# Patient Record
Sex: FEMALE | NOT HISPANIC OR LATINO
[De-identification: names, ages, dates, MRNs, and addresses within clinical notes are randomized per-mention and may not be internally consistent; named-entity substitution may affect disease eponyms.]

---

## 2017-03-21 ENCOUNTER — LAB SERVICES (OUTPATIENT)
Dept: OTHER | Age: 57
End: 2017-03-21

## 2017-03-21 ENCOUNTER — MYAURORA ACCOUNT LINK (OUTPATIENT)
Dept: OTHER | Age: 57
End: 2017-03-21

## 2017-03-21 ENCOUNTER — CHARTING TRANS (OUTPATIENT)
Dept: INTERNAL MEDICINE | Age: 57
End: 2017-03-21

## 2017-03-21 LAB
25(OH)D3 SERPL-MCNC: 21.9 NG/ML (ref 30–100)
ALBUMIN SERPL BCG-MCNC: 4.4 G/DL (ref 3.6–5.1)
ALP SERPL-CCNC: 66 U/L (ref 45–105)
ALT SERPL W/O P-5'-P-CCNC: 7 U/L (ref 7–34)
AST SERPL-CCNC: 13 U/L (ref 9–37)
BILIRUB SERPL-MCNC: 0.2 MG/DL (ref 0–1)
BILIRUBIN URINE: NEGATIVE
BLOOD URINE: ABNORMAL
BUN SERPL-MCNC: 16 MG/DL (ref 7–20)
CALCIUM SERPL-MCNC: 9.4 MG/DL (ref 8.6–10.6)
CHLORIDE SERPL-SCNC: 103 MMOL/L (ref 96–107)
CHOLEST SERPL-MCNC: 247 MG/DL (ref 140–200)
CLARITY: CLEAR
COLOR: YELLOW
CREATININE, SERUM: 1.3 MG/DL (ref 0.5–1.4)
DIFFERENTIAL TYPE: ABNORMAL
EST. AVERAGE GLUCOSE BLD GHB EST-MCNC: 113 MG/DL (ref 0–154)
GFR SERPL CREATININE-BSD FRML MDRD: 44 ML/MIN/{1.73M2}
GFR SERPL CREATININE-BSD FRML MDRD: 53 ML/MIN/{1.73M2}
GLUCOSE P FAST SERPL-MCNC: 92 MG/DL (ref 60–100)
GLUCOSE QUALITATIVE U: NEGATIVE
HBA1C MFR BLD: 5.6 % (ref 4.2–6)
HCO3 SERPL-SCNC: 28 MMOL/L (ref 22–32)
HDLC SERPL-MCNC: 66 MG/DL
HEMATOCRIT: 48.6 % (ref 34–45)
HEMOGLOBIN: 16.2 G/DL (ref 11.2–15.7)
KETONES, URINE: NEGATIVE
LDLC SERPL CALC-MCNC: 164 MG/DL (ref 0–100)
LEUKOCYTE ESTERASE URINE: NEGATIVE
LYMPH PERCENT: 30.7 % (ref 20.5–51.1)
LYMPHOCYTE ABSOLUTE #: 2.6 10*3/UL (ref 1.2–3.4)
MEAN CORPUSCULAR HGB CONCENTRATION: 33.3 % (ref 32–36)
MEAN CORPUSCULAR HGB: 30.1 PG (ref 27–34)
MEAN CORPUSCULAR VOLUME: 90.3 FL (ref 79–95)
MEAN PLATELET VOLUME: 9 FL (ref 8.6–12.4)
MIXED %: 8.1 % (ref 4.3–12.9)
MIXED ABSOLUTE #: 0.7 10*3/UL (ref 0.2–0.9)
NEUTROPHIL ABSOLUTE #: 5.2 10*3/UL (ref 1.4–6.5)
NEUTROPHIL PERCENT: 61.2 % (ref 34–73.5)
NITRITE URINE: NEGATIVE
PH URINE: 6.5 (ref 5–7)
PLATELET COUNT: 238 10*3/UL (ref 150–400)
POTASSIUM SERPL-SCNC: 4.5 MMOL/L (ref 3.5–5.3)
PROT SERPL-MCNC: 7.2 G/DL (ref 6.2–8.1)
RED BLOOD CELL COUNT: 5.38 10*6/UL (ref 3.7–5.2)
RED CELL DISTRIBUTION WIDTH: 14.3 % (ref 11.3–14.8)
RHEUMATOID FACT SERPL-ACNC: <8.6 [IU]/ML
SEDIMENTATION RATE, RBC: 8 MM/H (ref 0–20)
SODIUM SERPL-SCNC: 139 MMOL/L (ref 136–146)
SPECIFIC GRAVITY URINE: 1.01 (ref 1–1.03)
TRIGL SERPL-MCNC: 85 MG/DL (ref 0–200)
TSH SERPL-ACNC: 71.3 M[IU]/L (ref 0.3–4.82)
URINE PROTEIN, QUAL (DIPSTICK): NEGATIVE
UROBILINOGEN URINE: 0.2
WHITE BLOOD CELL COUNT: 8.5 10*3/UL (ref 4–10)

## 2017-03-22 ENCOUNTER — CHARTING TRANS (OUTPATIENT)
Dept: OTHER | Age: 57
End: 2017-03-22

## 2017-03-24 ENCOUNTER — CHARTING TRANS (OUTPATIENT)
Dept: OTHER | Age: 57
End: 2017-03-24

## 2017-03-24 LAB
ANA SER QL IA: NORMAL RATIO (ref 0–0.6)
CCP IGG FLD IA-ACNC: 1.7 U/ML (ref 0–7)
DSDNA IGG SERPL IA-ACNC: NORMAL [IU]/ML (ref 0–10)

## 2017-03-31 ENCOUNTER — CHARTING TRANS (OUTPATIENT)
Dept: OTHER | Age: 57
End: 2017-03-31

## 2017-05-06 ENCOUNTER — LAB SERVICES (OUTPATIENT)
Dept: OTHER | Age: 57
End: 2017-05-06

## 2017-05-06 LAB
DIFFERENTIAL TYPE: ABNORMAL
HEMATOCRIT: 47.9 % (ref 34–45)
HEMOGLOBIN: 15.3 G/DL (ref 11.2–15.7)
LYMPH PERCENT: 29.3 % (ref 20.5–51.1)
LYMPHOCYTE ABSOLUTE #: 2.5 10*3/UL (ref 1.2–3.4)
MEAN CORPUSCULAR HGB CONCENTRATION: 31.9 % (ref 32–36)
MEAN CORPUSCULAR HGB: 30.1 PG (ref 27–34)
MEAN CORPUSCULAR VOLUME: 94.1 FL (ref 79–95)
MEAN PLATELET VOLUME: 9.6 FL (ref 8.6–12.4)
MIXED %: 8.3 % (ref 4.3–12.9)
MIXED ABSOLUTE #: 0.7 10*3/UL (ref 0.2–0.9)
NEUTROPHIL ABSOLUTE #: 5.2 10*3/UL (ref 1.4–6.5)
NEUTROPHIL PERCENT: 62.4 % (ref 34–73.5)
PLATELET COUNT: 248 10*3/UL (ref 150–400)
RED BLOOD CELL COUNT: 5.09 10*6/UL (ref 3.7–5.2)
RED CELL DISTRIBUTION WIDTH: 14.4 % (ref 11.3–14.8)
TSH SERPL-ACNC: 32.1 M[IU]/L (ref 0.3–4.82)
WHITE BLOOD CELL COUNT: 8.4 10*3/UL (ref 4–10)

## 2017-05-09 ENCOUNTER — CHARTING TRANS (OUTPATIENT)
Dept: OTHER | Age: 57
End: 2017-05-09

## 2017-06-27 ENCOUNTER — CHARTING TRANS (OUTPATIENT)
Dept: OTHER | Age: 57
End: 2017-06-27

## 2017-07-06 ENCOUNTER — CHARTING TRANS (OUTPATIENT)
Dept: OTHER | Age: 57
End: 2017-07-06

## 2017-07-11 ENCOUNTER — LAB SERVICES (OUTPATIENT)
Dept: OTHER | Age: 57
End: 2017-07-11

## 2017-07-11 LAB — TSH SERPL-ACNC: 22.3 M[IU]/L (ref 0.3–4.82)

## 2017-07-14 ENCOUNTER — CHARTING TRANS (OUTPATIENT)
Dept: OTHER | Age: 57
End: 2017-07-14

## 2017-08-09 ENCOUNTER — LAB SERVICES (OUTPATIENT)
Dept: OTHER | Age: 57
End: 2017-08-09

## 2017-08-09 ENCOUNTER — CHARTING TRANS (OUTPATIENT)
Dept: RHEUMATOLOGY | Age: 57
End: 2017-08-09

## 2017-08-09 LAB
CRP SERPL-MCNC: <0.5 MG/DL (ref 0–1)
HBV SURFACE AG SERPL QL IA: NEGATIVE
SEDIMENTATION RATE, RBC: 12 MM/H (ref 0–20)

## 2017-08-10 LAB
HBV SURFACE AB SER QL: NEGATIVE
HCV AB SER QL: NEGATIVE

## 2017-08-16 LAB — G6PD RBC-CCNT: 12.4 U/G HB (ref 8.6–18)

## 2017-08-21 ENCOUNTER — CHARTING TRANS (OUTPATIENT)
Dept: OTHER | Age: 57
End: 2017-08-21

## 2017-08-29 ENCOUNTER — CHARTING TRANS (OUTPATIENT)
Dept: OTHER | Age: 57
End: 2017-08-29

## 2017-09-05 ENCOUNTER — MYAURORA ACCOUNT LINK (OUTPATIENT)
Dept: OTHER | Age: 57
End: 2017-09-05

## 2017-09-05 ENCOUNTER — CHARTING TRANS (OUTPATIENT)
Dept: RHEUMATOLOGY | Age: 57
End: 2017-09-05

## 2017-09-08 ENCOUNTER — CHARTING TRANS (OUTPATIENT)
Dept: OTHER | Age: 57
End: 2017-09-08

## 2017-09-14 ENCOUNTER — MYAURORA ACCOUNT LINK (OUTPATIENT)
Dept: OTHER | Age: 57
End: 2017-09-14

## 2017-09-14 ENCOUNTER — CHARTING TRANS (OUTPATIENT)
Dept: ORTHOPEDICS | Age: 57
End: 2017-09-14

## 2017-09-14 ENCOUNTER — LAB SERVICES (OUTPATIENT)
Dept: OTHER | Age: 57
End: 2017-09-14

## 2017-09-14 LAB — TSH SERPL-ACNC: 40.3 M[IU]/L (ref 0.3–4.82)

## 2018-01-02 ENCOUNTER — CHARTING TRANS (OUTPATIENT)
Dept: OTHER | Age: 58
End: 2018-01-02

## 2018-01-17 ENCOUNTER — LAB SERVICES (OUTPATIENT)
Dept: OTHER | Age: 58
End: 2018-01-17

## 2018-01-17 LAB — TSH SERPL-ACNC: 110 M[IU]/L (ref 0.3–4.82)

## 2018-02-26 ENCOUNTER — CHARTING TRANS (OUTPATIENT)
Dept: OTHER | Age: 58
End: 2018-02-26

## 2018-02-27 ENCOUNTER — CHARTING TRANS (OUTPATIENT)
Dept: OTHER | Age: 58
End: 2018-02-27

## 2018-11-28 VITALS
SYSTOLIC BLOOD PRESSURE: 120 MMHG | RESPIRATION RATE: 16 BRPM | DIASTOLIC BLOOD PRESSURE: 68 MMHG | HEIGHT: 63 IN | BODY MASS INDEX: 30.65 KG/M2 | HEART RATE: 64 BPM | WEIGHT: 173 LBS

## 2018-11-28 VITALS
WEIGHT: 170 LBS | HEART RATE: 62 BPM | RESPIRATION RATE: 18 BRPM | DIASTOLIC BLOOD PRESSURE: 78 MMHG | BODY MASS INDEX: 30.12 KG/M2 | HEIGHT: 63 IN | SYSTOLIC BLOOD PRESSURE: 120 MMHG

## 2018-11-28 VITALS
SYSTOLIC BLOOD PRESSURE: 140 MMHG | BODY MASS INDEX: 27.42 KG/M2 | HEART RATE: 80 BPM | DIASTOLIC BLOOD PRESSURE: 80 MMHG | HEIGHT: 62 IN | WEIGHT: 149 LBS | TEMPERATURE: 98.2 F

## 2018-11-28 VITALS — WEIGHT: 173 LBS | HEIGHT: 63 IN | BODY MASS INDEX: 30.65 KG/M2

## 2020-11-02 ENCOUNTER — APPOINTMENT (OUTPATIENT)
Dept: BEHAVIORAL HEALTH | Age: 60
End: 2020-11-02

## 2023-10-04 ENCOUNTER — APPOINTMENT (OUTPATIENT)
Dept: GENERAL RADIOLOGY | Facility: HOSPITAL | Age: 63
End: 2023-10-04
Attending: EMERGENCY MEDICINE

## 2023-10-04 ENCOUNTER — APPOINTMENT (OUTPATIENT)
Dept: GENERAL RADIOLOGY | Facility: HOSPITAL | Age: 63
End: 2023-10-04

## 2023-10-04 ENCOUNTER — APPOINTMENT (OUTPATIENT)
Dept: CT IMAGING | Facility: HOSPITAL | Age: 63
End: 2023-10-04
Attending: EMERGENCY MEDICINE

## 2023-10-04 ENCOUNTER — HOSPITAL ENCOUNTER (EMERGENCY)
Facility: HOSPITAL | Age: 63
Discharge: HOME | End: 2023-10-04
Attending: EMERGENCY MEDICINE

## 2023-10-04 VITALS
HEIGHT: 63 IN | RESPIRATION RATE: 18 BRPM | HEART RATE: 73 BPM | DIASTOLIC BLOOD PRESSURE: 57 MMHG | WEIGHT: 185 LBS | OXYGEN SATURATION: 93 % | BODY MASS INDEX: 32.78 KG/M2 | SYSTOLIC BLOOD PRESSURE: 107 MMHG | TEMPERATURE: 98 F

## 2023-10-04 DIAGNOSIS — S09.90XA INJURY OF HEAD, INITIAL ENCOUNTER: ICD-10-CM

## 2023-10-04 DIAGNOSIS — S40.011A CONTUSION OF RIGHT SHOULDER, INITIAL ENCOUNTER: ICD-10-CM

## 2023-10-04 DIAGNOSIS — S80.01XA CONTUSION OF RIGHT KNEE, INITIAL ENCOUNTER: ICD-10-CM

## 2023-10-04 DIAGNOSIS — S93.402A MILD SPRAIN OF LEFT ANKLE, INITIAL ENCOUNTER: Primary | ICD-10-CM

## 2023-10-04 PROCEDURE — 99284 EMERGENCY DEPT VISIT MOD MDM: CPT

## 2023-10-04 PROCEDURE — 73030 X-RAY EXAM OF SHOULDER: CPT | Performed by: EMERGENCY MEDICINE

## 2023-10-04 PROCEDURE — 73610 X-RAY EXAM OF ANKLE: CPT | Performed by: EMERGENCY MEDICINE

## 2023-10-04 PROCEDURE — 70450 CT HEAD/BRAIN W/O DYE: CPT | Performed by: EMERGENCY MEDICINE

## 2023-10-04 PROCEDURE — 73560 X-RAY EXAM OF KNEE 1 OR 2: CPT | Performed by: EMERGENCY MEDICINE

## 2023-10-04 RX ORDER — HYDROCODONE BITARTRATE AND ACETAMINOPHEN 5; 325 MG/1; MG/1
1 TABLET ORAL ONCE
Status: COMPLETED | OUTPATIENT
Start: 2023-10-04 | End: 2023-10-04

## 2023-10-04 RX ORDER — ASPIRIN 81 MG/1
81 TABLET ORAL DAILY
COMMUNITY

## 2023-10-04 NOTE — ED INITIAL ASSESSMENT (HPI)
Patient to triage via ems from home, c/o fall at home while she was in the kitchen. Stated her legs started to shake and she fell forward. Has been dealing with this issue of her legs shaking for a few months. + left ankle pain and swelling noted. Right knee and right shoulder pain. Stated she hit her head. Stated she is on a blood thinner but unsure which one. Denies any LOC.

## 2023-10-04 NOTE — CM/SW NOTE
Per RN, multiple [email or web address] companies called to attempt transfer home post discharge from ER, but none available. [facility] arranged to transport patient home.

## 2023-10-09 ENCOUNTER — APPOINTMENT (OUTPATIENT)
Dept: GENERAL RADIOLOGY | Facility: HOSPITAL | Age: 63
End: 2023-10-09
Attending: EMERGENCY MEDICINE

## 2023-10-09 ENCOUNTER — HOSPITAL ENCOUNTER (OUTPATIENT)
Facility: HOSPITAL | Age: 63
Setting detail: OBSERVATION
Discharge: NURSING, REHAB OR LONG-TERM CARE | End: 2023-10-12
Attending: EMERGENCY MEDICINE | Admitting: STUDENT IN AN ORGANIZED HEALTH CARE EDUCATION/TRAINING PROGRAM

## 2023-10-09 DIAGNOSIS — R29.6 FREQUENT FALLS: ICD-10-CM

## 2023-10-09 DIAGNOSIS — S42.202A CLOSED FRACTURE OF PROXIMAL END OF LEFT HUMERUS, UNSPECIFIED FRACTURE MORPHOLOGY, INITIAL ENCOUNTER: Primary | ICD-10-CM

## 2023-10-09 LAB
ANION GAP SERPL CALC-SCNC: 10 MMOL/L (ref 0–18)
BASOPHILS # BLD AUTO: 0.11 X10(3) UL (ref 0–0.2)
BASOPHILS NFR BLD AUTO: 0.9 %
BUN BLD-MCNC: 10 MG/DL (ref 7–18)
BUN/CREAT SERPL: 9.6 (ref 10–20)
CALCIUM BLD-MCNC: 9.1 MG/DL (ref 8.5–10.1)
CHLORIDE SERPL-SCNC: 109 MMOL/L (ref 98–112)
CO2 SERPL-SCNC: 19 MMOL/L (ref 21–32)
CREAT BLD-MCNC: 1.04 MG/DL
DEPRECATED RDW RBC AUTO: 47 FL (ref 35.1–46.3)
EGFRCR SERPLBLD CKD-EPI 2021: 61 ML/MIN/1.73M2 (ref 60–?)
EOSINOPHIL # BLD AUTO: 0.03 X10(3) UL (ref 0–0.7)
EOSINOPHIL NFR BLD AUTO: 0.3 %
ERYTHROCYTE [DISTWIDTH] IN BLOOD BY AUTOMATED COUNT: 13.8 % (ref 11–15)
EST. AVERAGE GLUCOSE BLD GHB EST-MCNC: 134 MG/DL (ref 68–126)
GLUCOSE BLD-MCNC: 113 MG/DL (ref 70–99)
GLUCOSE BLDC GLUCOMTR-MCNC: 105 MG/DL (ref 70–99)
HBA1C MFR BLD: 6.3 % (ref ?–5.7)
HCT VFR BLD AUTO: 46.7 %
HGB BLD-MCNC: 15 G/DL
IMM GRANULOCYTES # BLD AUTO: 0.04 X10(3) UL (ref 0–1)
IMM GRANULOCYTES NFR BLD: 0.3 %
LYMPHOCYTES # BLD AUTO: 2.08 X10(3) UL (ref 1–4)
LYMPHOCYTES NFR BLD AUTO: 17.6 %
MCH RBC QN AUTO: 29.5 PG (ref 26–34)
MCHC RBC AUTO-ENTMCNC: 32.1 G/DL (ref 31–37)
MCV RBC AUTO: 91.9 FL
MONOCYTES # BLD AUTO: 0.96 X10(3) UL (ref 0.1–1)
MONOCYTES NFR BLD AUTO: 8.1 %
NEUTROPHILS # BLD AUTO: 8.61 X10 (3) UL (ref 1.5–7.7)
NEUTROPHILS # BLD AUTO: 8.61 X10(3) UL (ref 1.5–7.7)
NEUTROPHILS NFR BLD AUTO: 72.8 %
OSMOLALITY SERPL CALC.SUM OF ELEC: 286 MOSM/KG (ref 275–295)
PLATELET # BLD AUTO: 272 10(3)UL (ref 150–450)
POTASSIUM SERPL-SCNC: 4.6 MMOL/L (ref 3.5–5.1)
RBC # BLD AUTO: 5.08 X10(6)UL
SODIUM SERPL-SCNC: 138 MMOL/L (ref 136–145)
WBC # BLD AUTO: 11.8 X10(3) UL (ref 4–11)

## 2023-10-09 PROCEDURE — 73030 X-RAY EXAM OF SHOULDER: CPT | Performed by: EMERGENCY MEDICINE

## 2023-10-09 PROCEDURE — 73080 X-RAY EXAM OF ELBOW: CPT | Performed by: EMERGENCY MEDICINE

## 2023-10-09 RX ORDER — ACETAMINOPHEN 500 MG
500 TABLET ORAL EVERY 4 HOURS PRN
Status: DISCONTINUED | OUTPATIENT
Start: 2023-10-09 | End: 2023-10-12

## 2023-10-09 RX ORDER — NICOTINE POLACRILEX 4 MG
30 LOZENGE BUCCAL
Status: DISCONTINUED | OUTPATIENT
Start: 2023-10-09 | End: 2023-10-12

## 2023-10-09 RX ORDER — BISACODYL 10 MG
10 SUPPOSITORY, RECTAL RECTAL
Status: DISCONTINUED | OUTPATIENT
Start: 2023-10-09 | End: 2023-10-12

## 2023-10-09 RX ORDER — IBUPROFEN 600 MG/1
600 TABLET ORAL ONCE
Status: COMPLETED | OUTPATIENT
Start: 2023-10-09 | End: 2023-10-09

## 2023-10-09 RX ORDER — NICOTINE POLACRILEX 4 MG
15 LOZENGE BUCCAL
Status: DISCONTINUED | OUTPATIENT
Start: 2023-10-09 | End: 2023-10-12

## 2023-10-09 RX ORDER — ENEMA 19; 7 G/133ML; G/133ML
1 ENEMA RECTAL ONCE AS NEEDED
Status: DISCONTINUED | OUTPATIENT
Start: 2023-10-09 | End: 2023-10-12

## 2023-10-09 RX ORDER — LEVOTHYROXINE SODIUM 0.05 MG/1
50 TABLET ORAL
Status: DISCONTINUED | OUTPATIENT
Start: 2023-10-10 | End: 2023-10-10

## 2023-10-09 RX ORDER — ONDANSETRON 2 MG/ML
4 INJECTION INTRAMUSCULAR; INTRAVENOUS EVERY 6 HOURS PRN
Status: DISCONTINUED | OUTPATIENT
Start: 2023-10-09 | End: 2023-10-12

## 2023-10-09 RX ORDER — KETOROLAC TROMETHAMINE 15 MG/ML
30 INJECTION, SOLUTION INTRAMUSCULAR; INTRAVENOUS EVERY 6 HOURS PRN
Status: DISCONTINUED | OUTPATIENT
Start: 2023-10-09 | End: 2023-10-11

## 2023-10-09 RX ORDER — LISINOPRIL 5 MG/1
5 TABLET ORAL DAILY
Status: DISCONTINUED | OUTPATIENT
Start: 2023-10-10 | End: 2023-10-12

## 2023-10-09 RX ORDER — ATORVASTATIN CALCIUM 10 MG/1
10 TABLET, FILM COATED ORAL NIGHTLY
Status: DISCONTINUED | OUTPATIENT
Start: 2023-10-09 | End: 2023-10-12

## 2023-10-09 RX ORDER — PROCHLORPERAZINE EDISYLATE 5 MG/ML
5 INJECTION INTRAMUSCULAR; INTRAVENOUS EVERY 8 HOURS PRN
Status: DISCONTINUED | OUTPATIENT
Start: 2023-10-09 | End: 2023-10-12

## 2023-10-09 RX ORDER — POLYETHYLENE GLYCOL 3350 17 G/17G
17 POWDER, FOR SOLUTION ORAL DAILY PRN
Status: DISCONTINUED | OUTPATIENT
Start: 2023-10-09 | End: 2023-10-12

## 2023-10-09 RX ORDER — ALBUTEROL SULFATE 90 UG/1
2 AEROSOL, METERED RESPIRATORY (INHALATION) EVERY 4 HOURS PRN
COMMUNITY

## 2023-10-09 RX ORDER — TRAMADOL HYDROCHLORIDE 50 MG/1
50 TABLET ORAL EVERY 6 HOURS PRN
Qty: 10 TABLET | Refills: 0 | Status: SHIPPED | OUTPATIENT
Start: 2023-10-09 | End: 2023-10-12

## 2023-10-09 RX ORDER — ALPRAZOLAM 1 MG/1
1 TABLET, EXTENDED RELEASE ORAL 3 TIMES DAILY PRN
COMMUNITY
End: 2023-10-12

## 2023-10-09 RX ORDER — SENNOSIDES 8.6 MG
17.2 TABLET ORAL NIGHTLY PRN
Status: DISCONTINUED | OUTPATIENT
Start: 2023-10-09 | End: 2023-10-12

## 2023-10-09 RX ORDER — ATORVASTATIN CALCIUM 10 MG/1
10 TABLET, FILM COATED ORAL NIGHTLY
Status: DISCONTINUED | OUTPATIENT
Start: 2023-10-09 | End: 2023-10-09

## 2023-10-09 RX ORDER — DEXTROSE MONOHYDRATE 25 G/50ML
50 INJECTION, SOLUTION INTRAVENOUS
Status: DISCONTINUED | OUTPATIENT
Start: 2023-10-09 | End: 2023-10-12

## 2023-10-09 NOTE — ED INITIAL ASSESSMENT (HPI)
Pt arrives via EMS from home, pt was seen here 10/4 for a fall, fell again today, pt c/o L shoulder pain. Pt is scheduled to see neuro tomorrow for eval.   Pt denies LOC with fall, head injury, blood thinners.

## 2023-10-10 ENCOUNTER — APPOINTMENT (OUTPATIENT)
Dept: MRI IMAGING | Facility: HOSPITAL | Age: 63
End: 2023-10-10
Attending: Other

## 2023-10-10 ENCOUNTER — APPOINTMENT (OUTPATIENT)
Dept: GENERAL RADIOLOGY | Facility: HOSPITAL | Age: 63
End: 2023-10-10
Attending: STUDENT IN AN ORGANIZED HEALTH CARE EDUCATION/TRAINING PROGRAM

## 2023-10-10 PROBLEM — R56.9 SEIZURE-LIKE ACTIVITY (HCC): Status: ACTIVE | Noted: 2023-10-10

## 2023-10-10 LAB
ANION GAP SERPL CALC-SCNC: 5 MMOL/L (ref 0–18)
BASOPHILS # BLD AUTO: 0.1 X10(3) UL (ref 0–0.2)
BASOPHILS NFR BLD AUTO: 1.2 %
BUN BLD-MCNC: 12 MG/DL (ref 7–18)
BUN/CREAT SERPL: 10.3 (ref 10–20)
CALCIUM BLD-MCNC: 9 MG/DL (ref 8.5–10.1)
CHLORIDE SERPL-SCNC: 110 MMOL/L (ref 98–112)
CO2 SERPL-SCNC: 26 MMOL/L (ref 21–32)
CREAT BLD-MCNC: 1.16 MG/DL
DEPRECATED RDW RBC AUTO: 46.8 FL (ref 35.1–46.3)
EGFRCR SERPLBLD CKD-EPI 2021: 54 ML/MIN/1.73M2 (ref 60–?)
EOSINOPHIL # BLD AUTO: 0.11 X10(3) UL (ref 0–0.7)
EOSINOPHIL NFR BLD AUTO: 1.3 %
ERYTHROCYTE [DISTWIDTH] IN BLOOD BY AUTOMATED COUNT: 13.9 % (ref 11–15)
GLUCOSE BLD-MCNC: 118 MG/DL (ref 70–99)
GLUCOSE BLDC GLUCOMTR-MCNC: 101 MG/DL (ref 70–99)
GLUCOSE BLDC GLUCOMTR-MCNC: 109 MG/DL (ref 70–99)
GLUCOSE BLDC GLUCOMTR-MCNC: 115 MG/DL (ref 70–99)
GLUCOSE BLDC GLUCOMTR-MCNC: 124 MG/DL (ref 70–99)
HCT VFR BLD AUTO: 46.6 %
HGB BLD-MCNC: 14.7 G/DL
IMM GRANULOCYTES # BLD AUTO: 0.03 X10(3) UL (ref 0–1)
IMM GRANULOCYTES NFR BLD: 0.4 %
LYMPHOCYTES # BLD AUTO: 1.94 X10(3) UL (ref 1–4)
LYMPHOCYTES NFR BLD AUTO: 22.6 %
MCH RBC QN AUTO: 29 PG (ref 26–34)
MCHC RBC AUTO-ENTMCNC: 31.5 G/DL (ref 31–37)
MCV RBC AUTO: 91.9 FL
MONOCYTES # BLD AUTO: 0.9 X10(3) UL (ref 0.1–1)
MONOCYTES NFR BLD AUTO: 10.5 %
NEUTROPHILS # BLD AUTO: 5.49 X10 (3) UL (ref 1.5–7.7)
NEUTROPHILS # BLD AUTO: 5.49 X10(3) UL (ref 1.5–7.7)
NEUTROPHILS NFR BLD AUTO: 64 %
OSMOLALITY SERPL CALC.SUM OF ELEC: 293 MOSM/KG (ref 275–295)
PLATELET # BLD AUTO: 274 10(3)UL (ref 150–450)
POTASSIUM SERPL-SCNC: 4 MMOL/L (ref 3.5–5.1)
RBC # BLD AUTO: 5.07 X10(6)UL
SODIUM SERPL-SCNC: 141 MMOL/L (ref 136–145)
TSI SER-ACNC: 0.53 MIU/ML (ref 0.36–3.74)
VIT B12 SERPL-MCNC: 581 PG/ML (ref 193–986)
WBC # BLD AUTO: 8.6 X10(3) UL (ref 4–11)

## 2023-10-10 PROCEDURE — 99223 1ST HOSP IP/OBS HIGH 75: CPT | Performed by: OTHER

## 2023-10-10 PROCEDURE — 99233 SBSQ HOSP IP/OBS HIGH 50: CPT | Performed by: HOSPITALIST

## 2023-10-10 PROCEDURE — 73020 X-RAY EXAM OF SHOULDER: CPT | Performed by: STUDENT IN AN ORGANIZED HEALTH CARE EDUCATION/TRAINING PROGRAM

## 2023-10-10 PROCEDURE — 70553 MRI BRAIN STEM W/O & W/DYE: CPT | Performed by: OTHER

## 2023-10-10 PROCEDURE — 73030 X-RAY EXAM OF SHOULDER: CPT | Performed by: STUDENT IN AN ORGANIZED HEALTH CARE EDUCATION/TRAINING PROGRAM

## 2023-10-10 PROCEDURE — 95816 EEG AWAKE AND DROWSY: CPT | Performed by: OTHER

## 2023-10-10 RX ORDER — ALPRAZOLAM 0.5 MG/1
1 TABLET ORAL 3 TIMES DAILY PRN
Status: DISCONTINUED | OUTPATIENT
Start: 2023-10-10 | End: 2023-10-12

## 2023-10-10 RX ORDER — LEVOTHYROXINE SODIUM 0.15 MG/1
150 TABLET ORAL
Status: DISCONTINUED | OUTPATIENT
Start: 2023-10-11 | End: 2023-10-12

## 2023-10-10 RX ORDER — LORAZEPAM 2 MG/ML
2 INJECTION INTRAMUSCULAR EVERY 4 HOURS PRN
Status: DISCONTINUED | OUTPATIENT
Start: 2023-10-10 | End: 2023-10-12

## 2023-10-10 RX ORDER — LAMOTRIGINE 25 MG/1
25 TABLET ORAL DAILY
Status: DISCONTINUED | OUTPATIENT
Start: 2023-10-10 | End: 2023-10-12

## 2023-10-10 RX ORDER — HYDROCODONE BITARTRATE AND ACETAMINOPHEN 5; 325 MG/1; MG/1
1 TABLET ORAL EVERY 4 HOURS PRN
Status: DISCONTINUED | OUTPATIENT
Start: 2023-10-10 | End: 2023-10-12

## 2023-10-10 RX ORDER — ALBUTEROL SULFATE 90 UG/1
2 AEROSOL, METERED RESPIRATORY (INHALATION) EVERY 4 HOURS PRN
Status: DISCONTINUED | OUTPATIENT
Start: 2023-10-10 | End: 2023-10-12

## 2023-10-10 RX ORDER — LEVOTHYROXINE SODIUM 0.07 MG/1
75 TABLET ORAL
Status: DISCONTINUED | OUTPATIENT
Start: 2023-10-15 | End: 2023-10-12

## 2023-10-10 RX ORDER — GADOTERATE MEGLUMINE 376.9 MG/ML
20 INJECTION INTRAVENOUS
Status: COMPLETED | OUTPATIENT
Start: 2023-10-10 | End: 2023-10-10

## 2023-10-10 RX ORDER — MORPHINE SULFATE 2 MG/ML
2 INJECTION, SOLUTION INTRAMUSCULAR; INTRAVENOUS EVERY 4 HOURS PRN
Status: DISCONTINUED | OUTPATIENT
Start: 2023-10-10 | End: 2023-10-11

## 2023-10-10 NOTE — PROCEDURES
EEG report    REFERRING PHYSICIAN: Javier Morgan MD    PCP and phone number:  No primary care provider on file. None    TECHNIQUE: 21 channels of EEG, 2 channels of EOG, and 1 channel of EKG were recorded utilizing the International 10/20 System. The recording was performed in a digitized monopolar referential format and playback was reformatted into various referential and bipolar montages utilizing appropriate filter settings. Automatic seizure and spike detection programs were utilized throughout the recording. Video was not recorded during the study    CLINICAL DATA:  Patient is sent for the evaluation of possible seizures. MEDICATION:  Continuous Medications:      Scheduled Medications:    Current Outpatient Medications:     traMADol 50 MG Oral Tab, Take 1 tablet (50 mg total) by mouth every 6 (six) hours as needed. , Disp: 10 tablet, Rfl: 0    PRN Medications:  morphINE, HYDROcodone-acetaminophen, ALPRAZolam, albuterol, LORazepam, acetaminophen, melatonin, polyethylene glycol (PEG 3350), sennosides, bisacodyl, fleet enema, ondansetron, prochlorperazine, glucose **OR** glucose **OR** glucose-vitamin C **OR** dextrose **OR** glucose **OR** glucose **OR** glucose-vitamin C, ketorolac    ACTIVATION:  Hyperventilation: Not done  Photic stimulation: Done, no abnormalities  Sleep: Normal sleep architecture was seen. BACKGROUND  While the patient was awake, the posterior dominant rhythm consisted of poorly-regulated 7-8 Hz rhythmic waveforms, symmetrically distributed over both posterior quadrants and was reactive to eye opening. EEG ABNORMALITY  None    IMPRESSION:  This is a slightly abnormal EEG (PDR was slower than normal). No focal, lateralized, or epileptiform features are noted. Clinical correlation required.

## 2023-10-10 NOTE — PLAN OF CARE
Monitoring vital signs- stable at this time. No acute changes noted throughout shift. Pt left arm in sling. Monitoring blood glucose levels per order- ACHS. Tolerating carb controlled diet. Voiding via purewick. SCDs for DVT prophylaxis. Norco provided as needed for pain. NWB to LUE. Fall and seizure precautions maintained- bed alarm on, bed locked in lowest position, call light and personal belongings within reach, non-skid socks in place to bilateral feet, and seizure pads in place. Frequent rounding by nursing staff. Plan for MRI and PT/OT eval, neurology and surgery on consult. Problem: Patient Centered Care  Goal: Patient preferences are identified and integrated in the patient's plan of care  Description: Interventions:  - What would you like us to know as we care for you?  - Provide timely, complete, and accurate information to patient/family  - Incorporate patient and family knowledge, values, beliefs, and cultural backgrounds into the planning and delivery of care  - Encourage patient/family to participate in care and decision-making at the level they choose  - Honor patient and family perspectives and choices  Outcome: Progressing     Problem: Patient/Family Goals  Goal: Patient/Family Long Term Goal  Description: Patient's Long Term Goal: Pt free of falls    Interventions:  - Fall risk precautions, safety education, seizure precautions  - See additional Care Plan goals for specific interventions  Outcome: Progressing  Goal: Patient/Family Short Term Goal  Description: Patient's Short Term Goal: Pain at comfortable score    Interventions:   - Pain management with medication  - See additional Care Plan goals for specific interventions  Outcome: Progressing     Problem: SAFETY ADULT - FALL  Goal: Free from fall injury  Description: INTERVENTIONS:  - Assess pt frequently for physical needs  - Identify cognitive and physical deficits and behaviors that affect risk of falls.   - [place] fall precautions as indicated by assessment.  - Educate pt/family on patient safety including physical limitations  - Instruct pt to call for assistance with activity based on assessment  - Modify environment to reduce risk of injury  - Provide assistive devices as appropriate  - Consider OT/PT consult to assist with strengthening/mobility  - Encourage toileting schedule  Outcome: Progressing     Problem: PAIN - ADULT  Goal: Verbalizes/displays adequate comfort level or patient's stated pain goal  Description: INTERVENTIONS:  - Encourage pt to monitor pain and request assistance  - Assess pain using appropriate pain scale  - Administer analgesics based on type and severity of pain and evaluate response  - Implement non-pharmacological measures as appropriate and evaluate response  - Consider cultural and social influences on pain and pain management  - Manage/alleviate anxiety  - Utilize distraction and/or relaxation techniques  - Monitor for opioid side effects  - Notify MD/LIP if interventions unsuccessful or patient reports new pain  - Anticipate increased pain with activity and pre-medicate as appropriate  Outcome: Progressing     Problem: SKIN/TISSUE INTEGRITY - ADULT  Goal: Skin integrity remains intact  Description: INTERVENTIONS  - Assess and document risk factors for pressure ulcer development  - Assess and document skin integrity  - Monitor for areas of redness and/or skin breakdown  - Initiate interventions, skin care algorithm/standards of care as needed  Outcome: Progressing     Problem: MUSCULOSKELETAL - ADULT  Goal: Return mobility to safest level of function  Description: INTERVENTIONS:  - Assess patient stability and activity tolerance for standing, transferring and ambulating w/ or w/o assistive devices  - Assist with transfers and ambulation using safe patient handling equipment as needed  - Ensure adequate protection for wounds/incisions during mobilization  - Obtain PT/OT consults as needed  - Advance activity as appropriate  - Communicate ordered activity level and limitations with patient/family  Outcome: Progressing  Goal: Maintain proper alignment of affected body part  Description: INTERVENTIONS:  - Support and protect limb and body alignment per provider's orders  - Instruct and reinforce with patient and family use of appropriate assistive device and precautions (e.g. spinal or hip dislocation precautions)  Outcome: Progressing     Problem: Impaired Functional Mobility  Goal: Achieve highest/safest level of mobility/gait  Description: Interventions:  - Assess patient's functional ability and stability  - Promote increasing activity/tolerance for mobility and gait  - Educate and engage patient/family in tolerated activity level and precautions  Outcome: Progressing     Problem: Impaired Activities of Daily Living  Goal: Achieve highest/safest level of independence in self care  Description: Interventions:  - Assess ability and encourage patient to participate in ADLs to maximize function  - Promote sitting position while performing ADLs such as feeding, grooming, and bathing  - Educate and encourage patient/family in tolerated functional activity level and precautions during self-care  Outcome: Progressing     Problem: RISK FOR INFECTION - ADULT  Goal: Absence of fever/infection during anticipated neutropenic period  Description: INTERVENTIONS  - Monitor WBC  - Administer growth factors as ordered  - Implement neutropenic guidelines  Outcome: Progressing     Problem: DISCHARGE PLANNING  Goal: Discharge to home or other facility with appropriate resources  Description: INTERVENTIONS:  - Identify barriers to discharge w/pt and caregiver  - Include patient/family/discharge partner in discharge planning  - Arrange for needed discharge resources and transportation as appropriate  - Identify discharge learning needs (meds, wound care, etc)  - Arrange for interpreters to assist at discharge as needed  - Consider post-discharge preferences of patient/family/discharge partner  - Complete POLST form as appropriate  - Assess patient's ability to be responsible for managing their own health  - Refer to Case Management Department for coordinating discharge planning if the patient needs post-hospital services based on physician/LIP order or complex needs related to functional status, cognitive ability or social support system  Outcome: Progressing

## 2023-10-10 NOTE — PROGRESS NOTES
Spoke with nurse- continue to await clarification orders from the ortho MD. Nurse plans to reach out to MD

## 2023-10-10 NOTE — ED QUICK NOTES
Pt reports ambulatory with assistance of a walker. Pt reports \"I can't use a cane, it doesn't work for [facility] [employer]". Pt states, \"I don't have any friends/ family or support system to help me at home. I am normally able to drive but I don't think I will be able to\".

## 2023-10-10 NOTE — ED QUICK NOTES
Orders for admission, patient is aware of plan and ready to go upstairs. Any questions, please call ED RN melody at extension [phone].      Patient Covid vaccination status: Fully vaccinated     COVID Test Ordered in ED: None    COVID Suspicion at Admission: N/A    Running Infusions:  None    Mental Status/LOC at time of transport: a & o x 4    Other pertinent information:   CIWA score: N/A   NIH score:  N/A

## 2023-10-10 NOTE — PROGRESS NOTES
Orders for OT eval received. Chart reviewed. Pt present with LT humerus fx. Ortho consult pending. Will defer eval until cleared by ortho.

## 2023-10-10 NOTE — PLAN OF CARE
Problem: Patient Centered Care  Goal: Patient preferences are identified and integrated in the patient's plan of care  Description: Interventions:  - What would you like us to know as we care for you?   - Provide timely, complete, and accurate information to patient/family  - Incorporate patient and family knowledge, values, beliefs, and cultural backgrounds into the planning and delivery of care  - Encourage patient/family to participate in care and decision-making at the level they choose  - Honor patient and family perspectives and choices  Outcome: Progressing     Problem: Patient/Family Goals  Goal: Patient/Family Long Term Goal  Description: Patient's Long Term Goal:     Interventions:  -   - See additional Care Plan goals for specific interventions  Outcome: Progressing  Goal: Patient/Family Short Term Goal  Description: Patient's Short Term Goal:     Interventions:   -   - See additional Care Plan goals for specific interventions  Outcome: Progressing     Problem: SAFETY ADULT - FALL  Goal: Free from fall injury  Description: INTERVENTIONS:  - Assess pt frequently for physical needs  - Identify cognitive and physical deficits and behaviors that affect risk of falls.   - [place] fall precautions as indicated by assessment.  - Educate pt/family on patient safety including physical limitations  - Instruct pt to call for assistance with activity based on assessment  - Modify environment to reduce risk of injury  - Provide assistive devices as appropriate  - Consider OT/PT consult to assist with strengthening/mobility  - Encourage toileting schedule  Outcome: Progressing     Problem: PAIN - ADULT  Goal: Verbalizes/displays adequate comfort level or patient's stated pain goal  Description: INTERVENTIONS:  - Encourage pt to monitor pain and request assistance  - Assess pain using appropriate pain scale  - Administer analgesics based on type and severity of pain and evaluate response  - Implement non-pharmacological measures as appropriate and evaluate response  - Consider cultural and social influences on pain and pain management  - Manage/alleviate anxiety  - Utilize distraction and/or relaxation techniques  - Monitor for opioid side effects  - Notify MD/LIP if interventions unsuccessful or patient reports new pain  - Anticipate increased pain with activity and pre-medicate as appropriate  Outcome: Progressing     Problem: SKIN/TISSUE INTEGRITY - ADULT  Goal: Skin integrity remains intact  Description: INTERVENTIONS  - Assess and document risk factors for pressure ulcer development  - Assess and document skin integrity  - Monitor for areas of redness and/or skin breakdown  - Initiate interventions, skin care algorithm/standards of care as needed  Outcome: Progressing     Problem: MUSCULOSKELETAL - ADULT  Goal: Return mobility to safest level of function  Description: INTERVENTIONS:  - Assess patient stability and activity tolerance for standing, transferring and ambulating w/ or w/o assistive devices  - Assist with transfers and ambulation using safe patient handling equipment as needed  - Ensure adequate protection for wounds/incisions during mobilization  - Obtain PT/OT consults as needed  - Advance activity as appropriate  - Communicate ordered activity level and limitations with patient/family  Outcome: Progressing  Goal: Maintain proper alignment of affected body part  Description: INTERVENTIONS:  - Support and protect limb and body alignment per provider's orders  - Instruct and reinforce with patient and family use of appropriate assistive device and precautions (e.g. spinal or hip dislocation precautions)  Outcome: Progressing     Problem: Impaired Functional Mobility  Goal: Achieve highest/safest level of mobility/gait  Description: Interventions:  - Assess patient's functional ability and stability  - Promote increasing activity/tolerance for mobility and gait  - Educate and engage patient/family in tolerated activity level and precautions  Outcome: Progressing     Problem: Impaired Activities of Daily Living  Goal: Achieve highest/safest level of independence in self care  Description: Interventions:  - Assess ability and encourage patient to participate in ADLs to maximize function  - Promote sitting position while performing ADLs such as feeding, grooming, and bathing  - Educate and encourage patient/family in tolerated functional activity level and precautions during self-care  Outcome: Progressing     Problem: RISK FOR INFECTION - ADULT  Goal: Absence of fever/infection during anticipated neutropenic period  Description: INTERVENTIONS  - Monitor WBC  - Administer growth factors as ordered  - Implement neutropenic guidelines  Outcome: Progressing     Problem: DISCHARGE PLANNING  Goal: Discharge to home or other facility with appropriate resources  Description: INTERVENTIONS:  - Identify barriers to discharge w/pt and caregiver  - Include patient/family/discharge partner in discharge planning  - Arrange for needed discharge resources and transportation as appropriate  - Identify discharge learning needs (meds, wound care, etc)  - Arrange for interpreters to assist at discharge as needed  - Consider post-discharge preferences of patient/family/discharge partner  - Complete POLST form as appropriate  - Assess patient's ability to be responsible for managing their own health  - Refer to Case Management Department for coordinating discharge planning if the patient needs post-hospital services based on physician/LIP order or complex needs related to functional status, cognitive ability or social support system  Outcome: Progressing    Pt admitted from ED. Lives alone and fell at home. FX left elbow, sling in place. Tylenol and toradol for pain PRN. Purewick in place. ACHS. Call light within reach. Bed in lowest and locked position. Dr. Alma Bhatti on consult to see patient.

## 2023-10-10 NOTE — PHYSICAL THERAPY NOTE
Physical Therapy Defer Note    Orders received and chart reviewed. Attempted to see patient for Physical Therapy services. Patient is not appropriate for therapy at this time secondary to patient admitted for L humerus fx 2/2 a fall, pending orthopedic consult. Please advise on appropriate WB status. Decision made to defer therapy pending ortho clearance. 10/10/23 0756   VISIT TYPE   PT Inpatient Visit Type (Documentation Required) Attempted Evaluation  (pending ortho consult)     Will re-schedule visit as patient is medically able to participate.     Ifeoma Thurston, PT, DPT  [id number]  Inpatient Rehabilitation  Physical Therapy  [phone]

## 2023-10-11 PROBLEM — F33.1 MAJOR DEPRESSIVE DISORDER, RECURRENT EPISODE, MODERATE (HCC): Status: ACTIVE | Noted: 2023-10-11

## 2023-10-11 PROBLEM — F41.1 GENERALIZED ANXIETY DISORDER: Status: ACTIVE | Noted: 2023-10-11

## 2023-10-11 PROBLEM — E23.7 PITUITARY LESION (HCC): Status: ACTIVE | Noted: 2023-10-11

## 2023-10-11 LAB
ANION GAP SERPL CALC-SCNC: 8 MMOL/L (ref 0–18)
BASOPHILS # BLD AUTO: 0.09 X10(3) UL (ref 0–0.2)
BASOPHILS NFR BLD AUTO: 1 %
BUN BLD-MCNC: 13 MG/DL (ref 7–18)
BUN/CREAT SERPL: 15.7 (ref 10–20)
CALCIUM BLD-MCNC: 8.8 MG/DL (ref 8.5–10.1)
CHLORIDE SERPL-SCNC: 108 MMOL/L (ref 98–112)
CO2 SERPL-SCNC: 25 MMOL/L (ref 21–32)
CREAT BLD-MCNC: 0.83 MG/DL
DEPRECATED RDW RBC AUTO: 45.1 FL (ref 35.1–46.3)
EGFRCR SERPLBLD CKD-EPI 2021: 80 ML/MIN/1.73M2 (ref 60–?)
EOSINOPHIL # BLD AUTO: 0.13 X10(3) UL (ref 0–0.7)
EOSINOPHIL NFR BLD AUTO: 1.4 %
ERYTHROCYTE [DISTWIDTH] IN BLOOD BY AUTOMATED COUNT: 13.6 % (ref 11–15)
GLUCOSE BLD-MCNC: 124 MG/DL (ref 70–99)
GLUCOSE BLDC GLUCOMTR-MCNC: 111 MG/DL (ref 70–99)
GLUCOSE BLDC GLUCOMTR-MCNC: 118 MG/DL (ref 70–99)
GLUCOSE BLDC GLUCOMTR-MCNC: 123 MG/DL (ref 70–99)
GLUCOSE BLDC GLUCOMTR-MCNC: 127 MG/DL (ref 70–99)
GLUCOSE BLDC GLUCOMTR-MCNC: 141 MG/DL (ref 70–99)
HCT VFR BLD AUTO: 45.1 %
HGB BLD-MCNC: 14.6 G/DL
IMM GRANULOCYTES # BLD AUTO: 0.04 X10(3) UL (ref 0–1)
IMM GRANULOCYTES NFR BLD: 0.4 %
LYMPHOCYTES # BLD AUTO: 1.42 X10(3) UL (ref 1–4)
LYMPHOCYTES NFR BLD AUTO: 15.4 %
MCH RBC QN AUTO: 29.2 PG (ref 26–34)
MCHC RBC AUTO-ENTMCNC: 32.4 G/DL (ref 31–37)
MCV RBC AUTO: 90.2 FL
MONOCYTES # BLD AUTO: 0.9 X10(3) UL (ref 0.1–1)
MONOCYTES NFR BLD AUTO: 9.7 %
NEUTROPHILS # BLD AUTO: 6.66 X10 (3) UL (ref 1.5–7.7)
NEUTROPHILS # BLD AUTO: 6.66 X10(3) UL (ref 1.5–7.7)
NEUTROPHILS NFR BLD AUTO: 72.1 %
OSMOLALITY SERPL CALC.SUM OF ELEC: 294 MOSM/KG (ref 275–295)
PLATELET # BLD AUTO: 273 10(3)UL (ref 150–450)
POTASSIUM SERPL-SCNC: 3.9 MMOL/L (ref 3.5–5.1)
RBC # BLD AUTO: 5 X10(6)UL
SODIUM SERPL-SCNC: 141 MMOL/L (ref 136–145)
T PALLIDUM AB SER QL: NEGATIVE
WBC # BLD AUTO: 9.2 X10(3) UL (ref 4–11)

## 2023-10-11 PROCEDURE — 99232 SBSQ HOSP IP/OBS MODERATE 35: CPT | Performed by: OTHER

## 2023-10-11 PROCEDURE — 90792 PSYCH DIAG EVAL W/MED SRVCS: CPT | Performed by: OTHER

## 2023-10-11 PROCEDURE — 99233 SBSQ HOSP IP/OBS HIGH 50: CPT | Performed by: HOSPITALIST

## 2023-10-11 RX ORDER — MORPHINE SULFATE 2 MG/ML
2 INJECTION, SOLUTION INTRAMUSCULAR; INTRAVENOUS EVERY 4 HOURS PRN
Status: DISCONTINUED | OUTPATIENT
Start: 2023-10-11 | End: 2023-10-12

## 2023-10-11 RX ORDER — DOCUSATE SODIUM 100 MG/1
100 CAPSULE, LIQUID FILLED ORAL 2 TIMES DAILY
Status: DISCONTINUED | OUTPATIENT
Start: 2023-10-11 | End: 2023-10-12

## 2023-10-11 RX ORDER — NAPROXEN 250 MG/1
500 TABLET ORAL 2 TIMES DAILY WITH MEALS
Status: DISCONTINUED | OUTPATIENT
Start: 2023-10-11 | End: 2023-10-12

## 2023-10-11 RX ORDER — MIRTAZAPINE 7.5 MG/1
15 TABLET, FILM COATED ORAL NIGHTLY
Status: DISCONTINUED | OUTPATIENT
Start: 2023-10-11 | End: 2023-10-12

## 2023-10-11 RX ORDER — ARIPIPRAZOLE 2 MG/1
1 TABLET ORAL NIGHTLY
Status: DISCONTINUED | OUTPATIENT
Start: 2023-10-11 | End: 2023-10-12

## 2023-10-11 RX ORDER — HEPARIN SODIUM 5000 [USP'U]/ML
5000 INJECTION, SOLUTION INTRAVENOUS; SUBCUTANEOUS EVERY 12 HOURS SCHEDULED
Status: DISCONTINUED | OUTPATIENT
Start: 2023-10-11 | End: 2023-10-12

## 2023-10-11 RX ORDER — BUSPIRONE HYDROCHLORIDE 5 MG/1
5 TABLET ORAL 2 TIMES DAILY
Status: DISCONTINUED | OUTPATIENT
Start: 2023-10-11 | End: 2023-10-12

## 2023-10-11 RX ORDER — NAPROXEN 250 MG/1
500 TABLET ORAL 2 TIMES DAILY WITH MEALS
Status: DISCONTINUED | OUTPATIENT
Start: 2023-10-11 | End: 2023-10-11

## 2023-10-11 NOTE — CM/SW NOTE
Pt discussed in RN rounds. SW was informed that pt may need GENE. SW asked [facility] to tent send GENE referrals, while awating PT OT recs. 1239pm- PASRR level 1 screen completed and uploaded to aidin referral. SW was informed that pt wishes to go to [address]. SW called Roderick Collazo, to see if location is able to accommodate her. Dannyann Vale will reach out to [facility]. SW asked if pt needs insurance Rodolfo Mendoza stated no.          10/11/23 1300   CM/SW Referral Data   Referral Source Physician   Reason for Referral Discharge planning   Medical Hx   Does patient have an established PCP? Yes  (Catalina Sutton, )   Patient Info   Patient's Current Mental Status at Time of Assessment Oriented; Alert   Patient's Home Environment Condo/Apt with elevator   Patient lives with Alone   Patient Status Prior to Admission   Independent with ADLs and Mobility Yes   Discharge Needs   Anticipated D/C needs Subacute rehab   Services Requested   PASRR Level 1 Submitted Yes   Choice of Post-Acute Provider   Informed patient of right to choose their preferred provider Yes   List of appropriate post-acute services provided to patient/family with quality data Yes     SW received MDO for discharge planning. Pt provided above information. SW provided pt GENE list. Pt was informed that [facility] not have a bed until Friday and [facility] has no bed avail at this time. Pt wishes to have time to review choice for GENE. 259pm- RN informed ARMEN that pt wishes to have BTL for choice. SW notified Dyan Wade for choice. Roderick Collazo informed SW for tomorrow DC. SW reserved via [employer]. ARMEN ordered transportation of a [address] through [employer]. [address] will transport the pt at [address] [place] [address] & [facility]  10/11, 10/12, to [address] [facility] form/ flow sheet completed. RN to attach and print out with After Visit Summary Packet. Plan: Pending medical clearance, DC to BTL, PASRR completed    SW/CM to remain available for support and/or discharge planning.      BINDU Gaitan, NESSAW   x [phone]

## 2023-10-11 NOTE — PHYSICAL THERAPY NOTE
PHYSICAL THERAPY EVALUATION - INPATIENT     Room Number: 408/408-A  Evaluation Date: 10/11/2023  Type of Evaluation: Initial   Physician Order: PT Eval and Treat    Presenting Problem: L humeral fx nonsurgical  Co-Morbidities : multiple falls  Reason for Therapy: Mobility Dysfunction and Discharge Planning    PHYSICAL THERAPY ASSESSMENT     Patient is a 66year old female admitted 10/9/2023 for L humeral fx non-surgical.  Patient's current functional deficits include bed mobility, transfers, and gait, which are below the patient's pre-admission status. Patient was independent at home thought with frequent falls including history of ankle fx. Functional Mobility: Gait belt used throughout mobility. - bed mobility: supine to/from sit mod A   - transfers: sit to/from stand transfers with min A x 3 reps    - standing balance   - gait: ambulation with HHA and min A 15' and 25'    At end of session patient in chair with all needs in reach, RN aware. The patient's Approx Degree of Impairment: 57.7% has been calculated based on documentation in the [facility] '6 clicks' Inpatient Basic Mobility Short Form. Research supports that patients with this level of impairment may benefit from GENE. Patient will benefit from continued IP PT services to address these deficits in preparation for discharge. DISCHARGE RECOMMENDATIONS  PT Discharge Recommendations: Sub-acute rehabilitation (was at [facility] from previous ankle injury, open to returning there)    PLAN  PT Treatment Plan: Bed mobility; Coordination; Body mechanics; Energy conservation;Patient education; Family education;Gait training;Range of motion;Stoop training;Stair training;Transfer training; Other (Comment)  Rehab Potential : Good  Frequency (Obs): 5x/week       PHYSICAL THERAPY MEDICAL/SOCIAL HISTORY     History related to current admission: fracture of L humerus      Problem List  Principal Problem:    Closed fracture of proximal end of left humerus, unspecified fracture morphology, initial encounter  Active Problems:    Frequent falls    Seizure-like activity ([address]      HOME SITUATION  Home Situation  Type of Home: Apartment  Home Layout: One level;Elevator  Lives With: Alone  Patient Owned Equipment: Rolling walker     Prior Level of Beat[place]: CGA    SUBJECTIVE  \"I can't go home like this. \"     PHYSICAL THERAPY EXAMINATION     OBJECTIVE  Precautions: Bed/chair alarm  Fall Risk: High fall risk    WEIGHT BEARING RESTRICTION     L Upper Extremity: Non-Weight Bearing          PAIN ASSESSMENT  Rating: 10  Location: L UE fx  Management Techniques: Body mechanics;Breathing techniques;Relaxation;Repositioning    COGNITION  Overall Cognitive Status:  WFL - within functional limits    BALANCE  Static Sitting: Fair +  Dynamic Sitting: Fair  Static Standing: Fair -  Dynamic Standin [place] 2 '6-Clicks' INPATIENT SHORT FORM - BASIC MOBILITY  How much difficulty does the patient currently have. .. Patient Difficulty: Turning over in bed (including adjusting bedclothes, sheets and blankets)?: A Little   Patient Difficulty: Sitting down on and standing up from a chair with arms (e.g., wheelchair, bedside commode, etc.): A Little   Patient Difficulty: Moving from lying on back to sitting on the side of the bed?: A Lot   How much help from another person does the patient currently need. ..    Help from Another: Moving to and from a bed to a chair (including a wheelchair)?: A Little   Help from Another: Need to walk in hospital room?: A Little   Help from Another: Climbing 3-5 steps with a railing?: Total     AM-PAC Score:  Raw Score: 15   Approx Degree of Impairment: 57.7%   Standardized Score (AM-PAC Scale): 39.45   CMS Modifier (G-Code): CK    FUNCTIONAL ABILITY STATUS  Functional Mobility/Gait Assessment  Gait Assistance: Minimum assistance (HHA)  Distance (ft): 15' and 25'  Assistive Device: None (HHA R Ue)  Pattern: Shuffle    Bed Mobility: min A     Transfers: min A     Exercise/Education Provided:  Bed mobility  Body mechanics  Functional activity tolerated  Gait training    Patient End of Session: Up in chair;Call light within reach; Needs met;RN aware of session/findings; All patient questions and concerns addressed; Alarm set; Discussed recommendations with /    CURRENT GOALS    Goals to be met by: 10/30  Patient Goal Patient's self-stated goal is: unstated    Goal #1 Patient is able to demonstrate supine - sit EOB @ level: supervision     Goal #1   Current Status    Goal #2 Patient is able to demonstrate transfers Sit to/from Stand at assistance level: supervision with cane - straight     Goal #2  Current Status    Goal #3 Patient is able to ambulate 150 feet with assist device: cane - straight at assistance level: supervision   Goal #3   Current Status    Goal #4 Patient will negotiate 1 stairs/one curb w/ assistive device and supervision   Goal #4   Current Status    Goal #5 Patient to demonstrate independence with home activity/exercise instructions provided to patient in preparation for discharge.    Goal #5   Current Status    Goal #6    Goal #6  Current Status      Patient Evaluation Complexity Level:  History Moderate - 1 or 2 personal factors and/or co-morbidities   Examination of body systems Moderate - addressing a total of 3 or more elements   Clinical Presentation Moderate - Evolving   Clinical Decision Making Moderate Complexity       Gait Training: 15 minutes  Therapeutic Activity: 8 minutes

## 2023-10-11 NOTE — CM/SW NOTE
Department  notified of request for juan antonio MILLS referrals started. Assigned CM/SW to follow up with pt/family on further discharge planning.        Lucía Alvarez   , [facility]

## 2023-10-11 NOTE — PLAN OF CARE
Sling in place to Left arm. Monitoring vital signs- stable at this time. No acute changes noted throughout shift. Monitoring blood glucose levels per order- ACHS. Tolerating carb controlled diet. Voiding up to bathroom. Patient is on room air. SCDs for DVT prophylaxis. Norco, morphine, and naproxen provided as needed for pain. Up with HHA on right side. Encouraged frequent ambulation and sitting up in chair. Fall and seizure precautions maintained- bed alarm on, bed locked in lowest position, call light and personal belongings within reach, non-skid socks in place to bilateral feet, seizure pads, and tele monitoring. Frequent rounding by nursing staff. Plan for discharge to [place] [id number] tomorrow (10/12) pending medical clearance. Problem: Patient Centered Care  Goal: Patient preferences are identified and integrated in the patient's plan of care  Description: Interventions:  - What would you like us to know as we care for you?  Therapy dog visits  - Provide timely, complete, and accurate information to patient/family  - Incorporate patient and family knowledge, values, beliefs, and cultural backgrounds into the planning and delivery of care  - Encourage patient/family to participate in care and decision-making at the level they choose  - Honor patient and family perspectives and choices  Outcome: Progressing     Problem: Patient/Family Goals  Goal: Patient/Family Long Term Goal  Description: Patient's Long Term Goal: To be free of falls    Interventions:  - Fall precautions, safety with ambulation  - See additional Care Plan goals for specific interventions  Outcome: Progressing  Goal: Patient/Family Short Term Goal  Description: Patient's Short Term Goal: Pain at comfortable score    Interventions:   - pain medication, non-pharmacological comfort measures   - See additional Care Plan goals for specific interventions  Outcome: Progressing     Problem: SAFETY ADULT - FALL  Goal: Free from fall injury  Description: INTERVENTIONS:  - Assess pt frequently for physical needs  - Identify cognitive and physical deficits and behaviors that affect risk of falls.   - [place] fall precautions as indicated by assessment.  - Educate pt/family on patient safety including physical limitations  - Instruct pt to call for assistance with activity based on assessment  - Modify environment to reduce risk of injury  - Provide assistive devices as appropriate  - Consider OT/PT consult to assist with strengthening/mobility  - Encourage toileting schedule  Outcome: Progressing     Problem: PAIN - ADULT  Goal: Verbalizes/displays adequate comfort level or patient's stated pain goal  Description: INTERVENTIONS:  - Encourage pt to monitor pain and request assistance  - Assess pain using appropriate pain scale  - Administer analgesics based on type and severity of pain and evaluate response  - Implement non-pharmacological measures as appropriate and evaluate response  - Consider cultural and social influences on pain and pain management  - Manage/alleviate anxiety  - Utilize distraction and/or relaxation techniques  - Monitor for opioid side effects  - Notify MD/LIP if interventions unsuccessful or patient reports new pain  - Anticipate increased pain with activity and pre-medicate as appropriate  Outcome: Progressing     Problem: SKIN/TISSUE INTEGRITY - ADULT  Goal: Skin integrity remains intact  Description: INTERVENTIONS  - Assess and document risk factors for pressure ulcer development  - Assess and document skin integrity  - Monitor for areas of redness and/or skin breakdown  - Initiate interventions, skin care algorithm/standards of care as needed  Outcome: Progressing     Problem: MUSCULOSKELETAL - ADULT  Goal: Return mobility to safest level of function  Description: INTERVENTIONS:  - Assess patient stability and activity tolerance for standing, transferring and ambulating w/ or w/o assistive devices  - Assist with transfers and ambulation using safe patient handling equipment as needed  - Ensure adequate protection for wounds/incisions during mobilization  - Obtain PT/OT consults as needed  - Advance activity as appropriate  - Communicate ordered activity level and limitations with patient/family  Outcome: Progressing  Goal: Maintain proper alignment of affected body part  Description: INTERVENTIONS:  - Support and protect limb and body alignment per provider's orders  - Instruct and reinforce with patient and family use of appropriate assistive device and precautions (e.g. spinal or hip dislocation precautions)  Outcome: Progressing     Problem: Impaired Functional Mobility  Goal: Achieve highest/safest level of mobility/gait  Description: Interventions:  - Assess patient's functional ability and stability  - Promote increasing activity/tolerance for mobility and gait  - Educate and engage patient/family in tolerated activity level and precautions  Outcome: Progressing     Problem: Impaired Activities of Daily Living  Goal: Achieve highest/safest level of independence in self care  Description: Interventions:  - Assess ability and encourage patient to participate in ADLs to maximize function  - Promote sitting position while performing ADLs such as feeding, grooming, and bathing  - Educate and encourage patient/family in tolerated functional activity level and precautions during self-care  Outcome: Progressing     Problem: RISK FOR INFECTION - ADULT  Goal: Absence of fever/infection during anticipated neutropenic period  Description: INTERVENTIONS  - Monitor WBC  - Administer growth factors as ordered  - Implement neutropenic guidelines  Outcome: Progressing     Problem: DISCHARGE PLANNING  Goal: Discharge to home or other facility with appropriate resources  Description: INTERVENTIONS:  - Identify barriers to discharge w/pt and caregiver  - Include patient/family/discharge partner in discharge planning  - Arrange for needed discharge resources and transportation as appropriate  - Identify discharge learning needs (meds, wound care, etc)  - Arrange for interpreters to assist at discharge as needed  - Consider post-discharge preferences of patient/family/discharge partner  - Complete POLST form as appropriate  - Assess patient's ability to be responsible for managing their own health  - Refer to Case Management Department for coordinating discharge planning if the patient needs post-hospital services based on physician/LIP order or complex needs related to functional status, cognitive ability or social support system  Outcome: Progressing

## 2023-10-12 VITALS
BODY MASS INDEX: 33 KG/M2 | DIASTOLIC BLOOD PRESSURE: 69 MMHG | SYSTOLIC BLOOD PRESSURE: 151 MMHG | HEART RATE: 87 BPM | RESPIRATION RATE: 16 BRPM | WEIGHT: 185.19 LBS | TEMPERATURE: 97 F | OXYGEN SATURATION: 94 %

## 2023-10-12 LAB
ANION GAP SERPL CALC-SCNC: 7 MMOL/L (ref 0–18)
BASOPHILS # BLD AUTO: 0.12 X10(3) UL (ref 0–0.2)
BASOPHILS NFR BLD AUTO: 1.2 %
BUN BLD-MCNC: 14 MG/DL (ref 7–18)
BUN/CREAT SERPL: 14 (ref 10–20)
CALCIUM BLD-MCNC: 9.3 MG/DL (ref 8.5–10.1)
CHLORIDE SERPL-SCNC: 107 MMOL/L (ref 98–112)
CO2 SERPL-SCNC: 26 MMOL/L (ref 21–32)
CREAT BLD-MCNC: 1 MG/DL
DEPRECATED RDW RBC AUTO: 48.1 FL (ref 35.1–46.3)
EGFRCR SERPLBLD CKD-EPI 2021: 64 ML/MIN/1.73M2 (ref 60–?)
EOSINOPHIL # BLD AUTO: 0.31 X10(3) UL (ref 0–0.7)
EOSINOPHIL NFR BLD AUTO: 3.1 %
ERYTHROCYTE [DISTWIDTH] IN BLOOD BY AUTOMATED COUNT: 14.1 % (ref 11–15)
GLUCOSE BLD-MCNC: 140 MG/DL (ref 70–99)
GLUCOSE BLDC GLUCOMTR-MCNC: 129 MG/DL (ref 70–99)
GLUCOSE BLDC GLUCOMTR-MCNC: 132 MG/DL (ref 70–99)
HCT VFR BLD AUTO: 46.8 %
HGB BLD-MCNC: 14.8 G/DL
IMM GRANULOCYTES # BLD AUTO: 0.03 X10(3) UL (ref 0–1)
IMM GRANULOCYTES NFR BLD: 0.3 %
LYMPHOCYTES # BLD AUTO: 1.89 X10(3) UL (ref 1–4)
LYMPHOCYTES NFR BLD AUTO: 19.1 %
MCH RBC QN AUTO: 29.5 PG (ref 26–34)
MCHC RBC AUTO-ENTMCNC: 31.6 G/DL (ref 31–37)
MCV RBC AUTO: 93.4 FL
MONOCYTES # BLD AUTO: 1.13 X10(3) UL (ref 0.1–1)
MONOCYTES NFR BLD AUTO: 11.4 %
NEUTROPHILS # BLD AUTO: 6.43 X10 (3) UL (ref 1.5–7.7)
NEUTROPHILS # BLD AUTO: 6.43 X10(3) UL (ref 1.5–7.7)
NEUTROPHILS NFR BLD AUTO: 64.9 %
OSMOLALITY SERPL CALC.SUM OF ELEC: 293 MOSM/KG (ref 275–295)
PLATELET # BLD AUTO: 304 10(3)UL (ref 150–450)
POTASSIUM SERPL-SCNC: 3.9 MMOL/L (ref 3.5–5.1)
RBC # BLD AUTO: 5.01 X10(6)UL
SODIUM SERPL-SCNC: 140 MMOL/L (ref 136–145)
WBC # BLD AUTO: 9.9 X10(3) UL (ref 4–11)

## 2023-10-12 PROCEDURE — 99233 SBSQ HOSP IP/OBS HIGH 50: CPT | Performed by: OTHER

## 2023-10-12 RX ORDER — HYDROCODONE BITARTRATE AND ACETAMINOPHEN 5; 325 MG/1; MG/1
1 TABLET ORAL EVERY 4 HOURS PRN
Qty: 30 TABLET | Refills: 0 | Status: SHIPPED | OUTPATIENT
Start: 2023-10-12 | End: 2023-10-12

## 2023-10-12 RX ORDER — ALPRAZOLAM 0.5 MG/1
0.5 TABLET ORAL 3 TIMES DAILY PRN
Qty: 20 TABLET | Refills: 0 | Status: SHIPPED | OUTPATIENT
Start: 2023-10-12

## 2023-10-12 RX ORDER — SENNA AND DOCUSATE SODIUM 50; 8.6 MG/1; MG/1
1 TABLET, FILM COATED ORAL 2 TIMES DAILY
Status: SHIPPED | COMMUNITY
Start: 2023-10-12

## 2023-10-12 RX ORDER — LAMOTRIGINE 25 MG/1
25 TABLET ORAL DAILY
Qty: 30 TABLET | Refills: 0 | Status: SHIPPED | OUTPATIENT
Start: 2023-10-13

## 2023-10-12 RX ORDER — POLYETHYLENE GLYCOL 3350 17 G/17G
17 POWDER, FOR SOLUTION ORAL DAILY PRN
Status: SHIPPED | COMMUNITY
Start: 2023-10-12

## 2023-10-12 RX ORDER — ARIPIPRAZOLE 2 MG/1
1 TABLET ORAL NIGHTLY
Qty: 30 TABLET | Refills: 0 | Status: SHIPPED | OUTPATIENT
Start: 2023-10-12

## 2023-10-12 RX ORDER — LEVOTHYROXINE SODIUM 0.07 MG/1
75 TABLET ORAL
Status: SHIPPED | COMMUNITY
Start: 2023-10-12

## 2023-10-12 RX ORDER — LEVOTHYROXINE SODIUM 0.15 MG/1
150 TABLET ORAL
Status: SHIPPED | COMMUNITY
Start: 2023-10-12

## 2023-10-12 RX ORDER — HYDROCODONE BITARTRATE AND ACETAMINOPHEN 5; 325 MG/1; MG/1
2 TABLET ORAL EVERY 4 HOURS PRN
Status: DISCONTINUED | OUTPATIENT
Start: 2023-10-12 | End: 2023-10-12

## 2023-10-12 RX ORDER — BUSPIRONE HYDROCHLORIDE 5 MG/1
5 TABLET ORAL 2 TIMES DAILY
Qty: 60 TABLET | Refills: 0 | Status: SHIPPED | OUTPATIENT
Start: 2023-10-12

## 2023-10-12 RX ORDER — HYDROCODONE BITARTRATE AND ACETAMINOPHEN 5; 325 MG/1; MG/1
2 TABLET ORAL EVERY 4 HOURS PRN
Qty: 40 TABLET | Refills: 0 | Status: SHIPPED | OUTPATIENT
Start: 2023-10-12

## 2023-10-12 NOTE — CM/SW NOTE
Confirmed w/ MD - pt is cleared today. SW left Vmail for liaison Serena Remy - requested call back to arrange DC time for today. PLAN: [facility], [facility] needs date - pending time from GENE AYERS/CM to remain available for support and/or discharge planning.          Krishan Alexander, MSW, [facility]

## 2023-10-12 NOTE — CM/SW NOTE
10/12/23 1100   Discharge disposition   Expected discharge disposition subacute   Post Acute Care Provider [facility]   Discharge transportation [facility]       Confirmed pt is cleared for DC. Received call from liaison Cholo Feliciano they can accept at any time - agreed to Andres Ramos RN/Ifeoma and Dr. Skip Valencia updated. Requested RN update pt.     SW spoke to [facility] w/ [facility] set for [facility]. PCS completed in Epic - pt's RN to print w/ pt's AVS.    Report phone #: [phone]    PLAN: Gail [facility], [id number] set for [facility], [address] completed        Ting Up MSW, [address] [facility]

## 2023-10-12 NOTE — PLAN OF CARE
Problem: Patient Centered Care  Goal: Patient preferences are identified and integrated in the patient's plan of care  Description: Interventions:  - What would you like us to know as we care for you?   - Provide timely, complete, and accurate information to patient/family  - Incorporate patient and family knowledge, values, beliefs, and cultural backgrounds into the planning and delivery of care  - Encourage patient/family to participate in care and decision-making at the level they choose  - Honor patient and family perspectives and choices  Outcome: Progressing     Problem: Patient/Family Goals  Goal: Patient/Family Long Term Goal  Description: Patient's Long Term Goal:  Interventions:  - See additional Care Plan goals for specific interventions  Outcome: Progressing  Goal: Patient/Family Short Term Goal  Description: Patient's Short Term Goal:     Interventions:   - See additional Care Plan goals for specific interventions  Outcome: Progressing     Problem: SAFETY ADULT - FALL  Goal: Free from fall injury  Description: INTERVENTIONS:  - Assess pt frequently for physical needs  - Identify cognitive and physical deficits and behaviors that affect risk of falls.   - [place] fall precautions as indicated by assessment.  - Educate pt/family on patient safety including physical limitations  - Instruct pt to call for assistance with activity based on assessment  - Modify environment to reduce risk of injury  - Provide assistive devices as appropriate  - Consider OT/PT consult to assist with strengthening/mobility  - Encourage toileting schedule  Outcome: Progressing     Problem: PAIN - ADULT  Goal: Verbalizes/displays adequate comfort level or patient's stated pain goal  Description: INTERVENTIONS:  - Encourage pt to monitor pain and request assistance  - Assess pain using appropriate pain scale  - Administer analgesics based on type and severity of pain and evaluate response  - Implement non-pharmacological measures as appropriate and evaluate response  - Consider cultural and social influences on pain and pain management  - Manage/alleviate anxiety  - Utilize distraction and/or relaxation techniques  - Monitor for opioid side effects  - Notify MD/LIP if interventions unsuccessful or patient reports new pain  - Anticipate increased pain with activity and pre-medicate as appropriate  Outcome: Progressing     Problem: SKIN/TISSUE INTEGRITY - ADULT  Goal: Skin integrity remains intact  Description: INTERVENTIONS  - Assess and document risk factors for pressure ulcer development  - Assess and document skin integrity  - Monitor for areas of redness and/or skin breakdown  - Initiate interventions, skin care algorithm/standards of care as needed  Outcome: Progressing     Problem: MUSCULOSKELETAL - ADULT  Goal: Return mobility to safest level of function  Description: INTERVENTIONS:  - Assess patient stability and activity tolerance for standing, transferring and ambulating w/ or w/o assistive devices  - Assist with transfers and ambulation using safe patient handling equipment as needed  - Ensure adequate protection for wounds/incisions during mobilization  - Obtain PT/OT consults as needed  - Advance activity as appropriate  - Communicate ordered activity level and limitations with patient/family  Outcome: Progressing  Goal: Maintain proper alignment of affected body part  Description: INTERVENTIONS:  - Support and protect limb and body alignment per provider's orders  - Instruct and reinforce with patient and family use of appropriate assistive device and precautions (e.g. spinal or hip dislocation precautions)  Outcome: Progressing     Problem: Impaired Functional Mobility  Goal: Achieve highest/safest level of mobility/gait  Description: Interventions:  - Assess patient's functional ability and stability  - Promote increasing activity/tolerance for mobility and gait  - Educate and engage patient/family in tolerated activity level and precautions  Outcome: Progressing     Problem: Impaired Activities of Daily Living  Goal: Achieve highest/safest level of independence in self care  Description: Interventions:  - Assess ability and encourage patient to participate in ADLs to maximize function  - Promote sitting position while performing ADLs such as feeding, grooming, and bathing  - Educate and encourage patient/family in tolerated functional activity level and precautions during self-care  {Additional ADL Interventions:[id number]:::0}  Outcome: Progressing     Problem: RISK FOR INFECTION - ADULT  Goal: Absence of fever/infection during anticipated neutropenic period  Description: INTERVENTIONS  - Monitor WBC  - Administer growth factors as ordered  - Implement neutropenic guidelines  Outcome: Progressing     Problem: DISCHARGE PLANNING  Goal: Discharge to home or other facility with appropriate resources  Description: INTERVENTIONS:  - Identify barriers to discharge w/pt and caregiver  - Include patient/family/discharge partner in discharge planning  - Arrange for needed discharge resources and transportation as appropriate  - Identify discharge learning needs (meds, wound care, etc)  - Arrange for interpreters to assist at discharge as needed  - Consider post-discharge preferences of patient/family/discharge partner  - Complete POLST form as appropriate  - Assess patient's ability to be responsible for managing their own health  - Refer to Case Management Department for coordinating discharge planning if the patient needs post-hospital services based on physician/LIP order or complex needs related to functional status, cognitive ability or social support system  [facility]  [facility], gave norco for pain, no leg seizures during the night. Dr Serg Fernandez saw patient. New meds ordered. Patient calm and resting in chair this am. Patient to be discharged to [facility] today.

## 2023-10-12 NOTE — PHYSICAL THERAPY NOTE
Attempted f/u treatment today, chart reviewed. Per SW note, pt being discharged to rehab today at 2:00pm. Will follow up if DC plans change.

## 2023-10-12 NOTE — PLAN OF CARE
Progress adequate for discharge to rehab today per MDs and therapists. Left arm sling maintained, ice packs PRN for comfort. Going to rehab today.

## 2023-10-12 NOTE — PLAN OF CARE
Up and about with assist in room to chair and bathroom. Left arm sling on. Ice packs for comfort. Pain controlled with oral pain medications. Dc plan is [facility] when cleared medically. Refused prune juice and refused suppository, stated had loose stools prior and will not take; Miralax and stool softener given per MD orders. Problem: Patient Centered Care  Goal: Patient preferences are identified and integrated in the patient's plan of care  Description: Interventions:  - What would you like us to know as we care for you? From home alone  - Provide timely, complete, and accurate information to patient/family  - Incorporate patient and family knowledge, values, beliefs, and cultural backgrounds into the planning and delivery of care  - Encourage patient/family to participate in care and decision-making at the level they choose  - Honor patient and family perspectives and choices  Outcome: Progressing     Problem: Patient/Family Goals  Goal: Patient/Family Long Term Goal  Description: Patient's Long Term Goal: return home when able    Interventions:  - MD aware of DC plan  - See additional Care Plan goals for specific interventions  Outcome: Progressing  Goal: Patient/Family Short Term Goal  Description: Patient's Short Term Goal: pain controlled <5/10    Interventions:   - medicated per MD orders for pain  - See additional Care Plan goals for specific interventions  Outcome: Progressing     Problem: SAFETY ADULT - FALL  Goal: Free from fall injury  Description: INTERVENTIONS:  - Assess pt frequently for physical needs  - Identify cognitive and physical deficits and behaviors that affect risk of falls.   - [place] fall precautions as indicated by assessment.  - Educate pt/family on patient safety including physical limitations  - Instruct pt to call for assistance with activity based on assessment  - Modify environment to reduce risk of injury  - Provide assistive devices as appropriate  - Consider OT/PT consult to assist with strengthening/mobility  - Encourage toileting schedule  Outcome: Progressing     Problem: PAIN - ADULT  Goal: Verbalizes/displays adequate comfort level or patient's stated pain goal  Description: INTERVENTIONS:  - Encourage pt to monitor pain and request assistance  - Assess pain using appropriate pain scale  - Administer analgesics based on type and severity of pain and evaluate response  - Implement non-pharmacological measures as appropriate and evaluate response  - Consider cultural and social influences on pain and pain management  - Manage/alleviate anxiety  - Utilize distraction and/or relaxation techniques  - Monitor for opioid side effects  - Notify MD/LIP if interventions unsuccessful or patient reports new pain  - Anticipate increased pain with activity and pre-medicate as appropriate  Outcome: Progressing     Problem: SKIN/TISSUE INTEGRITY - ADULT  Goal: Skin integrity remains intact  Description: INTERVENTIONS  - Assess and document risk factors for pressure ulcer development  - Assess and document skin integrity  - Monitor for areas of redness and/or skin breakdown  - Initiate interventions, skin care algorithm/standards of care as needed  Outcome: Progressing     Problem: MUSCULOSKELETAL - ADULT  Goal: Return mobility to safest level of function  Description: INTERVENTIONS:  - Assess patient stability and activity tolerance for standing, transferring and ambulating w/ or w/o assistive devices  - Assist with transfers and ambulation using safe patient handling equipment as needed  - Ensure adequate protection for wounds/incisions during mobilization  - Obtain PT/OT consults as needed  - Advance activity as appropriate  - Communicate ordered activity level and limitations with patient/family  Outcome: Progressing  Goal: Maintain proper alignment of affected body part  Description: INTERVENTIONS:  - Support and protect limb and body alignment per provider's orders  - Instruct and reinforce with patient and family use of appropriate assistive device and precautions (e.g. spinal or hip dislocation precautions)  Outcome: Progressing     Problem: Impaired Functional Mobility  Goal: Achieve highest/safest level of mobility/gait  Description: Interventions:  - Assess patient's functional ability and stability  - Promote increasing activity/tolerance for mobility and gait  - Educate and engage patient/family in tolerated activity level and precautions    Outcome: Progressing     Problem: Impaired Activities of Daily Living  Goal: Achieve highest/safest level of independence in self care  Description: Interventions:  - Assess ability and encourage patient to participate in ADLs to maximize function  - Promote sitting position while performing ADLs such as feeding, grooming, and bathing  - Educate and encourage patient/family in tolerated functional activity level and precautions during self-care    Outcome: Progressing     Problem: RISK FOR INFECTION - ADULT  Goal: Absence of fever/infection during anticipated neutropenic period  Description: INTERVENTIONS  - Monitor WBC  - Administer growth factors as ordered  - Implement neutropenic guidelines  Outcome: Progressing     Problem: DISCHARGE PLANNING  Goal: Discharge to home or other facility with appropriate resources  Description: INTERVENTIONS:  - Identify barriers to discharge w/pt and caregiver  - Include patient/family/discharge partner in discharge planning  - Arrange for needed discharge resources and transportation as appropriate  - Identify discharge learning needs (meds, wound care, etc)  - Arrange for interpreters to assist at discharge as needed  - Consider post-discharge preferences of patient/family/discharge partner  - Complete POLST form as appropriate  - Assess patient's ability to be responsible for managing their own health  - Refer to Case Management Department for coordinating discharge planning if the patient needs post-hospital services based on physician/LIP order or complex needs related to functional status, cognitive ability or social support system  Outcome: Progressing

## 2023-10-13 ENCOUNTER — PATIENT OUTREACH (OUTPATIENT)
Dept: CASE MANAGEMENT | Age: 63
End: 2023-10-13

## 2023-10-13 ENCOUNTER — INITIAL APN SNF VISIT (OUTPATIENT)
Facility: SKILLED NURSING FACILITY | Age: 63
End: 2023-10-13

## 2023-10-13 DIAGNOSIS — R26.81 GAIT INSTABILITY: ICD-10-CM

## 2023-10-13 DIAGNOSIS — R29.6 FREQUENT FALLS: ICD-10-CM

## 2023-10-13 DIAGNOSIS — E03.8 OTHER SPECIFIED HYPOTHYROIDISM: ICD-10-CM

## 2023-10-13 DIAGNOSIS — S42.202A CLOSED FRACTURE OF PROXIMAL END OF LEFT HUMERUS, UNSPECIFIED FRACTURE MORPHOLOGY, INITIAL ENCOUNTER: Primary | ICD-10-CM

## 2023-10-13 DIAGNOSIS — F33.1 MAJOR DEPRESSIVE DISORDER, RECURRENT EPISODE, MODERATE (HCC): ICD-10-CM

## 2023-10-13 DIAGNOSIS — F41.1 GENERALIZED ANXIETY DISORDER: ICD-10-CM

## 2023-10-13 DIAGNOSIS — R56.9 SEIZURE-LIKE ACTIVITY (HCC): ICD-10-CM

## 2023-10-13 PROCEDURE — 1123F ACP DISCUSS/DSCN MKR DOCD: CPT | Performed by: NURSE PRACTITIONER

## 2023-10-13 PROCEDURE — 99306 1ST NF CARE HIGH MDM 50: CPT | Performed by: NURSE PRACTITIONER

## 2023-10-13 NOTE — PROGRESS NOTES
Ascencion Lloyd  : 1960  Age 66year old  female patient is admitted to [address] [place] [address] for rehabilitation and strengthening. [address] [place] [address] Admission: 10/09/23 - 10/12/23    Chief complaint: humerus fracture, gait instability, DM type 2, hld, hypothyroidism     HPI  66year old female who presents for evaluation of fall and left arm pain. PMHx significant for essential hypertension, hyperlipidemia, thyroid disease. Patient reports that she fell at home on the tile floor after losing balance. She did not have any lightheadedness or dizziness prior to the episode. She did not hit her head or lost consciousness. She reports that she does not take any blood thinners. States that she has been falling frequently at home. States that she does not have any family support lives on her own. Of note, patient was seen in the ED 10/4/2023 after a fall. At that time, CT head did not reveal any intracranial abnormalities. She was discharged home and was advised to follow-up with her PCP as well as see a neurologist.  Patient states that she is able to eat and drink without any difficulties. She also reported she is not able to take care of herself at home. Denies any headache, change in vision, chest pain, shortness of breath, fever or chills. Stabilized and [facility]    Patient seen as in initial aprn visit, she Is up in the wheelchair. Hasn't been evaluated by therapy yet but thinks she will do ok. Has some pain but the medication helps control it. No shortness of breath or chest pain, no nausea or vomiting. No complaints. Past Medical History:   Diagnosis Date    Essential hypertension     Heart attack ([address] 2009    Hyperlipidemia     Thyroid disease      Past Surgical History:   Procedure Laterality Date    APPENDECTOMY            REMOVAL GALLBLADDER       No family history on file.   Social History     Socioeconomic History    Marital status:    Tobacco Use    Smoking status: Every Day     Years: 10     Types: Cigarettes    Smokeless tobacco: Never   Social Determinants of Health  Food Insecurity: Unknown (10/9/2023)      Food Insecurity          Food Insecurity: Patient refused  Transportation Needs: No Transportation Needs (10/9/2023)      Transportation Needs          Lack of Transportation: No  Housing Stability: Low Risk  (10/9/2023)      Housing Stability          Housing Instability: No    IMMUNIZATIONS    Immunization History  Administered            Date(s) Administered    Covid-19 Vaccine Pfizer 30 mcg/0.3 ml                          02/23/2021  04/06/2021  10/26/2021       ALLERGIES:  No Known Allergies    CODE STATUS:  Full Code    ADVANCED CARE PLANNING TEAM: Will need family care plan       CURRENT MEDICATIONS - reviewed and updated on [facility]       SUBJECTIVE    Patient seen as in initial aprn visit, she Is up in the wheelchair. Hasn't been evaluated by therapy yet but thinks she will do ok. Has some pain but the medication helps control it. No shortness of breath or chest pain, no nausea or vomiting. No complaints. PHYSICAL EXAM:  GENERAL HEALTH:well developed, well nourished, in no apparent distress   LINES, TUBES, DRAINS:  none  SKIN: no rashes, no suspicious lesions, pale, warm  WOUND: see wound assessment,   EYES: PERRLA, EOMI, sclera anicteric, conjunctiva normal; there is no nystagmus, no drainage from eyes  HENT: normocephalic; normal nose, no nasal drainage, mucous membranes pink, moist, pharynx no exudate, no visible cerumen. NECK:supple, FROM; no JVD, no TMG, no carotid bruits   BREAST: deferred exam   RESPIRATORY:clear to percussion and auscultation  CARDIOVASCULAR: S1, S2 normal, RRR; no S3, no S4 and no murmur  ABDOMEN:  normal active BS+, soft, nondistended; no organomegaly, no masses; no bruits; nontender, no guarding, no rebound tenderness.   :Deferred  LYMPHATIC:no lymphedema  MUSCULOSKELETAL: no acute synovitis upper or lower extremity EXTREMITIES/VASCULAR:no cyanosis, clubbing or edema  NEUROLOGIC:intact; no sensorimotor deficit and follows commands  PSYCHIATRIC: alert and oriented x 3; affect appropriate     SEE PLAN BELOW  Humerus fracture   Minimally displaced  - Xray 10/10/23 - Mildly displaced, comminuted fracture of proximal humeral metadiaphysis with involvement of the greater tuberosity   - continue Norco 5-325mg (2 tabs) Q4PRN  - continue Ibuprofen 200mg Q6PRN  - seen by Dr. Donovan Graves in Orthopedic consultation, recom conservative management-->  No lifting using the upper extremity. Ok to come out of the sling at 7 days, multiple times a day, and start pendulum exercises which were taught today. NWSNOW RICE  Follow up: in 2-3 weeks in our office for repeat radiographic evaluation. Gait instability. Over the last 2 months patient has had progressive gait instability with frequent falls. Prior to this she ambulated without any difficulty whatsoever. Previous ER visit recently showed normal CT of the brain. No prodrome to suggest arrhythmia, hypotension. Pt describes her gait instability as starting off with shaking in the left leg than the right leg and that she goes down without warning, worrisome for seizure  - A1c 6.3  - seen by Dr. Lucía Harris in Neurology consultation  - continue Lamictal 25mg daily  - MRI brain shows:   1. No acute intracranial abnormality. No suspect epileptogenic foci are seen. 2. There is a 5 mm intrinsically T1 hyperintense nodule in the sella along the anterosuperior margin of the pituitary gland. Primary differential considerations include a complex/proteinaceous Rathke's cleft cyst or a complex pituitary micro adenoma. Suggest correlation with pituitary biochemical parameters.   If laboratory abnormalities are present, consider further evaluation with a dedicated pituitary protocol MR.   - pt to f/u with her own endocrinologist outpatient [facility]  - monitor     Hypothyroidism   - continue Levothyroxine 75 mcg daily  - monitor     HTN  - Qshift vitals  - continue Lisinopril 5mg daily  - continue ASA 81mg daily  - monitor      Constipation   - continue GlycoLax daily PRN  - continue senna dailyPRN  - monitor      Depression/Anxiety  - continue ALPRAZolam 0.5 Q8PRN  - continue BusPIRone 5mg BID  - continue ARIPiprazole 1mg HS  - monitor       FOLLOW UP APPOINTMENTS  No future appointments. This is a 60 minute visit and greater than 50% of the time was spent counseling the patient and/or coordinating care.  Patient is a full code    Kendra Urban, APRN  10/13/23

## 2023-10-16 ENCOUNTER — EXTERNAL FACILITY (OUTPATIENT)
Dept: INTERNAL MEDICINE CLINIC | Facility: CLINIC | Age: 63
End: 2023-10-16

## 2023-10-16 DIAGNOSIS — R26.81 GAIT INSTABILITY: Primary | ICD-10-CM

## 2023-10-16 DIAGNOSIS — I10 PRIMARY HYPERTENSION: ICD-10-CM

## 2023-10-16 DIAGNOSIS — S42.202D CLOSED FRACTURE OF PROXIMAL END OF LEFT HUMERUS WITH ROUTINE HEALING, UNSPECIFIED FRACTURE MORPHOLOGY, SUBSEQUENT ENCOUNTER: ICD-10-CM

## 2023-10-16 PROCEDURE — 99305 1ST NF CARE MODERATE MDM 35: CPT | Performed by: INTERNAL MEDICINE

## 2023-10-16 NOTE — PROGRESS NOTES
hpi  66year old female who presents for evaluation of fall and left arm pain. PMHx significant for essential hypertension, hyperlipidemia, thyroid disease. Patient reports that she fell at home on the tile floor after losing balance. She did not have any lightheadedness or dizziness prior to the episode. She did not hit her head or lost consciousness. She reports that she does not take any blood thinners. States that she has been falling frequently at home. At that time, CT head did not reveal any intracranial abnormalities. She was discharged home and was advised to follow-up with her PCP as well as see a neurologist.  Patient states that she is able to eat and drink without any difficulties. She also reported she is not able to take care of herself at home. Denies any headache, change in vision, chest pain, shortness of breath, fever or chills. Stabilized and sent [facility]         Past Medical History:  Diagnosis Date   Essential hypertension   Heart attack [facility])    Hyperlipidemia   Thyroid disease    Past Surgical History:  Procedure Laterality Date   APPENDECTOMY      REMOVAL GALLBLADDER    No family history on file.   Social History    Socioeconomic History   Marital status:   Tobacco Use   Smoking status: Every Day   Years: 10   Types: Cigarettes   Smokeless tobacco: Never  Social Determinants of Health  Food Insecurity: Unknown (10/9/2023)      Food Insecurity          Food Insecurity: Patient refused  Transportation Needs: No Transportation Needs (10/9/2023)      Transportation Needs          Lack of Transportation: No  Housing Stability: Low Risk (10/9/2023)      Housing Stability          Housing Instability: No      ALLERGIES:  No Known Allergies    CODE STATUS: Full Code        CURRENT MEDICATIONS - reviewed     SUBJECTIVE    she is complainig of left arm pain  PHYSICAL EXAM:  GENERAL HEALTH:well developed, well nourished, in no apparent distress  LINES, TUBES, DRAINS: none  SKIN: no rashes, no suspicious lesions, pale, warm  WOUND: see wound assessment,  EYES: PERRLA, EOMI, sclera anicteric, conjunctiva normal; there is no nystagmus, no drainage from eyes  HENT: normocephalic; normal nose, no nasal drainage, mucous membranes pink, moist, pharynx no exudate, no visible cerumen. NECK:supple, FROM; no JVD, no TMG, no carotid bruits  BREAST: deferred exam  RESPIRATORY:clear to percussion and auscultation  CARDIOVASCULAR: S1, S2 normal, RRR; no S3, no S4 and no murmur  ABDOMEN: normal active BS+, soft, nondistended; no organomegaly, no masses; no bruits; nontender, no guarding, no rebound tenderness. :Deferred  LYMPHATIC:no lymphedema  MUSCULOSKELETAL: no acute synovitis upper or lower extremity  EXTREMITIES/VASCULAR:no cyanosis, clubbing or edema  NEUROLOGIC:intact; no sensorimotor deficit and follows commands  PSYCHIATRIC: alert and oriented x 3; affect appropriate    SEE PLAN BELOW  Humerus fracture   Minimally displaced  - Xray 10/10/23 - Mildly displaced, comminuted fracture of proximal humeral metadiaphysis with involvement of the greater tuberosity  pain meds as needed  - seen by Dr. Ifeoma Rosenberg in Orthopedic consultation, recom conservative management-->  ? No lifting using the upper extremity. ? Ok to come out of the sling at 7 days, multiple times a day, and start pendulum exercises which were taught today. ? NWB LUE  follow up with ortho       Gait instability. Over the last 2 months patient has had progressive gait instability with frequent falls. - A1c 6.3  - seen by Dr. Coco Acuña in Neurology consultation  - continue Lamictal 25mg daily  -.    Suggest correlation with pituitary biochemical parameters.   If laboratory abnormalities are present, consider further evaluation with a dedicated pituitary protocol MR.   - pt to f/u with her own endocrinologist outpatient at [facility]      Hypothyroidism  - continue Levothyroxine 75 mcg daily      HTN  monitor bp    Constipation  -continue meds  Depression/Anxiety  cnitnue mds

## 2023-10-17 ENCOUNTER — SNF VISIT (OUTPATIENT)
Facility: SKILLED NURSING FACILITY | Age: 63
End: 2023-10-17

## 2023-10-17 DIAGNOSIS — E03.9 HYPOTHYROIDISM, UNSPECIFIED TYPE: ICD-10-CM

## 2023-10-17 DIAGNOSIS — R26.81 GAIT INSTABILITY: ICD-10-CM

## 2023-10-17 DIAGNOSIS — F41.1 GENERALIZED ANXIETY DISORDER: ICD-10-CM

## 2023-10-17 DIAGNOSIS — I10 PRIMARY HYPERTENSION: ICD-10-CM

## 2023-10-17 DIAGNOSIS — S42.202A CLOSED FRACTURE OF PROXIMAL END OF LEFT HUMERUS, UNSPECIFIED FRACTURE MORPHOLOGY, INITIAL ENCOUNTER: Primary | ICD-10-CM

## 2023-10-17 PROCEDURE — 99309 SBSQ NF CARE MODERATE MDM 30: CPT | Performed by: NURSE PRACTITIONER

## 2023-10-17 NOTE — PROGRESS NOTES
Venus Bradshaw  : 1960  Age 66year old  female patient is admitted to [address] [place] [address] for rehabilitation and strengthening     [address] [place] [address] Admission: 10/09/23 - 10/12/23     Chief complaint: humerus fracture, gait instability, DM type 2, hld, hypothyroidism      HPI  66year old female who presents for evaluation of fall and left arm pain. PMHx significant for essential hypertension, hyperlipidemia, thyroid disease. Patient reports that she fell at home on the tile floor after losing balance. She did not have any lightheadedness or dizziness prior to the episode. She did not hit her head or lost consciousness. She reports that she does not take any blood thinners. States that she has been falling frequently at home. States that she does not have any family support lives on her own. Of note, patient was seen in the ED 10/4/2023 after a fall. At that time, CT head did not reveal any intracranial abnormalities. She was discharged home and was advised to follow-up with her PCP as well as see a neurologist.  Patient states that she is able to eat and drink without any difficulties. She also reported she is not able to take care of herself at home. Denies any headache, change in vision, chest pain, shortness of breath, fever or chills. Stabilized and sent to [facility]    Patient seen in follow up, working with therapy. States the pain is better controlled and she is using the medication. She is doing well but her roommate is driving her nuts and very mean to her. They are working in getting her into a different room. No shortness of breath or chest pain, no nausea. Eating well.      ALLERGIES:  No Known Allergies    IMMUNIZATIONS    Immunization History  Administered            Date(s) Administered    Covid-19 Vaccine Pfizer 30 mcg/0.3 ml                          2021  2021  10/26/2021       CODE STATUS:  Full Code    ADVANCED CARE PLANNING TEAM: Will need family care plan    CURRENT MEDICATIONS - reviewed and updated on SNF EMR     SUBJECTIVE    Patient seen in follow up, working with therapy. States the pain is better controlled and she is using the medication. She is doing well but her roommate is driving her nuts and very mean to her. They are working in getting her into a different room. No shortness of breath or chest pain, no nausea. Eating well. PHYSICAL EXAM:  GENERAL HEALTH:well developed, well nourished, in no apparent distress   LINES, TUBES, DRAINS:  none  SKIN: no rashes, no suspicious lesions, pale, warm  WOUND: see wound assessment,   EYES: PERRLA, EOMI, sclera anicteric, conjunctiva normal; there is no nystagmus, no drainage from eyes  HENT: normocephalic; normal nose, no nasal drainage, mucous membranes pink, moist, pharynx no exudate, no visible cerumen. NECK:supple, FROM; no JVD, no TMG, no carotid bruits   BREAST: deferred exam   RESPIRATORY:clear to percussion and auscultation  CARDIOVASCULAR: S1, S2 normal, RRR; no S3, no S4 and no murmur  ABDOMEN:  normal active BS+, soft, nondistended; no organomegaly, no masses; no bruits; nontender, no guarding, no rebound tenderness. :Deferred  LYMPHATIC:no lymphedema  MUSCULOSKELETAL: no acute synovitis upper or lower extremity   EXTREMITIES/VASCULAR:no cyanosis, clubbing or edema  NEUROLOGIC:intact; no sensorimotor deficit and follows commands  PSYCHIATRIC: alert and oriented x 3; affect appropriate      SEE PLAN BELOW  Humerus fracture   Minimally displaced  - Xray 10/10/23 - Mildly displaced, comminuted fracture of proximal humeral metadiaphysis with involvement of the greater tuberosity   - continue Norco 5-325mg (2 tabs) Q4PRN  - continue Ibuprofen 200mg Q6PRN  - seen by Dr. Kristina Hutchison in Orthopedic consultation, recom conservative management-->  No lifting using the upper extremity. Ok to come out of the sling at 7 days, multiple times a day, and start pendulum exercises which were taught today.   BIB RICE  Follow up: in 2-3 weeks in our office for repeat radiographic evaluation. Gait instability. Over the last 2 months patient has had progressive gait instability with frequent falls. Prior to this she ambulated without any difficulty whatsoever. Previous ER visit recently showed normal CT of the brain. No prodrome to suggest arrhythmia, hypotension. Pt describes her gait instability as starting off with shaking in the left leg than the right leg and that she goes down without warning, worrisome for seizure  - A1c 6.3  - seen by Dr. Jennifer Rasmussen in Neurology consultation  - continue Lamictal 25mg daily  - MRI brain shows:   1. No acute intracranial abnormality. No suspect epileptogenic foci are seen. 2. There is a 5 mm intrinsically T1 hyperintense nodule in the sella along the anterosuperior margin of the pituitary gland. Primary differential considerations include a complex/proteinaceous Rathke's cleft cyst or a complex pituitary micro adenoma. Suggest correlation with pituitary biochemical parameters. If laboratory abnormalities are present, consider further evaluation with a dedicated pituitary protocol MR.   - pt to f/u with her own endocrinologist outpatient at [facility]  - monitor      Hypothyroidism   - continue Levothyroxine 75 mcg daily  - monitor      HTN  - Qshift vitals  - continue Lisinopril 5mg daily  - continue ASA 81mg daily  - monitor       Constipation   - continue GlycoLax daily PRN  - continue senna dailyPRN  - monitor      Depression/Anxiety  - continue ALPRAZolam 0.5 Q8PRN  - continue BusPIRone 5mg BID  - continue ARIPiprazole 1mg HS  - monitor     FOLLOW UP APPOINTMENTS  No future appointments. This is a 25 minute visit and greater than 50% of the time was spent counseling the patient and/or coordinating care.       Alison Stoll, APRN  10/17/23

## 2023-10-24 PROCEDURE — 99308 SBSQ NF CARE LOW MDM 20: CPT | Performed by: INTERNAL MEDICINE

## 2023-10-25 ENCOUNTER — EXTERNAL FACILITY (OUTPATIENT)
Dept: INTERNAL MEDICINE CLINIC | Facility: CLINIC | Age: 63
End: 2023-10-25

## 2023-10-25 DIAGNOSIS — S42.202D CLOSED FRACTURE OF PROXIMAL END OF LEFT HUMERUS WITH ROUTINE HEALING, UNSPECIFIED FRACTURE MORPHOLOGY, SUBSEQUENT ENCOUNTER: Primary | ICD-10-CM

## 2023-10-25 NOTE — PROGRESS NOTES
follow up    seen 10/24/2023 BTl  66year old female who presents for evaluation of fall and left arm pain. PMHx significant for essential hypertension, hyperlipidemia, thyroid disease. Patient reports that she fell at home on the tile floor after losing balance. She did not have any lightheadedness or dizziness prior to the episode. She did not hit her head or lost consciousness. She reports that she does not take any blood thinners. States that she has been falling frequently at home. At that time, CT head did not reveal any intracranial abnormalities. She was discharged home and was advised to follow-up with her PCP as well as see a neurologist.  Patient states that she is able to eat and drink without any difficulties. She also reported she is not able to take care of herself at home. Denies any headache, change in vision, chest pain, shortness of breath, fever or chills. Stabilized and sent to [facility] for rehab         Past Medical History:  Diagnosis Date   Essential hypertension   Heart attack [facility])    Hyperlipidemia   Thyroid disease    Past Surgical History:  Procedure Laterality Date   APPENDECTOMY      REMOVAL GALLBLADDER    No family history on file.   Social History    Socioeconomic History   Marital status:   Tobacco Use   Smoking status: Every Day   Years: 10   Types: Cigarettes   Smokeless tobacco: Never  Social Determinants of Health  Food Insecurity: Unknown (10/9/2023)      Food Insecurity          Food Insecurity: Patient refused  Transportation Needs: No Transportation Needs (10/9/2023)      Transportation Needs          Lack of Transportation: No  Housing Stability: Low Risk (10/9/2023)      Housing Stability          Housing Instability: No    ALLERGIES:  No Known Allergies      CURRENT MEDICATIONS - reviewed    SUBJECTIVE     she is feeling better   PHYSICAL EXAM:  GENERAL HEALTH:well developed, well nourished, in no apparent distress  LINES, TUBES, DRAINS: none  SKIN: no rashes, no suspicious lesions, pale, warm  WOUND: see wound assessment,  EYES: PERRLA, EOMI, sclera anicteric, conjunctiva normal; there is no nystagmus, no drainage from eyes  HENT: normocephalic; normal nose, no nasal drainage, mucous membranes pink, moist, pharynx no exudate, no visible cerumen. NECK:supple, FROM; no JVD, no TMG, no carotid bruits  BREAST: deferred exam  RESPIRATORY:clear to percussion and auscultation  CARDIOVASCULAR: S1, S2 normal, RRR; no S3, no S4 and no murmur  ABDOMEN: normal active BS+, soft, nondistended; no organomegaly, no masses; no bruits; nontender, no guarding, no rebound tenderness. :Deferred  LYMPHATIC:no lymphedema  MUSCULOSKELETAL: no acute synovitis upper or lower extremity  EXTREMITIES/VASCULAR:no cyanosis, clubbing or edema  NEUROLOGIC:intact; no sensorimotor deficit and follows commands  PSYCHIATRIC: alert and oriented x 3; affect appropriate    SEE PLAN BELOW  Humerus fracture   Minimally displaced  - Xray 10/10/23 - Mildly displaced, comminuted fracture of proximal humeral metadiaphysis with involvement of the greater tuberosity  pain meds as needed  - seen by Dr. Thierno Mcmullen in Orthopedic consultation, recom conservative management-->  ? No lifting using the upper extremity. ? Ok to come out of the sling at 7 days, multiple times a day, and start pendulum exercises which were taught today. ? NWB LUE  follow up with ortho, stable     Gait instability. Over the last 2 months patient has had progressive gait instability with frequent falls. - A1c 6.3  - seen by Dr. Chata Quick in Neurology consultation  - continue Lamictal 25mg daily  -.    Suggest correlation with pituitary biochemical parameters.   If laboratory abnormalities are present, consider further evaluation with a dedicated pituitary protocol MR.   - pt to f/u with her own endocrinologist outpatient at [facility]    Hypothyroidism  - continue Levothyroxine 75 mcg daily    HTN  monitor bp    Constipation  -continue meds  Depression/Anxiety  conitnue mds

## 2023-10-26 PROCEDURE — 99308 SBSQ NF CARE LOW MDM 20: CPT | Performed by: INTERNAL MEDICINE

## 2023-10-27 ENCOUNTER — EXTERNAL FACILITY (OUTPATIENT)
Dept: INTERNAL MEDICINE CLINIC | Facility: CLINIC | Age: 63
End: 2023-10-27

## 2023-10-27 DIAGNOSIS — S42.202D CLOSED FRACTURE OF PROXIMAL END OF LEFT HUMERUS WITH ROUTINE HEALING, UNSPECIFIED FRACTURE MORPHOLOGY, SUBSEQUENT ENCOUNTER: Primary | ICD-10-CM

## 2023-10-27 DIAGNOSIS — I10 PRIMARY HYPERTENSION: ICD-10-CM

## 2023-10-27 NOTE — PROGRESS NOTES
follow up  seen 10/26/2023    66year old female who presents for evaluation of fall and left arm pain. PMHx significant for essential hypertension, hyperlipidemia, thyroid disease. Patient reports that she fell at home on the tile floor after losing balance. She did not have any lightheadedness or dizziness prior to the episode. She did not hit her head or lost consciousness. She reports that she does not take any blood thinners. States that she has been falling frequently at home. At that time, CT head did not reveal any intracranial abnormalities. She was discharged home and was advised to follow-up with her PCP as well as see a neurologist.  Patient states that she is able to eat and drink without any difficulties. She also reported she is not able to take care of herself at home. Denies any headache, change in vision, chest pain, shortness of breath, fever or chills. Stabilized and sent to [facility] for [facility]         Past Medical History:  Diagnosis Date   Essential hypertension   Heart attack [facility])    Hyperlipidemia   Thyroid disease    Past Surgical History:  Procedure Laterality Date   APPENDECTOMY      REMOVAL GALLBLADDER    No family history on file.   Social History    Socioeconomic History   Marital status:   Tobacco Use   Smoking status: Every Day   Years: 10   Types: Cigarettes   Smokeless tobacco: Never  Social Determinants of Health  Food Insecurity: Unknown (10/9/2023)      Food Insecurity          Food Insecurity: Patient refused  Transportation Needs: No Transportation Needs (10/9/2023)      Transportation Needs          Lack of Transportation: No  Housing Stability: Low Risk (10/9/2023)      Housing Stability          Housing Instability: No    ALLERGIES:  No Known Allergies    CURRENT MEDICATIONS - reviewed    SUBJECTIVE    she is feeling better  PHYSICAL EXAM:  GENERAL HEALTH:well developed, well nourished, in no apparent distress  LINES, TUBES, DRAINS: none  SKIN: no rashes, no suspicious lesions, pale, warm  WOUND: see wound assessment,  EYES: PERRLA, EOMI, sclera anicteric, conjunctiva normal; there is no nystagmus, no drainage from eyes  HENT: normocephalic; normal nose, no nasal drainage, mucous membranes pink, moist, pharynx no exudate, no visible cerumen. NECK:supple, FROM; no JVD, no TMG, no carotid bruits  BREAST: deferred exam  RESPIRATORY:clear to percussion and auscultation  CARDIOVASCULAR: S1, S2 normal, RRR; no S3, no S4 and no murmur  ABDOMEN: normal active BS+, soft, nondistended; no organomegaly, no masses; no bruits; nontender, no guarding, no rebound tenderness. :Deferred  LYMPHATIC:no lymphedema  MUSCULOSKELETAL: no acute synovitis upper or lower extremity  EXTREMITIES/VASCULAR:no cyanosis, clubbing or edema  NEUROLOGIC:intact; no sensorimotor deficit and follows commands  PSYCHIATRIC: alert and oriented x 3; affect appropriate    a/p    Humerus fracture   Minimally displaced  - Xray 10/10/23 - Mildly displaced, comminuted fracture of proximal humeral metadiaphysis with involvement of the greater tuberosity  pain meds as needed  - seen by Dr. Gabriel Nathan in Orthopedic consultation, recom conservative management-->  ? No lifting using the upper extremity. ? Ok to come out of the sling at 7 days, multiple times a day, and start pendulum exercises which were taught today. ? NWB LUE  follow up with ortho, stable, doing  ok    Gait instability. Over the last 2 months patient has had progressive gait instability with frequent falls. - A1c 6.3  - seen by Dr. Valentino Shivers in Neurology consultation  - continue Lamictal 25mg daily  -.    Suggest correlation with pituitary biochemical parameters.   If laboratory abnormalities are present, consider further evaluation with a dedicated pituitary protocol MR.   - pt to f/u with her own endocrinologist outpatient at [facility]    Hypothyroidism  - continue Levothyroxine 75 mcg daily    HTN  monitor bp    Constipation  -continue meds  Depression/Anxiety  continue with ptot , stable

## 2023-10-30 PROCEDURE — 99308 SBSQ NF CARE LOW MDM 20: CPT | Performed by: INTERNAL MEDICINE

## 2023-10-31 ENCOUNTER — EXTERNAL FACILITY (OUTPATIENT)
Dept: INTERNAL MEDICINE CLINIC | Facility: CLINIC | Age: 63
End: 2023-10-31

## 2023-10-31 DIAGNOSIS — S42.302D CLOSED FRACTURE OF LEFT UPPER EXTREMITY WITH ROUTINE HEALING, SUBSEQUENT ENCOUNTER: Primary | ICD-10-CM

## 2023-10-31 DIAGNOSIS — I10 PRIMARY HYPERTENSION: ICD-10-CM

## 2023-10-31 DIAGNOSIS — E03.8 OTHER SPECIFIED HYPOTHYROIDISM: ICD-10-CM

## 2023-10-31 NOTE — PROGRESS NOTES
follow up  seen 10/30/2023    66year old female who presents for evaluation of fall and left arm pain. PMHx significant for essential hypertension, hyperlipidemia, thyroid disease. Patient reports that she fell at home on the tile floor after losing balance. She did not have any lightheadedness or dizziness prior to the episode. She did not hit her head or lost consciousness. She reports that she does not take any blood thinners. States that she has been falling frequently at home. At that time, CT head did not reveal any intracranial abnormalities. She was discharged home and was advised to follow-up with her PCP as well as see a neurologist.  Patient states that she is able to eat and drink without any difficulties. She also reported she is not able to take care of herself at home. Denies any headache, change in vision, chest pain, shortness of breath, fever or chills. Stabilized and sent to [facility] for rehab         Past Medical History:  Diagnosis Date   Essential hypertension   Heart attack [facility])    Hyperlipidemia   Thyroid disease    Past Surgical History:  Procedure Laterality Date   APPENDECTOMY      REMOVAL GALLBLADDER    No family history on file.   Social History    Socioeconomic History   Marital status:   Tobacco Use   Smoking status: Every Day   Years: 10   Types: Cigarettes   Smokeless tobacco: Never  Social Determinants of Health  Food Insecurity: Unknown (10/9/2023)      Food Insecurity          Food Insecurity: Patient refused  Transportation Needs: No Transportation Needs (10/9/2023)      Transportation Needs          Lack of Transportation: No  Housing Stability: Low Risk (10/9/2023)      Housing Stability          Housing Instability: No    ALLERGIES:  No Known Allergies    CURRENT MEDICATIONS - reviewed    SUBJECTIVE    she is feeling better, on and off pain but she wants to take tylenol  PHYSICAL EXAM:  GENERAL HEALTH:well developed, well nourished, in no apparent distress  LINES, TUBES, DRAINS: none  SKIN: no rashes, no suspicious lesions, pale, warm  WOUND: see wound assessment,  EYES: PERRLA, EOMI, sclera anicteric, conjunctiva normal; there is no nystagmus, no drainage from eyes  HENT: normocephalic; normal nose, no nasal drainage, mucous membranes pink, moist, pharynx no exudate, no visible cerumen. NECK:supple, FROM; no JVD, no TMG, no carotid bruits  BREAST: deferred exam  RESPIRATORY:clear to percussion and auscultation  CARDIOVASCULAR: S1, S2 normal, RRR; no S3, no S4 and no murmur  ABDOMEN: normal active BS+, soft, nondistended; no organomegaly, no masses; no bruits; nontender, no guarding, no rebound tenderness. :Deferred  LYMPHATIC:no lymphedema  MUSCULOSKELETAL: no acute synovitis upper or lower extremity  EXTREMITIES/VASCULAR:no cyanosis, clubbing or edema  NEUROLOGIC:intact; no sensorimotor deficit and follows commands  PSYCHIATRIC: alert and oriented x 3; affect appropriate    a/p    Humerus fracture   Minimally displaced  - Xray 10/10/23 - Mildly displaced, comminuted fracture of proximal humeral metadiaphysis with involvement of the greater tuberosity  pain meds as needed  - seen by Dr. Alma Bhatti in Orthopedic consultation, recom conservative management-->  ? No lifting using the upper extremity. NWB LUE  follow up with ortho,tylenol as needed for pain    Gait instability. Over the last 2 months patient has had progressive gait instability with frequent falls. - A1c 6.3  - seen by Dr. Ghada Paulson in Neurology consultation  - continue Lamictal 25mg daily  -.    Suggest correlation with pituitary biochemical parameters.   If laboratory abnormalities are present, consider further evaluation with a dedicated pituitary protocol .   - pt to f/u with her own endocrinologist outpatient at [facility]    Hypothyroidism  - continue Levothyroxine 75 mcg daily    HTN  monitor bp    Constipation  -continue meds  Depression/Anxiety  smoker - advised her to quit   continue with ptot , stable

## 2023-11-02 ENCOUNTER — EXTERNAL FACILITY (OUTPATIENT)
Dept: INTERNAL MEDICINE CLINIC | Facility: CLINIC | Age: 63
End: 2023-11-02

## 2023-11-02 DIAGNOSIS — S42.202D CLOSED FRACTURE OF PROXIMAL END OF LEFT HUMERUS WITH ROUTINE HEALING, UNSPECIFIED FRACTURE MORPHOLOGY, SUBSEQUENT ENCOUNTER: ICD-10-CM

## 2023-11-02 DIAGNOSIS — I10 PRIMARY HYPERTENSION: ICD-10-CM

## 2023-11-02 DIAGNOSIS — R26.81 UNSTEADY GAIT: Primary | ICD-10-CM

## 2023-11-02 PROCEDURE — 99308 SBSQ NF CARE LOW MDM 20: CPT | Performed by: INTERNAL MEDICINE

## 2023-11-02 NOTE — PROGRESS NOTES
follow up      66year old female who presents for evaluation of fall and left arm pain. PMHx significant for essential hypertension, hyperlipidemia, thyroid disease. Patient reports that she fell at home on the tile floor after losing balance. She did not have any lightheadedness or dizziness prior to the episode. She did not hit her head or lost consciousness. She reports that she does not take any blood thinners. States that she has been falling frequently at home. At that time, CT head did not reveal any intracranial abnormalities. She was discharged home and was advised to follow-up with her PCP as well as see a neurologist.  Patient states that she is able to eat and drink without any difficulties. She also reported she is not able to take care of herself at home. Denies any headache, change in vision, chest pain, shortness of breath, fever or chills. Stabilized and sent [facility]         Past Medical History:  Diagnosis Date   Essential hypertension   Heart attack [facility])    Hyperlipidemia   Thyroid disease    Past Surgical History:  Procedure Laterality Date   APPENDECTOMY      REMOVAL GALLBLADDER    No family history on file.   Social History    Socioeconomic History   Marital status:   Tobacco Use   Smoking status: Every Day   Years: 10   Types: Cigarettes   Smokeless tobacco: Never  Social Determinants of Health  Food Insecurity: Unknown (10/9/2023)      Food Insecurity          Food Insecurity: Patient refused  Transportation Needs: No Transportation Needs (10/9/2023)      Transportation Needs          Lack of Transportation: No  Housing Stability: Low Risk (10/9/2023)      Housing Stability          Housing Instability: No    ALLERGIES:  No Known Allergies    CURRENT MEDICATIONS - reviewed    SUBJECTIVE      she is doing better , pain improved , doing good with therapy    PHYSICAL EXAM:  GENERAL HEALTH:well developed, well nourished, in no apparent distress  LINES, TUBES, DRAINS: none  SKIN: no rashes, no suspicious lesions, pale, warm  WOUND: see wound assessment,  EYES: PERRLA, EOMI, sclera anicteric, conjunctiva normal; there is no nystagmus, no drainage from eyes  HENT: normocephalic; normal nose, no nasal drainage, mucous membranes pink, moist, pharynx no exudate, no visible cerumen. NECK:supple, FROM; no JVD, no TMG, no carotid bruits  BREAST: deferred exam  RESPIRATORY:clear to percussion and auscultation  CARDIOVASCULAR: S1, S2 normal, RRR; no S3, no S4 and no murmur  ABDOMEN: normal active BS+, soft, nondistended; no organomegaly, no masses; no bruits; nontender, no guarding, no rebound tenderness. :Deferred  LYMPHATIC:no lymphedema  MUSCULOSKELETAL: no acute synovitis upper or lower extremity  EXTREMITIES/VASCULAR:no cyanosis, clubbing or edema  NEUROLOGIC:intact; no sensorimotor deficit and follows commands  PSYCHIATRIC: alert and oriented x 3; affect appropriate    a/p    Humerus fracture   Minimally displaced  - Xray 10/10/23 - Mildly displaced, comminuted fracture of proximal humeral metadiaphysis with involvement of the greater tuberosity  pain meds as needed  - seen by Dr. Carole Barthel in Orthopedic consultation, recom conservative management-->  continue with ptot          Gait instability. Over the last 2 months patient has had progressive gait instability with frequent falls. - A1c 6.3  - seen by Dr. Eduardo Bonds in Neurology consultation  - continue Lamictal 25mg daily  -. continue with ptot    Suggest correlation with pituitary biochemical parameters.   If laboratory abnormalities are present, consider further evaluation with a dedicated pituitary protocol MR.   - pt to f/u with her own endocrinologist outpatient at [facility]    Hypothyroidism  - continue Levothyroxine 75 mcg daily    HTN  monitor bp    Constipation  -continue meds  Depression/Anxiety  smoker - advised her to quit  continue with ptot , stable

## 2023-11-07 ENCOUNTER — EXTERNAL FACILITY (OUTPATIENT)
Dept: INTERNAL MEDICINE CLINIC | Facility: CLINIC | Age: 63
End: 2023-11-07

## 2023-11-07 ENCOUNTER — SNF VISIT (OUTPATIENT)
Facility: SKILLED NURSING FACILITY | Age: 63
End: 2023-11-07

## 2023-11-07 DIAGNOSIS — S42.202D CLOSED FRACTURE OF PROXIMAL END OF LEFT HUMERUS WITH ROUTINE HEALING, UNSPECIFIED FRACTURE MORPHOLOGY, SUBSEQUENT ENCOUNTER: ICD-10-CM

## 2023-11-07 DIAGNOSIS — R29.6 FREQUENT FALLS: Primary | ICD-10-CM

## 2023-11-07 DIAGNOSIS — S42.202A CLOSED FRACTURE OF PROXIMAL END OF LEFT HUMERUS, UNSPECIFIED FRACTURE MORPHOLOGY, INITIAL ENCOUNTER: Primary | ICD-10-CM

## 2023-11-07 DIAGNOSIS — E03.9 HYPOTHYROIDISM, UNSPECIFIED TYPE: ICD-10-CM

## 2023-11-07 DIAGNOSIS — I10 PRIMARY HYPERTENSION: ICD-10-CM

## 2023-11-07 PROCEDURE — 99309 SBSQ NF CARE MODERATE MDM 30: CPT | Performed by: NURSE PRACTITIONER

## 2023-11-07 PROCEDURE — 99308 SBSQ NF CARE LOW MDM 20: CPT | Performed by: INTERNAL MEDICINE

## 2023-11-07 NOTE — PROGRESS NOTES
follow up  seen   66year old female who presents for evaluation of fall and left arm pain. PMHx significant for essential hypertension, hyperlipidemia, thyroid disease. Patient reports that she fell at home on the tile floor after losing balance. She did not have any lightheadedness or dizziness prior to the episode. She did not hit her head or lost consciousness. She reports that she does not take any blood thinners. States that she has been falling frequently at home. At that time, CT head did not reveal any intracranial abnormalities. She was discharged home and was advised to follow-up with her PCP as well as see a neurologist.  Patient states that she is able to eat and drink without any difficulties. She also reported she is not able to take care of herself at home. Denies any headache, change in vision, chest pain, shortness of breath, fever or chills. Stabilized and sent [facility]         Past Medical History:  Diagnosis Date   Essential hypertension   Heart attack [facility])    Hyperlipidemia   Thyroid disease    Past Surgical History:  Procedure Laterality Date   APPENDECTOMY      REMOVAL GALLBLADDER    No family history on file.   Social History    Socioeconomic History   Marital status:   Tobacco Use   Smoking status: Every Day   Years: 10   Types: Cigarettes   Smokeless tobacco: Never  Social Determinants of Health  Food Insecurity: Unknown (10/9/2023)      Food Insecurity          Food Insecurity: Patient refused  Transportation Needs: No Transportation Needs (10/9/2023)      Transportation Needs          Lack of Transportation: No  Housing Stability: Low Risk (10/9/2023)      Housing Stability          Housing Instability: No    ALLERGIES:  No Known Allergies    CURRENT MEDICATIONS - reviewed    SUBJECTIVE    she is doing better , improving     PHYSICAL EXAM:  GENERAL HEALTH:well developed, well nourished, in no apparent distress  LINES, TUBES, DRAINS: none  SKIN: no rashes, no suspicious lesions, pale, warm  WOUND: see wound assessment,  EYES: PERRLA, EOMI, sclera anicteric, conjunctiva normal; there is no nystagmus, no drainage from eyes  HENT: normocephalic; normal nose, no nasal drainage, mucous membranes pink, moist, pharynx no exudate, no visible cerumen. NECK:supple, FROM; no JVD, no TMG, no carotid bruits  BREAST: deferred exam  RESPIRATORY:clear to percussion and auscultation  CARDIOVASCULAR: S1, S2 normal, RRR; no S3, no S4 and no murmur  ABDOMEN: normal active BS+, soft, nondistended; no organomegaly, no masses; no bruits; nontender, no guarding, no rebound tenderness. :Deferred  LYMPHATIC:no lymphedema  MUSCULOSKELETAL: no acute synovitis upper or lower extremity  EXTREMITIES/VASCULAR:no cyanosis, clubbing or edema  NEUROLOGIC:intact; no sensorimotor deficit and follows commands  PSYCHIATRIC: alert and oriented x 3; affect appropriate    a/p    Humerus fracture   Minimally displaced  - Xray 10/10/23 - Mildly displaced, comminuted fracture of proximal humeral metadiaphysis with involvement of the greater tuberosity  pain meds as needed  - seen by Dr. Jennifer Cardona in Orthopedic consultation, recom conservative management-->  continue with ptot, follow up with ortho as scheduled     Gait instability. Over the last 2 months patient has had progressive gait instability with frequent falls. - A1c 6.3  - seen by Dr. Fanta Silverman in Neurology consultation  - continue Lamictal 25mg daily  -. continue with ptot    Suggest correlation with pituitary biochemical parameters.   If laboratory abnormalities are present, consider further evaluation with a dedicated pituitary protocol MR.   - pt to f/u with her own endocrinologist outpatient at [facility]    Hypothyroidism  - continue Levothyroxine 75 mcg daily    HTN  controlled     Constipation  -continue meds  Depression/Anxiety  smoker - advised her to quit  continue with ptot , stable

## 2023-11-07 NOTE — PROGRESS NOTES
Karen uLly  : 1960  Age 66year old  female patient is admitted to [address] [place] [address] for rehabilitation and strengthening     [facility] Admission: 10/09/23 - 10/12/23     Chief complaint: humerus fracture, gait instability, DM type 2, hld, hypothyroidism      HPI  66year old female who presents for evaluation of fall and left arm pain. PMHx significant for essential hypertension, hyperlipidemia, thyroid disease. Patient reports that she fell at home on the tile floor after losing balance. She did not have any lightheadedness or dizziness prior to the episode. She did not hit her head or lost consciousness. She reports that she does not take any blood thinners. States that she has been falling frequently at home. States that she does not have any family support lives on her own. Of note, patient was seen in the ED 10/4/2023 after a fall. At that time, CT head did not reveal any intracranial abnormalities. She was discharged home and was advised to follow-up with her PCP as well as see a neurologist.  Patient states that she is able to eat and drink without any difficulties. She also reported she is not able to take care of herself at home. Denies any headache, change in vision, chest pain, shortness of breath, fever or chills. Stabilized and sent to [facility] for rehab     Patient seen in follow up, she has ortho on  and then is hoping of home next week. She has no complaints. Ready for home. No shortness of breath or chest pain. No nausea or vomiting. Working with therapy. Standby assist walking with out devices.      ALLERGIES:  No Known Allergies    IMMUNIZATIONS  Immunization History   Administered Date(s) Administered    Covid-19 Vaccine Pfizer 30 mcg/0.3 ml 2021, 2021, 10/26/2021        CODE STATUS:  Full Code    ADVANCED CARE PLANNING TEAM: Will need family care plan    CURRENT MEDICATIONS - reviewed and updated on SNF EMR     SUBJECTIVE    Patient seen in follow up, she has ortho on 11/9 and then is hoping of home next week. She has no complaints. Ready for home. No shortness of breath or chest pain. No nausea or vomiting. Working with therapy. Standby assist walking with out devices. PHYSICAL EXAM:    GENERAL HEALTH:well developed, well nourished, in no apparent distress   LINES, TUBES, DRAINS:  none  SKIN: no rashes, no suspicious lesions, pale, warm  WOUND: see wound assessment,   EYES: PERRLA, EOMI, sclera anicteric, conjunctiva normal; there is no nystagmus, no drainage from eyes  HENT: normocephalic; normal nose, no nasal drainage, mucous membranes pink, moist, pharynx no exudate, no visible cerumen. NECK:supple, FROM; no JVD, no TMG, no carotid bruits   BREAST: deferred exam   RESPIRATORY:clear to percussion and auscultation  CARDIOVASCULAR: S1, S2 normal, RRR; no S3, no S4 and no murmur  ABDOMEN:  normal active BS+, soft, nondistended; no organomegaly, no masses; no bruits; nontender, no guarding, no rebound tenderness. :Deferred  LYMPHATIC:no lymphedema  MUSCULOSKELETAL: no acute synovitis upper or lower extremity   EXTREMITIES/VASCULAR:no cyanosis, clubbing or edema  NEUROLOGIC:intact; no sensorimotor deficit and follows commands  PSYCHIATRIC: alert and oriented x 3; affect appropriate      SEE PLAN BELOW  Humerus fracture   Minimally displaced  - Xray 10/10/23 - Mildly displaced, comminuted fracture of proximal humeral metadiaphysis with involvement of the greater tuberosity   - continue Norco 5-325mg (2 tabs) Q4PRN  - continue Ibuprofen 200mg Q6PRN  - seen by Dr. Enid Alford in Orthopedic consultation, recom conservative management-->  No lifting using the upper extremity. Ok to come out of the sling at 7 days, multiple times a day, and start pendulum exercises which were taught today. BIB RICE  Follow up: in 2-3 weeks in our office for repeat radiographic evaluation. Gait instability.   Over the last 2 months patient has had progressive gait instability with frequent falls. Prior to this she ambulated without any difficulty whatsoever. Previous ER visit recently showed normal CT of the brain. No prodrome to suggest arrhythmia, hypotension. Pt describes her gait instability as starting off with shaking in the left leg than the right leg and that she goes down without warning, worrisome for seizure  - A1c 6.3  - seen by Dr. Ainsley Gonzalez in Neurology consultation  - continue Lamictal 25mg daily  - MRI brain shows:   1. No acute intracranial abnormality. No suspect epileptogenic foci are seen. 2. There is a 5 mm intrinsically T1 hyperintense nodule in the sella along the anterosuperior margin of the pituitary gland. Primary differential considerations include a complex/proteinaceous Rathke's cleft cyst or a complex pituitary micro adenoma. Suggest correlation with pituitary biochemical parameters. If laboratory abnormalities are present, consider further evaluation with a dedicated pituitary protocol MR.   - pt to f/u with her own endocrinologist outpatient at [facility]  - monitor      Hypothyroidism   - continue Levothyroxine 75 mcg daily  - monitor      HTN  - Qshift vitals  - continue Lisinopril 5mg daily  - continue ASA 81mg daily  - monitor       Constipation   - continue GlycoLax daily PRN  - continue senna dailyPRN  - monitor      Depression/Anxiety  - continue ALPRAZolam 0.5 Q8PRN  - continue BusPIRone 5mg BID  - continue ARIPiprazole 1mg HS  - monitor      FOLLOW UP APPOINTMENTS  No future appointments. This is a 25 minute visit and greater than 50% of the time was spent counseling the patient and/or coordinating care. Note to patient: The [facility] makes medical notes like these available to patients in the interest of transparency. However, this is a medical document intended as peer to peer communication. It is written in medical language and may contain abbreviations or verbiage that are unfamiliar. It may appear blunt or direct. Medical documents are intended to carry relevant information, facts as evident, and the clinical opinion of the practitioner who signs the document.      Marium Og, APRN  11/07/23 Tetracycline Counseling: Patient counseled regarding possible photosensitivity and increased risk for sunburn.  Patient instructed to avoid sunlight, if possible.  When exposed to sunlight, patients should wear protective clothing, sunglasses, and sunscreen.  The patient was instructed to call the office immediately if the following severe adverse effects occur:  hearing changes, easy bruising/bleeding, severe headache, or vision changes.  The patient verbalized understanding of the proper use and possible adverse effects of tetracycline.  All of the patient's questions and concerns were addressed. Patient understands to avoid pregnancy while on therapy due to potential birth defects.

## 2023-11-09 ENCOUNTER — SNF DISCHARGE (OUTPATIENT)
Facility: SKILLED NURSING FACILITY | Age: 63
End: 2023-11-09

## 2023-11-09 ENCOUNTER — EXTERNAL FACILITY (OUTPATIENT)
Dept: INTERNAL MEDICINE CLINIC | Facility: CLINIC | Age: 63
End: 2023-11-09

## 2023-11-09 DIAGNOSIS — I10 PRIMARY HYPERTENSION: ICD-10-CM

## 2023-11-09 DIAGNOSIS — S42.202D CLOSED FRACTURE OF PROXIMAL END OF LEFT HUMERUS WITH ROUTINE HEALING, UNSPECIFIED FRACTURE MORPHOLOGY, SUBSEQUENT ENCOUNTER: Primary | ICD-10-CM

## 2023-11-09 DIAGNOSIS — S42.202A CLOSED FRACTURE OF PROXIMAL END OF LEFT HUMERUS, UNSPECIFIED FRACTURE MORPHOLOGY, INITIAL ENCOUNTER: Primary | ICD-10-CM

## 2023-11-09 DIAGNOSIS — F33.1 MAJOR DEPRESSIVE DISORDER, RECURRENT EPISODE, MODERATE (HCC): ICD-10-CM

## 2023-11-09 DIAGNOSIS — R29.6 FREQUENT FALLS: ICD-10-CM

## 2023-11-09 DIAGNOSIS — E03.9 HYPOTHYROIDISM, UNSPECIFIED TYPE: ICD-10-CM

## 2023-11-09 PROCEDURE — 99308 SBSQ NF CARE LOW MDM 20: CPT | Performed by: INTERNAL MEDICINE

## 2023-11-09 PROCEDURE — 99310 SBSQ NF CARE HIGH MDM 45: CPT | Performed by: NURSE PRACTITIONER

## 2023-11-09 NOTE — PROGRESS NOTES
Antonio Braun, 9/20/1960, 66year old, female is being discharged from [address] [place] [address] to home      [address]    Date of Admission to [address] [place] [address]: 10/12/23    Date of Discharge from [address] [place] [address]: 11/13/23                          Admitting Diagnoses: humerus fracture, gait instability, DM type 2, hld, hypothyroidism     Reason for Admission to [facility]: Rehabilitation and strengthening      PHYSICAL EXAM:  GENERAL HEALTH:well developed, well nourished, in no apparent distress   LINES, TUBES, DRAINS:  none  SKIN: no rashes, no suspicious lesions, pale, warm  WOUND: see wound assessment,   EYES: PERRLA, EOMI, sclera anicteric, conjunctiva normal; there is no nystagmus, no drainage from eyes  HENT: normocephalic; normal nose, no nasal drainage, mucous membranes pink, moist, pharynx no exudate, no visible cerumen. NECK:supple, FROM; no JVD, no TMG, no carotid bruits   BREAST: deferred exam   RESPIRATORY:clear to percussion and auscultation  CARDIOVASCULAR: S1, S2 normal, RRR; no S3, no S4 and no murmur  ABDOMEN:  normal active BS+, soft, nondistended; no organomegaly, no masses; no bruits; nontender, no guarding, no rebound tenderness. :Deferred  LYMPHATIC:no lymphedema  MUSCULOSKELETAL: no acute synovitis upper or lower extremity   EXTREMITIES/VASCULAR:no cyanosis, clubbing or edema  NEUROLOGIC:intact; no sensorimotor deficit and follows commands  PSYCHIATRIC: alert and oriented x 3; affect appropriate      SEE PLAN BELOW  Humerus fracture   Minimally displaced  - Xray 10/10/23 - Mildly displaced, comminuted fracture of proximal humeral metadiaphysis with involvement of the greater tuberosity   - continue Norco 5-325mg (2 tabs) Q4PRN  - continue Ibuprofen 200mg Q6PRN  - seen by Dr. Jeff Dumont in Orthopedic consultation, recom conservative management-->  No lifting using the upper extremity.   Ok to come out of the sling at 7 days, multiple times a day, and start pendulum exercises which were taught today.  BIB RICE  Follow up: 11/9, awaiting new orders. Gait instability. Over the last 2 months patient has had progressive gait instability with frequent falls. Prior to this she ambulated without any difficulty whatsoever. Previous ER visit recently showed normal CT of the brain. No prodrome to suggest arrhythmia, hypotension. Pt describes her gait instability as starting off with shaking in the left leg than the right leg and that she goes down without warning, worrisome for seizure  - A1c 6.3  - seen by Dr. Codie Macdonald in Neurology consultation  - continue Lamictal 25mg daily  - MRI brain shows:   1. No acute intracranial abnormality. No suspect epileptogenic foci are seen. 2. There is a 5 mm intrinsically T1 hyperintense nodule in the sella along the anterosuperior margin of the pituitary gland. Primary differential considerations include a complex/proteinaceous Rathke's cleft cyst or a complex pituitary micro adenoma. Suggest correlation with pituitary biochemical parameters. If laboratory abnormalities are present, consider further evaluation with a dedicated pituitary protocol MR.   - pt to f/u with her own endocrinologist outpatient at [facility]  - monitor      Hypothyroidism   - continue Levothyroxine 75 mcg daily  - monitor      HTN  - Qshift vitals  - continue Lisinopril 5mg daily  - continue ASA 81mg daily  - monitor       Constipation   - continue GlycoLax daily PRN  - continue senna dailyPRN  - monitor      Depression/Anxiety  - continue ALPRAZolam 0.5 Q8PRN  - continue BusPIRone 5mg BID  - continue ARIPiprazole 1mg HS  - monitor     Discharge planning  - SW following  - [facility], residential  - PT/OT at home  - patient would like to discharge to home 11/13/23    Medication Reconciliation Completed:  Yes - All medications and side effects reviewed with patient    FOLLOW UP APPOINTMENTS  No future appointments.      This is a 35 minute visit and greater than 50% of the time was spent counseling the patient and/or coordinating care. Note to patient: The [facility] makes medical notes like these available to patients in the interest of transparency. However, this is a medical document intended as peer to peer communication. It is written in medical language and may contain abbreviations or verbiage that are unfamiliar. It may appear blunt or direct. Medical documents are intended to carry relevant information, facts as evident, and the clinical opinion of the practitioner who signs the document.      Alvarez Galeano, APRN  11/9/2023

## 2023-11-09 NOTE — PROGRESS NOTES
follow up  seen   66year old female who presents for evaluation of fall and left arm pain. PMHx significant for essential hypertension, hyperlipidemia, thyroid disease. Patient reports that she fell at home on the tile floor after losing balance. She did not have any lightheadedness or dizziness prior to the episode. She did not hit her head or lost consciousness. She reports that she does not take any blood thinners. States that she has been falling frequently at home. At that time, CT head did not reveal any intracranial abnormalities. She was discharged home and was advised to follow-up with her PCP as well as see a neurologist.  Patient states that she is able to eat and drink without any difficulties. She also reported she is not able to take care of herself at home. Denies any headache, change in vision, chest pain, shortness of breath, fever or chills. Stabilized and sent to [facility]         Past Medical History:  Diagnosis Date   Essential hypertension   Heart attack [facility])    Hyperlipidemia   Thyroid disease    Past Surgical History:  Procedure Laterality Date   APPENDECTOMY      REMOVAL GALLBLADDER    No family history on file.   Social History    Socioeconomic History   Marital status:   Tobacco Use   Smoking status: Every Day   Years: 10   Types: Cigarettes   Smokeless tobacco: Never  Social Determinants of Health  Food Insecurity: Unknown (10/9/2023)      Food Insecurity          Food Insecurity: Patient refused  Transportation Needs: No Transportation Needs (10/9/2023)      Transportation Needs          Lack of Transportation: No  Housing Stability: Low Risk (10/9/2023)      Housing Stability          Housing Instability: No    ALLERGIES:  No Known Allergies    CURRENT MEDICATIONS - reviewed    SUBJECTIVE    she is doing better ,has lul with ortho today    PHYSICAL EXAM:  GENERAL HEALTH:well developed, well nourished, in no apparent distress  LINES, TUBES, DRAINS: none  SKIN: no rashes, no suspicious lesions, pale, warm  WOUND: see wound assessment,  EYES: PERRLA, EOMI, sclera anicteric, conjunctiva normal; there is no nystagmus, no drainage from eyes  HENT: normocephalic; normal nose, no nasal drainage, mucous membranes pink, moist, pharynx no exudate, no visible cerumen. NECK:supple, FROM; no JVD, no TMG, no carotid bruits  BREAST: deferred exam  RESPIRATORY:clear to percussion and auscultation  CARDIOVASCULAR: S1, S2 normal, RRR; no S3, no S4 and no murmur  ABDOMEN: normal active BS+, soft, nondistended; no organomegaly, no masses; no bruits; nontender, no guarding, no rebound tenderness. :Deferred  LYMPHATIC:no lymphedema  MUSCULOSKELETAL: no acute synovitis upper or lower extremity  EXTREMITIES/VASCULAR:no cyanosis, clubbing or edema  NEUROLOGIC:intact; no sensorimotor deficit and follows commands  PSYCHIATRIC: alert and oriented x 3; affect appropriate    a/p    Humerus fracture   Minimally displaced  - Xray 10/10/23 - Mildly displaced, comminuted fracture of proximal humeral metadiaphysis with involvement of the greater tuberosity  pain meds as needed  - seen by Dr. Joselo Cortes in Orthopedic consultation, recom conservative management-->  c she has lul today     Gait instability. Over the last 2 months patient has had progressive gait instability with frequent falls. - A1c 6.3  - seen by Dr. Codie Macdonald in Neurology consultation  - continue Lamictal 25mg daily  -. continue with ptot    Suggest correlation with pituitary biochemical parameters.   If laboratory abnormalities are present, consider further evaluation with a dedicated pituitary protocol MR.   - pt to f/u with her own endocrinologist outpatient at [facility]    Hypothyroidism  - continue Levothyroxine 75 mcg daily    HTN  controlled    Constipation  -continue meds  Depression/Anxiety  smoker - advised her to quit  continue with ptot , stable

## 2023-11-14 ENCOUNTER — EXTERNAL FACILITY (OUTPATIENT)
Dept: INTERNAL MEDICINE CLINIC | Facility: CLINIC | Age: 63
End: 2023-11-14

## 2023-11-14 DIAGNOSIS — I10 PRIMARY HYPERTENSION: ICD-10-CM

## 2023-11-14 DIAGNOSIS — R26.81 GAIT INSTABILITY: ICD-10-CM

## 2023-11-14 DIAGNOSIS — S42.202D CLOSED FRACTURE OF PROXIMAL END OF LEFT HUMERUS WITH ROUTINE HEALING, UNSPECIFIED FRACTURE MORPHOLOGY, SUBSEQUENT ENCOUNTER: Primary | ICD-10-CM

## 2023-11-14 NOTE — PROGRESS NOTES
follow up  seen   66year old female who presents for evaluation of fall and left arm pain. PMHx significant for essential hypertension, hyperlipidemia, thyroid disease. Patient reports that she fell at home on the tile floor after losing balance. She did not have any lightheadedness or dizziness prior to the episode. She did not hit her head or lost consciousness. She reports that she does not take any blood thinners. States that she has been falling frequently at home. At that time, CT head did not reveal any intracranial abnormalities. She was discharged home and was advised to follow-up with her PCP as well as see a neurologist.  Patient states that she is able to eat and drink without any difficulties. She also reported she is not able to take care of herself at home. Denies any headache, change in vision, chest pain, shortness of breath, fever or chills. Stabilized and sent to [facility]         Past Medical History:  Diagnosis Date   Essential hypertension   Heart attack [facility])    Hyperlipidemia   Thyroid disease    Past Surgical History:  Procedure Laterality Date   APPENDECTOMY      REMOVAL GALLBLADDER    No family history on file.   Social History    Socioeconomic History   Marital status:   Tobacco Use   Smoking status: Every Day   Years: 10   Types: Cigarettes   Smokeless tobacco: Never  Social Determinants of Health  Food Insecurity: Unknown (10/9/2023)      Food Insecurity          Food Insecurity: Patient refused  Transportation Needs: No Transportation Needs (10/9/2023)      Transportation Needs          Lack of Transportation: No  Housing Stability: Low Risk (10/9/2023)      Housing Stability          Housing Instability: No    ALLERGIES:  No Known Allergies    CURRENT MEDICATIONS - reviewed    SUBJECTIVE    she is doing better ,has lul with ortho today    PHYSICAL EXAM:  GENERAL HEALTH:well developed, well nourished, in no apparent distress  LINES, TUBES, DRAINS: none  SKIN: no rashes, no suspicious lesions, pale, warm  WOUND: see wound assessment,  EYES: PERRLA, EOMI, sclera anicteric, conjunctiva normal; there is no nystagmus, no drainage from eyes  HENT: normocephalic; normal nose, no nasal drainage, mucous membranes pink, moist, pharynx no exudate, no visible cerumen. NECK:supple, FROM; no JVD, no TMG, no carotid bruits  BREAST: deferred exam  RESPIRATORY:clear to percussion and auscultation  CARDIOVASCULAR: S1, S2 normal, RRR; no S3, no S4 and no murmur  ABDOMEN: normal active BS+, soft, nondistended; no organomegaly, no masses; no bruits; nontender, no guarding, no rebound tenderness. :Deferred  LYMPHATIC:no lymphedema  MUSCULOSKELETAL: no acute synovitis upper or lower extremity  EXTREMITIES/VASCULAR:no cyanosis, clubbing or edema  NEUROLOGIC:intact; no sensorimotor deficit and follows commands  PSYCHIATRIC: alert and oriented x 3; affect appropriate    a/p    Humerus fracture   Minimally displaced  - Xray 10/10/23 - Mildly displaced, comminuted fracture of proximal humeral metadiaphysis with involvement of the greater tuberosity  pain meds as needed  - seen by Dr. Juan R Delarosa in Orthopedic consultation, recom conservative management-->  c she has lul today    Gait instability. Over the last 2 months patient has had progressive gait instability with frequent falls. - A1c 6.3  - seen by Dr. Mary Bui in Neurology consultation  - continue Lamictal 25mg daily  -. continue with ptot    Suggest correlation with pituitary biochemical parameters.   If laboratory abnormalities are present, consider further evaluation with a dedicated pituitary protocol MR.   - pt to f/u with her own endocrinologist outpatient at [facility]    Hypothyroidism  - continue Levothyroxine 75 mcg daily    HTN  controlled    Constipation  -continue meds  Depression/Anxiety  smoker - advised her to quit  continue with ptot , stable

## 2023-11-16 ENCOUNTER — EXTERNAL FACILITY (OUTPATIENT)
Dept: INTERNAL MEDICINE CLINIC | Facility: CLINIC | Age: 63
End: 2023-11-16

## 2023-11-16 DIAGNOSIS — I10 PRIMARY HYPERTENSION: ICD-10-CM

## 2023-11-16 DIAGNOSIS — S42.202D CLOSED FRACTURE OF PROXIMAL END OF LEFT HUMERUS WITH ROUTINE HEALING, UNSPECIFIED FRACTURE MORPHOLOGY, SUBSEQUENT ENCOUNTER: Primary | ICD-10-CM

## 2023-11-16 PROCEDURE — 99308 SBSQ NF CARE LOW MDM 20: CPT | Performed by: INTERNAL MEDICINE

## 2023-11-16 NOTE — PROGRESS NOTES
follow up    66year old female who presents for evaluation of fall and left arm pain. PMHx significant for essential hypertension, hyperlipidemia, thyroid disease. Patient reports that she fell at home on the tile floor after losing balance. She did not have any lightheadedness or dizziness prior to the episode. She did not hit her head or lost consciousness. She reports that she does not take any blood thinners. States that she has been falling frequently at home. At that time, CT head did not reveal any intracranial abnormalities. She was discharged home and was advised to follow-up with her PCP as well as see a neurologist.  Patient states that she is able to eat and drink without any difficulties. She also reported she is not able to take care of herself at home. Denies any headache, change in vision, chest pain, shortness of breath, fever or chills. Stabilized and sent to [facility]         Past Medical History:  Diagnosis Date   Essential hypertension   Heart attack [facility])    Hyperlipidemia   Thyroid disease    Past Surgical History:  Procedure Laterality Date   APPENDECTOMY      REMOVAL GALLBLADDER    No family history on file.   Social History    Socioeconomic History   Marital status:   Tobacco Use   Smoking status: Every Day   Years: 10   Types: Cigarettes   Smokeless tobacco: Never  Social Determinants of Health  Food Insecurity: Unknown (10/9/2023)      Food Insecurity          Food Insecurity: Patient refused  Transportation Needs: No Transportation Needs (10/9/2023)      Transportation Needs          Lack of Transportation: No  Housing Stability: Low Risk (10/9/2023)      Housing Stability          Housing Instability: No    ALLERGIES:  No Known Allergies    CURRENT MEDICATIONS - reviewed    SUBJECTIVE    she is doing  much better, has minimal pain on her shoulder     PHYSICAL EXAM:  GENERAL HEALTH:well developed, well nourished, in no apparent distress  LINES, TUBES, DRAINS: none  SKIN: no rashes, no suspicious lesions, pale, warm  WOUND: see wound assessment,  EYES: PERRLA, EOMI, sclera anicteric, conjunctiva normal; there is no nystagmus, no drainage from eyes  HENT: normocephalic; normal nose, no nasal drainage, mucous membranes pink, moist, pharynx no exudate, no visible cerumen. NECK:supple, FROM; no JVD, no TMG, no carotid bruits  BREAST: deferred exam  RESPIRATORY:clear to percussion and auscultation  CARDIOVASCULAR: S1, S2 normal, RRR; no S3, no S4 and no murmur  ABDOMEN: normal active BS+, soft, nondistended; no organomegaly, no masses; no bruits; nontender, no guarding, no rebound tenderness. :Deferred  LYMPHATIC:no lymphedema  MUSCULOSKELETAL: no acute synovitis upper or lower extremity  EXTREMITIES/VASCULAR:no cyanosis, clubbing or edema  NEUROLOGIC:intact; no sensorimotor deficit and follows commands  PSYCHIATRIC: alert and oriented x 3; affect appropriate    a/p    Humerus fracture   Minimally displaced  - Xray 10/10/23 - Mildly displaced, comminuted fracture of proximal humeral metadiaphysis with involvement of the greater tuberosity  pain meds as needed  - seen by Dr. Svetlana Lucero in Orthopedic consultation, recom conservative management-->   continue with ptot,improving     Gait instability. Over the last 2 months patient has had progressive gait instability with frequent falls. - A1c 6.3  - seen by Dr. Amee Sorenson in Neurology consultation  - continue Lamictal 25mg daily  -. continue with ptot    Suggest correlation with pituitary biochemical parameters.   If laboratory abnormalities are present, consider further evaluation with a dedicated pituitary protocol MR.   - pt to f/u with her own endocrinologist [facility] at [facility]    Hypothyroidism  - continue Levothyroxine 75 mcg daily    HTN  stable    Constipation  -continue meds  Depression/Anxiety  smoker - advised her to quit  continue with ptot , stable

## 2025-05-14 ENCOUNTER — APPOINTMENT (OUTPATIENT)
Dept: GENERAL RADIOLOGY | Facility: HOSPITAL | Age: 65
End: 2025-05-14
Attending: EMERGENCY MEDICINE
Payer: MEDICAID

## 2025-05-14 ENCOUNTER — APPOINTMENT (OUTPATIENT)
Dept: CT IMAGING | Facility: HOSPITAL | Age: 65
End: 2025-05-14
Attending: EMERGENCY MEDICINE
Payer: MEDICAID

## 2025-05-14 ENCOUNTER — HOSPITAL ENCOUNTER (EMERGENCY)
Facility: HOSPITAL | Age: 65
Discharge: HOME | End: 2025-05-14
Attending: EMERGENCY MEDICINE
Payer: MEDICAID

## 2025-05-14 VITALS
HEART RATE: 63 BPM | OXYGEN SATURATION: 93 % | TEMPERATURE: 97 F | SYSTOLIC BLOOD PRESSURE: 100 MMHG | RESPIRATION RATE: 16 BRPM | DIASTOLIC BLOOD PRESSURE: 61 MMHG

## 2025-05-14 DIAGNOSIS — N17.9 ACUTE KIDNEY INJURY: ICD-10-CM

## 2025-05-14 DIAGNOSIS — J44.1 COPD EXACERBATION (HCC): ICD-10-CM

## 2025-05-14 DIAGNOSIS — R29.6 FREQUENT FALLS: ICD-10-CM

## 2025-05-14 DIAGNOSIS — R74.01 TRANSAMINITIS: ICD-10-CM

## 2025-05-14 DIAGNOSIS — S22.000A COMPRESSION FRACTURE OF BODY OF THORACIC VERTEBRA (HCC): Primary | ICD-10-CM

## 2025-05-14 LAB
ALBUMIN SERPL-MCNC: 4.2 G/DL (ref 3.2–4.8)
ALBUMIN/GLOB SERPL: 1.8 {RATIO} (ref 1–2)
ALP LIVER SERPL-CCNC: 309 U/L (ref 50–130)
ALT SERPL-CCNC: 306 U/L (ref 10–49)
ANION GAP SERPL CALC-SCNC: 3 MMOL/L (ref 0–18)
AST SERPL-CCNC: 438 U/L (ref ?–34)
ATRIAL RATE: 58 BPM
BASOPHILS # BLD AUTO: 0.07 X10(3) UL (ref 0–0.2)
BASOPHILS NFR BLD AUTO: 0.8 %
BILIRUB SERPL-MCNC: 0.3 MG/DL (ref 0.2–1.1)
BUN BLD-MCNC: 18 MG/DL (ref 9–23)
BUN/CREAT SERPL: 7.7 (ref 10–20)
CALCIUM BLD-MCNC: 8.9 MG/DL (ref 8.7–10.4)
CHLORIDE SERPL-SCNC: 108 MMOL/L (ref 98–112)
CO2 SERPL-SCNC: 26 MMOL/L (ref 21–32)
CREAT BLD-MCNC: 2.34 MG/DL (ref 0.55–1.02)
DEPRECATED RDW RBC AUTO: 47.4 FL (ref 35.1–46.3)
EGFRCR SERPLBLD CKD-EPI 2021: 23 ML/MIN/1.73M2 (ref 60–?)
EOSINOPHIL # BLD AUTO: 0.04 X10(3) UL (ref 0–0.7)
EOSINOPHIL NFR BLD AUTO: 0.5 %
ERYTHROCYTE [DISTWIDTH] IN BLOOD BY AUTOMATED COUNT: 14.2 % (ref 11–15)
FLUAV + FLUBV RNA SPEC NAA+PROBE: NEGATIVE
FLUAV + FLUBV RNA SPEC NAA+PROBE: NEGATIVE
GLOBULIN PLAS-MCNC: 2.4 G/DL (ref 2–3.5)
GLUCOSE BLD-MCNC: 121 MG/DL (ref 70–99)
HCT VFR BLD AUTO: 53.7 % (ref 35–48)
HGB BLD-MCNC: 17.5 G/DL (ref 12–16)
IMM GRANULOCYTES # BLD AUTO: 0.03 X10(3) UL (ref 0–1)
IMM GRANULOCYTES NFR BLD: 0.3 %
LIPASE SERPL-CCNC: 36 U/L (ref 12–53)
LYMPHOCYTES # BLD AUTO: 1.48 X10(3) UL (ref 1–4)
LYMPHOCYTES NFR BLD AUTO: 17.1 %
MCH RBC QN AUTO: 29.5 PG (ref 26–34)
MCHC RBC AUTO-ENTMCNC: 32.6 G/DL (ref 31–37)
MCV RBC AUTO: 90.4 FL (ref 80–100)
MONOCYTES # BLD AUTO: 0.3 X10(3) UL (ref 0.1–1)
MONOCYTES NFR BLD AUTO: 3.5 %
NEUTROPHILS # BLD AUTO: 6.71 X10 (3) UL (ref 1.5–7.7)
NEUTROPHILS # BLD AUTO: 6.71 X10(3) UL (ref 1.5–7.7)
NEUTROPHILS NFR BLD AUTO: 77.8 %
NT-PROBNP SERPL-MCNC: 1213 PG/ML (ref ?–125)
OSMOLALITY SERPL CALC.SUM OF ELEC: 287 MOSM/KG (ref 275–295)
P AXIS: 49 DEGREES
P-R INTERVAL: 218 MS
PLATELET # BLD AUTO: 286 10(3)UL (ref 150–450)
POTASSIUM SERPL-SCNC: 3.9 MMOL/L (ref 3.5–5.1)
PROT SERPL-MCNC: 6.6 G/DL (ref 5.7–8.2)
Q-T INTERVAL: 476 MS
QRS DURATION: 108 MS
QTC CALCULATION (BEZET): 467 MS
R AXIS: -23 DEGREES
RBC # BLD AUTO: 5.94 X10(6)UL (ref 3.8–5.3)
RSV RNA SPEC NAA+PROBE: NEGATIVE
SARS-COV-2 RNA RESP QL NAA+PROBE: NOT DETECTED
SODIUM SERPL-SCNC: 137 MMOL/L (ref 136–145)
T AXIS: 92 DEGREES
VENTRICULAR RATE: 58 BPM
WBC # BLD AUTO: 8.6 X10(3) UL (ref 4–11)

## 2025-05-14 PROCEDURE — 85025 COMPLETE CBC W/AUTO DIFF WBC: CPT | Performed by: EMERGENCY MEDICINE

## 2025-05-14 PROCEDURE — 85025 COMPLETE CBC W/AUTO DIFF WBC: CPT

## 2025-05-14 PROCEDURE — 72072 X-RAY EXAM THORAC SPINE 3VWS: CPT | Performed by: EMERGENCY MEDICINE

## 2025-05-14 PROCEDURE — 94799 UNLISTED PULMONARY SVC/PX: CPT

## 2025-05-14 PROCEDURE — 70450 CT HEAD/BRAIN W/O DYE: CPT | Performed by: EMERGENCY MEDICINE

## 2025-05-14 PROCEDURE — 83880 ASSAY OF NATRIURETIC PEPTIDE: CPT | Performed by: EMERGENCY MEDICINE

## 2025-05-14 PROCEDURE — 93010 ELECTROCARDIOGRAM REPORT: CPT

## 2025-05-14 PROCEDURE — 85060 BLOOD SMEAR INTERPRETATION: CPT | Performed by: EMERGENCY MEDICINE

## 2025-05-14 PROCEDURE — 99285 EMERGENCY DEPT VISIT HI MDM: CPT

## 2025-05-14 PROCEDURE — 71045 X-RAY EXAM CHEST 1 VIEW: CPT | Performed by: EMERGENCY MEDICINE

## 2025-05-14 PROCEDURE — 0241U SARS-COV-2/FLU A AND B/RSV BY PCR (GENEXPERT): CPT | Performed by: EMERGENCY MEDICINE

## 2025-05-14 PROCEDURE — 93005 ELECTROCARDIOGRAM TRACING: CPT

## 2025-05-14 PROCEDURE — 96361 HYDRATE IV INFUSION ADD-ON: CPT

## 2025-05-14 PROCEDURE — 72100 X-RAY EXAM L-S SPINE 2/3 VWS: CPT | Performed by: EMERGENCY MEDICINE

## 2025-05-14 PROCEDURE — 72125 CT NECK SPINE W/O DYE: CPT | Performed by: EMERGENCY MEDICINE

## 2025-05-14 PROCEDURE — 94644 CONT INHLJ TX 1ST HOUR: CPT

## 2025-05-14 PROCEDURE — 83690 ASSAY OF LIPASE: CPT | Performed by: EMERGENCY MEDICINE

## 2025-05-14 PROCEDURE — 96374 THER/PROPH/DIAG INJ IV PUSH: CPT

## 2025-05-14 PROCEDURE — 80053 COMPREHEN METABOLIC PANEL: CPT

## 2025-05-14 PROCEDURE — 80053 COMPREHEN METABOLIC PANEL: CPT | Performed by: EMERGENCY MEDICINE

## 2025-05-14 RX ORDER — KETOROLAC TROMETHAMINE 15 MG/ML
15 INJECTION, SOLUTION INTRAMUSCULAR; INTRAVENOUS ONCE
Status: COMPLETED | OUTPATIENT
Start: 2025-05-14 | End: 2025-05-14

## 2025-05-14 RX ORDER — ALPRAZOLAM 0.5 MG
0.25 TABLET ORAL ONCE
Status: COMPLETED | OUTPATIENT
Start: 2025-05-14 | End: 2025-05-14

## 2025-05-14 RX ORDER — ALBUTEROL SULFATE 90 UG/1
1 INHALANT RESPIRATORY (INHALATION) ONCE
Status: COMPLETED | OUTPATIENT
Start: 2025-05-14 | End: 2025-05-14

## 2025-05-14 RX ORDER — HYDROCODONE BITARTRATE AND ACETAMINOPHEN 5; 325 MG/1; MG/1
1 TABLET ORAL ONCE
Refills: 0 | Status: COMPLETED | OUTPATIENT
Start: 2025-05-14 | End: 2025-05-14

## 2025-05-14 RX ORDER — METHYLPREDNISOLONE 4 MG/1
TABLET ORAL
Qty: 21 TABLET | Refills: 0 | Status: ON HOLD | OUTPATIENT
Start: 2025-05-14

## 2025-05-14 RX ORDER — PREDNISONE 20 MG/1
40 TABLET ORAL ONCE
Status: COMPLETED | OUTPATIENT
Start: 2025-05-14 | End: 2025-05-14

## 2025-05-14 RX ORDER — ALBUTEROL SULFATE 5 MG/ML
10 SOLUTION RESPIRATORY (INHALATION) ONCE
Status: COMPLETED | OUTPATIENT
Start: 2025-05-14 | End: 2025-05-14

## 2025-05-14 RX ORDER — LIDOCAINE 50 MG/G
1 PATCH TOPICAL EVERY 24 HOURS
Qty: 14 PATCH | Refills: 0 | Status: ON HOLD | OUTPATIENT
Start: 2025-05-14

## 2025-05-14 RX ORDER — IBUPROFEN 600 MG/1
600 TABLET, FILM COATED ORAL EVERY 6 HOURS PRN
Qty: 28 TABLET | Refills: 0 | Status: ON HOLD | OUTPATIENT
Start: 2025-05-14 | End: 2025-05-21

## 2025-05-14 NOTE — DISCHARGE INSTRUCTIONS
Thank you for seeking care at [facility].  You have been seen and evaluated for back pain. As we discussed you have a T10-12 mild anterior compression fracture.  Please follow-up with the back specialist we provided you and take the medicines as prescribed. Continue your home norco.      We discussed the results of your workup   Please read the instructions provided   If given prescriptions, take as instructed    Remember, your care process does not end after your visit today. Please follow-up with your doctor within 1-2 days for a follow-up check to ensure you are  improving, to see if you need any further evaluation/testing, or to evaluate for any alternate diagnoses.    Please return to the emergency department if you develop severe pain that is not controlled by pain medications, loss of control of your legs, if you are unable to walk because of pain or weakness, worsening numbness or weakness, loss of bowel or bladder control (dribbling of urine or having accidents you wouldn't normally have), inability to urinate, numbness of your genital or anal area, fevers, abdominal pain, change in urination, or as these could all be signs of a serious medical emergency. Call or return to the ER for any other new or worsening symptoms.

## 2025-05-14 NOTE — ED PROVIDER NOTES
Patient Seen in: [facility] Emergency Department    History     Chief Complaint   Patient presents with    Fall    Back Pain     Stated Complaint: falls, back pain    HPI    Chief complaint: Back pain    History of present illness: This is a 64-year-old female history of CAD, hyperlipidemia, hypertension, hypothyroidism, chronic leg weakness being evaluated by a neurologist at [facility] arrives to the ER today for evaluation of back pain.  Patient states that she falls somewhat frequently and most recently fell about a week and a half ago.  States she was in her kitchen when she slipped and \"did the splits\" on her legs and hit her back and head.  She did not pass out and has been ambulatory with her walker since then but reports persistent back pain and neck pain, worse with movements.  Denies any new numbness weakness or tingling in her extremities. Denies fever. Denies bowel or urinary retention, no saddle anesthesia, perianal numbness, stool/bladder incontinence. Denies pain in her arms or legs. She does additionally however reports she has been short of breath for about the past week though denies cough. Reports hx of COPD, ran out of albuterol at home. Denies chest pain or pressure. Has not been around anyone sick or traveled recently.  No history of DVT or PE.  She denies any abdominal pain, vomiting, diarrhea, dysuria.  Reports somewhat poor appetite but this is not new for her. Pt is not sure if she's on bloodthinners.       Past Medical History[1]    Past Surgical History[2]         Family History[3]    Short Social Hx on File[4]    Review of Systems    Positive for stated complaint: falls, back pain  Other systems are as noted in HPI.  Constitutional and vital signs reviewed.      All other systems reviewed and negative except as noted above.    PSFH elements reviewed from today and agreed except as otherwise stated in HPI.    Physical Exam     ED Triage Vitals   BP 05/14/25 0949 (!)  151/135   Pulse 05/14/25 0949 62   Resp 05/14/25 0949 16   Temp 05/14/25 0953 96.7 °F (35.9 °C)   Temp src 05/14/25 0953 Temporal   SpO2 05/14/25 0949 92 %   O2 Device 05/14/25 0949 None (Room air)       Current:/61   Pulse 63   Temp 96.7 °F (35.9 °C) (Temporal)   Resp 16   SpO2 93%     PULSE OX 92% on RA       General: Patient appears in no distress  Neck: Supple, full range of motion, +diffuse midline bony tenderness, no deformity or bruising  CV: RRR no MRG, 2+ radial pulses bilaterally  RESP: diffuse expiratory wheezes with rhonchi at lung bases. Diminished at bases. Speaking in full sentences, no accessory muscle use   Abdomen: Soft nontender nondistended, no mass or prominent aortic pulsation  Back: Tender to palpation in bilateral paraspinal area on thoracic and lumbar region, no midline bony tenderness to the T or L spine, no erythema  Straight leg tests: negative bilaterally   Neuro: Patient is alert and oriented x3, antigrav 4+/5 strength bilateral lower extremities on flexion of hips and knee flexion/extension, 5/5 ankle flexion and extension, dorsiflexion and plantarflexion 5/5 strength, sensation intact from sacral region throughout lower extremities down to the dorsal surface of the foot, 2+ DTRs of the knee bilaterally   Extremities:Nontender full range of motion in bilateral upper and lower extremities, no calf tenderness or pitting leg edema        ED Course     Labs Reviewed   CBC WITH DIFFERENTIAL WITH PLATELET - Abnormal; Notable for the following components:       Result Value    RBC 5.94 (*)     HGB 17.5 (*)     HCT 53.7 (*)     RDW-SD 47.4 (*)     All other components within normal limits   COMP METABOLIC PANEL (14) - Abnormal; Notable for the following components:    Glucose 121 (*)     Creatinine 2.34 (*)     BUN/CREA Ratio 7.7 (*)     eGFR-Cr 23 (*)      (*)      (*)     Alkaline Phosphatase 309 (*)     All other components within normal limits   PRO BETA NATRIURETIC  PEPTIDE - Abnormal; Notable for the following components:    Pro-Beta Natriuretic Peptide 1,213 (*)     All other components within normal limits   LIPASE - Normal   SARS-COV-2/FLU A AND B/RSV BY PCR (GENEXPERT) - Normal    Narrative:     This test is intended for the qualitative detection and differentiation of SARS-CoV-2, influenza A, influenza B, and respiratory syncytial virus (RSV) viral RNA in nasopharyngeal or nares swabs from individuals suspected of respiratory viral infection consistent with COVID-19 by their healthcare provider. Signs and symptoms of respiratory viral infection due to SARS-CoV-2, influenza, and RSV can be similar.    Test performed using the Xpert Xpress SARS-CoV-2/FLU/RSV (real time RT-PCR)  assay on the [employer] instrument, [address], [place] [address].   This test is being used under the Food and Drug Administration's Emergency Use Authorization.    The authorized Fact Sheet for Healthcare Providers for this assay is available upon request from the laboratory.   MD BLOOD SMEAR CONSULT   RAINBOW DRAW BLUE     EKG my interpretation:   Sinus bradycardia rate 58 bpm, 1st degree av block, qtc 467 ms, no stemi.     MDM       This patient presents with complaints of back pain as well as shortness of breath in the setting of COPD.  She is wheezing on exam though satting appropriately on room air.  She has some midline C-spine tenderness, no midline T or L-spine tenderness, no step-off or deformity and generally is well-appearing.    Differential diagnoses considered includes, but is not limited to:   Back spasm or musculoskeletal pain or strain in the setting of mechanical fall  C-spine injury or intracranial bleeding in the setting of headache and fall with head trauma  Copd exacerbation  Pneumonia   Viral syndrome   Less likely CHF     Will obtain the following tests: CT head/C spine, XR T and L spine, cxr   Will administer the following medications/therapies: albuterol 10mg neb,  prednisone, norco and toradol for pain     Cbc and cmp sent prior to my assessment of patient show no anemia but are concerning for SUSAN after Toradol ordered, also transaminitis. Will hold any further doses of NSAIDs at this time to avoid renal injury.     Please see ED course for my independent review of these tests/imaging results.    Chronic conditions affecting care: CAD, HTN, HLD, COPD    Workup and medications considered but not ordered: n/a     Social Determinants of Health that impacted care: n/a       ED Course as of 05/17/25 0010  ------------------------------------------------------------  Time: 05/14 1045  Comment: Cbc no leukocytosis or anemia   ------------------------------------------------------------  Time: 05/14 1045  Value: CREATININE(!): 2.34  Comment: Baseline Cr 1.3 back in December. No prior hepatic panel recently   ------------------------------------------------------------  Time: 05/14 1057  Value: pro-BETA NATRIURETIC PEPTIDE(!): 1,213  Comment: (Reviewed)  ------------------------------------------------------------  Time: 05/14 1057  Value: Lipase: 36  Comment: (Reviewed)  ------------------------------------------------------------  Time: 05/14 6803  Comment: Patient reassessed, has some improvement in her air movement and improvement in wheezing but still rhonchorous at lung bases.  States she is no longer feeling short of breath.  Discussed all results with her.  She is adamant she really would prefer not to be admitted to the hospital if possible, will give IV fluids and send for imaging, further disposition pending above, if no fractures and patient able to ambulate here I discussed with her I think would be okay for her to get some close follow-up at she would need a close recheck of her creatinine on the outpatient basis as well as follow-up with GI for transaminitis  ------------------------------------------------------------  Time: 05/14 6569  Value: CT BRAIN OR HEAD  (CPT=70450)  Comment:   CONCLUSION:  1. No acute intracranial process by noncontrast CT technique.  2. Intracranial atherosclerosis.     ------------------------------------------------------------  Time: 05/14 1249  Value: CT SPINE CERVICAL (CPT=72125)  Comment:   FINDINGS:  Normal alignment with no fracture.  Disc narrowing and endplate DJD C4-C7      CONCLUSION: No fracture     ------------------------------------------------------------  Time: 05/14 1249  Value: XR LUMBAR SPINE (MIN 2 VIEWS) (CPT=72100)  Comment:      Normal lumbar alignment.  No compression fracture.  Moderate disc narrowing L5-S1.  Severe aortic calcification with stent in the left common iliac artery     ------------------------------------------------------------  Time: 05/14 1249  Value: XR THORACIC SPINE (3 VIEWS) (CPT=72072)  Comment:   Findings and impression:      Mild anterior compression fractures T10, T11, T12      No malalignment     ------------------------------------------------------------  Time: 05/14 1249  Value: XR CHEST AP PORTABLE  (CPT=71045)  Comment:    Findings and impression:      Normal heart size.  No edema      No mediastinal widening      Mild patchy basilar opacity, likely atelectasis .      No pneumothorax or significant effusion      Old fracture proximal left humerus         ------------------------------------------------------------  Time: 05/14 6920  Comment: Patient has been ambulatory here.  I discussed all results with her, all questions answered.  Discussed findings of elevated creatinine and acute kidney injury as well as transaminitis that is nonspecific, proBNP mildly elevated though she has no pulmonary edema on chest x-ray, no focal lung consolidation to suggest pneumonia.  I offered her admission for the above however patient is adamantly declining and states that nothing I could say would really convince her to stay however I advised her strongly to get a recheck of her creatinine within the next  week, establish with GI regarding transaminitis, and to follow-up with neurosurgery regarding her compression fractures, take pain medicines as prescribed, she already has Norco prescribed for home.  Advise return if new or worsening symptoms occur right away.  She verbalized understanding comfortable with plan for discharge.  She has been giving albuterol for home as well as a steroid burst.             Disposition and Plan     Clinical Impression:  1. Compression fracture of body of thoracic vertebra (HCC)    2. Acute kidney injury    3. Frequent falls    4. Transaminitis    5. COPD exacerbation (HCC)        Disposition:  Discharge    Follow-up:  Madiha Cuellar  [address] [facility] [address] [phone]    Schedule an appointment as soon as possible for a visit in 2 day(s)      [facility] Emergency Department  [address] [facility] [id number] [phone]  Go to  If symptoms worsen, right away    Doris Martinez MD  [address] [facility] [id number] [phone]    Schedule an appointment as soon as possible for a visit in 1 week(s)  regarding liver levels    Dusty Skinner MD  [address] [facility] [address]  [facility] [id number] [phone]    Schedule an appointment as soon as possible for a visit in 1 week(s)        Medications Prescribed:  Discharge Medication List as of 5/14/2025  1:51 PM        START taking these medications    Details   methylPREDNISolone 4 MG Oral Tablet Therapy Pack Take as directed on dose pack instructions, Normal, Disp-21 tablet, R-0      lidocaine 5 % External Patch Place 1 patch onto the skin daily., Normal, Disp-14 patch, R-0      !! ibuprofen 600 MG Oral Tab Take 1 tablet (600 mg total) by mouth every 6 (six) hours as needed., Normal, Disp-28 tablet, R-0       !! - Potential duplicate medications found. Please discuss with provider.          Present on Admission:  **None**    Cayla Tubbs,   Attending Physician  Emergency Medicine               [1]   Past Medical History:   Essential hypertension    Heart attack (HCC)    Hyperlipidemia    Thyroid disease   [2]   Past Surgical History:  Procedure Laterality Date    Appendectomy            Removal gallbladder     [3] History reviewed. No pertinent family history.  [4]   Social History  Socioeconomic History    Marital status:    Tobacco Use    Smoking status: Every Day     Types: Cigarettes    Smokeless tobacco: Never     Social Drivers of Health     Food Insecurity: Unknown (10/9/2023)    Food Insecurity     Food Insecurity: Patient declined   Transportation Needs: No Transportation Needs (10/9/2023)    Transportation Needs     Lack of Transportation: No   Housing Stability: Low Risk  (10/9/2023)    Housing Stability     Housing Instability: No   Recent Concern: Housing Stability - At Risk (2023)    Received from [facility]      Continue Regimen: Wart peel Detail Level: Zone Otc Regimen: Scar away Initiate Treatment: [place]

## 2025-05-14 NOTE — ED INITIAL ASSESSMENT (HPI)
Pt arrives via EMS from home c/o back pain, pt fell Monday. Pt states she has had frequent falls, is seeing a neurologist for this.   Pt denies head injury/LOC with falls.   Pt denies numbness or tingling to legs, denies loss of bowel or bladder.     A&Ox4/4     +81mg ASA daily

## 2025-05-17 ENCOUNTER — HOSPITAL ENCOUNTER (INPATIENT)
Facility: HOSPITAL | Age: 65
LOS: 21 days | Discharge: NURSING, REHAB OR LONG-TERM CARE | End: 2025-06-07
Attending: EMERGENCY MEDICINE | Admitting: INTERNAL MEDICINE
Payer: MEDICAID

## 2025-05-17 ENCOUNTER — APPOINTMENT (OUTPATIENT)
Dept: ULTRASOUND IMAGING | Facility: HOSPITAL | Age: 65
End: 2025-05-17
Attending: FAMILY MEDICINE
Payer: MEDICAID

## 2025-05-17 ENCOUNTER — APPOINTMENT (OUTPATIENT)
Dept: CT IMAGING | Facility: HOSPITAL | Age: 65
End: 2025-05-17
Attending: EMERGENCY MEDICINE
Payer: MEDICAID

## 2025-05-17 ENCOUNTER — APPOINTMENT (OUTPATIENT)
Dept: GENERAL RADIOLOGY | Facility: HOSPITAL | Age: 65
End: 2025-05-17
Attending: EMERGENCY MEDICINE
Payer: MEDICAID

## 2025-05-17 DIAGNOSIS — M62.82 NON-TRAUMATIC RHABDOMYOLYSIS: ICD-10-CM

## 2025-05-17 DIAGNOSIS — N18.9 ACUTE RENAL FAILURE SUPERIMPOSED ON CHRONIC KIDNEY DISEASE, UNSPECIFIED ACUTE RENAL FAILURE TYPE, UNSPECIFIED CKD STAGE: Primary | ICD-10-CM

## 2025-05-17 DIAGNOSIS — J43.9 PULMONARY EMPHYSEMA, UNSPECIFIED EMPHYSEMA TYPE (HCC): ICD-10-CM

## 2025-05-17 DIAGNOSIS — N17.9 ACUTE RENAL FAILURE SUPERIMPOSED ON CHRONIC KIDNEY DISEASE, UNSPECIFIED ACUTE RENAL FAILURE TYPE, UNSPECIFIED CKD STAGE: Primary | ICD-10-CM

## 2025-05-17 DIAGNOSIS — R74.01 TRANSAMINITIS: ICD-10-CM

## 2025-05-17 LAB
ALBUMIN SERPL-MCNC: 4.1 G/DL (ref 3.2–4.8)
ALBUMIN/GLOB SERPL: 1.7 {RATIO} (ref 1–2)
ALP LIVER SERPL-CCNC: 487 U/L (ref 50–130)
ALT SERPL-CCNC: 958 U/L (ref 10–49)
ANION GAP SERPL CALC-SCNC: 6 MMOL/L (ref 0–18)
ANION GAP SERPL CALC-SCNC: 7 MMOL/L (ref 0–18)
AST SERPL-CCNC: 2002 U/L (ref ?–34)
BASOPHILS # BLD AUTO: 0.06 X10(3) UL (ref 0–0.2)
BASOPHILS NFR BLD AUTO: 0.6 %
BILIRUB SERPL-MCNC: 0.2 MG/DL (ref 0.2–1.1)
BILIRUB UR QL CFM: NEGATIVE
BUN BLD-MCNC: 33 MG/DL (ref 9–23)
BUN BLD-MCNC: 38 MG/DL (ref 9–23)
BUN/CREAT SERPL: 6.5 (ref 10–20)
BUN/CREAT SERPL: 7.8 (ref 10–20)
CALCIUM BLD-MCNC: 7.4 MG/DL (ref 8.7–10.4)
CALCIUM BLD-MCNC: 8 MG/DL (ref 8.7–10.4)
CHLORIDE SERPL-SCNC: 107 MMOL/L (ref 98–112)
CHLORIDE SERPL-SCNC: 111 MMOL/L (ref 98–112)
CK SERPL-CCNC: ABNORMAL U/L (ref 34–145)
CK SERPL-CCNC: ABNORMAL U/L (ref 34–145)
CO2 SERPL-SCNC: 18 MMOL/L (ref 21–32)
CO2 SERPL-SCNC: 21 MMOL/L (ref 21–32)
COLOR UR: YELLOW
CREAT BLD-MCNC: 4.9 MG/DL (ref 0.55–1.02)
CREAT BLD-MCNC: 5.08 MG/DL (ref 0.55–1.02)
DEPRECATED RDW RBC AUTO: 47.9 FL (ref 35.1–46.3)
EGFRCR SERPLBLD CKD-EPI 2021: 9 ML/MIN/1.73M2 (ref 60–?)
EGFRCR SERPLBLD CKD-EPI 2021: 9 ML/MIN/1.73M2 (ref 60–?)
EOSINOPHIL # BLD AUTO: 0 X10(3) UL (ref 0–0.7)
EOSINOPHIL NFR BLD AUTO: 0 %
ERYTHROCYTE [DISTWIDTH] IN BLOOD BY AUTOMATED COUNT: 14.6 % (ref 11–15)
GLOBULIN PLAS-MCNC: 2.4 G/DL (ref 2–3.5)
GLUCOSE BLD-MCNC: 115 MG/DL (ref 70–99)
GLUCOSE BLD-MCNC: 93 MG/DL (ref 70–99)
GLUCOSE BLDC GLUCOMTR-MCNC: 108 MG/DL (ref 70–99)
GLUCOSE UR-MCNC: NEGATIVE MG/DL
HCT VFR BLD AUTO: 51.6 % (ref 35–48)
HGB BLD-MCNC: 17.1 G/DL (ref 12–16)
IMM GRANULOCYTES # BLD AUTO: 0.04 X10(3) UL (ref 0–1)
IMM GRANULOCYTES NFR BLD: 0.4 %
IRON SATN MFR SERPL: 22 % (ref 15–50)
IRON SERPL-MCNC: 64 UG/DL (ref 50–170)
LYMPHOCYTES # BLD AUTO: 1.21 X10(3) UL (ref 1–4)
LYMPHOCYTES NFR BLD AUTO: 12 %
MAGNESIUM SERPL-MCNC: 2.4 MG/DL (ref 1.6–2.6)
MCH RBC QN AUTO: 29.6 PG (ref 26–34)
MCHC RBC AUTO-ENTMCNC: 33.1 G/DL (ref 31–37)
MCV RBC AUTO: 89.3 FL (ref 80–100)
MONOCYTES # BLD AUTO: 0.25 X10(3) UL (ref 0.1–1)
MONOCYTES NFR BLD AUTO: 2.5 %
NEUTROPHILS # BLD AUTO: 8.5 X10 (3) UL (ref 1.5–7.7)
NEUTROPHILS # BLD AUTO: 8.5 X10(3) UL (ref 1.5–7.7)
NEUTROPHILS NFR BLD AUTO: 84.5 %
NITRITE UR QL STRIP.AUTO: NEGATIVE
OSMOLALITY SERPL CALC.SUM OF ELEC: 287 MOSM/KG (ref 275–295)
OSMOLALITY SERPL CALC.SUM OF ELEC: 290 MOSM/KG (ref 275–295)
PH UR: 5.5 [PH] (ref 5–8)
PHOSPHATE SERPL-MCNC: 6.3 MG/DL (ref 2.4–5.1)
PLATELET # BLD AUTO: 242 10(3)UL (ref 150–450)
POTASSIUM SERPL-SCNC: 3.9 MMOL/L (ref 3.5–5.1)
POTASSIUM SERPL-SCNC: 4.2 MMOL/L (ref 3.5–5.1)
PROT SERPL-MCNC: 6.5 G/DL (ref 5.7–8.2)
PROT UR-MCNC: >=300 MG/DL
PTH-INTACT SERPL-MCNC: 447.9 PG/ML (ref 18.5–88)
RBC # BLD AUTO: 5.78 X10(6)UL (ref 3.8–5.3)
RBC #/AREA URNS AUTO: >10 /HPF
SODIUM SERPL-SCNC: 135 MMOL/L (ref 136–145)
SODIUM SERPL-SCNC: 135 MMOL/L (ref 136–145)
SP GR UR STRIP: 1.02 (ref 1–1.03)
TOTAL IRON BINDING CAPACITY: 296 UG/DL (ref 250–425)
TRANSFERRIN SERPL-MCNC: 229 MG/DL (ref 250–380)
URATE SERPL-MCNC: 9.3 MG/DL (ref 3.1–7.8)
UROBILINOGEN UR STRIP-ACNC: 0.2
WBC # BLD AUTO: 10.1 X10(3) UL (ref 4–11)
WBC #/AREA URNS AUTO: >50 /HPF
WBC CLUMPS UR QL AUTO: PRESENT /HPF

## 2025-05-17 PROCEDURE — 74176 CT ABD & PELVIS W/O CONTRAST: CPT | Performed by: EMERGENCY MEDICINE

## 2025-05-17 PROCEDURE — 71045 X-RAY EXAM CHEST 1 VIEW: CPT | Performed by: EMERGENCY MEDICINE

## 2025-05-17 PROCEDURE — 72128 CT CHEST SPINE W/O DYE: CPT | Performed by: EMERGENCY MEDICINE

## 2025-05-17 PROCEDURE — 72125 CT NECK SPINE W/O DYE: CPT | Performed by: EMERGENCY MEDICINE

## 2025-05-17 PROCEDURE — 70450 CT HEAD/BRAIN W/O DYE: CPT | Performed by: EMERGENCY MEDICINE

## 2025-05-17 PROCEDURE — 72131 CT LUMBAR SPINE W/O DYE: CPT | Performed by: EMERGENCY MEDICINE

## 2025-05-17 PROCEDURE — 76775 US EXAM ABDO BACK WALL LIM: CPT | Performed by: FAMILY MEDICINE

## 2025-05-17 RX ORDER — BUSPIRONE HYDROCHLORIDE 30 MG/1
30 TABLET ORAL 3 TIMES DAILY
COMMUNITY

## 2025-05-17 RX ORDER — DULOXETIN HYDROCHLORIDE 60 MG/1
60 CAPSULE, DELAYED RELEASE ORAL 2 TIMES DAILY
COMMUNITY
Start: 2024-11-26

## 2025-05-17 RX ORDER — DULOXETIN HYDROCHLORIDE 60 MG/1
60 CAPSULE, DELAYED RELEASE ORAL 2 TIMES DAILY
Status: DISCONTINUED | OUTPATIENT
Start: 2025-05-17 | End: 2025-05-24

## 2025-05-17 RX ORDER — LEVOTHYROXINE SODIUM 75 UG/1
150 TABLET ORAL
Status: DISCONTINUED | OUTPATIENT
Start: 2025-05-18 | End: 2025-05-28

## 2025-05-17 RX ORDER — CHLORPROMAZINE HYDROCHLORIDE 25 MG/1
25 TABLET, FILM COATED ORAL 4 TIMES DAILY
COMMUNITY
Start: 2025-05-02

## 2025-05-17 RX ORDER — BUSPIRONE HYDROCHLORIDE 15 MG/1
30 TABLET ORAL 3 TIMES DAILY
Status: DISCONTINUED | OUTPATIENT
Start: 2025-05-17 | End: 2025-05-24

## 2025-05-17 RX ORDER — DIAZEPAM 5 MG/1
5 TABLET ORAL EVERY 8 HOURS PRN
Status: ON HOLD | COMMUNITY
Start: 2025-04-21 | End: 2025-06-07

## 2025-05-17 RX ORDER — SODIUM CHLORIDE 9 MG/ML
INJECTION, SOLUTION INTRAVENOUS CONTINUOUS
Status: ACTIVE | OUTPATIENT
Start: 2025-05-17 | End: 2025-05-19

## 2025-05-17 RX ORDER — QUETIAPINE FUMARATE 50 MG/1
50 TABLET, FILM COATED ORAL 2 TIMES DAILY
COMMUNITY
Start: 2025-04-07

## 2025-05-17 RX ORDER — DIAZEPAM 5 MG/1
5 TABLET ORAL EVERY 8 HOURS PRN
Status: DISCONTINUED | OUTPATIENT
Start: 2025-05-17 | End: 2025-05-28

## 2025-05-17 RX ORDER — QUETIAPINE FUMARATE 25 MG/1
50 TABLET, FILM COATED ORAL 2 TIMES DAILY
Status: DISCONTINUED | OUTPATIENT
Start: 2025-05-17 | End: 2025-05-24

## 2025-05-17 RX ORDER — METOPROLOL SUCCINATE 50 MG/1
50 TABLET, EXTENDED RELEASE ORAL 2 TIMES DAILY
COMMUNITY
Start: 2022-07-06 | End: 2025-06-07

## 2025-05-17 RX ORDER — ACETAMINOPHEN 325 MG/1
650 TABLET ORAL EVERY 6 HOURS PRN
Status: DISCONTINUED | OUTPATIENT
Start: 2025-05-17 | End: 2025-06-07

## 2025-05-17 RX ORDER — CHLORPROMAZINE HYDROCHLORIDE 25 MG/1
25 TABLET, FILM COATED ORAL 4 TIMES DAILY
Status: DISCONTINUED | OUTPATIENT
Start: 2025-05-17 | End: 2025-05-24

## 2025-05-17 RX ORDER — SODIUM CHLORIDE 9 MG/ML
INJECTION, SOLUTION INTRAVENOUS CONTINUOUS
Status: DISCONTINUED | OUTPATIENT
Start: 2025-05-17 | End: 2025-05-19 | Stop reason: ALTCHOICE

## 2025-05-17 RX ORDER — METFORMIN HYDROCHLORIDE 500 MG/1
2 TABLET, EXTENDED RELEASE ORAL DAILY
COMMUNITY
End: 2025-06-07

## 2025-05-17 RX ORDER — QUETIAPINE 300 MG/1
300 TABLET, FILM COATED, EXTENDED RELEASE ORAL NIGHTLY
Status: DISCONTINUED | OUTPATIENT
Start: 2025-05-17 | End: 2025-06-07

## 2025-05-17 RX ORDER — QUETIAPINE 300 MG/1
300 TABLET, FILM COATED, EXTENDED RELEASE ORAL NIGHTLY
COMMUNITY
Start: 2025-03-25

## 2025-05-17 RX ORDER — ASPIRIN 81 MG/1
81 TABLET ORAL DAILY
Status: DISCONTINUED | OUTPATIENT
Start: 2025-05-18 | End: 2025-06-07

## 2025-05-17 RX ORDER — ROSUVASTATIN CALCIUM 40 MG/1
40 TABLET, COATED ORAL DAILY
COMMUNITY
Start: 2022-10-10 | End: 2025-06-07

## 2025-05-17 RX ORDER — METOPROLOL SUCCINATE 50 MG/1
50 TABLET, EXTENDED RELEASE ORAL 2 TIMES DAILY
Status: DISCONTINUED | OUTPATIENT
Start: 2025-05-17 | End: 2025-05-29 | Stop reason: ALTCHOICE

## 2025-05-17 NOTE — ED QUICK NOTES
Orders for admission, patient is aware of plan and ready to go upstairs. Any questions, please call ED RN Rylie at extension [phone].     Patient Covid vaccination status: Fully vaccinated     COVID Test Ordered in ED: None    COVID Suspicion at Admission: N/A    Running Infusions: Medication Infusions[1]     Mental Status/LOC at time of transport: a&ox4    Other pertinent information:   CIWA score: N/A   NIH score:  N/A             [1]    sodium chloride

## 2025-05-17 NOTE — H&P
[facility]  part of [facility]    History & Physical    Radha Winters Patient Status:  Inpatient    1960 MRN [id number]   Location [facility] 5SW/SE Attending Fanny Majano MD   Hosp Day # 0 PCP JUAN MONTANEZ     Date:  2025  Date of Admission:  2025    History provided by:patient  Chief Complaint:     Chief Complaint   Patient presents with    Dizziness       HPI:   Radha Winters is a(n) 64 year old female. ***    History   Past Medical History[1]  Past Surgical History[2]  Family History[3]  Social History:  Short Social Hx on File[4]  Allergies/Medications:   Allergies: Allergies[5]  Prescriptions Prior to Admission[6]    Review of Systems:   Pertinent items are noted in HPI.    Physical Exam:   Vital Signs:  Blood pressure 120/72, pulse 63, temperature 97.4 °F (36.3 °C), temperature source Oral, resp. rate 16, height 5' 3\" (1.6 m), weight 212 lb 8.4 oz (96.4 kg), SpO2 91%.          Cervical Papanicolaou to be done in MD's office    Results:     Lab Results   Component Value Date    WBC 10.1 2025    HGB 17.1 (H) 2025    HCT 51.6 (H) 2025    .0 2025    CREATSERUM 4.90 (H) 2025    BUN 38 (H) 2025     (L) 2025    K 4.2 2025     2025    CO2 21.0 2025     (H) 2025    CA 8.0 (L) 2025    ALB 4.1 2025    ALKPHO 487 (H) 2025    BILT 0.2 2025    TP 6.5 2025    AST 2,002 (H) 2025     (H) 2025    TSH 0.528 10/10/2023    LIP 36 2025    CK 39,706 (HH) 2025    B12 581 10/10/2023       CT ABDOMEN+PELVIS(CPT=74176)  Result Date: 2025  CONCLUSION: No acute abnormality identified within the abdomen/pelvis.  Incidental nonacute findings are present and are described within the body of the report.    Dictated by (CST): Jordi River MD on 2025 at 12:00 PM     Finalized by (CST): Jordi River MD on 2025 at  12:06 PM          XR CHEST AP PORTABLE  (CPT=71045)  Result Date: 5/17/2025  CONCLUSION: Questionable retrocardiac density which could represent atelectasis, infiltrate, or a combination in addition to a trace pleural effusion.  No other acute appearing abnormalities are clearly demonstrated.    Dictated by (CST): Jordi River MD on 5/17/2025 at 11:42 AM     Finalized by (CST): Jordi River MD on 5/17/2025 at 11:43 AM          CT SPINE THORACIC (CPT=72128)  Result Date: 5/17/2025  CONCLUSION:   1. Central compression deformities of the T11 and T12 vertebral bodies likely nonacute given the lack of associated abnormalities.  No acute fracture clearly demonstrated.  2. Moderate degenerative changes within the mid/lower thoracic spine.     Dictated by (CST): Jordi River MD on 5/17/2025 at 11:35 AM     Finalized by (CST): Jordi River MD on 5/17/2025 at 11:38 AM          CT SPINE LUMBAR (CPT=72131)  Result Date: 5/17/2025  CONCLUSION:   1. No acute fracture/traumatic subluxation.  2. Moderate degenerative changes within the lumbar spine as described within the body of the report.    Dictated by (CST): Jordi River MD on 5/17/2025 at 11:28 AM     Finalized by (CST): Jordi River MD on 5/17/2025 at 11:31 AM          CT BRAIN OR HEAD (CPT=70450)  Result Date: 5/17/2025  CONCLUSION: No acute or significant chronic intracranial abnormality.     Dictated by (CST): Jordi River MD on 5/17/2025 at 11:10 AM     Finalized by (CST): Jordi River MD on 5/17/2025 at 11:11 AM          CT SPINE CERVICAL (CPT=72125)  Result Date: 5/17/2025  CONCLUSION:   1. No acute fracture/traumatic subluxation.  2. Slight to moderate degenerative changes within the mid/lower cervical spine.    Dictated by (CST): Jordi River MD on 5/17/2025 at 10:57 AM     Finalized by (CST): Jordi River MD on 5/17/2025 at 11:02 AM          EKG 12 Lead  Result Date: 5/17/2025  Sinus bradycardia with 1st degree A-V block  Possible Left atrial enlargement Low voltage QRS, consider pulmonary disease, pericardial effusion, or normal variant Septal infarct , age undetermined Abnormal ECG When compared with ECG of 14-MAY-2025 11:19, Septal infarct is now Present      Assessment/Plan:     Acute renal failure superimposed on chronic kidney disease, unspecified acute renal failure type, unspecified CKD stage        traumatic rhabdomyolysis        Transaminitis      Active Orders   LAB    Basic Metabolic Panel (8)     Frequency: Tomorrow AM draw     Number of Occurrences: 1 Occurrences    CBC With Differential With Platelet     Frequency: Tomorrow AM draw     Number of Occurrences: 1 Occurrences    Chloride, Urine, Random     Frequency: Once     Number of Occurrences: 1 Occurrences    CK (Creatine Kinase) (Not Creatinine)     Frequency: Once     Number of Occurrences: 1 Occurrences    Comp Metabolic Panel (14)     Frequency: Tomorrow AM draw     Number of Occurrences: 1 Occurrences    Creatinine, Urine, Random     Frequency: Once     Number of Occurrences: 1 Occurrences    Iron And Tibc     Frequency: Once     Number of Occurrences: 1 Occurrences    Protein,Total,Urine, Random     Frequency: Once     Number of Occurrences: 1 Occurrences    PTH, Intact     Frequency: Once     Number of Occurrences: 1 Occurrences    Sodium, Urine, Random     Frequency: Once     Number of Occurrences: 1 Occurrences   Nourishments    Room Service Eligibility Until Discontinued     Frequency: Until Discontinued     Number of Occurrences: Until Specified    Room Service Notify RN Until Discontinued     Frequency: Until Discontinued     Number of Occurrences: Until Specified   OT    OT eval and treat     Frequency: Once     Number of Occurrences: 1 Occurrences   PT    PT eval and treat     Frequency: Once     Number of Occurrences: 1 Occurrences   Diet    Cardiac diet Cardiac; Texture Consistency: Soft / Easy to Chew ; Is Patient on Accuchecks? No     Frequency:  Effective Now     Number of Occurrences: Until Specified   Consult    Consult to Nephrology     Frequency: Once     Number of Occurrences: 1 Occurrences   Medications    acetaminophen (Tylenol) tab 650 mg     Frequency: Q6H PRN     Dose: 650 mg     Route: Oral    aspirin DR tab 81 mg     Frequency: Daily     Dose: 81 mg     Route: Oral    busPIRone (Buspar) tab 30 mg     Frequency: TID     Dose: 30 mg     Route: Oral    chlorproMAZINE (Thorazine) tab 25 mg     Frequency: QID     Dose: 25 mg     Route: Oral    diazePAM (Valium) tab 5 mg     Frequency: Q8H PRN     Dose: 5 mg     Route: Oral    DULoxetine (Cymbalta) DR cap 60 mg     Frequency: BID     Dose: 60 mg     Route: Oral    levothyroxine (Synthroid) tab 150 mcg     Frequency: Before breakfast     Dose: 150 mcg     Route: Oral    metoprolol succinate ER (Toprol XL) 24 hr tab 50 mg     Frequency: BID     Dose: 50 mg     Route: Oral    QUEtiapine (SEROquel) tab 50 mg     Frequency: BID     Dose: 50 mg     Route: Oral    QUEtiapine ER (SEROquel XR) 24 hr tab 300 mg     Frequency: Nightly     Dose: 300 mg     Route: Oral    sodium chloride 0.9% infusion     Frequency: Continuous     Route: Intravenous    sodium chloride 0.9% infusion     Frequency: Continuous     Route: Intravenous       Fanny Majano MD  2025          [1]   Past Medical History:   Essential hypertension    Heart attack (HCC)    Hyperlipidemia    Thyroid disease   [2]   Past Surgical History:  Procedure Laterality Date    Appendectomy            Removal gallbladder     [3] History reviewed. No pertinent family history.  [4]   Social History  Socioeconomic History    Marital status:    Tobacco Use    Smoking status: Every Day     Types: Cigarettes    Smokeless tobacco: Never     Social Drivers of Health     Food Insecurity: No Food Insecurity (2025)    NCSS - Food Insecurity     Worried About Running Out of Food in the Last Year: No     Ran Out of Food in the Last Year:  No   Transportation Needs: No Transportation Needs (5/17/2025)    NCSS - Transportation     Lack of Transportation: No   Housing Stability: Not At Risk (5/17/2025)    NCSS - Housing/Utilities     Has Housing: Yes     Worried About Losing Housing: No     Unable to Get Utilities: No   [5] No Known Allergies  [6]   Medications Prior to Admission   Medication Sig    busPIRone HCl 30 MG Oral Tab Take 1 tablet (30 mg total) by mouth 3 (three) times daily.    chlorproMAZINE 25 MG Oral Tab Take 1 tablet (25 mg total) by mouth 4 (four) times daily.    diazePAM 5 MG Oral Tab Take 1 tablet (5 mg total) by mouth every 8 (eight) hours as needed for Anxiety.    DULoxetine 60 MG Oral Cap DR Particles Take 1 capsule (60 mg total) by mouth 2 (two) times daily.    metFORMIN  MG Oral Tablet 24 Hr Take 2 tablets (1,000 mg total) by mouth daily. (Patient taking differently: Take 2 tablets (1,000 mg total) by mouth 2 (two) times daily with meals.)    metoprolol succinate ER 50 MG Oral Tablet 24 Hr Take 1 tablet (50 mg total) by mouth 2 (two) times daily.    QUEtiapine  MG Oral Tablet 24 Hr Take 1 tablet (300 mg total) by mouth nightly.    QUEtiapine 50 MG Oral Tab Take 1 tablet (50 mg total) by mouth 2 (two) times daily.    rosuvastatin 40 MG Oral Tab Take 1 tablet (40 mg total) by mouth before bedtime.    lidocaine 5 % External Patch Place 1 patch onto the skin daily.    ibuprofen 600 MG Oral Tab Take 1 tablet (600 mg total) by mouth every 6 (six) hours as needed.    levothyroxine 150 MCG Oral Tab Take 1 tablet (150 mcg total) by mouth before breakfast. Give on an empty stomach. On Mon, Tue, Wed, Thur, Fri and Saturday    levothyroxine 75 MCG Oral Tab Take 1 tablet (75 mcg total) by mouth before breakfast. On Sundays only    aspirin 81 MG Oral Tab EC Take 1 tablet (81 mg total) by mouth in the morning.    methylPREDNISolone 4 MG Oral Tablet Therapy Pack Take as directed on dose pack instructions (Patient not taking: Reported  on 5/17/2025)

## 2025-05-17 NOTE — CONSULTS
[facility]  part of [facility]    Report of Consultation    Radha Winters Patient Status:  Inpatient    1960 MRN [id number]   Location [facility] [place] 5SW/SE Attending Fanny Majano MD   Hosp Day # 0 PCP JUAN MONTANEZ     Date of Admission:  2025  Date of Consult:  2025  Reason for Consultation:   Acute Kidney Injury    History of Present Illness:   Patient is a 64 year old female who was admitted to the hospital for Acute renal failure superimposed on chronic kidney disease, unspecified acute renal failure type, unspecified CKD stage:  4    Ms. Winters is a 64-year-old female with a history of hypertension, coronary artery disease, hyperlipidemia, hypothyroidism, and chronic leg weakness who presents with multiple issues. She has a two-year history of recurrent falls and was previously evaluated at [facility], where she was told her falls were related to her sodium levels. The patient experienced a fall 2-3 days ago and presented to the emergency room but refused admission at that time. Laboratory studies then revealed elevated creatinine. Today, she experienced two more falls - initially landing on her back, followed by a second fall. She developed occipital headache and back pain following these events. She denies loss of consciousness, neck pain, or leg pain. Additionally, the patient reports poor oral intake and decreased appetite over the past several days.     Nephrology consulted for Acute Kidney Injury, and Rhabdomyolysis.  When seen and examined earlier, patient is hemodynamically stable, on NC.    Past Medical History  Past Medical History[1]    Past Surgical History  Past Surgical History[2]    Family History  Family History[3]    Social History  Short Social Hx on File[4]        Current Medications:  Current Hospital Medications[5]  Prescriptions Prior to Admission[6]    Allergies  Allergies[7]    Review of Systems:    12 ROS checked  pertinent positives per HPI    Physical Exam:   Blood pressure 120/72, pulse 63, temperature 97.4 °F (36.3 °C), temperature source Oral, resp. rate 16, height 5' 3\" (1.6 m), weight 212 lb 8.4 oz (96.4 kg), SpO2 91%.    Gen:  NAD, fatigued appearing  HEENT:  NC/AT, PERRLA  Neck:  Supple, no JVD  Chest:  Diminished breath sounds at bases  CVS:  S1S2, regular rate  Abdomen:  Soft, NT/ND  Ext:  + pedal edema  Neuro: No focal deficits appreciated.    Results:     Laboratory Data:  Lab Results   Component Value Date    WBC 10.1 05/17/2025    HGB 17.1 (H) 05/17/2025    HCT 51.6 (H) 05/17/2025    .0 05/17/2025    CREATSERUM 4.90 (H) 05/17/2025    BUN 38 (H) 05/17/2025     (L) 05/17/2025    K 4.2 05/17/2025     05/17/2025    CO2 21.0 05/17/2025     (H) 05/17/2025    CA 8.0 (L) 05/17/2025    ALB 4.1 05/17/2025    ALKPHO 487 (H) 05/17/2025    TP 6.5 05/17/2025    AST 2,002 (H) 05/17/2025     (H) 05/17/2025    TSH 0.528 10/10/2023    LIP 36 05/14/2025    CK 39,706 (HH) 05/17/2025    B12 581 10/10/2023         Imaging:  CT ABDOMEN+PELVIS(CPT=74176)  Result Date: 5/17/2025  CONCLUSION: No acute abnormality identified within the abdomen/pelvis.  Incidental nonacute findings are present and are described within the body of the report.    Dictated by (CST): Jordi River MD on 5/17/2025 at 12:00 PM     Finalized by (CST): Jordi River MD on 5/17/2025 at 12:06 PM          XR CHEST AP PORTABLE  (CPT=71045)  Result Date: 5/17/2025  CONCLUSION: Questionable retrocardiac density which could represent atelectasis, infiltrate, or a combination in addition to a trace pleural effusion.  No other acute appearing abnormalities are clearly demonstrated.    Dictated by (CST): Jordi River MD on 5/17/2025 at 11:42 AM     Finalized by (CST): Jordi River MD on 5/17/2025 at 11:43 AM          CT SPINE THORACIC (CPT=72128)  Result Date: 5/17/2025  CONCLUSION:   1. Central compression deformities of the  T11 and T12 vertebral bodies likely nonacute given the lack of associated abnormalities.  No acute fracture clearly demonstrated.  2. Moderate degenerative changes within the mid/lower thoracic spine.     Dictated by (CST): Jordi River MD on 5/17/2025 at 11:35 AM     Finalized by (CST): Jordi River MD on 5/17/2025 at 11:38 AM          CT SPINE LUMBAR (CPT=72131)  Result Date: 5/17/2025  CONCLUSION:   1. No acute fracture/traumatic subluxation.  2. Moderate degenerative changes within the lumbar spine as described within the body of the report.    Dictated by (CST): Jordi River MD on 5/17/2025 at 11:28 AM     Finalized by (CST): Jordi River MD on 5/17/2025 at 11:31 AM          CT BRAIN OR HEAD (CPT=70450)  Result Date: 5/17/2025  CONCLUSION: No acute or significant chronic intracranial abnormality.     Dictated by (CST): Jordi River MD on 5/17/2025 at 11:10 AM     Finalized by (CST): Jordi River MD on 5/17/2025 at 11:11 AM          CT SPINE CERVICAL (CPT=72125)  Result Date: 5/17/2025  CONCLUSION:   1. No acute fracture/traumatic subluxation.  2. Slight to moderate degenerative changes within the mid/lower cervical spine.    Dictated by (CST): Jordi River MD on 5/17/2025 at 10:57 AM     Finalized by (CST): Jordi River MD on 5/17/2025 at 11:02 AM               Impression:     Acute renal failure superimposed on chronic kidney disease, unspecified acute renal failure type, unspecified CKD stage        Non-traumatic rhabdomyolysis        Transaminitis    Patient is a 63 y/o female with PMH of HTN, dyslipidemia, CAD admitted after multiple falls, Nephrology consulted for Acute Kidney Injury     Acute Kidney Injury:  Likely secondary to ATN, from Rhabdomyolysis, continue aggressive volume resuscitation, 0.9  ml/hr, continue to trend BMP, check Uric acid, Phos, Urine studies  CKD stage 3b vs 4:  Secondary to HTN nephrosclerosis, check Phos, PTH, renal US  Transaminitis:   Continue to trend LFTs, will check abdominal US      Recommendations:  Continue to trend CMP  Check Phos, PTH, uric acid,Phos  Aggressive volume resuscitation, 0.9 NS at 150 ml/hr  Check UA, Urine electrolytes, urine protein, urine creatinine  Renal US  Renally dose meds  MAP goal >65    Thank you for allowing me to participate in the care of your patient.    Bernardo Lewis MD  2025         [1]   Past Medical History:   Essential hypertension    Heart attack (HCC)    Hyperlipidemia    Thyroid disease   [2]   Past Surgical History:  Procedure Laterality Date    Appendectomy            Removal gallbladder     [3] History reviewed. No pertinent family history.  [4]   Social History  Socioeconomic History    Marital status:    Tobacco Use    Smoking status: Every Day     Types: Cigarettes    Smokeless tobacco: Never     Social Drivers of Health     Food Insecurity: No Food Insecurity (2025)    NCSS - Food Insecurity     Worried About Running Out of Food in the Last Year: No     Ran Out of Food in the Last Year: No   Transportation Needs: No Transportation Needs (2025)    NCSS - Transportation     Lack of Transportation: No   Housing Stability: Not At Risk (2025)    NCSS - Housing/Utilities     Has Housing: Yes     Worried About Losing Housing: No     Unable to Get Utilities: No   [5]   Current Facility-Administered Medications   Medication Dose Route Frequency    sodium chloride 0.9% infusion   Intravenous Continuous    acetaminophen (Tylenol) tab 650 mg  650 mg Oral Q6H PRN   [6]   Medications Prior to Admission   Medication Sig    busPIRone HCl 30 MG Oral Tab Take 1 tablet (30 mg total) by mouth 3 (three) times daily.    chlorproMAZINE 25 MG Oral Tab Take 1 tablet (25 mg total) by mouth 4 (four) times daily.    diazePAM 5 MG Oral Tab Take 1 tablet (5 mg total) by mouth every 8 (eight) hours as needed for Anxiety.    DULoxetine 60 MG Oral Cap DR Particles Take 1 capsule (60 mg  total) by mouth 2 (two) times daily.    metFORMIN  MG Oral Tablet 24 Hr Take 2 tablets (1,000 mg total) by mouth daily. (Patient taking differently: Take 2 tablets (1,000 mg total) by mouth 2 (two) times daily with meals.)    metoprolol succinate ER 50 MG Oral Tablet 24 Hr Take 1 tablet (50 mg total) by mouth 2 (two) times daily.    QUEtiapine  MG Oral Tablet 24 Hr Take 1 tablet (300 mg total) by mouth nightly.    QUEtiapine 50 MG Oral Tab Take 1 tablet (50 mg total) by mouth 2 (two) times daily.    rosuvastatin 40 MG Oral Tab Take 1 tablet (40 mg total) by mouth before bedtime.    lidocaine 5 % External Patch Place 1 patch onto the skin daily.    ibuprofen 600 MG Oral Tab Take 1 tablet (600 mg total) by mouth every 6 (six) hours as needed.    levothyroxine 150 MCG Oral Tab Take 1 tablet (150 mcg total) by mouth before breakfast. Give on an empty stomach. On Mon, Tue, Wed, Thur, Fri and Saturday    levothyroxine 75 MCG Oral Tab Take 1 tablet (75 mcg total) by mouth before breakfast. On Sundays only    aspirin 81 MG Oral Tab EC Take 1 tablet (81 mg total) by mouth in the morning.    methylPREDNISolone 4 MG Oral Tablet Therapy Pack Take as directed on dose pack instructions (Patient not taking: Reported on 5/17/2025)   [7] No Known Allergies

## 2025-05-17 NOTE — ED PROVIDER NOTES
Patient Seen in: [facility] Emergency Department      History     Chief Complaint   Patient presents with    Dizziness     Stated Complaint: dizziness    Subjective:   HPI  History of Present Illness            64-year-old female with history of hypertension, hyperlipidemia, prior myocardial infarction, thyroid disease, and chronic leg weakness with frequent falls for which she follows with neurology presents after multiple falls today.  The patient was seen in this emergency department on  after a fall and was found to have thoracic vertebral compression fractures.  She was offered admission but opted to go home at that time.  She returns today after multiple falls this morning.  The patient states that she fell out of bed around 4:30 AM hitting her head as she fell.  She was able to get up and get back into bed.  She awoke approximately an hour later and went to the kitchen where she fell again landing on her back and hitting her head.  She then reports shortly thereafter she again fell while in the living room.  At this time she called the paramedics.  She complains of pain to her back in the same area where she had pain with her recent ED visit.  She also complains of diffuse headache worsened towards the back of the head.  She denies extremity injuries.  She denies chest or abdominal pain.  She denies vomiting or diarrhea.  She denies dysuria.  No known fevers.      Objective:     Past Medical History:    Essential hypertension    Heart attack (HCC)    Hyperlipidemia    Thyroid disease              Past Surgical History:   Procedure Laterality Date    Appendectomy            Removal gallbladder                  Social History     Socioeconomic History    Marital status:    Tobacco Use    Smoking status: Every Day     Types: Cigarettes    Smokeless tobacco: Never     Social Drivers of Health     Food Insecurity: Unknown (10/9/2023)    Food Insecurity     Food Insecurity: Patient  declined   Transportation Needs: No Transportation Needs (10/9/2023)    Transportation Needs     Lack of Transportation: No   Housing Stability: Low Risk  (10/9/2023)    Housing Stability     Housing Instability: No   Recent Concern: Housing Stability - At Risk (8/18/2023)    Received from [facility]                                Physical Exam     ED Triage Vitals [05/17/25 1002]   /60   Pulse 54   Resp 18   Temp 97.9 °F (36.6 °C)   Temp src Oral   SpO2 95 %   O2 Device None (Room air)       Current Vitals:   Vital Signs  BP: 105/63  Pulse: 59  Resp: 12  Temp: 97.9 °F (36.6 °C)  Temp src: Oral  MAP (mmHg): 78    Oxygen Therapy  SpO2: 95 %  O2 Device: None (Room air)          Physical Exam      General Appearance: alert, no distress  Eyes: pupils equal and round no injection  Respiratory: chest is non tender to palpation, breath sounds are equal  Cardiac: regular rate and rhythm  Gastrointestinal:  soft and non tender, there is no evidence of external or internal trauma by exam.  Neurological: Speech normal.  Motor and sensation is intact and symmetric to bilateral upper and lower extremities.  Skin: No laceration or abrasions.  Musculoskeletal                Head: atraumatic without scalp tenderness                Neck: The cervical spine is tender to palpation to the midline                Back: there is tenderness to palpation to the midline of the mid thoracic spine extending down through the lower lumbar spine                Extremities are non tender to palpation and there is full range of motion of the joints.        ED Course     Labs Reviewed   CBC WITH DIFFERENTIAL WITH PLATELET - Abnormal; Notable for the following components:       Result Value    RBC 5.78 (*)     HGB 17.1 (*)     HCT 51.6 (*)     RDW-SD 47.9 (*)     Neutrophil Absolute Prelim 8.50 (*)     Neutrophil Absolute 8.50 (*)     All other components within normal limits   COMP METABOLIC PANEL (14) - Abnormal; Notable for  the following components:    Glucose 115 (*)     Sodium 135 (*)     BUN 38 (*)     Creatinine 4.90 (*)     BUN/CREA Ratio 7.8 (*)     Calcium, Total 8.0 (*)     eGFR-Cr 9 (*)      (*)     AST 2,002 (*)     Alkaline Phosphatase 487 (*)     All other components within normal limits   URINALYSIS WITH CULTURE REFLEX - Abnormal; Notable for the following components:    Clarity Urine Turbid (*)     Ketones Urine Trace (*)     Blood Urine Large (*)     Protein Urine >=300 (*)     Leukocyte Esterase Urine Large (*)     All other components within normal limits   CK CREATINE KINASE (NOT CREATININE) - Abnormal; Notable for the following components:    CK 39,706 (*)     All other components within normal limits   UA MICROSCOPIC ONLY, URINE - Abnormal; Notable for the following components:    WBC Urine >50 (*)     RBC Urine >10 (*)     Bacteria Urine 3+ (*)     Squamous Epi. Cells Many (*)     WBC Clump Present (*)     All other components within normal limits   POCT GLUCOSE - Abnormal; Notable for the following components:    POC Glucose  108 (*)     All other components within normal limits   ICTOTEST - Normal   RAINBOW DRAW LAVENDER   RAINBOW DRAW LIGHT GREEN   RAINBOW DRAW BLUE   URINE CULTURE, ROUTINE     EKG    Rate, intervals and axes as noted on EKG Report.  Rate: 59  Rhythm: Sinus Rhythm  Axis: Normal  Reading: Nonspecific EKG              Results                              MDM      Lab, EKG, and CT results noted.  No acute injuries found on imaging today.  Patient with significant rhabdomyolysis and worsening acute renal failure as compared to 3 days ago.  Also with worsening transaminitis.  No visible abnormalities on plain CT of the abdomen/pelvis.  Receiving IV fluids and plan to admit for further evaluation and treatment.  Communicated with Dr. Lewis, nephrology.  Also communicated with Dr. Majano, medicine on-call.    Admission disposition: 5/17/2025 12:24 PM           Medical Decision  Making      Disposition and Plan     Clinical Impression:  1. Acute renal failure superimposed on chronic kidney disease, unspecified acute renal failure type, unspecified CKD stage    2. Non-traumatic rhabdomyolysis    3. Transaminitis         Disposition:  Admit  5/17/2025 12:24 pm    Follow-up:  No follow-up provider specified.        Medications Prescribed:  Current Discharge Medication List                Supplementary Documentation:         Hospital Problems       Present on Admission  Date Reviewed: 10/31/2023          ICD-10-CM Noted POA    * (Principal) Acute renal failure superimposed on chronic kidney disease, unspecified acute renal failure type, unspecified CKD stage N17.9, N18.9 5/17/2025 Unknown

## 2025-05-17 NOTE — ED INITIAL ASSESSMENT (HPI)
Received pt via EMS. Pt reports \"This morning around 0500 the usual time I wake up. I fell out of bed. I went to turn over and I slipped right off the bed. That was the first fall. Then I laid on the ground for a little bit on the floor. I fell back asleep on the floor, because my back was hurting and I couldn't get up off the floor. My second fall was in the living room. I was backing up my walker and I just fell backwards. I landed on the floor\". Pt reports hitting her head on the floor. Denies loc. + headache + dizziness. Pt is on ASA. Pt denies cp/sob. Pt reports dizzy spells for 2 years and follows up with a neurologist. PT c/o midline tenderness onset 2 days ago. Pt was elevated in ED and had a CT cervical spine. Pt c/o thoracic back pain that radiates to her lower back.

## 2025-05-18 ENCOUNTER — APPOINTMENT (OUTPATIENT)
Dept: ULTRASOUND IMAGING | Facility: HOSPITAL | Age: 65
End: 2025-05-18
Attending: INTERNAL MEDICINE
Payer: MEDICAID

## 2025-05-18 LAB
ALBUMIN SERPL-MCNC: 3.6 G/DL (ref 3.2–4.8)
ALBUMIN SERPL-MCNC: 3.6 G/DL (ref 3.2–4.8)
ALBUMIN/GLOB SERPL: 1.6 {RATIO} (ref 1–2)
ALBUMIN/GLOB SERPL: 1.8 {RATIO} (ref 1–2)
ALP LIVER SERPL-CCNC: 468 U/L (ref 50–130)
ALP LIVER SERPL-CCNC: 490 U/L (ref 50–130)
ALT SERPL-CCNC: 1048 U/L (ref 10–49)
ALT SERPL-CCNC: 846 U/L (ref 10–49)
ANION GAP SERPL CALC-SCNC: 6 MMOL/L (ref 0–18)
ANION GAP SERPL CALC-SCNC: 6 MMOL/L (ref 0–18)
ANION GAP SERPL CALC-SCNC: 8 MMOL/L (ref 0–18)
AST SERPL-CCNC: 1624 U/L (ref ?–34)
AST SERPL-CCNC: 2168 U/L (ref ?–34)
ATRIAL RATE: 59 BPM
BASOPHILS # BLD AUTO: 0.05 X10(3) UL (ref 0–0.2)
BASOPHILS NFR BLD AUTO: 0.6 %
BILIRUB SERPL-MCNC: 0.2 MG/DL (ref 0.2–1.1)
BILIRUB SERPL-MCNC: 0.2 MG/DL (ref 0.2–1.1)
BILIRUB UR QL: NEGATIVE
BUN BLD-MCNC: 42 MG/DL (ref 9–23)
BUN BLD-MCNC: 45 MG/DL (ref 9–23)
BUN BLD-MCNC: 45 MG/DL (ref 9–23)
BUN/CREAT SERPL: 6.8 (ref 10–20)
BUN/CREAT SERPL: 7 (ref 10–20)
BUN/CREAT SERPL: 7.3 (ref 10–20)
CALCIUM BLD-MCNC: 6.9 MG/DL (ref 8.7–10.4)
CALCIUM BLD-MCNC: 7 MG/DL (ref 8.7–10.4)
CALCIUM BLD-MCNC: 7.1 MG/DL (ref 8.7–10.4)
CHLORIDE SERPL-SCNC: 109 MMOL/L (ref 98–112)
CHLORIDE SERPL-SCNC: 111 MMOL/L (ref 98–112)
CHLORIDE SERPL-SCNC: 112 MMOL/L (ref 98–112)
CHLORIDE UR-SCNC: 77 MMOL/L
CHOLEST SERPL-MCNC: 71 MG/DL (ref ?–200)
CK SERPL-CCNC: ABNORMAL U/L (ref 34–145)
CK SERPL-CCNC: ABNORMAL U/L (ref 34–145)
CO2 SERPL-SCNC: 19 MMOL/L (ref 21–32)
CO2 SERPL-SCNC: 20 MMOL/L (ref 21–32)
CO2 SERPL-SCNC: 20 MMOL/L (ref 21–32)
COLOR UR: YELLOW
CREAT BLD-MCNC: 6.03 MG/DL (ref 0.55–1.02)
CREAT BLD-MCNC: 6.15 MG/DL (ref 0.55–1.02)
CREAT BLD-MCNC: 6.58 MG/DL (ref 0.55–1.02)
CREAT UR-SCNC: 89.4 MG/DL
DEPRECATED RDW RBC AUTO: 49.5 FL (ref 35.1–46.3)
EGFRCR SERPLBLD CKD-EPI 2021: 7 ML/MIN/1.73M2 (ref 60–?)
EOSINOPHIL # BLD AUTO: 0.02 X10(3) UL (ref 0–0.7)
EOSINOPHIL NFR BLD AUTO: 0.2 %
ERYTHROCYTE [DISTWIDTH] IN BLOOD BY AUTOMATED COUNT: 14.9 % (ref 11–15)
EST. AVERAGE GLUCOSE BLD GHB EST-MCNC: 171 MG/DL (ref 68–126)
GLOBULIN PLAS-MCNC: 2 G/DL (ref 2–3.5)
GLOBULIN PLAS-MCNC: 2.2 G/DL (ref 2–3.5)
GLUCOSE BLD-MCNC: 78 MG/DL (ref 70–99)
GLUCOSE BLD-MCNC: 84 MG/DL (ref 70–99)
GLUCOSE BLD-MCNC: 91 MG/DL (ref 70–99)
GLUCOSE UR-MCNC: NORMAL MG/DL
HBA1C MFR BLD: 7.6 % (ref ?–5.7)
HBV CORE AB SERPL QL IA: NONREACTIVE
HBV SURFACE AB SER QL: NONREACTIVE
HBV SURFACE AB SERPL IA-ACNC: <3.1 MIU/ML
HBV SURFACE AG SER-ACNC: <0.1 [IU]/L
HBV SURFACE AG SERPL QL IA: NONREACTIVE
HCT VFR BLD AUTO: 51.3 % (ref 35–48)
HDLC SERPL-MCNC: 26 MG/DL (ref 40–59)
HGB BLD-MCNC: 16.8 G/DL (ref 12–16)
IMM GRANULOCYTES # BLD AUTO: 0.03 X10(3) UL (ref 0–1)
IMM GRANULOCYTES NFR BLD: 0.3 %
KETONES UR-MCNC: NEGATIVE MG/DL
LDLC SERPL CALC-MCNC: 13 MG/DL (ref ?–100)
LEUKOCYTE ESTERASE UR QL STRIP.AUTO: 500
LYMPHOCYTES # BLD AUTO: 1.22 X10(3) UL (ref 1–4)
LYMPHOCYTES NFR BLD AUTO: 13.6 %
MCH RBC QN AUTO: 29.6 PG (ref 26–34)
MCHC RBC AUTO-ENTMCNC: 32.7 G/DL (ref 31–37)
MCV RBC AUTO: 90.3 FL (ref 80–100)
MONOCYTES # BLD AUTO: 0.23 X10(3) UL (ref 0.1–1)
MONOCYTES NFR BLD AUTO: 2.6 %
NEUTROPHILS # BLD AUTO: 7.39 X10 (3) UL (ref 1.5–7.7)
NEUTROPHILS # BLD AUTO: 7.39 X10(3) UL (ref 1.5–7.7)
NEUTROPHILS NFR BLD AUTO: 82.7 %
NITRITE UR QL STRIP.AUTO: NEGATIVE
NONHDLC SERPL-MCNC: 45 MG/DL (ref ?–130)
OSMOLALITY SERPL CALC.SUM OF ELEC: 292 MOSM/KG (ref 275–295)
OSMOLALITY SERPL CALC.SUM OF ELEC: 295 MOSM/KG (ref 275–295)
OSMOLALITY SERPL CALC.SUM OF ELEC: 296 MOSM/KG (ref 275–295)
P AXIS: 49 DEGREES
P-R INTERVAL: 216 MS
PH UR: 6.5 [PH] (ref 5–8)
PLATELET # BLD AUTO: 209 10(3)UL (ref 150–450)
POTASSIUM SERPL-SCNC: 4.2 MMOL/L (ref 3.5–5.1)
POTASSIUM SERPL-SCNC: 4.4 MMOL/L (ref 3.5–5.1)
POTASSIUM SERPL-SCNC: 4.7 MMOL/L (ref 3.5–5.1)
PROT SERPL-MCNC: 5.6 G/DL (ref 5.7–8.2)
PROT SERPL-MCNC: 5.8 G/DL (ref 5.7–8.2)
PROT UR-MCNC: 300 MG/DL
PROT UR-MCNC: 744.5 MG/DL (ref ?–14)
Q-T INTERVAL: 478 MS
QRS DURATION: 100 MS
QTC CALCULATION (BEZET): 473 MS
R AXIS: -10 DEGREES
RBC # BLD AUTO: 5.68 X10(6)UL (ref 3.8–5.3)
RBC #/AREA URNS AUTO: >10 /HPF
SODIUM SERPL-SCNC: 136 MMOL/L (ref 136–145)
SODIUM SERPL-SCNC: 137 MMOL/L (ref 136–145)
SODIUM SERPL-SCNC: 138 MMOL/L (ref 136–145)
SODIUM SERPL-SCNC: 93 MMOL/L
SP GR UR STRIP: 1.02 (ref 1–1.03)
T AXIS: 95 DEGREES
TRIGL SERPL-MCNC: 202 MG/DL (ref 30–149)
UROBILINOGEN UR STRIP-ACNC: NORMAL
VENTRICULAR RATE: 59 BPM
VLDLC SERPL CALC-MCNC: 25 MG/DL (ref 0–30)
WBC # BLD AUTO: 8.9 X10(3) UL (ref 4–11)
WBC #/AREA URNS AUTO: >50 /HPF

## 2025-05-18 PROCEDURE — 76857 US EXAM PELVIC LIMITED: CPT | Performed by: INTERNAL MEDICINE

## 2025-05-18 RX ORDER — HEPARIN SODIUM 5000 [USP'U]/ML
5000 INJECTION, SOLUTION INTRAVENOUS; SUBCUTANEOUS EVERY 12 HOURS SCHEDULED
Status: DISCONTINUED | OUTPATIENT
Start: 2025-05-18 | End: 2025-06-07

## 2025-05-18 RX ORDER — SODIUM CHLORIDE 9 MG/ML
INJECTION, SOLUTION INTRAVENOUS CONTINUOUS
Status: ACTIVE | OUTPATIENT
Start: 2025-05-18 | End: 2025-05-19

## 2025-05-18 RX ORDER — MIDODRINE HYDROCHLORIDE 5 MG/1
10 TABLET ORAL 3 TIMES DAILY
Status: DISCONTINUED | OUTPATIENT
Start: 2025-05-18 | End: 2025-05-28

## 2025-05-18 NOTE — PLAN OF CARE
Patient denied pain or discomfort. Critical CK, Dr. Majano made aware, no new order. Continue IVF NS at 150 ml/hr. SBA - safety precaution in place. Plan to maintained safety during this shift, monitor renal function & IV fluid, and PT/OT eval in AM. No urine output throughout the shift, bladder scanned the pt - 52 ml noted. Unable to get out of the bed due to weakness. Dr. Majano notified - US Kidney bladder order. Symptomatic - bladder pressure. Straight cath per protocol- UA sent to the lab    Problem: Patient Centered Care  Goal: Patient preferences are identified and integrated in the patient's plan of care  Description: Interventions:  - What would you like us to know as we care for you? Home alone  - Provide timely, complete, and accurate information to patient/family  - Incorporate patient and family knowledge, values, beliefs, and cultural backgrounds into the planning and delivery of care  - Encourage patient/family to participate in care and decision-making at the level they choose  - Honor patient and family perspectives and choices  Outcome: Progressing     Problem: PAIN - ADULT  Goal: Verbalizes/displays adequate comfort level or patient's stated pain goal  Description: INTERVENTIONS:  - Encourage pt to monitor pain and request assistance  - Assess pain using appropriate pain scale  - Administer analgesics based on type and severity of pain and evaluate response  - Implement non-pharmacological measures as appropriate and evaluate response  - Consider cultural and social influences on pain and pain management  - Manage/alleviate anxiety  - Utilize distraction and/or relaxation techniques  - Monitor for opioid side effects  - Notify MD/LIP if interventions unsuccessful or patient reports new pain  - Anticipate increased pain with activity and pre-medicate as appropriate  Outcome: Progressing     Problem: SAFETY ADULT - FALL  Goal: Free from fall injury  Description: INTERVENTIONS:  - Assess pt  frequently for physical needs  - Identify cognitive and physical deficits and behaviors that affect risk of falls.  - [place] fall precautions as indicated by assessment.  - Educate pt/family on patient safety including physical limitations  - Instruct pt to call for assistance with activity based on assessment  - Modify environment to reduce risk of injury  - Provide assistive devices as appropriate  - Consider OT/PT consult to assist with strengthening/mobility  - Encourage toileting schedule  Outcome: Progressing     Problem: DISCHARGE PLANNING  Goal: Discharge to home or other facility with appropriate resources  Description: INTERVENTIONS:  - Identify barriers to discharge w/pt and caregiver  - Include patient/family/discharge partner in discharge planning  - Arrange for needed discharge resources and transportation as appropriate  - Identify discharge learning needs (meds, wound care, etc)  - Arrange for interpreters to assist at discharge as needed  - Consider post-discharge preferences of patient/family/discharge partner  - Complete POLST form as appropriate  - Assess patient's ability to be responsible for managing their own health  - Refer to Case Management Department for coordinating discharge planning if the patient needs post-hospital services based on physician/LIP order or complex needs related to functional status, cognitive ability or social support system  Outcome: Progressing     Problem: SKIN/TISSUE INTEGRITY - ADULT  Goal: Skin integrity remains intact  Description: INTERVENTIONS  - Assess and document risk factors for pressure ulcer development  - Assess and document skin integrity  - Monitor for areas of redness and/or skin breakdown  - Initiate interventions, skin care algorithm/standards of care as needed  Outcome: Progressing

## 2025-05-18 NOTE — PROGRESS NOTES
[facility]  part of [facility]    Progress Note    Radha Winters Patient Status:  Inpatient    1960 MRN [id number]   Location [facility] 5SW/SE Attending Fanny Majano MD   Hosp Day # 1 PCP JUAN MONTANEZ       SUBJECTIVE:  No chest pain no abdominal pain no nausea no vomiting  Nursing staff reports decreased urine output    OBJECTIVE:  Vital signs in last 24 hours:  /61 (BP Location: Right arm)   Pulse 66   Temp 97.7 °F (36.5 °C) (Axillary)   Resp 16   Ht 5' 3\" (1.6 m)   Wt 212 lb 8.4 oz (96.4 kg)   SpO2 93%   BMI 37.65 kg/m²     Intake/Output:    Intake/Output Summary (Last 24 hours) at 2025 0801  Last data filed at 2025 0700  Gross per 24 hour   Intake 2777.92 ml   Output 65 ml   Net 2712.92 ml       Wt Readings from Last 3 Encounters:   25 212 lb 8.4 oz (96.4 kg)   10/09/23 185 lb 3 oz (84 kg)   10/04/23 185 lb (83.9 kg)       Exam  Gen: No acute distress, alert and oriented x3,  HEENT: No pallor  Pulm: Lungs clear, normal respiratory effort  CV: Heart with regular rate and rhythm, no peripheral edema  Abd: Abdomen soft, nontender, nondistended, no organomegaly, bowel sounds present  MSK: Full range of motion in extremities, no clubbing, no cyanosis  Skin: no rashes or lesions  CNS: Alert    Data Review:     Labs:   Lab Results   Component Value Date    WBC 8.9 2025    HGB 16.8 2025    HCT 51.3 2025    .0 2025    CREATSERUM 5.08 2025    BUN 33 2025     2025    K 3.9 2025     2025    CO2 18.0 2025    GLU 93 2025    CA 7.4 2025    ALB 4.1 2025    ALKPHO 487 2025    BILT 0.2 2025    TP 6.5 2025    AST 2,002 2025     2025    MG 2.4 2025    PHOS 6.3 2025    CK 27,597 2025       LABS  Recent Labs   Lab 25  0956 25  1018 25  0714   RBC 5.94* 5.78*  --  5.68*   HGB  17.5* 17.1*  --  16.8*   HCT 53.7* 51.6*  --  51.3*   MCV 90.4 89.3  --  90.3   MCH 29.5 29.6  --  29.6   MCHC 32.6 33.1  --  32.7   RDW 14.2 14.6  --  14.9   NEPRELIM 6.71 8.50*  --  7.39   WBC 8.6 10.1  --  8.9   .0 242.0  --  209.0   * 2,002*  --   --    * 958*  --   --    LIP 36  --   --   --    CK  --  39,706* 27,597*  --    * 115* 93  --        Imaging:      Meds:   Current Hospital Medications[1]    Assessment  Problem List[2]  1. Acute Kidney Injury on Chronic Kidney Disease:  - Will initiate IV hydration  - Nephrology consultation obtained  Patient with worsening renal function.  Nephrology is following the patient     2. Rhabdomyolysis:  - Secondary to recurrent falls  - Possibly statin-induced  - Plan: Hold statin (Crestor)     3. Acute Transaminitis:  - Likely secondary to rhabdomyolysis  - May also be statin-related  - Will monitor with serial labs       4. Recurrent Falls:  - Will check orthostatic vital signs  - Physical Therapy and Occupational Therapy consultations ordered     5. Coronary Artery Disease:  - Currently stable  - Patient denies chest pain     6. Hypothyroidism:  - Continue levothyroxine  Possible UTI.  Will start on ceftriaxone.       Plan for close monitoring and adjustment of management based on clinical course.  Case discussed with nursing staff    Active Orders   Imaging    US KIDNEY/BLADDER (ZCL=[id number])     Frequency: Once     Number of Occurrences: 1 Occurrences   Nourishments    Room Service Eligibility Until Discontinued     Frequency: Until Discontinued     Number of Occurrences: Until Specified    Room Service Notify RN Until Discontinued     Frequency: Until Discontinued     Number of Occurrences: Until Specified   OT    OT eval and treat     Frequency: Once     Number of Occurrences: 1 Occurrences   PT    PT eval and treat     Frequency: Once     Number of Occurrences: 1 Occurrences   Respiratory Care    Oxygen administration (adult) PRN      Frequency: PRN     Number of Occurrences: Until Specified   Diet    Cardiac diet Cardiac; Texture Consistency: Soft / Easy to Chew ; Is Patient on Accuchecks? No     Frequency: Effective Now     Number of Occurrences: Until Specified   Consult    Consult to Nephrology     Frequency: Once     Number of Occurrences: 1 Occurrences   Medications    acetaminophen (Tylenol) tab 650 mg     Frequency: Q6H PRN     Dose: 650 mg     Route: Oral    aspirin DR tab 81 mg     Frequency: Daily     Dose: 81 mg     Route: Oral    busPIRone (Buspar) tab 30 mg     Frequency: TID     Dose: 30 mg     Route: Oral    chlorproMAZINE (Thorazine) tab 25 mg     Frequency: QID     Dose: 25 mg     Route: Oral    diazePAM (Valium) tab 5 mg     Frequency: Q8H PRN     Dose: 5 mg     Route: Oral    DULoxetine (Cymbalta) DR cap 60 mg     Frequency: BID     Dose: 60 mg     Route: Oral    levothyroxine (Synthroid) tab 150 mcg     Frequency: Before breakfast     Dose: 150 mcg     Route: Oral    metoprolol succinate ER (Toprol XL) 24 hr tab 50 mg     Frequency: BID     Dose: 50 mg     Route: Oral    QUEtiapine (SEROquel) tab 50 mg     Frequency: BID     Dose: 50 mg     Route: Oral    QUEtiapine ER (SEROquel XR) 24 hr tab 300 mg     Frequency: Nightly     Dose: 300 mg     Route: Oral    sodium chloride 0.9% infusion     Frequency: Continuous     Route: Intravenous    sodium chloride 0.9% infusion     Frequency: Continuous     Route: Intravenous       Fanny Majano MD  5/18/2025  8:01 AM         [1]   Current Facility-Administered Medications   Medication Dose Route Frequency    sodium chloride 0.9% infusion   Intravenous Continuous    acetaminophen (Tylenol) tab 650 mg  650 mg Oral Q6H PRN    sodium chloride 0.9% infusion   Intravenous Continuous    aspirin DR tab 81 mg  81 mg Oral Daily    busPIRone (Buspar) tab 30 mg  30 mg Oral TID    chlorproMAZINE (Thorazine) tab 25 mg  25 mg Oral QID    diazePAM (Valium) tab 5 mg  5 mg Oral Q8H PRN    DULoxetine  (Cymbalta)  cap 60 mg  60 mg Oral BID    levothyroxine (Synthroid) tab 150 mcg  150 mcg Oral Before breakfast    metoprolol succinate ER (Toprol XL) 24 hr tab 50 mg  50 mg Oral BID    QUEtiapine (SEROquel) tab 50 mg  50 mg Oral BID    QUEtiapine ER (SEROquel XR) 24 hr tab 300 mg  300 mg Oral Nightly   [2]   Patient Active Problem List  Diagnosis    Closed fracture of proximal end of left humerus, unspecified fracture morphology, initial encounter    Frequent falls    Seizure-like activity (HCC)    Pituitary lesion (HCC)    Major depressive disorder, recurrent episode, moderate (HCC)    Generalized anxiety disorder    Acute renal failure superimposed on chronic kidney disease, unspecified acute renal failure type, unspecified CKD stage    Non-traumatic rhabdomyolysis    Transaminitis

## 2025-05-18 NOTE — PLAN OF CARE
Problem: Patient Centered Care  Goal: Patient preferences are identified and integrated in the patient's plan of care  Description: Interventions:  - What would you like us to know as we care for you? From home alone   - Provide timely, complete, and accurate information to patient/family  - Incorporate patient and family knowledge, values, beliefs, and cultural backgrounds into the planning and delivery of care  - Encourage patient/family to participate in care and decision-making at the level they choose  - Honor patient and family perspectives and choices  Outcome: Progressing     Problem: PAIN - ADULT  Goal: Verbalizes/displays adequate comfort level or patient's stated pain goal  Description: INTERVENTIONS:  - Encourage pt to monitor pain and request assistance  - Assess pain using appropriate pain scale  - Administer analgesics based on type and severity of pain and evaluate response  - Implement non-pharmacological measures as appropriate and evaluate response  - Consider cultural and social influences on pain and pain management  - Manage/alleviate anxiety  - Utilize distraction and/or relaxation techniques  - Monitor for opioid side effects  - Notify MD/LIP if interventions unsuccessful or patient reports new pain  - Anticipate increased pain with activity and pre-medicate as appropriate  Outcome: Progressing     Problem: SAFETY ADULT - FALL  Goal: Free from fall injury  Description: INTERVENTIONS:  - Assess pt frequently for physical needs  - Identify cognitive and physical deficits and behaviors that affect risk of falls.  - [place] fall precautions as indicated by assessment.  - Educate pt/family on patient safety including physical limitations  - Instruct pt to call for assistance with activity based on assessment  - Modify environment to reduce risk of injury  - Provide assistive devices as appropriate  - Consider OT/PT consult to assist with strengthening/mobility  - Encourage toileting  schedule  Outcome: Progressing     Problem: DISCHARGE PLANNING  Goal: Discharge to home or other facility with appropriate resources  Description: INTERVENTIONS:  - Identify barriers to discharge w/pt and caregiver  - Include patient/family/discharge partner in discharge planning  - Arrange for needed discharge resources and transportation as appropriate  - Identify discharge learning needs (meds, wound care, etc)  - Arrange for interpreters to assist at discharge as needed  - Consider post-discharge preferences of patient/family/discharge partner  - Complete POLST form as appropriate  - Assess patient's ability to be responsible for managing their own health  - Refer to Case Management Department for coordinating discharge planning if the patient needs post-hospital services based on physician/LIP order or complex needs related to functional status, cognitive ability or social support system  Outcome: Progressing     Problem: SKIN/TISSUE INTEGRITY - ADULT  Goal: Skin integrity remains intact  Description: INTERVENTIONS  - Assess and document risk factors for pressure ulcer development  - Assess and document skin integrity  - Monitor for areas of redness and/or skin breakdown  - Initiate interventions, skin care algorithm/standards of care as needed  Outcome: Progressing

## 2025-05-18 NOTE — PROGRESS NOTES
[facility]  part of [facility]    Nephrology Progress Note    Radha Winters Patient Status:  Inpatient    1960 MRN [id number]   Location [facility] 5SW/SE Attending Fanny Majano MD   Hosp Day # 1 PCP JUAN MONTANEZ     Subjective:   Radha Winters is a(n) 64 year old female     Objective:   Blood pressure 102/59, pulse 62, temperature 98.2 °F (36.8 °C), temperature source Axillary, resp. rate 16, height 5' 3\" (1.6 m), weight 212 lb 8.4 oz (96.4 kg), SpO2 92%.        Results:    Lab Results   Component Value Date    WBC 8.9 2025    HGB 16.8 (H) 2025    HCT 51.3 (H) 2025    .0 2025    CREATSERUM 6.15 (H) 2025    BUN 45 (H) 2025     2025    K 4.7 2025     2025    CO2 20.0 (L) 2025    GLU 84 2025    CA 7.0 (L) 2025    ALB 3.6 2025    ALKPHO 490 (H) 2025    BILT 0.2 2025    TP 5.6 (L) 2025    AST 2,168 (H) 2025    ALT 1,048 (H) 2025    TSH 0.528 10/10/2023    LIP 36 2025    MG 2.4 2025    PHOS 6.3 (H) 2025    CK 24,888 (HH) 2025    B12 581 10/10/2023       CT ABDOMEN+PELVIS(CPT=74176)  Result Date: 2025  CONCLUSION: No acute abnormality identified within the abdomen/pelvis.  Incidental nonacute findings are present and are described within the body of the report.    Dictated by (CST): Jordi River MD on 2025 at 12:00 PM     Finalized by (CST): Jordi River MD on 2025 at 12:06 PM          XR CHEST AP PORTABLE  (CPT=71045)  Result Date: 2025  CONCLUSION: Questionable retrocardiac density which could represent atelectasis, infiltrate, or a combination in addition to a trace pleural effusion.  No other acute appearing abnormalities are clearly demonstrated.    Dictated by (CST): Jordi River MD on 2025 at 11:42 AM     Finalized by (CST): Jordi River MD on 2025 at 11:43 AM          CT  SPINE THORACIC (CPT=72128)  Result Date: 5/17/2025  CONCLUSION:   1. Central compression deformities of the T11 and T12 vertebral bodies likely nonacute given the lack of associated abnormalities.  No acute fracture clearly demonstrated.  2. Moderate degenerative changes within the mid/lower thoracic spine.     Dictated by (CST): Jordi River MD on 5/17/2025 at 11:35 AM     Finalized by (CST): Jordi River MD on 5/17/2025 at 11:38 AM          CT SPINE LUMBAR (CPT=72131)  Result Date: 5/17/2025  CONCLUSION:   1. No acute fracture/traumatic subluxation.  2. Moderate degenerative changes within the lumbar spine as described within the body of the report.    Dictated by (CST): Jordi River MD on 5/17/2025 at 11:28 AM     Finalized by (CST): Jordi River MD on 5/17/2025 at 11:31 AM          CT BRAIN OR HEAD (CPT=70450)  Result Date: 5/17/2025  CONCLUSION: No acute or significant chronic intracranial abnormality.     Dictated by (CST): Jordi River MD on 5/17/2025 at 11:10 AM     Finalized by (CST): Jordi River MD on 5/17/2025 at 11:11 AM          CT SPINE CERVICAL (CPT=72125)  Result Date: 5/17/2025  CONCLUSION:   1. No acute fracture/traumatic subluxation.  2. Slight to moderate degenerative changes within the mid/lower cervical spine.    Dictated by (CST): Jordi River MD on 5/17/2025 at 10:57 AM     Finalized by (CST): Jordi River MD on 5/17/2025 at 11:02 AM          EKG 12 Lead  Result Date: 5/18/2025  Sinus bradycardia with 1st degree A-V block Possible Left atrial enlargement Low voltage QRS, consider pulmonary disease, pericardial effusion, or normal variant Septal infarct , age undetermined Abnormal ECG Confirmed by Moustapha Patel ([id number]) on 5/18/2025 4:46:43 PM      Assessment & Plan:     Acute renal failure superimposed on chronic kidney disease, unspecified acute renal failure type, unspecified CKD stage        Non-traumatic rhabdomyolysis        Transaminitis    Patient is a 64  y/o female with PMH of HTN, dyslipidemia, CAD admitted after multiple falls, Nephrology consulted for Acute Kidney Injury      Acute Kidney Injury:  Likely secondary to ATN, from Rhabdomyolysis, continue aggressive volume resuscitation, 0.9  ml/hr, continue to trend BMP, check Uric acid, Phos, Urine studies  CKD stage 3b vs 4:  Secondary to HTN nephrosclerosis, check Phos, PTH, renal US  Transaminitis:  Continue to trend LFTs, will check abdominal US        Recommendations:  Continue to trend CMP  Aggressive volume resuscitation, 0.9 NS at 150 ml/hr  Renal US reviewed  Renally dose meds  Add Midodrine for MAP goal >65  Huynh placement, for strict I/Os     Thank you for allowing me to participate in the care of your patient.      Bernardo Lewis MD  5/18/2025

## 2025-05-18 NOTE — H&P
Acute Kidney Injury with Rhabdomyolysis and Recurrent Falls    Chief Complaint  1. Acute kidney injury on  chronic kidney disease  2. Rhabdomyolysis  3. Acute transaminitis  4. Recurrent falls    History of Present Illness  Ms. Winters is a 64-year-old female with a history of hypertension, coronary artery disease, hyperlipidemia, hypothyroidism, and chronic leg weakness who presents with multiple issues. She has a two-year history of recurrent falls and was previously evaluated at [facility], where she was told her falls were related to her sodium levels. The patient experienced a fall 2-3 days ago and presented to the emergency room but refused admission at that time. Laboratory studies then revealed elevated creatinine. Today, she experienced two more falls - initially landing on her back, followed by a second fall. She developed occipital headache and back pain following these events. She denies loss of consciousness, neck pain, or leg pain. Additionally, the patient reports poor oral intake and decreased appetite over the past several days.    Medications  1. Crestor (rosuvastatin)  2. Levothyroxine  3. Other medications (reviewed but not specified in transcription)    Allergies  No known allergies    Past Medical History  1. Hypothyroidism  2. Hypertension  3. Coronary artery disease  4. Hyperlipidemia  5. Recurrent falls  6. Thoracic vertebral fracture    Past Surgical History  Appendectomy    Social History  Marital Status:   Smoking: Current smoker, 5 cigarettes per day  Alcohol: Denies alcohol use    Family History  Mother: Breast cancer    Review of Systems  Constitutional: Poor appetite, decreased oral intake  Neurological: Denies headache initially, later developed occipital headache  HEENT: No blurry vision, no double vision, no earache  Neck: No neck pain  Cardiovascular: No chest pain  Gastrointestinal: No abdominal pain, no nausea, no vomiting, no diarrhea  All other  systems negative    Vital Signs  Vital signs noted to be stable     Physical Exam  General: 65-year-old female lying comfortably in bed, alert  HEENT: Head atraumatic, pupils reactive, pallor noted  Neck: No JVD, no carotid bruits  Cardiovascular: S1 and S2 regular rate and rhythm  Respiratory: Lungs clear to auscultation  Abdomen: Bowel sounds present  Extremities: No pedal edema, no deformities  Neurological: Alert and oriented, no focal deficits    Lab Results  Creatinine: 4.9  CPK: 39, 706 (elevated)    Imaging and Other Relevant Results  No imaging results provided in transcription    Assessment and Plan  1. Acute Kidney Injury on Chronic Kidney Disease:  - Will initiate IV hydration  - Nephrology consultation obtained    2. Rhabdomyolysis:  - Secondary to recurrent falls  - Possibly statin-induced  - Plan: Hold statin (Crestor)    3. Acute Transaminitis:  - Likely secondary to rhabdomyolysis  - May also be statin-related  - Will monitor with serial labs    4. Recurrent Falls:  - Will check orthostatic vital signs  - Physical Therapy and Occupational Therapy consultations ordered    5. Coronary Artery Disease:  - Currently stable  - Patient denies chest pain    6. Hypothyroidism:  - Continue levothyroxine    Plan for close monitoring and adjustment of management based on clinical course.  Case discussed with nursing staff  Discussed with emergency room physician

## 2025-05-18 NOTE — OCCUPATIONAL THERAPY NOTE
OCCUPATIONAL THERAPY EVALUATION - INPATIENT     Room Number: 570/570-A  Evaluation Date: 5/18/2025  Type of Evaluation: Initial  Presenting Problem: 64-year-old female with a history of hypertension, coronary artery disease, hyperlipidemia, hypothyroidism, and chronic leg weakness who presents with multiple issues. She has a two-year history of recurrent falls and was previously evaluated at [facility], where she was told her falls were related to her sodium levels. The patient experienced a fall 2-3 days ago and presented to the emergency room but refused admission at that time. Laboratory studies then revealed elevated creatinine. Today, she experienced two more falls - initially landing on her back, followed by a second fall. She developed occipital headache and back pain following these events. She denies loss of consciousness, neck pain, or leg pain. Additionally, the patient reports poor oral intake and decreased appetite over the past several days.    Physician Order: IP Consult to Occupational Therapy  Reason for Therapy: ADL/IADL Dysfunction and Discharge Planning    OCCUPATIONAL THERAPY ASSESSMENT   Patient is a 64 year old female admitted 5/17/2025 for frequent falls with no clear etiology.  Prior to admission, patient's baseline is mod I.  Patient is currently functioning below baseline with toileting, bathing, lower body dressing, transfers, static standing balance, dynamic standing balance, functional standing tolerance, and performing household tasks.  Patient is requiring moderate assist as a result of the following impairments: decreased functional strength, decreased functional reach, decreased endurance, decreased muscular endurance, and difficulty maintaining precautions. Occupational Therapy will continue to follow for duration of hospitalization.    Patient will benefit from continued skilled OT Services to promote return to prior level of function and safety with continuous  assistance and gradual rehabilitative therapy.    PLAN DURING HOSPITALIZATION  OT Device Recommendations: None  OT Treatment Plan: ADL training, Functional transfer training, UE strengthening/ROM, Endurance training, Cognitive reorientation     OCCUPATIONAL THERAPY MEDICAL/SOCIAL HISTORY   Problem List  Principal Problem:    Acute renal failure superimposed on chronic kidney disease, unspecified acute renal failure type, unspecified CKD stage  Active Problems:    Non-traumatic rhabdomyolysis    Transaminitis    HOME SITUATION  Type of Home: Apartment  Home Layout: One level; Able to live on main level; Ramped entrance  Lives With: Alone  Toilet and Equipment: Standard height toilet  Shower/Tub and Equipment: Tub transfer bench; Tub-shower combo; Grab bar  Drives: Yes  Patient Regularly Uses: Rolling walker; Glasses    Stairs in Home: ramp access  Use of Assistive Device(s): rolling walker    Prior Level of [place]: mod I    SUBJECTIVE  \"I don't want to get up.  I am scared to move.\"    OCCUPATIONAL THERAPY EXAMINATION      OBJECTIVE  Precautions: Bed/chair alarm  Fall Risk: High fall risk      PAIN ASSESSMENT  Ratin      ACTIVITY TOLERANCE                         O2 SATURATIONS       COGNITION  TBA     VISION  WFL    PERCEPTION  WFL    SENSATION  Light touch:  intact    Communication: Able to communicate basic wants and needs    Behavioral/Emotional/Social: cooperative     RANGE OF MOTION   Upper extremity ROM is within functional limits     STRENGTH ASSESSMENT  Upper extremity strength is within functional limits     COORDINATION  Gross Motor: WFL   Fine Motor: WFL     ACTIVITIES OF DAILY LIVING ASSESSMENT  AM-PAC ‘6-Clicks’ Inpatient Daily Activity Short Form  How much help from another person does the patient currently need…  -   Putting on and taking off regular lower body clothing?: A Lot  -   Bathing (including washing, rinsing, drying)?: A Lot  -   Toileting, which includes using toilet, bedpan or  urinal? : A Lot  -   Putting on and taking off regular upper body clothing?: A Little  -   Taking care of personal grooming such as brushing teeth?: A Little  -   Eating meals?: None    AM-PAC Score:  Score: 16  Approx Degree of Impairment: 53.32%  Standardized Score (AM-PAC Scale): 35.96  CMS Modifier (G-Code): CK    FUNCTIONAL TRANSFER ASSESSMENT  Sit to Stand: Edge of Bed  Edge of Bed: Minimal Assist    BED MOBILITY  Rolling: Minimal Assist  Supine to Sit : Minimal Assist    BALANCE ASSESSMENT     FUNCTIONAL ADL ASSESSMENT     THERAPEUTIC EXERCISE     Skilled Therapy Provided: OT Evaluation Complete:  Following OT evaluation, treatment rendered as follows:  - Fall prevention  - BUE HEP from bed level to inc strength and endurance while in house.   - AE edu for lb dressing  - Initiated fall education but pt request to hold completion at this time.      EDUCATION PROVIDED  Patient Education : Role of Occupational Therapy; Plan of Care; Discharge Recommendations; DME Recommendations; Functional Transfer Techniques; Fall Prevention; Energy Conservation  Patient's Response to Education: Verbalized Understanding; Requires Further Education    The patient's Approx Degree of Impairment: 53.32% has been calculated based on documentation in the [facility] '6 clicks' Inpatient Daily Activity Short Form.  Research supports that patients with this level of impairment may benefit from rehab.  Final disposition will be made by interdisciplinary medical team.     Patient End of Session: Up in chair, Needs met, Call light within reach, RN aware of session/findings, All patient questions and concerns addressed, Hospital anti-slip socks, Discussed recommendations with /    OT Goals  Patients self stated goal is: to not fall      Patient will complete functional transfer with min A   Comment:     Patient will complete toileting with min A  Comment:     Patient will tolerate standing for 10 minutes in prep for  adls with min A   Comment:    Patient will complete item retrieval with min A   Comment:          Goals  on: 2025  Frequency: 3-5x a week     Patient Evaluation Complexity Level:   Occupational Profile/Medical History MODERATE - Expanded review of history including review of medical or therapy record   Specific performance deficits impacting engagement in ADL/IADL MODERATE  3 - 5 performance deficits   Client Assessment/Performance Deficits MODERATE - Comorbidities and min to mod modifications of tasks    Clinical Decision Making MODERATE - Analysis of occupational profile, detailed assessments, several treatment options    Overall Complexity MODERATE     OT Session Time: 23 minutes  Self-Care Home Management: 13 minutes  Therapeutic Activity: 10 minutes

## 2025-05-19 ENCOUNTER — APPOINTMENT (OUTPATIENT)
Dept: GENERAL RADIOLOGY | Facility: HOSPITAL | Age: 65
End: 2025-05-19
Attending: INTERNAL MEDICINE
Payer: MEDICAID

## 2025-05-19 ENCOUNTER — APPOINTMENT (OUTPATIENT)
Dept: GENERAL RADIOLOGY | Facility: HOSPITAL | Age: 65
End: 2025-05-19
Attending: EMERGENCY MEDICINE
Payer: MEDICAID

## 2025-05-19 LAB
ALBUMIN SERPL-MCNC: 3.1 G/DL (ref 3.2–4.8)
ALBUMIN/GLOB SERPL: 1.7 {RATIO} (ref 1–2)
ALP LIVER SERPL-CCNC: 503 U/L (ref 50–130)
ALT SERPL-CCNC: 1368 U/L (ref 10–49)
ANION GAP SERPL CALC-SCNC: 7 MMOL/L (ref 0–18)
AST SERPL-CCNC: 2929 U/L (ref ?–34)
BASOPHILS # BLD AUTO: 0.03 X10(3) UL (ref 0–0.2)
BASOPHILS NFR BLD AUTO: 0.3 %
BILIRUB SERPL-MCNC: <0.2 MG/DL (ref 0.2–1.1)
BUN BLD-MCNC: 45 MG/DL (ref 9–23)
BUN/CREAT SERPL: 6.7 (ref 10–20)
CALCIUM BLD-MCNC: 6.5 MG/DL (ref 8.7–10.4)
CHLORIDE SERPL-SCNC: 115 MMOL/L (ref 98–112)
CK SERPL-CCNC: ABNORMAL U/L (ref 34–145)
CO2 SERPL-SCNC: 16 MMOL/L (ref 21–32)
CREAT BLD-MCNC: 6.69 MG/DL (ref 0.55–1.02)
DEPRECATED RDW RBC AUTO: 51.4 FL (ref 35.1–46.3)
EGFRCR SERPLBLD CKD-EPI 2021: 7 ML/MIN/1.73M2 (ref 60–?)
EOSINOPHIL # BLD AUTO: 0.01 X10(3) UL (ref 0–0.7)
EOSINOPHIL NFR BLD AUTO: 0.1 %
ERYTHROCYTE [DISTWIDTH] IN BLOOD BY AUTOMATED COUNT: 15.3 % (ref 11–15)
GLOBULIN PLAS-MCNC: 1.8 G/DL (ref 2–3.5)
GLUCOSE BLD-MCNC: 87 MG/DL (ref 70–99)
GLUCOSE BLDC GLUCOMTR-MCNC: 111 MG/DL (ref 70–99)
HAV IGM SER QL: NONREACTIVE
HBV CORE AB SERPL QL IA: NONREACTIVE
HBV CORE IGM SER QL: NONREACTIVE
HBV SURFACE AB SER QL: NONREACTIVE
HBV SURFACE AB SERPL IA-ACNC: <3.1 MIU/ML
HBV SURFACE AG SER-ACNC: <0.1 [IU]/L
HBV SURFACE AG SERPL QL IA: NONREACTIVE
HBV SURFACE AG SERPL QL IA: NONREACTIVE
HCT VFR BLD AUTO: 45.8 % (ref 35–48)
HCV AB SERPL QL IA: NONREACTIVE
HGB BLD-MCNC: 14.8 G/DL (ref 12–16)
IMM GRANULOCYTES # BLD AUTO: 0.04 X10(3) UL (ref 0–1)
IMM GRANULOCYTES NFR BLD: 0.4 %
LYMPHOCYTES # BLD AUTO: 1.04 X10(3) UL (ref 1–4)
LYMPHOCYTES NFR BLD AUTO: 10.7 %
MCH RBC QN AUTO: 29.5 PG (ref 26–34)
MCHC RBC AUTO-ENTMCNC: 32.3 G/DL (ref 31–37)
MCV RBC AUTO: 91.4 FL (ref 80–100)
MONOCYTES # BLD AUTO: 0.22 X10(3) UL (ref 0.1–1)
MONOCYTES NFR BLD AUTO: 2.3 %
NEUTROPHILS # BLD AUTO: 8.34 X10 (3) UL (ref 1.5–7.7)
NEUTROPHILS # BLD AUTO: 8.34 X10(3) UL (ref 1.5–7.7)
NEUTROPHILS NFR BLD AUTO: 86.2 %
OSMOLALITY SERPL CALC.SUM OF ELEC: 297 MOSM/KG (ref 275–295)
PLATELET # BLD AUTO: 174 10(3)UL (ref 150–450)
POTASSIUM SERPL-SCNC: 4.3 MMOL/L (ref 3.5–5.1)
PROT SERPL-MCNC: 4.9 G/DL (ref 5.7–8.2)
RBC # BLD AUTO: 5.01 X10(6)UL (ref 3.8–5.3)
SODIUM SERPL-SCNC: 138 MMOL/L (ref 136–145)
WBC # BLD AUTO: 9.7 X10(3) UL (ref 4–11)

## 2025-05-19 PROCEDURE — 36556 INSERT NON-TUNNEL CV CATH: CPT | Performed by: EMERGENCY MEDICINE

## 2025-05-19 PROCEDURE — 71045 X-RAY EXAM CHEST 1 VIEW: CPT | Performed by: EMERGENCY MEDICINE

## 2025-05-19 PROCEDURE — 99291 CRITICAL CARE FIRST HOUR: CPT | Performed by: INTERNAL MEDICINE

## 2025-05-19 PROCEDURE — 71045 X-RAY EXAM CHEST 1 VIEW: CPT | Performed by: INTERNAL MEDICINE

## 2025-05-19 PROCEDURE — B5181ZA FLUOROSCOPY OF SUPERIOR VENA CAVA USING LOW OSMOLAR CONTRAST, GUIDANCE: ICD-10-PCS | Performed by: EMERGENCY MEDICINE

## 2025-05-19 PROCEDURE — 02HV33Z INSERTION OF INFUSION DEVICE INTO SUPERIOR VENA CAVA, PERCUTANEOUS APPROACH: ICD-10-PCS | Performed by: EMERGENCY MEDICINE

## 2025-05-19 PROCEDURE — 76937 US GUIDE VASCULAR ACCESS: CPT | Performed by: EMERGENCY MEDICINE

## 2025-05-19 PROCEDURE — 5A1D70Z PERFORMANCE OF URINARY FILTRATION, INTERMITTENT, LESS THAN 6 HOURS PER DAY: ICD-10-PCS | Performed by: INTERNAL MEDICINE

## 2025-05-19 RX ORDER — HEPARIN SODIUM 1000 [USP'U]/ML
2000 INJECTION, SOLUTION INTRAVENOUS; SUBCUTANEOUS
Status: DISCONTINUED | OUTPATIENT
Start: 2025-05-19 | End: 2025-06-07

## 2025-05-19 RX ORDER — ALBUTEROL SULFATE 0.83 MG/ML
2.5 SOLUTION RESPIRATORY (INHALATION) EVERY 6 HOURS PRN
Status: DISCONTINUED | OUTPATIENT
Start: 2025-05-19 | End: 2025-06-07

## 2025-05-19 RX ORDER — ALBUMIN (HUMAN) 12.5 G/50ML
25 SOLUTION INTRAVENOUS
Status: DISPENSED | OUTPATIENT
Start: 2025-05-19 | End: 2025-05-21

## 2025-05-19 NOTE — PLAN OF CARE
Pt is alert & or x 3, following cammands, recent admission for fall resulting in thoracic vertebral compression fractures,  ct head, & spine this admission were negative. Pt has chronic falls. Prn tylenol this am.  She stated feeling exhausted after having dialysis. I stated Dialysis pt ususally tell me they get really tired on dialysis days. Sinus r.  Right neck dialysis catheter with pigtail placed early this am.  This was her first -Dialysis started today. Ordered for am called in the order for 05/20/25. Dr Lewis ordered.levophed started at 3 mics during dialysis, albumin given before starting levophed.  Sched midodrine. Pt is swallowing pills.  0.9 at 125.enc her to eat. 1 liter removed per HD reviewed call light usage, emotinoal support. Huynh care done.  Redness on her right hip and sacrum   Problem: Patient Centered Care  Goal: Patient preferences are identified and integrated in the patient's plan of care  Description: Interventions:  - What would you like us to know as we care for you?  - Provide timely, complete, and accurate information to patient/family  - Incorporate patient and family knowledge, values, beliefs, and cultural backgrounds into the planning and delivery of care  - Encourage patient/family to participate in care and decision-making at the level they choose  - Honor patient and family perspectives and choices  Outcome: Progressing     Problem: PAIN - ADULT  Goal: Verbalizes/displays adequate comfort level or patient's stated pain goal  Description: INTERVENTIONS:  - Encourage pt to monitor pain and request assistance  - Assess pain using appropriate pain scale  - Administer analgesics based on type and severity of pain and evaluate response  - Implement non-pharmacological measures as appropriate and evaluate response  - Consider cultural and social influences on pain and pain management  - Manage/alleviate anxiety  - Utilize distraction and/or relaxation techniques  - Monitor for opioid  side effects  - Notify MD/LIP if interventions unsuccessful or patient reports new pain  - Anticipate increased pain with activity and pre-medicate as appropriate  Outcome: Progressing     Problem: SAFETY ADULT - FALL  Goal: Free from fall injury  Description: INTERVENTIONS:  - Assess pt frequently for physical needs  - Identify cognitive and physical deficits and behaviors that affect risk of falls.  - [place] fall precautions as indicated by assessment.  - Educate pt/family on patient safety including physical limitations  - Instruct pt to call for assistance with activity based on assessment  - Modify environment to reduce risk of injury  - Provide assistive devices as appropriate  - Consider OT/PT consult to assist with strengthening/mobility  - Encourage toileting schedule  Outcome: Progressing     Problem: DISCHARGE PLANNING  Goal: Discharge to home or other facility with appropriate resources  Description: INTERVENTIONS:  - Identify barriers to discharge w/pt and caregiver  - Include patient/family/discharge partner in discharge planning  - Arrange for needed discharge resources and transportation as appropriate  - Identify discharge learning needs (meds, wound care, etc)  - Arrange for interpreters to assist at discharge as needed  - Consider post-discharge preferences of patient/family/discharge partner  - Complete POLST form as appropriate  - Assess patient's ability to be responsible for managing their own health  - Refer to Case Management Department for coordinating discharge planning if the patient needs post-hospital services based on physician/LIP order or complex needs related to functional status, cognitive ability or social support system  Outcome: Progressing     Problem: SKIN/TISSUE INTEGRITY - ADULT  Goal: Skin integrity remains intact  Description: INTERVENTIONS  - Assess and document risk factors for pressure ulcer development  - Assess and document skin integrity  - Monitor for areas of  redness and/or skin breakdown  - Initiate interventions, skin care algorithm/standards of care as needed  Outcome: Progressing     Problem: RESPIRATORY - ADULT  Goal: Achieves optimal ventilation and oxygenation  Description: INTERVENTIONS:  - Assess for changes in respiratory status  - Assess for changes in mentation and behavior  - Position to facilitate oxygenation and minimize respiratory effort  - Oxygen supplementation based on oxygen saturation or ABGs  - Provide Smoking Cessation handout, if applicable  - Encourage broncho-pulmonary hygiene including cough, deep breathe, Incentive Spirometry  - Assess the need for suctioning and perform as needed  - Assess and instruct to report SOB or any respiratory difficulty  - Respiratory Therapy support as indicated  - Manage/alleviate anxiety  - Monitor for signs/symptoms of CO2 retention  Outcome: Progressing     Problem: GENITOURINARY - ADULT  Goal: Absence of urinary retention  Description: INTERVENTIONS:  - Assess patient’s ability to void and empty bladder  - Monitor intake/output and perform bladder scan as needed  - Follow urinary retention protocol/standard of care  - Consider collaborating with pharmacy to review patient's medication profile  - Implement strategies to promote bladder emptying  Outcome: Progressing     Problem: METABOLIC/FLUID AND ELECTROLYTES - ADULT  Goal: Glucose maintained within prescribed range  Description: INTERVENTIONS:  - Monitor Blood Glucose as ordered  - Assess for signs and symptoms of hyperglycemia and hypoglycemia  - Administer ordered medications to maintain glucose within target range  - Assess barriers to adequate nutritional intake and initiate nutrition consult as needed  - Instruct patient on self management of diabetes  Outcome: Progressing  Goal: Electrolytes maintained within normal limits  Description: INTERVENTIONS:  - Monitor labs and rhythm and assess patient for signs and symptoms of electrolyte imbalances  -  Administer electrolyte replacement as ordered  - Monitor response to electrolyte replacements, including rhythm and repeat lab results as appropriate  - Fluid restriction as ordered  - Instruct patient on fluid and nutrition restrictions as appropriate  Outcome: Progressing  Goal: Hemodynamic stability and optimal renal function maintained  Description: INTERVENTIONS:  - Monitor labs and assess for signs and symptoms of volume excess or deficit  - Monitor intake, output and patient weight  - Monitor urine specific gravity, serum osmolarity and serum sodium as indicated or ordered  - Monitor response to interventions for patient's volume status, including labs, urine output, blood pressure (other measures as available)  - Encourage oral intake as appropriate  - Instruct patient on fluid and nutrition restrictions as appropriate  Outcome: Progressing

## 2025-05-19 NOTE — PLAN OF CARE
Dr Lewis notified  of pt low urine out, BP and the results after I irrigated the denny and the appearance of the output. Orders also received from Dr Lewis. Will continue to monitor pt and update MD when needed.

## 2025-05-19 NOTE — PROGRESS NOTES
On call Nocturnist 05/19/25    Patient admitted 2 days ago with worsening renal failure and elevated cpk of 39,000, creatinine 4.9 and elevated lft's.  Has been receiving fluids, and was just started on midodrine last night for low bp.  Having bp in 80.s.  Currently sleeping sbp in 90's. Cr now 6.58.    -with worsening Cr and soft BP will place central line lg volume catheter.    Central line placement:   After verbal informed consent from patient; with the risks explained to be bleeding, infection, pain, retroperitoneal bleeding and or pneumothorax; maximal sterile barrier technique was uses including cap, gown, sterile gloves, large sheet, handwashing and chlorexidine prep.  The area anesthetized with 1% lidocaine.  The r internal jugular was located with us and  vein was punctured then a wire introducer was placed , a triple lumen catheter was placed using Seldinger technique.  No complications.  Blood return low pressure, dark blood.  Patient tolerated procedure well.   Line placement verification xray pending.    The procedure was performed by myself.

## 2025-05-19 NOTE — PLAN OF CARE
Pt transferred to CCU accompanied by staff (RN)and transporter. Pt in no acute distress, will notify family/friend later in the morning.

## 2025-05-19 NOTE — PLAN OF CARE
Problem: Patient Centered Care  Goal: Patient preferences are identified and integrated in the patient's plan of care  Description: Interventions:  - What would you like us to know as we care for you? Pt from home alone  - Provide timely, complete, and accurate information to patient/family  - Incorporate patient and family knowledge, values, beliefs, and cultural backgrounds into the planning and delivery of care  - Encourage patient/family to participate in care and decision-making at the level they choose  - Honor patient and family perspectives and choices  Outcome: Progressing     Problem: PAIN - ADULT  Goal: Verbalizes/displays adequate comfort level or patient's stated pain goal  Description: INTERVENTIONS:  - Encourage pt to monitor pain and request assistance  - Assess pain using appropriate pain scale  - Administer analgesics based on type and severity of pain and evaluate response  - Implement non-pharmacological measures as appropriate and evaluate response  - Consider cultural and social influences on pain and pain management  - Manage/alleviate anxiety  - Utilize distraction and/or relaxation techniques  - Monitor for opioid side effects  - Notify MD/LIP if interventions unsuccessful or patient reports new pain  - Anticipate increased pain with activity and pre-medicate as appropriate  Outcome: Progressing     Problem: SAFETY ADULT - FALL  Goal: Free from fall injury  Description: INTERVENTIONS:  - Assess pt frequently for physical needs  - Identify cognitive and physical deficits and behaviors that affect risk of falls.  - [place] fall precautions as indicated by assessment.  - Educate pt/family on patient safety including physical limitations  - Instruct pt to call for assistance with activity based on assessment  - Modify environment to reduce risk of injury  - Provide assistive devices as appropriate  - Consider OT/PT consult to assist with strengthening/mobility  - Encourage toileting  schedule  Outcome: Progressing     Problem: DISCHARGE PLANNING  Goal: Discharge to home or other facility with appropriate resources  Description: INTERVENTIONS:  - Identify barriers to discharge w/pt and caregiver  - Include patient/family/discharge partner in discharge planning  - Arrange for needed discharge resources and transportation as appropriate  - Identify discharge learning needs (meds, wound care, etc)  - Arrange for interpreters to assist at discharge as needed  - Consider post-discharge preferences of patient/family/discharge partner  - Complete POLST form as appropriate  - Assess patient's ability to be responsible for managing their own health  - Refer to Case Management Department for coordinating discharge planning if the patient needs post-hospital services based on physician/LIP order or complex needs related to functional status, cognitive ability or social support system  Outcome: Progressing     Problem: SKIN/TISSUE INTEGRITY - ADULT  Goal: Skin integrity remains intact  Description: INTERVENTIONS  - Assess and document risk factors for pressure ulcer development  - Assess and document skin integrity  - Monitor for areas of redness and/or skin breakdown  - Initiate interventions, skin care algorithm/standards of care as needed  Outcome: Progressing     Problem: RESPIRATORY - ADULT  Goal: Achieves optimal ventilation and oxygenation  Description: INTERVENTIONS:  - Assess for changes in respiratory status  - Assess for changes in mentation and behavior  - Position to facilitate oxygenation and minimize respiratory effort  - Oxygen supplementation based on oxygen saturation or ABGs  - Provide Smoking Cessation handout, if applicable  - Encourage broncho-pulmonary hygiene including cough, deep breathe, Incentive Spirometry  - Assess the need for suctioning and perform as needed  - Assess and instruct to report SOB or any respiratory difficulty  - Respiratory Therapy support as indicated  -  Manage/alleviate anxiety  - Monitor for signs/symptoms of CO2 retention  Outcome: Progressing     Problem: GENITOURINARY - ADULT  Goal: Absence of urinary retention  Description: INTERVENTIONS:  - Assess patient’s ability to void and empty bladder  - Monitor intake/output and perform bladder scan as needed  - Follow urinary retention protocol/standard of care  - Consider collaborating with pharmacy to review patient's medication profile  - Implement strategies to promote bladder emptying  Outcome: Progressing     Problem: METABOLIC/FLUID AND ELECTROLYTES - ADULT  Goal: Glucose maintained within prescribed range  Description: INTERVENTIONS:  - Monitor Blood Glucose as ordered  - Assess for signs and symptoms of hyperglycemia and hypoglycemia  - Administer ordered medications to maintain glucose within target range  - Assess barriers to adequate nutritional intake and initiate nutrition consult as needed  - Instruct patient on self management of diabetes  Outcome: Progressing  Goal: Electrolytes maintained within normal limits  Description: INTERVENTIONS:  - Monitor labs and rhythm and assess patient for signs and symptoms of electrolyte imbalances  - Administer electrolyte replacement as ordered  - Monitor response to electrolyte replacements, including rhythm and repeat lab results as appropriate  - Fluid restriction as ordered  - Instruct patient on fluid and nutrition restrictions as appropriate  Outcome: Progressing  Goal: Hemodynamic stability and optimal renal function maintained  Description: INTERVENTIONS:  - Monitor labs and assess for signs and symptoms of volume excess or deficit  - Monitor intake, output and patient weight  - Monitor urine specific gravity, serum osmolarity and serum sodium as indicated or ordered  - Monitor response to interventions for patient's volume status, including labs, urine output, blood pressure (other measures as available)  - Encourage oral intake as appropriate  - Instruct  patient on fluid and nutrition restrictions as appropriate  Outcome: Progressing

## 2025-05-19 NOTE — PLAN OF CARE
Problem: Patient Centered Care  Goal: Patient preferences are identified and integrated in the patient's plan of care  Description: Interventions:  - What would you like us to know as we care for you?   - Provide timely, complete, and accurate information to patient/family  - Incorporate patient and family knowledge, values, beliefs, and cultural backgrounds into the planning and delivery of care  - Encourage patient/family to participate in care and decision-making at the level they choose  - Honor patient and family perspectives and choices  Outcome: Progressing     Problem: PAIN - ADULT  Goal: Verbalizes/displays adequate comfort level or patient's stated pain goal  Description: INTERVENTIONS:  - Encourage pt to monitor pain and request assistance  - Assess pain using appropriate pain scale  - Administer analgesics based on type and severity of pain and evaluate response  - Implement non-pharmacological measures as appropriate and evaluate response  - Consider cultural and social influences on pain and pain management  - Manage/alleviate anxiety  - Utilize distraction and/or relaxation techniques  - Monitor for opioid side effects  - Notify MD/LIP if interventions unsuccessful or patient reports new pain  - Anticipate increased pain with activity and pre-medicate as appropriate  Outcome: Progressing     Problem: SAFETY ADULT - FALL  Goal: Free from fall injury  Description: INTERVENTIONS:  - Assess pt frequently for physical needs  - Identify cognitive and physical deficits and behaviors that affect risk of falls.  - [place] fall precautions as indicated by assessment.  - Educate pt/family on patient safety including physical limitations  - Instruct pt to call for assistance with activity based on assessment  - Modify environment to reduce risk of injury  - Provide assistive devices as appropriate  - Consider OT/PT consult to assist with strengthening/mobility  - Encourage toileting schedule  Outcome:  Progressing     Problem: DISCHARGE PLANNING  Goal: Discharge to home or other facility with appropriate resources  Description: INTERVENTIONS:  - Identify barriers to discharge w/pt and caregiver  - Include patient/family/discharge partner in discharge planning  - Arrange for needed discharge resources and transportation as appropriate  - Identify discharge learning needs (meds, wound care, etc)  - Arrange for interpreters to assist at discharge as needed  - Consider post-discharge preferences of patient/family/discharge partner  - Complete POLST form as appropriate  - Assess patient's ability to be responsible for managing their own health  - Refer to Case Management Department for coordinating discharge planning if the patient needs post-hospital services based on physician/LIP order or complex needs related to functional status, cognitive ability or social support system  Outcome: Progressing     Problem: SKIN/TISSUE INTEGRITY - ADULT  Goal: Skin integrity remains intact  Description: INTERVENTIONS  - Assess and document risk factors for pressure ulcer development  - Assess and document skin integrity  - Monitor for areas of redness and/or skin breakdown  - Initiate interventions, skin care algorithm/standards of care as needed  Outcome: Progressing     Problem: GENITOURINARY - ADULT  Goal: Absence of urinary retention  Description: INTERVENTIONS:  - Assess patient’s ability to void and empty bladder  - Monitor intake/output and perform bladder scan as needed  - Follow urinary retention protocol/standard of care  - Consider collaborating with pharmacy to review patient's medication profile  - Implement strategies to promote bladder emptying  Outcome: Progressing     Problem: METABOLIC/FLUID AND ELECTROLYTES - ADULT  Goal: Glucose maintained within prescribed range  Description: INTERVENTIONS:  - Monitor Blood Glucose as ordered  - Assess for signs and symptoms of hyperglycemia and hypoglycemia  - Administer  ordered medications to maintain glucose within target range  - Assess barriers to adequate nutritional intake and initiate nutrition consult as needed  - Instruct patient on self management of diabetes  Outcome: Progressing  Goal: Electrolytes maintained within normal limits  Description: INTERVENTIONS:  - Monitor labs and rhythm and assess patient for signs and symptoms of electrolyte imbalances  - Administer electrolyte replacement as ordered  - Monitor response to electrolyte replacements, including rhythm and repeat lab results as appropriate  - Fluid restriction as ordered  - Instruct patient on fluid and nutrition restrictions as appropriate  Outcome: Progressing  Goal: Hemodynamic stability and optimal renal function maintained  Description: INTERVENTIONS:  - Monitor labs and assess for signs and symptoms of volume excess or deficit  - Monitor intake, output and patient weight  - Monitor urine specific gravity, serum osmolarity and serum sodium as indicated or ordered  - Monitor response to interventions for patient's volume status, including labs, urine output, blood pressure (other measures as available)  - Encourage oral intake as appropriate  - Instruct patient on fluid and nutrition restrictions as appropriate  Outcome: Progressing     Problem: RESPIRATORY - ADULT  Goal: Achieves optimal ventilation and oxygenation  Description: INTERVENTIONS:  - Assess for changes in respiratory status  - Assess for changes in mentation and behavior  - Position to facilitate oxygenation and minimize respiratory effort  - Oxygen supplementation based on oxygen saturation or ABGs  - Provide Smoking Cessation handout, if applicable  - Encourage broncho-pulmonary hygiene including cough, deep breathe, Incentive Spirometry  - Assess the need for suctioning and perform as needed  - Assess and instruct to report SOB or any respiratory difficulty  - Respiratory Therapy support as indicated  - Manage/alleviate anxiety  - Monitor  for signs/symptoms of CO2 retention  Outcome: Not Progressing

## 2025-05-19 NOTE — PHYSICAL THERAPY NOTE
PT order received, chart reviewed. Patient was transferred to CCU overnight due to a decline in medical status/hypotension and increased creatine level with central line placed.. Will plan to check back tomorrow.

## 2025-05-19 NOTE — PROGRESS NOTES
[facility]  part of [facility]    Progress Note    Radha Winters Patient Status:  Inpatient    1960 MRN [id number]   Location [facility] 5SW/SE Attending Fanny Majano MD   Hosp Day # 2 PCP JUAN MONTANEZ       SUBJECTIVE:  No chest pain no abdominal pain no nausea no vomiting    Nursing staff reports no complaints    Patient was hypotensive last night.  Patient was given IV fluids.  Patient transferred to the ICU for vasopressors.            OBJECTIVE:  Vital signs in last 24 hours:  /57 (BP Location: Right arm)   Pulse 61   Temp 98.1 °F (36.7 °C)   Resp 16   Ht 5' 3\" (1.6 m)   Wt 214 lb 15.2 oz (97.5 kg)   SpO2 98%   BMI 38.08 kg/m²     Intake/Output:    Intake/Output Summary (Last 24 hours) at 2025 0904  Last data filed at 2025 0633  Gross per 24 hour   Intake 69853.08 ml   Output 55 ml   Net 34862.08 ml       Wt Readings from Last 3 Encounters:   25 214 lb 15.2 oz (97.5 kg)   10/09/23 185 lb 3 oz (84 kg)   10/04/23 185 lb (83.9 kg)       Exam  Gen: No acute distress, alert and oriented x3,  HEENT: No pallor  Pulm: Lungs clear, normal respiratory effort  CV: Heart with regular rate and rhythm, no peripheral edema  Abd: Abdomen soft, nontender, nondistended, no organomegaly, bowel sounds present  Skin: no rashes or lesions  CNS: Alert    Data Review:     Labs:   Lab Results   Component Value Date    WBC 9.7 2025    HGB 14.8 2025    HCT 45.8 2025    .0 2025    CREATSERUM 6.69 2025    BUN 45 2025     2025    K 4.3 2025     2025    CO2 16.0 2025    GLU 87 2025    CA 6.5 2025    ALB 3.1 2025    ALKPHO 503 2025    BILT <0.2 2025    TP 4.9 2025    AST 2,929 2025    ALT 1,368 2025    CK 20,418 2025       LABS  Recent Labs   Lab 25  0956 25  1018 25  0714 25  0951 25  1757  05/19/25  0337   RBC 5.94* 5.78*  --  5.68*  --   --  5.01   HGB 17.5* 17.1*  --  16.8*  --   --  14.8   HCT 53.7* 51.6*  --  51.3*  --   --  45.8   MCV 90.4 89.3  --  90.3  --   --  91.4   MCH 29.5 29.6  --  29.6  --   --  29.5   MCHC 32.6 33.1  --  32.7  --   --  32.3   RDW 14.2 14.6  --  14.9  --   --  15.3*   NEPRELIM 6.71 8.50*  --  7.39  --   --  8.34*   WBC 8.6 10.1  --  8.9  --   --  9.7   .0 242.0  --  209.0  --   --  174.0   * 2,002*  --  1,624* 2,168*  --  2,929*   * 958*  --  846* 1,048*  --  1,368*   LIP 36  --   --   --   --   --   --    CK  --  39,706*   < >  --  24,888* 22,903* 20,418*   * 115*   < > 91 84 78 87    < > = values in this interval not displayed.       Imaging:      Meds:   Current Hospital Medications[1]    Assessment  Problem List[2]  1. Acute Kidney Injury on Chronic Kidney Disease:  - Will initiate IV hydration  - Nephrology consultation obtained  Patient with worsening renal function.  Nephrology is following the patient    Patient was started on hemodialysis     2. Rhabdomyolysis:  - Secondary to recurrent falls  - Possibly statin-induced  - Plan: Hold statin (Crestor)     3. Acute Transaminitis:  - Likely secondary to rhabdomyolysis  - May also be statin-related  - Will monitor with serial labs  Patient could also have a shock liver       4. Recurrent Falls:  - Will check orthostatic vital signs  - Physical Therapy and Occupational Therapy consultations ordered     5. Coronary Artery Disease:  - Currently stable  - Patient denies chest pain     6. Hypothyroidism:  - Continue levothyroxine  Possible UTI.  Will start on ceftriaxone.       Plan for close monitoring and adjustment of management based on clinical course.  Case discussed with nursing staff    Active Orders   LAB    Basic Metabolic Panel (8)     Frequency: Tomorrow AM draw     Number of Occurrences: 1 Occurrences    BNP (Brain Natriuretic Peptide)     Frequency: Once     Number of Occurrences:  1 Occurrences    CBC With Differential With Platelet     Frequency: Tomorrow AM draw     Number of Occurrences: 1 Occurrences    Hepatic Function Panel (7)     Frequency: Tomorrow AM draw     Number of Occurrences: 1 Occurrences   Imaging    XR CHEST AP PORTABLE  (CPT=71045)     Frequency: Once     Number of Occurrences: 1 Occurrences   Nourishments    Room Service Eligibility Until Discontinued     Frequency: Until Discontinued     Number of Occurrences: Until Specified    Room Service Notify RN Until Discontinued     Frequency: Until Discontinued     Number of Occurrences: Until Specified   PT    PT eval and treat     Frequency: Once     Number of Occurrences: 1 Occurrences   Respiratory Care    Oxygen administration (adult) PRN     Frequency: PRN     Number of Occurrences: Until Specified   Dialysis    Hemodialysis inpatient     Frequency: Once     Number of Occurrences: 1 Occurrences   Diet    Renal diet Renal; Sodium Restriction: 2 GM NA; Texture Consistency: Soft / Easy to Chew ; Is Patient on Accuchecks? No     Frequency: Effective Now     Number of Occurrences: Until Specified   Nursing    Give all morning medications unless otherwise specified     Frequency: Until Discontinued     Number of Occurrences: Until Specified     Order Comments: Give all morning medications unless otherwise specified.  DO NOT use Electrolyte protocol.      Insert denny catheter     Frequency: Once     Number of Occurrences: 1 Occurrences    Nurse Driven Indwelling Urinary Catheter Removal Protocol     Frequency: Until Discontinued     Number of Occurrences: Until Specified    Nursing communication - call [facility] to order dialysis     Frequency: Once     Number of Occurrences: 1 Occurrences     Order Comments: Call [facility] at [phone] to order dialysis.  Identify special circumstances and specify stat or routine treatment.     Consult    Consult to Pulmonology     Frequency: Once     Number of Occurrences: 1 Occurrences    Medications    acetaminophen (Tylenol) tab 650 mg     Frequency: Q6H PRN     Dose: 650 mg     Route: Oral    albumin human (Albumin) 25% injection 25 g     Linked Order: And     Frequency: PRN Dialysis     Dose: 25 g     Route: Intravenous    aspirin DR tab 81 mg     Frequency: Daily     Dose: 81 mg     Route: Oral    busPIRone (Buspar) tab 30 mg     Frequency: TID     Dose: 30 mg     Route: Oral    cefTRIAXone (Rocephin) 1 g in sodium chloride 0.9% 100 mL IVPB-ADDV     Frequency: Q24H     Dose: 1 g     Route: Intravenous    chlorproMAZINE (Thorazine) tab 25 mg     Frequency: QID     Dose: 25 mg     Route: Oral    diazePAM (Valium) tab 5 mg     Frequency: Q8H PRN     Dose: 5 mg     Route: Oral    DULoxetine (Cymbalta) DR cap 60 mg     Frequency: BID     Dose: 60 mg     Route: Oral    heparin (Porcine) 1000 UNIT/ML injection 2,000 Units     Frequency: PRN Dialysis     Dose: 2,000 Units     Route: Intravenous    heparin (Porcine) 5000 UNIT/ML injection 5,000 Units     Frequency: Q12H PATRICK     Dose: 5,000 Units     Route: Subcutaneous    levothyroxine (Synthroid) tab 150 mcg     Frequency: Before breakfast     Dose: 150 mcg     Route: Oral    metoprolol succinate ER (Toprol XL) 24 hr tab 50 mg     Frequency: BID     Dose: 50 mg     Route: Oral    midodrine (ProAmatine) tab 10 mg     Frequency: TID     Dose: 10 mg     Route: Oral    norepinephrine (Levophed) 4 mg/250mL infusion premix     Frequency: Continuous     Dose: 0.5-50 mcg/min     Route: Intravenous    QUEtiapine (SEROquel) tab 50 mg     Frequency: BID     Dose: 50 mg     Route: Oral    QUEtiapine ER (SEROquel XR) 24 hr tab 300 mg     Frequency: Nightly     Dose: 300 mg     Route: Oral    sodium chloride 0.9 % IV bolus 100 mL     Linked Order: And     Frequency: Q30 Min PRN     Dose: 100 mL     Route: Intravenous    sodium chloride 0.9% infusion     Frequency: Continuous     Route: Intravenous    sodium chloride 0.9% infusion     Frequency: Continuous      Route: Intravenous    sodium chloride 0.9% infusion     Frequency: Continuous     Route: Intravenous       Fanny Majano MD  5/18/2025  8:01 AM         [1]   Current Facility-Administered Medications   Medication Dose Route Frequency    norepinephrine (Levophed) 4 mg/250mL infusion premix  0.5-50 mcg/min Intravenous Continuous    sodium chloride 0.9 % IV bolus 100 mL  100 mL Intravenous Q30 Min PRN    And    albumin human (Albumin) 25% injection 25 g  25 g Intravenous PRN Dialysis    heparin (Porcine) 1000 UNIT/ML injection 2,000 Units  2,000 Units Intravenous PRN Dialysis    cefTRIAXone (Rocephin) 1 g in sodium chloride 0.9% 100 mL IVPB-ADDV  1 g Intravenous Q24H    sodium chloride 0.9% infusion   Intravenous Continuous    heparin (Porcine) 5000 UNIT/ML injection 5,000 Units  5,000 Units Subcutaneous 2 times per day    midodrine (ProAmatine) tab 10 mg  10 mg Oral TID    sodium chloride 0.9% infusion   Intravenous Continuous    acetaminophen (Tylenol) tab 650 mg  650 mg Oral Q6H PRN    aspirin DR tab 81 mg  81 mg Oral Daily    busPIRone (Buspar) tab 30 mg  30 mg Oral TID    chlorproMAZINE (Thorazine) tab 25 mg  25 mg Oral QID    diazePAM (Valium) tab 5 mg  5 mg Oral Q8H PRN    DULoxetine (Cymbalta) DR cap 60 mg  60 mg Oral BID    levothyroxine (Synthroid) tab 150 mcg  150 mcg Oral Before breakfast    metoprolol succinate ER (Toprol XL) 24 hr tab 50 mg  50 mg Oral BID    QUEtiapine (SEROquel) tab 50 mg  50 mg Oral BID    QUEtiapine ER (SEROquel XR) 24 hr tab 300 mg  300 mg Oral Nightly   [2]   Patient Active Problem List  Diagnosis    Closed fracture of proximal end of left humerus, unspecified fracture morphology, initial encounter    Frequent falls    Seizure-like activity (HCC)    Pituitary lesion (HCC)    Major depressive disorder, recurrent episode, moderate (HCC)    Generalized anxiety disorder    Acute renal failure superimposed on chronic kidney disease, unspecified acute renal failure type, unspecified  CKD stage    Non-traumatic rhabdomyolysis    Transaminitis

## 2025-05-19 NOTE — PROGRESS NOTES
[facility]  part of [facility]    Nephrology Progress Note    Radha Winters Patient Status:  Inpatient    1960 MRN [id number]   Location [facility] 2W/SW Attending Fanny Majano MD   Hosp Day # 2 PCP JUAN MONTANEZ     Subjective:   Radha Winters is a(n) 64 year old female     Seen and examined in the ICU, transferred for hypotension, now on Norepinephrine, on NC.  HD catheter placed, remains oliguric/anuric.      Objective:   Blood pressure 99/50, pulse 62, temperature 98 °F (36.7 °C), resp. rate 11, height 5' 3\" (1.6 m), weight 214 lb 15.2 oz (97.5 kg), SpO2 94%.    Gen:  NAD, fatigued appearing  HEENT:  NC/AT, PERRLA  Neck:  Supple, no JVD  Chest:  Diminished breath sounds at bases  CVS:  S1S2, regular rate  Abdomen:  Soft, NT/ND  Ext:  + pedal edema  Neuro: No focal deficits appreciated.    Results:    Lab Results   Component Value Date    WBC 9.7 2025    HGB 14.8 2025    HCT 45.8 2025    .0 2025    CREATSERUM 6.69 (H) 2025    BUN 45 (H) 2025     2025    K 4.3 2025     (H) 2025    CO2 16.0 (L) 2025    GLU 87 2025    CA 6.5 (L) 2025    ALB 3.1 (L) 2025    ALKPHO 503 (H) 2025    BILT <0.2 (L) 2025    TP 4.9 (L) 2025    AST 2,929 (H) 2025    ALT 1,368 (H) 2025    TSH 0.528 10/10/2023    LIP 36 2025    MG 2.4 2025    PHOS 6.3 (H) 2025    CK 20,418 (HH) 2025    B12 581 10/10/2023       XR CHEST AP PORTABLE  (CPT=71045)  Result Date: 2025  CONCLUSION:   1. Slight cardiac enlargement with secondary signs of slight to moderate fluid overload.  2. Small hazy opacities at the lung bases which could represent infiltrate, atelectasis, or a combination in addition to very small bilateral pleural effusions.    Dictated by (CST): Jordi River MD on 2025 at 6:42 AM     Finalized by (CST): Jordi River MD on 2025  at 6:43 AM          XR CHEST AP/PA (1 VIEW) (CPT=71045)  Result Date: 5/19/2025  CONCLUSION:   1. Cardiac enlargement with secondary signs of slight to moderate fluid overload.  2. Small to moderate-sized streaky opacity at the left lung base which could represent atelectasis, infiltrate, or a combination in addition to a small pleural effusion.    Dictated by (CST): Jordi River MD on 5/19/2025 at 6:38 AM     Finalized by (CST): Jordi River MD on 5/19/2025 at 6:40 AM          CT ABDOMEN+PELVIS(CPT=74176)  Result Date: 5/17/2025  CONCLUSION: No acute abnormality identified within the abdomen/pelvis.  Incidental nonacute findings are present and are described within the body of the report.    Dictated by (CST): Jordi River MD on 5/17/2025 at 12:00 PM     Finalized by (CST): Jordi River MD on 5/17/2025 at 12:06 PM          XR CHEST AP PORTABLE  (CPT=71045)  Result Date: 5/17/2025  CONCLUSION: Questionable retrocardiac density which could represent atelectasis, infiltrate, or a combination in addition to a trace pleural effusion.  No other acute appearing abnormalities are clearly demonstrated.    Dictated by (CST): Jordi River MD on 5/17/2025 at 11:42 AM     Finalized by (CST): Jordi River MD on 5/17/2025 at 11:43 AM          CT SPINE THORACIC (CPT=72128)  Result Date: 5/17/2025  CONCLUSION:   1. Central compression deformities of the T11 and T12 vertebral bodies likely nonacute given the lack of associated abnormalities.  No acute fracture clearly demonstrated.  2. Moderate degenerative changes within the mid/lower thoracic spine.     Dictated by (CST): Jordi River MD on 5/17/2025 at 11:35 AM     Finalized by (CST): Jordi River MD on 5/17/2025 at 11:38 AM          CT SPINE LUMBAR (CPT=72131)  Result Date: 5/17/2025  CONCLUSION:   1. No acute fracture/traumatic subluxation.  2. Moderate degenerative changes within the lumbar spine as described within the body of the report.     Dictated by (CST): Jordi River MD on 5/17/2025 at 11:28 AM     Finalized by (CST): Jordi River MD on 5/17/2025 at 11:31 AM          CT BRAIN OR HEAD (CPT=70450)  Result Date: 5/17/2025  CONCLUSION: No acute or significant chronic intracranial abnormality.     Dictated by (CST): Jordi River MD on 5/17/2025 at 11:10 AM     Finalized by (CST): Jordi River MD on 5/17/2025 at 11:11 AM          CT SPINE CERVICAL (CPT=72125)  Result Date: 5/17/2025  CONCLUSION:   1. No acute fracture/traumatic subluxation.  2. Slight to moderate degenerative changes within the mid/lower cervical spine.    Dictated by (CST): Jordi River MD on 5/17/2025 at 10:57 AM     Finalized by (CST): Jordi River MD on 5/17/2025 at 11:02 AM          EKG 12 Lead  Result Date: 5/18/2025  Sinus bradycardia with 1st degree A-V block Possible Left atrial enlargement Low voltage QRS, consider pulmonary disease, pericardial effusion, or normal variant Septal infarct , age undetermined Abnormal ECG Confirmed by Moustapha Patel ([id number]) on 5/18/2025 4:46:43 PM      Assessment & Plan:     Acute renal failure superimposed on chronic kidney disease, unspecified acute renal failure type, unspecified CKD stage        Non-traumatic rhabdomyolysis        Transaminitis    Patient is a 63 y/o female with PMH of HTN, dyslipidemia, CAD admitted after multiple falls, Nephrology consulted for Acute Kidney Injury      Acute Kidney Injury:  Likely secondary to ATN, from Rhabdomyolysis, continue aggressive volume resuscitation, 0.9  ml/hr, continue to trend BMP, check Uric acid, Phos, Urine studies  CKD stage 3b vs 4:  Secondary to HTN nephrosclerosis, check Phos, PTH, renal US  Transaminitis:  Continue to trend LFTs, will check abdominal US        Recommendations:  Acute Kidney Injury likely unresolved ATN, will plan for HD later today    Renal US reviewed  Renally dose meds  Levophed, Midodrine for MAP goal >65  Huynh placement, for strict  I/Os     Thank you for allowing me to participate in the care of your patient.      Bernardo Lewis MD  5/19/2025

## 2025-05-19 NOTE — PLAN OF CARE
Pt BP very low, took it 3 times,also note denny cath cloudy with sediments. Denny catheter irrigated with 30ml x2 returned equal amount cloudy (almost white) with sediments. Dr Majano notified of these finding, order to notify Renal, and give iV bolus(see order)

## 2025-05-19 NOTE — CONSULTS
[facility]  part of [facility]    Consult Note    Date:  2025  Date of Admission:  2025    Chief Complaint:   Radha Winters is a(n) 64 year old female with rhabdomyolysis with acute kidney injury.    HPI:   The patient has a history of hypertension, coronary artery disease, dyslipidemia, hypothyroidism, chronic leg weakness and back discomforts for which she takes ibuprofen 600 mg daily.  She had fallen several times over the past few days.  She came to the emergency room for this concern and was found to have markedly elevated CPK and a creatinine greater than 6.  She denies fever, chills, shakes, nausea, vomiting.  She does note that she hit her head when she fell.  She denies blacking out but does admit to some dizziness and attributes her falling to weakness in the lower extremities and some shaking motion in her left lower extremity which then jordan.    History   Past Medical History[1]  Past Surgical History[2]  Family History[3] Mother  cancer, father  heart attack.    Social History: Single, 1 child, smokes half pack per day previously 1 pack/day, no alcohol, disability due to back disease and previously worked at [employer][4]  Allergies/Medications:   Allergies: Allergies[5]  Prescriptions Prior to Admission[6]    Review of Systems:   Review of Systems:  Vision normal. Ear nose and throat normal. Bowel normal. Bladder function normal. No depression. No thyroid disease. No lymphatic system concerns.  No rash. Muscles and joints unremarkable. No weight loss no weight gain.    Physical Exam:   Vital Signs:  Blood pressure 99/50, pulse 62, temperature 98 °F (36.7 °C), resp. rate 11, height 5' 3\" (1.6 m), weight 214 lb 15.2 oz (97.5 kg), SpO2 94%.     Alert female  HEENT examination is unremarkable with pupils equal round and reactive to light and accommodation.   Neck without adenopathy, thyromegaly, JVD nor bruit.   Lungs diminished with  bibasilar crackle to auscultation and percussion.  Cardiac regular rate and rhythm no murmur.   Abdomen nontender, without hepatosplenomegaly and no mass appreciable.   Extremities without clubbing cyanosis nor edema.   Neurologic grossly intact with symmetric tone and strength and reflex.  Skin without gross abnormality    Results:     Lab Results   Component Value Date    WBC 9.7 05/19/2025    HGB 14.8 05/19/2025    HCT 45.8 05/19/2025    .0 05/19/2025    CREATSERUM 6.69 05/19/2025    BUN 45 05/19/2025     05/19/2025    K 4.3 05/19/2025     05/19/2025    CO2 16.0 05/19/2025    GLU 87 05/19/2025    CA 6.5 05/19/2025    ALB 3.1 05/19/2025    ALKPHO 503 05/19/2025    BILT <0.2 05/19/2025    TP 4.9 05/19/2025    AST 2,929 05/19/2025    ALT 1,368 05/19/2025    CK 20,418 05/19/2025   Chest x-ray-Questionable retrocardiac density which could represent atelectasis, infiltrate, or a combination in addition to a trace pleural effusion.  No other acute appearing abnormalities are clearly demonstrated.    CT scan the brain-no acute intracranial process    Assessment/Plan:   1.  Acute kidney injury with rhabdomyolysis-associated with multiple falls.  The patient became hypotensive overnight and a central line was inserted with trialysis catheter.    Recommendations:  1.  Hydration  2.  Follow CPK and renal functions  3.  As per nephrology.    2.  Transaminitis-worsening.  Etiology is uncertain.  Hypotensive and may have a component of shock liver.  Was on a statin.  Minimal fatty infiltration on CT scan of the abdomen.    Recommendations: Following functions    3.  DVT prophylaxis-subcutaneous heparin    4.  Hypotension-on midodrine and now on Levophed.  Would hold the metoprolol.    Recommendations: Taper Levophed as tolerated    5.  Basilar crackles-mild basilar haziness with slight to moderate fluid overload.    Recommendations: Supplemental oxygen and morning chest x-ray.    6.  Falls-rehabilitative  services    Tommie Reynaga MD  Medical Director, Critical Care, [facility]  Medical Director, [facility]  Pager: [phone]    Upon my evaluation, this patient had a high probability of imminent or life-threatening deterioration due to critical findings of laboratory tests, critical findings of imaging, hypotension, shock, renal failure, hepatic failure, metabolic failure, and respiratory failure, which required my direct attention, intervention, and personal management.    I have personally provided 35 minutes of critical care time, exclusive of time spent on separately billable procedures.  Time includes review of all pertinent laboratory/radiology results, discussion with consultants, and monitoring for potential decompensation.  Performed interventions included fluids, pressor drugs, and vasoactive medications.              [1]   Past Medical History:   Essential hypertension    Heart attack (HCC)    Hyperlipidemia    Thyroid disease   [2]   Past Surgical History:  Procedure Laterality Date    Appendectomy            Removal gallbladder     [3] History reviewed. No pertinent family history.  [4]   Social History  Socioeconomic History    Marital status:    Tobacco Use    Smoking status: Every Day     Types: Cigarettes    Smokeless tobacco: Never     Social Drivers of Health     Food Insecurity: No Food Insecurity (2025)    NCSS - Food Insecurity     Worried About Running Out of Food in the Last Year: No     Ran Out of Food in the Last Year: No   Transportation Needs: No Transportation Needs (2025)    NCSS - Transportation     Lack of Transportation: No   Housing Stability: Not At Risk (2025)    NCSS - Housing/Utilities     Has Housing: Yes     Worried About Losing Housing: No     Unable to Get Utilities: No   [5] No Known Allergies  [6]   Medications Prior to Admission   Medication Sig    busPIRone HCl 30 MG Oral Tab Take 1 tablet (30 mg total) by mouth 3 (three)  times daily.    chlorproMAZINE 25 MG Oral Tab Take 1 tablet (25 mg total) by mouth 4 (four) times daily.    diazePAM 5 MG Oral Tab Take 1 tablet (5 mg total) by mouth every 8 (eight) hours as needed for Anxiety.    DULoxetine 60 MG Oral Cap DR Particles Take 1 capsule (60 mg total) by mouth 2 (two) times daily.    metFORMIN  MG Oral Tablet 24 Hr Take 2 tablets (1,000 mg total) by mouth daily. (Patient taking differently: Take 2 tablets (1,000 mg total) by mouth 2 (two) times daily with meals.)    metoprolol succinate ER 50 MG Oral Tablet 24 Hr Take 1 tablet (50 mg total) by mouth 2 (two) times daily.    QUEtiapine  MG Oral Tablet 24 Hr Take 1 tablet (300 mg total) by mouth nightly.    QUEtiapine 50 MG Oral Tab Take 1 tablet (50 mg total) by mouth 2 (two) times daily.    rosuvastatin 40 MG Oral Tab Take 1 tablet (40 mg total) by mouth before bedtime.    lidocaine 5 % External Patch Place 1 patch onto the skin daily.    ibuprofen 600 MG Oral Tab Take 1 tablet (600 mg total) by mouth every 6 (six) hours as needed.    levothyroxine 150 MCG Oral Tab Take 1 tablet (150 mcg total) by mouth before breakfast. Give on an empty stomach. On Mon, Tue, Wed, Thur, Fri and Saturday    levothyroxine 75 MCG Oral Tab Take 1 tablet (75 mcg total) by mouth before breakfast. On Sundays only    aspirin 81 MG Oral Tab EC Take 1 tablet (81 mg total) by mouth in the morning.    methylPREDNISolone 4 MG Oral Tablet Therapy Pack Take as directed on dose pack instructions (Patient not taking: Reported on 5/17/2025)

## 2025-05-20 ENCOUNTER — APPOINTMENT (OUTPATIENT)
Dept: GENERAL RADIOLOGY | Facility: HOSPITAL | Age: 65
End: 2025-05-20
Attending: INTERNAL MEDICINE
Payer: MEDICAID

## 2025-05-20 LAB
ALBUMIN SERPL-MCNC: 3.2 G/DL (ref 3.2–4.8)
ALP LIVER SERPL-CCNC: 485 U/L (ref 50–130)
ALT SERPL-CCNC: 1083 U/L (ref 10–49)
ANION GAP SERPL CALC-SCNC: 9 MMOL/L (ref 0–18)
AST SERPL-CCNC: 1603 U/L (ref ?–34)
BASOPHILS # BLD AUTO: 0.05 X10(3) UL (ref 0–0.2)
BASOPHILS NFR BLD AUTO: 0.7 %
BILIRUB DIRECT SERPL-MCNC: <0.1 MG/DL (ref ?–0.3)
BILIRUB SERPL-MCNC: 0.2 MG/DL (ref 0.2–1.1)
BNP SERPL-MCNC: 605 PG/ML (ref ?–100)
BUN BLD-MCNC: 28 MG/DL (ref 9–23)
BUN/CREAT SERPL: 5.3 (ref 10–20)
CALCIUM BLD-MCNC: 7.1 MG/DL (ref 8.7–10.4)
CHLORIDE SERPL-SCNC: 108 MMOL/L (ref 98–112)
CK SERPL-CCNC: ABNORMAL U/L (ref 34–145)
CO2 SERPL-SCNC: 19 MMOL/L (ref 21–32)
CREAT BLD-MCNC: 5.28 MG/DL (ref 0.55–1.02)
DEPRECATED RDW RBC AUTO: 49.2 FL (ref 35.1–46.3)
EGFRCR SERPLBLD CKD-EPI 2021: 9 ML/MIN/1.73M2 (ref 60–?)
EOSINOPHIL # BLD AUTO: 0.04 X10(3) UL (ref 0–0.7)
EOSINOPHIL NFR BLD AUTO: 0.5 %
ERYTHROCYTE [DISTWIDTH] IN BLOOD BY AUTOMATED COUNT: 15.2 % (ref 11–15)
GLUCOSE BLD-MCNC: 102 MG/DL (ref 70–99)
HCT VFR BLD AUTO: 42.5 % (ref 35–48)
HGB BLD-MCNC: 14.2 G/DL (ref 12–16)
IMM GRANULOCYTES # BLD AUTO: 0.04 X10(3) UL (ref 0–1)
IMM GRANULOCYTES NFR BLD: 0.5 %
LYMPHOCYTES # BLD AUTO: 0.88 X10(3) UL (ref 1–4)
LYMPHOCYTES NFR BLD AUTO: 12 %
MCH RBC QN AUTO: 29.5 PG (ref 26–34)
MCHC RBC AUTO-ENTMCNC: 33.4 G/DL (ref 31–37)
MCV RBC AUTO: 88.2 FL (ref 80–100)
MONOCYTES # BLD AUTO: 0.23 X10(3) UL (ref 0.1–1)
MONOCYTES NFR BLD AUTO: 3.1 %
NEUTROPHILS # BLD AUTO: 6.07 X10 (3) UL (ref 1.5–7.7)
NEUTROPHILS # BLD AUTO: 6.07 X10(3) UL (ref 1.5–7.7)
NEUTROPHILS NFR BLD AUTO: 83.2 %
OSMOLALITY SERPL CALC.SUM OF ELEC: 288 MOSM/KG (ref 275–295)
PLATELET # BLD AUTO: 151 10(3)UL (ref 150–450)
PLATELETS.RETICULATED NFR BLD AUTO: 5.3 % (ref 0–7)
POTASSIUM SERPL-SCNC: 3.9 MMOL/L (ref 3.5–5.1)
PROT SERPL-MCNC: 4.8 G/DL (ref 5.7–8.2)
RBC # BLD AUTO: 4.82 X10(6)UL (ref 3.8–5.3)
SODIUM SERPL-SCNC: 136 MMOL/L (ref 136–145)
WBC # BLD AUTO: 7.3 X10(3) UL (ref 4–11)

## 2025-05-20 PROCEDURE — 99233 SBSQ HOSP IP/OBS HIGH 50: CPT | Performed by: INTERNAL MEDICINE

## 2025-05-20 RX ORDER — DOCUSATE SODIUM 100 MG/1
100 CAPSULE, LIQUID FILLED ORAL 2 TIMES DAILY
Status: DISCONTINUED | OUTPATIENT
Start: 2025-05-20 | End: 2025-05-24

## 2025-05-20 RX ORDER — SODIUM PHOSPHATE, DIBASIC AND SODIUM PHOSPHATE, MONOBASIC 7; 19 G/230ML; G/230ML
1 ENEMA RECTAL ONCE AS NEEDED
Status: DISCONTINUED | OUTPATIENT
Start: 2025-05-20 | End: 2025-06-07

## 2025-05-20 RX ORDER — POLYETHYLENE GLYCOL 3350 17 G/17G
17 POWDER, FOR SOLUTION ORAL DAILY PRN
Status: DISCONTINUED | OUTPATIENT
Start: 2025-05-20 | End: 2025-06-07

## 2025-05-20 RX ORDER — BISACODYL 10 MG
10 SUPPOSITORY, RECTAL RECTAL
Status: DISCONTINUED | OUTPATIENT
Start: 2025-05-20 | End: 2025-06-07

## 2025-05-20 NOTE — PROGRESS NOTES
[facility]  part of [facility]    Nephrology Progress Note    Radha Winters Patient Status:  Inpatient    1960 MRN [id number]   Location [facility] 2W/SW Attending Fanny Majano MD   Hosp Day # 3 PCP JUAN MONTANEZ     Subjective:   Radha Winters is a(n) 64 year old female     Seen and examined earlier, had HD earlier tolerated it well, now off pressors, on NC.    Objective:   Blood pressure 91/50, pulse 80, temperature 98.3 °F (36.8 °C), temperature source Temporal, resp. rate 14, height 5' 3\" (1.6 m), weight 213 lb (96.6 kg), SpO2 93%.    Gen:  NAD, fatigued appearing  HEENT:  NC/AT, PERRLA  Neck:  Supple, no JVD  Chest:  Diminished breath sounds at bases  CVS:  S1S2, regular rate  Abdomen:  Soft, NT/ND  Ext:  + pedal edema  Neuro: No focal deficits appreciated.       Results:    Lab Results   Component Value Date    WBC 7.3 2025    HGB 14.2 2025    HCT 42.5 2025    .0 2025    CREATSERUM 5.28 (H) 2025    BUN 28 (H) 2025     2025    K 3.9 2025     2025    CO2 19.0 (L) 2025     (H) 2025    CA 7.1 (L) 2025    ALB 3.2 2025    ALKPHO 485 (H) 2025    BILT 0.2 2025    TP 4.8 (L) 2025    AST 1,603 (H) 2025    ALT 1,083 (H) 2025    TSH 0.528 10/10/2023    LIP 36 2025    MG 2.4 2025    PHOS 6.3 (H) 2025    CK 20,418 (HH) 2025    B12 581 10/10/2023       XR CHEST AP PORTABLE  (CPT=71045)  Result Date: 2025  CONCLUSION:   Suboptimal evaluation of the left lung base secondary to patient positioning and portable technique.  Questionable small left pleural effusion and hazy left basilar opacities versus artifact related to patient positioning.  If symptoms or strong suspicion persist, suggest short interval follow-up PA and lateral chest radiographs.  Additional streaky bilateral perihilar and infrahilar opacities have  slightly worsened since examination from 21 hours prior probably relate to worsening atelectasis.   A preliminary report was issued by the [facility] teleradiology service. There are no major discrepancies.  elm-remote  Dictated by (CST): Christiano Basilio MD on 5/20/2025 at 8:42 AM     Finalized by (CST): Christiano Basilio MD on 5/20/2025 at 8:45 AM          XR CHEST AP PORTABLE  (CPT=71045)  Result Date: 5/19/2025  CONCLUSION:   1. Slight cardiac enlargement with secondary signs of slight to moderate fluid overload.  2. Small hazy opacities at the lung bases which could represent infiltrate, atelectasis, or a combination in addition to very small bilateral pleural effusions.    Dictated by (CST): Jordi River MD on 5/19/2025 at 6:42 AM     Finalized by (CST): Jordi River MD on 5/19/2025 at 6:43 AM          XR CHEST AP/PA (1 VIEW) (CPT=71045)  Result Date: 5/19/2025  CONCLUSION:   1. Cardiac enlargement with secondary signs of slight to moderate fluid overload.  2. Small to moderate-sized streaky opacity at the left lung base which could represent atelectasis, infiltrate, or a combination in addition to a small pleural effusion.    Dictated by (CST): Jordi River MD on 5/19/2025 at 6:38 AM     Finalized by (CST): Jordi River MD on 5/19/2025 at 6:40 AM                Assessment & Plan:     Acute renal failure superimposed on chronic kidney disease, unspecified acute renal failure type, unspecified CKD stage        Non-traumatic rhabdomyolysis        Transaminitis  Patient is a 65 y/o female with PMH of HTN, dyslipidemia, CAD admitted after multiple falls, Nephrology consulted for Acute Kidney Injury      Acute Kidney Injury:  Likely secondary to ATN, from Rhabdomyolysis, continue aggressive volume resuscitation, 0.9  ml/hr, continue to trend BMP, check Uric acid, Phos, Urine studies  CKD stage 3b vs 4:  Secondary to HTN nephrosclerosis, check Phos, PTH, renal US  Transaminitis:  Continue to  trend LFTs, will check abdominal US        Recommendations:  Acute Kidney Injury likely unresolved ATN, will plan for HD today and again tomorrow with UF as tolerated     Renal US reviewed  Renally dose meds  Midodrine for MAP goal >65  Huynh placement, for strict I/Os     Thank you for allowing me to participate in the care of your patient.           Bernardo Lewis MD  5/20/2025

## 2025-05-20 NOTE — PAYOR COMM NOTE
--------------  CONTINUED STAY REVIEW  5/20  Payor: [facility]  Subscriber #:  [id number]  Authorization Number: [id number]    Admit date: 5/17/25  Admit time:  2:12 PM    REVIEW DOCUMENTATION:  Critical care    Assessment and Plan:   1.  Acute kidney injury with rhabdomyolysis-associated with multiple falls.  The patient became hypotensive and a central line was inserted with trialysis catheter.  The patient is now off pressors.  She is undergoing dialysis.     Recommendations:  1.  Hydration  2.  Follow CPK and renal functions  3.  As per nephrology.     2.  Transaminitis-improved today but still markedly elevated.  Etiology is uncertain.  Hypotensive and may have a component of shock liver.  Was on a statin.  Minimal fatty infiltration on CT scan of the abdomen.     Recommendations: Following functions     3.  DVT prophylaxis-subcutaneous heparin     4.  Hypotension-on midodrine and now on Levophed.  Would hold the metoprolol.     Recommendations: Taper Levophed as tolerated     5.  Basilar crackles-mild basilar haziness with slight to moderate fluid overload.  Suggestion of modest laterally layering left pleural effusion.  Getting dialysis today.     Recommendations: Supplemental oxygen and morning chest x-ray.     6.  Falls-rehabilitative services           Subjective:   Radha Winters is a(n) 64 year old female who looks better and is feeling somewhat better     Objective:   Blood pressure 102/54, pulse 73, temperature 97.3 °F (36.3 °C), temperature source Temporal, resp. rate 15, height 5' 3\" (1.6 m), weight 213 lb (96.6 kg), SpO2 94%.     Physical Exam alert white female  HEENT examination is unremarkable with pupils equal round and reactive to light and accommodation.   Neck without adenopathy, thyromegaly, JVD nor bruit.   Lungs diminished at bases to auscultation and percussion.  Cardiac regular rate and rhythm no murmur.   Abdomen nontender, without hepatosplenomegaly and  no mass appreciable.   Extremities without clubbing cyanosis nor edema.   Neurologic grossly intact with symmetric tone and strength and reflex.  Skin without gross abnormality     Results:            Lab Results   Component Value Date     WBC 7.3 05/20/2025     HGB 14.2 05/20/2025     HCT 42.5 05/20/2025     .0 05/20/2025     CREATSERUM 5.28 05/20/2025     BUN 28 05/20/2025      05/20/2025     K 3.9 05/20/2025      05/20/2025     CO2 19.0 05/20/2025      05/20/2025     CA 7.1 05/20/2025     ALB 3.2 05/20/2025     ALKPHO 485 05/20/2025     BILT 0.2 05/20/2025     TP 4.8 05/20/2025     AST 1,603 05/20/2025     ALT 1,083 05/20/2025      Chest x-ray-haziness at left lung base with possible posteriorly and laterally layering pleural effusion    MEDICATIONS ADMINISTERED IN LAST 1 DAY:  acetaminophen (Tylenol) tab 650 mg       Date Action Dose Route User    5/20/2025 0943 Given 650 mg Oral Silvia Hong RN    5/19/2025 2114 Given 650 mg Oral Ashlyn Jacobson RN          albuterol (Ventolin) (2.5 MG/3ML) 0.083% nebulizer solution 2.5 mg       Date Action Dose Route User    5/19/2025 2351 Given 2.5 mg Nebulization Luke Mcgrath, ELIAZARP          aspirin DR tab 81 mg       Date Action Dose Route User    5/20/2025 0936 Given 81 mg Oral Silvia Hong RN          busPIRone (Buspar) tab 30 mg       Date Action Dose Route User    5/20/2025 0936 Given 30 mg Oral Silvia Hong RN    5/19/2025 2114 Given 30 mg Oral Ashyln Jacobson RN    5/19/2025 1645 Given 30 mg Oral Silvia Hong RN          chlorproMAZINE (Thorazine) tab 25 mg       Date Action Dose Route User    5/20/2025 1308 Given 25 mg Oral Silvia Hong RN    5/20/2025 0936 Given 25 mg Oral Silvai Hong RN    5/19/2025 2117 Given 25 mg Oral Ashlyn Jacobson RN    5/19/2025 1646 Given 25 mg Oral Silvia Hong RN          diazePAM (Valium) tab 5 mg       Date Action Dose Route User    5/19/2025 2322 Given 5 mg Oral Kavon  ELLIS Goldberg          DULoxetine (Cymbalta) DR cap 60 mg       Date Action Dose Route User    5/20/2025 0936 Given 60 mg Oral Silvia Hong RN    5/19/2025 2114 Given 60 mg Oral Ashlyn Jacobson RN          heparin (Porcine) 1000 UNIT/ML injection 2,000 Units       Date Action Dose Route User    5/20/2025 0947 Given 2,000 Units Intravenous Silvia Hong RN          heparin (Porcine) 5000 UNIT/ML injection 5,000 Units       Date Action Dose Route User    5/20/2025 0936 Given 5,000 Units Subcutaneous (Left Upper Abdomen) Silvia Hong RN    5/19/2025 2115 Given 5,000 Units Subcutaneous (Left Lower Abdomen) Ashlyn Jacobson RN          levothyroxine (Synthroid) tab 150 mcg       Date Action Dose Route User    5/20/2025 0607 Given 150 mcg Oral Ashlyn Jacobson RN          midodrine (ProAmatine) tab 10 mg       Date Action Dose Route User    5/20/2025 1308 Given 10 mg Oral Silvia Hong RN    5/20/2025 0607 Given 10 mg Oral Ashlyn Jacobson RN    5/19/2025 1646 Given 10 mg Oral Silvia Hong RN          norepinephrine (Levophed) 4 mg/250mL infusion premix       Date Action Dose Route User    5/19/2025 2300 Rate/Dose Change 1 mcg/min Intravenous Ashlyn Jacobson RN    5/19/2025 1800 Rate/Dose Verify 2 mcg/min Intravenous Silvia Hong RN    5/19/2025 1700 Rate/Dose Verify 2 mcg/min Intravenous Silvia Hong RN    5/19/2025 1624 Rate/Dose Change 2 mcg/min Intravenous Silvia Hong RN    5/19/2025 1600 Rate/Dose Verify 3 mcg/min Intravenous Silvia Hong RN          QUEtiapine (SEROquel) tab 50 mg       Date Action Dose Route User    5/20/2025 0936 Given 50 mg Oral Silvia Hong RN    5/19/2025 1645 Given 50 mg Oral Silvia Hong RN          QUEtiapine ER (SEROquel XR) 24 hr tab 300 mg       Date Action Dose Route User    5/19/2025 2114 Given 300 mg Oral Ashlyn Jacobson, RN          sodium chloride 0.9% infusion       Date Action Dose Route User    5/19/2025 1800 Rate/Dose Verify  (none) Intravenous Silvia Hong, RN    5/19/2025 1700 Rate/Dose Verify (none) Intravenous Silvia Hong, RN    5/19/2025 1624 Rate/Dose Verify (none) Intravenous Silvia Hong, RN    5/19/2025 1600 Rate/Dose Verify (none) Intravenous Silvia Hong RN            Vitals (last day)       Date/Time Temp Pulse Resp BP SpO2 Weight O2 Device O2 Flow Rate (L/min) [facility]    05/20/25 1400 -- 82 18 119/55 92 % -- Nasal cannula 3 L/min HS    05/20/25 1300 -- 82 15 139/121 -- -- -- -- HS    05/20/25 1200 98.3 °F (36.8 °C) 80 14 -- 93 % -- Nasal cannula 3 L/min HS    05/20/25 1000 -- 80 16 91/50 97 % -- Nasal cannula 3 L/min HS    05/20/25 0900 -- 73 13 93/51 96 % -- Nasal cannula 3 L/min HS    05/20/25 0800 98.1 °F (36.7 °C) 72 14 105/52 97 % -- Nasal cannula 4 L/min HS    05/20/25 0700 -- 73 15 102/54 94 % 213 lb (96.6 kg) -- 4 L/min HS    05/20/25 0630 -- 74 13 91/54 95 % -- Nasal cannula 4 L/min NA    05/20/25 0610 -- 76 22 95/54 94 % -- Nasal cannula 4 L/min NA    05/20/25 0600 -- 74 12 88/49 92 % -- Nasal cannula 4 L/min NA    05/20/25 0500 -- 77 17 104/62 93 % -- Nasal cannula 4 L/min NA    05/20/25 0400 97.3 °F (36.3 °C) 72 14 111/53 95 % -- Nasal cannula 4 L/min NA    05/20/25 0300 -- 72 11 110/51 94 % -- Nasal cannula 4 L/min NA    05/20/25 0200 -- 70 11 107/51 94 % -- Nasal cannula 4 L/min NA    05/20/25 0100 -- 69 11 103/51 94 % -- Nasal cannula 4 L/min NA    05/20/25 0000 97.6 °F (36.4 °C) 69 18 105/63 94 % -- Nasal cannula 4 L/min NA    05/19/25 2330 -- 69 15 117/59 92 % -- Nasal cannula 4 L/min NA    05/19/25 2300 -- 69 17 124/61 95 % -- Nasal cannula 4 L/min NA    05/19/25 2200 -- 69 16 116/64 96 % -- Nasal cannula 4 L/min NA    05/19/25 2100 -- 70 13 111/59 94 % -- Nasal cannula 4 L/min NA    05/19/25 2000 97.5 °F (36.4 °C) 65 11 109/54 94 % -- Nasal cannula 4 L/min NA    05/19/25 1900 -- 69 14 118/56 97 % -- Nasal cannula 4 L/min HS    05/19/25 1800 -- 70 15 107/52 98 % -- Nasal cannula 4 L/min HS     05/19/25 1700 -- 69 16 118/64 94 % -- Nasal cannula 4 L/min     05/19/25 1624 -- 68 10 123/61 98 % -- Nasal cannula 4 L/min     05/19/25 1600 98.1 °F (36.7 °C) 64 10 106/56 97 % -- Nasal cannula 5 L/min     05/19/25 1500 -- 66 11 113/58 98 % -- Nasal cannula 5 L/min     05/19/25 1400 -- 68 12 99/50 98 % -- Nasal cannula 5 L/min     05/19/25 1300 -- 65 12 100/52 99 % -- Nasal cannula 6 L/min     05/19/25 1210 -- 63 11 100/53 -- -- Nasal cannula 6 L/min     05/19/25 1200 98.1 °F (36.7 °C) 61 12 89/49 -- -- Nasal cannula 6 L/min     05/19/25 1100 -- 61 15 85/53 96 % -- Nasal cannula --     05/19/25 1000 -- 61 15 103/50 94 % -- Nasal cannula 6 L/min     05/19/25 0800 98.1 °F (36.7 °C) 61 16 110/57 98 % -- Nasal cannula 6 L/min     05/19/25 0734 -- -- -- -- -- 214 lb 15.2 oz (97.5 kg) -- --     05/19/25 0700 -- 59 13 108/54 89 % -- Nasal cannula 6 L/min     05/19/25 0600 -- 62 11 99/50 94 % -- Nasal cannula 6 L/min     05/19/25 0400 98 °F (36.7 °C) 61 12 97/53 94 % -- Nasal cannula 6 L/min     05/19/25 0200 97.6 °F (36.4 °C) 57 12 92/49 94 % -- Nasal cannula 6 L/min     05/19/25 0027 -- -- 18 84/52 -- -- -- -- SWA    05/19/25 0020 -- 62 18 89/50 91 % -- Nasal cannula 3 L/min SWA          CIWA Scores (since admission)       None

## 2025-05-20 NOTE — PROGRESS NOTES
[facility]  part of [facility]    Progress Note      Assessment and Plan:   1.  Acute kidney injury with rhabdomyolysis-associated with multiple falls.  The patient became hypotensive and a central line was inserted with trialysis catheter.  The patient is now off pressors.  She is undergoing dialysis.     Recommendations:  1.  Hydration  2.  Follow CPK and renal functions  3.  As per nephrology.     2.  Transaminitis-improved today but still markedly elevated.  Etiology is uncertain.  Hypotensive and may have a component of shock liver.  Was on a statin.  Minimal fatty infiltration on CT scan of the abdomen.     Recommendations: Following functions     3.  DVT prophylaxis-subcutaneous heparin     4.  Hypotension-on midodrine and now on Levophed.  Would hold the metoprolol.     Recommendations: Taper Levophed as tolerated     5.  Basilar crackles-mild basilar haziness with slight to moderate fluid overload.  Suggestion of modest laterally layering left pleural effusion.  Getting dialysis today.     Recommendations: Supplemental oxygen and morning chest x-ray.     6.  Falls-rehabilitative services        Subjective:   Radha Winters is a(n) 64 year old female who looks better and is feeling somewhat better    Objective:   Blood pressure 102/54, pulse 73, temperature 97.3 °F (36.3 °C), temperature source Temporal, resp. rate 15, height 5' 3\" (1.6 m), weight 213 lb (96.6 kg), SpO2 94%.    Physical Exam alert white female  HEENT examination is unremarkable with pupils equal round and reactive to light and accommodation.   Neck without adenopathy, thyromegaly, JVD nor bruit.   Lungs diminished at bases to auscultation and percussion.  Cardiac regular rate and rhythm no murmur.   Abdomen nontender, without hepatosplenomegaly and no mass appreciable.   Extremities without clubbing cyanosis nor edema.   Neurologic grossly intact with symmetric tone and strength and reflex.  Skin without gross  abnormality     Results:     Lab Results   Component Value Date    WBC 7.3 05/20/2025    HGB 14.2 05/20/2025    HCT 42.5 05/20/2025    .0 05/20/2025    CREATSERUM 5.28 05/20/2025    BUN 28 05/20/2025     05/20/2025    K 3.9 05/20/2025     05/20/2025    CO2 19.0 05/20/2025     05/20/2025    CA 7.1 05/20/2025    ALB 3.2 05/20/2025    ALKPHO 485 05/20/2025    BILT 0.2 05/20/2025    TP 4.8 05/20/2025    AST 1,603 05/20/2025    ALT 1,083 05/20/2025     Chest x-ray-haziness at left lung base with possible posteriorly and laterally layering pleural effusion    Tommie Reynaga MD  Medical Director, Critical Care, [facility]  Medical Director, [facility]  Pager: [phone]

## 2025-05-20 NOTE — PROGRESS NOTES
[facility]  part of [facility]    Progress Note    Radha Winters Patient Status:  Inpatient    1960 MRN [id number]   Location [facility] 5SW/SE Attending Fanny Majano MD   Hosp Day # 3 PCP JUAN MONTANEZ       SUBJECTIVE:  No chest pain no abdominal pain no nausea no vomiting  feeling tired    Nursing staff reports no complaints        OBJECTIVE:  Vital signs in last 24 hours:  BP 95/54 (BP Location: Right arm)   Pulse 76   Temp 97.3 °F (36.3 °C) (Temporal)   Resp 22   Ht 5' 3\" (1.6 m)   Wt 214 lb 15.2 oz (97.5 kg)   SpO2 94%   BMI 38.08 kg/m²     Intake/Output:    Intake/Output Summary (Last 24 hours) at 2025 0618  Last data filed at 2025 0400  Gross per 24 hour   Intake 6382.6 ml   Output 1105 ml   Net 5277.6 ml       Wt Readings from Last 3 Encounters:   25 214 lb 15.2 oz (97.5 kg)   10/09/23 185 lb 3 oz (84 kg)   10/04/23 185 lb (83.9 kg)       Exam  Gen: No acute distress, alert and oriented x3,  HEENT: No pallor  Pulm: Lungs clear, normal respiratory effort  CV: Heart with regular rate and rhythm, no peripheral edema  Abd: Abdomen soft, nontender, nondistended, no organomegaly, bowel sounds present  Skin: no rashes or lesions  CNS: Alert    Data Review:     Labs:   Lab Results   Component Value Date    WBC 7.3 2025    HGB 14.2 2025    HCT 42.5 2025    .0 2025    CREATSERUM 5.28 2025    BUN 28 2025     2025    K 3.9 2025     2025    CO2 19.0 2025     2025    CA 7.1 2025    ALB 3.2 2025    ALKPHO 485 2025    BILT 0.2 2025    TP 4.8 2025    AST 1,603 2025    ALT 1,083 2025       LABS  Recent Labs   Lab 25  0956 25  1018 25  0714 25  0951 25  1757 25  0337 25  0441   RBC 5.94*   < > 5.68*  --   --  5.01 4.82   HGB 17.5*   < > 16.8*  --   --  14.8 14.2   HCT 53.7*   < >  51.3*  --   --  45.8 42.5   MCV 90.4   < > 90.3  --   --  91.4 88.2   MCH 29.5   < > 29.6  --   --  29.5 29.5   MCHC 32.6   < > 32.7  --   --  32.3 33.4   RDW 14.2   < > 14.9  --   --  15.3* 15.2*   NEPRELIM 6.71   < > 7.39  --   --  8.34* 6.07   WBC 8.6   < > 8.9  --   --  9.7 7.3   .0   < > 209.0  --   --  174.0 151.0   *   < > 1,624* 2,168*  --  2,929* 1,603*   *   < > 846* 1,048*  --  1,368* 1,083*   LIP 36  --   --   --   --   --   --    CK  --    < >  --  24,888* 22,903* 20,418*  --    *   < > 91 84 78 87 102*    < > = values in this interval not displayed.       Imaging:      Meds:   Current Hospital Medications[1]    Assessment  Problem List[2]  1. Acute Kidney Injury on Chronic Kidney Disease:  - Will initiate IV hydration  - Nephrology consultation obtained  Patient with worsening renal function.  Nephrology is following the patient    Patient was started on hemodialysis     2. Rhabdomyolysis:  - Secondary to recurrent falls  - Possibly statin-induced  - Plan: Hold statin (Crestor)     3. Acute Transaminitis:  - Likely secondary to rhabdomyolysis  - May also be statin-related  - Will monitor with serial labs  Patient could also have a shock liver       4. Recurrent Falls:  - Will check orthostatic vital signs  - Physical Therapy and Occupational Therapy consultations ordered     5. Coronary Artery Disease:  - Currently stable  - Patient denies chest pain     6. Hypothyroidism:  - Continue levothyroxine  Possible UTI.  Will start on ceftriaxone.       Plan for close monitoring and adjustment of management based on clinical course.  Case discussed with nursing staff    Active Orders   Nourishments    Room Service Eligibility Until Discontinued     Frequency: Until Discontinued     Number of Occurrences: Until Specified    Room Service Notify RN Until Discontinued     Frequency: Until Discontinued     Number of Occurrences: Until Specified   PT    PT eval and treat     Frequency:  Once     Number of Occurrences: 1 Occurrences   Respiratory Care    Oxygen administration (adult) PRN     Frequency: PRN     Number of Occurrences: Until Specified   Dialysis    Hemodialysis inpatient     Frequency: Tomorrow     Number of Occurrences: 1 Occurrences   Diet    Renal diet Renal; Sodium Restriction: 2 GM NA; Texture Consistency: Soft / Easy to Chew ; Is Patient on Accuchecks? No     Frequency: Effective Now     Number of Occurrences: Until Specified   Nursing    Give all morning medications unless otherwise specified     Frequency: Until Discontinued     Number of Occurrences: Until Specified     Order Comments: Give all morning medications unless otherwise specified.  DO NOT use Electrolyte protocol.      Insert denny catheter     Frequency: Once     Number of Occurrences: 1 Occurrences    Nurse Driven Indwelling Urinary Catheter Removal Protocol     Frequency: Until Discontinued     Number of Occurrences: Until Specified    Nursing communication - call [facility] to order dialysis     Frequency: Once     Number of Occurrences: 1 Occurrences     Order Comments: Call [facility] at [phone] to order dialysis.  Identify special circumstances and specify stat or routine treatment.     Consult    Consult to Pulmonology     Frequency: Once     Number of Occurrences: 1 Occurrences   Medications    acetaminophen (Tylenol) tab 650 mg     Frequency: Q6H PRN     Dose: 650 mg     Route: Oral    albumin human (Albumin) 25% injection 25 g     Linked Order: And     Frequency: PRN Dialysis     Dose: 25 g     Route: Intravenous    albuterol (Ventolin) (2.5 MG/3ML) 0.083% nebulizer solution 2.5 mg     Frequency: Q6H PRN     Dose: 2.5 mg     Route: Nebulization    aspirin DR tab 81 mg     Frequency: Daily     Dose: 81 mg     Route: Oral    busPIRone (Buspar) tab 30 mg     Frequency: TID     Dose: 30 mg     Route: Oral    cefTRIAXone (Rocephin) 2 g in sodium chloride 0.9% 100 mL IVPB-ADDV     Frequency: Q24H     Dose:  2 g     Route: Intravenous    chlorproMAZINE (Thorazine) tab 25 mg     Frequency: QID     Dose: 25 mg     Route: Oral    diazePAM (Valium) tab 5 mg     Frequency: Q8H PRN     Dose: 5 mg     Route: Oral    DULoxetine (Cymbalta) DR cap 60 mg     Frequency: BID     Dose: 60 mg     Route: Oral    heparin (Porcine) 1000 UNIT/ML injection 2,000 Units     Frequency: PRN Dialysis     Dose: 2,000 Units     Route: Intravenous    heparin (Porcine) 5000 UNIT/ML injection 5,000 Units     Frequency: Q12H PATRICK     Dose: 5,000 Units     Route: Subcutaneous    levothyroxine (Synthroid) tab 150 mcg     Frequency: Before breakfast     Dose: 150 mcg     Route: Oral    metoprolol succinate ER (Toprol XL) 24 hr tab 50 mg     Frequency: BID     Dose: 50 mg     Route: Oral    midodrine (ProAmatine) tab 10 mg     Frequency: TID     Dose: 10 mg     Route: Oral    norepinephrine (Levophed) 4 mg/250mL infusion premix     Frequency: Continuous     Dose: 0.5-50 mcg/min     Route: Intravenous    QUEtiapine (SEROquel) tab 50 mg     Frequency: BID     Dose: 50 mg     Route: Oral    QUEtiapine ER (SEROquel XR) 24 hr tab 300 mg     Frequency: Nightly     Dose: 300 mg     Route: Oral    sodium chloride 0.9 % IV bolus 100 mL     Linked Order: And     Frequency: Q30 Min PRN     Dose: 100 mL     Route: Intravenous       Fanny Majano MD  5/18/2025  8:01 AM         [1]   Current Facility-Administered Medications   Medication Dose Route Frequency    norepinephrine (Levophed) 4 mg/250mL infusion premix  0.5-50 mcg/min Intravenous Continuous    sodium chloride 0.9 % IV bolus 100 mL  100 mL Intravenous Q30 Min PRN    And    albumin human (Albumin) 25% injection 25 g  25 g Intravenous PRN Dialysis    heparin (Porcine) 1000 UNIT/ML injection 2,000 Units  2,000 Units Intravenous PRN Dialysis    cefTRIAXone (Rocephin) 2 g in sodium chloride 0.9% 100 mL IVPB-ADDV  2 g Intravenous Q24H    albuterol (Ventolin) (2.5 MG/3ML) 0.083% nebulizer solution 2.5 mg  2.5 mg  Nebulization Q6H PRN    heparin (Porcine) 5000 UNIT/ML injection 5,000 Units  5,000 Units Subcutaneous 2 times per day    midodrine (ProAmatine) tab 10 mg  10 mg Oral TID    acetaminophen (Tylenol) tab 650 mg  650 mg Oral Q6H PRN    aspirin DR tab 81 mg  81 mg Oral Daily    busPIRone (Buspar) tab 30 mg  30 mg Oral TID    chlorproMAZINE (Thorazine) tab 25 mg  25 mg Oral QID    diazePAM (Valium) tab 5 mg  5 mg Oral Q8H PRN    DULoxetine (Cymbalta) DR cap 60 mg  60 mg Oral BID    levothyroxine (Synthroid) tab 150 mcg  150 mcg Oral Before breakfast    [Held by provider] metoprolol succinate ER (Toprol XL) 24 hr tab 50 mg  50 mg Oral BID    QUEtiapine (SEROquel) tab 50 mg  50 mg Oral BID    QUEtiapine ER (SEROquel XR) 24 hr tab 300 mg  300 mg Oral Nightly   [2]   Patient Active Problem List  Diagnosis    Closed fracture of proximal end of left humerus, unspecified fracture morphology, initial encounter    Frequent falls    Seizure-like activity (HCC)    Pituitary lesion (HCC)    Major depressive disorder, recurrent episode, moderate (HCC)    Generalized anxiety disorder    Acute renal failure superimposed on chronic kidney disease, unspecified acute renal failure type, unspecified CKD stage    Non-traumatic rhabdomyolysis    Transaminitis

## 2025-05-20 NOTE — PHYSICAL THERAPY NOTE
Chart reviewed for PT phoeinx, spoke with RN. Pt is finishing dialysis right now but can participate later. Will check back later as schedule allows. RN aware

## 2025-05-20 NOTE — DIETARY NOTE
ADULT NUTRITION INITIAL ASSESSMENT    Pt is at moderate nutrition risk.  Pt does not meet malnutrition criteria.      RECOMMENDATIONS TO MD:   RD liberalized diet to least restrictive and add ONS to promote improved/adequate PO intake. Pt would benefit from bowel meds for improved regularity.   See Nutrition Intervention for diet and oral nutritional supplement specifics     ADMITTING DIAGNOSIS:  Transaminitis [R74.01]  Non-traumatic rhabdomyolysis [M62.82]  Acute renal failure superimposed on chronic kidney disease, unspecified acute renal failure type, unspecified CKD stage [N17.9, N18.9]  PERTINENT PAST MEDICAL HISTORY: Past Medical History[1]    PATIENT STATUS:   Initial 05/20/25: Pt assessed due to verbal RN consult for poor appetite and PO intake. Pt presented to hospital s/p recurrent falls. Has PMH as listed above including 2-year h/o recurrent falls with previous evaluation [facility] where falls were linked to hyponatremia. Per H&P pt reported poor appetite/intake for several days PTA however screened at no risk by RN on initial MST. Found to have SUSAN and rhabdomyolysis with emergent HD initiated on 5/19. Transferred to the CCU with hypotension. Stable on NC with 3L O2. Off pressor support early this AM. Pt sitting up in bed working on lunch tray, <25% consumed. Appetite remains poor. Reports she consumed the 10am Mighty Shake however felt the chocolate was too rich for her. Will trial alternative flavors today. See diet hx below. Pt reports typical wt on home standing scale ~220 lbs.     FOOD/NUTRITION RELATED HISTORY:  Appetite: poor appetite PTA  Intake: ~20-25% x5 meals documented since past 24 hrs and reports typically consumes 2 meals daily, a late breakfast of ready to eat cereal with 2% milk and a self-prepared balanced dinner. 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 Reports decreased intake ~75% of usually x2 wks PTA.   Intake Meeting Needs: No, but oral nutrition supplements (ONS) to maximize  Percent  Meals Eaten (last 6 days)       Date/Time Percent Meals Eaten (%)    05/17/25 2130 75 %    05/18/25 1143 25 %    05/18/25 1902 20 %     Percent Meals Eaten (%): dinner at 05/18/25 1902    05/19/25 0902 20 %    05/19/25 1800 20 %    05/20/25 1100 20 %        Food Allergies: No Known Food Allergies (NKFA)  Cultural/Ethnic/Jew Preferences: Not Obtained    GASTROINTESTINAL: +BM last reported on 5/15, constipation, Loss of appetite, and abdomen is firm, distended, with active bowel sounds  UO: 105 ml output over the past 24 hrs; 1 L output from HD on 5/19   I/Os: +8.6 L total this admission    MEDICATIONS: reviewed   norepinephrine Stopped (05/19/25 2332)      cefTRIAXone  2 g Intravenous Q24H    heparin  5,000 Units Subcutaneous 2 times per day    midodrine  10 mg Oral TID    aspirin  81 mg Oral Daily    busPIRone HCl  30 mg Oral TID    chlorproMAZINE  25 mg Oral QID    DULoxetine  60 mg Oral BID    levothyroxine  150 mcg Oral Before breakfast    [Held by provider] metoprolol succinate ER  50 mg Oral BID    QUEtiapine  50 mg Oral BID    QUEtiapine ER  300 mg Oral Nightly     LABS: reviewed  Recent Labs     05/17/25  1018 05/17/25 2005 05/18/25  1757 05/19/25  0337 05/20/25  0441   *   < > 78 87 102*   BUN 38*   < > 45* 45* 28*   CREATSERUM 4.90*   < > 6.58* 6.69* 5.28*   CA 8.0*   < > 6.9* 6.5* 7.1*   MG 2.4  --   --   --   --    *   < > 138 138 136   K 4.2   < > 4.4 4.3 3.9      < > 112 115* 108   CO2 21.0   < > 20.0* 16.0* 19.0*   PHOS 6.3*  --   --   --   --    OSMOCALC 290   < > 296* 297* 288    < > = values in this interval not displayed.       WEIGHT HISTORY:  Patient Weight(s) for the past 336 hrs:   Weight   05/20/25 0700 96.6 kg (213 lb)   05/19/25 0734 97.5 kg (214 lb 15.2 oz)   05/17/25 1419 96.4 kg (212 lb 8.4 oz)   05/17/25 1002 84 kg (185 lb 3 oz)     Wt Readings from Last 10 Encounters:   05/20/25 96.6 kg (213 lb)   10/09/23 84 kg (185 lb 3 oz)   10/04/23 83.9 kg (185 lb)    06/12/16 49.9 kg (110 lb)     ANTHROPOMETRICS:  HT: 160 cm (5' 3\")  Wt Readings from Last 1 Encounters:   05/20/25 96.6 kg (213 lb)   Last weight: Likely accurate and Current wt reflects 3% (7 lb) wt loss from UBW.  Dosing Weight: 97 kg (213 lbs) - admission weight on 5/20/25, utilized for anthropometric calculations  BMI: Body mass index is 37.73 kg/m².  BMI CLASSIFICATION: 35-39.9 kg/m2 - obesity class II  IBW/lbs (Calculated) Female: 115 lbs           185% IBW  Usual Body Wt: 220 lbs       97% UBW    NUTRITION RELATED PHYSICAL FINDINGS:  - Nutrition Focused Physical Exam (NFPE): well nourished and no wasting noted  - Fluid Accumulation: none . See RN documentation for details  - Skin Integrity: at risk. See RN documentation for details    NUTRITION DIAGNOSIS/PROBLEM:   Inadequate oral intake related to Decreased ability to consume sufficient energy as evidenced by diet hx decreased PO x2 wks PTA and poor appetite/intake since admission x3 days.     NUTRITION INTERVENTION:     NUTRITION PRESCRIPTION:   Estimated Nutrition needs: --dosing wt of 97 kg - wt taken on 5/20/25   Calories: 7348-1832 calories/day (15-20 calories per kg Dosing wt)  Protein: 63-78 g protein/day (1.2-1.5 g protein/kg Ideal body wt (IBW) (52.3 kg))  Fluid Needs: 1585 ml/day (25 ml/kg adjusted BW for obesity (63.4 kg) chronological age method) - adjust for clinical status (SUSAN on HD)    - Diet:       Procedures    Sodium restricted diet Sodium Restriction: 3-4 GM NA; Texture Consistency: Soft / Easy to Chew ; Is Patient on Accuchecks? No   **Liberalized renal restriction to promote improved/adequate PO intake**     - Nutrition Care Plan: Adjusted diet to least restrictive to maximize intake, Encouraged increased PO intake, Encouraged small frequent meals with emphasis on high calorie/high protein, and Initiated ONS (oral nutritional supplements)  - ONS (Oral Nutrition Supplements)/Meals/Snacks: Mighty Shake (300 calories/ 9 g protein each)  TID Vanilla, Chocolate, or Strawberry   - Vitamin and mineral supplements: none  - Feeding assistance: meal set up and assistance with menu selection and encouragement at meal times  - Nutrition education: Discussed importance of adequate energy and protein intake; encouraged ONS intake; encouraged smaller, more frequent meals  - Coordination of nutrition care: collaboration with other providers and discussed in Care Rounds   - Discharge and transfer of nutrition care to new setting or provider: monitor plans - from home alone    MONITOR AND EVALUATE/NUTRITION GOALS:  - Food and Nutrient Intake:      Monitor: adequacy of PO intake and adequacy of supplement intake  - Food and Nutrient Administration:      Monitor: N/A  - Anthropometric Measurement:    Monitor weight  - Nutrition Goals:      long term gradual wt loss, PO and supplement greater than 75% of needs, labs within acceptable limits, minimize lean body mass loss, maintain true wt within 5%, and improved GI status    DIETITIAN FOLLOW UP: RD to follow and monitor nutrition status    Rosy Chaidez MS, RD, LDN, [facility]  [id number]         [1]   Past Medical History:   Essential hypertension    Heart attack (HCC)    Hyperlipidemia    Thyroid disease

## 2025-05-20 NOTE — PAYOR COMM NOTE
--------------  ADMISSION REVIEW   5/17-5/19  Payor: [facility]  Subscriber #:  [id number]  Authorization Number: [id number]    Admit date: 5/17/25  Admit time:  2:12 PM       REVIEW DOCUMENTATION:     ED Provider Notes        ED Provider Notes signed by Massimo Stoll MD at 5/17/2025 12:26 PM       Author: Massimo Stoll MD Service: -- Author Type: Physician    Filed: 5/17/2025 12:26 PM Date of Service: 5/17/2025 10:29 AM Status: Signed    : Massimo Stoll MD (Physician)           Patient Seen in: [facility] Emergency Department      History     Chief Complaint   Patient presents with    Dizziness     Stated Complaint: dizziness    Subjective: History of Present Illness            64-year-old female with history of hypertension, hyperlipidemia, prior myocardial infarction, thyroid disease, and chronic leg weakness with frequent falls for which she follows with neurology presents after multiple falls today.  The patient was seen in this emergency department on 5/14 after a fall and was found to have thoracic vertebral compression fractures.  She was offered admission but opted to go home at that time.  She returns today after multiple falls this morning.  The patient states that she fell out of bed around 4:30 AM hitting her head as she fell.  She was able to get up and get back into bed.  She awoke approximately an hour later and went to the kitchen where she fell again landing on her back and hitting her head.  She then reports shortly thereafter she again fell while in the living room.  At this time she called the paramedics.  She complains of pain to her back in the same area where she had pain with her recent ED visit.  She also complains of diffuse headache worsened towards the back of the head.  She denies extremity injuries.  She denies chest or abdominal pain.  She denies vomiting or diarrhea.  She denies dysuria.  No known fevers.    Objective:     Past  Medical History:    Essential hypertension    Heart attack (HCC)    Hyperlipidemia    Thyroid disease     Physical Exam     ED Triage Vitals [05/17/25 1002]   /60   Pulse 54   Resp 18   Temp 97.9 °F (36.6 °C)   Temp src Oral   SpO2 95 %   O2 Device None (Room air)       Current Vitals:   Vital Signs  BP: 105/63  Pulse: 59  Resp: 12  Temp: 97.9 °F (36.6 °C)  Temp src: Oral  MAP (mmHg): 78    Oxygen Therapy  SpO2: 95 %  O2 Device: None (Room air)      Physical Exam      General Appearance: alert, no distress  Eyes: pupils equal and round no injection  Respiratory: chest is non tender to palpation, breath sounds are equal  Cardiac: regular rate and rhythm  Gastrointestinal:  soft and non tender, there is no evidence of external or internal trauma by exam.  Neurological: Speech normal.  Motor and sensation is intact and symmetric to bilateral upper and lower extremities.  Skin: No laceration or abrasions.  Musculoskeletal                Head: atraumatic without scalp tenderness                Neck: The cervical spine is tender to palpation to the midline                Back: there is tenderness to palpation to the midline of the mid thoracic spine extending down through the lower lumbar spine                Extremities are non tender to palpation and there is full range of motion of the joints.    ED Course     Labs Reviewed   CBC WITH DIFFERENTIAL WITH PLATELET - Abnormal; Notable for the following components:       Result Value    RBC 5.78 (*)     HGB 17.1 (*)     HCT 51.6 (*)     RDW-SD 47.9 (*)     Neutrophil Absolute Prelim 8.50 (*)     Neutrophil Absolute 8.50 (*)     All other components within normal limits   COMP METABOLIC PANEL (14) - Abnormal; Notable for the following components:    Glucose 115 (*)     Sodium 135 (*)     BUN 38 (*)     Creatinine 4.90 (*)     BUN/CREA Ratio 7.8 (*)     Calcium, Total 8.0 (*)     eGFR-Cr 9 (*)      (*)     AST 2,002 (*)     Alkaline Phosphatase 487 (*)     All  other components within normal limits   URINALYSIS WITH CULTURE REFLEX - Abnormal; Notable for the following components:    Clarity Urine Turbid (*)     Ketones Urine Trace (*)     Blood Urine Large (*)     Protein Urine >=300 (*)     Leukocyte Esterase Urine Large (*)     All other components within normal limits   CK CREATINE KINASE (NOT CREATININE) - Abnormal; Notable for the following components:    CK 39,706 (*)     All other components within normal limits   UA MICROSCOPIC ONLY, URINE - Abnormal; Notable for the following components:    WBC Urine >50 (*)     RBC Urine >10 (*)     Bacteria Urine 3+ (*)     Squamous Epi. Cells Many (*)     WBC Clump Present (*)     All other components within normal limits   POCT GLUCOSE - Abnormal; Notable for the following components:    POC Glucose  108 (*)     All other components within normal limits   ICTOTEST - Normal   RAINBOW DRAW LAVENDER   RAINBOW DRAW LIGHT GREEN   RAINBOW DRAW BLUE   URINE CULTURE, ROUTINE     EKG    Rate, intervals and axes as noted on EKG Report.  Rate: 59  Rhythm: Sinus Rhythm  Axis: Normal  Reading: Nonspecific EKG    MDM      Lab, EKG, and CT results noted.  No acute injuries found on imaging today.  Patient with significant rhabdomyolysis and worsening acute renal failure as compared to 3 days ago.  Also with worsening transaminitis.  No visible abnormalities on plain CT of the abdomen/pelvis.  Receiving IV fluids and plan to admit for further evaluation and treatment.  Communicated with Dr. Lewis, nephrology.  Also communicated with Dr. Majano, medicine on-call.    Admission disposition: 5/17/2025 12:24 PM    Disposition and Plan     Clinical Impression:  1. Acute renal failure superimposed on chronic kidney disease, unspecified acute renal failure type, unspecified CKD stage    2. Non-traumatic rhabdomyolysis    3. Transaminitis        Disposition:  Admit  5/17/2025 12:24 pm    Hospital Problems       Present on Admission  Date Reviewed:  10/31/2023          ICD-10-CM Noted POA    * (Principal) Acute renal failure superimposed on chronic kidney disease, unspecified acute renal failure type, unspecified CKD stage N17.9, N18.9 5/17/2025 Unknown                 5/17 H&P  Acute Kidney Injury with Rhabdomyolysis and Recurrent Falls     Chief Complaint  1. Acute kidney injury on  chronic kidney disease  2. Rhabdomyolysis  3. Acute transaminitis  4. Recurrent falls     History of Present Illness  Ms. Winters is a 64-year-old female with a history of hypertension, coronary artery disease, hyperlipidemia, hypothyroidism, and chronic leg weakness who presents with multiple issues. She has a two-year history of recurrent falls and was previously evaluated at [facility], where she was told her falls were related to her sodium levels. The patient experienced a fall 2-3 days ago and presented to the emergency room but refused admission at that time. Laboratory studies then revealed elevated creatinine. Today, she experienced two more falls - initially landing on her back, followed by a second fall. She developed occipital headache and back pain following these events. She denies loss of consciousness, neck pain, or leg pain. Additionally, the patient reports poor oral intake and decreased appetite over the past several days.      Review of Systems  Constitutional: Poor appetite, decreased oral intake  Neurological: Denies headache initially, later developed occipital headache  HEENT: No blurry vision, no double vision, no earache  Neck: No neck pain  Cardiovascular: No chest pain  Gastrointestinal: No abdominal pain, no nausea, no vomiting, no diarrhea  All other systems negative     Vital Signs  Vital signs noted to be stable      Physical Exam  General: 65-year-old female lying comfortably in bed, alert  HEENT: Head atraumatic, pupils reactive, pallor noted  Neck: No JVD, no carotid bruits  Cardiovascular: S1 and S2 regular rate and  rhythm  Respiratory: Lungs clear to auscultation  Abdomen: Bowel sounds present  Extremities: No pedal edema, no deformities  Neurological: Alert and oriented, no focal deficits     Lab Results  Creatinine: 4.9  CPK: 39, 706 (elevated)     Imaging and Other Relevant Results  No imaging results provided in transcription     Assessment and Plan  1. Acute Kidney Injury on Chronic Kidney Disease:  - Will initiate IV hydration  - Nephrology consultation obtained     2. Rhabdomyolysis:  - Secondary to recurrent falls  - Possibly statin-induced  - Plan: Hold statin (Crestor)     3. Acute Transaminitis:  - Likely secondary to rhabdomyolysis  - May also be statin-related  - Will monitor with serial labs     4. Recurrent Falls:  - Will check orthostatic vital signs  - Physical Therapy and Occupational Therapy consultations ordered     5. Coronary Artery Disease:  - Currently stable  - Patient denies chest pain     6. Hypothyroidism:  - Continue levothyroxine          5/17 Nephrology    Reason for Consultation:   Acute Kidney Injury     History of Present Illness:   Patient is a 64 year old female who was admitted to the hospital for Acute renal failure superimposed on chronic kidney disease, unspecified acute renal failure type, unspecified CKD stage:  4     Ms. Winters is a 64-year-old female with a history of hypertension, coronary artery disease, hyperlipidemia, hypothyroidism, and chronic leg weakness who presents with multiple issues. She has a two-year history of recurrent falls and was previously evaluated at [facility], where she was told her falls were related to her sodium levels. The patient experienced a fall 2-3 days ago and presented to the emergency room but refused admission at that time. Laboratory studies then revealed elevated creatinine. Today, she experienced two more falls - initially landing on her back, followed by a second fall. She developed occipital headache and back pain  following these events. She denies loss of consciousness, neck pain, or leg pain. Additionally, the patient reports poor oral intake and decreased appetite over the past several days.      Nephrology consulted for Acute Kidney Injury, and Rhabdomyolysis.  When seen and examined earlier, patient is hemodynamically stable, on NC.     Acute renal failure superimposed on chronic kidney disease, unspecified acute renal failure type, unspecified CKD stage          Non-traumatic rhabdomyolysis          Transaminitis     Patient is a 63 y/o female with PMH of HTN, dyslipidemia, CAD admitted after multiple falls, Nephrology consulted for Acute Kidney Injury      Acute Kidney Injury:  Likely secondary to ATN, from Rhabdomyolysis, continue aggressive volume resuscitation, 0.9  ml/hr, continue to trend BMP, check Uric acid, Phos, Urine studies  CKD stage 3b vs 4:  Secondary to HTN nephrosclerosis, check Phos, PTH, renal US  Transaminitis:  Continue to trend LFTs, will check abdominal US        Recommendations:  Continue to trend CMP  Check Phos, PTH, uric acid,Phos  Aggressive volume resuscitation, 0.9 NS at 150 ml/hr  Check UA, Urine electrolytes, urine protein, urine creatinine  Renal US  Renally dose meds  MAP goal >65        5/18 IM  decreased urine outpu       /61 (BP Location: Right arm)   Pulse 66   Temp 97.7 °F (36.5 °C) (Axillary)   Resp 16   Ht 5' 3\" (1.6 m)   Wt 212 lb 8.4 oz (96.4 kg)   SpO2 93%   BMI 37.65 kg/m²        Lab 05/14/25  0956 05/17/25  1018 05/17/25 2005 05/18/25  0714   RBC 5.94* 5.78*  --  5.68*   HGB 17.5* 17.1*  --  16.8*   HCT 53.7* 51.6*  --  51.3*   MCV 90.4 89.3  --  90.3   MCH 29.5 29.6  --  29.6   MCHC 32.6 33.1  --  32.7   RDW 14.2 14.6  --  14.9   NEPRELIM 6.71 8.50*  --  7.39   WBC 8.6 10.1  --  8.9   .0 242.0  --  209.0   * 2,002*  --   --    * 958*  --   --    LIP 36  --   --   --    CK  --  39,706* 27,597*  --    * 115* 93  --       Medication Dose Route Frequency    sodium chloride 0.9% infusion   Intravenous Continuous     1. Acute Kidney Injury on Chronic Kidney Disease:  - Will initiate IV hydration  - Nephrology consultation obtained  Patient with worsening renal function.  Nephrology is following the patient     2. Rhabdomyolysis:  - Secondary to recurrent falls  - Possibly statin-induced  - Plan: Hold statin (Crestor)     3. Acute Transaminitis:  - Likely secondary to rhabdomyolysis  - May also be statin-related  - Will monitor with serial labs        4. Recurrent Falls:  - Will check orthostatic vital signs  - Physical Therapy and Occupational Therapy consultations ordered     5. Coronary Artery Disease:  - Currently stable  - Patient denies chest pain     6. Hypothyroidism:  - Continue levothyroxine  Possible UTI.  Will start on ceftriaxone.      5/18 Nephrology     Acute Kidney Injury:  Likely secondary to ATN, from Rhabdomyolysis, continue aggressive volume resuscitation, 0.9  ml/hr, continue to trend BMP, check Uric acid, Phos, Urine studies  CKD stage 3b vs 4:  Secondary to HTN nephrosclerosis, check Phos, PTH, renal US  Transaminitis:  Continue to trend LFTs, will check abdominal US        5/19 Nephrology  Seen and examined in the ICU, transferred for hypotension, now on Norepinephrine, on NC. HD catheter placed, remains oliguric/anuric.         Blood pressure 99/50, pulse 62, temperature 98 °F (36.7 °C), resp. rate 11, height 5' 3\" (1.6 m), weight 214 lb 15.2 oz (97.5 kg), SpO2 94%.    Gen:  NAD, fatigued appearing  HEENT:  NC/AT, PERRLA  Neck:  Supple, no JVD  Chest:  Diminished breath sounds at bases  CVS:  S1S2, regular rate  Abdomen:  Soft, NT/ND  Ext:  + pedal edema  Neuro: No focal deficits appreciated.     Results:        Lab Results   Component Value Date     WBC 9.7 05/19/2025     HGB 14.8 05/19/2025     HCT 45.8 05/19/2025     .0 05/19/2025     CREATSERUM 6.69 (H) 05/19/2025     BUN 45 (H) 05/19/2025       05/19/2025     K 4.3 05/19/2025      (H) 05/19/2025     CO2 16.0 (L) 05/19/2025     GLU 87 05/19/2025     CA 6.5 (L) 05/19/2025     ALB 3.1 (L) 05/19/2025     ALKPHO 503 (H) 05/19/2025     BILT <0.2 (L) 05/19/2025     TP 4.9 (L) 05/19/2025     AST 2,929 (H) 05/19/2025     ALT 1,368 (H) 05/19/2025         CK 20,418 (HH) 05/19/2025   Acute renal failure superimposed on chronic kidney disease, unspecified acute renal failure type, unspecified CKD stage          Non-traumatic rhabdomyolysis          Transaminitis     Patient is a 65 y/o female with PMH of HTN, dyslipidemia, CAD admitted after multiple falls, Nephrology consulted for Acute Kidney Injury      Acute Kidney Injury:  Likely secondary to ATN, from Rhabdomyolysis, continue aggressive volume resuscitation, 0.9  ml/hr, continue to trend BMP, check Uric acid, Phos, Urine studies  CKD stage 3b vs 4:  Secondary to HTN nephrosclerosis, check Phos, PTH, renal US  Transaminitis:  Continue to trend LFTs, will check abdominal US        Recommendations:  Acute Kidney Injury likely unresolved ATN, will plan for HD later today     Renal US reviewed  Renally dose meds  Levophed, Midodrine for MAP goal >65  Huynh placement, for strict I/Os      MEDICATIONS ADMINISTERED IN LAST 1 DAY:  acetaminophen (Tylenol) tab 650 mg       Date Action Dose Route User    5/19/2025 2114 Given 650 mg Oral Ashlyn Jacobson RN    5/19/2025 1215 Given 650 mg Oral Silvia Hong RN          albumin human (Albumin) 25% injection 25 g       Date Action Dose Route User    5/19/2025 1159 New Bag 25 g Intravenous Silvia Hong RN          albuterol (Ventolin) (2.5 MG/3ML) 0.083% nebulizer solution 2.5 mg       Date Action Dose Route User    5/19/2025 2351 Given 2.5 mg Nebulization Luke Mcgrath, MART          aspirin DR tab 81 mg       Date Action Dose Route User    5/20/2025 0936 Given 81 mg Oral Silvia Hong, RN          busPIRone (Buspar) tab 30 mg       Date Action  Dose Route User    5/20/2025 0936 Given 30 mg Oral Silvia Hong RN    5/19/2025 2114 Given 30 mg Oral Ashlyn Jacobson RN    5/19/2025 1645 Given 30 mg Oral Silvia Hong RN          cefTRIAXone (Rocephin) 2 g in sodium chloride 0.9% 100 mL IVPB-ADDV       Date Action Dose Route User    5/19/2025 1431 New Bag 2 g Intravenous Silvia Hong RN          chlorproMAZINE (Thorazine) tab 25 mg       Date Action Dose Route User    5/20/2025 0936 Given 25 mg Oral Silvia Hong RN    5/19/2025 2117 Given 25 mg Oral Ashlyn Jacobson RN    5/19/2025 1646 Given 25 mg Oral Silvia Hong RN    5/19/2025 1215 Given 25 mg Oral Silvia Hong RN          diazePAM (Valium) tab 5 mg       Date Action Dose Route User    5/19/2025 2322 Given 5 mg Oral Ashlyn Jacobson RN          DULoxetine (Cymbalta) DR cap 60 mg       Date Action Dose Route User    5/20/2025 0936 Given 60 mg Oral Silvia Hong RN    5/19/2025 2114 Given 60 mg Oral Ashlyn Jacobson RN          heparin (Porcine) 1000 UNIT/ML injection 2,000 Units       Date Action Dose Route User    5/19/2025 1421 Given 2,000 Units Intravenous Silvia Hong RN          heparin (Porcine) 5000 UNIT/ML injection 5,000 Units       Date Action Dose Route User    5/20/2025 0936 Given 5,000 Units Subcutaneous (Left Upper Abdomen) Silvia Hong RN    5/19/2025 2115 Given 5,000 Units Subcutaneous (Left Lower Abdomen) Ashlyn Jacobson RN          levothyroxine (Synthroid) tab 150 mcg       Date Action Dose Route User    5/20/2025 0607 Given 150 mcg Oral Ashlyn Jacobson RN          midodrine (ProAmatine) tab 10 mg       Date Action Dose Route User    5/20/2025 0607 Given 10 mg Oral Ashlyn Jacobson RN    5/19/2025 1646 Given 10 mg Oral Silvia Hong RN    5/19/2025 1159 Given 10 mg Oral Silvia Hong, RN          norepinephrine (Levophed) 4 mg/250mL infusion premix       Date Action Dose Route User    5/19/2025 2300 Rate/Dose Change 1 mcg/min Intravenous  Ashlyn Jacobson, RN    5/19/2025 1800 Rate/Dose Verify 2 mcg/min Intravenous Gely, Hope A, RN    5/19/2025 1700 Rate/Dose Verify 2 mcg/min Intravenous Gely, Hope A, RN    5/19/2025 1624 Rate/Dose Change 2 mcg/min Intravenous GelySilvia espana, RN    5/19/2025 1600 Rate/Dose Verify 3 mcg/min Intravenous Silvia Hong, RN    5/19/2025 1424 Rate/Dose Verify 3 mcg/min Intravenous Gely, Hope A, RN    5/19/2025 1400 Rate/Dose Verify 3 mcg/min Intravenous Silvia Hong, RN    5/19/2025 1200 Rate/Dose Verify 3 mcg/min Intravenous Silvia Hong, RN          QUEtiapine (SEROquel) tab 50 mg       Date Action Dose Route User    5/20/2025 0936 Given 50 mg Oral Gely, Hope A, RN    5/19/2025 1645 Given 50 mg Oral GelySilvia pineda RN          QUEtiapine ER (SEROquel XR) 24 hr tab 300 mg       Date Action Dose Route User    5/19/2025 2114 Given 300 mg Oral Ashlyn Jacobson, ELLIS          sodium chloride 0.9% infusion       Date Action Dose Route User    5/19/2025 1800 Rate/Dose Verify (none) Intravenous Silvia Hong, RN    5/19/2025 1700 Rate/Dose Verify (none) Intravenous Silvia Hong, RN    5/19/2025 1624 Rate/Dose Verify (none) Intravenous Silvia Hong, RN    5/19/2025 1600 Rate/Dose Verify (none) Intravenous Gely, Hope A, RN    5/19/2025 1424 Rate/Dose Verify (none) Intravenous Silvia Hong, RN    5/19/2025 1422 New Bag (none) Intravenous Silvia Hong, RN    5/19/2025 1400 Rate/Dose Verify (none) Intravenous Silvia Hong, RN    5/19/2025 1200 Rate/Dose Verify (none) Intravenous Silvia Hong, RN            Vitals (last day)       Date/Time Temp Pulse Resp BP SpO2 Weight O2 Device O2 Flow Rate (L/min) [facility]    05/20/25 0700 -- 73 15 102/54 94 % 213 lb (96.6 kg) -- 4 L/min HS    05/20/25 0630 -- 74 13 91/54 95 % -- Nasal cannula 4 L/min NA    05/20/25 0610 -- 76 22 95/54 94 % -- Nasal cannula 4 L/min NA    05/20/25 0600 -- 74 12 88/49 92 % -- Nasal cannula 4 L/min NA     05/20/25 0500 -- 77 17 104/62 93 % -- Nasal cannula 4 L/min NA    05/20/25 0400 97.3 °F (36.3 °C) 72 14 111/53 95 % -- Nasal cannula 4 L/min NA    05/20/25 0300 -- 72 11 110/51 94 % -- Nasal cannula 4 L/min NA    05/20/25 0200 -- 70 11 107/51 94 % -- Nasal cannula 4 L/min NA    05/20/25 0100 -- 69 11 103/51 94 % -- Nasal cannula 4 L/min NA    05/20/25 0000 97.6 °F (36.4 °C) 69 18 105/63 94 % -- Nasal cannula 4 L/min NA    05/19/25 2330 -- 69 15 117/59 92 % -- Nasal cannula 4 L/min NA    05/19/25 2300 -- 69 17 124/61 95 % -- Nasal cannula 4 L/min NA    05/19/25 2200 -- 69 16 116/64 96 % -- Nasal cannula 4 L/min NA    05/19/25 2100 -- 70 13 111/59 94 % -- Nasal cannula 4 L/min NA    05/19/25 2000 97.5 °F (36.4 °C) 65 11 109/54 94 % -- Nasal cannula 4 L/min NA    05/19/25 1900 -- 69 14 118/56 97 % -- Nasal cannula 4 L/min HS    05/19/25 1800 -- 70 15 107/52 98 % -- Nasal cannula 4 L/min HS    05/19/25 1700 -- 69 16 118/64 94 % -- Nasal cannula 4 L/min HS    05/19/25 1624 -- 68 10 123/61 98 % -- Nasal cannula 4 L/min HS    05/19/25 1600 98.1 °F (36.7 °C) 64 10 106/56 97 % -- Nasal cannula 5 L/min HS    05/19/25 1500 -- 66 11 113/58 98 % -- Nasal cannula 5 L/min HS    05/19/25 1400 -- 68 12 99/50 98 % -- Nasal cannula 5 L/min HS    05/19/25 1300 -- 65 12 100/52 99 % -- Nasal cannula 6 L/min HS    05/19/25 1210 -- 63 11 100/53 -- -- Nasal cannula 6 L/min     05/19/25 1200 98.1 °F (36.7 °C) 61 12 89/49 -- -- Nasal cannula 6 L/min     05/19/25 1100 -- 61 15 85/53 96 % -- Nasal cannula --     05/19/25 1000 -- 61 15 103/50 94 % -- Nasal cannula 6 L/min     05/19/25 0800 98.1 °F (36.7 °C) 61 16 110/57 98 % -- Nasal cannula 6 L/min     05/19/25 0734 -- -- -- -- -- 214 lb 15.2 oz (97.5 kg) -- --     05/19/25 0700 -- 59 13 108/54 89 % -- Nasal cannula 6 L/min     05/19/25 0600 -- 62 11 99/50 94 % -- Nasal cannula 6 L/min     05/19/25 0400 98 °F (36.7 °C) 61 12 97/53 94 % -- Nasal cannula 6 L/min     05/19/25  0200 97.6 °F (36.4 °C) 57 12 92/49 94 % -- Nasal cannula 6 L/min SW    05/19/25 0027 -- -- 18 84/52 -- -- -- -- SWA    05/19/25 0020 -- 62 18 89/50 91 % -- Nasal cannula 3 L/min [facility]          05/17/25 0955 [facility] EMERGENCY 45 45 Emergency Medicine Admission   05/17/25 1353 [facility] EMERGENCY 45 45 Emergency Medicine Transfer Out   05/17/25 1353 [facility] EMERGENCY PJ PJ Emergency Medicine Transfer In   05/17/25 1412 [facility] EMERGENCY PJ PJ Emergency Medicine Transfer Out   05/17/25 1412 [facility] 5SW/ 570-A Medical Transfer In   05/19/25 0129 [facility] 5SW/ 570-A Medical Transfer Out   05/19/25 0129 [facility] 2W/ 211-A CNICU/CCU Transfer In   05/19/25 0312 EM 2W/ 211-A CNICU/CCU Patient Update

## 2025-05-21 ENCOUNTER — APPOINTMENT (OUTPATIENT)
Dept: GENERAL RADIOLOGY | Facility: HOSPITAL | Age: 65
End: 2025-05-21
Attending: INTERNAL MEDICINE
Payer: MEDICAID

## 2025-05-21 LAB
ALBUMIN SERPL-MCNC: 3.1 G/DL (ref 3.2–4.8)
ALP LIVER SERPL-CCNC: 450 U/L (ref 50–130)
ALT SERPL-CCNC: 749 U/L (ref 10–49)
ANION GAP SERPL CALC-SCNC: 8 MMOL/L (ref 0–18)
AST SERPL-CCNC: 741 U/L (ref ?–34)
BASOPHILS # BLD AUTO: 0.07 X10(3) UL (ref 0–0.2)
BASOPHILS NFR BLD AUTO: 0.9 %
BILIRUB DIRECT SERPL-MCNC: 0.1 MG/DL (ref ?–0.3)
BILIRUB SERPL-MCNC: 0.2 MG/DL (ref 0.2–1.1)
BUN BLD-MCNC: 22 MG/DL (ref 9–23)
BUN/CREAT SERPL: 4.7 (ref 10–20)
CALCIUM BLD-MCNC: 7.9 MG/DL (ref 8.7–10.4)
CHLORIDE SERPL-SCNC: 105 MMOL/L (ref 98–112)
CK SERPL-CCNC: 8116 U/L (ref 34–145)
CO2 SERPL-SCNC: 25 MMOL/L (ref 21–32)
CREAT BLD-MCNC: 4.73 MG/DL (ref 0.55–1.02)
DEPRECATED RDW RBC AUTO: 49.7 FL (ref 35.1–46.3)
EGFRCR SERPLBLD CKD-EPI 2021: 10 ML/MIN/1.73M2 (ref 60–?)
EOSINOPHIL # BLD AUTO: 0.05 X10(3) UL (ref 0–0.7)
EOSINOPHIL NFR BLD AUTO: 0.7 %
ERYTHROCYTE [DISTWIDTH] IN BLOOD BY AUTOMATED COUNT: 15.3 % (ref 11–15)
GLUCOSE BLD-MCNC: 124 MG/DL (ref 70–99)
HCT VFR BLD AUTO: 42.4 % (ref 35–48)
HGB BLD-MCNC: 14.3 G/DL (ref 12–16)
IMM GRANULOCYTES # BLD AUTO: 0.03 X10(3) UL (ref 0–1)
IMM GRANULOCYTES NFR BLD: 0.4 %
LYMPHOCYTES # BLD AUTO: 0.93 X10(3) UL (ref 1–4)
LYMPHOCYTES NFR BLD AUTO: 12.6 %
MCH RBC QN AUTO: 29.6 PG (ref 26–34)
MCHC RBC AUTO-ENTMCNC: 33.7 G/DL (ref 31–37)
MCV RBC AUTO: 87.8 FL (ref 80–100)
MONOCYTES # BLD AUTO: 0.32 X10(3) UL (ref 0.1–1)
MONOCYTES NFR BLD AUTO: 4.3 %
NEUTROPHILS # BLD AUTO: 5.98 X10 (3) UL (ref 1.5–7.7)
NEUTROPHILS # BLD AUTO: 5.98 X10(3) UL (ref 1.5–7.7)
NEUTROPHILS NFR BLD AUTO: 81.1 %
OSMOLALITY SERPL CALC.SUM OF ELEC: 291 MOSM/KG (ref 275–295)
PLATELET # BLD AUTO: 133 10(3)UL (ref 150–450)
PLATELETS.RETICULATED NFR BLD AUTO: 5.5 % (ref 0–7)
POTASSIUM SERPL-SCNC: 3.3 MMOL/L (ref 3.5–5.1)
PROT SERPL-MCNC: 4.8 G/DL (ref 5.7–8.2)
RBC # BLD AUTO: 4.83 X10(6)UL (ref 3.8–5.3)
SODIUM SERPL-SCNC: 138 MMOL/L (ref 136–145)
WBC # BLD AUTO: 7.4 X10(3) UL (ref 4–11)

## 2025-05-21 PROCEDURE — 99233 SBSQ HOSP IP/OBS HIGH 50: CPT | Performed by: INTERNAL MEDICINE

## 2025-05-21 PROCEDURE — 71045 X-RAY EXAM CHEST 1 VIEW: CPT | Performed by: INTERNAL MEDICINE

## 2025-05-21 RX ORDER — ALBUMIN (HUMAN) 12.5 G/50ML
25 SOLUTION INTRAVENOUS ONCE
Status: DISCONTINUED | OUTPATIENT
Start: 2025-05-21 | End: 2025-05-28

## 2025-05-21 RX ORDER — POTASSIUM CHLORIDE 750 MG/1
10 TABLET, EXTENDED RELEASE ORAL ONCE
Status: COMPLETED | OUTPATIENT
Start: 2025-05-21 | End: 2025-05-21

## 2025-05-21 RX ORDER — ONDANSETRON 2 MG/ML
4 INJECTION INTRAMUSCULAR; INTRAVENOUS EVERY 6 HOURS PRN
Status: DISCONTINUED | OUTPATIENT
Start: 2025-05-21 | End: 2025-06-07

## 2025-05-21 RX ORDER — ONDANSETRON 2 MG/ML
INJECTION INTRAMUSCULAR; INTRAVENOUS
Status: COMPLETED
Start: 2025-05-21 | End: 2025-05-21

## 2025-05-21 NOTE — PLAN OF CARE
Problem: Patient Centered Care  Goal: Patient preferences are identified and integrated in the patient's plan of care  Description: Interventions:  - What would you like us to know as we care for you?   - Provide timely, complete, and accurate information to patient/family  - Incorporate patient and family knowledge, values, beliefs, and cultural backgrounds into the planning and delivery of care  - Encourage patient/family to participate in care and decision-making at the level they choose  - Honor patient and family perspectives and choices  Outcome: Progressing     Problem: PAIN - ADULT  Goal: Verbalizes/displays adequate comfort level or patient's stated pain goal  Description: INTERVENTIONS:  - Encourage pt to monitor pain and request assistance  - Assess pain using appropriate pain scale  - Administer analgesics based on type and severity of pain and evaluate response  - Implement non-pharmacological measures as appropriate and evaluate response  - Consider cultural and social influences on pain and pain management  - Manage/alleviate anxiety  - Utilize distraction and/or relaxation techniques  - Monitor for opioid side effects  - Notify MD/LIP if interventions unsuccessful or patient reports new pain  - Anticipate increased pain with activity and pre-medicate as appropriate  Outcome: Progressing     Problem: SAFETY ADULT - FALL  Goal: Free from fall injury  Description: INTERVENTIONS:  - Assess pt frequently for physical needs  - Identify cognitive and physical deficits and behaviors that affect risk of falls.  - [place] fall precautions as indicated by assessment.  - Educate pt/family on patient safety including physical limitations  - Instruct pt to call for assistance with activity based on assessment  - Modify environment to reduce risk of injury  - Provide assistive devices as appropriate  - Consider OT/PT consult to assist with strengthening/mobility  - Encourage toileting schedule  Outcome:  Progressing     Problem: DISCHARGE PLANNING  Goal: Discharge to home or other facility with appropriate resources  Description: INTERVENTIONS:  - Identify barriers to discharge w/pt and caregiver  - Include patient/family/discharge partner in discharge planning  - Arrange for needed discharge resources and transportation as appropriate  - Identify discharge learning needs (meds, wound care, etc)  - Arrange for interpreters to assist at discharge as needed  - Consider post-discharge preferences of patient/family/discharge partner  - Complete POLST form as appropriate  - Assess patient's ability to be responsible for managing their own health  - Refer to Case Management Department for coordinating discharge planning if the patient needs post-hospital services based on physician/LIP order or complex needs related to functional status, cognitive ability or social support system  Outcome: Progressing     Problem: RESPIRATORY - ADULT  Goal: Achieves optimal ventilation and oxygenation  Description: INTERVENTIONS:  - Assess for changes in respiratory status  - Assess for changes in mentation and behavior  - Position to facilitate oxygenation and minimize respiratory effort  - Oxygen supplementation based on oxygen saturation or ABGs  - Provide Smoking Cessation handout, if applicable  - Encourage broncho-pulmonary hygiene including cough, deep breathe, Incentive Spirometry  - Assess the need for suctioning and perform as needed  - Assess and instruct to report SOB or any respiratory difficulty  - Respiratory Therapy support as indicated  - Manage/alleviate anxiety  - Monitor for signs/symptoms of CO2 retention  Outcome: Progressing     Problem: GENITOURINARY - ADULT  Goal: Absence of urinary retention  Description: INTERVENTIONS:  - Assess patient’s ability to void and empty bladder  - Monitor intake/output and perform bladder scan as needed  - Follow urinary retention protocol/standard of care  - Consider collaborating  with pharmacy to review patient's medication profile  - Implement strategies to promote bladder emptying  Outcome: Progressing     Problem: METABOLIC/FLUID AND ELECTROLYTES - ADULT  Goal: Glucose maintained within prescribed range  Description: INTERVENTIONS:  - Monitor Blood Glucose as ordered  - Assess for signs and symptoms of hyperglycemia and hypoglycemia  - Administer ordered medications to maintain glucose within target range  - Assess barriers to adequate nutritional intake and initiate nutrition consult as needed  - Instruct patient on self management of diabetes  Outcome: Progressing  Goal: Electrolytes maintained within normal limits  Description: INTERVENTIONS:  - Monitor labs and rhythm and assess patient for signs and symptoms of electrolyte imbalances  - Administer electrolyte replacement as ordered  - Monitor response to electrolyte replacements, including rhythm and repeat lab results as appropriate  - Fluid restriction as ordered  - Instruct patient on fluid and nutrition restrictions as appropriate  Outcome: Progressing  Goal: Hemodynamic stability and optimal renal function maintained  Description: INTERVENTIONS:  - Monitor labs and assess for signs and symptoms of volume excess or deficit  - Monitor intake, output and patient weight  - Monitor urine specific gravity, serum osmolarity and serum sodium as indicated or ordered  - Monitor response to interventions for patient's volume status, including labs, urine output, blood pressure (other measures as available)  - Encourage oral intake as appropriate  - Instruct patient on fluid and nutrition restrictions as appropriate  Outcome: Progressing

## 2025-05-21 NOTE — PROGRESS NOTES
05/21/25 1400   Vitals   Pulse 103   Resp 19   /82   MAP (mmHg) 92   Patient Position Sitting   Cardiac Rhythm NSR;ST   Oxygen Therapy   SpO2 94 %   O2 Device Nasal cannula   O2 Flow Rate (L/min) 4 L/min     C/o feeling more SOB, denies CP. Pt ate lunch. Sitting up in chair. Lungs congested rhonchi, increase oxygen to 4liters. Resp called for neb treament. Pt with weak cough unable to cough but declined deep suctioning. Post neb pt reports slight improvement of SOB. Dr. Majano made aware. Speech eval for bedside swallow ordered. Pt also complained of nausea no emesis. Relieved with zofran. Cont to monitor.

## 2025-05-21 NOTE — PROGRESS NOTES
[facility]  part of [facility]    Progress Note      Assessment and Plan:   1.  Acute kidney injury with rhabdomyolysis-associated with multiple falls.  The patient became hypotensive and a central line was inserted with trialysis catheter.  The patient is now off pressors.  She is undergoing dialysis.  The chest x-ray looks like pulmonary edema and it would be best to get more fluid off.     Recommendations:  1.  Hydration  2.  Follow CPK and renal functions  3.  As per nephrology.     2.  Transaminitis-improving.  Etiology is uncertain.  Hypotensive and may have a component of shock liver.  Was on a statin.  Minimal fatty infiltration on CT scan of the abdomen.     Recommendations: Following functions     3.  DVT prophylaxis-subcutaneous heparin     4.  Hypotension-on midodrine and now on Levophed.  Would hold the metoprolol.     Recommendations: Taper Levophed as tolerated     5.  Basilar crackles-chest x-ray looks like pulmonary edema.     Recommendations: Supplemental oxygen and morning chest x-ray.  Fluid removal with dialysis.     6.  Falls-rehabilitative services        Subjective:   Radha Winters is a(n) 64 year old female who looks better but still has malaise.  Objective:   Blood pressure 101/53, pulse 86, temperature 97.3 °F (36.3 °C), temperature source Temporal, resp. rate 11, height 5' 3\" (1.6 m), weight 204 lb 5.9 oz (92.7 kg), SpO2 94%.    Physical Exam alert white female  HEENT examination is unremarkable with pupils equal round and reactive to light and accommodation.   Neck without adenopathy, thyromegaly, JVD nor bruit.   Lungs diminished at bases to auscultation and percussion.  Cardiac regular rate and rhythm no murmur.   Abdomen nontender, without hepatosplenomegaly and no mass appreciable.   Extremities without clubbing cyanosis nor edema.   Neurologic grossly intact with symmetric tone and strength and reflex.  Skin without gross abnormality     Results:     Lab  Results   Component Value Date    WBC 7.4 05/21/2025    HGB 14.3 05/21/2025    HCT 42.4 05/21/2025    .0 05/21/2025    CREATSERUM 4.73 05/21/2025    BUN 22 05/21/2025     05/21/2025    K 3.3 05/21/2025     05/21/2025    CO2 25.0 05/21/2025     05/21/2025    CA 7.9 05/21/2025    ALB 3.1 05/21/2025    ALKPHO 450 05/21/2025    BILT 0.2 05/21/2025    TP 4.8 05/21/2025     05/21/2025     05/21/2025    CK 8,116 05/21/2025     Chest x-ray-pulmonary edema with modest basilar effusions    Tommie Reynaga MD  Medical Director, Critical Care, [facility]  Medical Director, [facility]  Pager: [phone]

## 2025-05-21 NOTE — PHYSICAL THERAPY NOTE
Chart reviewed for eval, per RN Robbie pt is again on dialysis this AM. Will plan to check back this afternoon.

## 2025-05-21 NOTE — PHYSICAL THERAPY NOTE
PHYSICAL THERAPY EVALUATION - INPATIENT     Room Number: 211/211-A  Evaluation Date: 5/21/2025  Type of Evaluation: Initial   Physician Order: PT Eval and Treat    Presenting Problem: AMS w/multiple falls, rhabdo w/elevated CPK and hypotension  Co-Morbidities : falls, HTN, CAD, hypothyroid, chronic leg weakness  Reason for Therapy: Mobility Dysfunction and Discharge Planning    PHYSICAL THERAPY ASSESSMENT   Patient is a 64 year old female admitted 5/17/2025 for frequent falls, AMS, rhabdomyolosis. hospital course complicated by worsening hypotension and creatine levels, Newly started on hemodialysis which she has received several times. Prior to admission, patient's baseline is home alone, independent and managing her home, has a ramped entrance uses a RW. Has a two year history of falling and has had multiple work ups.  Patient is currently functioning below baseline with bed mobility, transfers, gait, maintaining seated position, standing prolonged periods, and performing household tasks.  Patient is requiring maximum assist and dependent as a result of the following impairments: decreased functional strength, decreased endurance/aerobic capacity, pain, impaired sitting  balance, impaired motor planning, decreased muscular endurance, medical status, and increased O2 needs from baseline.  Physical Therapy will continue to follow for duration of hospitalization.    Patient will benefit from continued skilled PT Services to promote return to prior level of function and safety with continuous assistance and gradual rehabilitative therapy .    PLAN DURING HOSPITALIZATION  Nursing Mobility Recommendation : Lift Equipment  PT Device Recommendation: Other (Comment) (undetermined)  PT Treatment Plan: Bed mobility, Endurance, Energy conservation, Patient education, Gait training, Range of motion, Strengthening, Transfer training, Balance training  Rehab Potential : Fair  Frequency (Obs): 3-5x/week     PHYSICAL THERAPY  MEDICAL/SOCIAL HISTORY   History related to current admission: Presenting Problem: 64-year-old female with a history of hypertension, coronary artery disease, hyperlipidemia, hypothyroidism, and chronic leg weakness who presents with multiple issues. She has a two-year history of recurrent falls and was previously evaluated at [facility], where she was told her falls were related to her sodium levels. The patient experienced a fall 2-3 days ago and presented to the emergency room but refused admission at that time. Laboratory studies then revealed elevated creatinine. Today, she experienced two more falls - initially landing on her back, followed by a second fall. She developed occipital headache and back pain following these events. She denies loss of consciousness, neck pain, or leg pain. Additionally, the patient reports poor oral intake and decreased appetite over the past several days.      Problem List  Principal Problem:    Acute renal failure superimposed on chronic kidney disease, unspecified acute renal failure type, unspecified CKD stage  Active Problems:    Non-traumatic rhabdomyolysis    Transaminitis      HOME SITUATION  Type of Home: Apartment  Home Layout: One level, Ramped entrance, Elevator  Stairs to Enter : 0        Stairs to Bedroom: 0         Lives With: Alone    Drives: Yes   Patient Regularly Uses: Glasses, Rolling walker     Prior Level of [place]: Patient lives in an apartment alone. She reports using a RW at all times and manages her home duties. She has no family support    SUBJECTIVE  \"I am just so weak\"    PHYSICAL THERAPY EXAMINATION   OBJECTIVE  Precautions: Bed/chair alarm, Limb alert - right, Limb alert - left  Fall Risk: High fall risk    PAIN ASSESSMENT  Rating: Unable to rate  Location: sensitivity on her skin all over  Management Techniques: Activity promotion, Repositioning    COGNITION  Overall Cognitive Status:  A&Ox3, slow to respond at  times    RANGE OF MOTION AND STRENGTH ASSESSMENT  Upper extremity ROM and strength are limited, pt is able to use for short bursts but is functionally weak    Lower extremity ROM is within functional limits   Lower extremity strength is limited, MMT is 2+ to 3/5 in BLE, able to do a SLR but for a second only    BALANCE  Static Sitting: Fair +  Dynamic Sitting: Fair -  Static Standing: Not tested  Dynamic Standing: Not tested    NEUROLOGICAL FINDINGS      Unable to fully assess, limited LE strength                ACTIVITY TOLERANCE  Pulse: 110  Heart Rate Source: Monitor     BP: 105/61  BP Location: Right arm  BP Method: Automatic  Patient Position: Sitting    O2 WALK  Oxygen Therapy  SPO2% on Oxygen at Rest: 94    AM-PAC '6-Clicks' INPATIENT SHORT FORM - BASIC MOBILITY  How much difficulty does the patient currently have...  Patient Difficulty: Turning over in bed (including adjusting bedclothes, sheets and blankets)?: A Lot   Patient Difficulty: Sitting down on and standing up from a chair with arms (e.g., wheelchair, bedside commode, etc.): Unable   Patient Difficulty: Moving from lying on back to sitting on the side of the bed?: A Lot   How much help from another person does the patient currently need...   Help from Another: Moving to and from a bed to a chair (including a wheelchair)?: Total   Help from Another: Need to walk in hospital room?: Total   Help from Another: Climbing 3-5 steps with a railing?: Total     AM-PAC Score:  Raw Score: 8   Approx Degree of Impairment: 86.62%   Standardized Score (AM-PAC Scale): 28.58   CMS Modifier (G-Code): CM    FUNCTIONAL ABILITY STATUS  Functional Mobility/Gait Assessment  Gait Assistance: Not tested (pt unable to attempt to stand today)  Rolling: maximum assist  Supine to Sit: maximum assist  Sit to Supine: dependent  Sit to Stand: unable to stand from chair    Exercise/Education Provided:  Bed mobility  Energy conservation  Functional activity tolerated  Neuromuscular  re-educate  Posture  ROM  Sitting balance and core exs    Skilled Therapy Provided: RN approves participation, pt completed hemodialysis this morning. She presents awake and alert in bed and agreeable to therapy. She is able to perform AAROM in all extremities and rolling with 1-2 person assist. She does not feel she can sit EOB so overhead lift used to transfer to chair. In the chair she is able to weight shift in all directions with UE support and hold for 10-20 seconds at a time. She is unable to stand from the chair, reports her legs feel like jelly. She is up in chair with all needs in reach, tolerated activity well. RN in room and aware of session, all needs in reach. Pt eating lunch    The patient's Approx Degree of Impairment: 86.62% has been calculated based on documentation in the [facility] '6 clicks' Inpatient Basic Mobility Short Form.  Research supports that patients with this level of impairment may benefit from LTAC. However, anticipate she will most benefit from gradual rehab therapy to maximize return to plf. Final disposition will be made by interdisciplinary medical team.    Patient End of Session: Up in chair, With  staff, Needs met, Call light within reach, RN aware of session/findings, All patient questions and concerns addressed, Hospital anti-slip socks, Discussed recommendations with /    CURRENT GOALS  Goals to be met by: 6/15/25  Patient Goal Patient's self-stated goal is: to be able to live alone again   Goal #1 Patient is able to demonstrate supine - sit EOB @ level: minimum assistance     Goal #1   Current Status    Goal #2 Patient is able to demonstrate transfers Sit to/from Stand at assistance level: moderate assistance with walker - rolling     Goal #2  Current Status    Goal #3 Patient is able to stand for 2 min with assist device: walker - rolling at assistance level: maximum assistance   Goal #3   Current Status    Goal #4 Patient will demonstrate transfers  bed to/from chair with RW and moderate assistance   Goal #4   Current Status    Goal #5 Patient to demonstrate independence with home activity/exercise instructions provided to patient in preparation for discharge.   Goal #5   Current Status    Goal #6    Goal #6  Current Status      Patient Evaluation Complexity Level:  History High - 3 or more personal factors and/or co-morbidities   Examination of body systems Moderate - addressing a total of 3 or more elements   Clinical Presentation  Moderate - Evolving   Clinical Decision Making  Moderate Complexity     Therapeutic Activity:  20 minutes

## 2025-05-21 NOTE — PROGRESS NOTES
[facility]  part of [facility]    Progress Note    Radha Winters Patient Status:  Inpatient    1960 MRN [id number]   Location [facility] 5SW/SE Attending Fanny Majano MD   Hosp Day # 4 PCP JUAN MONTAENZ       SUBJECTIVE:  No chest pain no abdominal pain no nausea no vomiting      Nursing staff reports no complaints        OBJECTIVE:  Vital signs in last 24 hours:  /54 (BP Location: Right arm)   Pulse 83   Temp 97.3 °F (36.3 °C) (Temporal)   Resp 11   Ht 5' 3\" (1.6 m)   Wt 204 lb 5.9 oz (92.7 kg)   SpO2 94%   BMI 36.20 kg/m²     Intake/Output:    Intake/Output Summary (Last 24 hours) at 2025 0600  Last data filed at 2025 2200  Gross per 24 hour   Intake 740 ml   Output 1635 ml   Net -895 ml       Wt Readings from Last 3 Encounters:   25 204 lb 5.9 oz (92.7 kg)   10/09/23 185 lb 3 oz (84 kg)   10/04/23 185 lb (83.9 kg)       Exam  Gen: No acute distress, alert and oriented x3,  HEENT: No pallor  Pulm: Lungs clear, normal respiratory effort  CV: Heart with regular rate and rhythm, no peripheral edema  Abd: Abdomen soft, nontender, nondistended, no organomegaly, bowel sounds present  Skin: no rashes or lesions  CNS: Alert    Data Review:     Labs:   Lab Results   Component Value Date    WBC 7.4 2025    HGB 14.3 2025    HCT 42.4 2025    .0 2025    CREATSERUM 4.73 2025    BUN 22 2025     2025    K 3.3 2025     2025    CO2 25.0 2025     2025    CA 7.9 2025    ALB 3.1 2025    ALKPHO 450 2025    BILT 0.2 2025    TP 4.8 2025     2025     2025    CK 8,116 2025       LABS  Recent Labs   Lab 25  0956 25  1018 25  0337 25  0441 25  0347   RBC 5.94*   < > 5.01 4.82 4.83   HGB 17.5*   < > 14.8 14.2 14.3   HCT 53.7*   < > 45.8 42.5 42.4   MCV 90.4   < > 91.4 88.2 87.8   MCH  29.5   < > 29.5 29.5 29.6   MCHC 32.6   < > 32.3 33.4 33.7   RDW 14.2   < > 15.3* 15.2* 15.3*   NEPRELIM 6.71   < > 8.34* 6.07 5.98   WBC 8.6   < > 9.7 7.3 7.4   .0   < > 174.0 151.0 133.0*   *   < > 2,929* 1,603* 741*   *   < > 1,368* 1,083* 749*   LIP 36  --   --   --   --    CK  --    < > 20,418* 12,214* 8,116*   *   < > 87 102* 124*    < > = values in this interval not displayed.       Imaging:      Meds:   Current Hospital Medications[1]    Assessment  Problem List[2]  1. Acute Kidney Injury on Chronic Kidney Disease:  - Will initiate IV hydration  - Nephrology consultation obtained  Patient with worsening renal function.  Nephrology is following the patient    Patient was started on hemodialysis     2. Rhabdomyolysis:  - Secondary to recurrent falls  - Possibly statin-induced  - Plan: Hold statin (Crestor)     3. Acute Transaminitis:  - Likely secondary to rhabdomyolysis  - May also be statin-related  - Will monitor with serial labs  Patient could also have a shock liver       4. Recurrent Falls:  - Will check orthostatic vital signs  - Physical Therapy and Occupational Therapy consultations ordered     5. Coronary Artery Disease:  - Currently stable  - Patient denies chest pain     6. Hypothyroidism:  - Continue levothyroxine  Possible UTI.  Will start on ceftriaxone.       Plan for close monitoring and adjustment of management based on clinical course.  Case discussed with nursing staff    Active Orders   Nourishments    Dietary Nutrition Supplements BID     Frequency: BID     Number of Occurrences: Until Specified     Order Comments: Mighty Shake @ 10am and 2pm daily - variety of flavors (send one now)      Room Service Eligibility Until Discontinued     Frequency: Until Discontinued     Number of Occurrences: Until Specified    Room Service Notify RN Until Discontinued     Frequency: Until Discontinued     Number of Occurrences: Until Specified   OT    OT Eval and Treat      Frequency: Once     Number of Occurrences: 1 Occurrences    OT Eval and Treat     Frequency: Once     Number of Occurrences: 1 Occurrences   PT    PT eval and treat     Frequency: Once     Number of Occurrences: 1 Occurrences   Respiratory Care    Oxygen administration (adult) PRN     Frequency: PRN     Number of Occurrences: Until Specified   Dialysis    Hemodialysis inpatient     Frequency: Tomorrow     Number of Occurrences: 1 Occurrences     Order Comments: Bolus heparin 4000 U at start     Diet    Sodium restricted diet Sodium Restriction: 3-4 GM NA; Texture Consistency: Soft / Easy to Chew ; Is Patient on Accuchecks? No     Frequency: Effective Now     Number of Occurrences: Until Specified   Nursing    Give all morning medications unless otherwise specified     Frequency: Until Discontinued     Number of Occurrences: Until Specified     Order Comments: Give all morning medications unless otherwise specified.  DO NOT use Electrolyte protocol.      Insert denny catheter     Frequency: Once     Number of Occurrences: 1 Occurrences    Nurse Driven Indwelling Urinary Catheter Removal Protocol     Frequency: Until Discontinued     Number of Occurrences: Until Specified    Nursing communication - call [facility] to order dialysis     Frequency: Once     Number of Occurrences: 1 Occurrences     Order Comments: Call [facility] at [phone] to order dialysis.  Identify special circumstances and specify stat or routine treatment.     Consult    Consult to Pulmonology     Frequency: Once     Number of Occurrences: 1 Occurrences   Medications    acetaminophen (Tylenol) tab 650 mg     Frequency: Q6H PRN     Dose: 650 mg     Route: Oral    albumin human (Albumin) 25% injection 25 g     Linked Order: And     Frequency: PRN Dialysis     Dose: 25 g     Route: Intravenous    albuterol (Ventolin) (2.5 MG/3ML) 0.083% nebulizer solution 2.5 mg     Frequency: Q6H PRN     Dose: 2.5 mg     Route: Nebulization    aspirin  tab 81  mg     Frequency: Daily     Dose: 81 mg     Route: Oral    bisacodyl (Dulcolax) 10 MG rectal suppository 10 mg     Frequency: Daily PRN     Dose: 10 mg     Route: Rectal    busPIRone (Buspar) tab 30 mg     Frequency: TID     Dose: 30 mg     Route: Oral    cefTRIAXone (Rocephin) 2 g in sodium chloride 0.9% 100 mL IVPB-ADDV     Frequency: Q24H     Dose: 2 g     Route: Intravenous    chlorproMAZINE (Thorazine) tab 25 mg     Frequency: QID     Dose: 25 mg     Route: Oral    diazePAM (Valium) tab 5 mg     Frequency: Q8H PRN     Dose: 5 mg     Route: Oral    docusate sodium (Colace) cap 100 mg     Frequency: BID     Dose: 100 mg     Route: Oral    DULoxetine (Cymbalta) DR cap 60 mg     Frequency: BID     Dose: 60 mg     Route: Oral    fleet enema (Fleet) rectal enema 133 mL     Frequency: Once PRN     Dose: 1 enema     Route: Rectal    heparin (Porcine) 1000 UNIT/ML injection 2,000 Units     Frequency: PRN Dialysis     Dose: 2,000 Units     Route: Intravenous    heparin (Porcine) 5000 UNIT/ML injection 5,000 Units     Frequency: Q12H PATRICK     Dose: 5,000 Units     Route: Subcutaneous    levothyroxine (Synthroid) tab 150 mcg     Frequency: Before breakfast     Dose: 150 mcg     Route: Oral    magnesium hydroxide (Milk of Magnesia) 400 MG/5ML oral suspension 30 mL     Frequency: Daily PRN     Dose: 30 mL     Route: Oral    metoprolol succinate ER (Toprol XL) 24 hr tab 50 mg     Frequency: BID     Dose: 50 mg     Route: Oral    midodrine (ProAmatine) tab 10 mg     Frequency: TID     Dose: 10 mg     Route: Oral    norepinephrine (Levophed) 4 mg/250mL infusion premix     Frequency: Continuous     Dose: 0.5-50 mcg/min     Route: Intravenous    polyethylene glycol (PEG 3350) (Miralax) 17 g oral packet 17 g     Frequency: Daily PRN     Dose: 17 g     Route: Oral    QUEtiapine (SEROquel) tab 50 mg     Frequency: BID     Dose: 50 mg     Route: Oral    QUEtiapine ER (SEROquel XR) 24 hr tab 300 mg     Frequency: Nightly     Dose: 300  mg     Route: Oral    sodium chloride 0.9 % IV bolus 100 mL     Linked Order: And     Frequency: Q30 Min PRN     Dose: 100 mL     Route: Intravenous       Fanny Majano MD  5/18/2025  8:01 AM         [1]   Current Facility-Administered Medications   Medication Dose Route Frequency    docusate sodium (Colace) cap 100 mg  100 mg Oral BID    polyethylene glycol (PEG 3350) (Miralax) 17 g oral packet 17 g  17 g Oral Daily PRN    magnesium hydroxide (Milk of Magnesia) 400 MG/5ML oral suspension 30 mL  30 mL Oral Daily PRN    bisacodyl (Dulcolax) 10 MG rectal suppository 10 mg  10 mg Rectal Daily PRN    fleet enema (Fleet) rectal enema 133 mL  1 enema Rectal Once PRN    norepinephrine (Levophed) 4 mg/250mL infusion premix  0.5-50 mcg/min Intravenous Continuous    sodium chloride 0.9 % IV bolus 100 mL  100 mL Intravenous Q30 Min PRN    And    albumin human (Albumin) 25% injection 25 g  25 g Intravenous PRN Dialysis    heparin (Porcine) 1000 UNIT/ML injection 2,000 Units  2,000 Units Intravenous PRN Dialysis    cefTRIAXone (Rocephin) 2 g in sodium chloride 0.9% 100 mL IVPB-ADDV  2 g Intravenous Q24H    albuterol (Ventolin) (2.5 MG/3ML) 0.083% nebulizer solution 2.5 mg  2.5 mg Nebulization Q6H PRN    heparin (Porcine) 5000 UNIT/ML injection 5,000 Units  5,000 Units Subcutaneous 2 times per day    midodrine (ProAmatine) tab 10 mg  10 mg Oral TID    acetaminophen (Tylenol) tab 650 mg  650 mg Oral Q6H PRN    aspirin DR tab 81 mg  81 mg Oral Daily    busPIRone (Buspar) tab 30 mg  30 mg Oral TID    chlorproMAZINE (Thorazine) tab 25 mg  25 mg Oral QID    diazePAM (Valium) tab 5 mg  5 mg Oral Q8H PRN    DULoxetine (Cymbalta) DR cap 60 mg  60 mg Oral BID    levothyroxine (Synthroid) tab 150 mcg  150 mcg Oral Before breakfast    [Held by provider] metoprolol succinate ER (Toprol XL) 24 hr tab 50 mg  50 mg Oral BID    QUEtiapine (SEROquel) tab 50 mg  50 mg Oral BID    QUEtiapine ER (SEROquel XR) 24 hr tab 300 mg  300 mg Oral  Blu   [2]   Patient Active Problem List  Diagnosis    Closed fracture of proximal end of left humerus, unspecified fracture morphology, initial encounter    Frequent falls    Seizure-like activity (HCC)    Pituitary lesion (HCC)    Major depressive disorder, recurrent episode, moderate (HCC)    Generalized anxiety disorder    Acute renal failure superimposed on chronic kidney disease, unspecified acute renal failure type, unspecified CKD stage    Non-traumatic rhabdomyolysis    Transaminitis

## 2025-05-21 NOTE — PROGRESS NOTES
[facility]  part of [facility]    Radha Winters Patient Status:  Inpatient    1960 MRN [id number]   Location [facility] 2W/SW Attending Fanny Majano MD   Hosp Day # 4 PCP JUAN MONTANEZ     Radha Winters is a 64 year old female patient.    Patient Active Problem List    Diagnosis Date Noted    Acute renal failure superimposed on chronic kidney disease, unspecified acute renal failure type, unspecified CKD stage 2025    Non-traumatic rhabdomyolysis 2025    Transaminitis 2025    Pituitary lesion (HCC) 10/11/2023    Major depressive disorder, recurrent episode, moderate (HCC) 10/11/2023    Generalized anxiety disorder 10/11/2023    Seizure-like activity ([facility]) 10/10/2023    Closed fracture of proximal end of left humerus, unspecified fracture morphology, initial encounter 10/09/2023    Frequent falls 10/09/2023       Past Medical History[1]    Current Medications[2]    Allergies[3]    Principal Problem:    Acute renal failure superimposed on chronic kidney disease, unspecified acute renal failure type, unspecified CKD stage  Active Problems:    Non-traumatic rhabdomyolysis    Transaminitis      Blood pressure 109/72, pulse 91, temperature 97.3 °F (36.3 °C), temperature source Temporal, resp. rate 16, height 5' 3\" (1.6 m), weight 204 lb 5.9 oz (92.7 kg), SpO2 96%.        Subjective      ***  Objective:  Vital signs: (most recent): Blood pressure 109/72, pulse 91, temperature 97.3 °F (36.3 °C), temperature source Temporal, resp. rate 16, height 5' 3\" (1.6 m), weight 204 lb 5.9 oz (92.7 kg), SpO2 96%.          ***  Recent Labs   Lab 25  0337 25  0441 25  0347   GLU 87 102* 124*   BUN 45* 28* 22   CREATSERUM 6.69* 5.28* 4.73*   CA 6.5* 7.1* 7.9*   ALB 3.1* 3.2 3.1*    136 138   K 4.3 3.9 3.3*   * 108 105   CO2 16.0* 19.0* 25.0   ALKPHO 503* 485* 450*   AST 2,929* 1,603* 741*   ALT 1,368* 1,083* 749*   BILT <0.2* 0.2 0.2   TP 4.9*  4.8* 4.8*     Recent Labs   Lab 05/19/25  0337 05/20/25  0441 05/21/25  0347   RBC 5.01 4.82 4.83   HGB 14.8 14.2 14.3   HCT 45.8 42.5 42.4   MCV 91.4 88.2 87.8   MCH 29.5 29.5 29.6   MCHC 32.3 33.4 33.7   RDW 15.3* 15.2* 15.3*   NEPRELIM 8.34* 6.07 5.98   WBC 9.7 7.3 7.4   .0 151.0 133.0*       Assessment & Plan   Renal Failure.  CAD.  Hypotension.  S/P Dialysis Catheter.  Hypoalbuminemia.               [1]   Past Medical History:   Essential hypertension    Heart attack (HCC)    Hyperlipidemia    Thyroid disease   [2]   No current outpatient medications on file.   [3] No Known Allergies

## 2025-05-21 NOTE — PROGRESS NOTES
[facility]  part of [facility]    Nephrology Progress Note    Radha Winters Patient Status:  Inpatient    1960 MRN [id number]   Location [facility] 2W/SW Attending Fanny Majano MD   Hosp Day # 4 PCP JUAN MONTANEZ     Subjective:   Radha Winters is a(n) 64 year old female     Off pressors, hemodynamically stable, plan for HD again today.    Objective:   Blood pressure 101/53, pulse 86, temperature 97.3 °F (36.3 °C), temperature source Temporal, resp. rate 11, height 5' 3\" (1.6 m), weight 204 lb 5.9 oz (92.7 kg), SpO2 94%.    Gen:  NAD, fatigued appearing  HEENT:  NC/AT, PERRLA  Neck:  Supple, no JVD  Chest:  Diminished breath sounds at bases  CVS:  S1S2, regular rate  Abdomen:  Soft, NT/ND  Ext:  + pedal edema  Neuro: No focal deficits appreciated.    Results:    Lab Results   Component Value Date    WBC 7.4 2025    HGB 14.3 2025    HCT 42.4 2025    .0 (L) 2025    CREATSERUM 4.73 (H) 2025    BUN 22 2025     2025    K 3.3 (L) 2025     2025    CO2 25.0 2025     (H) 2025    CA 7.9 (L) 2025    ALB 3.1 (L) 2025    ALKPHO 450 (H) 2025    BILT 0.2 2025    TP 4.8 (L) 2025     (H) 2025     (H) 2025    TSH 0.528 10/10/2023    LIP 36 2025    MG 2.4 2025    PHOS 6.3 (H) 2025    CK 8,116 (HH) 2025    B12 581 10/10/2023       XR CHEST AP PORTABLE  (CPT=71045)  Result Date: 2025  CONCLUSION:  1. Findings most suggestive of moderate CHF/fluid overload including stable central pulmonary venous congestion with small bilateral pleural effusions, slightly worse on the right. 2. Bibasilar consolidation, slightly worse on the right, which probably relates to passive atelectasis.    Dictated by (CST): Von Roberts MD on 2025 at 7:45 AM     Finalized by (CST): Von Roberts MD on 2025 at 7:47 AM           XR CHEST AP PORTABLE  (CPT=71045)  Result Date: 5/20/2025  CONCLUSION:   Suboptimal evaluation of the left lung base secondary to patient positioning and portable technique.  Questionable small left pleural effusion and hazy left basilar opacities versus artifact related to patient positioning.  If symptoms or strong suspicion persist, suggest short interval follow-up PA and lateral chest radiographs.  Additional streaky bilateral perihilar and infrahilar opacities have slightly worsened since examination from 21 hours prior probably relate to worsening atelectasis.   A preliminary report was issued by the [facility] teleradiology service. There are no major discrepancies.  [id number]  Dictated by (CST): Christiano Basilio MD on 5/20/2025 at 8:42 AM     Finalized by (CST): Christiano Basilio MD on 5/20/2025 at 8:45 AM                Assessment & Plan:     Acute renal failure superimposed on chronic kidney disease, unspecified acute renal failure type, unspecified CKD stage        Non-traumatic rhabdomyolysis        Transaminitis    Patient is a 63 y/o female with PMH of HTN, dyslipidemia, CAD admitted after multiple falls, Nephrology consulted for Acute Kidney Injury      Acute Kidney Injury:  Likely secondary to ATN, from Rhabdomyolysis, continue aggressive volume resuscitation, 0.9  ml/hr, continue to trend BMP, check Uric acid, Phos, Urine studies  CKD stage 3b vs 4:  Secondary to HTN nephrosclerosis, check Phos, PTH, renal US  Transaminitis:  Continue to trend LFTs, will check abdominal US        Recommendations:  Acute Kidney Injury likely unresolved ATN, will plan for HD today with UF     Renal US reviewed  Renally dose meds  Midodrine for MAP goal >65  Huynh placement, for strict I/Os     Thank you for allowing me to participate in the care of your patient.      Bernardo Lewis MD  5/21/2025

## 2025-05-21 NOTE — PROGRESS NOTES
Dr. Arredondo notified for vitals. Pt receiving HD at this time. Pt without complaints. Received order for albunin 25% 200ml. And to stop pulling fluids if no improvement. Cont to monitor.    05/21/25 0955   Vitals   Pulse 91   Resp 15   BP (!) 76/45   MAP (mmHg) (!) 54   Cardiac Rhythm NSR   Oxygen Therapy   SpO2 96 %   O2 Device Nasal cannula   O2 Flow Rate (L/min) 3 L/min

## 2025-05-22 ENCOUNTER — APPOINTMENT (OUTPATIENT)
Dept: GENERAL RADIOLOGY | Facility: HOSPITAL | Age: 65
End: 2025-05-22
Attending: INTERNAL MEDICINE
Payer: MEDICAID

## 2025-05-22 ENCOUNTER — APPOINTMENT (OUTPATIENT)
Dept: INTERVENTIONAL RADIOLOGY/VASCULAR | Facility: HOSPITAL | Age: 65
End: 2025-05-22
Attending: CLINICAL NURSE SPECIALIST
Payer: MEDICAID

## 2025-05-22 LAB
ALBUMIN SERPL-MCNC: 3.1 G/DL (ref 3.2–4.8)
ALBUMIN/GLOB SERPL: 1.8 {RATIO} (ref 1–2)
ALP LIVER SERPL-CCNC: 379 U/L (ref 50–130)
ALT SERPL-CCNC: 497 U/L (ref 10–49)
AMMONIA PLAS-MCNC: <10 UMOL/L (ref 11–32)
ANION GAP SERPL CALC-SCNC: 7 MMOL/L (ref 0–18)
AST SERPL-CCNC: 381 U/L (ref ?–34)
BASOPHILS # BLD AUTO: 0.06 X10(3) UL (ref 0–0.2)
BASOPHILS NFR BLD AUTO: 0.8 %
BILIRUB DIRECT SERPL-MCNC: <0.1 MG/DL (ref ?–0.3)
BILIRUB SERPL-MCNC: 0.2 MG/DL (ref 0.2–1.1)
BUN BLD-MCNC: 17 MG/DL (ref 9–23)
BUN/CREAT SERPL: 3.6 (ref 10–20)
CALCIUM BLD-MCNC: 7.8 MG/DL (ref 8.7–10.4)
CHLORIDE SERPL-SCNC: 102 MMOL/L (ref 98–112)
CO2 SERPL-SCNC: 26 MMOL/L (ref 21–32)
CREAT BLD-MCNC: 4.77 MG/DL (ref 0.55–1.02)
DEPRECATED RDW RBC AUTO: 50.6 FL (ref 35.1–46.3)
EGFRCR SERPLBLD CKD-EPI 2021: 10 ML/MIN/1.73M2 (ref 60–?)
EOSINOPHIL # BLD AUTO: 0.08 X10(3) UL (ref 0–0.7)
EOSINOPHIL NFR BLD AUTO: 1.1 %
ERYTHROCYTE [DISTWIDTH] IN BLOOD BY AUTOMATED COUNT: 15.3 % (ref 11–15)
GLOBULIN PLAS-MCNC: 1.7 G/DL (ref 2–3.5)
GLUCOSE BLD-MCNC: 174 MG/DL (ref 70–99)
HCT VFR BLD AUTO: 42.6 % (ref 35–48)
HGB BLD-MCNC: 14.2 G/DL (ref 12–16)
IMM GRANULOCYTES # BLD AUTO: 0.05 X10(3) UL (ref 0–1)
IMM GRANULOCYTES NFR BLD: 0.7 %
LYMPHOCYTES # BLD AUTO: 0.95 X10(3) UL (ref 1–4)
LYMPHOCYTES NFR BLD AUTO: 13.3 %
MCH RBC QN AUTO: 30 PG (ref 26–34)
MCHC RBC AUTO-ENTMCNC: 33.3 G/DL (ref 31–37)
MCV RBC AUTO: 90.1 FL (ref 80–100)
MONOCYTES # BLD AUTO: 0.45 X10(3) UL (ref 0.1–1)
MONOCYTES NFR BLD AUTO: 6.3 %
NEUTROPHILS # BLD AUTO: 5.57 X10 (3) UL (ref 1.5–7.7)
NEUTROPHILS # BLD AUTO: 5.57 X10(3) UL (ref 1.5–7.7)
NEUTROPHILS NFR BLD AUTO: 77.8 %
OSMOLALITY SERPL CALC.SUM OF ELEC: 286 MOSM/KG (ref 275–295)
PLATELET # BLD AUTO: 112 10(3)UL (ref 150–450)
PLATELETS.RETICULATED NFR BLD AUTO: 6 % (ref 0–7)
POTASSIUM SERPL-SCNC: 3.2 MMOL/L (ref 3.5–5.1)
PROT SERPL-MCNC: 4.8 G/DL (ref 5.7–8.2)
RBC # BLD AUTO: 4.73 X10(6)UL (ref 3.8–5.3)
SODIUM SERPL-SCNC: 135 MMOL/L (ref 136–145)
WBC # BLD AUTO: 7.2 X10(3) UL (ref 4–11)

## 2025-05-22 PROCEDURE — 99233 SBSQ HOSP IP/OBS HIGH 50: CPT | Performed by: INTERNAL MEDICINE

## 2025-05-22 PROCEDURE — 02HV33Z INSERTION OF INFUSION DEVICE INTO SUPERIOR VENA CAVA, PERCUTANEOUS APPROACH: ICD-10-PCS | Performed by: RADIOLOGY

## 2025-05-22 PROCEDURE — 71045 X-RAY EXAM CHEST 1 VIEW: CPT | Performed by: INTERNAL MEDICINE

## 2025-05-22 PROCEDURE — 0JH63XZ INSERTION OF TUNNELED VASCULAR ACCESS DEVICE INTO CHEST SUBCUTANEOUS TISSUE AND FASCIA, PERCUTANEOUS APPROACH: ICD-10-PCS | Performed by: RADIOLOGY

## 2025-05-22 PROCEDURE — B5181ZA FLUOROSCOPY OF SUPERIOR VENA CAVA USING LOW OSMOLAR CONTRAST, GUIDANCE: ICD-10-PCS | Performed by: RADIOLOGY

## 2025-05-22 RX ORDER — MIDAZOLAM HYDROCHLORIDE 1 MG/ML
INJECTION INTRAMUSCULAR; INTRAVENOUS
Status: DISCONTINUED
Start: 2025-05-22 | End: 2025-05-22 | Stop reason: WASHOUT

## 2025-05-22 RX ORDER — HYDROCODONE BITARTRATE AND ACETAMINOPHEN 5; 325 MG/1; MG/1
1 TABLET ORAL EVERY 6 HOURS PRN
Refills: 0 | Status: DISCONTINUED | OUTPATIENT
Start: 2025-05-22 | End: 2025-06-07

## 2025-05-22 RX ORDER — LIDOCAINE HYDROCHLORIDE 20 MG/ML
INJECTION, SOLUTION INFILTRATION; PERINEURAL
Status: COMPLETED
Start: 2025-05-22 | End: 2025-05-22

## 2025-05-22 RX ORDER — POTASSIUM CHLORIDE 750 MG/1
10 TABLET, EXTENDED RELEASE ORAL DAILY
Status: DISCONTINUED | OUTPATIENT
Start: 2025-05-22 | End: 2025-05-27

## 2025-05-22 RX ORDER — HEPARIN SODIUM 1000 [USP'U]/ML
INJECTION, SOLUTION INTRAVENOUS; SUBCUTANEOUS
Status: COMPLETED
Start: 2025-05-22 | End: 2025-05-22

## 2025-05-22 NOTE — PROCEDURES
[facility]  part of [facility]  Procedure Note    Radha Winters Patient Status:  Inpatient    1960 MRN [id number]   Location [facility] INTERVENTIONAL SUITES Attending Fanny Majano MD   Hosp Day # 5 PCP JUAN MONTANEZ     Procedure: fluoro guided conversion of right vascath to permcath    Pre-Procedure Diagnosis:  acute renal failure    Post-Procedure Diagnosis: same    Anesthesia:  Local    Findings:  19 cm percath with tip at cavo-atrial junction. Tip is ready for immediate use.    Blood Loss:  minimal       Complications:  None     Plan:  1. Catheter ready for immediate use.    Galen De MD  2025

## 2025-05-22 NOTE — PAYOR COMM NOTE
--------------  CONTINUED STAY REVIEW  5/22  Payor: [facility]  Subscriber #:  [id number]  Authorization Number: [id number]    Admit date: 5/17/25  Admit time:  2:12 PM    REVIEW DOCUMENTATION:    Critical care    Assessment and Plan:   1.  Acute kidney injury with rhabdomyolysis-associated with multiple falls.  The patient became hypotensive and a central line was inserted with trialysis catheter.  The patient is now off pressors.     The patient had his dyspnea and chest x-ray yet suggest pulmonary edema.  She remains on dialysis.     Recommendations:  1.  Hydration  2.  Follow CPK and renal functions-will repeat in the morning  3.  As per nephrology.  4.  Okay to floor transfer     2.  Transaminitis-improving.  Etiology is uncertain.  Hypotensive and may have a component of shock liver.  Was on a statin.  Minimal fatty infiltration on CT scan of the abdomen.     Recommendations: Following functions     3.  DVT prophylaxis-subcutaneous heparin     4.  Hypotension-on midodrine and now off Levophed.  Would hold the metoprolol.     Recommendations: Taper Levophed as tolerated     5.  Basilar crackles-chest x-ray looks like pulmonary edema.     Recommendations: Fluid removal with dialysis.     6.  Falls-rehabilitative services      Lungs diminished at bases to auscultation and percussion.       Component Value Date     WBC 7.2 05/22/2025     HGB 14.2 05/22/2025     HCT 42.6 05/22/2025     .0 05/22/2025     CREATSERUM 4.77 05/22/2025     BUN 17 05/22/2025      05/22/2025     K 3.2 05/22/2025      05/22/2025     CO2 26.0 05/22/2025      05/22/2025     CA 7.8 05/22/2025     ALB 3.1 05/22/2025     ALKPHO 379 05/22/2025     BILT 0.2 05/22/2025     TP 4.8 05/22/2025      05/22/2025      05/22/2025      Chest x-ray-pulmonary edema with modest basilar effusions     MEDICATIONS ADMINISTERED IN LAST 1 DAY:  albuterol (Ventolin) (2.5 MG/3ML) 0.083% nebulizer  solution 2.5 mg       Date Action Dose Route User    5/21/2025 2120 Given 2.5 mg Nebulization Miracle Us RCP    5/21/2025 1539 Given 2.5 mg Nebulization Vamshi Bundy, [facility]          aspirin DR tab 81 mg       Date Action Dose Route User    5/22/2025 0753 Given 81 mg Oral ChallengeUmm sweet RN    5/21/2025 1300 Given 81 mg Oral Robbie Palomares RN          busPIRone (Buspar) tab 30 mg       Date Action Dose Route User    5/22/2025 0753 Given 30 mg Oral ChallengeUmm sweet RN    5/21/2025 2217 Given 30 mg Oral Shawna, Cherry    5/21/2025 1745 Given 30 mg Oral Robbie Palomares RN    5/21/2025 1258 Given 30 mg Oral Robbie Palomares RN          cefTRIAXone (Rocephin) 2 g in sodium chloride 0.9% 100 mL IVPB-ADDV       Date Action Dose Route User    5/21/2025 1345 New Bag 2 g Intravenous Robbie Palomares RN          chlorproMAZINE (Thorazine) tab 25 mg       Date Action Dose Route User    5/22/2025 0753 Given 25 mg Oral Umm Huang RN    5/21/2025 2217 Given 25 mg Oral Shawna, Cherry    5/21/2025 1748 Given 25 mg Oral Robbie Palomares RN    5/21/2025 1259 Given 25 mg Oral Robbie Palomares RN          dextrose 10% - sodium chloride 0.9% 1000 mL infusion       Date Action Dose Route User    5/22/2025 0752 New Bag (none) Intravenous Umm Huang RN    5/21/2025 1449 New Bag (none) Intravenous Robbie Palomares RN          docusate sodium (Colace) cap 100 mg       Date Action Dose Route User    5/22/2025 0753 Given 100 mg Oral ChallengeUmm sweet RN    5/21/2025 2217 Given 100 mg Oral Shawna, Cherry    5/21/2025 1259 Given 100 mg Oral Robbie Palomares RN          DULoxetine (Cymbalta) DR cap 60 mg       Date Action Dose Route User    5/22/2025 0753 Given 60 mg Oral Challenge, Umm, RN    5/21/2025 2217 Given 60 mg Oral Cherry Matos    5/21/2025 1258 Given 60 mg Oral Robbie Palomares, RN          heparin (Porcine) 5000 UNIT/ML injection 5,000 Units       Date Action Dose Route User    5/22/2025 0753 Given 5,000 Units  Subcutaneous (Right Upper Arm) Umm Huang RN    5/21/2025 2217 Given 5,000 Units Subcutaneous (Right Lower Abdomen) Shawna, Cherry          levothyroxine (Synthroid) tab 150 mcg       Date Action Dose Route User    5/22/2025 0605 Given 150 mcg Oral Shawna, Cherry          midodrine (ProAmatine) tab 10 mg       Date Action Dose Route User    5/22/2025 0605 Given 10 mg Oral Shawna, Cherry    5/21/2025 1745 Given 10 mg Oral Robbie Palomares RN    5/21/2025 1302 Given 10 mg Oral Robbie Palomares RN          ondansetron (Zofran) 4 MG/2ML injection       Date Action Dose Route User    5/21/2025 1612 Given 4 mg (none) Robbie Palomares RN          polyethylene glycol (PEG 3350) (Miralax) 17 g oral packet 17 g       Date Action Dose Route User    5/21/2025 1307 Given 17 g Oral Robbie Palomares RN          potassium chloride (Klor-Con M10) tab 10 mEq       Date Action Dose Route User    5/22/2025 0908 Given 10 mEq Oral Umm Huang RN          QUEtiapine (SEROquel) tab 50 mg       Date Action Dose Route User    5/22/2025 0753 Given 50 mg Oral Umm Huang RN    5/21/2025 1926 Given 50 mg Oral Robbie Palomares RN    5/21/2025 1259 Given 50 mg Oral Robbie Palomares RN          QUEtiapine ER (SEROquel XR) 24 hr tab 300 mg       Date Action Dose Route User    5/21/2025 2218 Given 300 mg Oral Shawna, Cherry            Vitals (last day)       Date/Time Temp Pulse Resp BP SpO2 Weight O2 Device O2 Flow Rate (L/min) [facility]    05/22/25 1200 97.6 °F (36.4 °C) 96 18 124/61 93 % -- Nasal cannula 4 L/min     05/22/25 1100 -- 94 17 112/92 92 % -- Nasal cannula 4 L/min     05/22/25 1000 -- 94 15 140/65 94 % -- Nasal cannula 4 L/min     05/22/25 0900 -- 89 11 110/65 95 % -- Nasal cannula 4 L/min SR    05/22/25 0800 97.5 °F (36.4 °C) 94 17 128/79 93 % -- Nasal cannula 4 L/min SR    05/22/25 0730 -- 96 17 124/71 91 % -- Nasal cannula 4 L/min SR    05/22/25 0600 -- 99 14 119/59 93 % -- Nasal cannula 4 L/min EC    05/22/25 0400  97.8 °F (36.6 °C) 98 17 118/66 92 % -- Nasal cannula 4 L/min EC    05/22/25 0001 97.6 °F (36.4 °C) 91 12 121/65 93 % -- Nasal cannula 4 L/min EC    05/21/25 2215 -- 93 19 123/64 93 % -- Nasal cannula 4 L/min EC    05/21/25 2000 97.6 °F (36.4 °C) 95 17 123/63 93 % -- Nasal cannula 4 L/min EC    05/21/25 1800 -- 95 19 123/74 93 % -- Nasal cannula 4 L/min AM    05/21/25 1754 -- 95 20 118/69 93 % -- Nasal cannula 4 L/min AM    05/21/25 1700 -- 96 18 -- 96 % -- Nasal cannula 4 L/min AM    05/21/25 1600 97.6 °F (36.4 °C) 97 19 107/74 95 % -- Nasal cannula 4 L/min AM    05/21/25 1515 -- 99 20 -- 95 % -- Nasal cannula 4 L/min AM    05/21/25 1500 -- 97 22 -- 97 % -- -- -- AM    05/21/25 1400 -- 103 19 114/82 94 % -- Nasal cannula 4 L/min AM    05/21/25 1345 -- 110 -- 105/61 -- -- -- --     05/21/25 1300 -- 97 18 105/61 94 % -- Nasal cannula 2 L/min AM    05/21/25 1200 98.9 °F (37.2 °C) 91 14 111/71 97 % -- Nasal cannula 2 L/min AM    05/21/25 1100 -- 94 15 101/57 97 % -- Nasal cannula 3 L/min AM    05/21/25 1026 -- 91 16 109/72 96 % -- Nasal cannula 3 L/min AM    05/21/25 1000 -- 89 16 71/18 98 % -- -- -- AM    05/21/25 0955 -- 91 15 76/45 96 % -- Nasal cannula 4 L/min AM    05/21/25 0900 -- 89 15 111/69 96 % -- -- -- AM    05/21/25 0800 -- 88 18 126/62 94 % -- -- -- AM    05/21/25 0600 -- 86 11 101/53 94 % -- Nasal cannula 3 L/min     05/21/25 0400 97.3 °F (36.3 °C) 83 11 109/54 94 % -- Nasal cannula 3 L/min     05/21/25 0200 -- 83 12 105/49 94 % -- Nasal cannula 3 L/min     05/21/25 0100 -- -- -- -- -- 204 lb 5.9 oz (92.7 kg) -- --     05/21/25 0000 97.4 °F (36.3 °C) 82 14 106/79 95 % -- Nasal cannula 3 L/min           CIWA Scores (since admission)       None

## 2025-05-22 NOTE — PROGRESS NOTES
[facility]  part of [facility]    Progress Note      Assessment and Plan:   1.  Acute kidney injury with rhabdomyolysis-associated with multiple falls.  The patient became hypotensive and a central line was inserted with trialysis catheter.  The patient is now off pressors.    The patient had his dyspnea and chest x-ray yet suggest pulmonary edema.  She remains on dialysis.    Recommendations:  1.  Hydration  2.  Follow CPK and renal functions-will repeat in the morning  3.  As per nephrology.  4.  Okay to floor transfer     2.  Transaminitis-improving.  Etiology is uncertain.  Hypotensive and may have a component of shock liver.  Was on a statin.  Minimal fatty infiltration on CT scan of the abdomen.     Recommendations: Following functions     3.  DVT prophylaxis-subcutaneous heparin     4.  Hypotension-on midodrine and now off Levophed.  Would hold the metoprolol.     Recommendations: Taper Levophed as tolerated     5.  Basilar crackles-chest x-ray looks like pulmonary edema.     Recommendations: Fluid removal with dialysis.     6.  Falls-rehabilitative services        Subjective:   Radha Winters is a(n) 64 year old female who l yet complains of dyspnea.    Objective:   Blood pressure 124/61, pulse 96, temperature 97.6 °F (36.4 °C), temperature source Temporal, resp. rate 18, height 5' 3\" (1.6 m), weight 204 lb 5.9 oz (92.7 kg), SpO2 93%.    Physical Exam alert white female  HEENT examination is unremarkable with pupils equal round and reactive to light and accommodation.   Neck without adenopathy, thyromegaly, JVD nor bruit.   Lungs diminished at bases to auscultation and percussion.  Cardiac regular rate and rhythm no murmur.   Abdomen nontender, without hepatosplenomegaly and no mass appreciable.   Extremities without clubbing cyanosis nor edema.   Neurologic grossly intact with symmetric tone and strength and reflex.  Skin without gross abnormality     Results:     Lab Results    Component Value Date    WBC 7.2 05/22/2025    HGB 14.2 05/22/2025    HCT 42.6 05/22/2025    .0 05/22/2025    CREATSERUM 4.77 05/22/2025    BUN 17 05/22/2025     05/22/2025    K 3.2 05/22/2025     05/22/2025    CO2 26.0 05/22/2025     05/22/2025    CA 7.8 05/22/2025    ALB 3.1 05/22/2025    ALKPHO 379 05/22/2025    BILT 0.2 05/22/2025    TP 4.8 05/22/2025     05/22/2025     05/22/2025     Chest x-ray-pulmonary edema with modest basilar effusions    Tommie Reynaga MD  Medical Director, Critical Care, [facility]  Medical Director, [facility]  Pager: [phone]

## 2025-05-22 NOTE — PROGRESS NOTES
[facility]  part of [facility]    Nephrology Progress Note    Radha Winters Patient Status:  Inpatient    1960 MRN [id number]   Location [facility] 2W/SW Attending Fanny Majano MD   Hosp Day # 5 PCP JUAN MONTANEZ     Subjective:   Radha Winters is a(n) 64 year old female     Plan for IR placement of permcath.  Hemodynamically stable.      Objective:   Blood pressure 124/71, pulse 96, temperature 97.8 °F (36.6 °C), temperature source Temporal, resp. rate 17, height 5' 3\" (1.6 m), weight 204 lb 5.9 oz (92.7 kg), SpO2 91%.    Gen:  NAD, fatigued appearing  HEENT:  NC/AT, PERRLA  Neck:  Supple, no JVD  Chest:  Diminished breath sounds at bases  CVS:  S1S2, regular rate  Abdomen:  Soft, NT/ND  Ext:  + pedal edema  Neuro: No focal deficits appreciated.    Results:    Lab Results   Component Value Date    WBC 7.2 2025    HGB 14.2 2025    HCT 42.6 2025    .0 (L) 2025    CREATSERUM 4.77 (H) 2025    BUN 17 2025     (L) 2025    K 3.2 (L) 2025     2025    CO2 26.0 2025     (H) 2025    CA 7.8 (L) 2025    ALB 3.1 (L) 2025    ALKPHO 379 (H) 2025    BILT 0.2 2025    TP 4.8 (L) 2025     (H) 2025     (H) 2025    TSH 0.528 10/10/2023    LIP 36 2025    MG 2.4 2025    PHOS 6.3 (H) 2025    CK 8,116 (HH) 2025    B12 581 10/10/2023       XR CHEST AP PORTABLE  (CPT=71045)  Result Date: 2025  CONCLUSION: Findings suggesting increased fluid volume status of the patient and/or CHF.  No significant interval change.   Dictated by (CST): Michael Villareal MD on 2025 at 7:34 AM     Finalized by (CST): Michael Villareal MD on 2025 at 7:36 AM          XR CHEST AP PORTABLE  (CPT=71045)  Result Date: 2025  CONCLUSION:  1. Findings most suggestive of moderate CHF/fluid overload including stable central pulmonary venous  congestion with small bilateral pleural effusions, slightly worse on the right. 2. Bibasilar consolidation, slightly worse on the right, which probably relates to passive atelectasis.    Dictated by (CST): Von Roberts MD on 5/21/2025 at 7:45 AM     Finalized by (CST): Von Roberts MD on 5/21/2025 at 7:47 AM                Assessment & Plan:     Acute renal failure superimposed on chronic kidney disease, unspecified acute renal failure type, unspecified CKD stage        Non-traumatic rhabdomyolysis        Transaminitis    Patient is a 63 y/o female with PMH of HTN, dyslipidemia, CAD admitted after multiple falls, Nephrology consulted for Acute Kidney Injury      Acute Kidney Injury:  Likely secondary to ATN, from Rhabdomyolysis, continue aggressive volume resuscitation, 0.9  ml/hr, continue to trend BMP, check Uric acid, Phos, Urine studies  CKD stage 3b vs 4:  Secondary to HTN nephrosclerosis, check Phos, PTH, renal US  Transaminitis:  Continue to trend LFTs, will check abdominal US        Recommendations:  Acute Kidney Injury likely unresolved ATN  Will plan for tunneled HD line  KCL replacement    HD in AM     Renal US reviewed  Renally dose meds  Midodrine for MAP goal >65  Huynh placement, for strict I/Os     Thank you for allowing me to participate in the care of your patient.         Bernardo Lewis MD  5/22/2025

## 2025-05-22 NOTE — PLAN OF CARE
Patient alert and oriented on 4L oxygen overnight. Low urine output- patient bladder scanned and pt not retaining. IVF continued at 50/hr. Call light in reach and no needs noted at this time  Problem: Patient Centered Care  Goal: Patient preferences are identified and integrated in the patient's plan of care  Description: Interventions:  - What would you like us to know as we care for you?  - Provide timely, complete, and accurate information to patient/family  - Incorporate patient and family knowledge, values, beliefs, and cultural backgrounds into the planning and delivery of care  - Encourage patient/family to participate in care and decision-making at the level they choose  - Honor patient and family perspectives and choices  Outcome: Progressing     Problem: PAIN - ADULT  Goal: Verbalizes/displays adequate comfort level or patient's stated pain goal  Description: INTERVENTIONS:  - Encourage pt to monitor pain and request assistance  - Assess pain using appropriate pain scale  - Administer analgesics based on type and severity of pain and evaluate response  - Implement non-pharmacological measures as appropriate and evaluate response  - Consider cultural and social influences on pain and pain management  - Manage/alleviate anxiety  - Utilize distraction and/or relaxation techniques  - Monitor for opioid side effects  - Notify MD/LIP if interventions unsuccessful or patient reports new pain  - Anticipate increased pain with activity and pre-medicate as appropriate  Outcome: Progressing     Problem: SAFETY ADULT - FALL  Goal: Free from fall injury  Description: INTERVENTIONS:  - Assess pt frequently for physical needs  - Identify cognitive and physical deficits and behaviors that affect risk of falls.  - [place] fall precautions as indicated by assessment.  - Educate pt/family on patient safety including physical limitations  - Instruct pt to call for assistance with activity based on assessment  - Modify  environment to reduce risk of injury  - Provide assistive devices as appropriate  - Consider OT/PT consult to assist with strengthening/mobility  - Encourage toileting schedule  Outcome: Progressing     Problem: DISCHARGE PLANNING  Goal: Discharge to home or other facility with appropriate resources  Description: INTERVENTIONS:  - Identify barriers to discharge w/pt and caregiver  - Include patient/family/discharge partner in discharge planning  - Arrange for needed discharge resources and transportation as appropriate  - Identify discharge learning needs (meds, wound care, etc)  - Arrange for interpreters to assist at discharge as needed  - Consider post-discharge preferences of patient/family/discharge partner  - Complete POLST form as appropriate  - Assess patient's ability to be responsible for managing their own health  - Refer to Case Management Department for coordinating discharge planning if the patient needs post-hospital services based on physician/LIP order or complex needs related to functional status, cognitive ability or social support system  Outcome: Progressing     Problem: SKIN/TISSUE INTEGRITY - ADULT  Goal: Skin integrity remains intact  Description: INTERVENTIONS  - Assess and document risk factors for pressure ulcer development  - Assess and document skin integrity  - Monitor for areas of redness and/or skin breakdown  - Initiate interventions, skin care algorithm/standards of care as needed  Outcome: Progressing     Problem: RESPIRATORY - ADULT  Goal: Achieves optimal ventilation and oxygenation  Description: INTERVENTIONS:  - Assess for changes in respiratory status  - Assess for changes in mentation and behavior  - Position to facilitate oxygenation and minimize respiratory effort  - Oxygen supplementation based on oxygen saturation or ABGs  - Provide Smoking Cessation handout, if applicable  - Encourage broncho-pulmonary hygiene including cough, deep breathe, Incentive Spirometry  - Assess  the need for suctioning and perform as needed  - Assess and instruct to report SOB or any respiratory difficulty  - Respiratory Therapy support as indicated  - Manage/alleviate anxiety  - Monitor for signs/symptoms of CO2 retention  Outcome: Progressing     Problem: GENITOURINARY - ADULT  Goal: Absence of urinary retention  Description: INTERVENTIONS:  - Assess patient’s ability to void and empty bladder  - Monitor intake/output and perform bladder scan as needed  - Follow urinary retention protocol/standard of care  - Consider collaborating with pharmacy to review patient's medication profile  - Implement strategies to promote bladder emptying  Outcome: Progressing     Problem: METABOLIC/FLUID AND ELECTROLYTES - ADULT  Goal: Glucose maintained within prescribed range  Description: INTERVENTIONS:  - Monitor Blood Glucose as ordered  - Assess for signs and symptoms of hyperglycemia and hypoglycemia  - Administer ordered medications to maintain glucose within target range  - Assess barriers to adequate nutritional intake and initiate nutrition consult as needed  - Instruct patient on self management of diabetes  Outcome: Progressing  Goal: Electrolytes maintained within normal limits  Description: INTERVENTIONS:  - Monitor labs and rhythm and assess patient for signs and symptoms of electrolyte imbalances  - Administer electrolyte replacement as ordered  - Monitor response to electrolyte replacements, including rhythm and repeat lab results as appropriate  - Fluid restriction as ordered  - Instruct patient on fluid and nutrition restrictions as appropriate  Outcome: Progressing  Goal: Hemodynamic stability and optimal renal function maintained  Description: INTERVENTIONS:  - Monitor labs and assess for signs and symptoms of volume excess or deficit  - Monitor intake, output and patient weight  - Monitor urine specific gravity, serum osmolarity and serum sodium as indicated or ordered  - Monitor response to  interventions for patient's volume status, including labs, urine output, blood pressure (other measures as available)  - Encourage oral intake as appropriate  - Instruct patient on fluid and nutrition restrictions as appropriate  Outcome: Progressing     Problem: HEMATOLOGIC - ADULT  Goal: Free from bleeding injury  Description: (Example usage: patient with low platelets)  INTERVENTIONS:  - Avoid intramuscular injections, enemas and rectal medication administration  - Ensure safe mobilization of patient  - Hold pressure on venipuncture sites to achieve adequate hemostasis  - Assess for signs and symptoms of internal bleeding  - Monitor lab trends  - Patient is to report abnormal signs of bleeding to staff  - Avoid use of toothpicks and dental floss  - Use electric shaver for shaving  - Use soft bristle tooth brush  - Limit straining and forceful nose blowing  Outcome: Progressing

## 2025-05-22 NOTE — PLAN OF CARE
Speech consult today, patient passed swallow eval. Patient was very lethargic today, MD notified, ammonia <10. Patient went to IR procedure - temp HD cath exchanged for a perm cath. Call light within reach, safety measures maintained. Transfer order in.     Problem: Patient Centered Care  Goal: Patient preferences are identified and integrated in the patient's plan of care  Description: Interventions:  - What would you like us to know as we care for you? I like to have my legs elevated sometimes.   - Provide timely, complete, and accurate information to patient/family  - Incorporate patient and family knowledge, values, beliefs, and cultural backgrounds into the planning and delivery of care  - Encourage patient/family to participate in care and decision-making at the level they choose  - Honor patient and family perspectives and choices  Outcome: Progressing     Problem: PAIN - ADULT  Goal: Verbalizes/displays adequate comfort level or patient's stated pain goal  Description: INTERVENTIONS:  - Encourage pt to monitor pain and request assistance  - Assess pain using appropriate pain scale  - Administer analgesics based on type and severity of pain and evaluate response  - Implement non-pharmacological measures as appropriate and evaluate response  - Consider cultural and social influences on pain and pain management  - Manage/alleviate anxiety  - Utilize distraction and/or relaxation techniques  - Monitor for opioid side effects  - Notify MD/LIP if interventions unsuccessful or patient reports new pain  - Anticipate increased pain with activity and pre-medicate as appropriate  Outcome: Progressing     Problem: SAFETY ADULT - FALL  Goal: Free from fall injury  Description: INTERVENTIONS:  - Assess pt frequently for physical needs  - Identify cognitive and physical deficits and behaviors that affect risk of falls.  - [place] fall precautions as indicated by assessment.  - Educate pt/family on patient safety  including physical limitations  - Instruct pt to call for assistance with activity based on assessment  - Modify environment to reduce risk of injury  - Provide assistive devices as appropriate  - Consider OT/PT consult to assist with strengthening/mobility  - Encourage toileting schedule  Outcome: Progressing     Problem: DISCHARGE PLANNING  Goal: Discharge to home or other facility with appropriate resources  Description: INTERVENTIONS:  - Identify barriers to discharge w/pt and caregiver  - Include patient/family/discharge partner in discharge planning  - Arrange for needed discharge resources and transportation as appropriate  - Identify discharge learning needs (meds, wound care, etc)  - Arrange for interpreters to assist at discharge as needed  - Consider post-discharge preferences of patient/family/discharge partner  - Complete POLST form as appropriate  - Assess patient's ability to be responsible for managing their own health  - Refer to Case Management Department for coordinating discharge planning if the patient needs post-hospital services based on physician/LIP order or complex needs related to functional status, cognitive ability or social support system  Outcome: Progressing     Problem: SKIN/TISSUE INTEGRITY - ADULT  Goal: Skin integrity remains intact  Description: INTERVENTIONS  - Assess and document risk factors for pressure ulcer development  - Assess and document skin integrity  - Monitor for areas of redness and/or skin breakdown  - Initiate interventions, skin care algorithm/standards of care as needed  Outcome: Progressing     Problem: RESPIRATORY - ADULT  Goal: Achieves optimal ventilation and oxygenation  Description: INTERVENTIONS:  - Assess for changes in respiratory status  - Assess for changes in mentation and behavior  - Position to facilitate oxygenation and minimize respiratory effort  - Oxygen supplementation based on oxygen saturation or ABGs  - Provide Smoking Cessation handout,  if applicable  - Encourage broncho-pulmonary hygiene including cough, deep breathe, Incentive Spirometry  - Assess the need for suctioning and perform as needed  - Assess and instruct to report SOB or any respiratory difficulty  - Respiratory Therapy support as indicated  - Manage/alleviate anxiety  - Monitor for signs/symptoms of CO2 retention  Outcome: Progressing     Problem: GENITOURINARY - ADULT  Goal: Absence of urinary retention  Description: INTERVENTIONS:  - Assess patient’s ability to void and empty bladder  - Monitor intake/output and perform bladder scan as needed  - Follow urinary retention protocol/standard of care  - Consider collaborating with pharmacy to review patient's medication profile  - Implement strategies to promote bladder emptying  Outcome: Progressing     Problem: METABOLIC/FLUID AND ELECTROLYTES - ADULT  Goal: Glucose maintained within prescribed range  Description: INTERVENTIONS:  - Monitor Blood Glucose as ordered  - Assess for signs and symptoms of hyperglycemia and hypoglycemia  - Administer ordered medications to maintain glucose within target range  - Assess barriers to adequate nutritional intake and initiate nutrition consult as needed  - Instruct patient on self management of diabetes  Outcome: Progressing  Goal: Electrolytes maintained within normal limits  Description: INTERVENTIONS:  - Monitor labs and rhythm and assess patient for signs and symptoms of electrolyte imbalances  - Administer electrolyte replacement as ordered  - Monitor response to electrolyte replacements, including rhythm and repeat lab results as appropriate  - Fluid restriction as ordered  - Instruct patient on fluid and nutrition restrictions as appropriate  Outcome: Progressing  Goal: Hemodynamic stability and optimal renal function maintained  Description: INTERVENTIONS:  - Monitor labs and assess for signs and symptoms of volume excess or deficit  - Monitor intake, output and patient weight  - Monitor  urine specific gravity, serum osmolarity and serum sodium as indicated or ordered  - Monitor response to interventions for patient's volume status, including labs, urine output, blood pressure (other measures as available)  - Encourage oral intake as appropriate  - Instruct patient on fluid and nutrition restrictions as appropriate  Outcome: Progressing     Problem: HEMATOLOGIC - ADULT  Goal: Free from bleeding injury  Description: (Example usage: patient with low platelets)  INTERVENTIONS:  - Avoid intramuscular injections, enemas and rectal medication administration  - Ensure safe mobilization of patient  - Hold pressure on venipuncture sites to achieve adequate hemostasis  - Assess for signs and symptoms of internal bleeding  - Monitor lab trends  - Patient is to report abnormal signs of bleeding to staff  - Avoid use of toothpicks and dental floss  - Use electric shaver for shaving  - Use soft bristle tooth brush  - Limit straining and forceful nose blowing  Outcome: Progressing

## 2025-05-22 NOTE — SLP NOTE
ADULT SWALLOWING EVALUATION    ASSESSMENT    ASSESSMENT/OVERALL IMPRESSION:  PPE REQUIRED. THIS THERAPIST WORE GLOVES FOR DURATION OF EVALUATION. HANDS WASHED UPON ENTRANCE/EXIT.    Per MD H&P, \"Ms. Winters is a 64-year-old female with a history of hypertension, coronary artery disease, hyperlipidemia, hypothyroidism, and chronic leg weakness who presents with multiple issues. She has a two-year history of recurrent falls and was previously evaluated at [facility], where she was told her falls were related to her sodium levels. The patient experienced a fall 2-3 days ago and presented to the emergency room but refused admission at that time. Laboratory studies then revealed elevated creatinine. Today, she experienced two more falls - initially landing on her back, followed by a second fall. She developed occipital headache and back pain following these events. She denies loss of consciousness, neck pain, or leg pain. Additionally, the patient reports poor oral intake and decreased appetite over the past several days.\"    SLP BSSE orders received and acknowledged. A swallow evaluation warranted per \"congested after eating\". Pt afebrile with weak/clear vocal quality, on 4L/Min, with oxygen saturation at 95%. Pt with no hx of dysphagia at [facility].   Pt positioned 90 degrees in bed, fatigued/cooperative. Pt with no complaints of pain. Oral motor examination revealed reduced strength, ROM, and rate of motion. Pt presented with trials of soft solids and thin liquids via straw. Pt with adequate oral acceptance and bilabial seal across all trials. Pt with intact bite, prolonged mastication of solids, and delayed A-P transit. Pt's swallow response appears delayed with reduced hyolaryngeal elevation/excursion. No clinical signs of aspiration (e.g., immediate/delayed throat clear, immediate/delayed cough, wet vocal quality, increased O2 effort) observed across all trials. 5/22 CXR indicates \"Findings  suggesting increased fluid volume status of the patient and/or CHF.  No significant interval change\". Oxygen status remained stable t/o the entire evaluation.     At this time, pt presents with mild oral dysphagia and probable pharyngeal dysfunction. Recommend a soft easy to chew diet and thin liquids with strict adherence to safe swallowing compensatory strategies. Results and recommendations reviewed with RN, pt. Pt v/u to all results/recommendations.     PLAN: SLP to f/u x2 meal assessments, monitor imaging, and VFSS if clinically indicated       RECOMMENDATIONS   Diet Recommendations - Solids: Soft/ Easy to chew  Diet Recommendations - Liquids: Thin Liquids                       Aspiration Precautions: Upright position, Slow rate, Small bites, Small sips  Medication Administration Recommendations:  (as tolerated)  Treatment Plan/Recommendations: Aspiration precautions    HISTORY   MEDICAL HISTORY  Reason for Referral:  (congested after eating)    Problem List  Principal Problem:    Acute renal failure superimposed on chronic kidney disease, unspecified acute renal failure type, unspecified CKD stage  Active Problems:    Non-traumatic rhabdomyolysis    Transaminitis      Past Medical History  Past Medical History[1]    Prior Living Situation: Home alone  Diet Prior to Admission: Regular, Thin liquids  Precautions: Aspiration    Patient/Family Goals: did not state    SWALLOWING HISTORY  Current Diet Consistency: Soft/ Easy to Chew, Thin liquids  Dysphagia History: none  Imaging Results: 5/17 CT BRAIN  CONCLUSION: No acute or significant chronic intracranial abnormality.       SUBJECTIVE       OBJECTIVE   ORAL MOTOR EXAMINATION  Dentition: Edentulous (has dentures)  Symmetry: Within Functional Limits  Strength: Overall reduced     Range of Motion: Overall reduced  Rate of Motion: Reduced    Voice Quality: Weak, Clear  Respiratory Status: Supplemental O2, Nasal cannula  Consistencies Trialed: Soft solid, Thin  liquids  Method of Presentation: Staff/Clinician assistance, Straw  Patient Positioned: Upright, Midline    Oral Phase of Swallow: Impaired  Bolus Retrieval: Intact  Bilabial Seal: Intact  Bolus Formation: Impaired  Bolus Propulsion: Impaired  Mastication: Impaired  Retention: Intact    Pharyngeal Phase of Swallow: Appears Impaired  Laryngeal Elevation Timing: Appears impaired  Laryngeal Elevation Strength: Appears impaired  Laryngeal Elevation Coordination: Appears intact  (Please note: Silent aspiration cannot be evaluated clinically. Videofluoroscopic Swallow Study is required to rule-out silent aspiration.)    Esophageal Phase of Swallow: No complaints consistent with possible esophageal involvement  Comments: NA          GOALS  Goal #1 The patient will tolerate soft easy to chew consistency and thin liquids without overt signs or symptoms of aspiration with 100 % accuracy over 1-2 session(s).  In Progress   Goal #2 The patient/family/caregiver will demonstrate understanding and implementation of aspiration precautions and swallow strategies independently over 1-2 session(s).    In Progress   Goal #3 The patient will tolerate trial upgrade of regular consistency and thin liquids without overt signs or symptoms of aspiration with 100 % accuracy over 1-2 session(s).  In Progress   Goal #4 The patient will utilize compensatory strategies as outlined by  BSSE (clinical evaluation) including Slow rate, Small bites, Small sips, Upright 90 degrees, Eliminate distractions with PRN assistance 100 % of the time across 2 sessions.    In Progress     FOLLOW UP  Treatment Plan/Recommendations: Aspiration precautions  Duration: 1 week  Follow Up Needed (Documentation Required): Yes  SLP Follow-up Date: 05/23/25    Thank you for your referral.   If you have any questions, please contact VANESSA Mijares M.S. CCC-SLP  Speech Language Pathologist  Phone Number Ext. [phone]         [1]   Past Medical History:    Essential hypertension    Heart attack (HCC)    Hyperlipidemia    Thyroid disease

## 2025-05-22 NOTE — PROGRESS NOTES
[facility]  part of [facility]    Progress Note    Radha Winters Patient Status:  Inpatient    1960 MRN [id number]   Location [facility] 5SW/SE Attending Fanny Majano MD   Hosp Day # 5 PCP JUAN MONTANEZ       SUBJECTIVE:  Complains of feeling tired.  Otherwise patient is alert.  Answering my questions appropriately          OBJECTIVE:  Vital signs in last 24 hours:  /61 (BP Location: Right arm)   Pulse 96   Temp 97.6 °F (36.4 °C) (Temporal)   Resp 18   Ht 5' 3\" (1.6 m)   Wt 204 lb 5.9 oz (92.7 kg)   SpO2 93%   BMI 36.20 kg/m²     Intake/Output:    Intake/Output Summary (Last 24 hours) at 2025 1304  Last data filed at 2025 0600  Gross per 24 hour   Intake 840 ml   Output --   Net 840 ml       Wt Readings from Last 3 Encounters:   25 204 lb 5.9 oz (92.7 kg)   10/09/23 185 lb 3 oz (84 kg)   10/04/23 185 lb (83.9 kg)       Exam  Gen: No acute distress, alert and oriented x3,  HEENT: No pallor  Pulm: Lungs clear, normal respiratory effort  CV: Heart with regular rate and rhythm, no peripheral edema  Abd: Abdomen soft, nontender, nondistended, no organomegaly, bowel sounds present  Skin: no rashes or lesions  CNS: Alert    Data Review:     Labs:   Lab Results   Component Value Date    WBC 7.2 2025    HGB 14.2 2025    HCT 42.6 2025    .0 2025    CREATSERUM 4.77 2025    BUN 17 2025     2025    K 3.2 2025     2025    CO2 26.0 2025     2025    CA 7.8 2025    ALB 3.1 2025    ALKPHO 379 2025    BILT 0.2 2025    TP 4.8 2025     2025     2025       LABS  Recent Labs   Lab 25  0337 25  0441 25  0347 25  0613   RBC 5.01 4.82 4.83 4.73   HGB 14.8 14.2 14.3 14.2   HCT 45.8 42.5 42.4 42.6   MCV 91.4 88.2 87.8 90.1   MCH 29.5 29.5 29.6 30.0   MCHC 32.3 33.4 33.7 33.3   RDW 15.3* 15.2* 15.3*  15.3*   NEPRELIM 8.34* 6.07 5.98 5.57   WBC 9.7 7.3 7.4 7.2   .0 151.0 133.0* 112.0*   AST 2,929* 1,603* 741* 381*   ALT 1,368* 1,083* 749* 497*   CK 20,418* 12,214* 8,116*  --    GLU 87 102* 124* 174*       Imaging:      Meds:   Current Hospital Medications[1]    Assessment  Problem List[2]  1. Acute Kidney Injury on Chronic Kidney Disease:  - Will initiate IV hydration  - Nephrology consultation obtained  Patient with worsening renal function.  Nephrology is following the patient    on hemodialysis     2. Rhabdomyolysis:  - Secondary to recurrent falls  - Possibly statin-induced  - Plan: Hold statin (Crestor)  Being     3. Acute Transaminitis:  - Likely secondary to rhabdomyolysis  - May also be statin-related  - Will monitor with serial labs  Patient could also have a shock liver       4. Recurrent Falls:  - Will check orthostatic vital signs  - Physical Therapy and Occupational Therapy consultations ordered     5. Coronary Artery Disease:  - Currently stable  - Patient denies chest pain     6. Hypothyroidism:  - Continue levothyroxine  Possible UTI.  Will start on ceftriaxone.       Plan for close monitoring and adjustment of management based on clinical course.  Case discussed with nursing staff  Nursing staff texted me that patient was feeling lethargic.  But patient is actually more alert today than yesterday.  I had ordered ammonia level which is also not elevated    Active Orders   LAB    Basic Metabolic Panel (8)     Frequency: Tomorrow AM draw     Number of Occurrences: 1 Occurrences    CBC With Differential With Platelet     Frequency: Tomorrow AM draw     Number of Occurrences: 1 Occurrences    CBC With Differential With Platelet     Frequency: Tomorrow AM draw     Number of Occurrences: 1 Occurrences    CK (Creatine Kinase) (Not Creatinine)     Frequency: Once     Number of Occurrences: 1 Occurrences    Comp Metabolic Panel (14)     Frequency: Tomorrow AM draw     Number of Occurrences: 1  Occurrences   Nourishments    Dietary Nutrition Supplements BID     Frequency: BID     Number of Occurrences: Until Specified     Order Comments: Mighty Shake @ 10am and 2pm daily - variety of flavors (send one now)      Room Service Eligibility Until Discontinued     Frequency: Until Discontinued     Number of Occurrences: Until Specified    Room Service Notify RN Until Discontinued     Frequency: Until Discontinued     Number of Occurrences: Until Specified   Respiratory Care    Oxygen administration (adult) PRN     Frequency: PRN     Number of Occurrences: Until Specified   Dialysis    Hemodialysis inpatient     Frequency: Once     Number of Occurrences: 1 Occurrences     Order Comments: Bolus heparin 4000 U at start     Transfer    Transfer patient Once     Frequency: Once     Number of Occurrences: 1 Occurrences   Diet    NPO     Frequency: Effective Now     Number of Occurrences: Until Specified   Nursing    Give all morning medications unless otherwise specified     Frequency: Until Discontinued     Number of Occurrences: Until Specified     Order Comments: Give all morning medications unless otherwise specified.  DO NOT use Electrolyte protocol.      Insert denny catheter     Frequency: Once     Number of Occurrences: 1 Occurrences    Notify Radiologist     Frequency: Until Discontinued     Number of Occurrences: Until Specified    Nurse Driven Indwelling Urinary Catheter Removal Protocol     Frequency: Until Discontinued     Number of Occurrences: Until Specified    Nursing communication (specify)     Frequency: Once     Number of Occurrences: 1 Occurrences     Order Comments: Hold blood thinners pre procedure      Nursing communication - call [facility] to order dialysis     Frequency: Once     Number of Occurrences: 1 Occurrences     Order Comments: Call [facility] at [phone] to order dialysis.  Identify special circumstances and specify stat or routine treatment.      Verify informed consent      Frequency: Once     Number of Occurrences: 1 Occurrences    Void prior to procedure     Frequency: Once     Number of Occurrences: 1 Occurrences   Consult    Consult to Interventional Radiology     Frequency: Once     Number of Occurrences: 1 Occurrences    Consult to Pulmonology     Frequency: Once     Number of Occurrences: 1 Occurrences   Medications    acetaminophen (Tylenol) tab 650 mg     Frequency: Q6H PRN     Dose: 650 mg     Route: Oral    albumin human (Albumin) 25% injection 25 g     Frequency: Once     Dose: 25 g     Route: Intravenous    albuterol (Ventolin) (2.5 MG/3ML) 0.083% nebulizer solution 2.5 mg     Frequency: Q6H PRN     Dose: 2.5 mg     Route: Nebulization    aspirin DR tab 81 mg     Frequency: Daily     Dose: 81 mg     Route: Oral    bisacodyl (Dulcolax) 10 MG rectal suppository 10 mg     Frequency: Daily PRN     Dose: 10 mg     Route: Rectal    busPIRone (Buspar) tab 30 mg     Frequency: TID     Dose: 30 mg     Route: Oral    cefTRIAXone (Rocephin) 2 g in sodium chloride 0.9% 100 mL IVPB-ADDV     Frequency: Q24H     Dose: 2 g     Route: Intravenous    chlorproMAZINE (Thorazine) tab 25 mg     Frequency: QID     Dose: 25 mg     Route: Oral    dextrose 10% - sodium chloride 0.9% 1000 mL infusion     Frequency: Continuous     Route: Intravenous    diazePAM (Valium) tab 5 mg     Frequency: Q8H PRN     Dose: 5 mg     Route: Oral    docusate sodium (Colace) cap 100 mg     Frequency: BID     Dose: 100 mg     Route: Oral    DULoxetine (Cymbalta) DR cap 60 mg     Frequency: BID     Dose: 60 mg     Route: Oral    fleet enema (Fleet) rectal enema 133 mL     Frequency: Once PRN     Dose: 1 enema     Route: Rectal    heparin (Porcine) 1000 UNIT/ML injection 2,000 Units     Frequency: PRN Dialysis     Dose: 2,000 Units     Route: Intravenous    heparin (Porcine) 5000 UNIT/ML injection 5,000 Units     Frequency: Q12H PATRICK     Dose: 5,000 Units     Route: Subcutaneous    levothyroxine (Synthroid) tab 150 mcg      Frequency: Before breakfast     Dose: 150 mcg     Route: Oral    magnesium hydroxide (Milk of Magnesia) 400 MG/5ML oral suspension 30 mL     Frequency: Daily PRN     Dose: 30 mL     Route: Oral    metoprolol succinate ER (Toprol XL) 24 hr tab 50 mg     Frequency: BID     Dose: 50 mg     Route: Oral    midodrine (ProAmatine) tab 10 mg     Frequency: TID     Dose: 10 mg     Route: Oral    norepinephrine (Levophed) 4 mg/250mL infusion premix     Frequency: Continuous     Dose: 0.5-50 mcg/min     Route: Intravenous    ondansetron (Zofran) 4 MG/2ML injection 4 mg     Frequency: Q6H PRN     Dose: 4 mg     Route: Intravenous    polyethylene glycol (PEG 3350) (Miralax) 17 g oral packet 17 g     Frequency: Daily PRN     Dose: 17 g     Route: Oral    potassium chloride (Klor-Con M10) tab 10 mEq     Frequency: Daily     Dose: 10 mEq     Route: Oral    QUEtiapine (SEROquel) tab 50 mg     Frequency: BID     Dose: 50 mg     Route: Oral    QUEtiapine ER (SEROquel XR) 24 hr tab 300 mg     Frequency: Nightly     Dose: 300 mg     Route: Oral       Fanny Majano MD           [1]   Current Facility-Administered Medications   Medication Dose Route Frequency    potassium chloride (Klor-Con M10) tab 10 mEq  10 mEq Oral Daily    albumin human (Albumin) 25% injection 25 g  25 g Intravenous Once    dextrose 10% - sodium chloride 0.9% 1000 mL infusion   Intravenous Continuous    ondansetron (Zofran) 4 MG/2ML injection 4 mg  4 mg Intravenous Q6H PRN    docusate sodium (Colace) cap 100 mg  100 mg Oral BID    polyethylene glycol (PEG 3350) (Miralax) 17 g oral packet 17 g  17 g Oral Daily PRN    magnesium hydroxide (Milk of Magnesia) 400 MG/5ML oral suspension 30 mL  30 mL Oral Daily PRN    bisacodyl (Dulcolax) 10 MG rectal suppository 10 mg  10 mg Rectal Daily PRN    fleet enema (Fleet) rectal enema 133 mL  1 enema Rectal Once PRN    norepinephrine (Levophed) 4 mg/250mL infusion premix  0.5-50 mcg/min Intravenous Continuous    heparin  (Porcine) 1000 UNIT/ML injection 2,000 Units  2,000 Units Intravenous PRN Dialysis    cefTRIAXone (Rocephin) 2 g in sodium chloride 0.9% 100 mL IVPB-ADDV  2 g Intravenous Q24H    albuterol (Ventolin) (2.5 MG/3ML) 0.083% nebulizer solution 2.5 mg  2.5 mg Nebulization Q6H PRN    heparin (Porcine) 5000 UNIT/ML injection 5,000 Units  5,000 Units Subcutaneous 2 times per day    midodrine (ProAmatine) tab 10 mg  10 mg Oral TID    acetaminophen (Tylenol) tab 650 mg  650 mg Oral Q6H PRN    aspirin DR tab 81 mg  81 mg Oral Daily    busPIRone (Buspar) tab 30 mg  30 mg Oral TID    chlorproMAZINE (Thorazine) tab 25 mg  25 mg Oral QID    diazePAM (Valium) tab 5 mg  5 mg Oral Q8H PRN    DULoxetine (Cymbalta) DR cap 60 mg  60 mg Oral BID    levothyroxine (Synthroid) tab 150 mcg  150 mcg Oral Before breakfast    [Held by provider] metoprolol succinate ER (Toprol XL) 24 hr tab 50 mg  50 mg Oral BID    QUEtiapine (SEROquel) tab 50 mg  50 mg Oral BID    QUEtiapine ER (SEROquel XR) 24 hr tab 300 mg  300 mg Oral Nightly   [2]   Patient Active Problem List  Diagnosis    Closed fracture of proximal end of left humerus, unspecified fracture morphology, initial encounter    Frequent falls    Seizure-like activity (HCC)    Pituitary lesion (HCC)    Major depressive disorder, recurrent episode, moderate (HCC)    Generalized anxiety disorder    Acute renal failure superimposed on chronic kidney disease, unspecified acute renal failure type, unspecified CKD stage    Non-traumatic rhabdomyolysis    Transaminitis

## 2025-05-22 NOTE — CM/SW NOTE
Anticipated therapy need: Gradual Rehabilitative Therapy.    [facility] sent.  [facility] plan.    CM will follow-up with patient to see if Radha is agreeable for IP rehab placement at [facility].    Magali Bravo MBA BSN RN CRELLIS MURRY  RN Case Manager  [phone]

## 2025-05-23 ENCOUNTER — APPOINTMENT (OUTPATIENT)
Dept: GENERAL RADIOLOGY | Facility: HOSPITAL | Age: 65
End: 2025-05-23
Attending: INTERNAL MEDICINE
Payer: MEDICAID

## 2025-05-23 LAB
ALBUMIN SERPL-MCNC: 3.3 G/DL (ref 3.2–4.8)
ALBUMIN/GLOB SERPL: 1.7 {RATIO} (ref 1–2)
ALP LIVER SERPL-CCNC: 343 U/L (ref 50–130)
ALT SERPL-CCNC: 357 U/L (ref 10–49)
ANION GAP SERPL CALC-SCNC: 7 MMOL/L (ref 0–18)
AST SERPL-CCNC: 276 U/L (ref ?–34)
BASOPHILS # BLD AUTO: 0.05 X10(3) UL (ref 0–0.2)
BASOPHILS NFR BLD AUTO: 0.7 %
BILIRUB SERPL-MCNC: <0.2 MG/DL (ref 0.2–1.1)
BUN BLD-MCNC: 22 MG/DL (ref 9–23)
BUN/CREAT SERPL: 3.8 (ref 10–20)
CALCIUM BLD-MCNC: 8.4 MG/DL (ref 8.7–10.4)
CHLORIDE SERPL-SCNC: 102 MMOL/L (ref 98–112)
CK SERPL-CCNC: 7754 U/L (ref 34–145)
CO2 SERPL-SCNC: 25 MMOL/L (ref 21–32)
CREAT BLD-MCNC: 5.84 MG/DL (ref 0.55–1.02)
DEPRECATED RDW RBC AUTO: 52.3 FL (ref 35.1–46.3)
EGFRCR SERPLBLD CKD-EPI 2021: 8 ML/MIN/1.73M2 (ref 60–?)
EOSINOPHIL # BLD AUTO: 0.05 X10(3) UL (ref 0–0.7)
EOSINOPHIL NFR BLD AUTO: 0.7 %
ERYTHROCYTE [DISTWIDTH] IN BLOOD BY AUTOMATED COUNT: 15.9 % (ref 11–15)
GLOBULIN PLAS-MCNC: 2 G/DL (ref 2–3.5)
GLUCOSE BLD-MCNC: 139 MG/DL (ref 70–99)
GLUCOSE BLDC GLUCOMTR-MCNC: 146 MG/DL (ref 70–99)
HCT VFR BLD AUTO: 46 % (ref 35–48)
HGB BLD-MCNC: 15.2 G/DL (ref 12–16)
IMM GRANULOCYTES # BLD AUTO: 0.03 X10(3) UL (ref 0–1)
IMM GRANULOCYTES NFR BLD: 0.4 %
LYMPHOCYTES # BLD AUTO: 0.78 X10(3) UL (ref 1–4)
LYMPHOCYTES NFR BLD AUTO: 10.4 %
MCH RBC QN AUTO: 29.9 PG (ref 26–34)
MCHC RBC AUTO-ENTMCNC: 33 G/DL (ref 31–37)
MCV RBC AUTO: 90.4 FL (ref 80–100)
MONOCYTES # BLD AUTO: 0.45 X10(3) UL (ref 0.1–1)
MONOCYTES NFR BLD AUTO: 6 %
NEUTROPHILS # BLD AUTO: 6.11 X10 (3) UL (ref 1.5–7.7)
NEUTROPHILS # BLD AUTO: 6.11 X10(3) UL (ref 1.5–7.7)
NEUTROPHILS NFR BLD AUTO: 81.8 %
OSMOLALITY SERPL CALC.SUM OF ELEC: 284 MOSM/KG (ref 275–295)
PLATELET # BLD AUTO: 119 10(3)UL (ref 150–450)
PLATELETS.RETICULATED NFR BLD AUTO: 7.4 % (ref 0–7)
POTASSIUM SERPL-SCNC: 3.7 MMOL/L (ref 3.5–5.1)
PROT SERPL-MCNC: 5.3 G/DL (ref 5.7–8.2)
RBC # BLD AUTO: 5.09 X10(6)UL (ref 3.8–5.3)
SODIUM SERPL-SCNC: 134 MMOL/L (ref 136–145)
WBC # BLD AUTO: 7.5 X10(3) UL (ref 4–11)

## 2025-05-23 PROCEDURE — 71045 X-RAY EXAM CHEST 1 VIEW: CPT | Performed by: INTERNAL MEDICINE

## 2025-05-23 PROCEDURE — 99232 SBSQ HOSP IP/OBS MODERATE 35: CPT | Performed by: INTERNAL MEDICINE

## 2025-05-23 NOTE — PROGRESS NOTES
[facility]  part of [facility]    Progress Note    Radha Winters Patient Status:  Inpatient    1960 MRN [id number]   Location [facility] 5SW/SE Attending Fanny Majano MD   Hosp Day # 6 PCP JUAN MONTANEZ       SUBJECTIVE:  Complains of feeling tired.  Otherwise patient is alert.      OBJECTIVE:  Vital signs in last 24 hours:  BP 96/41 (BP Location: Right arm)   Pulse 93   Temp 97.6 °F (36.4 °C) (Oral)   Resp 18   Ht 5' 3\" (1.6 m)   Wt 211 lb 13.8 oz (96.1 kg)   SpO2 95%   BMI 37.53 kg/m²     Intake/Output:    Intake/Output Summary (Last 24 hours) at 2025 1116  Last data filed at 2025 1445  Gross per 24 hour   Intake 1269.9 ml   Output 50 ml   Net 1219.9 ml       Wt Readings from Last 3 Encounters:   25 211 lb 13.8 oz (96.1 kg)   10/09/23 185 lb 3 oz (84 kg)   10/04/23 185 lb (83.9 kg)       Exam  Gen: No acute distress, alert and oriented x3,  HEENT: No pallor  Pulm: Lungs clear, normal respiratory effort  CV: Heart with regular rate and rhythm, no peripheral edema  Abd: Abdomen soft, nontender, nondistended, no organomegaly, bowel sounds present  Skin: no rashes or lesions  CNS: Alert    Data Review:     Labs:   Lab Results   Component Value Date    WBC 7.5 2025    HGB 15.2 2025    HCT 46.0 2025    .0 2025    CREATSERUM 5.84 2025    BUN 22 2025     2025    K 3.7 2025     2025    CO2 25.0 2025     2025    CA 8.4 2025    ALB 3.3 2025    ALKPHO 343 2025    BILT <0.2 2025    TP 5.3 2025     2025     2025    CK 7,754 2025       LABS  Recent Labs   Lab 25  0441 25  0347 25  0613 25  0811   RBC 4.82 4.83 4.73 5.09   HGB 14.2 14.3 14.2 15.2   HCT 42.5 42.4 42.6 46.0   MCV 88.2 87.8 90.1 90.4   MCH 29.5 29.6 30.0 29.9   MCHC 33.4 33.7 33.3 33.0   RDW 15.2* 15.3* 15.3* 15.9*    NEPRELIM 6.07 5.98 5.57 6.11   WBC 7.3 7.4 7.2 7.5   .0 133.0* 112.0* 119.0*   AST 1,603* 741* 381* 276*   ALT 1,083* 749* 497* 357*   CK 12,214* 8,116*  --  7,754*   * 124* 174* 139*       Imaging:      Meds:   Current Hospital Medications[1]    Assessment  Problem List[2]  1. Acute Kidney Injury on Chronic Kidney Disease:  - Will initiate IV hydration  - Nephrology consultation obtained  Patient with worsening renal function.  Nephrology is following the patient    on hemodialysis     2. Rhabdomyolysis:  - Secondary to recurrent falls  - Possibly statin-induced  - Plan: Hold statin (Crestor)  Improving     3. Acute Transaminitis:  - Likely secondary to rhabdomyolysis  - May also be statin-related  - Will monitor with serial labs  Patient could also have a shock liver  Liver enzymes are improving       4. Recurrent Falls:  - Will check orthostatic vital signs  - Physical Therapy and Occupational Therapy consultations ordered     5. Coronary Artery Disease:  - Currently stable  - Patient denies chest pain     6. Hypothyroidism:  - Continue levothyroxine  Possible UTI.  Will start on ceftriaxone.       Plan for close monitoring and adjustment of management based on clinical course.  Case discussed with nursing staff    Active Orders   Nourishments    Dietary Nutrition Supplements BID     Frequency: BID     Number of Occurrences: Until Specified     Order Comments: Mighty Shake @ 10am and 2pm daily - variety of flavors (send one now)      Room Service Eligibility Until Discontinued     Frequency: Until Discontinued     Number of Occurrences: Until Specified    Room Service Notify RN Until Discontinued     Frequency: Until Discontinued     Number of Occurrences: Until Specified   Respiratory Care    Oxygen administration (adult) PRN     Frequency: PRN     Number of Occurrences: Until Specified   Dialysis    Hemodialysis inpatient     Frequency: Tomorrow     Number of Occurrences: 1 Occurrences     Order  Comments: Bolus heparin 4000 U at start     Diet    Renal diet Renal; Sodium Restriction: 3-4 GM NA; Texture Consistency: Soft / Easy to Chew ; Is Patient on Accuchecks? No     Frequency: Effective Now     Number of Occurrences: Until Specified   Nursing    Give all morning medications unless otherwise specified     Frequency: Until Discontinued     Number of Occurrences: Until Specified     Order Comments: Give all morning medications unless otherwise specified.  DO NOT use Electrolyte protocol.      Insert denny catheter     Frequency: Once     Number of Occurrences: 1 Occurrences    May use port/central line     Frequency: Until Discontinued     Number of Occurrences: Until Specified    Notify Radiologist     Frequency: Until Discontinued     Number of Occurrences: Until Specified    Nurse Driven Indwelling Urinary Catheter Removal Protocol     Frequency: Until Discontinued     Number of Occurrences: Until Specified    Nursing communication (specify)     Frequency: Once     Number of Occurrences: 1 Occurrences     Order Comments: Hold blood thinners pre procedure      Nursing communication - call [facility] to order dialysis     Frequency: Once     Number of Occurrences: 1 Occurrences     Order Comments: Call [facility] at [phone] to order dialysis.  Identify special circumstances and specify stat or routine treatment.      Patient may resume usual diet     Frequency: Once     Number of Occurrences: 1 Occurrences     Order Comments: Npo x one hour, then resume pre-procedure diet      Post procedure site assessment     Frequency: Per Unit Routine     Number of Occurrences: Until Specified     Order Comments: Every 15 minutes for 1 hour, then every 30 minutes for 1 hour, then every 60 minutes for 2 hours, then per unit routine      Pulse oximetry     Frequency: Per Unit Routine     Number of Occurrences: Until Specified    Verify informed consent     Frequency: Once     Number of Occurrences: 1 Occurrences     Vital signs     Frequency: Per Unit Routine     Number of Occurrences: Until Specified     Order Comments: Every 15 minutes for 1 hour, then every 30 minutes for 1 hour, then every 60 minutes for 2 hours, then per unit routine.      Void prior to procedure     Frequency: Once     Number of Occurrences: 1 Occurrences   Consult    Consult to Interventional Radiology     Frequency: Once     Number of Occurrences: 1 Occurrences    Consult to Pulmonology     Frequency: Once     Number of Occurrences: 1 Occurrences   Medications    acetaminophen (Tylenol) tab 650 mg     Frequency: Q6H PRN     Dose: 650 mg     Route: Oral    albumin human (Albumin) 25% injection 25 g     Frequency: Once     Dose: 25 g     Route: Intravenous    albuterol (Ventolin) (2.5 MG/3ML) 0.083% nebulizer solution 2.5 mg     Frequency: Q6H PRN     Dose: 2.5 mg     Route: Nebulization    aspirin DR tab 81 mg     Frequency: Daily     Dose: 81 mg     Route: Oral    bisacodyl (Dulcolax) 10 MG rectal suppository 10 mg     Frequency: Daily PRN     Dose: 10 mg     Route: Rectal    busPIRone (Buspar) tab 30 mg     Frequency: TID     Dose: 30 mg     Route: Oral    ceFAZolin (Ancef) 1 g in dextrose 5% 100mL IVPB-ADD     Frequency: Q24H     Dose: 1 g     Route: Intravenous    chlorproMAZINE (Thorazine) tab 25 mg     Frequency: QID     Dose: 25 mg     Route: Oral    dextrose 10% - sodium chloride 0.9% 1000 mL infusion     Frequency: Continuous     Route: Intravenous    diazePAM (Valium) tab 5 mg     Frequency: Q8H PRN     Dose: 5 mg     Route: Oral    docusate sodium (Colace) cap 100 mg     Frequency: BID     Dose: 100 mg     Route: Oral    DULoxetine (Cymbalta) DR cap 60 mg     Frequency: BID     Dose: 60 mg     Route: Oral    fleet enema (Fleet) rectal enema 133 mL     Frequency: Once PRN     Dose: 1 enema     Route: Rectal    heparin (Porcine) 100 Units/mL lock flush 150 Units     Frequency: Once     Dose: 1.5 mL     Route: Intravenous    heparin (Porcine)  1000 UNIT/ML injection 2,000 Units     Frequency: PRN Dialysis     Dose: 2,000 Units     Route: Intravenous    heparin (Porcine) 5000 UNIT/ML injection 5,000 Units     Frequency: Q12H PATRICK     Dose: 5,000 Units     Route: Subcutaneous    HYDROcodone-acetaminophen (Norco) 5-325 MG per tab 1 tablet     Frequency: Q6H PRN     Dose: 1 tablet     Route: Oral    levothyroxine (Synthroid) tab 150 mcg     Frequency: Before breakfast     Dose: 150 mcg     Route: Oral    magnesium hydroxide (Milk of Magnesia) 400 MG/5ML oral suspension 30 mL     Frequency: Daily PRN     Dose: 30 mL     Route: Oral    metoprolol succinate ER (Toprol XL) 24 hr tab 50 mg     Frequency: BID     Dose: 50 mg     Route: Oral    midodrine (ProAmatine) tab 10 mg     Frequency: TID     Dose: 10 mg     Route: Oral    norepinephrine (Levophed) 4 mg/250mL infusion premix     Frequency: Continuous     Dose: 0.5-50 mcg/min     Route: Intravenous    ondansetron (Zofran) 4 MG/2ML injection 4 mg     Frequency: Q6H PRN     Dose: 4 mg     Route: Intravenous    polyethylene glycol (PEG 3350) (Miralax) 17 g oral packet 17 g     Frequency: Daily PRN     Dose: 17 g     Route: Oral    potassium chloride (Klor-Con M10) tab 10 mEq     Frequency: Daily     Dose: 10 mEq     Route: Oral    QUEtiapine (SEROquel) tab 50 mg     Frequency: BID     Dose: 50 mg     Route: Oral    QUEtiapine ER (SEROquel XR) 24 hr tab 300 mg     Frequency: Nightly     Dose: 300 mg     Route: Oral       Fanny Majano MD           [1]   Current Facility-Administered Medications   Medication Dose Route Frequency    potassium chloride (Klor-Con M10) tab 10 mEq  10 mEq Oral Daily    ceFAZolin (Ancef) 1 g in dextrose 5% 100mL IVPB-ADD  1 g Intravenous Q24H    heparin (Porcine) 100 Units/mL lock flush 150 Units  1.5 mL Intravenous Once    HYDROcodone-acetaminophen (Norco) 5-325 MG per tab 1 tablet  1 tablet Oral Q6H PRN    albumin human (Albumin) 25% injection 25 g  25 g Intravenous Once    dextrose  10% - sodium chloride 0.9% 1000 mL infusion   Intravenous Continuous    ondansetron (Zofran) 4 MG/2ML injection 4 mg  4 mg Intravenous Q6H PRN    docusate sodium (Colace) cap 100 mg  100 mg Oral BID    polyethylene glycol (PEG 3350) (Miralax) 17 g oral packet 17 g  17 g Oral Daily PRN    magnesium hydroxide (Milk of Magnesia) 400 MG/5ML oral suspension 30 mL  30 mL Oral Daily PRN    bisacodyl (Dulcolax) 10 MG rectal suppository 10 mg  10 mg Rectal Daily PRN    fleet enema (Fleet) rectal enema 133 mL  1 enema Rectal Once PRN    norepinephrine (Levophed) 4 mg/250mL infusion premix  0.5-50 mcg/min Intravenous Continuous    heparin (Porcine) 1000 UNIT/ML injection 2,000 Units  2,000 Units Intravenous PRN Dialysis    albuterol (Ventolin) (2.5 MG/3ML) 0.083% nebulizer solution 2.5 mg  2.5 mg Nebulization Q6H PRN    [Held by provider] heparin (Porcine) 5000 UNIT/ML injection 5,000 Units  5,000 Units Subcutaneous 2 times per day    midodrine (ProAmatine) tab 10 mg  10 mg Oral TID    acetaminophen (Tylenol) tab 650 mg  650 mg Oral Q6H PRN    aspirin DR tab 81 mg  81 mg Oral Daily    busPIRone (Buspar) tab 30 mg  30 mg Oral TID    chlorproMAZINE (Thorazine) tab 25 mg  25 mg Oral QID    diazePAM (Valium) tab 5 mg  5 mg Oral Q8H PRN    DULoxetine (Cymbalta) DR cap 60 mg  60 mg Oral BID    levothyroxine (Synthroid) tab 150 mcg  150 mcg Oral Before breakfast    [Held by provider] metoprolol succinate ER (Toprol XL) 24 hr tab 50 mg  50 mg Oral BID    QUEtiapine (SEROquel) tab 50 mg  50 mg Oral BID    QUEtiapine ER (SEROquel XR) 24 hr tab 300 mg  300 mg Oral Nightly   [2]   Patient Active Problem List  Diagnosis    Closed fracture of proximal end of left humerus, unspecified fracture morphology, initial encounter    Frequent falls    Seizure-like activity (HCC)    Pituitary lesion (HCC)    Major depressive disorder, recurrent episode, moderate (HCC)    Generalized anxiety disorder    Acute renal failure superimposed on chronic  kidney disease, unspecified acute renal failure type, unspecified CKD stage    Non-traumatic rhabdomyolysis    Transaminitis

## 2025-05-23 NOTE — SLP NOTE
SPEECH DAILY NOTE - INPATIENT    ASSESSMENT & PLAN   ASSESSMENT      Proper PPE worn. Hands sanitized upon entrance/exit Pt room.         Pt alert, afebrile and on 2L/min 02 via NC. Pt seen for swallow analysis per BSE recommendations (after consulting with RN). Pt agreed to participate. Pt's preferred method of learning verbal. Pt upright in bed; observed with current diet of soft ETC/thin liquids (lunch tray) for monitoring diet tolerance. Swallowing precautions/strategies discussed; mild cues needed for execution. Prolonged mastication time on soft solid- attributed to Pt's overt fatigue; reduced endurance for task. No significant oral retention. Pharyngeal response appeared to trigger within 1-2 sec per hyolaryngeal elevation to completion (functional rise/strength per palpation). No overt CSA on soft ETC nor thin liquids. Slight congestion noted baseline. Rec no straw- discussed with Pt and RN. No report of difficulty taking meds. Sp02 ~95% during this session. Call light within Pt's reach upon SLP discharge from room.        CXR 5/22/25:  CONCLUSION: Findings suggesting increased fluid volume status of the patient and/or CHF.  No significant interval change.       PLAN:    To f/u x1 sessions to ensure safe intake of diet and reinforce swallowing/aspiration precautions. To monitor for new CXR results. Diet upgrade as tolerated (with dentures present). Family education as available.          Diet Recommendations - Solids: Soft/ Easy to chew  Diet Recommendations - Liquids: Thin Liquids    Compensatory Strategies Recommended:  (no straw)  Aspiration Precautions: Upright position, Slow rate, Small bites, Small sips  Medication Administration Recommendations:  (as tolerated)    Patient Experiencing Pain: No  Treatment Plan  Treatment Plan/Recommendations: Aspiration precautions  Interdisciplinary Communication: Discussed with RN  Plan posted at bedside      GOALS  Goal #1 The patient will tolerate soft easy to chew  consistency and thin liquids without overt signs or symptoms of aspiration with 100 % accuracy over 1-2 session(s).    No CSA on current diet. Increased mastication duration.          In Progress   Goal #2 The patient/family/caregiver will demonstrate understanding and implementation of aspiration precautions and swallow strategies independently over 1-2 session(s).    Swallowing precautions posted in room.     In Progress   Goal #3 The patient will tolerate trial upgrade of regular consistency and thin liquids without overt signs or symptoms of aspiration with 100 % accuracy over 1-2 session(s).    N/A    In Progress   Goal #4 The patient will utilize compensatory strategies as outlined by  BSSE (clinical evaluation) including Slow rate, Small bites, Small sips, Upright 90 degrees, Eliminate distractions with PRN assistance 100 % of the time across 2 sessions.    Mild cues for small sips/bites.     In Progress   FOLLOW UP  Follow Up Needed (Documentation Required): Yes  SLP Follow-up Date: 05/24/25  Duration: 1 week    Session: 1    If you have any questions, please contact   Maria Fernanda Fierro M.S. [facility]/SLP  Speech-Language Pathologist  [facility]  #[phone]

## 2025-05-23 NOTE — PLAN OF CARE
Transferred to room 544. Report given to ELLIS Riggs. PRN pain meds given. Fluids running @ 50ml  Problem: Patient Centered Care  Goal: Patient preferences are identified and integrated in the patient's plan of care  Description: Interventions:  - What would you like us to know as we care for you?   - Provide timely, complete, and accurate information to patient/family  - Incorporate patient and family knowledge, values, beliefs, and cultural backgrounds into the planning and delivery of care  - Encourage patient/family to participate in care and decision-making at the level they choose  - Honor patient and family perspectives and choices  Outcome: Progressing     Problem: PAIN - ADULT  Goal: Verbalizes/displays adequate comfort level or patient's stated pain goal  Description: INTERVENTIONS:  - Encourage pt to monitor pain and request assistance  - Assess pain using appropriate pain scale  - Administer analgesics based on type and severity of pain and evaluate response  - Implement non-pharmacological measures as appropriate and evaluate response  - Consider cultural and social influences on pain and pain management  - Manage/alleviate anxiety  - Utilize distraction and/or relaxation techniques  - Monitor for opioid side effects  - Notify MD/LIP if interventions unsuccessful or patient reports new pain  - Anticipate increased pain with activity and pre-medicate as appropriate  Outcome: Progressing     Problem: SAFETY ADULT - FALL  Goal: Free from fall injury  Description: INTERVENTIONS:  - Assess pt frequently for physical needs  - Identify cognitive and physical deficits and behaviors that affect risk of falls.  - [place] fall precautions as indicated by assessment.  - Educate pt/family on patient safety including physical limitations  - Instruct pt to call for assistance with activity based on assessment  - Modify environment to reduce risk of injury  - Provide assistive devices as appropriate  - Consider OT/PT  consult to assist with strengthening/mobility  - Encourage toileting schedule  Outcome: Progressing     Problem: DISCHARGE PLANNING  Goal: Discharge to home or other facility with appropriate resources  Description: INTERVENTIONS:  - Identify barriers to discharge w/pt and caregiver  - Include patient/family/discharge partner in discharge planning  - Arrange for needed discharge resources and transportation as appropriate  - Identify discharge learning needs (meds, wound care, etc)  - Arrange for interpreters to assist at discharge as needed  - Consider post-discharge preferences of patient/family/discharge partner  - Complete POLST form as appropriate  - Assess patient's ability to be responsible for managing their own health  - Refer to Case Management Department for coordinating discharge planning if the patient needs post-hospital services based on physician/LIP order or complex needs related to functional status, cognitive ability or social support system  Outcome: Progressing     Problem: SKIN/TISSUE INTEGRITY - ADULT  Goal: Skin integrity remains intact  Description: INTERVENTIONS  - Assess and document risk factors for pressure ulcer development  - Assess and document skin integrity  - Monitor for areas of redness and/or skin breakdown  - Initiate interventions, skin care algorithm/standards of care as needed  Outcome: Progressing     Problem: RESPIRATORY - ADULT  Goal: Achieves optimal ventilation and oxygenation  Description: INTERVENTIONS:  - Assess for changes in respiratory status  - Assess for changes in mentation and behavior  - Position to facilitate oxygenation and minimize respiratory effort  - Oxygen supplementation based on oxygen saturation or ABGs  - Provide Smoking Cessation handout, if applicable  - Encourage broncho-pulmonary hygiene including cough, deep breathe, Incentive Spirometry  - Assess the need for suctioning and perform as needed  - Assess and instruct to report SOB or any  respiratory difficulty  - Respiratory Therapy support as indicated  - Manage/alleviate anxiety  - Monitor for signs/symptoms of CO2 retention  Outcome: Progressing     Problem: GENITOURINARY - ADULT  Goal: Absence of urinary retention  Description: INTERVENTIONS:  - Assess patient’s ability to void and empty bladder  - Monitor intake/output and perform bladder scan as needed  - Follow urinary retention protocol/standard of care  - Consider collaborating with pharmacy to review patient's medication profile  - Implement strategies to promote bladder emptying  Outcome: Progressing     Problem: METABOLIC/FLUID AND ELECTROLYTES - ADULT  Goal: Glucose maintained within prescribed range  Description: INTERVENTIONS:  - Monitor Blood Glucose as ordered  - Assess for signs and symptoms of hyperglycemia and hypoglycemia  - Administer ordered medications to maintain glucose within target range  - Assess barriers to adequate nutritional intake and initiate nutrition consult as needed  - Instruct patient on self management of diabetes  Outcome: Progressing  Goal: Electrolytes maintained within normal limits  Description: INTERVENTIONS:  - Monitor labs and rhythm and assess patient for signs and symptoms of electrolyte imbalances  - Administer electrolyte replacement as ordered  - Monitor response to electrolyte replacements, including rhythm and repeat lab results as appropriate  - Fluid restriction as ordered  - Instruct patient on fluid and nutrition restrictions as appropriate  Outcome: Progressing  Goal: Hemodynamic stability and optimal renal function maintained  Description: INTERVENTIONS:  - Monitor labs and assess for signs and symptoms of volume excess or deficit  - Monitor intake, output and patient weight  - Monitor urine specific gravity, serum osmolarity and serum sodium as indicated or ordered  - Monitor response to interventions for patient's volume status, including labs, urine output, blood pressure (other  measures as available)  - Encourage oral intake as appropriate  - Instruct patient on fluid and nutrition restrictions as appropriate  Outcome: Progressing     Problem: HEMATOLOGIC - ADULT  Goal: Free from bleeding injury  Description: (Example usage: patient with low platelets)  INTERVENTIONS:  - Avoid intramuscular injections, enemas and rectal medication administration  - Ensure safe mobilization of patient  - Hold pressure on venipuncture sites to achieve adequate hemostasis  - Assess for signs and symptoms of internal bleeding  - Monitor lab trends  - Patient is to report abnormal signs of bleeding to staff  - Avoid use of toothpicks and dental floss  - Use electric shaver for shaving  - Use soft bristle tooth brush  - Limit straining and forceful nose blowing  Outcome: Progressing

## 2025-05-23 NOTE — CM/SW NOTE
Patient discussed in RN DC rounds. ARMEN sent updated HD clinical information to HD SARs via aidin. [facility] is still queued at this time.     413pm- [facility] uploaded into aidin referral    Plan: Pending medical clearance, DC to TBD, [facility], *[facility], *list/choice/*[facility], HD flowsheets, *[facility] HD approval    SW/VALORIE to remain available for support and/or discharge planning.     Michaela Young, MSW, LSW   x [phone]

## 2025-05-23 NOTE — PROGRESS NOTES
[facility]  part of [facility]     Progress Note    Radha Winters Patient Status:  Inpatient    1960 MRN [id number]   Location [facility] 5SW/SE Attending Fanny Majano MD   Hosp Day # 6 PCP JUAN MONTANEZ       Subjective:   Patient seen and examined.  Resting in bed.  Some occasional mild dyspnea.    Objective:   Blood pressure 130/64, pulse 93, temperature 97.6 °F (36.4 °C), temperature source Oral, resp. rate 18, height 5' 3\" (1.6 m), weight 211 lb 13.8 oz (96.1 kg), SpO2 95%.  Intake/Output:   Last 3 shifts: I/O last 3 completed shifts:  In: 2109.9 [P.O.:250; I.V.:1759.9; IV PIGGYBACK:100]  Out: 50 [Urine:50]   This shift: No intake/output data recorded.     Vent Settings:      Hemodynamic parameters (last 24 hours):      Scheduled Meds: Current Hospital Medications[1]    Continuous Infusions: Medication Infusions[2]    Physical Exam  Constitutional: no acute distress  Eyes: PERRL  ENT: nares pateint  Neck: supple, no JVD  Cardio: RRR, S1 S2  Respiratory: Slightly diminished basilar breath sounds  GI: abdomen soft, non tender, active bowel sounds, no organomegaly  Extremities: no clubbing, cyanosis, edema  Neurologic: no gross motor deficits  Skin: warm, dry      Results:     Lab Results   Component Value Date    WBC 7.5 2025    HGB 15.2 2025    HCT 46.0 2025    .0 2025    CREATSERUM 5.84 2025    BUN 22 2025     2025    K 3.7 2025     2025    CO2 25.0 2025     2025    CA 8.4 2025    ALB 3.3 2025    ALKPHO 343 2025    BILT <0.2 2025    TP 5.3 2025     2025     2025    CK 7,754 2025       IR TUNNELED CV CATH INSERTION EXCHANGE CHECK  Result Date: 2025  CONCLUSION:  Conversion of right temporary catheter to PermCath with tip at cavo-atrial junction.  Line is ready for immediate use.    Dictated by (CST): Kenisha  MD Galen on 5/22/2025 at 6:47 PM     Finalized by (CST): Galen De MD on 5/22/2025 at 6:51 PM          XR CHEST AP PORTABLE  (CPT=71045)  Result Date: 5/22/2025  CONCLUSION: Findings suggesting increased fluid volume status of the patient and/or CHF.  No significant interval change.   Dictated by (CST): Michael Villareal MD on 5/22/2025 at 7:34 AM     Finalized by (CST): Michael Villareal MD on 5/22/2025 at 7:36 AM                  Assessment   1.  Acute kidney injury  2.  Acute respiratory failure  3.  Transaminitis, improved  4.  Mild pulmonary edema     Plan   -Patient presents with evidence of acute kidney injury and rhabdomyolysis with hypotension.  Weaned off pressor support.  - Wean oxygen as tolerated  - Chest x-ray with mild edema seen  - DVT prophylaxis: Heparin    Stephanie Russell, DO  Pulmonary Critical Care Medicine  [facility]        [1]   Current Facility-Administered Medications   Medication Dose Route Frequency    potassium chloride (Klor-Con M10) tab 10 mEq  10 mEq Oral Daily    ceFAZolin (Ancef) 1 g in dextrose 5% 100mL IVPB-ADD  1 g Intravenous Q24H    heparin (Porcine) 100 Units/mL lock flush 150 Units  1.5 mL Intravenous Once    HYDROcodone-acetaminophen (Norco) 5-325 MG per tab 1 tablet  1 tablet Oral Q6H PRN    albumin human (Albumin) 25% injection 25 g  25 g Intravenous Once    dextrose 10% - sodium chloride 0.9% 1000 mL infusion   Intravenous Continuous    ondansetron (Zofran) 4 MG/2ML injection 4 mg  4 mg Intravenous Q6H PRN    docusate sodium (Colace) cap 100 mg  100 mg Oral BID    polyethylene glycol (PEG 3350) (Miralax) 17 g oral packet 17 g  17 g Oral Daily PRN    magnesium hydroxide (Milk of Magnesia) 400 MG/5ML oral suspension 30 mL  30 mL Oral Daily PRN    bisacodyl (Dulcolax) 10 MG rectal suppository 10 mg  10 mg Rectal Daily PRN    fleet enema (Fleet) rectal enema 133 mL  1 enema Rectal Once PRN    norepinephrine (Levophed) 4 mg/250mL infusion premix  0.5-50 mcg/min Intravenous  Continuous    heparin (Porcine) 1000 UNIT/ML injection 2,000 Units  2,000 Units Intravenous PRN Dialysis    albuterol (Ventolin) (2.5 MG/3ML) 0.083% nebulizer solution 2.5 mg  2.5 mg Nebulization Q6H PRN    [Held by provider] heparin (Porcine) 5000 UNIT/ML injection 5,000 Units  5,000 Units Subcutaneous 2 times per day    midodrine (ProAmatine) tab 10 mg  10 mg Oral TID    acetaminophen (Tylenol) tab 650 mg  650 mg Oral Q6H PRN    aspirin DR tab 81 mg  81 mg Oral Daily    busPIRone (Buspar) tab 30 mg  30 mg Oral TID    chlorproMAZINE (Thorazine) tab 25 mg  25 mg Oral QID    diazePAM (Valium) tab 5 mg  5 mg Oral Q8H PRN    DULoxetine (Cymbalta) DR cap 60 mg  60 mg Oral BID    levothyroxine (Synthroid) tab 150 mcg  150 mcg Oral Before breakfast    [Held by provider] metoprolol succinate ER (Toprol XL) 24 hr tab 50 mg  50 mg Oral BID    QUEtiapine (SEROquel) tab 50 mg  50 mg Oral BID    QUEtiapine ER (SEROquel XR) 24 hr tab 300 mg  300 mg Oral Nightly   [2]    dextrose 10% - sodium chloride 0.9% 1000 mL infusion 50 mL/hr at 05/22/25 0752    norepinephrine Stopped (05/19/25 2332)

## 2025-05-23 NOTE — CONGREGATE LIVING REVIEW
[facility] Living Authorization    The [facility] Review Committee has reviewed this case and the patient IS APPROVED for discharge to a facility for Short Term Skilled once the following procedure is followed:     - The physician discharge instructions (contained within the FLORES note for SNF) must inlcude the below appropriate and approved COVID instructions to the facility    For questions regarding CLRC approval process, please contact the CM assigned to the case.  For questions regarding RN discharge workflow, please contact the unit Clinical Leader.

## 2025-05-24 ENCOUNTER — APPOINTMENT (OUTPATIENT)
Dept: GENERAL RADIOLOGY | Facility: HOSPITAL | Age: 65
End: 2025-05-24
Attending: HOSPITALIST
Payer: MEDICAID

## 2025-05-24 ENCOUNTER — APPOINTMENT (OUTPATIENT)
Dept: GENERAL RADIOLOGY | Facility: HOSPITAL | Age: 65
End: 2025-05-24
Attending: INTERNAL MEDICINE
Payer: MEDICAID

## 2025-05-24 LAB
ALBUMIN SERPL-MCNC: 3.1 G/DL (ref 3.2–4.8)
ALBUMIN/GLOB SERPL: 1.6 {RATIO} (ref 1–2)
ALP LIVER SERPL-CCNC: 274 U/L (ref 50–130)
ALT SERPL-CCNC: 189 U/L (ref 10–49)
ANION GAP SERPL CALC-SCNC: 6 MMOL/L (ref 0–18)
AST SERPL-CCNC: 193 U/L (ref ?–34)
ATRIAL RATE: 94 BPM
BASOPHILS # BLD AUTO: 0.05 X10(3) UL (ref 0–0.2)
BASOPHILS NFR BLD AUTO: 0.6 %
BILIRUB SERPL-MCNC: 0.2 MG/DL (ref 0.2–1.1)
BUN BLD-MCNC: 18 MG/DL (ref 9–23)
BUN/CREAT SERPL: 3.7 (ref 10–20)
CALCIUM BLD-MCNC: 8 MG/DL (ref 8.7–10.4)
CHLORIDE SERPL-SCNC: 102 MMOL/L (ref 98–112)
CK SERPL-CCNC: 6552 U/L (ref 34–145)
CO2 SERPL-SCNC: 29 MMOL/L (ref 21–32)
CREAT BLD-MCNC: 4.89 MG/DL (ref 0.55–1.02)
D DIMER PPP FEU-MCNC: 1.77 UG/ML FEU (ref ?–0.62)
DEPRECATED RDW RBC AUTO: 51.8 FL (ref 35.1–46.3)
EGFRCR SERPLBLD CKD-EPI 2021: 9 ML/MIN/1.73M2 (ref 60–?)
EOSINOPHIL # BLD AUTO: 0.06 X10(3) UL (ref 0–0.7)
EOSINOPHIL NFR BLD AUTO: 0.7 %
ERYTHROCYTE [DISTWIDTH] IN BLOOD BY AUTOMATED COUNT: 15.8 % (ref 11–15)
GLOBULIN PLAS-MCNC: 1.9 G/DL (ref 2–3.5)
GLUCOSE BLD-MCNC: 151 MG/DL (ref 70–99)
GLUCOSE BLDC GLUCOMTR-MCNC: 107 MG/DL (ref 70–99)
GLUCOSE BLDC GLUCOMTR-MCNC: 142 MG/DL (ref 70–99)
HCT VFR BLD AUTO: 41 % (ref 35–48)
HGB BLD-MCNC: 13.7 G/DL (ref 12–16)
IMM GRANULOCYTES # BLD AUTO: 0.07 X10(3) UL (ref 0–1)
IMM GRANULOCYTES NFR BLD: 0.9 %
LYMPHOCYTES # BLD AUTO: 0.84 X10(3) UL (ref 1–4)
LYMPHOCYTES NFR BLD AUTO: 10.3 %
MCH RBC QN AUTO: 30.3 PG (ref 26–34)
MCHC RBC AUTO-ENTMCNC: 33.4 G/DL (ref 31–37)
MCV RBC AUTO: 90.7 FL (ref 80–100)
MONOCYTES # BLD AUTO: 0.53 X10(3) UL (ref 0.1–1)
MONOCYTES NFR BLD AUTO: 6.5 %
NEUTROPHILS # BLD AUTO: 6.64 X10 (3) UL (ref 1.5–7.7)
NEUTROPHILS # BLD AUTO: 6.64 X10(3) UL (ref 1.5–7.7)
NEUTROPHILS NFR BLD AUTO: 81 %
OSMOLALITY SERPL CALC.SUM OF ELEC: 289 MOSM/KG (ref 275–295)
P AXIS: 74 DEGREES
P-R INTERVAL: 232 MS
PLATELET # BLD AUTO: 111 10(3)UL (ref 150–450)
PLATELETS.RETICULATED NFR BLD AUTO: 7.2 % (ref 0–7)
POTASSIUM SERPL-SCNC: 3.7 MMOL/L (ref 3.5–5.1)
PROT SERPL-MCNC: 5 G/DL (ref 5.7–8.2)
Q-T INTERVAL: 370 MS
QRS DURATION: 104 MS
QTC CALCULATION (BEZET): 462 MS
R AXIS: -13 DEGREES
RBC # BLD AUTO: 4.52 X10(6)UL (ref 3.8–5.3)
SODIUM SERPL-SCNC: 137 MMOL/L (ref 136–145)
T AXIS: 48 DEGREES
TROPONIN I SERPL HS-MCNC: 197 NG/L (ref ?–34)
VENTRICULAR RATE: 94 BPM
WBC # BLD AUTO: 8.2 X10(3) UL (ref 4–11)

## 2025-05-24 PROCEDURE — 99233 SBSQ HOSP IP/OBS HIGH 50: CPT | Performed by: INTERNAL MEDICINE

## 2025-05-24 PROCEDURE — 71045 X-RAY EXAM CHEST 1 VIEW: CPT | Performed by: INTERNAL MEDICINE

## 2025-05-24 PROCEDURE — 71045 X-RAY EXAM CHEST 1 VIEW: CPT | Performed by: HOSPITALIST

## 2025-05-24 PROCEDURE — 99291 CRITICAL CARE FIRST HOUR: CPT | Performed by: HOSPITALIST

## 2025-05-24 RX ORDER — DULOXETIN HYDROCHLORIDE 60 MG/1
60 CAPSULE, DELAYED RELEASE ORAL 2 TIMES DAILY
Status: DISCONTINUED | OUTPATIENT
Start: 2025-05-24 | End: 2025-06-07

## 2025-05-24 RX ORDER — DOCUSATE SODIUM 100 MG/1
100 CAPSULE, LIQUID FILLED ORAL 2 TIMES DAILY
Status: DISCONTINUED | OUTPATIENT
Start: 2025-05-24 | End: 2025-05-28

## 2025-05-24 RX ORDER — QUETIAPINE FUMARATE 25 MG/1
50 TABLET, FILM COATED ORAL 2 TIMES DAILY
Status: DISCONTINUED | OUTPATIENT
Start: 2025-05-24 | End: 2025-05-28

## 2025-05-24 RX ORDER — BUSPIRONE HYDROCHLORIDE 15 MG/1
30 TABLET ORAL 3 TIMES DAILY
Status: DISCONTINUED | OUTPATIENT
Start: 2025-05-24 | End: 2025-05-28

## 2025-05-24 RX ORDER — CHLORPROMAZINE HYDROCHLORIDE 25 MG/1
25 TABLET, FILM COATED ORAL 4 TIMES DAILY
Status: DISCONTINUED | OUTPATIENT
Start: 2025-05-24 | End: 2025-05-28

## 2025-05-24 NOTE — PLAN OF CARE
Problem: Patient Centered Care  Goal: Patient preferences are identified and integrated in the patient's plan of care  Description: Interventions:  - What would you like us to know as we care for you?   - Provide timely, complete, and accurate information to patient/family  - Incorporate patient and family knowledge, values, beliefs, and cultural backgrounds into the planning and delivery of care  - Encourage patient/family to participate in care and decision-making at the level they choose  - Honor patient and family perspectives and choices  Outcome: Progressing     Problem: PAIN - ADULT  Goal: Verbalizes/displays adequate comfort level or patient's stated pain goal  Description: INTERVENTIONS:  - Encourage pt to monitor pain and request assistance  - Assess pain using appropriate pain scale  - Administer analgesics based on type and severity of pain and evaluate response  - Implement non-pharmacological measures as appropriate and evaluate response  - Consider cultural and social influences on pain and pain management  - Manage/alleviate anxiety  - Utilize distraction and/or relaxation techniques  - Monitor for opioid side effects  - Notify MD/LIP if interventions unsuccessful or patient reports new pain  - Anticipate increased pain with activity and pre-medicate as appropriate  Outcome: Progressing     Problem: SAFETY ADULT - FALL  Goal: Free from fall injury  Description: INTERVENTIONS:  - Assess pt frequently for physical needs  - Identify cognitive and physical deficits and behaviors that affect risk of falls.  - Moran fall precautions as indicated by assessment.  - Educate pt/family on patient safety including physical limitations  - Instruct pt to call for assistance with activity based on assessment  - Modify environment to reduce risk of injury  - Provide assistive devices as appropriate  - Consider OT/PT consult to assist with strengthening/mobility  - Encourage toileting schedule  Outcome:  Progressing     Problem: DISCHARGE PLANNING  Goal: Discharge to home or other facility with appropriate resources  Description: INTERVENTIONS:  - Identify barriers to discharge w/pt and caregiver  - Include patient/family/discharge partner in discharge planning  - Arrange for needed discharge resources and transportation as appropriate  - Identify discharge learning needs (meds, wound care, etc)  - Arrange for interpreters to assist at discharge as needed  - Consider post-discharge preferences of patient/family/discharge partner  - Complete POLST form as appropriate  - Assess patient's ability to be responsible for managing their own health  - Refer to Case Management Department for coordinating discharge planning if the patient needs post-hospital services based on physician/LIP order or complex needs related to functional status, cognitive ability or social support system  Outcome: Progressing     Problem: SKIN/TISSUE INTEGRITY - ADULT  Goal: Skin integrity remains intact  Description: INTERVENTIONS  - Assess and document risk factors for pressure ulcer development  - Assess and document skin integrity  - Monitor for areas of redness and/or skin breakdown  - Initiate interventions, skin care algorithm/standards of care as needed  Outcome: Progressing     Problem: RESPIRATORY - ADULT  Goal: Achieves optimal ventilation and oxygenation  Description: INTERVENTIONS:  - Assess for changes in respiratory status  - Assess for changes in mentation and behavior  - Position to facilitate oxygenation and minimize respiratory effort  - Oxygen supplementation based on oxygen saturation or ABGs  - Provide Smoking Cessation handout, if applicable  - Encourage broncho-pulmonary hygiene including cough, deep breathe, Incentive Spirometry  - Assess the need for suctioning and perform as needed  - Assess and instruct to report SOB or any respiratory difficulty  - Respiratory Therapy support as indicated  - Manage/alleviate  anxiety  - Monitor for signs/symptoms of CO2 retention  Outcome: Progressing     Problem: GENITOURINARY - ADULT  Goal: Absence of urinary retention  Description: INTERVENTIONS:  - Assess patient’s ability to void and empty bladder  - Monitor intake/output and perform bladder scan as needed  - Follow urinary retention protocol/standard of care  - Consider collaborating with pharmacy to review patient's medication profile  - Implement strategies to promote bladder emptying  Outcome: Progressing     Problem: METABOLIC/FLUID AND ELECTROLYTES - ADULT  Goal: Glucose maintained within prescribed range  Description: INTERVENTIONS:  - Monitor Blood Glucose as ordered  - Assess for signs and symptoms of hyperglycemia and hypoglycemia  - Administer ordered medications to maintain glucose within target range  - Assess barriers to adequate nutritional intake and initiate nutrition consult as needed  - Instruct patient on self management of diabetes  Outcome: Progressing  Goal: Electrolytes maintained within normal limits  Description: INTERVENTIONS:  - Monitor labs and rhythm and assess patient for signs and symptoms of electrolyte imbalances  - Administer electrolyte replacement as ordered  - Monitor response to electrolyte replacements, including rhythm and repeat lab results as appropriate  - Fluid restriction as ordered  - Instruct patient on fluid and nutrition restrictions as appropriate  Outcome: Progressing  Goal: Hemodynamic stability and optimal renal function maintained  Description: INTERVENTIONS:  - Monitor labs and assess for signs and symptoms of volume excess or deficit  - Monitor intake, output and patient weight  - Monitor urine specific gravity, serum osmolarity and serum sodium as indicated or ordered  - Monitor response to interventions for patient's volume status, including labs, urine output, blood pressure (other measures as available)  - Encourage oral intake as appropriate  - Instruct patient on fluid  and nutrition restrictions as appropriate  Outcome: Progressing     Problem: HEMATOLOGIC - ADULT  Goal: Free from bleeding injury  Description: (Example usage: patient with low platelets)  INTERVENTIONS:  - Avoid intramuscular injections, enemas and rectal medication administration  - Ensure safe mobilization of patient  - Hold pressure on venipuncture sites to achieve adequate hemostasis  - Assess for signs and symptoms of internal bleeding  - Monitor lab trends  - Patient is to report abnormal signs of bleeding to staff  - Avoid use of toothpicks and dental floss  - Use electric shaver for shaving  - Use soft bristle tooth brush  - Limit straining and forceful nose blowing  Outcome: Progressing

## 2025-05-24 NOTE — PROGRESS NOTES
*See RRT Documentation record*  Reason for RRT was called: CHEST PAIN, SOB, HYPOXIA   Assessement of patient leading to RRT: patient laying, no apparent distress. Reports her chest feels heavy and short of breath. Continuous pulse ox in place showing 84%. 8L via HFNC in place at the time.   Intervertions/Testing:NRB at 15L, EKG, CXR, TROP, D-DIMER. STAT HD ordered   Patient outcomes/Disposition: PCU  Family notified y/n:N

## 2025-05-24 NOTE — PROGRESS NOTES
[place] [facility]  RAPID RESPONSE NOTE     aRdha Winters Patient Status:  Inpatient    1960 MRN [id number]   Location [facility] 5SW/SE Attending Fanny Majano MD   Hosp Day # 7 PCP JUAN MONTANEZ     Reason for RRT: CHEST PAIN, SOB, HYPOXIA    Physical Exam:    /62 (BP Location: Right arm)   Pulse 96   Temp 99.2 °F (37.3 °C) (Axillary)   Resp 20   Ht 5' 3\" (1.6 m)   Wt 231 lb 1.6 oz (104.8 kg)   SpO2 (!) 89%   BMI 40.94 kg/m²   General: MILD DISTRESS, FLAT AFFECT  Respiratory: BIBASILAR CRACKLES  Cardiovascular: S1,S2, RRR  Abdomen: SOFT, NT, ND  Neurologic: MAEW, NON-FOCAL  Extremities: NO EDEMA OF B/L LE'S    Diagnostic Data:      Labs: TROPS, D-DIMER PENDING    Imaging: XRAY PENDING    ASSESSMENT / PLAN:     SOB, HYPOXIA, CHEST DISCOMFORT  LIKELY DUE TO ONGOING FLUID OVERLOAD   NOW REQUIRING 15 L NRB  STAT EKG WITH NO ACUTE ST CHANGES  ORDERED TROP, D-DIMER AND STAT CHEST XRAY  WILL TRANSFER TO PCU FOR CLOSER MONITORING  D/W DR DUMAS  WILL D/W DR. GOMEZ AND BROOK    Critical care time: 35 MIN    Lucio Kelly MD  2025

## 2025-05-24 NOTE — PROGRESS NOTES
[facility]  part of [facility]    Progress Note    Radha Winters Patient Status:  Inpatient    1960 MRN [id number]   Location [facility] 2W/SW Attending Fanny Majano MD   Hosp Day # 7 PCP JUAN MONTANEZ       Subjective:   Radha Winters is a(n) 64 year old female acute kidney injury, rhabdomyolysis.  Nonoliguric.    Objective:     Vitals:    25 1600   BP: 111/59   Pulse: 101   Resp: 24   Temp:        Intake/Output Summary (Last 24 hours) at 2025 1726  Last data filed at 2025 0637  Gross per 24 hour   Intake 924 ml   Output --   Net 924 ml     Wt Readings from Last 2 Encounters:   25 235 lb 14.3 oz (107 kg)   10/09/23 185 lb 3 oz (84 kg)         General appearance:  alert, appears stated age, and cooperative  Head: Normocephalic, without obvious abnormality, atraumatic  Eyes: conjunctivae/corneas clear. PERRL, EOM's intact. Fundi benign.  Neck: no adenopathy, no carotid bruit, no JVD, supple, symmetrical, trachea midline, and thyroid not enlarged, symmetric, no tenderness/mass/nodules  Pulmonary: clear to auscultation bilaterally  Cardiovascular: S1, S2 normal, no murmur, click, rub or gallop, regular rate and rhythm  Abdominal: soft, non-tender; bowel sounds normal; no masses,  no organomegaly  Extremities: extremities normal, atraumatic, no cyanosis or edema  Pulses: 2+ and symmetric  Neurologic: Grossly normal  Genital Exam: defer exam    Current Medications:  Current Hospital Medications[1]    Allergies  Allergies[2]    Laboratory Results:     Lab Results   Component Value Date    WBC 8.2 2025    HGB 13.7 2025    HCT 41.0 2025    .0 (L) 2025     2025    K 3.7 2025     2025    CO2 29.0 2025    CREATSERUM 4.89 (H) 2025    BUN 18 2025     (H) 2025    CA 8.0 (L) 2025    ALB 3.1 (L) 2025    ALKPHO 274 (H) 2025    BILT 0.2 2025    TP  5.0 (L) 05/24/2025     (H) 05/24/2025     (H) 05/24/2025    TSH 0.528 10/10/2023    LIP 36 05/14/2025    DDIMER 1.77 (H) 05/24/2025    MG 2.4 05/17/2025    PHOS 6.3 (H) 05/17/2025    CK 6,552 (HH) 05/24/2025    B12 581 10/10/2023     Hospital Encounter on 05/17/25   1. Urine Culture, Routine     Status: Abnormal    Collection Time: 05/17/25 10:18 AM    Specimen: Urine, clean catch   Result Value Ref Range    Urine Culture >100,000 CFU/ML Escherichia coli (A) N/A       Susceptibility    Escherichia coli -  (no method available)     Ampicillin >=32 Resistant      Ampicillin + Sulbactam 4 Sensitive      Cefazolin <=4 Sensitive      Ciprofloxacin <=0.25 Sensitive      Gentamicin <=1 Sensitive      Meropenem <=0.25 Sensitive      Levofloxacin* 1 Sensitive       * The current CLSI susceptibility breakpoint for levofloxacin is an MARLEY of 0.5 mcg/mL. MICs above this should NOT be considered susceptible. Updated susceptibility reporting is in progress.     Nitrofurantoin <=16 Sensitive      Piperacillin + Tazobactam <=4 Sensitive      Trimethoprim/Sulfa >=320 Resistant        Assessment and Plan:     Acute renal failure superimposed on chronic kidney disease, unspecified acute renal failure type, unspecified CKD stage  I thinAcute renal failure superimposed on chronic kidney disease, unspecified acute renal failure type, unspecified CKD stage          Non-traumatic rhabdomyolysis          Transaminitis     Patient is a 65 y/o female with PMH of HTN, dyslipidemia, CAD admitted after multiple falls, Nephrology consulted for Acute Kidney Injury      Acute Kidney Injury:  Likely secondary to ATN, from Rhabdomyolysis, continue aggressive volume resuscitation, 0.9  ml/hr, continue to trend BMP, check Uric acid, Phos, Urine studies  CKD stage 3b vs 4:  Secondary to HTN nephrosclerosis, check Phos, PTH, renal US  Transaminitis:  Continue to trend LFTs, will check abdominal US        Recommendations:  Acute Kidney  Injury likely unresolved ATN  Will plan for tunneled HD line  KCL replacement     HD in AM     Renal US reviewed  Renally dose meds  Midodrine for MAP goal >65  Huynh placement, for strict I/Os    May 23, 2025  Patient is nonoliguric.  Renal function is severely deranged.  Has a tunneled dialysis catheter in place.  Will dialyze as needed for volume control.  Currently patient shows no signs of recovery of kidney function and likely needs supportive care including dialysis as needed.    May 24, 2025  Patient had a rapid response called because of being short of breath and was clinically volume overloaded.  Patient transferred to progressive care unit.  Discussed with hospitalist.  Patient is receiving dialysis with intent to remove 2.2 L of fluid.  Continue supportive care and monitor electrolytes and renal function.      Imaging Results:   No results found.  EKG 12 Lead  Result Date: 5/24/2025  Sinus rhythm with 1st degree A-V block Low voltage QRS, consider pulmonary disease, pericardial effusion, or normal variant Possible Anterolateral infarct (cited on or before 17-MAY-2025) Abnormal ECG When compared with ECG of 17-MAY-2025 10:02, Vent. rate has increased BY  35 BPM Confirmed by ULISES STRICKLAND, HALEY ([id number]) on 5/24/2025 10:45:10 AM          DANIS GOMEZ MD  5/24/2025  [email or web address]         [1]   Current Facility-Administered Medications:     QUEtiapine (SEROquel) tab 50 mg, 50 mg, Oral, BID    chlorproMAZINE (Thorazine) tab 25 mg, 25 mg, Oral, QID    busPIRone (Buspar) tab 30 mg, 30 mg, Oral, TID    docusate sodium (Colace) cap 100 mg, 100 mg, Oral, BID    DULoxetine (Cymbalta) DR cap 60 mg, 60 mg, Oral, BID    potassium chloride (Klor-Con M10) tab 10 mEq, 10 mEq, Oral, Daily    ceFAZolin (Ancef) 1 g in dextrose 5% 100mL IVPB-ADD, 1 g, Intravenous, Q24H    heparin (Porcine) 100 Units/mL lock flush 150 Units, 1.5 mL, Intravenous, Once    HYDROcodone-acetaminophen (Norco) 5-325 MG per tab 1  tablet, 1 tablet, Oral, Q6H PRN    albumin human (Albumin) 25% injection 25 g, 25 g, Intravenous, Once    dextrose 10% - sodium chloride 0.9% 1000 mL infusion, , Intravenous, Continuous    ondansetron (Zofran) 4 MG/2ML injection 4 mg, 4 mg, Intravenous, Q6H PRN    polyethylene glycol (PEG 3350) (Miralax) 17 g oral packet 17 g, 17 g, Oral, Daily PRN    magnesium hydroxide (Milk of Magnesia) 400 MG/5ML oral suspension 30 mL, 30 mL, Oral, Daily PRN    bisacodyl (Dulcolax) 10 MG rectal suppository 10 mg, 10 mg, Rectal, Daily PRN    fleet enema (Fleet) rectal enema 133 mL, 1 enema, Rectal, Once PRN    norepinephrine (Levophed) 4 mg/250mL infusion premix, 0.5-50 mcg/min, Intravenous, Continuous    heparin (Porcine) 1000 UNIT/ML injection 2,000 Units, 2,000 Units, Intravenous, PRN Dialysis    albuterol (Ventolin) (2.5 MG/3ML) 0.083% nebulizer solution 2.5 mg, 2.5 mg, Nebulization, Q6H PRN    heparin (Porcine) 5000 UNIT/ML injection 5,000 Units, 5,000 Units, Subcutaneous, 2 times per day    midodrine (ProAmatine) tab 10 mg, 10 mg, Oral, TID    acetaminophen (Tylenol) tab 650 mg, 650 mg, Oral, Q6H PRN    aspirin DR tab 81 mg, 81 mg, Oral, Daily    diazePAM (Valium) tab 5 mg, 5 mg, Oral, Q8H PRN    levothyroxine (Synthroid) tab 150 mcg, 150 mcg, Oral, Before breakfast    [Held by provider] metoprolol succinate ER (Toprol XL) 24 hr tab 50 mg, 50 mg, Oral, BID    QUEtiapine ER (SEROquel XR) 24 hr tab 300 mg, 300 mg, Oral, Nightly  [2] No Known Allergies

## 2025-05-24 NOTE — PROGRESS NOTES
[facility]  part of [facility]    Progress Note      Assessment and Plan:   1.  Acute kidney injury with rhabdomyolysis-the patient remains on hemodialysis.  She became short of breath today and rapid response was called and she was transferred back down to the unit.    Recommendations:  1.  Dialysis  2.  As per nephrology.     2.  Transaminitis-improving.  Etiology is uncertain.  Hypotensive and may have a component of shock liver.  Was on a statin.  Minimal fatty infiltration on CT scan of the abdomen.     Recommendations: Following functions     3.  DVT prophylaxis-subcutaneous heparin     4.  Hypotension-on midodrine and now off Levophed.  Would hold the metoprolol.     Recommendations: Taper Levophed as tolerated     5.  Respiratory failure-ongoing pulmonary edema.  The D-dimer is elevated and I will order lower extremity venous ultrasound and VQ scan to screen for PE.     Recommendations: Fluid removal with dialysis.  Lower extremity venous ultrasound and VQ scan.     6.  Falls-rehabilitative services        Subjective:   Radha Winters is a(n) 64 year old female who l yet complains of dyspnea and had rapid response earlier today.    Objective:   Blood pressure 153/62, pulse 98, temperature 99.2 °F (37.3 °C), temperature source Axillary, resp. rate 18, height 5' 3\" (1.6 m), weight 235 lb 14.3 oz (107 kg), SpO2 93%.    Physical Exam alert white female  HEENT examination is unremarkable with pupils equal round and reactive to light and accommodation.   Neck without adenopathy, thyromegaly, JVD nor bruit.   Lungs diminished at bases to auscultation and percussion.  Cardiac regular rate and rhythm no murmur.   Abdomen nontender, without hepatosplenomegaly and no mass appreciable.   Extremities without clubbing cyanosis nor edema.   Neurologic grossly intact with symmetric tone and strength and reflex.  Skin without gross abnormality     Results:     Lab Results   Component Value Date    WBC  8.2 05/24/2025    HGB 13.7 05/24/2025    HCT 41.0 05/24/2025    .0 05/24/2025    CREATSERUM 4.89 05/24/2025    BUN 18 05/24/2025     05/24/2025    K 3.7 05/24/2025     05/24/2025    CO2 29.0 05/24/2025     05/24/2025    CA 8.0 05/24/2025    ALB 3.1 05/24/2025    ALKPHO 274 05/24/2025    BILT 0.2 05/24/2025    TP 5.0 05/24/2025     05/24/2025     05/24/2025    DDIMER 1.77 05/24/2025    CK 6,552 05/24/2025     Chest x-ray-pulmonary edema with modest basilar effusions    Tommie Reynaga MD  Medical Director, Critical Care, [facility]  Medical Director, [facility]  Pager: [phone]

## 2025-05-24 NOTE — PROGRESS NOTES
[facility]  part of [facility]    Progress Note    Radha Winters Patient Status:  Inpatient    1960 MRN [id number]   Location [facility] 5SW/SE Attending Fanny Majano MD   Hosp Day # 6 PCP JUAN MONTANEZ       Subjective:   Radha Winters is a(n) 64 year old female SUASN, rhabdomyolysis, nonoliguric.    Objective:     Vitals:    25 1701   BP: 99/60   Pulse: 102   Resp: 18   Temp: 97.6 °F (36.4 °C)       Intake/Output Summary (Last 24 hours) at 2025 1935  Last data filed at 2025 1752  Gross per 24 hour   Intake 20 ml   Output 1500 ml   Net -1480 ml     Wt Readings from Last 2 Encounters:   25 211 lb 13.8 oz (96.1 kg)   10/09/23 185 lb 3 oz (84 kg)         General appearance:  alert, appears stated age, and cooperative  Head: Normocephalic, without obvious abnormality, atraumatic  Eyes: conjunctivae/corneas clear. PERRL, EOM's intact. Fundi benign.  Neck: no adenopathy, no carotid bruit, no JVD, supple, symmetrical, trachea midline, and thyroid not enlarged, symmetric, no tenderness/mass/nodules  Pulmonary: clear to auscultation bilaterally  Cardiovascular: S1, S2 normal, no murmur, click, rub or gallop, regular rate and rhythm  Abdominal: soft, non-tender; bowel sounds normal; no masses,  no organomegaly  Extremities: extremities normal, atraumatic, no cyanosis or edema  Pulses: 2+ and symmetric  Neurologic: Grossly normal  Genital Exam: defer exam    Current Medications:  Current Hospital Medications[1]    Allergies  Allergies[2]    Laboratory Results:     Lab Results   Component Value Date    WBC 7.5 2025    HGB 15.2 2025    HCT 46.0 2025    .0 (L) 2025     (L) 2025    K 3.7 2025     2025    CO2 25.0 2025    CREATSERUM 5.84 (H) 2025    BUN 22 2025     (H) 2025    CA 8.4 (L) 2025    ALB 3.3 2025    ALKPHO 343 (H) 2025    BILT <0.2 (L)  05/23/2025    TP 5.3 (L) 05/23/2025     (H) 05/23/2025     (H) 05/23/2025    TSH 0.528 10/10/2023    LIP 36 05/14/2025    MG 2.4 05/17/2025    PHOS 6.3 (H) 05/17/2025    CK 7,754 (HH) 05/23/2025    B12 581 10/10/2023     Hospital Encounter on 05/17/25   1. Urine Culture, Routine     Status: Abnormal    Collection Time: 05/17/25 10:18 AM    Specimen: Urine, clean catch   Result Value Ref Range    Urine Culture >100,000 CFU/ML Escherichia coli (A) N/A       Susceptibility    Escherichia coli -  (no method available)     Ampicillin >=32 Resistant      Ampicillin + Sulbactam 4 Sensitive      Cefazolin <=4 Sensitive      Ciprofloxacin <=0.25 Sensitive      Gentamicin <=1 Sensitive      Meropenem <=0.25 Sensitive      Levofloxacin* 1 Sensitive       * The current CLSI susceptibility breakpoint for levofloxacin is an MARLEY of 0.5 mcg/mL. MICs above this should NOT be considered susceptible. Updated susceptibility reporting is in progress.     Nitrofurantoin <=16 Sensitive      Piperacillin + Tazobactam <=4 Sensitive      Trimethoprim/Sulfa >=320 Resistant        Assessment and Plan:     Acute renal failure superimposed on chronic kidney disease, unspecified acute renal failure type, unspecified CKD stage  I thinAcute renal failure superimposed on chronic kidney disease, unspecified acute renal failure type, unspecified CKD stage          Non-traumatic rhabdomyolysis          Transaminitis     Patient is a 63 y/o female with PMH of HTN, dyslipidemia, CAD admitted after multiple falls, Nephrology consulted for Acute Kidney Injury      Acute Kidney Injury:  Likely secondary to ATN, from Rhabdomyolysis, continue aggressive volume resuscitation, 0.9  ml/hr, continue to trend BMP, check Uric acid, Phos, Urine studies  CKD stage 3b vs 4:  Secondary to HTN nephrosclerosis, check Phos, PTH, renal US  Transaminitis:  Continue to trend LFTs, will check abdominal US        Recommendations:  Acute Kidney Injury likely  unresolved ATN  Will plan for tunneled HD line  KCL replacement     HD in AM     Renal US reviewed  Renally dose meds  Midodrine for MAP goal >65  Huynh placement, for strict I/Os    May 23, 2025  Patient is nonoliguric.  Renal function is severely deranged.  Has a tunneled dialysis catheter in place.  Will dialyze as needed for volume control.  Currently patient shows no signs of recovery of kidney function and likely needs supportive care including dialysis as needed.         Imaging Results:   IR TUNNELED CV CATH INSERTION EXCHANGE CHECK  Result Date: 5/22/2025  CONCLUSION:  Conversion of right temporary catheter to PermCath with tip at cavo-atrial junction.  Line is ready for immediate use.    Dictated by (CST): Galen De MD on 5/22/2025 at 6:47 PM     Finalized by (CST): Galen De MD on 5/22/2025 at 6:51 PM          XR CHEST AP PORTABLE  (CPT=71045)  Result Date: 5/22/2025  CONCLUSION: Findings suggesting increased fluid volume status of the patient and/or CHF.  No significant interval change.   Dictated by (CST): Michael Villareal MD on 5/22/2025 at 7:34 AM     Finalized by (CST): Michael Villareal MD on 5/22/2025 at 7:36 AM                    DANIS GOMEZ MD  5/23/2025  [email or web address]         [1]   Current Facility-Administered Medications:     potassium chloride (Klor-Con M10) tab 10 mEq, 10 mEq, Oral, Daily    ceFAZolin (Ancef) 1 g in dextrose 5% 100mL IVPB-ADD, 1 g, Intravenous, Q24H    heparin (Porcine) 100 Units/mL lock flush 150 Units, 1.5 mL, Intravenous, Once    HYDROcodone-acetaminophen (Norco) 5-325 MG per tab 1 tablet, 1 tablet, Oral, Q6H PRN    albumin human (Albumin) 25% injection 25 g, 25 g, Intravenous, Once    dextrose 10% - sodium chloride 0.9% 1000 mL infusion, , Intravenous, Continuous    ondansetron (Zofran) 4 MG/2ML injection 4 mg, 4 mg, Intravenous, Q6H PRN    docusate sodium (Colace) cap 100 mg, 100 mg, Oral, BID    polyethylene glycol (PEG 3350) (Miralax) 17 g oral  packet 17 g, 17 g, Oral, Daily PRN    magnesium hydroxide (Milk of Magnesia) 400 MG/5ML oral suspension 30 mL, 30 mL, Oral, Daily PRN    bisacodyl (Dulcolax) 10 MG rectal suppository 10 mg, 10 mg, Rectal, Daily PRN    fleet enema (Fleet) rectal enema 133 mL, 1 enema, Rectal, Once PRN    norepinephrine (Levophed) 4 mg/250mL infusion premix, 0.5-50 mcg/min, Intravenous, Continuous    heparin (Porcine) 1000 UNIT/ML injection 2,000 Units, 2,000 Units, Intravenous, PRN Dialysis    albuterol (Ventolin) (2.5 MG/3ML) 0.083% nebulizer solution 2.5 mg, 2.5 mg, Nebulization, Q6H PRN    heparin (Porcine) 5000 UNIT/ML injection 5,000 Units, 5,000 Units, Subcutaneous, 2 times per day    midodrine (ProAmatine) tab 10 mg, 10 mg, Oral, TID    acetaminophen (Tylenol) tab 650 mg, 650 mg, Oral, Q6H PRN    aspirin DR tab 81 mg, 81 mg, Oral, Daily    busPIRone (Buspar) tab 30 mg, 30 mg, Oral, TID    chlorproMAZINE (Thorazine) tab 25 mg, 25 mg, Oral, QID    diazePAM (Valium) tab 5 mg, 5 mg, Oral, Q8H PRN    DULoxetine (Cymbalta) DR cap 60 mg, 60 mg, Oral, BID    levothyroxine (Synthroid) tab 150 mcg, 150 mcg, Oral, Before breakfast    [Held by provider] metoprolol succinate ER (Toprol XL) 24 hr tab 50 mg, 50 mg, Oral, BID    QUEtiapine (SEROquel) tab 50 mg, 50 mg, Oral, BID    QUEtiapine ER (SEROquel XR) 24 hr tab 300 mg, 300 mg, Oral, Nightly  [2] No Known Allergies

## 2025-05-24 NOTE — PLAN OF CARE
Problem: Patient Centered Care  Goal: Patient preferences are identified and integrated in the patient's plan of care  Description: Interventions:  - What would you like us to know as we care for you? WATSON  - Provide timely, complete, and accurate information to patient/family  - Incorporate patient and family knowledge, values, beliefs, and cultural backgrounds into the planning and delivery of care  - Encourage patient/family to participate in care and decision-making at the level they choose  - Honor patient and family perspectives and choices  Outcome: Progressing     Problem: PAIN - ADULT  Goal: Verbalizes/displays adequate comfort level or patient's stated pain goal  Description: INTERVENTIONS:  - Encourage pt to monitor pain and request assistance  - Assess pain using appropriate pain scale  - Administer analgesics based on type and severity of pain and evaluate response  - Implement non-pharmacological measures as appropriate and evaluate response  - Consider cultural and social influences on pain and pain management  - Manage/alleviate anxiety  - Utilize distraction and/or relaxation techniques  - Monitor for opioid side effects  - Notify MD/LIP if interventions unsuccessful or patient reports new pain  - Anticipate increased pain with activity and pre-medicate as appropriate  Outcome: Progressing     Problem: RESPIRATORY - ADULT  Goal: Achieves optimal ventilation and oxygenation  Description: INTERVENTIONS:  - Assess for changes in respiratory status  - Assess for changes in mentation and behavior  - Position to facilitate oxygenation and minimize respiratory effort  - Oxygen supplementation based on oxygen saturation or ABGs  - Provide Smoking Cessation handout, if applicable  - Encourage broncho-pulmonary hygiene including cough, deep breathe, Incentive Spirometry  - Assess the need for suctioning and perform as needed  - Assess and instruct to report SOB or any respiratory difficulty  - Respiratory  Therapy support as indicated  - Manage/alleviate anxiety  - Monitor for signs/symptoms of CO2 retention  Outcome: Not Progressing     Problem: HEMATOLOGIC - ADULT  Goal: Free from bleeding injury  Description: (Example usage: patient with low platelets)  INTERVENTIONS:  - Avoid intramuscular injections, enemas and rectal medication administration  - Ensure safe mobilization of patient  - Hold pressure on venipuncture sites to achieve adequate hemostasis  - Assess for signs and symptoms of internal bleeding  - Monitor lab trends  - Patient is to report abnormal signs of bleeding to staff  - Avoid use of toothpicks and dental floss  - Use electric shaver for shaving  - Use soft bristle tooth brush  - Limit straining and forceful nose blowing  Outcome: Not Progressing

## 2025-05-25 ENCOUNTER — APPOINTMENT (OUTPATIENT)
Dept: ULTRASOUND IMAGING | Facility: HOSPITAL | Age: 65
End: 2025-05-25
Attending: INTERNAL MEDICINE
Payer: MEDICAID

## 2025-05-25 ENCOUNTER — APPOINTMENT (OUTPATIENT)
Dept: GENERAL RADIOLOGY | Facility: HOSPITAL | Age: 65
End: 2025-05-25
Attending: INTERNAL MEDICINE
Payer: MEDICAID

## 2025-05-25 LAB
ANION GAP SERPL CALC-SCNC: 7 MMOL/L (ref 0–18)
BASOPHILS # BLD AUTO: 0.05 X10(3) UL (ref 0–0.2)
BASOPHILS NFR BLD AUTO: 0.5 %
BUN BLD-MCNC: 14 MG/DL (ref 9–23)
BUN/CREAT SERPL: 4 (ref 10–20)
CALCIUM BLD-MCNC: 8.2 MG/DL (ref 8.7–10.4)
CHLORIDE SERPL-SCNC: 99 MMOL/L (ref 98–112)
CO2 SERPL-SCNC: 32 MMOL/L (ref 21–32)
CREAT BLD-MCNC: 3.49 MG/DL (ref 0.55–1.02)
DEPRECATED RDW RBC AUTO: 53.8 FL (ref 35.1–46.3)
EGFRCR SERPLBLD CKD-EPI 2021: 14 ML/MIN/1.73M2 (ref 60–?)
EOSINOPHIL # BLD AUTO: 0.03 X10(3) UL (ref 0–0.7)
EOSINOPHIL NFR BLD AUTO: 0.3 %
ERYTHROCYTE [DISTWIDTH] IN BLOOD BY AUTOMATED COUNT: 15.9 % (ref 11–15)
GLUCOSE BLD-MCNC: 124 MG/DL (ref 70–99)
GLUCOSE BLDC GLUCOMTR-MCNC: 111 MG/DL (ref 70–99)
GLUCOSE BLDC GLUCOMTR-MCNC: 113 MG/DL (ref 70–99)
GLUCOSE BLDC GLUCOMTR-MCNC: 123 MG/DL (ref 70–99)
HCT VFR BLD AUTO: 42.9 % (ref 35–48)
HGB BLD-MCNC: 14 G/DL (ref 12–16)
IMM GRANULOCYTES # BLD AUTO: 0.06 X10(3) UL (ref 0–1)
IMM GRANULOCYTES NFR BLD: 0.6 %
LYMPHOCYTES # BLD AUTO: 0.92 X10(3) UL (ref 1–4)
LYMPHOCYTES NFR BLD AUTO: 9.3 %
MCH RBC QN AUTO: 30.4 PG (ref 26–34)
MCHC RBC AUTO-ENTMCNC: 32.6 G/DL (ref 31–37)
MCV RBC AUTO: 93.1 FL (ref 80–100)
MONOCYTES # BLD AUTO: 0.64 X10(3) UL (ref 0.1–1)
MONOCYTES NFR BLD AUTO: 6.5 %
NEUTROPHILS # BLD AUTO: 8.22 X10 (3) UL (ref 1.5–7.7)
NEUTROPHILS # BLD AUTO: 8.22 X10(3) UL (ref 1.5–7.7)
NEUTROPHILS NFR BLD AUTO: 82.8 %
OSMOLALITY SERPL CALC.SUM OF ELEC: 288 MOSM/KG (ref 275–295)
PLATELET # BLD AUTO: 108 10(3)UL (ref 150–450)
PLATELETS.RETICULATED NFR BLD AUTO: 5.9 % (ref 0–7)
POTASSIUM SERPL-SCNC: 3.7 MMOL/L (ref 3.5–5.1)
RBC # BLD AUTO: 4.61 X10(6)UL (ref 3.8–5.3)
SODIUM SERPL-SCNC: 138 MMOL/L (ref 136–145)
WBC # BLD AUTO: 9.9 X10(3) UL (ref 4–11)

## 2025-05-25 PROCEDURE — 99233 SBSQ HOSP IP/OBS HIGH 50: CPT | Performed by: INTERNAL MEDICINE

## 2025-05-25 PROCEDURE — 93970 EXTREMITY STUDY: CPT | Performed by: INTERNAL MEDICINE

## 2025-05-25 PROCEDURE — 71045 X-RAY EXAM CHEST 1 VIEW: CPT | Performed by: INTERNAL MEDICINE

## 2025-05-25 NOTE — PHYSICAL THERAPY NOTE
Physical Therapy Contact Note    Attempted to see patient for Physical Therapy services. Patient not available secondary to receiving bedside dialysis. Will re-schedule visit.    Irasema Roman, PT, DPT

## 2025-05-25 NOTE — PROGRESS NOTES
[facility]  part of [facility]    Progress Note    Radha Winters Patient Status:  Inpatient    1960 MRN [id number]   Location [facility] 5SW/SE Attending Fanny Majano MD   Hosp Day # 8 PCP JUAN MONTANEZ       SUBJECTIVE:  On bipap and HD  Alert   No  chest pain    No shortness of breath       OBJECTIVE:  Vital signs in last 24 hours:  /64 (BP Location: Left arm)   Pulse 92   Temp 96.5 °F (35.8 °C) (Temporal)   Resp 14   Ht 5' 3\" (1.6 m)   Wt 235 lb 0.2 oz (106.6 kg)   SpO2 91%   BMI 41.63 kg/m²     Intake/Output:    Intake/Output Summary (Last 24 hours) at 2025 0901  Last data filed at 2025 2146  Gross per 24 hour   Intake 1467 ml   Output 2275 ml   Net -808 ml       Wt Readings from Last 3 Encounters:   25 235 lb 0.2 oz (106.6 kg)   10/09/23 185 lb 3 oz (84 kg)   10/04/23 185 lb (83.9 kg)       Exam  Gen: No acute distress, alert and oriented x3,  HEENT: No pallor  Pulm: Lungs bilateral coarse breath sound  CV: Heart with regular rate and rhythm, no peripheral edema  Abd: Abdomen soft, nontender, nondistended, no organomegaly, bowel sounds present  Skin: no rashes or lesions  CNS: Alert    Data Review:     Labs:   Lab Results   Component Value Date    WBC 9.9 2025    HGB 14.0 2025    HCT 42.9 2025    .0 2025    CREATSERUM 3.49 2025    BUN 14 2025     2025    K 3.7 2025    CL 99 2025    CO2 32.0 2025     2025    CA 8.2 2025    ALB 3.1 2025    ALKPHO 274 2025    BILT 0.2 2025    TP 5.0 2025     2025     2025    DDIMER 1.77 2025    CK 6,552 2025       LABS  Recent Labs   Lab 25  0347 25  0613 25  0811 25  0904 25  0940 25  0430   RBC 4.83 4.73 5.09 4.52  --  4.61   HGB 14.3 14.2 15.2 13.7  --  14.0   HCT 42.4 42.6 46.0 41.0  --  42.9   MCV 87.8 90.1 90.4  90.7  --  93.1   MCH 29.6 30.0 29.9 30.3  --  30.4   MCHC 33.7 33.3 33.0 33.4  --  32.6   RDW 15.3* 15.3* 15.9* 15.8*  --  15.9*   NEPRELIM 5.98 5.57 6.11 6.64  --  8.22*   WBC 7.4 7.2 7.5 8.2  --  9.9   .0* 112.0* 119.0* 111.0*  --  108.0*   * 381* 276* 193*  --   --    * 497* 357* 189*  --   --    DDIMER  --   --   --   --  1.77*  --    CK 8,116*  --  7,754* 6,552*  --   --    * 174* 139* 151*  --  124*       Imaging:      Meds:   Current Hospital Medications[1]    Assessment  Problem List[2]  1. Acute Kidney Injury on Chronic Kidney Disease:  -  - Nephrology consultation obtained  Patient with worsening renal function.  Nephrology is following the patient  On hemodialysis       2. Rhabdomyolysis:  - Secondary to recurrent falls  - Possibly statin-induced  - Plan: Hold statin (Crestor)  Improving     3. Acute Transaminitis:  - Likely secondary to rhabdomyolysis  - May also be statin-related  - Will monitor with serial labs  Patient could also have a shock liver  Liver enzymes are improving       4. Recurrent Falls:  - Will check orthostatic vital signs  - Physical Therapy and Occupational Therapy consultations ordered     5. Coronary Artery Disease:  - Currently stable  - Patient denies chest pain     6. Hypothyroidism:  - Continue levothyroxine  Possible UTI.  on ceftriaxone.    Acute hypoxic respiratory failure patient is being seen by pulmonary.         Plan for close monitoring and adjustment of management based on clinical course.  Case discussed with nursing staff    Active Orders   Imaging    NM LUNG VQ VENT / PERFUSION SCAN  (CPT=78582)     Frequency: Once     Number of Occurrences: 1 Occurrences    US VENOUS DOPPLER LEG BILAT - DIAG IMG (CPT=93970)     Frequency: Once     Number of Occurrences: 1 Occurrences   Nourishments    Dietary Nutrition Supplements BID     Frequency: BID     Number of Occurrences: Until Specified     Order Comments: Mighty Shake @ 10am and 2pm daily -  variety of flavors (send one now)      Room Service Eligibility Until Discontinued     Frequency: Until Discontinued     Number of Occurrences: Until Specified    Room Service Notify RN Until Discontinued     Frequency: Until Discontinued     Number of Occurrences: Until Specified   Respiratory Care    BIPAP When Sleeping     Frequency: When Sleeping     Number of Occurrences: Until Specified    Oxygen administration (adult) PRN     Frequency: PRN     Number of Occurrences: Until Specified    Respiratory care evaluation Once     Frequency: Once     Number of Occurrences: 1 Occurrences     Order Comments: If patient uses home CPAP/BIPAP, place on known home settings. If unknown, place on auto CPAP with upper limit 20 and lower limit 5 cm H2O     Dialysis    Hemodialysis inpatient     Frequency: Tomorrow     Number of Occurrences: 1 Occurrences    Hemodialysis inpatient     Frequency: Once     Number of Occurrences: 1 Occurrences     Order Comments: Bolus heparin 4000 U at start     Diet    Renal diet Renal; Sodium Restriction: 3-4 GM NA; Texture Consistency: Soft / Easy to Chew ; Is Patient on Accuchecks? No     Frequency: Effective Now     Number of Occurrences: Until Specified   Nursing    Accucheck     Frequency: BID     Number of Occurrences: Until Specified    Give all morning medications unless otherwise specified     Frequency: Until Discontinued     Number of Occurrences: Until Specified     Order Comments: Give all morning medications unless otherwise specified.  DO NOT use Electrolyte protocol.      If patient uses CPAP/BIPAP, encourage patient to wear when sleeping (including daytime) and after any apneic episode or adverse event.     Frequency: Until Discontinued     Number of Occurrences: Until Specified    Insert denny catheter     Frequency: Once     Number of Occurrences: 1 Occurrences    May use port/central line     Frequency: Until Discontinued     Number of Occurrences: Until Specified    Notify  attending physician for patients refusing CPAP     Frequency: Until Discontinued     Number of Occurrences: Until Specified     Order Comments: Document that patient was educated and refused CPAP, if applicable.      Notify attending physician for sustained desaturation <90% or prolonged or recurrent apnea     Frequency: Until Discontinued     Number of Occurrences: Until Specified     Order Comments: Notify attending physician for sustained desaturation <90% or prolonged or recurrent apnea      Notify Radiologist     Frequency: Until Discontinued     Number of Occurrences: Until Specified    Nurse Driven Indwelling Urinary Catheter Removal Protocol     Frequency: Until Discontinued     Number of Occurrences: Until Specified    Nursing communication (specify)     Frequency: Once     Number of Occurrences: 1 Occurrences     Order Comments: Hold blood thinners pre procedure      Nursing communication - call [facility] to order dialysis     Frequency: Once     Number of Occurrences: 1 Occurrences     Order Comments: Call [facility] at [phone] to order dialysis.  Identify special circumstances and specify stat or routine treatment.      Patient may resume usual diet     Frequency: Once     Number of Occurrences: 1 Occurrences     Order Comments: Npo x one hour, then resume pre-procedure diet      Post procedure site assessment     Frequency: Per Unit Routine     Number of Occurrences: Until Specified     Order Comments: Every 15 minutes for 1 hour, then every 30 minutes for 1 hour, then every 60 minutes for 2 hours, then per unit routine      Provide patient with sleep apnea education materials     Frequency: Once     Number of Occurrences: 1 Occurrences    Pulse oximetry     Frequency: Per Unit Routine     Number of Occurrences: Until Specified    Pulse oximetry     Frequency: Continuous     Number of Occurrences: Until Specified    Verify informed consent     Frequency: Once     Number of Occurrences: 1  Occurrences    Vital signs     Frequency: Per Unit Routine     Number of Occurrences: Until Specified     Order Comments: Every 15 minutes for 1 hour, then every 30 minutes for 1 hour, then every 60 minutes for 2 hours, then per unit routine.      Void prior to procedure     Frequency: Once     Number of Occurrences: 1 Occurrences   Consult    Consult to Interventional Radiology     Frequency: Once     Number of Occurrences: 1 Occurrences    Consult to Pulmonology     Frequency: Once     Number of Occurrences: 1 Occurrences   Medications    acetaminophen (Tylenol) tab 650 mg     Frequency: Q6H PRN     Dose: 650 mg     Route: Oral    albumin human (Albumin) 25% injection 25 g     Frequency: Once     Dose: 25 g     Route: Intravenous    albuterol (Ventolin) (2.5 MG/3ML) 0.083% nebulizer solution 2.5 mg     Frequency: Q6H PRN     Dose: 2.5 mg     Route: Nebulization    aspirin DR tab 81 mg     Frequency: Daily     Dose: 81 mg     Route: Oral    bisacodyl (Dulcolax) 10 MG rectal suppository 10 mg     Frequency: Daily PRN     Dose: 10 mg     Route: Rectal    busPIRone (Buspar) tab 30 mg     Frequency: TID     Dose: 30 mg     Route: Oral    chlorproMAZINE (Thorazine) tab 25 mg     Frequency: QID     Dose: 25 mg     Route: Oral    dextrose 10% - sodium chloride 0.9% 1000 mL infusion     Frequency: Continuous     Route: Intravenous    diazePAM (Valium) tab 5 mg     Frequency: Q8H PRN     Dose: 5 mg     Route: Oral    docusate sodium (Colace) cap 100 mg     Frequency: BID     Dose: 100 mg     Route: Oral    DULoxetine (Cymbalta) DR cap 60 mg     Frequency: BID     Dose: 60 mg     Route: Oral    fleet enema (Fleet) rectal enema 133 mL     Frequency: Once PRN     Dose: 1 enema     Route: Rectal    heparin (Porcine) 100 Units/mL lock flush 150 Units     Frequency: Once     Dose: 1.5 mL     Route: Intravenous    heparin (Porcine) 1000 UNIT/ML injection 2,000 Units     Frequency: PRN Dialysis     Dose: 2,000 Units     Route:  Intravenous    heparin (Porcine) 5000 UNIT/ML injection 5,000 Units     Frequency: Q12H PATRICK     Dose: 5,000 Units     Route: Subcutaneous    HYDROcodone-acetaminophen (Norco) 5-325 MG per tab 1 tablet     Frequency: Q6H PRN     Dose: 1 tablet     Route: Oral    levothyroxine (Synthroid) tab 150 mcg     Frequency: Before breakfast     Dose: 150 mcg     Route: Oral    magnesium hydroxide (Milk of Magnesia) 400 MG/5ML oral suspension 30 mL     Frequency: Daily PRN     Dose: 30 mL     Route: Oral    metoprolol succinate ER (Toprol XL) 24 hr tab 50 mg     Frequency: BID     Dose: 50 mg     Route: Oral    midodrine (ProAmatine) tab 10 mg     Frequency: TID     Dose: 10 mg     Route: Oral    norepinephrine (Levophed) 4 mg/250mL infusion premix     Frequency: Continuous     Dose: 0.5-50 mcg/min     Route: Intravenous    ondansetron (Zofran) 4 MG/2ML injection 4 mg     Frequency: Q6H PRN     Dose: 4 mg     Route: Intravenous    polyethylene glycol (PEG 3350) (Miralax) 17 g oral packet 17 g     Frequency: Daily PRN     Dose: 17 g     Route: Oral    potassium chloride (Klor-Con M10) tab 10 mEq     Frequency: Daily     Dose: 10 mEq     Route: Oral    QUEtiapine (SEROquel) tab 50 mg     Frequency: BID     Dose: 50 mg     Route: Oral    QUEtiapine ER (SEROquel XR) 24 hr tab 300 mg     Frequency: Nightly     Dose: 300 mg     Route: Oral       Fanny Majano MD           [1]   Current Facility-Administered Medications   Medication Dose Route Frequency    QUEtiapine (SEROquel) tab 50 mg  50 mg Oral BID    chlorproMAZINE (Thorazine) tab 25 mg  25 mg Oral QID    busPIRone (Buspar) tab 30 mg  30 mg Oral TID    docusate sodium (Colace) cap 100 mg  100 mg Oral BID    DULoxetine (Cymbalta) DR cap 60 mg  60 mg Oral BID    potassium chloride (Klor-Con M10) tab 10 mEq  10 mEq Oral Daily    heparin (Porcine) 100 Units/mL lock flush 150 Units  1.5 mL Intravenous Once    HYDROcodone-acetaminophen (Norco) 5-325 MG per tab 1 tablet  1 tablet  Oral Q6H PRN    albumin human (Albumin) 25% injection 25 g  25 g Intravenous Once    dextrose 10% - sodium chloride 0.9% 1000 mL infusion   Intravenous Continuous    ondansetron (Zofran) 4 MG/2ML injection 4 mg  4 mg Intravenous Q6H PRN    polyethylene glycol (PEG 3350) (Miralax) 17 g oral packet 17 g  17 g Oral Daily PRN    magnesium hydroxide (Milk of Magnesia) 400 MG/5ML oral suspension 30 mL  30 mL Oral Daily PRN    bisacodyl (Dulcolax) 10 MG rectal suppository 10 mg  10 mg Rectal Daily PRN    fleet enema (Fleet) rectal enema 133 mL  1 enema Rectal Once PRN    norepinephrine (Levophed) 4 mg/250mL infusion premix  0.5-50 mcg/min Intravenous Continuous    heparin (Porcine) 1000 UNIT/ML injection 2,000 Units  2,000 Units Intravenous PRN Dialysis    albuterol (Ventolin) (2.5 MG/3ML) 0.083% nebulizer solution 2.5 mg  2.5 mg Nebulization Q6H PRN    heparin (Porcine) 5000 UNIT/ML injection 5,000 Units  5,000 Units Subcutaneous 2 times per day    midodrine (ProAmatine) tab 10 mg  10 mg Oral TID    acetaminophen (Tylenol) tab 650 mg  650 mg Oral Q6H PRN    aspirin DR tab 81 mg  81 mg Oral Daily    diazePAM (Valium) tab 5 mg  5 mg Oral Q8H PRN    levothyroxine (Synthroid) tab 150 mcg  150 mcg Oral Before breakfast    [Held by provider] metoprolol succinate ER (Toprol XL) 24 hr tab 50 mg  50 mg Oral BID    QUEtiapine ER (SEROquel XR) 24 hr tab 300 mg  300 mg Oral Nightly   [2]   Patient Active Problem List  Diagnosis    Closed fracture of proximal end of left humerus, unspecified fracture morphology, initial encounter    Frequent falls    Seizure-like activity (HCC)    Pituitary lesion (HCC)    Major depressive disorder, recurrent episode, moderate (HCC)    Generalized anxiety disorder    Acute renal failure superimposed on chronic kidney disease, unspecified acute renal failure type, unspecified CKD stage    Non-traumatic rhabdomyolysis    Transaminitis

## 2025-05-25 NOTE — PLAN OF CARE
Problem: Patient Centered Care  Goal: Patient preferences are identified and integrated in the patient's plan of care  Description: Interventions:  - What would you like us to know as we care for you?   - Provide timely, complete, and accurate information to patient/family  - Incorporate patient and family knowledge, values, beliefs, and cultural backgrounds into the planning and delivery of care  - Encourage patient/family to participate in care and decision-making at the level they choose  - Honor patient and family perspectives and choices  Outcome: Progressing     Problem: PAIN - ADULT  Goal: Verbalizes/displays adequate comfort level or patient's stated pain goal  Description: INTERVENTIONS:  - Encourage pt to monitor pain and request assistance  - Assess pain using appropriate pain scale  - Administer analgesics based on type and severity of pain and evaluate response  - Implement non-pharmacological measures as appropriate and evaluate response  - Consider cultural and social influences on pain and pain management  - Manage/alleviate anxiety  - Utilize distraction and/or relaxation techniques  - Monitor for opioid side effects  - Notify MD/LIP if interventions unsuccessful or patient reports new pain  - Anticipate increased pain with activity and pre-medicate as appropriate  Outcome: Progressing     Problem: SAFETY ADULT - FALL  Goal: Free from fall injury  Description: INTERVENTIONS:  - Assess pt frequently for physical needs  - Identify cognitive and physical deficits and behaviors that affect risk of falls.  - Nakina fall precautions as indicated by assessment.  - Educate pt/family on patient safety including physical limitations  - Instruct pt to call for assistance with activity based on assessment  - Modify environment to reduce risk of injury  - Provide assistive devices as appropriate  - Consider OT/PT consult to assist with strengthening/mobility  - Encourage toileting schedule  Outcome:  Progressing     Problem: SKIN/TISSUE INTEGRITY - ADULT  Goal: Skin integrity remains intact  Description: INTERVENTIONS  - Assess and document risk factors for pressure ulcer development  - Assess and document skin integrity  - Monitor for areas of redness and/or skin breakdown  - Initiate interventions, skin care algorithm/standards of care as needed  Outcome: Progressing      VSS. Pt gets SOB and desats on highflow. remained on non-rebreather 15L / Cpap while sleeping. Denies pain. Oreinted to time and person. Follows commands. Weak.   HD to be done today. VQ scan / US BLE as well. Very minimal uo via denny. HD site oozing.

## 2025-05-25 NOTE — PROGRESS NOTES
[facility]  part of [facility]    Progress Note      Assessment and Plan:   1.  Acute kidney injury with rhabdomyolysis-the patient remains on hemodialysis.  She became short of breath today and rapid response was called and she was transferred back down to the unit.    Recommendations:  1.  Dialysis  2.  As per nephrology.     2.  Transaminitis-improving.  Etiology is uncertain.  Hypotensive and may have a component of shock liver.  Was on a statin.  Minimal fatty infiltration on CT scan of the abdomen.     Recommendations: Following functions     3.  DVT prophylaxis-subcutaneous heparin     4.  Hypotension-on midodrine and now off Levophed.  Would hold the metoprolol.     Recommendations: Taper Levophed as tolerated     5.  Respiratory failure-ongoing pulmonary edema.  The D-dimer is elevated and I will order lower extremity venous ultrasound and VQ scan to screen for PE.  The patient has left lower lobe atelectasis and possible moderate effusion     Recommendations: Fluid removal with dialysis.  Lower extremity venous ultrasound and VQ scan.  Will keep the patient off the left side.  Incentive spirometry, chest physiotherapy and will consider left chest ultrasound to evaluate for significant effusion.     6.  Falls-rehabilitative services        Subjective:   Radha Winters is a(n) 64 year old female who l yet complains of dyspnea and had rapid response earlier today.    Objective:   Blood pressure 126/72, pulse 100, temperature 97.5 °F (36.4 °C), temperature source Temporal, resp. rate 17, height 160 cm (5' 3\"), weight 235 lb 0.2 oz (106.6 kg), SpO2 100%.    Physical Exam alert white female  HEENT examination is unremarkable with pupils equal round and reactive to light and accommodation.   Neck without adenopathy, thyromegaly, JVD nor bruit.   Lungs diminished at bases to auscultation and percussion.  Cardiac regular rate and rhythm no murmur.   Abdomen nontender, without  hepatosplenomegaly and no mass appreciable.   Extremities without clubbing cyanosis nor edema.   Neurologic grossly intact with symmetric tone and strength and reflex.  Skin without gross abnormality     Results:     Lab Results   Component Value Date    WBC 9.9 05/25/2025    HGB 14.0 05/25/2025    HCT 42.9 05/25/2025    .0 05/25/2025    CREATSERUM 3.49 05/25/2025    BUN 14 05/25/2025     05/25/2025    K 3.7 05/25/2025    CL 99 05/25/2025    CO2 32.0 05/25/2025     05/25/2025    CA 8.2 05/25/2025     Chest x-ray-pulmonary edema with modest basilar effusions    Tommie Reynaga MD  Medical Director, Critical Care, [facility]  Medical Director, [facility]  Pager: [phone]

## 2025-05-25 NOTE — PROGRESS NOTES
[facility]  part of [facility]    Progress Note    Radha Winters Patient Status:  Inpatient    1960 MRN [id number]   Location [facility] 5SW/SE Attending Fanny Majano MD   Hosp Day # 7 PCP JUAN MONTANEZ       SUBJECTIVE:  Complains of feeling tired.      Feeling better    Patient had rapid response called earlier patient was having shortness of breath.  Patient transferred to the ICU.  Patient is on BiPAP.        OBJECTIVE:  Vital signs in last 24 hours:  /69   Pulse 104   Temp 97 °F (36.1 °C) (Temporal)   Resp 23   Ht 5' 3\" (1.6 m)   Wt 235 lb 14.3 oz (107 kg)   SpO2 95%   BMI 41.79 kg/m²     Intake/Output:    Intake/Output Summary (Last 24 hours) at 2025 2323  Last data filed at 2025 2146  Gross per 24 hour   Intake 1771.67 ml   Output 2275 ml   Net -503.33 ml       Wt Readings from Last 3 Encounters:   25 235 lb 14.3 oz (107 kg)   10/09/23 185 lb 3 oz (84 kg)   10/04/23 185 lb (83.9 kg)       Exam  Gen: No acute distress, alert and oriented x3,  HEENT: No pallor  Pulm: Lungs clear, normal respiratory effort  CV: Heart with regular rate and rhythm, no peripheral edema  Abd: Abdomen soft, nontender, nondistended, no organomegaly, bowel sounds present  Skin: no rashes or lesions  CNS: Alert    Data Review:     Labs:   Lab Results   Component Value Date    WBC 8.2 2025    HGB 13.7 2025    HCT 41.0 2025    .0 2025    CREATSERUM 4.89 2025    BUN 18 2025     2025    K 3.7 2025     2025    CO2 29.0 2025     2025    CA 8.0 2025    ALB 3.1 2025    ALKPHO 274 2025    BILT 0.2 2025    TP 5.0 2025     2025     2025    DDIMER 1.77 2025    CK 6,552 2025       LABS  Recent Labs   Lab 25  0347 25  0613 25  0811 25  0904 25  0940   RBC 4.83 4.73 5.09 4.52  --    HGB  14.3 14.2 15.2 13.7  --    HCT 42.4 42.6 46.0 41.0  --    MCV 87.8 90.1 90.4 90.7  --    MCH 29.6 30.0 29.9 30.3  --    MCHC 33.7 33.3 33.0 33.4  --    RDW 15.3* 15.3* 15.9* 15.8*  --    NEPRELIM 5.98 5.57 6.11 6.64  --    WBC 7.4 7.2 7.5 8.2  --    .0* 112.0* 119.0* 111.0*  --    * 381* 276* 193*  --    * 497* 357* 189*  --    DDIMER  --   --   --   --  1.77*   CK 8,116*  --  7,754* 6,552*  --    * 174* 139* 151*  --        Imaging:      Meds:   Current Hospital Medications[1]    Assessment  Problem List[2]  1. Acute Kidney Injury on Chronic Kidney Disease:  - Will initiate IV hydration  - Nephrology consultation obtained  Patient with worsening renal function.  Nephrology is following the patient    on hemodialysis     2. Rhabdomyolysis:  - Secondary to recurrent falls  - Possibly statin-induced  - Plan: Hold statin (Crestor)  Improving     3. Acute Transaminitis:  - Likely secondary to rhabdomyolysis  - May also be statin-related  - Will monitor with serial labs  Patient could also have a shock liver  Liver enzymes are improving       4. Recurrent Falls:  - Will check orthostatic vital signs  - Physical Therapy and Occupational Therapy consultations ordered     5. Coronary Artery Disease:  - Currently stable  - Patient denies chest pain     6. Hypothyroidism:  - Continue levothyroxine  Possible UTI.         Plan for close monitoring and adjustment of management based on clinical course.  Case discussed with nursing staff    Active Orders   LAB    Basic Metabolic Panel (8)     Frequency: Tomorrow AM draw     Number of Occurrences: 1 Occurrences    CBC With Differential With Platelet     Frequency: Tomorrow AM draw     Number of Occurrences: 1 Occurrences   Imaging    NM LUNG VQ VENT / PERFUSION SCAN  (CPT=78582)     Frequency: Once     Number of Occurrences: 1 Occurrences    US VENOUS DOPPLER LEG BILAT - DIAG IMG (CPT=93970)     Frequency: Once     Number of Occurrences: 1 Occurrences     XR CHEST AP PORTABLE  (CPT=71045)     Frequency: Once     Number of Occurrences: 1 Occurrences   Nourishments    Dietary Nutrition Supplements BID     Frequency: BID     Number of Occurrences: Until Specified     Order Comments: Mighty Shake @ 10am and 2pm daily - variety of flavors (send one now)      Room Service Eligibility Until Discontinued     Frequency: Until Discontinued     Number of Occurrences: Until Specified    Room Service Notify RN Until Discontinued     Frequency: Until Discontinued     Number of Occurrences: Until Specified   Respiratory Care    BIPAP When Sleeping     Frequency: When Sleeping     Number of Occurrences: Until Specified    Oxygen administration (adult) PRN     Frequency: PRN     Number of Occurrences: Until Specified    Respiratory care evaluation Once     Frequency: Once     Number of Occurrences: 1 Occurrences     Order Comments: If patient uses home CPAP/BIPAP, place on known home settings. If unknown, place on auto CPAP with upper limit 20 and lower limit 5 cm H2O     Dialysis    Hemodialysis inpatient     Frequency: Tomorrow     Number of Occurrences: 1 Occurrences    Hemodialysis inpatient     Frequency: Once     Number of Occurrences: 1 Occurrences     Order Comments: Bolus heparin 4000 U at start     Diet    Renal diet Renal; Sodium Restriction: 3-4 GM NA; Texture Consistency: Soft / Easy to Chew ; Is Patient on Accuchecks? No     Frequency: Effective Now     Number of Occurrences: Until Specified   Nursing    Accucheck     Frequency: BID     Number of Occurrences: Until Specified    Give all morning medications unless otherwise specified     Frequency: Until Discontinued     Number of Occurrences: Until Specified     Order Comments: Give all morning medications unless otherwise specified.  DO NOT use Electrolyte protocol.      If patient uses CPAP/BIPAP, encourage patient to wear when sleeping (including daytime) and after any apneic episode or adverse event.      Frequency: Until Discontinued     Number of Occurrences: Until Specified    Insert denny catheter     Frequency: Once     Number of Occurrences: 1 Occurrences    May use port/central line     Frequency: Until Discontinued     Number of Occurrences: Until Specified    Notify attending physician for patients refusing CPAP     Frequency: Until Discontinued     Number of Occurrences: Until Specified     Order Comments: Document that patient was educated and refused CPAP, if applicable.      Notify attending physician for sustained desaturation <90% or prolonged or recurrent apnea     Frequency: Until Discontinued     Number of Occurrences: Until Specified     Order Comments: Notify attending physician for sustained desaturation <90% or prolonged or recurrent apnea      Notify Radiologist     Frequency: Until Discontinued     Number of Occurrences: Until Specified    Nurse Driven Indwelling Urinary Catheter Removal Protocol     Frequency: Until Discontinued     Number of Occurrences: Until Specified    Nursing communication (specify)     Frequency: Once     Number of Occurrences: 1 Occurrences     Order Comments: Hold blood thinners pre procedure      Nursing communication - call [facility] to order dialysis     Frequency: Once     Number of Occurrences: 1 Occurrences     Order Comments: Call [facility] at [phone] to order dialysis.  Identify special circumstances and specify stat or routine treatment.      Patient may resume usual diet     Frequency: Once     Number of Occurrences: 1 Occurrences     Order Comments: Npo x one hour, then resume pre-procedure diet      Post procedure site assessment     Frequency: Per Unit Routine     Number of Occurrences: Until Specified     Order Comments: Every 15 minutes for 1 hour, then every 30 minutes for 1 hour, then every 60 minutes for 2 hours, then per unit routine      Provide patient with sleep apnea education materials     Frequency: Once     Number of Occurrences: 1  Occurrences    Pulse oximetry     Frequency: Per Unit Routine     Number of Occurrences: Until Specified    Pulse oximetry     Frequency: Continuous     Number of Occurrences: Until Specified    Verify informed consent     Frequency: Once     Number of Occurrences: 1 Occurrences    Vital signs     Frequency: Per Unit Routine     Number of Occurrences: Until Specified     Order Comments: Every 15 minutes for 1 hour, then every 30 minutes for 1 hour, then every 60 minutes for 2 hours, then per unit routine.      Void prior to procedure     Frequency: Once     Number of Occurrences: 1 Occurrences   Consult    Consult to Interventional Radiology     Frequency: Once     Number of Occurrences: 1 Occurrences    Consult to Pulmonology     Frequency: Once     Number of Occurrences: 1 Occurrences   Medications    acetaminophen (Tylenol) tab 650 mg     Frequency: Q6H PRN     Dose: 650 mg     Route: Oral    albumin human (Albumin) 25% injection 25 g     Frequency: Once     Dose: 25 g     Route: Intravenous    albuterol (Ventolin) (2.5 MG/3ML) 0.083% nebulizer solution 2.5 mg     Frequency: Q6H PRN     Dose: 2.5 mg     Route: Nebulization    aspirin DR tab 81 mg     Frequency: Daily     Dose: 81 mg     Route: Oral    bisacodyl (Dulcolax) 10 MG rectal suppository 10 mg     Frequency: Daily PRN     Dose: 10 mg     Route: Rectal    busPIRone (Buspar) tab 30 mg     Frequency: TID     Dose: 30 mg     Route: Oral    chlorproMAZINE (Thorazine) tab 25 mg     Frequency: QID     Dose: 25 mg     Route: Oral    dextrose 10% - sodium chloride 0.9% 1000 mL infusion     Frequency: Continuous     Route: Intravenous    diazePAM (Valium) tab 5 mg     Frequency: Q8H PRN     Dose: 5 mg     Route: Oral    docusate sodium (Colace) cap 100 mg     Frequency: BID     Dose: 100 mg     Route: Oral    DULoxetine (Cymbalta) DR cap 60 mg     Frequency: BID     Dose: 60 mg     Route: Oral    fleet enema (Fleet) rectal enema 133 mL     Frequency: Once PRN      Dose: 1 enema     Route: Rectal    heparin (Porcine) 100 Units/mL lock flush 150 Units     Frequency: Once     Dose: 1.5 mL     Route: Intravenous    heparin (Porcine) 1000 UNIT/ML injection 2,000 Units     Frequency: PRN Dialysis     Dose: 2,000 Units     Route: Intravenous    heparin (Porcine) 5000 UNIT/ML injection 5,000 Units     Frequency: Q12H PATRICK     Dose: 5,000 Units     Route: Subcutaneous    HYDROcodone-acetaminophen (Norco) 5-325 MG per tab 1 tablet     Frequency: Q6H PRN     Dose: 1 tablet     Route: Oral    levothyroxine (Synthroid) tab 150 mcg     Frequency: Before breakfast     Dose: 150 mcg     Route: Oral    magnesium hydroxide (Milk of Magnesia) 400 MG/5ML oral suspension 30 mL     Frequency: Daily PRN     Dose: 30 mL     Route: Oral    metoprolol succinate ER (Toprol XL) 24 hr tab 50 mg     Frequency: BID     Dose: 50 mg     Route: Oral    midodrine (ProAmatine) tab 10 mg     Frequency: TID     Dose: 10 mg     Route: Oral    norepinephrine (Levophed) 4 mg/250mL infusion premix     Frequency: Continuous     Dose: 0.5-50 mcg/min     Route: Intravenous    ondansetron (Zofran) 4 MG/2ML injection 4 mg     Frequency: Q6H PRN     Dose: 4 mg     Route: Intravenous    polyethylene glycol (PEG 3350) (Miralax) 17 g oral packet 17 g     Frequency: Daily PRN     Dose: 17 g     Route: Oral    potassium chloride (Klor-Con M10) tab 10 mEq     Frequency: Daily     Dose: 10 mEq     Route: Oral    QUEtiapine (SEROquel) tab 50 mg     Frequency: BID     Dose: 50 mg     Route: Oral    QUEtiapine ER (SEROquel XR) 24 hr tab 300 mg     Frequency: Nightly     Dose: 300 mg     Route: Oral       Fanny Majano MD           [1]   Current Facility-Administered Medications   Medication Dose Route Frequency    QUEtiapine (SEROquel) tab 50 mg  50 mg Oral BID    chlorproMAZINE (Thorazine) tab 25 mg  25 mg Oral QID    busPIRone (Buspar) tab 30 mg  30 mg Oral TID    docusate sodium (Colace) cap 100 mg  100 mg Oral BID     DULoxetine (Cymbalta) DR cap 60 mg  60 mg Oral BID    potassium chloride (Klor-Con M10) tab 10 mEq  10 mEq Oral Daily    heparin (Porcine) 100 Units/mL lock flush 150 Units  1.5 mL Intravenous Once    HYDROcodone-acetaminophen (Norco) 5-325 MG per tab 1 tablet  1 tablet Oral Q6H PRN    albumin human (Albumin) 25% injection 25 g  25 g Intravenous Once    dextrose 10% - sodium chloride 0.9% 1000 mL infusion   Intravenous Continuous    ondansetron (Zofran) 4 MG/2ML injection 4 mg  4 mg Intravenous Q6H PRN    polyethylene glycol (PEG 3350) (Miralax) 17 g oral packet 17 g  17 g Oral Daily PRN    magnesium hydroxide (Milk of Magnesia) 400 MG/5ML oral suspension 30 mL  30 mL Oral Daily PRN    bisacodyl (Dulcolax) 10 MG rectal suppository 10 mg  10 mg Rectal Daily PRN    fleet enema (Fleet) rectal enema 133 mL  1 enema Rectal Once PRN    norepinephrine (Levophed) 4 mg/250mL infusion premix  0.5-50 mcg/min Intravenous Continuous    heparin (Porcine) 1000 UNIT/ML injection 2,000 Units  2,000 Units Intravenous PRN Dialysis    albuterol (Ventolin) (2.5 MG/3ML) 0.083% nebulizer solution 2.5 mg  2.5 mg Nebulization Q6H PRN    heparin (Porcine) 5000 UNIT/ML injection 5,000 Units  5,000 Units Subcutaneous 2 times per day    midodrine (ProAmatine) tab 10 mg  10 mg Oral TID    acetaminophen (Tylenol) tab 650 mg  650 mg Oral Q6H PRN    aspirin DR tab 81 mg  81 mg Oral Daily    diazePAM (Valium) tab 5 mg  5 mg Oral Q8H PRN    levothyroxine (Synthroid) tab 150 mcg  150 mcg Oral Before breakfast    [Held by provider] metoprolol succinate ER (Toprol XL) 24 hr tab 50 mg  50 mg Oral BID    QUEtiapine ER (SEROquel XR) 24 hr tab 300 mg  300 mg Oral Nightly   [2]   Patient Active Problem List  Diagnosis    Closed fracture of proximal end of left humerus, unspecified fracture morphology, initial encounter    Frequent falls    Seizure-like activity (HCC)    Pituitary lesion (HCC)    Major depressive disorder, recurrent episode, moderate (HCC)     Generalized anxiety disorder    Acute renal failure superimposed on chronic kidney disease, unspecified acute renal failure type, unspecified CKD stage    Non-traumatic rhabdomyolysis    Transaminitis

## 2025-05-25 NOTE — PROGRESS NOTES
[facility]  part of [facility]    Progress Note    Radha Winters Patient Status:  Inpatient    1960 MRN [id number]   Location [facility] 2W/SW Attending Fanny Majano MD   Hosp Day # 8 PCP JUAN MONTANEZ       Subjective:5       Radha Winters is a(n) 64 year old female With volume overload.    Objective:     Vitals:    25 1000   BP: 123/66   Pulse: 101   Resp: 19   Temp:        Intake/Output Summary (Last 24 hours) at 2025 1158  Last data filed at 2025 0800  Gross per 24 hour   Intake 1667 ml   Output 2275 ml   Net -608 ml     Wt Readings from Last 2 Encounters:   25 235 lb 0.2 oz (106.6 kg)   10/09/23 185 lb 3 oz (84 kg)         General appearance:  alert, appears stated age, and cooperative  Head: Normocephalic, without obvious abnormality, atraumatic  Eyes: conjunctivae/corneas clear. PERRL, EOM's intact. Fundi benign.  Neck: no adenopathy, no carotid bruit, no JVD, supple, symmetrical, trachea midline, and thyroid not enlarged, symmetric, no tenderness/mass/nodules  Pulmonary: clear to auscultation bilaterally  Cardiovascular: S1, S2 normal, no murmur, click, rub or gallop, regular rate and rhythm  Abdominal: soft, non-tender; bowel sounds normal; no masses,  no organomegaly  Extremities: extremities normal, atraumatic, no cyanosis or edema  Pulses: 2+ and symmetric  Neurologic: Grossly normal  Genital Exam: defer exam    Current Medications:  Current Hospital Medications[1]    Allergies  Allergies[2]    Laboratory Results:     Lab Results   Component Value Date    WBC 9.9 2025    HGB 14.0 2025    HCT 42.9 2025    .0 (L) 2025     2025    K 3.7 2025    CL 99 2025    CO2 32.0 2025    CREATSERUM 3.49 (H) 2025    BUN 14 2025     (H) 2025    CA 8.2 (L) 2025    ALB 3.1 (L) 2025    ALKPHO 274 (H) 2025    BILT 0.2 2025    TP 5.0 (L)  05/24/2025     (H) 05/24/2025     (H) 05/24/2025    TSH 0.528 10/10/2023    LIP 36 05/14/2025    DDIMER 1.77 (H) 05/24/2025    MG 2.4 05/17/2025    PHOS 6.3 (H) 05/17/2025    CK 6,552 (HH) 05/24/2025    B12 581 10/10/2023     Hospital Encounter on 05/17/25   1. Urine Culture, Routine     Status: Abnormal    Collection Time: 05/17/25 10:18 AM    Specimen: Urine, clean catch   Result Value Ref Range    Urine Culture >100,000 CFU/ML Escherichia coli (A) N/A       Susceptibility    Escherichia coli -  (no method available)     Ampicillin >=32 Resistant      Ampicillin + Sulbactam 4 Sensitive      Cefazolin <=4 Sensitive      Ciprofloxacin <=0.25 Sensitive      Gentamicin <=1 Sensitive      Meropenem <=0.25 Sensitive      Levofloxacin* 1 Sensitive       * The current CLSI susceptibility breakpoint for levofloxacin is an MARLEY of 0.5 mcg/mL. MICs above this should NOT be considered susceptible. Updated susceptibility reporting is in progress.     Nitrofurantoin <=16 Sensitive      Piperacillin + Tazobactam <=4 Sensitive      Trimethoprim/Sulfa >=320 Resistant        Assessment and Plan:      Acute renal failure superimposed on chronic kidney disease, unspecified acute renal failure type, unspecified CKD stage  I thinAcute renal failure superimposed on chronic kidney disease, unspecified acute renal failure type, unspecified CKD stage          Non-traumatic rhabdomyolysis          Transaminitis     Patient is a 65 y/o female with PMH of HTN, dyslipidemia, CAD admitted after multiple falls, Nephrology consulted for Acute Kidney Injury      Acute Kidney Injury:  Likely secondary to ATN, from Rhabdomyolysis, continue aggressive volume resuscitation, 0.9  ml/hr, continue to trend BMP, check Uric acid, Phos, Urine studies  CKD stage 3b vs 4:  Secondary to HTN nephrosclerosis, check Phos, PTH, renal US  Transaminitis:  Continue to trend LFTs, will check abdominal US        Recommendations:  Acute Kidney Injury  likely unresolved ATN  Will plan for tunneled HD line  KCL replacement     HD in AM     Renal US reviewed  Renally dose meds  Midodrine for MAP goal >65  Huynh placement, for strict I/Os     May 23, 2025  Patient is nonoliguric.  Renal function is severely deranged.  Has a tunneled dialysis catheter in place.  Will dialyze as needed for volume control.  Currently patient shows no signs of recovery of kidney function and likely needs supportive care including dialysis as needed.     May 24, 2025  Patient had a rapid response called because of being short of breath and was clinically volume overloaded.  Patient transferred to progressive care unit.  Discussed with hospitalist.  Patient is receiving dialysis with intent to remove 2.2 L of fluid.  Continue supportive care and monitor electrolytes and renal function.     May 25, 2025  Patient is critically ill in ICU.  Today is day 2 of back-to-back dialysis.  Within goal  to remove 2.2 L of fluid in 3-1/2 hours.  Overall patient seems to have improved but still requiring BiPAP.The monitoring electrolyte and renal function.      Imaging Results:   XR CHEST AP PORTABLE  (CPT=71045)  Result Date: 5/25/2025  PROCEDURE: XR CHEST AP PORTABLE  (CPT=71045) TIME: 6:34 a.m..   COMPARISON: [facility], XR CHEST AP PORTABLE (CPT=71045), 5/24/2025, 10:03 AM.  INDICATIONS: Shortness of breath.  TECHNIQUE:   Single view.   FINDINGS/IMPRESSION:    1. The heart mediastinal structures are enlarged.  Pulmonary vascularity is increased.  There is a moderate to large left pleural effusion.  The findings are most compatible with moderate interstitial edema.  2. There is left lung volume loss with increased density at the left lung base.  The findings could represent atelectasis, infiltrate, or a combination in addition to the pleural effusion.  There is minimal suspected atelectasis at the right lung base.  3. There is a right IJ PermCath with tip projecting over the expected  position of the cavoatrial junction. .    Dictated by (CST): Jordi River MD on 5/25/2025 at 9:17 AM     Finalized by (CST): Jordi River MD on 5/25/2025 at 9:18 AM          EKG 12 Lead  Result Date: 5/24/2025  Sinus rhythm with 1st degree A-V block Low voltage QRS, consider pulmonary disease, pericardial effusion, or normal variant Possible Anterolateral infarct (cited on or before 17-MAY-2025) Abnormal ECG When compared with ECG of 17-MAY-2025 10:02, Vent. rate has increased BY  35 BPM Confirmed by ULISES STRICKLAND, HALEY ([id number]) on 5/24/2025 10:45:10 AM          DANIS GOMEZ MD  5/25/2025  [email or web address]         [1]   Current Facility-Administered Medications:     QUEtiapine (SEROquel) tab 50 mg, 50 mg, Oral, BID    chlorproMAZINE (Thorazine) tab 25 mg, 25 mg, Oral, QID    busPIRone (Buspar) tab 30 mg, 30 mg, Oral, TID    docusate sodium (Colace) cap 100 mg, 100 mg, Oral, BID    DULoxetine (Cymbalta) DR cap 60 mg, 60 mg, Oral, BID    potassium chloride (Klor-Con M10) tab 10 mEq, 10 mEq, Oral, Daily    heparin (Porcine) 100 Units/mL lock flush 150 Units, 1.5 mL, Intravenous, Once    HYDROcodone-acetaminophen (Norco) 5-325 MG per tab 1 tablet, 1 tablet, Oral, Q6H PRN    albumin human (Albumin) 25% injection 25 g, 25 g, Intravenous, Once    dextrose 10% - sodium chloride 0.9% 1000 mL infusion, , Intravenous, Continuous    ondansetron (Zofran) 4 MG/2ML injection 4 mg, 4 mg, Intravenous, Q6H PRN    polyethylene glycol (PEG 3350) (Miralax) 17 g oral packet 17 g, 17 g, Oral, Daily PRN    magnesium hydroxide (Milk of Magnesia) 400 MG/5ML oral suspension 30 mL, 30 mL, Oral, Daily PRN    bisacodyl (Dulcolax) 10 MG rectal suppository 10 mg, 10 mg, Rectal, Daily PRN    fleet enema (Fleet) rectal enema 133 mL, 1 enema, Rectal, Once PRN    norepinephrine (Levophed) 4 mg/250mL infusion premix, 0.5-50 mcg/min, Intravenous, Continuous    heparin (Porcine) 1000 UNIT/ML injection 2,000 Units, 2,000 Units,  Intravenous, PRN Dialysis    albuterol (Ventolin) (2.5 MG/3ML) 0.083% nebulizer solution 2.5 mg, 2.5 mg, Nebulization, Q6H PRN    heparin (Porcine) 5000 UNIT/ML injection 5,000 Units, 5,000 Units, Subcutaneous, 2 times per day    midodrine (ProAmatine) tab 10 mg, 10 mg, Oral, TID    acetaminophen (Tylenol) tab 650 mg, 650 mg, Oral, Q6H PRN    aspirin DR tab 81 mg, 81 mg, Oral, Daily    diazePAM (Valium) tab 5 mg, 5 mg, Oral, Q8H PRN    levothyroxine (Synthroid) tab 150 mcg, 150 mcg, Oral, Before breakfast    [Held by provider] metoprolol succinate ER (Toprol XL) 24 hr tab 50 mg, 50 mg, Oral, BID    QUEtiapine ER (SEROquel XR) 24 hr tab 300 mg, 300 mg, Oral, Nightly  [2] No Known Allergies

## 2025-05-25 NOTE — SPIRITUAL CARE NOTE
Spiritual Care Visit Note    Patient Name: Radha Winters Date of Spiritual Care Visit: 25   : 1960 Primary Dx: Acute renal failure superimposed on chronic kidney disease, unspecified acute renal failure type, unspecified CKD stage       Referred By: Referral From:     Spiritual Care Taxonomy:    Intended Effects: Demonstrate caring and concern    Methods: Collaborate with care team member    Interventions: Acknowledge current situation, Acknowledge response to difficult experience, Active listening, Prayer for healing    Visit Type/Summary:     - Spiritual Care: Responded to a request for spiritual care and assessed pt for spiritual care needs. Offered empathic listening and emotional support. Pt expressed appreciation for  visit. Provided support for pt spiritual/[employer] requests. Offered a blessing. Offered prayer.    Spiritual Care support can be requested via an Epic consult. For urgent/immediate needs, please contact the On Call  at: [place]: ext [phone]    Rev. Wendy Lindsey MDiv

## 2025-05-26 ENCOUNTER — APPOINTMENT (OUTPATIENT)
Dept: GENERAL RADIOLOGY | Facility: HOSPITAL | Age: 65
End: 2025-05-26
Attending: INTERNAL MEDICINE
Payer: MEDICAID

## 2025-05-26 LAB
ALBUMIN SERPL-MCNC: 3.5 G/DL (ref 3.2–4.8)
ALBUMIN/GLOB SERPL: 1.7 {RATIO} (ref 1–2)
ALP LIVER SERPL-CCNC: 232 U/L (ref 50–130)
ALT SERPL-CCNC: 70 U/L (ref 10–49)
ANION GAP SERPL CALC-SCNC: 7 MMOL/L (ref 0–18)
AST SERPL-CCNC: 285 U/L (ref ?–34)
ATRIAL RATE: 106 BPM
BASOPHILS # BLD AUTO: 0.04 X10(3) UL (ref 0–0.2)
BASOPHILS NFR BLD AUTO: 0.4 %
BILIRUB SERPL-MCNC: 0.3 MG/DL (ref 0.2–1.1)
BUN BLD-MCNC: 18 MG/DL (ref 9–23)
BUN/CREAT SERPL: 5.4 (ref 10–20)
CALCIUM BLD-MCNC: 8.3 MG/DL (ref 8.7–10.4)
CHLORIDE SERPL-SCNC: 100 MMOL/L (ref 98–112)
CO2 SERPL-SCNC: 28 MMOL/L (ref 21–32)
CREAT BLD-MCNC: 3.34 MG/DL (ref 0.55–1.02)
DEPRECATED RDW RBC AUTO: 57.7 FL (ref 35.1–46.3)
EGFRCR SERPLBLD CKD-EPI 2021: 15 ML/MIN/1.73M2 (ref 60–?)
EOSINOPHIL # BLD AUTO: 0.08 X10(3) UL (ref 0–0.7)
EOSINOPHIL NFR BLD AUTO: 0.8 %
ERYTHROCYTE [DISTWIDTH] IN BLOOD BY AUTOMATED COUNT: 16.5 % (ref 11–15)
GLOBULIN PLAS-MCNC: 2.1 G/DL (ref 2–3.5)
GLUCOSE BLD-MCNC: 114 MG/DL (ref 70–99)
GLUCOSE BLDC GLUCOMTR-MCNC: 101 MG/DL (ref 70–99)
GLUCOSE BLDC GLUCOMTR-MCNC: 108 MG/DL (ref 70–99)
GLUCOSE BLDC GLUCOMTR-MCNC: 98 MG/DL (ref 70–99)
HCT VFR BLD AUTO: 42.8 % (ref 35–48)
HGB BLD-MCNC: 13.4 G/DL (ref 12–16)
IMM GRANULOCYTES # BLD AUTO: 0.07 X10(3) UL (ref 0–1)
IMM GRANULOCYTES NFR BLD: 0.7 %
LYMPHOCYTES # BLD AUTO: 0.91 X10(3) UL (ref 1–4)
LYMPHOCYTES NFR BLD AUTO: 8.9 %
MAGNESIUM SERPL-MCNC: 2.1 MG/DL (ref 1.6–2.6)
MCH RBC QN AUTO: 29.9 PG (ref 26–34)
MCHC RBC AUTO-ENTMCNC: 31.3 G/DL (ref 31–37)
MCV RBC AUTO: 95.5 FL (ref 80–100)
MONOCYTES # BLD AUTO: 0.59 X10(3) UL (ref 0.1–1)
MONOCYTES NFR BLD AUTO: 5.7 %
NEUTROPHILS # BLD AUTO: 8.59 X10 (3) UL (ref 1.5–7.7)
NEUTROPHILS # BLD AUTO: 8.59 X10(3) UL (ref 1.5–7.7)
NEUTROPHILS NFR BLD AUTO: 83.5 %
OSMOLALITY SERPL CALC.SUM OF ELEC: 283 MOSM/KG (ref 275–295)
P AXIS: 74 DEGREES
P-R INTERVAL: 200 MS
PLATELET # BLD AUTO: 111 10(3)UL (ref 150–450)
PLATELETS.RETICULATED NFR BLD AUTO: 5.8 % (ref 0–7)
POTASSIUM SERPL-SCNC: 4.6 MMOL/L (ref 3.5–5.1)
PROT SERPL-MCNC: 5.6 G/DL (ref 5.7–8.2)
Q-T INTERVAL: 370 MS
QRS DURATION: 100 MS
QTC CALCULATION (BEZET): 491 MS
R AXIS: 10 DEGREES
RBC # BLD AUTO: 4.48 X10(6)UL (ref 3.8–5.3)
SODIUM SERPL-SCNC: 135 MMOL/L (ref 136–145)
T AXIS: 36 DEGREES
VENTRICULAR RATE: 106 BPM
WBC # BLD AUTO: 10.3 X10(3) UL (ref 4–11)

## 2025-05-26 PROCEDURE — 71045 X-RAY EXAM CHEST 1 VIEW: CPT | Performed by: INTERNAL MEDICINE

## 2025-05-26 PROCEDURE — 99233 SBSQ HOSP IP/OBS HIGH 50: CPT | Performed by: INTERNAL MEDICINE

## 2025-05-26 NOTE — PROGRESS NOTES
[facility]  part of [facility]    Progress Note    Radha Winters Patient Status:  Inpatient    1960 MRN [id number]   Location [facility] 2W/SW Attending Fanny Majano MD   Hosp Day # 9 PCP JUAN MONTANEZ       Subjective:   Radha Winters is a(n) 64 year old female Unable to come off BiPaP    Objective:     Vitals:    25 0900   BP:    Pulse: 97   Resp: 14   Temp:        Intake/Output Summary (Last 24 hours) at 2025 1030  Last data filed at 2025 0914  Gross per 24 hour   Intake 360 ml   Output 28 ml   Net 332 ml     Wt Readings from Last 2 Encounters:   25 233 lb 0.4 oz (105.7 kg)   10/09/23 185 lb 3 oz (84 kg)         General appearance:  alert, appears stated age, and cooperative  Head: Normocephalic, without obvious abnormality, atraumatic  Eyes: conjunctivae/corneas clear. PERRL, EOM's intact. Fundi benign.  Neck: no adenopathy, no carotid bruit, no JVD, supple, symmetrical, trachea midline, and thyroid not enlarged, symmetric, no tenderness/mass/nodules  Pulmonary: clear to auscultation bilaterally  Cardiovascular: S1, S2 normal, no murmur, click, rub or gallop, regular rate and rhythm  Abdominal: soft, non-tender; bowel sounds normal; no masses,  no organomegaly  Extremities: extremities normal, atraumatic, no cyanosis or edema  Pulses: 2+ and symmetric  Neurologic: Grossly normal  Genital Exam: defer exam    Current Medications:  Current Hospital Medications[1]    Allergies  Allergies[2]    Laboratory Results:     Lab Results   Component Value Date    WBC 10.3 2025    HGB 13.4 2025    HCT 42.8 2025    .0 (L) 2025     (L) 2025    K 4.6 2025     2025    CO2 28.0 2025    CREATSERUM 3.34 (H) 2025    BUN 18 2025     (H) 2025    CA 8.3 (L) 2025    ALB 3.5 2025    ALKPHO 232 (H) 2025    BILT 0.3 2025    TP 5.6 (L) 2025    AST  285 (H) 05/26/2025    ALT 70 (H) 05/26/2025    TSH 0.528 10/10/2023    LIP 36 05/14/2025    DDIMER 1.77 (H) 05/24/2025    MG 2.1 05/26/2025    PHOS 6.3 (H) 05/17/2025    CK 6,552 (HH) 05/24/2025    B12 581 10/10/2023     Hospital Encounter on 05/17/25   1. Urine Culture, Routine     Status: Abnormal    Collection Time: 05/17/25 10:18 AM    Specimen: Urine, clean catch   Result Value Ref Range    Urine Culture >100,000 CFU/ML Escherichia coli (A) N/A       Susceptibility    Escherichia coli -  (no method available)     Ampicillin >=32 Resistant      Ampicillin + Sulbactam 4 Sensitive      Cefazolin <=4 Sensitive      Ciprofloxacin <=0.25 Sensitive      Gentamicin <=1 Sensitive      Meropenem <=0.25 Sensitive      Levofloxacin* 1 Sensitive       * The current CLSI susceptibility breakpoint for levofloxacin is an MARLEY of 0.5 mcg/mL. MICs above this should NOT be considered susceptible. Updated susceptibility reporting is in progress.     Nitrofurantoin <=16 Sensitive      Piperacillin + Tazobactam <=4 Sensitive      Trimethoprim/Sulfa >=320 Resistant        Assessment and Plan:     Acute renal failure superimposed on chronic kidney disease, unspecified acute renal failure type, unspecified CKD stage  I thinAcute renal failure superimposed on chronic kidney disease, unspecified acute renal failure type, unspecified CKD stage          Non-traumatic rhabdomyolysis          Transaminitis     Patient is a 63 y/o female with PMH of HTN, dyslipidemia, CAD admitted after multiple falls, Nephrology consulted for Acute Kidney Injury      Acute Kidney Injury:  Likely secondary to ATN, from Rhabdomyolysis, continue aggressive volume resuscitation, 0.9  ml/hr, continue to trend BMP, check Uric acid, Phos, Urine studies  CKD stage 3b vs 4:  Secondary to HTN nephrosclerosis, check Phos, PTH, renal US  Transaminitis:  Continue to trend LFTs, will check abdominal US        Recommendations:  Acute Kidney Injury likely unresolved  ATN  Will plan for tunneled HD line  KCL replacement     HD in AM     Renal US reviewed  Renally dose meds  Midodrine for MAP goal >65  Huynh placement, for strict I/Os     May 23, 2025  Patient is nonoliguric.  Renal function is severely deranged.  Has a tunneled dialysis catheter in place.  Will dialyze as needed for volume control.  Currently patient shows no signs of recovery of kidney function and likely needs supportive care including dialysis as needed.     May 24, 2025  Patient had a rapid response called because of being short of breath and was clinically volume overloaded.  Patient transferred to progressive care unit.  Discussed with hospitalist.  Patient is receiving dialysis with intent to remove 2.2 L of fluid.  Continue supportive care and monitor electrolytes and renal function.     May 25, 2025  Patient is critically ill in ICU.  Today is day 2 of back-to-back dialysis.  Within goal  to remove 2.2 L of fluid in 3-1/2 hours.  Overall patient seems to have improved but still requiring BiPAP.The monitoring electrolyte and renal function.    May 26th 2025  Patient still not improved much with volume status. Plan for dialysis tomorrow with 4hrs and 3 Lit UF. Remains critically ill in ICU. D/W RN.   Imaging Results:   XR CHEST AP PORTABLE  (CPT=71045)  Result Date: 5/26/2025  CONCLUSION:  1. Stable findings most consistent with moderate CHF/fluid overload including stable pulmonary edema and partial improvement in a small left pleural effusion. 2. Partial improvement in left basilar atelectasis.    Dictated by (CST): Von Roberts MD on 5/26/2025 at 7:13 AM     Finalized by (CST): Von Roberts MD on 5/26/2025 at 7:15 AM          US VENOUS DOPPLER LEG BILAT - DIAG IMG (CPT=93970)  Result Date: 5/25/2025  CONCLUSION:  1. No lower extremity DVT bilaterally.    Dictated by (CST): Javi Garcia MD on 5/25/2025 at 2:45 PM     Finalized by (CST): Javi Garcia MD on 5/25/2025 at 2:45 PM          XR  CHEST AP PORTABLE  (CPT=71045)  Result Date: 5/25/2025  PROCEDURE: XR CHEST AP PORTABLE  (CPT=71045) TIME: 6:34 a.m..   COMPARISON: [facility], XR CHEST AP PORTABLE (CPT=71045), 5/24/2025, 10:03 AM.  INDICATIONS: Shortness of breath.  TECHNIQUE:   Single view.   FINDINGS/IMPRESSION:    1. The heart mediastinal structures are enlarged.  Pulmonary vascularity is increased.  There is a moderate to large left pleural effusion.  The findings are most compatible with moderate interstitial edema.  2. There is left lung volume loss with increased density at the left lung base.  The findings could represent atelectasis, infiltrate, or a combination in addition to the pleural effusion.  There is minimal suspected atelectasis at the right lung base.  3. There is a right IJ PermCath with tip projecting over the expected position of the cavoatrial junction. .    Dictated by (CST): Jordi River MD on 5/25/2025 at 9:17 AM     Finalized by (CST): Jordi River MD on 5/25/2025 at 9:18 AM          EKG 12 Lead  Result Date: 5/25/2025  Sinus tachycardia with occasional and consecutive Premature ventricular complexes and Fusion complexes Low voltage QRS, consider pulmonary disease, pericardial effusion, or normal variant Abnormal ECG When compared with ECG of 24-MAY-2025 09:36, Fusion complexes are now Present Premature ventricular complexes are now Present PA interval has decreased Borderline criteria for Anterior infarct are no longer Present Borderline criteria for Anterolateral infarct are no longer Present          DANIS GOMEZ MD  5/26/2025  [email or web address]         [1]   Current Facility-Administered Medications:     QUEtiapine (SEROquel) tab 50 mg, 50 mg, Oral, BID    chlorproMAZINE (Thorazine) tab 25 mg, 25 mg, Oral, QID    busPIRone (Buspar) tab 30 mg, 30 mg, Oral, TID    docusate sodium (Colace) cap 100 mg, 100 mg, Oral, BID    DULoxetine (Cymbalta) DR cap 60 mg, 60 mg, Oral, BID     potassium chloride (Klor-Con M10) tab 10 mEq, 10 mEq, Oral, Daily    heparin (Porcine) 100 Units/mL lock flush 150 Units, 1.5 mL, Intravenous, Once    HYDROcodone-acetaminophen (Norco) 5-325 MG per tab 1 tablet, 1 tablet, Oral, Q6H PRN    albumin human (Albumin) 25% injection 25 g, 25 g, Intravenous, Once    dextrose 10% - sodium chloride 0.9% 1000 mL infusion, , Intravenous, Continuous    ondansetron (Zofran) 4 MG/2ML injection 4 mg, 4 mg, Intravenous, Q6H PRN    polyethylene glycol (PEG 3350) (Miralax) 17 g oral packet 17 g, 17 g, Oral, Daily PRN    magnesium hydroxide (Milk of Magnesia) 400 MG/5ML oral suspension 30 mL, 30 mL, Oral, Daily PRN    bisacodyl (Dulcolax) 10 MG rectal suppository 10 mg, 10 mg, Rectal, Daily PRN    fleet enema (Fleet) rectal enema 133 mL, 1 enema, Rectal, Once PRN    norepinephrine (Levophed) 4 mg/250mL infusion premix, 0.5-50 mcg/min, Intravenous, Continuous    heparin (Porcine) 1000 UNIT/ML injection 2,000 Units, 2,000 Units, Intravenous, PRN Dialysis    albuterol (Ventolin) (2.5 MG/3ML) 0.083% nebulizer solution 2.5 mg, 2.5 mg, Nebulization, Q6H PRN    heparin (Porcine) 5000 UNIT/ML injection 5,000 Units, 5,000 Units, Subcutaneous, 2 times per day    midodrine (ProAmatine) tab 10 mg, 10 mg, Oral, TID    acetaminophen (Tylenol) tab 650 mg, 650 mg, Oral, Q6H PRN    aspirin DR tab 81 mg, 81 mg, Oral, Daily    diazePAM (Valium) tab 5 mg, 5 mg, Oral, Q8H PRN    levothyroxine (Synthroid) tab 150 mcg, 150 mcg, Oral, Before breakfast    [Held by provider] metoprolol succinate ER (Toprol XL) 24 hr tab 50 mg, 50 mg, Oral, BID    QUEtiapine ER (SEROquel XR) 24 hr tab 300 mg, 300 mg, Oral, Nightly  [2] No Known Allergies

## 2025-05-26 NOTE — PLAN OF CARE
Problem: Patient Centered Care  Goal: Patient preferences are identified and integrated in the patient's plan of care  Description: Interventions:  - What would you like us to know as we care for you? I've gotten weak.   - Provide timely, complete, and accurate information to patient/family  - Incorporate patient and family knowledge, values, beliefs, and cultural backgrounds into the planning and delivery of care  - Encourage patient/family to participate in care and decision-making at the level they choose  - Honor patient and family perspectives and choices  Outcome: Progressing     Problem: PAIN - ADULT  Goal: Verbalizes/displays adequate comfort level or patient's stated pain goal  Description: INTERVENTIONS:  - Encourage pt to monitor pain and request assistance  - Assess pain using appropriate pain scale  - Administer analgesics based on type and severity of pain and evaluate response  - Implement non-pharmacological measures as appropriate and evaluate response  - Consider cultural and social influences on pain and pain management  - Manage/alleviate anxiety  - Utilize distraction and/or relaxation techniques  - Monitor for opioid side effects  - Notify MD/LIP if interventions unsuccessful or patient reports new pain  - Anticipate increased pain with activity and pre-medicate as appropriate  Outcome: Progressing     Problem: SAFETY ADULT - FALL  Goal: Free from fall injury  Description: INTERVENTIONS:  - Assess pt frequently for physical needs  - Identify cognitive and physical deficits and behaviors that affect risk of falls.  - [place] fall precautions as indicated by assessment.  - Educate pt/family on patient safety including physical limitations  - Instruct pt to call for assistance with activity based on assessment  - Modify environment to reduce risk of injury  - Provide assistive devices as appropriate  - Consider OT/PT consult to assist with strengthening/mobility  - Encourage toileting  schedule  Outcome: Progressing     Problem: DISCHARGE PLANNING  Goal: Discharge to home or other facility with appropriate resources  Description: INTERVENTIONS:  - Identify barriers to discharge w/pt and caregiver  - Include patient/family/discharge partner in discharge planning  - Arrange for needed discharge resources and transportation as appropriate  - Identify discharge learning needs (meds, wound care, etc)  - Arrange for interpreters to assist at discharge as needed  - Consider post-discharge preferences of patient/family/discharge partner  - Complete POLST form as appropriate  - Assess patient's ability to be responsible for managing their own health  - Refer to Case Management Department for coordinating discharge planning if the patient needs post-hospital services based on physician/LIP order or complex needs related to functional status, cognitive ability or social support system  Outcome: Progressing     Problem: SKIN/TISSUE INTEGRITY - ADULT  Goal: Skin integrity remains intact  Description: INTERVENTIONS  - Assess and document risk factors for pressure ulcer development  - Assess and document skin integrity  - Monitor for areas of redness and/or skin breakdown  - Initiate interventions, skin care algorithm/standards of care as needed  Outcome: Progressing     Problem: RESPIRATORY - ADULT  Goal: Achieves optimal ventilation and oxygenation  Description: INTERVENTIONS:  - Assess for changes in respiratory status  - Assess for changes in mentation and behavior  - Position to facilitate oxygenation and minimize respiratory effort  - Oxygen supplementation based on oxygen saturation or ABGs  - Provide Smoking Cessation handout, if applicable  - Encourage broncho-pulmonary hygiene including cough, deep breathe, Incentive Spirometry  - Assess the need for suctioning and perform as needed  - Assess and instruct to report SOB or any respiratory difficulty  - Respiratory Therapy support as indicated  -  Manage/alleviate anxiety  - Monitor for signs/symptoms of CO2 retention  Outcome: Progressing     Problem: GENITOURINARY - ADULT  Goal: Absence of urinary retention  Description: INTERVENTIONS:  - Assess patient’s ability to void and empty bladder  - Monitor intake/output and perform bladder scan as needed  - Follow urinary retention protocol/standard of care  - Consider collaborating with pharmacy to review patient's medication profile  - Implement strategies to promote bladder emptying  Outcome: Progressing     Problem: METABOLIC/FLUID AND ELECTROLYTES - ADULT  Goal: Glucose maintained within prescribed range  Description: INTERVENTIONS:  - Monitor Blood Glucose as ordered  - Assess for signs and symptoms of hyperglycemia and hypoglycemia  - Administer ordered medications to maintain glucose within target range  - Assess barriers to adequate nutritional intake and initiate nutrition consult as needed  - Instruct patient on self management of diabetes  Outcome: Progressing  Goal: Electrolytes maintained within normal limits  Description: INTERVENTIONS:  - Monitor labs and rhythm and assess patient for signs and symptoms of electrolyte imbalances  - Administer electrolyte replacement as ordered  - Monitor response to electrolyte replacements, including rhythm and repeat lab results as appropriate  - Fluid restriction as ordered  - Instruct patient on fluid and nutrition restrictions as appropriate  Outcome: Progressing  Goal: Hemodynamic stability and optimal renal function maintained  Description: INTERVENTIONS:  - Monitor labs and assess for signs and symptoms of volume excess or deficit  - Monitor intake, output and patient weight  - Monitor urine specific gravity, serum osmolarity and serum sodium as indicated or ordered  - Monitor response to interventions for patient's volume status, including labs, urine output, blood pressure (other measures as available)  - Encourage oral intake as appropriate  - Instruct  patient on fluid and nutrition restrictions as appropriate  Outcome: Progressing     Problem: HEMATOLOGIC - ADULT  Goal: Free from bleeding injury  Description: (Example usage: patient with low platelets)  INTERVENTIONS:  - Avoid intramuscular injections, enemas and rectal medication administration  - Ensure safe mobilization of patient  - Hold pressure on venipuncture sites to achieve adequate hemostasis  - Assess for signs and symptoms of internal bleeding  - Monitor lab trends  - Patient is to report abnormal signs of bleeding to staff  - Avoid use of toothpicks and dental floss  - Use electric shaver for shaving  - Use soft bristle tooth brush  - Limit straining and forceful nose blowing  Outcome: Progressing

## 2025-05-26 NOTE — DIETARY NOTE
ADULT NUTRITION REASSESSMENT    Pt is at moderate nutrition risk.  Pt does not meet malnutrition criteria.      RECOMMENDATIONS TO MD: See Nutrition Intervention for diet and oral nutritional supplement specifics     Encouraged pt to continue to increase po but if oral and ONS intake remains suboptimal to meet daily nutrition needs, consider temporary Enteral nutrition.     ADMITTING DIAGNOSIS:  Transaminitis [R74.01]  Non-traumatic rhabdomyolysis [M62.82]  Acute renal failure superimposed on chronic kidney disease, unspecified acute renal failure type, unspecified CKD stage [N17.9, N18.9]  PERTINENT PAST MEDICAL HISTORY: Past Medical History[1]    PATIENT STATUS:   Initial 05/20/25: Pt assessed due to verbal RN consult for poor appetite and PO intake. Pt presented to hospital s/p recurrent falls. Has PMH as listed above including 2-year h/o recurrent falls with previous evaluation at [facility] where falls were linked to hyponatremia. Per H&P pt reported poor appetite/intake for several days PTA however screened at no risk by RN on initial MST. Found to have SUSAN and rhabdomyolysis with emergent HD initiated on 5/19. Transferred to the CCU with hypotension. Stable on NC with 3L O2. Off pressor support early this AM. Pt sitting up in bed working on lunch tray, <25% consumed. Appetite remains poor. Reports she consumed the 10am Mighty Shake however felt the chocolate was too rich for her. Will trial alternative flavors today. See diet hx below. Pt reports typical wt on home standing scale ~220 lbs.    5/26/2025 Update:       Po intake remains poor, average meal intake 23% with per recorded ONS intake of some 50-75% Mighty shake supplements. Poor appetite vs CPAP. At re-visit, pt states she is very thirsty, asked RN for help and RD requested pt to alternately drink/sip water and Mighty shake supplements--pt agreed. Pt took 80% at 11 am and 50% at 2 pm with RN.  Attempted Swallow eval but pt on CPAP. Bowel regimen in place  for constipation. HD yesterday with 2200 ml fluid removal.    Unable to add Mvi po daily as these contains K. Encouraged pt to continue to increase po as feasible but if oral and ONS intake remains suboptimal to meet daily nutrition needs, consider temporary Enteral nutrition.     FOOD/NUTRITION RELATED HISTORY:  Appetite: poor appetite PTA  Intake: Po intake remains poor, average meal intake 23% with per recorded ONS intake of some 50-75% Mighty shake supplements. Poor appetite vs CPAP. Later, Pt took 80% at 11 am and 50% at 2 pm with RN.  Intake Meeting Needs: Poor to marginal , ONS in place.   Percent Meals Eaten (last 6 days)       Date/Time Percent Meals Eaten (%)    05/20/25 1100 20 %    05/20/25 1400 20 %    05/22/25 1000 90 %     Percent Meals Eaten (%): oatmeal at 05/22/25 1000    05/23/25 1100 0 %     Percent Meals Eaten (%): pt refused breakfast. at 05/23/25 1100    05/23/25 1752 0 %     Percent Meals Eaten (%): pt has poor appetite at 05/23/25 1752    05/25/25 0800 25 %    05/25/25 2000 25 %    05/26/25 0914 0 %    05/26/25 1454 10 %        Food Allergies: No Known Food Allergies (NKFA)  Cultural/Ethnic/Shinto Preferences: Not Obtained    GASTROINTESTINAL: +BM last reported on 5/15, constipation, Loss of appetite, Monitor swallow eval, and abdomen is firm, distended, with active bowel sounds  UO: 2200 ml fluid removed from HD on 5/25   I/Os: +5.7 L total this admission    MEDICATIONS: reviewed   dextrose 10% - sodium chloride 0.9% 1000 mL infusion Stopped (05/24/25 1100)    norepinephrine Stopped (05/19/25 2332)      QUEtiapine  50 mg Oral BID    chlorproMAZINE  25 mg Oral QID    busPIRone HCl  30 mg Oral TID    docusate sodium  100 mg Oral BID    DULoxetine  60 mg Oral BID    potassium chloride  10 mEq Oral Daily    heparin  1.5 mL Intravenous Once    albumin human  25 g Intravenous Once    heparin  5,000 Units Subcutaneous 2 times per day    midodrine  10 mg Oral TID    aspirin  81 mg Oral Daily     levothyroxine  150 mcg Oral Before breakfast    [Held by provider] metoprolol succinate ER  50 mg Oral BID    QUEtiapine ER  300 mg Oral Nightly     LABS: reviewed  Recent Labs     05/24/25  0904 05/25/25  0430 05/26/25  0358   * 124* 114*   BUN 18 14 18   CREATSERUM 4.89* 3.49* 3.34*   CA 8.0* 8.2* 8.3*   MG  --   --  2.1    138 135*   K 3.7 3.7 4.6    99 100   CO2 29.0 32.0 28.0   OSMOCALC 289 288 283       WEIGHT HISTORY:  Patient Weight(s) for the past 336 hrs:   Weight   05/26/25 0526 105.7 kg (233 lb 0.4 oz)   05/25/25 0500 106.6 kg (235 lb 0.2 oz)   05/24/25 1000 107 kg (235 lb 14.3 oz)   05/24/25 0627 104.8 kg (231 lb 1.6 oz)   05/22/25 1300 96.1 kg (211 lb 13.8 oz)   05/21/25 0100 92.7 kg (204 lb 5.9 oz)   05/20/25 0700 96.6 kg (213 lb)   05/19/25 0734 97.5 kg (214 lb 15.2 oz)   05/17/25 1419 96.4 kg (212 lb 8.4 oz)   05/17/25 1002 84 kg (185 lb 3 oz)     Wt Readings from Last 10 Encounters:   05/26/25 105.7 kg (233 lb 0.4 oz)   10/09/23 84 kg (185 lb 3 oz)   10/04/23 83.9 kg (185 lb)   06/12/16 49.9 kg (110 lb)     ANTHROPOMETRICS:  HT: 160 cm (5' 3\")  Wt Readings from Last 1 Encounters:   05/26/25 105.7 kg (233 lb 0.4 oz)   Last weight: Fluid changes noted   5/17: 213#  Dosing Weight: 97 kg (213 lbs) - admission weight on 5/20/25, utilized for anthropometric calculations  BMI: Body mass index is 37.73 kg/m².  BMI CLASSIFICATION: 35-39.9 kg/m2 - obesity class II  IBW/lbs (Calculated) Female: 115 lbs           185% IBW  Usual Body Wt: 220 lbs       97% UBW    NUTRITION RELATED PHYSICAL FINDINGS:  - Nutrition Focused Physical Exam (NFPE): well nourished and no wasting noted  - Fluid Accumulation: +1 edema on multiple region of the body. See RN documentation for details  - Skin Integrity: at risk. See RN documentation for details    NUTRITION DIAGNOSIS/PROBLEM:   Inadequate oral intake related to Decreased ability to consume sufficient energy as evidenced by diet hx decreased PO x2 wks PTA  and poor appetite/intake since admission x3 days.    Nutrition Diagnosis Progress: no  Improvement (unresolved)    NUTRITION INTERVENTION:     NUTRITION PRESCRIPTION:   Estimated Nutrition needs: --dosing wt of 97 kg - wt taken on 5/20/25   Calories: 6955-1229 calories/day (15-20 calories per kg Dosing wt)  Protein: 63-78 g protein/day (1.2-1.5 g protein/kg Ideal body wt (IBW) (52.3 kg))  Fluid Needs: 1585 ml/day (25 ml/kg adjusted BW for obesity (63.4 kg) chronological age method) - adjust for clinical status (SUSAN on HD)    - Diet:       Procedures    Renal diet Renal; Sodium Restriction: 3-4 GM NA; Texture Consistency: Soft / Easy to Chew ; Is Patient on Accuchecks? No    Renal diet was lifted on 5/20 by RD to improve po but when diet was resumed from NPO Renal restrictions were resumed on 5/22. Hence, will remove renal diet.    - Nutrition Care Plan: Adjusted diet to least restrictive to maximize intake, Encouraged increased PO intake, Initiated ONS (oral nutritional supplements), and Requested  with ordering meals, tray set up and/or feeding as needed  - ONS (Oral Nutrition Supplements)/Meals/Snacks: Mighty Shake (300 calories/ 9 g protein each) TID Vanilla, Chocolate, or Strawberry   - Vitamin and mineral supplements: none  - Feeding assistance: meal set up and assistance with menu selection and encouragement at meal times  - Nutrition education: Discussed importance of adequate energy and protein intake; encouraged ONS intake; encouraged smaller, more frequent meals  - Coordination of nutrition care: collaboration with other providers and discussed in Care Rounds   - Discharge and transfer of nutrition care to new setting or provider: monitor plans - from home alone      MONITOR AND EVALUATE/NUTRITION GOALS:  - Food and Nutrient Intake:    Monitor: adequacy of PO intake and adequacy of supplement intake  - Food and Nutrient Administration:    Monitor: need for temporary enteral nutrition if po  continues to be <65% of daily nutritional needs.   - Anthropometric Measurement:    Monitor weight  - Nutrition Goals:    allow wt loss due to fluid losses, PO and supplement greater than 75% of needs, good supplement intake, labs within acceptable limits, minimize lean body mass loss, prevent skin breakdown, maintain true wt within 5%, and improved GI status    RD will follow.     Edwige Trujillo RD, LDN, [facility]  Clinical Dietitian  [phone]             [1]   Past Medical History:   Essential hypertension    Heart attack (HCC)    Hyperlipidemia    Thyroid disease

## 2025-05-26 NOTE — PLAN OF CARE
Problem: Patient Centered Care  Goal: Patient preferences are identified and integrated in the patient's plan of care  Description: Interventions:  - What would you like us to know as we care for you? \"I don't need my dentures.\"  - Provide timely, complete, and accurate information to patient/family  - Incorporate patient and family knowledge, values, beliefs, and cultural backgrounds into the planning and delivery of care  - Encourage patient/family to participate in care and decision-making at the level they choose  - Honor patient and family perspectives and choices  Outcome: Progressing    Problem: PAIN - ADULT  Goal: Verbalizes/displays adequate comfort level or patient's stated pain goal  Description: INTERVENTIONS:  - Encourage pt to monitor pain and request assistance  - Assess pain using appropriate pain scale  - Administer analgesics based on type and severity of pain and evaluate response  - Implement non-pharmacological measures as appropriate and evaluate response  - Consider cultural and social influences on pain and pain management  - Manage/alleviate anxiety  - Utilize distraction and/or relaxation techniques  - Monitor for opioid side effects  - Notify MD/LIP if interventions unsuccessful or patient reports new pain  - Anticipate increased pain with activity and pre-medicate as appropriate  Outcome: Progressing     Problem: SAFETY ADULT - FALL  Goal: Free from fall injury  Description: INTERVENTIONS:  - Assess pt frequently for physical needs  - Identify cognitive and physical deficits and behaviors that affect risk of falls.  - [place] fall precautions as indicated by assessment.  - Educate pt/family on patient safety including physical limitations  - Instruct pt to call for assistance with activity based on assessment  - Modify environment to reduce risk of injury  - Provide assistive devices as appropriate  - Consider OT/PT consult to assist with strengthening/mobility  - Encourage toileting  schedule  Outcome: Progressing     Problem: SKIN/TISSUE INTEGRITY - ADULT  Goal: Skin integrity remains intact  Description: INTERVENTIONS  - Assess and document risk factors for pressure ulcer development  - Assess and document skin integrity  - Monitor for areas of redness and/or skin breakdown  - Initiate interventions, skin care algorithm/standards of care as needed  Outcome: Progressing     Problem: RESPIRATORY - ADULT  Goal: Achieves optimal ventilation and oxygenation  Description: INTERVENTIONS:  - Assess for changes in respiratory status  - Assess for changes in mentation and behavior  - Position to facilitate oxygenation and minimize respiratory effort  - Oxygen supplementation based on oxygen saturation or ABGs  - Provide Smoking Cessation handout, if applicable  - Encourage broncho-pulmonary hygiene including cough, deep breathe, Incentive Spirometry  - Assess the need for suctioning and perform as needed  - Assess and instruct to report SOB or any respiratory difficulty  - Respiratory Therapy support as indicated  - Manage/alleviate anxiety  - Monitor for signs/symptoms of CO2 retention  Outcome: Progressing     Problem: GENITOURINARY - ADULT  Goal: Absence of urinary retention  Description: INTERVENTIONS:  - Assess patient’s ability to void and empty bladder  - Monitor intake/output and perform bladder scan as needed  - Follow urinary retention protocol/standard of care  - Consider collaborating with pharmacy to review patient's medication profile  - Implement strategies to promote bladder emptying  Outcome: Progressing     Problem: METABOLIC/FLUID AND ELECTROLYTES - ADULT  Goal: Glucose maintained within prescribed range  Description: INTERVENTIONS:  - Monitor Blood Glucose as ordered  - Assess for signs and symptoms of hyperglycemia and hypoglycemia  - Administer ordered medications to maintain glucose within target range  - Assess barriers to adequate nutritional intake and initiate nutrition  consult as needed  - Instruct patient on self management of diabetes  Outcome: Progressing  Goal: Electrolytes maintained within normal limits  Description: INTERVENTIONS:  - Monitor labs and rhythm and assess patient for signs and symptoms of electrolyte imbalances  - Administer electrolyte replacement as ordered  - Monitor response to electrolyte replacements, including rhythm and repeat lab results as appropriate  - Fluid restriction as ordered  - Instruct patient on fluid and nutrition restrictions as appropriate  Outcome: Progressing  Goal: Hemodynamic stability and optimal renal function maintained  Description: INTERVENTIONS:  - Monitor labs and assess for signs and symptoms of volume excess or deficit  - Monitor intake, output and patient weight  - Monitor urine specific gravity, serum osmolarity and serum sodium as indicated or ordered  - Monitor response to interventions for patient's volume status, including labs, urine output, blood pressure (other measures as available)  - Encourage oral intake as appropriate  - Instruct patient on fluid and nutrition restrictions as appropriate  Outcome: Progressing     Problem: HEMATOLOGIC - ADULT  Goal: Free from bleeding injury  Description: (Example usage: patient with low platelets)  INTERVENTIONS:  - Avoid intramuscular injections, enemas and rectal medication administration  - Ensure safe mobilization of patient  - Hold pressure on venipuncture sites to achieve adequate hemostasis  - Assess for signs and symptoms of internal bleeding  - Monitor lab trends  - Patient is to report abnormal signs of bleeding to staff  - Avoid use of toothpicks and dental floss  - Use electric shaver for shaving  - Use soft bristle tooth brush  - Limit straining and forceful nose blowing  Outcome: Progressing  Goal: Maintains hematologic stability  Description: INTERVENTIONS  - Assess for signs and symptoms of bleeding or hemorrhage  - Monitor labs and vital signs for trends  -  Administer supportive blood products/factors, fluids and medications as ordered and appropriate  - Administer supportive blood products/factors as ordered and appropriate  Outcome: Progressing  Goal: Free from bleeding injury  Description: (Example usage: patient with low platelets)  INTERVENTIONS:  - Avoid intramuscular injections, enemas and rectal medication administration  - Ensure safe mobilization of patient  - Hold pressure on venipuncture sites to achieve adequate hemostasis  - Assess for signs and symptoms of internal bleeding  - Monitor lab trends  - Patient is to report abnormal signs of bleeding to staff  - Avoid use of toothpicks and dental floss  - Use electric shaver for shaving  - Use soft bristle tooth brush  - Limit straining and forceful nose blowing  Outcome: Progressing     Problem: GASTROINTESTINAL - ADULT  Goal: Minimal or absence of nausea and vomiting  Description: INTERVENTIONS:  - Maintain adequate hydration with IV or PO as ordered and tolerated  - Nasogastric tube to low intermittent suction as ordered  - Evaluate effectiveness of ordered antiemetic medications  - Provide nonpharmacologic comfort measures as appropriate  - Advance diet as tolerated, if ordered  - Obtain nutritional consult as needed  - Evaluate fluid balance  Outcome: Progressing  Goal: Maintains or returns to baseline bowel function  Description: INTERVENTIONS:  - Assess bowel function  - Maintain adequate hydration with IV or PO as ordered and tolerated  - Evaluate effectiveness of GI medications  - Encourage mobilization and activity  - Obtain nutritional consult as needed  - Establish a toileting routine/schedule  - Consider collaborating with pharmacy to review patient's medication profile  Outcome: Progressing     Problem: Diabetes/Glucose Control  Goal: Glucose maintained within prescribed range  Description: INTERVENTIONS:  - Monitor Blood Glucose as ordered  - Assess for signs and symptoms of hyperglycemia and  hypoglycemia  - Administer ordered medications to maintain glucose within target range  - Assess barriers to adequate nutritional intake and initiate nutrition consult as needed  - Instruct patient on self management of diabetes  Outcome: Progressing     Problem: Delirium  Goal: Minimize duration of delirium  Description: Interventions:  - Encourage use of hearing aids, eye glasses  - Promote highest level of mobility daily  - Provide frequent reorientation  - Promote wakefulness i.e. lights on, blinds open  - Promote sleep, encourage patient's normal rest cycle i.e. lights off, TV off, minimize noise and interruptions  - Encourage family to assist in orientation and promotion of home routines  Outcome: Progressing     Problem: DISCHARGE PLANNING  Goal: Discharge to home or other facility with appropriate resources  Description: INTERVENTIONS:  - Identify barriers to discharge w/pt and caregiver  - Include patient/family/discharge partner in discharge planning  - Arrange for needed discharge resources and transportation as appropriate  - Identify discharge learning needs (meds, wound care, etc)  - Arrange for interpreters to assist at discharge as needed  - Consider post-discharge preferences of patient/family/discharge partner  - Complete POLST form as appropriate  - Assess patient's ability to be responsible for managing their own health  - Refer to Case Management Department for coordinating discharge planning if the patient needs post-hospital services based on physician/LIP order or complex needs related to functional status, cognitive ability or social support system  Outcome: Progressing    Cristal Barnard, RN, BSN, CCRN

## 2025-05-26 NOTE — PROGRESS NOTES
05/26/25 1015   VISIT TYPE   SLP Inpatient Visit Type (Documentation Required) Attempted Treatment   FOLLOW UP/PLAN   Follow Up Needed (Documentation Required) Yes   SLP Follow-up Date 05/27/25     SLP attempt this AM. Pt remains on CPAP mask at this time. SLP to follow up as appropriate and/or as schedule allows.    Thank you.    Karen Ordonez M.S. CCC-SLP  Speech Language Pathologist  Phone Number [phone]

## 2025-05-26 NOTE — PROGRESS NOTES
[facility]  part of [facility]    Progress Note    Radha Winters Patient Status:  Inpatient    1960 MRN [id number]   Location [facility] 2W/SW Attending Fanny Majano MD   Hosp Day # 9 PCP JUAN MONTANEZ       Subjective:   Subjective:  Patient was seen and examined  On BiPAP overnight and comfortable  Placed on Ventimask  Occasional cough  Generalized weakness  No fever no abdominal pain  Objective:   Blood pressure 123/62, pulse 97, temperature 96.9 °F (36.1 °C), temperature source Temporal, resp. rate 14, height 5' 3\" (1.6 m), weight 233 lb 0.4 oz (105.7 kg), SpO2 96%.  Physical Exam  Vitals and nursing note reviewed.   Constitutional:       Appearance: She is obese. She is ill-appearing.   HENT:      Head: Atraumatic.      Mouth/Throat:      Mouth: Mucous membranes are moist.   Eyes:      General: No scleral icterus.  Cardiovascular:      Rate and Rhythm: Normal rate.      Heart sounds:      No gallop.   Pulmonary:      Comments: Distant breath sounds diminished in the bases  Mild basal rales  No wheezes or rhonchi  Abdominal:      General: Abdomen is flat. Bowel sounds are normal.      Palpations: Abdomen is soft.      Tenderness: There is no guarding.   Skin:     General: Skin is dry.   Neurological:      Mental Status: Mental status is at baseline.         Results:   Lab Results   Component Value Date    WBC 10.3 2025    HGB 13.4 2025    HCT 42.8 2025    .0 (L) 2025    CREATSERUM 3.34 (H) 2025    BUN 18 2025     (L) 2025    K 4.6 2025     2025    CO2 28.0 2025     (H) 2025    CA 8.3 (L) 2025    ALB 3.5 2025    ALKPHO 232 (H) 2025    BILT 0.3 2025    TP 5.6 (L) 2025     (H) 2025    ALT 70 (H) 2025    TSH 0.528 10/10/2023    LIP 36 2025    DDIMER 1.77 (H) 2025    MG 2.4 2025    PHOS 6.3 (H) 2025    TROPHS  197 () 05/24/2025    CK 6,552 () 05/24/2025    B12 581 10/10/2023       XR CHEST AP PORTABLE  (CPT=71045)  Result Date: 5/26/2025  CONCLUSION:  1. Stable findings most consistent with moderate CHF/fluid overload including stable pulmonary edema and partial improvement in a small left pleural effusion. 2. Partial improvement in left basilar atelectasis.    Dictated by (CST): Von Roberts MD on 5/26/2025 at 7:13 AM     Finalized by (CST): Von Roberts MD on 5/26/2025 at 7:15 AM          US VENOUS DOPPLER LEG BILAT - DIAG IMG (CPT=93970)  Result Date: 5/25/2025  CONCLUSION:  1. No lower extremity DVT bilaterally.    Dictated by (CST): Javi Garcia MD on 5/25/2025 at 2:45 PM     Finalized by (CST): Javi Garcia MD on 5/25/2025 at 2:45 PM          XR CHEST AP PORTABLE  (CPT=71045)  Result Date: 5/25/2025  PROCEDURE: XR CHEST AP PORTABLE  (CPT=71045) TIME: 6:34 a.m..   COMPARISON: [facility], XR CHEST AP PORTABLE (CPT=71045), 5/24/2025, 10:03 AM.  INDICATIONS: Shortness of breath.  TECHNIQUE:   Single view.   FINDINGS/IMPRESSION:    1. The heart mediastinal structures are enlarged.  Pulmonary vascularity is increased.  There is a moderate to large left pleural effusion.  The findings are most compatible with moderate interstitial edema.  2. There is left lung volume loss with increased density at the left lung base.  The findings could represent atelectasis, infiltrate, or a combination in addition to the pleural effusion.  There is minimal suspected atelectasis at the right lung base.  3. There is a right IJ PermCath with tip projecting over the expected position of the cavoatrial junction. .    Dictated by (CST): Jordi River MD on 5/25/2025 at 9:17 AM     Finalized by (CST): Jordi River MD on 5/25/2025 at 9:18 AM          EKG 12 Lead  Result Date: 5/25/2025  Sinus tachycardia with occasional and consecutive Premature ventricular complexes and Fusion complexes Low voltage  QRS, consider pulmonary disease, pericardial effusion, or normal variant Abnormal ECG When compared with ECG of 24-MAY-2025 09:36, Fusion complexes are now Present Premature ventricular complexes are now Present OR interval has decreased Borderline criteria for Anterior infarct are no longer Present Borderline criteria for Anterolateral infarct are no longer Present    EKG 12 Lead  Result Date: 5/24/2025  Sinus rhythm with 1st degree A-V block Low voltage QRS, consider pulmonary disease, pericardial effusion, or normal variant Possible Anterolateral infarct (cited on or before 17-MAY-2025) Abnormal ECG When compared with ECG of 17-MAY-2025 10:02, Vent. rate has increased BY  35 BPM Confirmed by ULISES STRICKLAND, HALEY ([id number]) on 5/24/2025 10:45:10 AM      Assessment & Plan:       1-acute respiratory failure with hypoxia  Pulmonary edema  Basilar atelectasis  Obesity and CRUZITO    Chest x-ray with mild edema and basilar atelectasis  BiPAP during sleep and as needed  O2 therapy  Bronchial hygiene  EZ Pap    2-acute on chronic kidney injury  Nontraumatic rhabdomyolysis    On hemodialysis  Renal following    3-transaminitis likely shock liver  Improving  Mild fatty liver on CT otherwise negative  Supportive care    4-falls  PT and OT and rehab    5-DVT prophylaxis  Heparin subcu    6-full code    7-depression  Cymbalta and Seroquel    Discussed with the staff and RT                  Jonathan Casillas MD  5/26/2025

## 2025-05-26 NOTE — RESPIRATORY THERAPY NOTE
Pt received on CPAP 7-50% FIO2. Once in room pt was on 90% per nurse patient was being moved around desated and fio2 was increased.  At 0815 placed pt on High flow nasal cannula 6 LPM sating good 95% but after 7 min pt sats dropped to 88-89% (92% for 5 min) increased to 8 LPM was okay for a couple of min but desated again to 88-89% increased to 15 LPM and than NRM but pts sats maintained between 88-89%. At 0840 placed pt on back on CPAP-7/60%. Pt okay now. Nurse notified. Will continue to follow.     New orders from Dr. Casillas Settings changed to BIPAP 10/5. EZPAP added.

## 2025-05-26 NOTE — RESPIRATORY THERAPY NOTE
Pt was to weak to do PEP therapy. Did not want to come off her bipap. Vest therapy was done instead of PEP. EZPAP was not done due to reason above as well.

## 2025-05-27 ENCOUNTER — APPOINTMENT (OUTPATIENT)
Dept: NUCLEAR MEDICINE | Facility: HOSPITAL | Age: 65
End: 2025-05-27
Attending: INTERNAL MEDICINE
Payer: MEDICAID

## 2025-05-27 ENCOUNTER — APPOINTMENT (OUTPATIENT)
Dept: GENERAL RADIOLOGY | Facility: HOSPITAL | Age: 65
End: 2025-05-27
Attending: INTERNAL MEDICINE
Payer: MEDICAID

## 2025-05-27 ENCOUNTER — APPOINTMENT (OUTPATIENT)
Dept: GENERAL RADIOLOGY | Facility: HOSPITAL | Age: 65
End: 2025-05-27
Attending: HOSPITALIST
Payer: MEDICAID

## 2025-05-27 ENCOUNTER — APPOINTMENT (OUTPATIENT)
Dept: CV DIAGNOSTICS | Facility: HOSPITAL | Age: 65
End: 2025-05-27
Attending: INTERNAL MEDICINE
Payer: MEDICAID

## 2025-05-27 LAB
ALBUMIN SERPL-MCNC: 3.4 G/DL (ref 3.2–4.8)
ALBUMIN/GLOB SERPL: 1.7 {RATIO} (ref 1–2)
ALP LIVER SERPL-CCNC: 199 U/L (ref 50–130)
ALT SERPL-CCNC: 46 U/L (ref 10–49)
ANION GAP SERPL CALC-SCNC: 6 MMOL/L (ref 0–18)
AST SERPL-CCNC: 279 U/L (ref ?–34)
BASE EXCESS BLD CALC-SCNC: 0.8 MMOL/L (ref ?–2)
BASOPHILS # BLD AUTO: 0.04 X10(3) UL (ref 0–0.2)
BASOPHILS NFR BLD AUTO: 0.4 %
BILIRUB SERPL-MCNC: 0.2 MG/DL (ref 0.2–1.1)
BUN BLD-MCNC: 38 MG/DL (ref 9–23)
BUN/CREAT SERPL: 8 (ref 10–20)
CALCIUM BLD-MCNC: 8.6 MG/DL (ref 8.7–10.4)
CHLORIDE SERPL-SCNC: 100 MMOL/L (ref 98–112)
CK SERPL-CCNC: 9315 U/L (ref 34–145)
CO2 SERPL-SCNC: 28 MMOL/L (ref 21–32)
CREAT BLD-MCNC: 4.76 MG/DL (ref 0.55–1.02)
DEPRECATED RDW RBC AUTO: 57.8 FL (ref 35.1–46.3)
EGFRCR SERPLBLD CKD-EPI 2021: 10 ML/MIN/1.73M2 (ref 60–?)
EOSINOPHIL # BLD AUTO: 0.1 X10(3) UL (ref 0–0.7)
EOSINOPHIL NFR BLD AUTO: 1 %
ERYTHROCYTE [DISTWIDTH] IN BLOOD BY AUTOMATED COUNT: 16.8 % (ref 11–15)
GLOBULIN PLAS-MCNC: 2 G/DL (ref 2–3.5)
GLUCOSE BLD-MCNC: 112 MG/DL (ref 70–99)
GLUCOSE BLDC GLUCOMTR-MCNC: 101 MG/DL (ref 70–99)
GLUCOSE BLDC GLUCOMTR-MCNC: 111 MG/DL (ref 70–99)
GLUCOSE BLDC GLUCOMTR-MCNC: 85 MG/DL (ref 70–99)
GLUCOSE BLDC GLUCOMTR-MCNC: 94 MG/DL (ref 70–99)
HCO3 BLDA-SCNC: 25.5 MEQ/L (ref 21–27)
HCT VFR BLD AUTO: 43.8 % (ref 35–48)
HGB BLD-MCNC: 13.6 G/DL (ref 12–16)
IMM GRANULOCYTES # BLD AUTO: 0.06 X10(3) UL (ref 0–1)
IMM GRANULOCYTES NFR BLD: 0.6 %
LYMPHOCYTES # BLD AUTO: 0.74 X10(3) UL (ref 1–4)
LYMPHOCYTES NFR BLD AUTO: 7.5 %
MCH RBC QN AUTO: 29.6 PG (ref 26–34)
MCHC RBC AUTO-ENTMCNC: 31.1 G/DL (ref 31–37)
MCV RBC AUTO: 95.2 FL (ref 80–100)
MODIFIED ALLEN TEST: POSITIVE
MONOCYTES # BLD AUTO: 0.48 X10(3) UL (ref 0.1–1)
MONOCYTES NFR BLD AUTO: 4.8 %
NEUTROPHILS # BLD AUTO: 8.48 X10 (3) UL (ref 1.5–7.7)
NEUTROPHILS # BLD AUTO: 8.48 X10(3) UL (ref 1.5–7.7)
NEUTROPHILS NFR BLD AUTO: 85.7 %
O2 CT BLD-SCNC: 19.4 VOL% (ref 15–23)
O2/TOTAL GAS SETTING VFR VENT: 75 %
OSMOLALITY SERPL CALC.SUM OF ELEC: 288 MOSM/KG (ref 275–295)
OXYGEN LITERS/MINUTE: 45 L/MIN
PCO2 BLDA: 51 MM HG (ref 35–45)
PH BLDA: 7.34 [PH] (ref 7.35–7.45)
PLATELET # BLD AUTO: 129 10(3)UL (ref 150–450)
PLATELETS.RETICULATED NFR BLD AUTO: 6 % (ref 0–7)
PO2 BLDA: 79 MM HG (ref 80–100)
POTASSIUM SERPL-SCNC: 5.9 MMOL/L (ref 3.5–5.1)
PROT SERPL-MCNC: 5.4 G/DL (ref 5.7–8.2)
PUNCTURE CHARGE: YES
RBC # BLD AUTO: 4.6 X10(6)UL (ref 3.8–5.3)
SAO2 % BLDA: 95.4 % (ref 94–100)
SODIUM SERPL-SCNC: 134 MMOL/L (ref 136–145)
WBC # BLD AUTO: 9.9 X10(3) UL (ref 4–11)

## 2025-05-27 PROCEDURE — 71045 X-RAY EXAM CHEST 1 VIEW: CPT | Performed by: INTERNAL MEDICINE

## 2025-05-27 PROCEDURE — 99291 CRITICAL CARE FIRST HOUR: CPT | Performed by: HOSPITALIST

## 2025-05-27 PROCEDURE — 0BH17EZ INSERTION OF ENDOTRACHEAL AIRWAY INTO TRACHEA, VIA NATURAL OR ARTIFICIAL OPENING: ICD-10-PCS | Performed by: HOSPITALIST

## 2025-05-27 PROCEDURE — 5A1945Z RESPIRATORY VENTILATION, 24-96 CONSECUTIVE HOURS: ICD-10-PCS | Performed by: HOSPITALIST

## 2025-05-27 PROCEDURE — 31500 INSERT EMERGENCY AIRWAY: CPT | Performed by: HOSPITALIST

## 2025-05-27 PROCEDURE — 78582 LUNG VENTILAT&PERFUS IMAGING: CPT | Performed by: INTERNAL MEDICINE

## 2025-05-27 PROCEDURE — 99291 CRITICAL CARE FIRST HOUR: CPT | Performed by: INTERNAL MEDICINE

## 2025-05-27 PROCEDURE — 71045 X-RAY EXAM CHEST 1 VIEW: CPT | Performed by: HOSPITALIST

## 2025-05-27 RX ORDER — SODIUM BICARBONATE 650 MG/1
650 TABLET ORAL AS NEEDED
Status: DISCONTINUED | OUTPATIENT
Start: 2025-05-27 | End: 2025-06-07

## 2025-05-27 RX ORDER — IPRATROPIUM BROMIDE AND ALBUTEROL SULFATE 2.5; .5 MG/3ML; MG/3ML
3 SOLUTION RESPIRATORY (INHALATION)
Status: DISCONTINUED | OUTPATIENT
Start: 2025-05-27 | End: 2025-05-27

## 2025-05-27 RX ORDER — ARFORMOTEROL TARTRATE 15 UG/2ML
15 SOLUTION RESPIRATORY (INHALATION)
Status: DISCONTINUED | OUTPATIENT
Start: 2025-05-27 | End: 2025-06-07

## 2025-05-27 RX ORDER — IPRATROPIUM BROMIDE AND ALBUTEROL SULFATE 2.5; .5 MG/3ML; MG/3ML
3 SOLUTION RESPIRATORY (INHALATION)
Status: DISCONTINUED | OUTPATIENT
Start: 2025-05-27 | End: 2025-05-28

## 2025-05-27 RX ORDER — ETOMIDATE 2 MG/ML
10 INJECTION INTRAVENOUS ONCE
Status: COMPLETED | OUTPATIENT
Start: 2025-05-27 | End: 2025-05-27

## 2025-05-27 RX ORDER — ACETYLCYSTEINE 200 MG/ML
2 SOLUTION ORAL; RESPIRATORY (INHALATION) 2 TIMES DAILY
Status: DISCONTINUED | OUTPATIENT
Start: 2025-05-27 | End: 2025-06-02

## 2025-05-27 RX ORDER — ETOMIDATE 2 MG/ML
INJECTION INTRAVENOUS
Status: COMPLETED
Start: 2025-05-27 | End: 2025-05-27

## 2025-05-27 RX ORDER — METHYLPREDNISOLONE SODIUM SUCCINATE 40 MG/ML
40 INJECTION INTRAMUSCULAR; INTRAVENOUS EVERY 12 HOURS
Status: DISCONTINUED | OUTPATIENT
Start: 2025-05-27 | End: 2025-05-28

## 2025-05-27 RX ORDER — DEXTROSE MONOHYDRATE AND SODIUM CHLORIDE 5; .45 G/100ML; G/100ML
INJECTION, SOLUTION INTRAVENOUS CONTINUOUS
Status: DISCONTINUED | OUTPATIENT
Start: 2025-05-27 | End: 2025-05-28

## 2025-05-27 RX ORDER — BUDESONIDE 0.5 MG/2ML
0.5 INHALANT ORAL
Status: DISCONTINUED | OUTPATIENT
Start: 2025-05-27 | End: 2025-06-07

## 2025-05-27 NOTE — DIETARY NOTE
ADULT NUTRITION UPDATE NOTE    Pt is at high nutrition risk.  Pt does not meet malnutrition criteria.      RECOMMENDATIONS TO MD: See nutrition intervention for Enteral Nutrition (EN) rx. No BM since 5/15 on stool softeners and 5/21 s/p miralax. Will advise RN rx PRN meds. For constipation.     ADMITTING DIAGNOSIS:  Transaminitis [R74.01]  Non-traumatic rhabdomyolysis [M62.82]  Acute renal failure superimposed on chronic kidney disease, unspecified acute renal failure type, unspecified CKD stage [N17.9, N18.9]  PERTINENT PAST MEDICAL HISTORY: Past Medical History[1]    PATIENT STATUS:   Initial 05/20/25: Pt assessed due to verbal RN consult for poor appetite and PO intake. Pt presented to hospital s/p recurrent falls. Has PMH as listed above including 2-year h/o recurrent falls with previous evaluation at [facility] where falls were linked to hyponatremia. Per H&P pt reported poor appetite/intake for several days PTA however screened at no risk by RN on initial MST. Found to have SUSAN and rhabdomyolysis with emergent HD initiated on 5/19. Transferred to the CCU with hypotension. Stable on NC with 3L O2. Off pressor support early this AM. Pt sitting up in bed working on lunch tray, <25% consumed. Appetite remains poor. Reports she consumed the 10am Mighty Shake however felt the chocolate was too rich for her. Will trial alternative flavors today. See diet hx below. Pt reports typical wt on home standing scale ~220 lbs.    5/26/2025 Update:       Po intake remains poor, average meal intake 23% with per recorded ONS intake of some 50-75% Mighty shake supplements. Poor appetite vs CPAP. At re-visit, pt states she is very thirsty, asked RN for help and RD requested pt to alternately drink/sip water and Mighty shake supplements--pt agreed. Pt took 80% at 11 am and 50% at 2 pm with RN.  Attempted Swallow eval but pt on CPAP. Bowel regimen in place for constipation. HD yesterday with 2200 ml fluid removal.    Unable to add Mvi po  daily as these contains K. Encouraged pt to continue to increase po as feasible but if oral and ONS intake remains suboptimal to meet daily nutrition needs, consider temporary Enteral nutrition.   5/27/25 UPDATE: Overall 8 days poor nutrition intake. Pt is lethargic, unable to tolerate off BiPaP. S/p SLP BSE and dysphagia diet recommended.  Discussed at rounds and recommend tube feeds-orders received. Worsening renal function (SUSAN on CKD)  with noted hyperkalemia- will utilize renal failure and fiber enriched formula. Continue po diet and will adjust EN pending po intake contribution. Begin goal EN to 75% needs.     FOOD/NUTRITION RELATED HISTORY:  Appetite: poor appetite PTA  Intake:  5/26 Po intake remains poor, average meal intake 23% with per recorded ONS intake of some 50-75% Mighty shake supplements. Poor appetite vs CPAP. Later, Pt took 80% at 11 am and 50% at 2 pm with RN.  Intake Meeting Needs: Poor to marginal ,   Percent Meals Eaten (last 6 days)       Date/Time Percent Meals Eaten (%)    05/22/25 1000 90 %     Percent Meals Eaten (%): oatmeal at 05/22/25 1000    05/23/25 1100 0 %     Percent Meals Eaten (%): pt refused breakfast. at 05/23/25 1100    05/23/25 1752 0 %     Percent Meals Eaten (%): pt has poor appetite at 05/23/25 1752    05/25/25 0800 25 %    05/25/25 2000 25 %    05/26/25 0914 0 %    05/26/25 1454 10 %    05/26/25 1832 10 %     Percent Meals Eaten (%): Pt ate several bites of mac & cheese before desatting; at 05/26/25 1832        Food Allergies: No Known Food Allergies (NKFA)  Cultural/Ethnic/Confucianist Preferences: Not Obtained    GASTROINTESTINAL: +BM last reported on 5/15, constipation, Loss of appetite, Monitor swallow eval, and abdomen is soft, round, with active bowel sounds  UO: 2200 ml fluid removed from HD on 5/25   I/Os: +5.9 L total this admission    MEDICATIONS: reviewed. Noted levothyroxine in am- hold tf accordingly.    dextrose 10% - sodium chloride 0.9% 1000 mL infusion  Stopped (05/24/25 1100)    norepinephrine Stopped (05/19/25 2332)      budesonide  0.5 mg Nebulization 2 times daily    arformoterol  15 mcg Nebulization 2 times daily    ipratropium-albuterol  3 mL Nebulization 4 times per day    QUEtiapine  50 mg Oral BID    chlorproMAZINE  25 mg Oral QID    busPIRone HCl  30 mg Oral TID    docusate sodium  100 mg Oral BID    DULoxetine  60 mg Oral BID    heparin  1.5 mL Intravenous Once    albumin human  25 g Intravenous Once    heparin  5,000 Units Subcutaneous 2 times per day    midodrine  10 mg Oral TID    aspirin  81 mg Oral Daily    levothyroxine  150 mcg Oral Before breakfast    [Held by provider] metoprolol succinate ER  50 mg Oral BID    QUEtiapine ER  300 mg Oral Nightly   PRN meds: PRN Medications[2]    LABS: reviewed hyperkalemia- HD and will begin Nepro renal failure formula for EN support.   Recent Labs     05/25/25  0430 05/26/25  0358 05/27/25  0428   * 114* 112*   BUN 14 18 38*   CREATSERUM 3.49* 3.34* 4.76*   CA 8.2* 8.3* 8.6*   MG  --  2.1  --     135* 134*   K 3.7 4.6 5.9*   CL 99 100 100   CO2 32.0 28.0 28.0   OSMOCALC 288 283 288       WEIGHT HISTORY:  Patient Weight(s) for the past 336 hrs:   Weight   05/26/25 0526 105.7 kg (233 lb 0.4 oz)   05/25/25 0500 106.6 kg (235 lb 0.2 oz)   05/24/25 1000 107 kg (235 lb 14.3 oz)   05/24/25 0627 104.8 kg (231 lb 1.6 oz)   05/22/25 1300 96.1 kg (211 lb 13.8 oz)   05/21/25 0100 92.7 kg (204 lb 5.9 oz)   05/20/25 0700 96.6 kg (213 lb)   05/19/25 0734 97.5 kg (214 lb 15.2 oz)   05/17/25 1419 96.4 kg (212 lb 8.4 oz)   05/17/25 1002 84 kg (185 lb 3 oz)     Wt Readings from Last 10 Encounters:   05/26/25 105.7 kg (233 lb 0.4 oz)   10/09/23 84 kg (185 lb 3 oz)   10/04/23 83.9 kg (185 lb)   06/12/16 49.9 kg (110 lb)     ANTHROPOMETRICS:  HT: 160 cm (5' 3\")  Wt Readings from Last 1 Encounters:   05/26/25 105.7 kg (233 lb 0.4 oz)   Last weight: Fluid changes noted   5/17: 213#  Dosing Weight: 97 kg (213 lbs) -  admission weight on 5/20/25, utilized for anthropometric calculations  BMI: Body mass index is 37.73 kg/m².  BMI CLASSIFICATION: 35-39.9 kg/m2 - obesity class II  IBW/lbs (Calculated) Female: 115 lbs           185% IBW  Usual Body Wt: 220 lbs       97% UBW    NUTRITION RELATED PHYSICAL FINDINGS:  - Nutrition Focused Physical Exam (NFPE): well nourished and no wasting noted  - Fluid Accumulation: +1 edema on multiple region of the body. See RN documentation for details  - Skin Integrity: at risk. See RN documentation for details    NUTRITION DIAGNOSIS/PROBLEM:   Inadequate oral intake related to Decreased ability to consume sufficient energy as evidenced by diet hx decreased PO x2 wks PTA and poor appetite/intake since admission x3 days.    Nutrition Diagnosis Progress: no  Improvement (unresolved)    NUTRITION INTERVENTION:     NUTRITION PRESCRIPTION:   Estimated Nutrition needs: --dosing wt of 97 kg - wt taken on 5/20/25   Calories: 0354-9590 calories/day (15-20 calories per kg Dosing wt)  Protein: 63-78 g protein/day (1.2-1.5 g protein/kg Ideal body wt (IBW) (52.3 kg))  Fluid Needs: 1585 ml/day (25 ml/kg adjusted BW for obesity (63.4 kg) chronological age method) - adjust for clinical status (SUSAN on HD)  - Enteral Nutrition: Nepro 20 ml/hr; increase 10 ml increments q 8 hrs to initial goal 35 ml/hr x ave 21 hr infusion time (held for Levothyroxine), FWF: 30 ml q 4 hrs (180 ml). This provides pt ~ 1323 kcals (74% goal kcals), 60 g protein (92% goal) , 536 ml h20 715 ml total h20, 78% RDI's.   - Diet:       Procedures    Renal diet Renal; Sodium Restriction: 3-4 GM NA; Fluid Consistency: Nectar Thick / Mildly Thick Liquids; Texture Consistency: Pureed; Is Patient on Accuchecks? No    Renal diet was lifted on 5/20 by RD to improve po but when diet was resumed from NPO Renal restrictions were resumed on 5/22.    - Nutrition Care Plan: Encouraged increased PO intake, Requested  with ordering meals, tray  set up and/or feeding as needed, and recommended rx for constipation.   - ONS (Oral Nutrition Supplements)/Meals/Snacks: holding Nepro as not nectar thick. Hold other supplements for now (high k+) and resume when appropriate.   - Vitamin and mineral supplements: none  - Feeding assistance: meal set up and assistance with menu selection and encouragement at meal times  - Nutrition education: Discussed importance of adequate energy and protein intake; encouraged ONS intake; encouraged smaller, more frequent meals  - Coordination of nutrition care: collaboration with other providers and discussed in Care Rounds   - Discharge and transfer of nutrition care to new setting or provider: monitor plans - from home alone      MONITOR AND EVALUATE/NUTRITION GOALS:  - Food and Nutrient Intake:    Monitor: adequacy of po intake, for ability to resume ONS.   - Food and Nutrient Administration:    Monitor EN adequacy, tolerance, need to adjust pending po intake.   - Anthropometric Measurement:    Monitor weight  - Nutrition Goals:    allow wt loss due to fluid losses, PO and nutrition support to meet greater than 80% of needs, good supplement intake, labs within acceptable limits, minimize lean body mass loss, prevent skin breakdown, maintain true wt within 5%, and improved GI status    RD will follow.     Megan Palomares RD, LDN, [facility] ([id number])               [1]   Past Medical History:   Essential hypertension    Heart attack (HCC)    Hyperlipidemia    Thyroid disease   [2]   HYDROcodone-acetaminophen    ondansetron    polyethylene glycol (PEG 3350)    magnesium hydroxide    bisacodyl    fleet enema    heparin    albuterol    acetaminophen    diazePAM

## 2025-05-27 NOTE — SLP NOTE
SPEECH DAILY NOTE - INPATIENT    ASSESSMENT & PLAN   ASSESSMENT  PPE REQUIRED. THIS THERAPIST WORE GLOVES, DROPLET MASK, AND GOGGLES FOR DURATION OF EVALUATION. HANDS WASHED UPON ENTRANCE/EXIT.    SLP f/u for ongoing dysphagia tx/meal assessment per recommendations of easy to chew/thin per BSE. RN reports pt is having difficulty swallowing and unable to swallow AM medications. Per RD, pt with poor PO intake and would benefit from a NG for short term supplemental nutrition.    Pt positioned upright in bed with complaints of being tired due to not sleeping last night. Pt afebrile, tolerating 45L/Min O2NC @ 75% FiO2 with oxygen status 91 prior to the start of oral trials. SLP reviewed aspiration precautions and safe swallowing compensatory strategies with the patient. Safe swallow guidelines remain written on the white board in purple. Diet recommendations remain on the whiteboard in the room. Patient v/u but would benefit from continued education due to intermittent PORFIRIO. Provided 1:1 feed assistance, pt tolerates puree and mildly thick liquids via open cup with no overt clinical signs/symptoms of aspiration. Unable to don dentures at this time to trial solids. Pt trialed with thin liquids and anterior spillage from left side with OVERT signs/symptoms of aspiration observed as evidenced by immediate coughing, throat clearing, and desaturation to 86%. Trials discontinued as pt at risk for aspiration. Recommend DOWNGRADE to puree solids and mildly thick liquids at this time.      PLAN: SLP to f/u x3-4 meal assessments, monitor CXR, and VFSS if clinically warranted.     Diet Recommendations - Solids: Puree  Diet Recommendations - Liquids: Nectar thick liquids/ Mildly thick    Compensatory Strategies Recommended: Alternate consistencies  Aspiration Precautions: Upright position, Slow rate, Small bites, Small sips, No straw  Medication Administration Recommendations: Crushed in puree    Patient Experiencing Pain:  No      Treatment Plan  Treatment Plan/Recommendations: Aspiration precautions    Interdisciplinary Communication: Discussed with RN  Recommendations posted at bedside            GOALS  Goal #1 The patient will tolerate puree consistency and mildly thick liquids without overt signs or symptoms of aspiration with 100 % accuracy over 1-2 session(s). Goal modified 5/27   Goal #2 The patient/family/caregiver will demonstrate understanding and implementation of aspiration precautions and swallow strategies independently over 1-2 session(s).    In Progress   Goal #3 The patient will tolerate trial upgrade of advanced solid consistency and thin liquids without overt signs or symptoms of aspiration with 100 % accuracy over 2 session(s).  Goal modified 5/27   Goal #4 The patient will utilize compensatory strategies as outlined by  BSSE (clinical evaluation) including Slow rate, Small bites, Small sips, Upright 90 degrees, Eliminate distractions with PRN assistance 100 % of the time across 2 sessions.    In Progress     FOLLOW UP  Follow Up Needed (Documentation Required): Yes  SLP Follow-up Date: 05/28/25  Duration: 1 week    Session: 2    If you have any questions, please contact VANESSA Strange M.S. CCC-SLP  Speech Language Pathologist  Phone Number [phone]

## 2025-05-27 NOTE — PROGRESS NOTES
[facility]  part of [facility]    Progress Note    Radha Winters Patient Status:  Inpatient    1960 MRN [id number]   Location [facility] 2W/SW Attending Fanny Majano MD   Hosp Day # 10 PCP JUAN MONTANEZ       Subjective:   Radha Winters is a(n) 64 year old female SUSAN, volume overload    Objective:     Vitals:    25 1400   BP: 90/37   Pulse: 94   Resp: 17   Temp:        Intake/Output Summary (Last 24 hours) at 2025 1658  Last data filed at 2025 0600  Gross per 24 hour   Intake 230 ml   Output 30 ml   Net 200 ml     Wt Readings from Last 2 Encounters:   25 233 lb 0.4 oz (105.7 kg)   10/09/23 185 lb 3 oz (84 kg)         General appearance:  alert, appears stated age, and cooperative  Head: Normocephalic, without obvious abnormality, atraumatic  Eyes: conjunctivae/corneas clear. PERRL, EOM's intact. Fundi benign.  Neck: no adenopathy, no carotid bruit, no JVD, supple, symmetrical, trachea midline, and thyroid not enlarged, symmetric, no tenderness/mass/nodules  Pulmonary: clear to auscultation bilaterally  Cardiovascular: S1, S2 normal, no murmur, click, rub or gallop, regular rate and rhythm  Abdominal: soft, non-tender; bowel sounds normal; no masses,  no organomegaly  Extremities: extremities normal, atraumatic, no cyanosis or edema  Pulses: 2+ and symmetric  Neurologic: Grossly normal  Genital Exam: defer exam    Current Medications:  Current Hospital Medications[1]    Allergies  Allergies[2]    Laboratory Results:     Lab Results   Component Value Date    WBC 9.9 2025    HGB 13.6 2025    HCT 43.8 2025    .0 (L) 2025     (L) 2025    K 5.9 (H) 2025     2025    CO2 28.0 2025    CREATSERUM 4.76 (H) 2025    BUN 38 (H) 2025     (H) 2025    CA 8.6 (L) 2025    ALB 3.4 2025    ALKPHO 199 (H) 2025    BILT 0.2 2025    TP 5.4 (L) 2025      (H) 05/27/2025    ALT 46 05/27/2025    TSH 0.528 10/10/2023    LIP 36 05/14/2025    DDIMER 1.77 (H) 05/24/2025    MG 2.1 05/26/2025    PHOS 6.3 (H) 05/17/2025    CK 9,315 (HH) 05/27/2025    B12 581 10/10/2023     Hospital Encounter on 05/17/25   1. Urine Culture, Routine     Status: Abnormal    Collection Time: 05/17/25 10:18 AM    Specimen: Urine, clean catch   Result Value Ref Range    Urine Culture >100,000 CFU/ML Escherichia coli (A) N/A       Susceptibility    Escherichia coli -  (no method available)     Ampicillin >=32 Resistant      Ampicillin + Sulbactam 4 Sensitive      Cefazolin <=4 Sensitive      Ciprofloxacin <=0.25 Sensitive      Gentamicin <=1 Sensitive      Meropenem <=0.25 Sensitive      Levofloxacin* 1 Sensitive       * The current CLSI susceptibility breakpoint for levofloxacin is an MARLEY of 0.5 mcg/mL. MICs above this should NOT be considered susceptible. Updated susceptibility reporting is in progress.     Nitrofurantoin <=16 Sensitive      Piperacillin + Tazobactam <=4 Sensitive      Trimethoprim/Sulfa >=320 Resistant        Assessment and Plan:     Acute renal failure superimposed on chronic kidney disease, unspecified acute renal failure type, unspecified CKD stage  I thinAcute renal failure superimposed on chronic kidney disease, unspecified acute renal failure type, unspecified CKD stage          Non-traumatic rhabdomyolysis          Transaminitis     Patient is a 63 y/o female with PMH of HTN, dyslipidemia, CAD admitted after multiple falls, Nephrology consulted for Acute Kidney Injury      Acute Kidney Injury:  Likely secondary to ATN, from Rhabdomyolysis, continue aggressive volume resuscitation, 0.9  ml/hr, continue to trend BMP, check Uric acid, Phos, Urine studies  CKD stage 3b vs 4:  Secondary to HTN nephrosclerosis, check Phos, PTH, renal US  Transaminitis:  Continue to trend LFTs, will check abdominal US        Recommendations:  Acute Kidney Injury likely unresolved  ATN  Will plan for tunneled HD line  KCL replacement     HD in AM     Renal US reviewed  Renally dose meds  Midodrine for MAP goal >65  Huynh placement, for strict I/Os     May 23, 2025  Patient is nonoliguric.  Renal function is severely deranged.  Has a tunneled dialysis catheter in place.  Will dialyze as needed for volume control.  Currently patient shows no signs of recovery of kidney function and likely needs supportive care including dialysis as needed.     May 24, 2025  Patient had a rapid response called because of being short of breath and was clinically volume overloaded.  Patient transferred to progressive care unit.  Discussed with hospitalist.  Patient is receiving dialysis with intent to remove 2.2 L of fluid.  Continue supportive care and monitor electrolytes and renal function.     May 25, 2025  Patient is critically ill in ICU.  Today is day 2 of back-to-back dialysis.  Within goal  to remove 2.2 L of fluid in 3-1/2 hours.  Overall patient seems to have improved but still requiring BiPAP.The monitoring electrolyte and renal function.     May 26th 2025  Patient still not improved much with volume status. Plan for dialysis tomorrow with 4hrs and 3 Lit UF. Remains critically ill in ICU.     May 27th 2025  Patient doing better but still requiring high flow oxygen. She remains oligoanuric. Dialysis dependent. Monitor closely. Critically ill in ICU. Total time spent seeing patient was 45 minutes.    Imaging Results:   NM LUNG VQ VENT / PERFUSION SCAN  (CPT=78582)  Result Date: 5/27/2025  CONCLUSION:  1. No pulmonary embolus. 2. No perfusion in the left lung suggests airway obstruction probably from a large mucus plug.  Updated chest x-ray follow-up recommended.  Findings were called to Dr. Reynaga.    Dictated by (CST): Javi Garcia MD on 5/27/2025 at 3:34 PM     Finalized by (CST): Javi Garcia MD on 5/27/2025 at 3:43 PM          XR CHEST AP PORTABLE  (CPT=71045)  Result Date: 5/26/2025  CONCLUSION:   1. Cardiomegaly and stable findings most consistent with moderate CHF/fluid overload including stable pulmonary edema and a small left pleural effusion. 2. Stable left basilar atelectasis.  Superimposed pneumonia cannot be excluded.     Dictated by (CST): Von Roberts MD on 5/26/2025 at 12:23 PM     Finalized by (CST): Von Roberts MD on 5/26/2025 at 12:25 PM          XR CHEST AP PORTABLE  (CPT=71045)  Result Date: 5/26/2025  CONCLUSION:  1. Stable findings most consistent with moderate CHF/fluid overload including stable pulmonary edema and partial improvement in a small left pleural effusion. 2. Partial improvement in left basilar atelectasis.    Dictated by (CST): Von Roberts MD on 5/26/2025 at 7:13 AM     Finalized by (CST): Von Roberts MD on 5/26/2025 at 7:15 AM                    DANIS GOMEZ MD  5/27/2025  [email or web address]         [1]   Current Facility-Administered Medications:     budesonide (Pulmicort) 0.5 MG/2ML nebulizer suspension 0.5 mg, 0.5 mg, Nebulization, 2 times daily    arformoterol (Brovana) 15 MCG/2ML nebulizer solution 15 mcg, 15 mcg, Nebulization, 2 times daily    dextrose 10% infusion (TPN no rate), , Intravenous, Continuous PRN    sodium bicarbonate tab 650 mg, 650 mg, Per G Tube, PRN **AND** pancrelipase (Lip-Prot-Amyl) (Zenpep) DR particles cap 10,000 Units, 10,000 Units, Per G Tube, PRN    oxidized cellulose (Surgical Snow) external sheet 2 Package, 2 each, Topical, Once PRN    dextrose 5%-sodium chloride 0.45% infusion, , Intravenous, Continuous    acetylcysteine (Mucomyst) 20 % nebulizer solution 2 mL, 2 mL, Nebulization, BID    ipratropium-albuterol (Duoneb) 0.5-2.5 (3) MG/3ML inhalation solution 3 mL, 3 mL, Nebulization, 4 times per day    methylPREDNISolone sodium succinate (Solu-MEDROL) injection 40 mg, 40 mg, Intravenous, Q12H    ampicillin-sulbactam (Unasyn) 1.5 g in sodium chloride 0.9% 100mL IVPB-MAYO, 1.5 g, Intravenous, q12h     QUEtiapine (SEROquel) tab 50 mg, 50 mg, Oral, BID    chlorproMAZINE (Thorazine) tab 25 mg, 25 mg, Oral, QID    busPIRone (Buspar) tab 30 mg, 30 mg, Oral, TID    docusate sodium (Colace) cap 100 mg, 100 mg, Oral, BID    DULoxetine (Cymbalta) DR cap 60 mg, 60 mg, Oral, BID    heparin (Porcine) 100 Units/mL lock flush 150 Units, 1.5 mL, Intravenous, Once    HYDROcodone-acetaminophen (Norco) 5-325 MG per tab 1 tablet, 1 tablet, Oral, Q6H PRN    albumin human (Albumin) 25% injection 25 g, 25 g, Intravenous, Once    dextrose 10% - sodium chloride 0.9% 1000 mL infusion, , Intravenous, Continuous    ondansetron (Zofran) 4 MG/2ML injection 4 mg, 4 mg, Intravenous, Q6H PRN    polyethylene glycol (PEG 3350) (Miralax) 17 g oral packet 17 g, 17 g, Oral, Daily PRN    magnesium hydroxide (Milk of Magnesia) 400 MG/5ML oral suspension 30 mL, 30 mL, Oral, Daily PRN    bisacodyl (Dulcolax) 10 MG rectal suppository 10 mg, 10 mg, Rectal, Daily PRN    fleet enema (Fleet) rectal enema 133 mL, 1 enema, Rectal, Once PRN    norepinephrine (Levophed) 4 mg/250mL infusion premix, 0.5-50 mcg/min, Intravenous, Continuous    heparin (Porcine) 1000 UNIT/ML injection 2,000 Units, 2,000 Units, Intravenous, PRN Dialysis    albuterol (Ventolin) (2.5 MG/3ML) 0.083% nebulizer solution 2.5 mg, 2.5 mg, Nebulization, Q6H PRN    heparin (Porcine) 5000 UNIT/ML injection 5,000 Units, 5,000 Units, Subcutaneous, 2 times per day    midodrine (ProAmatine) tab 10 mg, 10 mg, Oral, TID    acetaminophen (Tylenol) tab 650 mg, 650 mg, Oral, Q6H PRN    aspirin DR tab 81 mg, 81 mg, Oral, Daily    diazePAM (Valium) tab 5 mg, 5 mg, Oral, Q8H PRN    levothyroxine (Synthroid) tab 150 mcg, 150 mcg, Oral, Before breakfast    [Held by provider] metoprolol succinate ER (Toprol XL) 24 hr tab 50 mg, 50 mg, Oral, BID    QUEtiapine ER (SEROquel XR) 24 hr tab 300 mg, 300 mg, Oral, Nightly  [2] No Known Allergies

## 2025-05-27 NOTE — PROGRESS NOTES
[facility]  part of [facility]    Progress Note    Radha Winters Patient Status:  Inpatient    1960 MRN [id number]   Location [facility] 5SW/SE Attending Fanny Majano MD   Hosp Day # 9 PCP JUAN MONTANEZ       SUBJECTIVE:  Alert   No  chest pain    No shortness of breath       OBJECTIVE:  Vital signs in last 24 hours:  /69 (BP Location: Left arm)   Pulse 98   Temp 97.2 °F (36.2 °C) (Temporal)   Resp 21   Ht 5' 3\" (1.6 m)   Wt 233 lb 0.4 oz (105.7 kg)   SpO2 94%   BMI 41.28 kg/m²     Intake/Output:    Intake/Output Summary (Last 24 hours) at 2025  Last data filed at 2025 1832  Gross per 24 hour   Intake 710 ml   Output 48 ml   Net 662 ml       Wt Readings from Last 3 Encounters:   25 233 lb 0.4 oz (105.7 kg)   10/09/23 185 lb 3 oz (84 kg)   10/04/23 185 lb (83.9 kg)       Exam  Gen: No acute distress, alert  HEENT: No pallor  Pulm: Lungs bilateral coarse breath sound  CV: Heart with regular rate and rhythm, no peripheral edema  Abd: Abdomen soft, nontender, nondistended, no organomegaly, bowel sounds present  Skin: no rashes or lesions  CNS: Alert    Data Review:     Labs:   Lab Results   Component Value Date    WBC 10.3 2025    HGB 13.4 2025    HCT 42.8 2025    .0 2025    CREATSERUM 3.34 2025    BUN 18 2025     2025    K 4.6 2025     2025    CO2 28.0 2025     2025    CA 8.3 2025    ALB 3.5 2025    ALKPHO 232 2025    BILT 0.3 2025    TP 5.6 2025     2025    ALT 70 2025    MG 2.1 2025       LABS  Recent Labs   Lab 25  0347 25  0613 25  0811 25  0904 25  0940 25  0430 25  0358   RBC 4.83   < > 5.09 4.52  --  4.61 4.48   HGB 14.3   < > 15.2 13.7  --  14.0 13.4   HCT 42.4   < > 46.0 41.0  --  42.9 42.8   MCV 87.8   < > 90.4 90.7  --  93.1 95.5   MCH 29.6    < > 29.9 30.3  --  30.4 29.9   MCHC 33.7   < > 33.0 33.4  --  32.6 31.3   RDW 15.3*   < > 15.9* 15.8*  --  15.9* 16.5*   NEPRELIM 5.98   < > 6.11 6.64  --  8.22* 8.59*   WBC 7.4   < > 7.5 8.2  --  9.9 10.3   .0*   < > 119.0* 111.0*  --  108.0* 111.0*   *   < > 276* 193*  --   --  285*   *   < > 357* 189*  --   --  70*   DDIMER  --   --   --   --  1.77*  --   --    CK 8,116*  --  7,754* 6,552*  --   --   --    *   < > 139* 151*  --  124* 114*    < > = values in this interval not displayed.       Imaging:      Meds:   Current Hospital Medications[1]    Assessment  Problem List[2]  1. Acute Kidney Injury on Chronic Kidney Disease:  -  - Nephrology consultation obtained  Patient with worsening renal function.  Nephrology is following the patient  On hemodialysis       2. Rhabdomyolysis:  - Secondary to recurrent falls  - Possibly statin-induced  - Plan: Hold statin (Crestor)  Improving     3. Acute Transaminitis:  - Likely secondary to rhabdomyolysis  - May also be statin-related  - Will monitor with serial labs  Patient could also have a shock liver  Liver enzymes are improving       4. Recurrent Falls:  - Will check orthostatic vital signs  - Physical Therapy and Occupational Therapy consultations ordered     5. Coronary Artery Disease:  - Currently stable  - Patient denies chest pain     6. Hypothyroidism:  - Continue levothyroxine  Possible UTI.  on ceftriaxone.    Acute hypoxic respiratory failure patient is being seen by pulmonary.         Plan for close monitoring and adjustment of management based on clinical course.  Case discussed with nursing staff    Patient is more alert today.      Active Orders   LAB    Basic Metabolic Panel (8)     Frequency: Tomorrow AM draw     Number of Occurrences: 1 Occurrences    CBC With Differential With Platelet     Frequency: Tomorrow AM draw     Number of Occurrences: 1 Occurrences   Imaging    NM LUNG VQ VENT / PERFUSION SCAN  (CPT=78582)      Frequency: Once     Number of Occurrences: 1 Occurrences   Nourishments    Dietary Nutrition Supplements BID     Frequency: BID     Number of Occurrences: Until Specified     Order Comments: Nepro@ 10am and 2pm daily - variety of flavors      Room Service Eligibility Until Discontinued     Frequency: Until Discontinued     Number of Occurrences: Until Specified    Room Service Notify RN Until Discontinued     Frequency: Until Discontinued     Number of Occurrences: Until Specified   Respiratory Care    BIPAP When Sleeping     Frequency: When Sleeping     Number of Occurrences: Until Specified    BIPAP When Sleeping     Frequency: When Sleeping     Number of Occurrences: Until Specified    Chest physiotherapy 4x Daily     Frequency: 4x Daily     Number of Occurrences: Until Specified     Order Comments: Left lower lobe atelectasis.      EZ-PAP 4x Daily     Frequency: 4x Daily     Number of Occurrences: Until Specified    Incentive spriometry: RT to teach pt Once     Frequency: Once     Number of Occurrences: 1 Occurrences    Oxygen administration (adult) PRN     Frequency: PRN     Number of Occurrences: Until Specified    Respiratory care evaluation Once     Frequency: Once     Number of Occurrences: 1 Occurrences     Order Comments: If patient uses home CPAP/BIPAP, place on known home settings. If unknown, place on auto CPAP with upper limit 20 and lower limit 5 cm H2O     Dialysis    Hemodialysis inpatient     Frequency: Tomorrow     Number of Occurrences: 1 Occurrences    Hemodialysis inpatient     Frequency: Once     Number of Occurrences: 1 Occurrences     Order Comments: Bolus heparin 4000 U at start     Diet    Renal diet Renal; Sodium Restriction: 3-4 GM NA; Texture Consistency: Soft / Easy to Chew ; Is Patient on Accuchecks? No     Frequency: Effective Now     Number of Occurrences: Until Specified   Nursing    Accucheck     Frequency: BID     Number of Occurrences: Until Specified    Give all morning  medications unless otherwise specified     Frequency: Until Discontinued     Number of Occurrences: Until Specified     Order Comments: Give all morning medications unless otherwise specified.  DO NOT use Electrolyte protocol.      If patient uses CPAP/BIPAP, encourage patient to wear when sleeping (including daytime) and after any apneic episode or adverse event.     Frequency: Until Discontinued     Number of Occurrences: Until Specified    Insert denny catheter     Frequency: Once     Number of Occurrences: 1 Occurrences    May use port/central line     Frequency: Until Discontinued     Number of Occurrences: Until Specified    Notify attending physician for patients refusing CPAP     Frequency: Until Discontinued     Number of Occurrences: Until Specified     Order Comments: Document that patient was educated and refused CPAP, if applicable.      Notify attending physician for sustained desaturation <90% or prolonged or recurrent apnea     Frequency: Until Discontinued     Number of Occurrences: Until Specified     Order Comments: Notify attending physician for sustained desaturation <90% or prolonged or recurrent apnea      Notify Radiologist     Frequency: Until Discontinued     Number of Occurrences: Until Specified    Nurse Driven Indwelling Urinary Catheter Removal Protocol     Frequency: Until Discontinued     Number of Occurrences: Until Specified    Nursing communication (specify)     Frequency: Once     Number of Occurrences: 1 Occurrences     Order Comments: Hold blood thinners pre procedure      Nursing communication (specify)     Frequency: Once     Number of Occurrences: 1 Occurrences     Order Comments: RN, keep the patient off the left side.      Nursing communication - call [facility] to order dialysis     Frequency: Once     Number of Occurrences: 1 Occurrences     Order Comments: Call [facility] at [phone] to order dialysis.  Identify special circumstances and specify stat or routine  treatment.      Patient may resume usual diet     Frequency: Once     Number of Occurrences: 1 Occurrences     Order Comments: Npo x one hour, then resume pre-procedure diet      Post procedure site assessment     Frequency: Per Unit Routine     Number of Occurrences: Until Specified     Order Comments: Every 15 minutes for 1 hour, then every 30 minutes for 1 hour, then every 60 minutes for 2 hours, then per unit routine      Provide patient with sleep apnea education materials     Frequency: Once     Number of Occurrences: 1 Occurrences    Pulse oximetry     Frequency: Per Unit Routine     Number of Occurrences: Until Specified    Pulse oximetry     Frequency: Continuous     Number of Occurrences: Until Specified    Verify informed consent     Frequency: Once     Number of Occurrences: 1 Occurrences    Vital signs     Frequency: Per Unit Routine     Number of Occurrences: Until Specified     Order Comments: Every 15 minutes for 1 hour, then every 30 minutes for 1 hour, then every 60 minutes for 2 hours, then per unit routine.      Void prior to procedure     Frequency: Once     Number of Occurrences: 1 Occurrences   Consult    Consult to Interventional Radiology     Frequency: Once     Number of Occurrences: 1 Occurrences    Consult to Pulmonology     Frequency: Once     Number of Occurrences: 1 Occurrences   Medications    acetaminophen (Tylenol) tab 650 mg     Frequency: Q6H PRN     Dose: 650 mg     Route: Oral    albumin human (Albumin) 25% injection 25 g     Frequency: Once     Dose: 25 g     Route: Intravenous    albuterol (Ventolin) (2.5 MG/3ML) 0.083% nebulizer solution 2.5 mg     Frequency: Q6H PRN     Dose: 2.5 mg     Route: Nebulization    aspirin DR tab 81 mg     Frequency: Daily     Dose: 81 mg     Route: Oral    bisacodyl (Dulcolax) 10 MG rectal suppository 10 mg     Frequency: Daily PRN     Dose: 10 mg     Route: Rectal    busPIRone (Buspar) tab 30 mg     Frequency: TID     Dose: 30 mg     Route: Oral     chlorproMAZINE (Thorazine) tab 25 mg     Frequency: QID     Dose: 25 mg     Route: Oral    dextrose 10% - sodium chloride 0.9% 1000 mL infusion     Frequency: Continuous     Route: Intravenous    diazePAM (Valium) tab 5 mg     Frequency: Q8H PRN     Dose: 5 mg     Route: Oral    docusate sodium (Colace) cap 100 mg     Frequency: BID     Dose: 100 mg     Route: Oral    DULoxetine (Cymbalta) DR cap 60 mg     Frequency: BID     Dose: 60 mg     Route: Oral    fleet enema (Fleet) rectal enema 133 mL     Frequency: Once PRN     Dose: 1 enema     Route: Rectal    heparin (Porcine) 100 Units/mL lock flush 150 Units     Frequency: Once     Dose: 1.5 mL     Route: Intravenous    heparin (Porcine) 1000 UNIT/ML injection 2,000 Units     Frequency: PRN Dialysis     Dose: 2,000 Units     Route: Intravenous    heparin (Porcine) 5000 UNIT/ML injection 5,000 Units     Frequency: Q12H PATRICK     Dose: 5,000 Units     Route: Subcutaneous    HYDROcodone-acetaminophen (Norco) 5-325 MG per tab 1 tablet     Frequency: Q6H PRN     Dose: 1 tablet     Route: Oral    levothyroxine (Synthroid) tab 150 mcg     Frequency: Before breakfast     Dose: 150 mcg     Route: Oral    magnesium hydroxide (Milk of Magnesia) 400 MG/5ML oral suspension 30 mL     Frequency: Daily PRN     Dose: 30 mL     Route: Oral    metoprolol succinate ER (Toprol XL) 24 hr tab 50 mg     Frequency: BID     Dose: 50 mg     Route: Oral    midodrine (ProAmatine) tab 10 mg     Frequency: TID     Dose: 10 mg     Route: Oral    norepinephrine (Levophed) 4 mg/250mL infusion premix     Frequency: Continuous     Dose: 0.5-50 mcg/min     Route: Intravenous    ondansetron (Zofran) 4 MG/2ML injection 4 mg     Frequency: Q6H PRN     Dose: 4 mg     Route: Intravenous    polyethylene glycol (PEG 3350) (Miralax) 17 g oral packet 17 g     Frequency: Daily PRN     Dose: 17 g     Route: Oral    potassium chloride (Klor-Con M10) tab 10 mEq     Frequency: Daily     Dose: 10 mEq     Route: Oral     QUEtiapine (SEROquel) tab 50 mg     Frequency: BID     Dose: 50 mg     Route: Oral    QUEtiapine ER (SEROquel XR) 24 hr tab 300 mg     Frequency: Nightly     Dose: 300 mg     Route: Oral       Fanny Majano MD           [1]   Current Facility-Administered Medications   Medication Dose Route Frequency    QUEtiapine (SEROquel) tab 50 mg  50 mg Oral BID    chlorproMAZINE (Thorazine) tab 25 mg  25 mg Oral QID    busPIRone (Buspar) tab 30 mg  30 mg Oral TID    docusate sodium (Colace) cap 100 mg  100 mg Oral BID    DULoxetine (Cymbalta) DR cap 60 mg  60 mg Oral BID    potassium chloride (Klor-Con M10) tab 10 mEq  10 mEq Oral Daily    heparin (Porcine) 100 Units/mL lock flush 150 Units  1.5 mL Intravenous Once    HYDROcodone-acetaminophen (Norco) 5-325 MG per tab 1 tablet  1 tablet Oral Q6H PRN    albumin human (Albumin) 25% injection 25 g  25 g Intravenous Once    dextrose 10% - sodium chloride 0.9% 1000 mL infusion   Intravenous Continuous    ondansetron (Zofran) 4 MG/2ML injection 4 mg  4 mg Intravenous Q6H PRN    polyethylene glycol (PEG 3350) (Miralax) 17 g oral packet 17 g  17 g Oral Daily PRN    magnesium hydroxide (Milk of Magnesia) 400 MG/5ML oral suspension 30 mL  30 mL Oral Daily PRN    bisacodyl (Dulcolax) 10 MG rectal suppository 10 mg  10 mg Rectal Daily PRN    fleet enema (Fleet) rectal enema 133 mL  1 enema Rectal Once PRN    norepinephrine (Levophed) 4 mg/250mL infusion premix  0.5-50 mcg/min Intravenous Continuous    heparin (Porcine) 1000 UNIT/ML injection 2,000 Units  2,000 Units Intravenous PRN Dialysis    albuterol (Ventolin) (2.5 MG/3ML) 0.083% nebulizer solution 2.5 mg  2.5 mg Nebulization Q6H PRN    heparin (Porcine) 5000 UNIT/ML injection 5,000 Units  5,000 Units Subcutaneous 2 times per day    midodrine (ProAmatine) tab 10 mg  10 mg Oral TID    acetaminophen (Tylenol) tab 650 mg  650 mg Oral Q6H PRN    aspirin DR tab 81 mg  81 mg Oral Daily    diazePAM (Valium) tab 5 mg  5 mg Oral Q8H PRN     levothyroxine (Synthroid) tab 150 mcg  150 mcg Oral Before breakfast    [Held by provider] metoprolol succinate ER (Toprol XL) 24 hr tab 50 mg  50 mg Oral BID    QUEtiapine ER (SEROquel XR) 24 hr tab 300 mg  300 mg Oral Nightly   [2]   Patient Active Problem List  Diagnosis    Closed fracture of proximal end of left humerus, unspecified fracture morphology, initial encounter    Frequent falls    Seizure-like activity (HCC)    Pituitary lesion (HCC)    Major depressive disorder, recurrent episode, moderate (HCC)    Generalized anxiety disorder    Acute renal failure superimposed on chronic kidney disease, unspecified acute renal failure type, unspecified CKD stage    Non-traumatic rhabdomyolysis    Transaminitis

## 2025-05-27 NOTE — CM/SW NOTE
Heb B uploaded to [facility] referral. Attempted to meet with patient but down for a test. Left list of SNF options at patient's bedside. SW/CM to f/u with patient to discuss choice & discharge planning.    Lili Calderón, BLANCA [id number]

## 2025-05-27 NOTE — PLAN OF CARE
Received patient at 0700. Patient is AO x1, HD completed, V/Q scan completed; Vapotherm.       Problem: Patient Centered Care  Goal: Patient preferences are identified and integrated in the patient's plan of care  Description: Interventions:  - What would you like us to know as we care for you?   - Provide timely, complete, and accurate information to patient/family  - Incorporate patient and family knowledge, values, beliefs, and cultural backgrounds into the planning and delivery of care  - Encourage patient/family to participate in care and decision-making at the level they choose  - Honor patient and family perspectives and choices  Outcome: Progressing     Problem: PAIN - ADULT  Goal: Verbalizes/displays adequate comfort level or patient's stated pain goal  Description: INTERVENTIONS:  - Encourage pt to monitor pain and request assistance  - Assess pain using appropriate pain scale  - Administer analgesics based on type and severity of pain and evaluate response  - Implement non-pharmacological measures as appropriate and evaluate response  - Consider cultural and social influences on pain and pain management  - Manage/alleviate anxiety  - Utilize distraction and/or relaxation techniques  - Monitor for opioid side effects  - Notify MD/LIP if interventions unsuccessful or patient reports new pain  - Anticipate increased pain with activity and pre-medicate as appropriate  Outcome: Progressing     Problem: SAFETY ADULT - FALL  Goal: Free from fall injury  Description: INTERVENTIONS:  - Assess pt frequently for physical needs  - Identify cognitive and physical deficits and behaviors that affect risk of falls.  - [place] fall precautions as indicated by assessment.  - Educate pt/family on patient safety including physical limitations  - Instruct pt to call for assistance with activity based on assessment  - Modify environment to reduce risk of injury  - Provide assistive devices as appropriate  - Consider OT/PT  consult to assist with strengthening/mobility  - Encourage toileting schedule  Outcome: Progressing     Problem: DISCHARGE PLANNING  Goal: Discharge to home or other facility with appropriate resources  Description: INTERVENTIONS:  - Identify barriers to discharge w/pt and caregiver  - Include patient/family/discharge partner in discharge planning  - Arrange for needed discharge resources and transportation as appropriate  - Identify discharge learning needs (meds, wound care, etc)  - Arrange for interpreters to assist at discharge as needed  - Consider post-discharge preferences of patient/family/discharge partner  - Complete POLST form as appropriate  - Assess patient's ability to be responsible for managing their own health  - Refer to Case Management Department for coordinating discharge planning if the patient needs post-hospital services based on physician/LIP order or complex needs related to functional status, cognitive ability or social support system  Outcome: Progressing     Problem: SKIN/TISSUE INTEGRITY - ADULT  Goal: Skin integrity remains intact  Description: INTERVENTIONS  - Assess and document risk factors for pressure ulcer development  - Assess and document skin integrity  - Monitor for areas of redness and/or skin breakdown  - Initiate interventions, skin care algorithm/standards of care as needed  Outcome: Progressing     Problem: RESPIRATORY - ADULT  Goal: Achieves optimal ventilation and oxygenation  Description: INTERVENTIONS:  - Assess for changes in respiratory status  - Assess for changes in mentation and behavior  - Position to facilitate oxygenation and minimize respiratory effort  - Oxygen supplementation based on oxygen saturation or ABGs  - Provide Smoking Cessation handout, if applicable  - Encourage broncho-pulmonary hygiene including cough, deep breathe, Incentive Spirometry  - Assess the need for suctioning and perform as needed  - Assess and instruct to report SOB or any  respiratory difficulty  - Respiratory Therapy support as indicated  - Manage/alleviate anxiety  - Monitor for signs/symptoms of CO2 retention  Outcome: Progressing     Problem: GENITOURINARY - ADULT  Goal: Absence of urinary retention  Description: INTERVENTIONS:  - Assess patient’s ability to void and empty bladder  - Monitor intake/output and perform bladder scan as needed  - Follow urinary retention protocol/standard of care  - Consider collaborating with pharmacy to review patient's medication profile  - Implement strategies to promote bladder emptying  Outcome: Progressing     Problem: METABOLIC/FLUID AND ELECTROLYTES - ADULT  Goal: Glucose maintained within prescribed range  Description: INTERVENTIONS:  - Monitor Blood Glucose as ordered  - Assess for signs and symptoms of hyperglycemia and hypoglycemia  - Administer ordered medications to maintain glucose within target range  - Assess barriers to adequate nutritional intake and initiate nutrition consult as needed  - Instruct patient on self management of diabetes  Outcome: Progressing  Goal: Electrolytes maintained within normal limits  Description: INTERVENTIONS:  - Monitor labs and rhythm and assess patient for signs and symptoms of electrolyte imbalances  - Administer electrolyte replacement as ordered  - Monitor response to electrolyte replacements, including rhythm and repeat lab results as appropriate  - Fluid restriction as ordered  - Instruct patient on fluid and nutrition restrictions as appropriate  Outcome: Progressing  Goal: Hemodynamic stability and optimal renal function maintained  Description: INTERVENTIONS:  - Monitor labs and assess for signs and symptoms of volume excess or deficit  - Monitor intake, output and patient weight  - Monitor urine specific gravity, serum osmolarity and serum sodium as indicated or ordered  - Monitor response to interventions for patient's volume status, including labs, urine output, blood pressure (other  measures as available)  - Encourage oral intake as appropriate  - Instruct patient on fluid and nutrition restrictions as appropriate  Outcome: Progressing     Problem: HEMATOLOGIC - ADULT  Goal: Free from bleeding injury  Description: (Example usage: patient with low platelets)  INTERVENTIONS:  - Avoid intramuscular injections, enemas and rectal medication administration  - Ensure safe mobilization of patient  - Hold pressure on venipuncture sites to achieve adequate hemostasis  - Assess for signs and symptoms of internal bleeding  - Monitor lab trends  - Patient is to report abnormal signs of bleeding to staff  - Avoid use of toothpicks and dental floss  - Use electric shaver for shaving  - Use soft bristle tooth brush  - Limit straining and forceful nose blowing  Outcome: Progressing  Goal: Maintains hematologic stability  Description: INTERVENTIONS  - Assess for signs and symptoms of bleeding or hemorrhage  - Monitor labs and vital signs for trends  - Administer supportive blood products/factors, fluids and medications as ordered and appropriate  - Administer supportive blood products/factors as ordered and appropriate  Outcome: Progressing  Goal: Free from bleeding injury  Description: (Example usage: patient with low platelets)  INTERVENTIONS:  - Avoid intramuscular injections, enemas and rectal medication administration  - Ensure safe mobilization of patient  - Hold pressure on venipuncture sites to achieve adequate hemostasis  - Assess for signs and symptoms of internal bleeding  - Monitor lab trends  - Patient is to report abnormal signs of bleeding to staff  - Avoid use of toothpicks and dental floss  - Use electric shaver for shaving  - Use soft bristle tooth brush  - Limit straining and forceful nose blowing  Outcome: Progressing     Problem: Diabetes/Glucose Control  Goal: Glucose maintained within prescribed range  Description: INTERVENTIONS:  - Monitor Blood Glucose as ordered  - Assess for signs and  symptoms of hyperglycemia and hypoglycemia  - Administer ordered medications to maintain glucose within target range  - Assess barriers to adequate nutritional intake and initiate nutrition consult as needed  - Instruct patient on self management of diabetes  Outcome: Progressing     Problem: Delirium  Goal: Minimize duration of delirium  Description: Interventions:  - Encourage use of hearing aids, eye glasses  - Promote highest level of mobility daily  - Provide frequent reorientation  - Promote wakefulness i.e. lights on, blinds open  - Promote sleep, encourage patient's normal rest cycle i.e. lights off, TV off, minimize noise and interruptions  - Encourage family to assist in orientation and promotion of home routines  Outcome: Progressing     Problem: GASTROINTESTINAL - ADULT  Goal: Minimal or absence of nausea and vomiting  Description: INTERVENTIONS:  - Maintain adequate hydration with IV or PO as ordered and tolerated  - Nasogastric tube to low intermittent suction as ordered  - Evaluate effectiveness of ordered antiemetic medications  - Provide nonpharmacologic comfort measures as appropriate  - Advance diet as tolerated, if ordered  - Obtain nutritional consult as needed  - Evaluate fluid balance  Outcome: Progressing  Goal: Maintains or returns to baseline bowel function  Description: INTERVENTIONS:  - Assess bowel function  - Maintain adequate hydration with IV or PO as ordered and tolerated  - Evaluate effectiveness of GI medications  - Encourage mobilization and activity  - Obtain nutritional consult as needed  - Establish a toileting routine/schedule  - Consider collaborating with pharmacy to review patient's medication profile  Outcome: Progressing

## 2025-05-27 NOTE — RESPIRATORY THERAPY NOTE
Patient received on continuous BiPAP. Current BiPAP settings-       05/27/25 0529   Control Settings   Set Rate 18 breaths/min   Set IPAP 14   Set EPAP 8   Oxygen Percent 60 %   Inspiratory time 0.9   Insp rise time 2     Pt tolerating well. RT will continue to monitor.

## 2025-05-27 NOTE — PLAN OF CARE
Problem: SAFETY ADULT - FALL  Goal: Free from fall injury  Description: INTERVENTIONS:  - Assess pt frequently for physical needs  - Identify cognitive and physical deficits and behaviors that affect risk of falls.  - Hart fall precautions as indicated by assessment.  - Educate pt/family on patient safety including physical limitations  - Instruct pt to call for assistance with activity based on assessment  - Modify environment to reduce risk of injury  - Provide assistive devices as appropriate  - Consider OT/PT consult to assist with strengthening/mobility  - Encourage toileting schedule  Outcome: Progressing     Problem: SKIN/TISSUE INTEGRITY - ADULT  Goal: Skin integrity remains intact  Description: INTERVENTIONS  - Assess and document risk factors for pressure ulcer development  - Assess and document skin integrity  - Monitor for areas of redness and/or skin breakdown  - Initiate interventions, skin care algorithm/standards of care as needed  Outcome: Progressing     Problem: HEMATOLOGIC - ADULT  Goal: Free from bleeding injury  Description: (Example usage: patient with low platelets)  INTERVENTIONS:  - Avoid intramuscular injections, enemas and rectal medication administration  - Ensure safe mobilization of patient  - Hold pressure on venipuncture sites to achieve adequate hemostasis  - Assess for signs and symptoms of internal bleeding  - Monitor lab trends  - Patient is to report abnormal signs of bleeding to staff  - Avoid use of toothpicks and dental floss  - Use electric shaver for shaving  - Use soft bristle tooth brush  - Limit straining and forceful nose blowing  Outcome: Progressing

## 2025-05-27 NOTE — PROGRESS NOTES
[facility]  part of [facility]    Progress Note    Radha Winters Patient Status:  Inpatient    1960 MRN [id number]   Location [facility] 5SW/SE Attending Fanny Majano MD   Hosp Day # 10 PCP JUAN MONTANEZ       SUBJECTIVE:  Alert  Patient is on Airvo      OBJECTIVE:  Vital signs in last 24 hours:  /69 (BP Location: Left arm)   Pulse 95   Temp 97.2 °F (36.2 °C) (Temporal)   Resp 13   Ht 5' 3\" (1.6 m)   Wt 233 lb 0.4 oz (105.7 kg)   SpO2 97%   BMI 41.28 kg/m²     Intake/Output:    Intake/Output Summary (Last 24 hours) at 2025 1046  Last data filed at 2025 0600  Gross per 24 hour   Intake 550 ml   Output 30 ml   Net 520 ml       Wt Readings from Last 3 Encounters:   25 233 lb 0.4 oz (105.7 kg)   10/09/23 185 lb 3 oz (84 kg)   10/04/23 185 lb (83.9 kg)       Exam  Gen: No acute distress, alert  HEENT: No pallor  Pulm: Lungs bilateral coarse breath sound  CV: Heart with regular rate and rhythm, no peripheral edema  Abd: Abdomen soft, nontender, nondistended, no organomegaly, bowel sounds present  Skin: no rashes or lesions  CNS: Alert    Data Review:     Labs:   Lab Results   Component Value Date    WBC 9.9 2025    HGB 13.6 2025    HCT 43.8 2025    .0 2025    CREATSERUM 4.76 2025    BUN 38 2025     2025    K 5.9 2025     2025    CO2 28.0 2025     2025    CA 8.6 2025    ALB 3.4 2025    ALKPHO 199 2025    BILT 0.2 2025    TP 5.4 2025     2025    ALT 46 2025    CK 9,315 2025       LABS  Recent Labs   Lab 25  0811 25  0904 25  0940 25  0430 25  0358 25  0428   RBC 5.09 4.52  --  4.61 4.48 4.60   HGB 15.2 13.7  --  14.0 13.4 13.6   HCT 46.0 41.0  --  42.9 42.8 43.8   MCV 90.4 90.7  --  93.1 95.5 95.2   MCH 29.9 30.3  --  30.4 29.9 29.6   MCHC 33.0 33.4  --  32.6 31.3 31.1    RDW 15.9* 15.8*  --  15.9* 16.5* 16.8*   NEPRELIM 6.11 6.64  --  8.22* 8.59* 8.48*   WBC 7.5 8.2  --  9.9 10.3 9.9   .0* 111.0*  --  108.0* 111.0* 129.0*   * 193*  --   --  285* 279*   * 189*  --   --  70* 46   DDIMER  --   --  1.77*  --   --   --    CK 7,754* 6,552*  --   --   --  9,315*   * 151*  --  124* 114* 112*       Imaging:      Meds:   Current Hospital Medications[1]    Assessment  Problem List[2]  1. Acute Kidney Injury on Chronic Kidney Disease:  -  - Nephrology consultation obtained  Patient with worsening renal function.  Nephrology is following the patient  On hemodialysis       2. Rhabdomyolysis:  - Secondary to recurrent falls  - Possibly statin-induced  - Plan: Hold statin (Crestor)  Improving     3. Acute Transaminitis:  - Likely secondary to rhabdomyolysis  - May also be statin-related  - Will monitor with serial labs  Patient could also have a shock liver  Liver enzymes are improving       4. Recurrent Falls:  - Will check orthostatic vital signs  - Physical Therapy and Occupational Therapy consultations ordered     5. Coronary Artery Disease:  - Currently stable  - Patient denies chest pain     6. Hypothyroidism:  - Continue levothyroxine  Possible UTI.  on ceftriaxone.    Acute hypoxic respiratory failure patient is being seen by pulmonary.         Plan for close monitoring and adjustment of management based on clinical course.  Case discussed with nursing staff    Patient is more alert today.      Active Orders   LAB    Arterial blood gas     Frequency: Once     Number of Occurrences: 1 Occurrences    CBC With Differential With Platelet     Frequency: Tomorrow AM draw     Number of Occurrences: 1 Occurrences    Comp Metabolic Panel (14)     Frequency: Tomorrow AM draw     Number of Occurrences: 1 Occurrences   Imaging    XR CHEST AP PORTABLE  (CPT=71045)     Frequency: Once     Number of Occurrences: 1 Occurrences   Nourishments    Dietary Nutrition Supplements  BID     Frequency: BID     Number of Occurrences: Until Specified     Order Comments: Nepro@ 10am and 2pm daily - variety of flavors      Room Service Eligibility Until Discontinued     Frequency: Until Discontinued     Number of Occurrences: Until Specified    Room Service Notify RN Until Discontinued     Frequency: Until Discontinued     Number of Occurrences: Until Specified   Respiratory Care    BIPAP When Sleeping     Frequency: When Sleeping     Number of Occurrences: Until Specified    BIPAP When Sleeping     Frequency: When Sleeping     Number of Occurrences: Until Specified    Chest physiotherapy 4x Daily     Frequency: 4x Daily     Number of Occurrences: Until Specified     Order Comments: Left lower lobe atelectasis.      EZ-PAP 4x Daily     Frequency: 4x Daily     Number of Occurrences: Until Specified    Incentive spriometry: RT to teach pt Once     Frequency: Once     Number of Occurrences: 1 Occurrences    Oxygen administration (adult) PRN     Frequency: PRN     Number of Occurrences: Until Specified    Respiratory care evaluation Once     Frequency: Once     Number of Occurrences: 1 Occurrences     Order Comments: If patient uses home CPAP/BIPAP, place on known home settings. If unknown, place on auto CPAP with upper limit 20 and lower limit 5 cm H2O     Dialysis    Hemodialysis inpatient     Frequency: Tomorrow     Number of Occurrences: 1 Occurrences    Hemodialysis inpatient     Frequency: Once     Number of Occurrences: 1 Occurrences     Order Comments: Bolus heparin 4000 U at start      Hemodialysis inpatient     Frequency: Tomorrow     Number of Occurrences: 1 Occurrences   Diet    Renal diet Renal; Sodium Restriction: 3-4 GM NA; Fluid Consistency: Nectar Thick / Mildly Thick Liquids; Texture Consistency: Pureed; Is Patient on Accuchecks? No     Frequency: Effective Now     Number of Occurrences: Until Specified     Order Comments: Meds crushed     Nursing    Accucheck     Frequency: BID      Number of Occurrences: Until Specified    Give all morning medications unless otherwise specified     Frequency: Until Discontinued     Number of Occurrences: Until Specified     Order Comments: Give all morning medications unless otherwise specified.  DO NOT use Electrolyte protocol.      If patient uses CPAP/BIPAP, encourage patient to wear when sleeping (including daytime) and after any apneic episode or adverse event.     Frequency: Until Discontinued     Number of Occurrences: Until Specified    Insert denny catheter     Frequency: Once     Number of Occurrences: 1 Occurrences    May use port/central line     Frequency: Until Discontinued     Number of Occurrences: Until Specified    Notify attending physician for patients refusing CPAP     Frequency: Until Discontinued     Number of Occurrences: Until Specified     Order Comments: Document that patient was educated and refused CPAP, if applicable.      Notify attending physician for sustained desaturation <90% or prolonged or recurrent apnea     Frequency: Until Discontinued     Number of Occurrences: Until Specified     Order Comments: Notify attending physician for sustained desaturation <90% or prolonged or recurrent apnea      Notify Radiologist     Frequency: Until Discontinued     Number of Occurrences: Until Specified    Nurse Driven Indwelling Urinary Catheter Removal Protocol     Frequency: Until Discontinued     Number of Occurrences: Until Specified    Nursing communication (specify)     Frequency: Once     Number of Occurrences: 1 Occurrences     Order Comments: Hold blood thinners pre procedure      Nursing communication (specify)     Frequency: Once     Number of Occurrences: 1 Occurrences     Order Comments: RN, keep the patient off the left side.      Nursing communication - call [facility] to order dialysis     Frequency: Once     Number of Occurrences: 1 Occurrences     Order Comments: Call [facility] at [phone] to order dialysis.   Identify special circumstances and specify stat or routine treatment.      Patient may resume usual diet     Frequency: Once     Number of Occurrences: 1 Occurrences     Order Comments: Npo x one hour, then resume pre-procedure diet      Post procedure site assessment     Frequency: Per Unit Routine     Number of Occurrences: Until Specified     Order Comments: Every 15 minutes for 1 hour, then every 30 minutes for 1 hour, then every 60 minutes for 2 hours, then per unit routine      Provide patient with sleep apnea education materials     Frequency: Once     Number of Occurrences: 1 Occurrences    Pulse oximetry     Frequency: Per Unit Routine     Number of Occurrences: Until Specified    Pulse oximetry     Frequency: Continuous     Number of Occurrences: Until Specified    Verify informed consent     Frequency: Once     Number of Occurrences: 1 Occurrences    Vital signs     Frequency: Per Unit Routine     Number of Occurrences: Until Specified     Order Comments: Every 15 minutes for 1 hour, then every 30 minutes for 1 hour, then every 60 minutes for 2 hours, then per unit routine.      Void prior to procedure     Frequency: Once     Number of Occurrences: 1 Occurrences   Consult    Consult to Interventional Radiology     Frequency: Once     Number of Occurrences: 1 Occurrences    Consult to Pulmonology     Frequency: Once     Number of Occurrences: 1 Occurrences   Medications    acetaminophen (Tylenol) tab 650 mg     Frequency: Q6H PRN     Dose: 650 mg     Route: Oral    albumin human (Albumin) 25% injection 25 g     Frequency: Once     Dose: 25 g     Route: Intravenous    albuterol (Ventolin) (2.5 MG/3ML) 0.083% nebulizer solution 2.5 mg     Frequency: Q6H PRN     Dose: 2.5 mg     Route: Nebulization    arformoterol (Brovana) 15 MCG/2ML nebulizer solution 15 mcg     Frequency: 2 times daily     Dose: 15 mcg     Route: Nebulization    aspirin DR tab 81 mg     Frequency: Daily     Dose: 81 mg     Route: Oral     bisacodyl (Dulcolax) 10 MG rectal suppository 10 mg     Frequency: Daily PRN     Dose: 10 mg     Route: Rectal    budesonide (Pulmicort) 0.5 MG/2ML nebulizer suspension 0.5 mg     Frequency: 2 times daily     Dose: 0.5 mg     Route: Nebulization    busPIRone (Buspar) tab 30 mg     Frequency: TID     Dose: 30 mg     Route: Oral    chlorproMAZINE (Thorazine) tab 25 mg     Frequency: QID     Dose: 25 mg     Route: Oral    dextrose 10% - sodium chloride 0.9% 1000 mL infusion     Frequency: Continuous     Route: Intravenous    diazePAM (Valium) tab 5 mg     Frequency: Q8H PRN     Dose: 5 mg     Route: Oral    docusate sodium (Colace) cap 100 mg     Frequency: BID     Dose: 100 mg     Route: Oral    DULoxetine (Cymbalta) DR cap 60 mg     Frequency: BID     Dose: 60 mg     Route: Oral    fleet enema (Fleet) rectal enema 133 mL     Frequency: Once PRN     Dose: 1 enema     Route: Rectal    heparin (Porcine) 100 Units/mL lock flush 150 Units     Frequency: Once     Dose: 1.5 mL     Route: Intravenous    heparin (Porcine) 1000 UNIT/ML injection 2,000 Units     Frequency: PRN Dialysis     Dose: 2,000 Units     Route: Intravenous    heparin (Porcine) 5000 UNIT/ML injection 5,000 Units     Frequency: Q12H PATRICK     Dose: 5,000 Units     Route: Subcutaneous    HYDROcodone-acetaminophen (Norco) 5-325 MG per tab 1 tablet     Frequency: Q6H PRN     Dose: 1 tablet     Route: Oral    ipratropium-albuterol (Duoneb) 0.5-2.5 (3) MG/3ML inhalation solution 3 mL     Frequency: Q6H PATRICK     Dose: 3 mL     Route: Nebulization    levothyroxine (Synthroid) tab 150 mcg     Frequency: Before breakfast     Dose: 150 mcg     Route: Oral    magnesium hydroxide (Milk of Magnesia) 400 MG/5ML oral suspension 30 mL     Frequency: Daily PRN     Dose: 30 mL     Route: Oral    metoprolol succinate ER (Toprol XL) 24 hr tab 50 mg     Frequency: BID     Dose: 50 mg     Route: Oral    midodrine (ProAmatine) tab 10 mg     Frequency: TID     Dose: 10 mg     Route:  Oral    norepinephrine (Levophed) 4 mg/250mL infusion premix     Frequency: Continuous     Dose: 0.5-50 mcg/min     Route: Intravenous    ondansetron (Zofran) 4 MG/2ML injection 4 mg     Frequency: Q6H PRN     Dose: 4 mg     Route: Intravenous    polyethylene glycol (PEG 3350) (Miralax) 17 g oral packet 17 g     Frequency: Daily PRN     Dose: 17 g     Route: Oral    potassium chloride (Klor-Con M10) tab 10 mEq     Frequency: Daily     Dose: 10 mEq     Route: Oral    QUEtiapine (SEROquel) tab 50 mg     Frequency: BID     Dose: 50 mg     Route: Oral    QUEtiapine ER (SEROquel XR) 24 hr tab 300 mg     Frequency: Nightly     Dose: 300 mg     Route: Oral   Cardiology    CARD ECHO 2D DOPPLER (CPT=93306)     Frequency: Once     Number of Occurrences: 1 Occurrences       Fanny Majano MD           [1]   Current Facility-Administered Medications   Medication Dose Route Frequency    budesonide (Pulmicort) 0.5 MG/2ML nebulizer suspension 0.5 mg  0.5 mg Nebulization 2 times daily    arformoterol (Brovana) 15 MCG/2ML nebulizer solution 15 mcg  15 mcg Nebulization 2 times daily    ipratropium-albuterol (Duoneb) 0.5-2.5 (3) MG/3ML inhalation solution 3 mL  3 mL Nebulization 4 times per day    QUEtiapine (SEROquel) tab 50 mg  50 mg Oral BID    chlorproMAZINE (Thorazine) tab 25 mg  25 mg Oral QID    busPIRone (Buspar) tab 30 mg  30 mg Oral TID    docusate sodium (Colace) cap 100 mg  100 mg Oral BID    DULoxetine (Cymbalta) DR cap 60 mg  60 mg Oral BID    potassium chloride (Klor-Con M10) tab 10 mEq  10 mEq Oral Daily    heparin (Porcine) 100 Units/mL lock flush 150 Units  1.5 mL Intravenous Once    HYDROcodone-acetaminophen (Norco) 5-325 MG per tab 1 tablet  1 tablet Oral Q6H PRN    albumin human (Albumin) 25% injection 25 g  25 g Intravenous Once    dextrose 10% - sodium chloride 0.9% 1000 mL infusion   Intravenous Continuous    ondansetron (Zofran) 4 MG/2ML injection 4 mg  4 mg Intravenous Q6H PRN    polyethylene glycol (PEG  3350) (Miralax) 17 g oral packet 17 g  17 g Oral Daily PRN    magnesium hydroxide (Milk of Magnesia) 400 MG/5ML oral suspension 30 mL  30 mL Oral Daily PRN    bisacodyl (Dulcolax) 10 MG rectal suppository 10 mg  10 mg Rectal Daily PRN    fleet enema (Fleet) rectal enema 133 mL  1 enema Rectal Once PRN    norepinephrine (Levophed) 4 mg/250mL infusion premix  0.5-50 mcg/min Intravenous Continuous    heparin (Porcine) 1000 UNIT/ML injection 2,000 Units  2,000 Units Intravenous PRN Dialysis    albuterol (Ventolin) (2.5 MG/3ML) 0.083% nebulizer solution 2.5 mg  2.5 mg Nebulization Q6H PRN    heparin (Porcine) 5000 UNIT/ML injection 5,000 Units  5,000 Units Subcutaneous 2 times per day    midodrine (ProAmatine) tab 10 mg  10 mg Oral TID    acetaminophen (Tylenol) tab 650 mg  650 mg Oral Q6H PRN    aspirin DR tab 81 mg  81 mg Oral Daily    diazePAM (Valium) tab 5 mg  5 mg Oral Q8H PRN    levothyroxine (Synthroid) tab 150 mcg  150 mcg Oral Before breakfast    [Held by provider] metoprolol succinate ER (Toprol XL) 24 hr tab 50 mg  50 mg Oral BID    QUEtiapine ER (SEROquel XR) 24 hr tab 300 mg  300 mg Oral Nightly   [2]   Patient Active Problem List  Diagnosis    Closed fracture of proximal end of left humerus, unspecified fracture morphology, initial encounter    Frequent falls    Seizure-like activity (HCC)    Pituitary lesion (HCC)    Major depressive disorder, recurrent episode, moderate (HCC)    Generalized anxiety disorder    Acute renal failure superimposed on chronic kidney disease, unspecified acute renal failure type, unspecified CKD stage    Non-traumatic rhabdomyolysis    Transaminitis

## 2025-05-27 NOTE — PROGRESS NOTES
[facility]  part of [facility]    Progress Note    Radha Winters Patient Status:  Inpatient    1960 MRN [id number]   Location [facility] 2W/SW Attending Fanny Majano MD   Hosp Day # 10 PCP JUAN MONTANEZ         Subjective:     Unable to perform ROS    Seen and examined  On BiPAP did not tolerate high flow nasal cannula  Difficult to swallow  Generalized weakness and fatigue  Become more lethargic off BiPAP  Moving extremity and following command  Poor historian  Denied abdominal pain  Objective:   Blood pressure 132/69, pulse 95, temperature 97.2 °F (36.2 °C), temperature source Temporal, resp. rate 13, height 5' 3\" (1.6 m), weight 233 lb 0.4 oz (105.7 kg), SpO2 97%.  Physical Exam  Vitals and nursing note reviewed.   Constitutional:       General: She is in acute distress.      Appearance: She is ill-appearing.   HENT:      Head: Atraumatic.      Nose: Nose normal.      Mouth/Throat:      Mouth: Mucous membranes are dry.   Eyes:      General: No scleral icterus.  Cardiovascular:      Rate and Rhythm: Normal rate.      Heart sounds: Murmur heard.      No gallop.   Pulmonary:      Comments: Distant breath sounds bilaterally  Prolonged expiration  Few expiratory wheezes  No rales or rhonchi  Abdominal:      General: Bowel sounds are normal. There is no distension.      Palpations: Abdomen is soft.      Tenderness: There is no guarding.   Musculoskeletal:      Cervical back: No rigidity.      Right lower leg: No edema.      Left lower leg: No edema.   Neurological:      Comments: Generalized weakness and fatigue  On BiPAP  Moves extremities and follows commands         Results:   Lab Results   Component Value Date    WBC 9.9 2025    HGB 13.6 2025    HCT 43.8 2025    .0 (L) 2025    CREATSERUM 4.76 (H) 2025    BUN 38 (H) 2025     (L) 2025    K 5.9 (H) 2025     2025    CO2 28.0 2025     (H)  05/27/2025    CA 8.6 (L) 05/27/2025    ALB 3.4 05/27/2025    ALKPHO 199 (H) 05/27/2025    BILT 0.2 05/27/2025    TP 5.4 (L) 05/27/2025     (H) 05/27/2025    ALT 46 05/27/2025    TSH 0.528 10/10/2023    LIP 36 05/14/2025    DDIMER 1.77 (H) 05/24/2025    MG 2.1 05/26/2025    PHOS 6.3 (H) 05/17/2025    TROPHS 197 (HH) 05/24/2025    CK 9,315 (HH) 05/27/2025    B12 581 10/10/2023       XR CHEST AP PORTABLE  (CPT=71045)  Result Date: 5/26/2025  CONCLUSION:  1. Cardiomegaly and stable findings most consistent with moderate CHF/fluid overload including stable pulmonary edema and a small left pleural effusion. 2. Stable left basilar atelectasis.  Superimposed pneumonia cannot be excluded.     Dictated by (CST): Von Roberts MD on 5/26/2025 at 12:23 PM     Finalized by (CST): Von Roberts MD on 5/26/2025 at 12:25 PM          XR CHEST AP PORTABLE  (CPT=71045)  Result Date: 5/26/2025  CONCLUSION:  1. Stable findings most consistent with moderate CHF/fluid overload including stable pulmonary edema and partial improvement in a small left pleural effusion. 2. Partial improvement in left basilar atelectasis.    Dictated by (CST): Von Roberts MD on 5/26/2025 at 7:13 AM     Finalized by (CST): Von Roberts MD on 5/26/2025 at 7:15 AM          US VENOUS DOPPLER LEG BILAT - DIAG IMG (CPT=93970)  Result Date: 5/25/2025  CONCLUSION:  1. No lower extremity DVT bilaterally.    Dictated by (CST): Javi Garcia MD on 5/25/2025 at 2:45 PM     Finalized by (CST): Javi Garcia MD on 5/25/2025 at 2:45 PM          EKG 12 Lead  Result Date: 5/26/2025  Sinus tachycardia with occasional and consecutive Premature ventricular complexes and Fusion complexes Low voltage QRS, consider pulmonary disease, pericardial effusion, or normal variant Abnormal ECG When compared with ECG of 24-MAY-2025 09:36, Fusion complexes are now Present Premature ventricular complexes are now Present Borderline criteria for Anterior infarct  are no longer Present Borderline criteria for Anterolateral infarct are no longer Present Confirmed by ULISES MILIAN, JAMES ([id number]) on 5/26/2025 1:45:53 PM      Assessment & Plan:       1-acute respiratory failure with hypoxia , multifactorial  -Pulmonary edema /history of CHF and now with renal failure  -COPD /extensive history of smoking  - Obesity and CRUZITO  -Generalized weakness and fatigue and recurrent falls     CXR mild edema and basilar atelectasis  On bipap and did not tolerate HFNC   O2 therapy /with Vapotherm to alternate with BiPAP  Bronchial hygiene /Pulmicort and Brovana and DuoNeb  EZ Pap  Check ABGs     2-acute on chronic kidney injury   Rhabdomyolysis , and now anuric and started with hemodialysis  Renal following     3-h/o HFrEF previous echo LVEF 35%  F/u echo      4-transaminitis likely shock liver  Improving  Mild fatty liver on CT otherwise negative  Supportive care     5-recurrent falls and admitted with rhabdomyolysis  PT and OT and rehab     6-DVT prophylaxis  Heparin subcu    7-poor nutrition status  Changed to Vapotherm for oxygen and hopefully to tolerate diet     8-full code     9-depression  Cymbalta and Seroquel      High risk and complex  D/w staff     Upon my evaluation, this patient had a high probability of imminent or life-threatening deterioration due to critical findings of laboratory tests, renal failure, and respiratory failure, which required my direct attention, intervention, and personal management.    I have personally provided 35 minutes of critical care time, exclusive of time spent on separately billable procedures.  Time includes review of all pertinent laboratory/radiology results, discussion with consultants, and monitoring for potential decompensation.  Performed interventions included oxygen and NIV .        Addendum :  V/Q scan reviewed / no ventilation left lung   No PE   Likely mucus plug   Pt on Aivo and now prn bipap   She is awake , alert , oriented x 3   Abgs  reviewed   Keep npo for possible aspiration   Keep close f/u   No family available   Will place on right side / aggressive bronchial hygiene and Mucomyst and chest pt and check cxr   If no improvement with chest pt and neb will need bronch / likely will need intubation for bronchoscopy .  D/w pt and staff             Jonathan Casillas MD  5/27/2025

## 2025-05-28 ENCOUNTER — APPOINTMENT (OUTPATIENT)
Dept: CT IMAGING | Facility: HOSPITAL | Age: 65
End: 2025-05-28
Attending: INTERNAL MEDICINE
Payer: MEDICAID

## 2025-05-28 ENCOUNTER — APPOINTMENT (OUTPATIENT)
Dept: CV DIAGNOSTICS | Facility: HOSPITAL | Age: 65
End: 2025-05-28
Attending: INTERNAL MEDICINE
Payer: MEDICAID

## 2025-05-28 ENCOUNTER — APPOINTMENT (OUTPATIENT)
Dept: GENERAL RADIOLOGY | Facility: HOSPITAL | Age: 65
End: 2025-05-28
Attending: INTERNAL MEDICINE
Payer: MEDICAID

## 2025-05-28 LAB
ALBUMIN SERPL-MCNC: 3.8 G/DL (ref 3.2–4.8)
ALBUMIN/GLOB SERPL: 1.8 {RATIO} (ref 1–2)
ALP LIVER SERPL-CCNC: 185 U/L (ref 50–130)
ALT SERPL-CCNC: 37 U/L (ref 10–49)
ANION GAP SERPL CALC-SCNC: 8 MMOL/L (ref 0–18)
AST SERPL-CCNC: 207 U/L (ref ?–34)
BASE EXCESS BLD CALC-SCNC: 1.3 MMOL/L (ref ?–2)
BASOPHILS # BLD AUTO: 0.03 X10(3) UL (ref 0–0.2)
BASOPHILS NFR BLD AUTO: 0.3 %
BILIRUB SERPL-MCNC: 0.3 MG/DL (ref 0.2–1.1)
BUN BLD-MCNC: 29 MG/DL (ref 9–23)
BUN/CREAT SERPL: 7.8 (ref 10–20)
CALCIUM BLD-MCNC: 8.9 MG/DL (ref 8.7–10.4)
CHLORIDE SERPL-SCNC: 100 MMOL/L (ref 98–112)
CHOLEST SERPL-MCNC: 84 MG/DL (ref ?–200)
CO2 SERPL-SCNC: 27 MMOL/L (ref 21–32)
CREAT BLD-MCNC: 3.71 MG/DL (ref 0.55–1.02)
DEPRECATED RDW RBC AUTO: 56.1 FL (ref 35.1–46.3)
EGFRCR SERPLBLD CKD-EPI 2021: 13 ML/MIN/1.73M2 (ref 60–?)
EOSINOPHIL # BLD AUTO: 0.07 X10(3) UL (ref 0–0.7)
EOSINOPHIL NFR BLD AUTO: 0.7 %
ERYTHROCYTE [DISTWIDTH] IN BLOOD BY AUTOMATED COUNT: 16.7 % (ref 11–15)
GLOBULIN PLAS-MCNC: 2.1 G/DL (ref 2–3.5)
GLUCOSE BLD-MCNC: 132 MG/DL (ref 70–99)
GLUCOSE BLDC GLUCOMTR-MCNC: 111 MG/DL (ref 70–99)
GLUCOSE BLDC GLUCOMTR-MCNC: 124 MG/DL (ref 70–99)
GLUCOSE BLDC GLUCOMTR-MCNC: 139 MG/DL (ref 70–99)
GLUCOSE BLDC GLUCOMTR-MCNC: 158 MG/DL (ref 70–99)
HCO3 BLDA-SCNC: 25.9 MEQ/L (ref 21–27)
HCT VFR BLD AUTO: 39.9 % (ref 35–48)
HDLC SERPL-MCNC: 30 MG/DL (ref 40–59)
HGB BLD-MCNC: 12.7 G/DL (ref 12–16)
IMM GRANULOCYTES # BLD AUTO: 0.08 X10(3) UL (ref 0–1)
IMM GRANULOCYTES NFR BLD: 0.8 %
LDLC SERPL CALC-MCNC: 23 MG/DL (ref ?–100)
LYMPHOCYTES # BLD AUTO: 0.69 X10(3) UL (ref 1–4)
LYMPHOCYTES NFR BLD AUTO: 7.2 %
MAGNESIUM SERPL-MCNC: 2.2 MG/DL (ref 1.6–2.6)
MCH RBC QN AUTO: 29.7 PG (ref 26–34)
MCHC RBC AUTO-ENTMCNC: 31.8 G/DL (ref 31–37)
MCV RBC AUTO: 93.4 FL (ref 80–100)
MONOCYTES # BLD AUTO: 0.26 X10(3) UL (ref 0.1–1)
MONOCYTES NFR BLD AUTO: 2.7 %
MRSA DNA SPEC QL NAA+PROBE: NEGATIVE
NEUTROPHILS # BLD AUTO: 8.52 X10 (3) UL (ref 1.5–7.7)
NEUTROPHILS # BLD AUTO: 8.52 X10(3) UL (ref 1.5–7.7)
NEUTROPHILS NFR BLD AUTO: 88.3 %
NONHDLC SERPL-MCNC: 54 MG/DL (ref ?–130)
O2 CT BLD-SCNC: 16.8 VOL% (ref 15–23)
O2/TOTAL GAS SETTING VFR VENT: 50 %
OSMOLALITY SERPL CALC.SUM OF ELEC: 288 MOSM/KG (ref 275–295)
PCO2 BLDA: 47 MM HG (ref 35–45)
PEEP SETTING VENT: 8 CM H2O
PH BLDA: 7.37 [PH] (ref 7.35–7.45)
PHOSPHATE SERPL-MCNC: 4.3 MG/DL (ref 2.4–5.1)
PLATELET # BLD AUTO: 153 10(3)UL (ref 150–450)
PLATELETS.RETICULATED NFR BLD AUTO: 5.3 % (ref 0–7)
PO2 BLDA: 75 MM HG (ref 80–100)
POTASSIUM SERPL-SCNC: 4.8 MMOL/L (ref 3.5–5.1)
PROT SERPL-MCNC: 5.9 G/DL (ref 5.7–8.2)
PUNCTURE CHARGE: YES
RBC # BLD AUTO: 4.27 X10(6)UL (ref 3.8–5.3)
RESP RATE: 16 BPM
SAO2 % BLDA: 95.2 % (ref 94–100)
SODIUM SERPL-SCNC: 135 MMOL/L (ref 136–145)
SPECIMEN VOL 24H UR: 430 ML
TRIGL SERPL-MCNC: 195 MG/DL (ref 30–149)
TROPONIN I SERPL HS-MCNC: 79 NG/L (ref ?–34)
VLDLC SERPL CALC-MCNC: 25 MG/DL (ref 0–30)
WBC # BLD AUTO: 9.7 X10(3) UL (ref 4–11)

## 2025-05-28 PROCEDURE — 71045 X-RAY EXAM CHEST 1 VIEW: CPT | Performed by: INTERNAL MEDICINE

## 2025-05-28 PROCEDURE — 93306 TTE W/DOPPLER COMPLETE: CPT | Performed by: INTERNAL MEDICINE

## 2025-05-28 PROCEDURE — 99291 CRITICAL CARE FIRST HOUR: CPT | Performed by: INTERNAL MEDICINE

## 2025-05-28 PROCEDURE — 71250 CT THORAX DX C-: CPT | Performed by: INTERNAL MEDICINE

## 2025-05-28 RX ORDER — IPRATROPIUM BROMIDE AND ALBUTEROL SULFATE 2.5; .5 MG/3ML; MG/3ML
3 SOLUTION RESPIRATORY (INHALATION) 3 TIMES DAILY
Status: DISCONTINUED | OUTPATIENT
Start: 2025-05-28 | End: 2025-05-31

## 2025-05-28 RX ORDER — CARVEDILOL 3.12 MG/1
3.12 TABLET ORAL 2 TIMES DAILY WITH MEALS
Status: DISCONTINUED | OUTPATIENT
Start: 2025-05-28 | End: 2025-06-07

## 2025-05-28 RX ORDER — CHLORPROMAZINE HYDROCHLORIDE 25 MG/1
25 TABLET, FILM COATED ORAL 4 TIMES DAILY
Status: DISCONTINUED | OUTPATIENT
Start: 2025-05-28 | End: 2025-06-07

## 2025-05-28 RX ORDER — SENNOSIDES 8.8 MG/5ML
10 LIQUID ORAL 2 TIMES DAILY
Status: DISCONTINUED | OUTPATIENT
Start: 2025-05-28 | End: 2025-06-01

## 2025-05-28 RX ORDER — DEXTROSE MONOHYDRATE 25 G/50ML
50 INJECTION, SOLUTION INTRAVENOUS
Status: DISCONTINUED | OUTPATIENT
Start: 2025-05-28 | End: 2025-06-07

## 2025-05-28 RX ORDER — NICOTINE POLACRILEX 4 MG
30 LOZENGE BUCCAL
Status: DISCONTINUED | OUTPATIENT
Start: 2025-05-28 | End: 2025-06-07

## 2025-05-28 RX ORDER — FAMOTIDINE 10 MG/ML
20 INJECTION, SOLUTION INTRAVENOUS DAILY
Status: DISCONTINUED | OUTPATIENT
Start: 2025-05-28 | End: 2025-06-07

## 2025-05-28 RX ORDER — MIDODRINE HYDROCHLORIDE 5 MG/1
10 TABLET ORAL 3 TIMES DAILY
Status: DISCONTINUED | OUTPATIENT
Start: 2025-05-28 | End: 2025-06-07

## 2025-05-28 RX ORDER — DIAZEPAM 5 MG/1
5 TABLET ORAL EVERY 8 HOURS PRN
Status: DISCONTINUED | OUTPATIENT
Start: 2025-05-28 | End: 2025-06-07

## 2025-05-28 RX ORDER — METHYLPREDNISOLONE SODIUM SUCCINATE 40 MG/ML
40 INJECTION INTRAMUSCULAR; INTRAVENOUS DAILY
Status: DISCONTINUED | OUTPATIENT
Start: 2025-05-29 | End: 2025-06-02

## 2025-05-28 RX ORDER — MINERAL OIL AND PETROLATUM 150; 830 MG/G; MG/G
1 OINTMENT OPHTHALMIC NIGHTLY
Status: DISCONTINUED | OUTPATIENT
Start: 2025-05-28 | End: 2025-05-30

## 2025-05-28 RX ORDER — QUETIAPINE FUMARATE 25 MG/1
50 TABLET, FILM COATED ORAL 2 TIMES DAILY
Status: DISCONTINUED | OUTPATIENT
Start: 2025-05-28 | End: 2025-06-07

## 2025-05-28 RX ORDER — BUSPIRONE HYDROCHLORIDE 15 MG/1
30 TABLET ORAL 3 TIMES DAILY
Status: DISCONTINUED | OUTPATIENT
Start: 2025-05-28 | End: 2025-06-07

## 2025-05-28 RX ORDER — CHLORHEXIDINE GLUCONATE ORAL RINSE 1.2 MG/ML
15 SOLUTION DENTAL
Status: DISCONTINUED | OUTPATIENT
Start: 2025-05-28 | End: 2025-05-30

## 2025-05-28 RX ORDER — LEVOTHYROXINE SODIUM 75 UG/1
150 TABLET ORAL
Status: DISCONTINUED | OUTPATIENT
Start: 2025-05-28 | End: 2025-06-07

## 2025-05-28 RX ORDER — NICOTINE POLACRILEX 4 MG
15 LOZENGE BUCCAL
Status: DISCONTINUED | OUTPATIENT
Start: 2025-05-28 | End: 2025-06-07

## 2025-05-28 NOTE — PLAN OF CARE
Problem: Safety Risk - Non-Violent Restraints  Goal: Patient will remain free from self-harm  Description: INTERVENTIONS:  - Apply the least restrictive restraint to prevent harm  - Notify patient and family of reasons restraints applied  - Assess for any contributing factors to confusion (electrolyte disturbances, delirium, medications)  - Discontinue any unnecessary medical devices as soon as possible  - Assess the patient's physical comfort, circulation, skin condition, hydration, nutrition and elimination needs   - Reorient and redirection as needed  - Assess for the need to continue restraints  Outcome: Not Progressing     Problem: Patient Centered Care  Goal: Patient preferences are identified and integrated in the patient's plan of care  Description: Interventions:  - What would you like us to know as we care for you?   - Provide timely, complete, and accurate information to patient/family  - Incorporate patient and family knowledge, values, beliefs, and cultural backgrounds into the planning and delivery of care  - Encourage patient/family to participate in care and decision-making at the level they choose  - Honor patient and family perspectives and choices  Outcome: Progressing     Problem: PAIN - ADULT  Goal: Verbalizes/displays adequate comfort level or patient's stated pain goal  Description: INTERVENTIONS:  - Encourage pt to monitor pain and request assistance  - Assess pain using appropriate pain scale  - Administer analgesics based on type and severity of pain and evaluate response  - Implement non-pharmacological measures as appropriate and evaluate response  - Consider cultural and social influences on pain and pain management  - Manage/alleviate anxiety  - Utilize distraction and/or relaxation techniques  - Monitor for opioid side effects  - Notify MD/LIP if interventions unsuccessful or patient reports new pain  - Anticipate increased pain with activity and pre-medicate as appropriate  Outcome:  Progressing     Problem: SAFETY ADULT - FALL  Goal: Free from fall injury  Description: INTERVENTIONS:  - Assess pt frequently for physical needs  - Identify cognitive and physical deficits and behaviors that affect risk of falls.  - [place] fall precautions as indicated by assessment.  - Educate pt/family on patient safety including physical limitations  - Instruct pt to call for assistance with activity based on assessment  - Modify environment to reduce risk of injury  - Provide assistive devices as appropriate  - Consider OT/PT consult to assist with strengthening/mobility  - Encourage toileting schedule  Outcome: Progressing     Problem: DISCHARGE PLANNING  Goal: Discharge to home or other facility with appropriate resources  Description: INTERVENTIONS:  - Identify barriers to discharge w/pt and caregiver  - Include patient/family/discharge partner in discharge planning  - Arrange for needed discharge resources and transportation as appropriate  - Identify discharge learning needs (meds, wound care, etc)  - Arrange for interpreters to assist at discharge as needed  - Consider post-discharge preferences of patient/family/discharge partner  - Complete POLST form as appropriate  - Assess patient's ability to be responsible for managing their own health  - Refer to Case Management Department for coordinating discharge planning if the patient needs post-hospital services based on physician/LIP order or complex needs related to functional status, cognitive ability or social support system  Outcome: Progressing     Problem: SKIN/TISSUE INTEGRITY - ADULT  Goal: Skin integrity remains intact  Description: INTERVENTIONS  - Assess and document risk factors for pressure ulcer development  - Assess and document skin integrity  - Monitor for areas of redness and/or skin breakdown  - Initiate interventions, skin care algorithm/standards of care as needed  Outcome: Progressing     Problem: RESPIRATORY - ADULT  Goal: Achieves  optimal ventilation and oxygenation  Description: INTERVENTIONS:  - Assess for changes in respiratory status  - Assess for changes in mentation and behavior  - Position to facilitate oxygenation and minimize respiratory effort  - Oxygen supplementation based on oxygen saturation or ABGs  - Provide Smoking Cessation handout, if applicable  - Encourage broncho-pulmonary hygiene including cough, deep breathe, Incentive Spirometry  - Assess the need for suctioning and perform as needed  - Assess and instruct to report SOB or any respiratory difficulty  - Respiratory Therapy support as indicated  - Manage/alleviate anxiety  - Monitor for signs/symptoms of CO2 retention  Outcome: Progressing     Problem: GENITOURINARY - ADULT  Goal: Absence of urinary retention  Description: INTERVENTIONS:  - Assess patient’s ability to void and empty bladder  - Monitor intake/output and perform bladder scan as needed  - Follow urinary retention protocol/standard of care  - Consider collaborating with pharmacy to review patient's medication profile  - Implement strategies to promote bladder emptying  Outcome: Progressing     Problem: METABOLIC/FLUID AND ELECTROLYTES - ADULT  Goal: Glucose maintained within prescribed range  Description: INTERVENTIONS:  - Monitor Blood Glucose as ordered  - Assess for signs and symptoms of hyperglycemia and hypoglycemia  - Administer ordered medications to maintain glucose within target range  - Assess barriers to adequate nutritional intake and initiate nutrition consult as needed  - Instruct patient on self management of diabetes  Outcome: Progressing  Goal: Electrolytes maintained within normal limits  Description: INTERVENTIONS:  - Monitor labs and rhythm and assess patient for signs and symptoms of electrolyte imbalances  - Administer electrolyte replacement as ordered  - Monitor response to electrolyte replacements, including rhythm and repeat lab results as appropriate  - Fluid restriction as  ordered  - Instruct patient on fluid and nutrition restrictions as appropriate  Outcome: Progressing  Goal: Hemodynamic stability and optimal renal function maintained  Description: INTERVENTIONS:  - Monitor labs and assess for signs and symptoms of volume excess or deficit  - Monitor intake, output and patient weight  - Monitor urine specific gravity, serum osmolarity and serum sodium as indicated or ordered  - Monitor response to interventions for patient's volume status, including labs, urine output, blood pressure (other measures as available)  - Encourage oral intake as appropriate  - Instruct patient on fluid and nutrition restrictions as appropriate  Outcome: Progressing     Problem: HEMATOLOGIC - ADULT  Goal: Free from bleeding injury  Description: (Example usage: patient with low platelets)  INTERVENTIONS:  - Avoid intramuscular injections, enemas and rectal medication administration  - Ensure safe mobilization of patient  - Hold pressure on venipuncture sites to achieve adequate hemostasis  - Assess for signs and symptoms of internal bleeding  - Monitor lab trends  - Patient is to report abnormal signs of bleeding to staff  - Avoid use of toothpicks and dental floss  - Use electric shaver for shaving  - Use soft bristle tooth brush  - Limit straining and forceful nose blowing  Outcome: Progressing  Goal: Maintains hematologic stability  Description: INTERVENTIONS  - Assess for signs and symptoms of bleeding or hemorrhage  - Monitor labs and vital signs for trends  - Administer supportive blood products/factors, fluids and medications as ordered and appropriate  - Administer supportive blood products/factors as ordered and appropriate  Outcome: Progressing  Goal: Free from bleeding injury  Description: (Example usage: patient with low platelets)  INTERVENTIONS:  - Avoid intramuscular injections, enemas and rectal medication administration  - Ensure safe mobilization of patient  - Hold pressure on  venipuncture sites to achieve adequate hemostasis  - Assess for signs and symptoms of internal bleeding  - Monitor lab trends  - Patient is to report abnormal signs of bleeding to staff  - Avoid use of toothpicks and dental floss  - Use electric shaver for shaving  - Use soft bristle tooth brush  - Limit straining and forceful nose blowing  Outcome: Progressing     Problem: Diabetes/Glucose Control  Goal: Glucose maintained within prescribed range  Description: INTERVENTIONS:  - Monitor Blood Glucose as ordered  - Assess for signs and symptoms of hyperglycemia and hypoglycemia  - Administer ordered medications to maintain glucose within target range  - Assess barriers to adequate nutritional intake and initiate nutrition consult as needed  - Instruct patient on self management of diabetes  Outcome: Progressing     Problem: Delirium  Goal: Minimize duration of delirium  Description: Interventions:  - Encourage use of hearing aids, eye glasses  - Promote highest level of mobility daily  - Provide frequent reorientation  - Promote wakefulness i.e. lights on, blinds open  - Promote sleep, encourage patient's normal rest cycle i.e. lights off, TV off, minimize noise and interruptions  - Encourage family to assist in orientation and promotion of home routines  Outcome: Progressing     Problem: GASTROINTESTINAL - ADULT  Goal: Minimal or absence of nausea and vomiting  Description: INTERVENTIONS:  - Maintain adequate hydration with IV or PO as ordered and tolerated  - Nasogastric tube to low intermittent suction as ordered  - Evaluate effectiveness of ordered antiemetic medications  - Provide nonpharmacologic comfort measures as appropriate  - Advance diet as tolerated, if ordered  - Obtain nutritional consult as needed  - Evaluate fluid balance  Outcome: Progressing  Goal: Maintains or returns to baseline bowel function  Description: INTERVENTIONS:  - Assess bowel function  - Maintain adequate hydration with IV or PO as  ordered and tolerated  - Evaluate effectiveness of GI medications  - Encourage mobilization and activity  - Obtain nutritional consult as needed  - Establish a toileting routine/schedule  - Consider collaborating with pharmacy to review patient's medication profile  Outcome: Progressing

## 2025-05-28 NOTE — H&P
Called by RN to evaluate patient in room 217    On my arrival patient lying in bed, in obvious respiratory distress saturating in the low 80s breathing at over 40 a minute currently on nonrebreather and Vapotherm.  Patient for possible bronchoscopy tomorrow.    On exam  Temperature 98  Pulse 104  Respiration 40  /70  Pulse ox 84% on Vapotherm and nonrebreather  Confused  In obvious respiratory distress bilateral rhonchi  Heart tachycardic  Abdomen soft  Extremities no edema    Assessment and plan    Acute respiratory failure with hypoxia  Likely multifactorial with fluid overload, COPD and likely mucous plugging.  Not responding to noninvasive measures, has not been able to tolerate BiPAP plan to intubate patient.  Pulmonary to be notified    35 minutes of critical care time spent with patient including coordinating care with ancillary staff.

## 2025-05-28 NOTE — PROGRESS NOTES
[facility]  part of [facility]    Progress Note    Radha Winters Patient Status:  Inpatient    1960 MRN [id number]   Location [facility] 2W/SW Attending Fanny Majano MD   Hosp Day # 11 PCP JUAN MONTANEZ       Subjective:   Radha Winters is a(n) 64 year old female acute kidney injury oligoanuric renal failure.    Objective:     Vitals:    25 1400   BP: 135/75   Pulse: 89   Resp: 17   Temp:        Intake/Output Summary (Last 24 hours) at 2025 1412  Last data filed at 2025 0500  Gross per 24 hour   Intake 456.3 ml   Output 3005 ml   Net -2548.7 ml     Wt Readings from Last 2 Encounters:   25 234 lb 2.1 oz (106.2 kg)   10/09/23 185 lb 3 oz (84 kg)         General appearance:  appears stated age and cooperative  Head: Normocephalic, without obvious abnormality, atraumatic  Eyes: negative  Neck: no adenopathy, no carotid bruit, no JVD, supple, symmetrical, trachea midline, and thyroid not enlarged, symmetric, no tenderness/mass/nodules  Pulmonary: clear to auscultation bilaterally  Cardiovascular: S1, S2 normal, no murmur, click, rub or gallop, regular rate and rhythm  Abdominal: soft, non-tender; bowel sounds normal; no masses,  no organomegaly  Extremities: extremities normal, atraumatic, no cyanosis or edema  Pulses: 2+ and symmetric  Neurologic: Unable to assess patient is in ICU sedated.  Genital Exam: defer exam    Current Medications:  Current Hospital Medications[1]    Allergies  Allergies[2]    Laboratory Results:     Lab Results   Component Value Date    WBC 9.7 2025    HGB 12.7 2025    HCT 39.9 2025    .0 2025     (L) 2025    K 4.8 2025     2025    CO2 27.0 2025    CREATSERUM 3.71 (H) 2025    BUN 29 (H) 2025     (H) 2025    CA 8.9 2025    ALB 3.8 2025    ALKPHO 185 (H) 2025    BILT 0.3 2025    TP 5.9 2025      (H) 05/28/2025    ALT 37 05/28/2025    TSH 0.528 10/10/2023    LIP 36 05/14/2025    DDIMER 1.77 (H) 05/24/2025    MG 2.2 05/28/2025    PHOS 4.3 05/28/2025    CK 9,315 (HH) 05/27/2025    B12 581 10/10/2023     Hospital Encounter on 05/17/25   1. Urine Culture, Routine     Status: Abnormal    Collection Time: 05/17/25 10:18 AM    Specimen: Urine, clean catch   Result Value Ref Range    Urine Culture >100,000 CFU/ML Escherichia coli (A) N/A       Susceptibility    Escherichia coli -  (no method available)     Ampicillin >=32 Resistant      Ampicillin + Sulbactam 4 Sensitive      Cefazolin <=4 Sensitive      Ciprofloxacin <=0.25 Sensitive      Gentamicin <=1 Sensitive      Meropenem <=0.25 Sensitive      Levofloxacin* 1 Sensitive       * The current CLSI susceptibility breakpoint for levofloxacin is an MARLEY of 0.5 mcg/mL. MICs above this should NOT be considered susceptible. Updated susceptibility reporting is in progress.     Nitrofurantoin <=16 Sensitive      Piperacillin + Tazobactam <=4 Sensitive      Trimethoprim/Sulfa >=320 Resistant        Assessment and Plan:     Acute renal failure superimposed on chronic kidney disease, unspecified acute renal failure type, unspecified CKD stage  I thinAcute renal failure superimposed on chronic kidney disease, unspecified acute renal failure type, unspecified CKD stage          Non-traumatic rhabdomyolysis          Transaminitis     Patient is a 65 y/o female with PMH of HTN, dyslipidemia, CAD admitted after multiple falls, Nephrology consulted for Acute Kidney Injury      Acute Kidney Injury:  Likely secondary to ATN, from Rhabdomyolysis, continue aggressive volume resuscitation, 0.9  ml/hr, continue to trend BMP, check Uric acid, Phos, Urine studies  CKD stage 3b vs 4:  Secondary to HTN nephrosclerosis, check Phos, PTH, renal US  Transaminitis:  Continue to trend LFTs, will check abdominal US        Recommendations:  Acute Kidney Injury likely unresolved ATN  Will plan  for tunneled HD line  KCL replacement     HD in AM     Renal US reviewed  Renally dose meds  Midodrine for MAP goal >65  Huynh placement, for strict I/Os     May 23, 2025  Patient is nonoliguric.  Renal function is severely deranged.  Has a tunneled dialysis catheter in place.  Will dialyze as needed for volume control.  Currently patient shows no signs of recovery of kidney function and likely needs supportive care including dialysis as needed.     May 24, 2025  Patient had a rapid response called because of being short of breath and was clinically volume overloaded.  Patient transferred to progressive care unit.  Discussed with hospitalist.  Patient is receiving dialysis with intent to remove 2.2 L of fluid.  Continue supportive care and monitor electrolytes and renal function.     May 25, 2025  Patient is critically ill in ICU.  Today is day 2 of back-to-back dialysis.  Within goal  to remove 2.2 L of fluid in 3-1/2 hours.  Overall patient seems to have improved but still requiring BiPAP.The monitoring electrolyte and renal function.     May 26th 2025  Patient still not improved much with volume status. Plan for dialysis tomorrow with 4hrs and 3 Lit UF. Remains critically ill in ICU.      May 27th 2025  Patient doing better but still requiring high flow oxygen. She remains oligoanuric. Dialysis dependent. Monitor closely. Critically ill in ICU. Total time spent seeing patient was 45 minutes.    May 28, 2025  Patient remains oligoanuric.  Urinary catheter was removed.  Will do periodic bladder scans and place catheter if needed or do bladder voiding protocol.  Discussed with RN.  Patient will also receive hemodialysis today with intent to remove 1.5 L of fluid.  Vent settings are acceptable.  Blood pressure is stable.  Patient did have intubation yesterday because of volume issues and respiratory distress.  Currently remains critically ill in the ICU.  Imaging Results:   CT CHEST (CPT=71250)  Result Date:  5/28/2025  CONCLUSION:  1. Near complete consolidation of the right middle lobe with additional dependent airspace disease/consolidation in the left lower lobe and less pronounced patchy consolidations of the right lower lobe.  These findings are new since prior 5/17/2025 abdominal CT and most compatible with multifocal pneumonia/aspiration.  Follow-up to resolution is advised. 2. Other right upper lobe predominant ground-glass density opacities throughout the lungs are probably infectious (or less likely relate to asymmetric pulmonary edema).  These can be reassessed on anticipated follow-up imaging to ensure resolution. 3. Cardiomegaly with multi-vessel coronary artery calcification. 4. Dilatation of the main pulmonary artery trunk may relate to underlying pulmonary hypertension. 5. Endotracheal tube, right chest port, and enteric tube all appear well positioned by CT. 6. Large heterogeneous density 4.7 cm partially calcified left adrenal mass is unchanged.  Smaller well-circumscribed 1.5 cm right adrenal nodule is also unchanged. 7. Stable chronic T11 and T12 vertebral body compression fractures. 8. Lesser incidental findings as above.   Dictated by (CST): Christiano Basilio MD on 5/28/2025 at 8:02 AM     Finalized by (CST): Christiano Basilio MD on 5/28/2025 at 8:12 AM          XR CHEST AP PORTABLE  (CPT=71045)  Result Date: 5/28/2025  CONCLUSION:   Right basilar opacity which could reflect atelectasis with or without superimposed pneumonia.    Dictated by (CST): Randal Wilkerson MD on 5/28/2025 at 7:33 AM     Finalized by (CST): Randal Wilkerson MD on 5/28/2025 at 7:35 AM          XR CHEST AP PORTABLE  (CPT=71045)  Result Date: 5/27/2025  CONCLUSION:  1. ET tube in satisfactory position with tip 2.6 cm above silverio. 2. Dialysis catheter remains with tip near RA SVC junction. 3. NG tube tip at least to distal esophagus.  Tip extends beyond the field of view. 4. CHF/fluid overload without significant change.  5. Left  basilar pleural effusion with poor aeration in left lower lung.    Dictated by (CST): Javi Garcia MD on 5/27/2025 at 10:11 PM     Finalized by (CST): Javi Garcia MD on 5/27/2025 at 10:13 PM          XR CHEST AP PORTABLE  (CPT=71045)  Result Date: 5/27/2025  CONCLUSION:  1. CHF/fluid overload with left basilar pleural effusion. 2. No finding to account for the lack of ventilation in the left lung on the recent V/Q scan.  Findings suggest a large mucus plug which is no longer present.    Dictated by (CST): Javi Garcia MD on 5/27/2025 at 4:58 PM     Finalized by (CST): Javi Garcia MD on 5/27/2025 at 5:00 PM          NM LUNG VQ VENT / PERFUSION SCAN  (CPT=78582)  Result Date: 5/27/2025  CONCLUSION:  1. No pulmonary embolus. 2. No perfusion in the left lung suggests airway obstruction probably from a large mucus plug.  Updated chest x-ray follow-up recommended.  Findings were called to Dr. Reynaga.    Dictated by (CST): Javi Garcia MD on 5/27/2025 at 3:34 PM     Finalized by (CST): Javi Garcia MD on 5/27/2025 at 3:43 PM                    DANIS GOMEZ MD  5/28/2025  [email or web address]         [1]   Current Facility-Administered Medications:     midodrine (ProAmatine) tab 10 mg, 10 mg, Per G Tube, TID    busPIRone (Buspar) tab 30 mg, 30 mg, Per G Tube, TID    chlorproMAZINE (Thorazine) tab 25 mg, 25 mg, Per G Tube, QID    diazePAM (Valium) tab 5 mg, 5 mg, Per G Tube, Q8H PRN    QUEtiapine (SEROquel) tab 50 mg, 50 mg, Per G Tube, BID    levothyroxine (Synthroid) tab 150 mcg, 150 mcg, Per G Tube, Before breakfast    fentaNYL (Sublimaze) 50 mcg/mL injection 50 mcg, 50 mcg, Intravenous, Q30 Min PRN    senna (Senokot) 8.8 MG/5ML oral syrup 17.6 mg, 10 mL, Per NG Tube, BID    docusate (Colace) 50 MG/5ML oral solution 100 mg, 100 mg, Per NG Tube, BID    chlorhexidine gluconate (Peridex) 0.12 % oral solution 15 mL, 15 mL, Mouth/Throat, BID@0800,2000    mineral oil-white petrolatum (Artificial  Tears) 83-15 % ophthalmic ointment 1 Application, 1 Application, Both Eyes, Nightly    famotidine (Pepcid) 20 mg/2mL injection 20 mg, 20 mg, Intravenous, Daily    ipratropium-albuterol (Duoneb) 0.5-2.5 (3) MG/3ML inhalation solution 3 mL, 3 mL, Nebulization, TID    [START ON 5/29/2025] methylPREDNISolone sodium succinate (Solu-MEDROL) injection 40 mg, 40 mg, Intravenous, Daily    glucose (Dex4) 15 GM/59ML oral liquid 15 g, 15 g, Oral, Q15 Min PRN **OR** glucose (Glutose) 40% oral gel 15 g, 15 g, Oral, Q15 Min PRN **OR** glucose-vitamin C (Dex-4) chewable tab 4 tablet, 4 tablet, Oral, Q15 Min PRN **OR** dextrose 50% injection 50 mL, 50 mL, Intravenous, Q15 Min PRN **OR** glucose (Dex4) 15 GM/59ML oral liquid 30 g, 30 g, Oral, Q15 Min PRN **OR** glucose (Glutose) 40% oral gel 30 g, 30 g, Oral, Q15 Min PRN **OR** glucose-vitamin C (Dex-4) chewable tab 8 tablet, 8 tablet, Oral, Q15 Min PRN    insulin regular human (Novolin R, Humulin R) 100 UNIT/ML injection 1-5 Units, 1-5 Units, Subcutaneous, 4 times per day    budesonide (Pulmicort) 0.5 MG/2ML nebulizer suspension 0.5 mg, 0.5 mg, Nebulization, 2 times daily    arformoterol (Brovana) 15 MCG/2ML nebulizer solution 15 mcg, 15 mcg, Nebulization, 2 times daily    dextrose 10% infusion (TPN no rate), , Intravenous, Continuous PRN    sodium bicarbonate tab 650 mg, 650 mg, Per G Tube, PRN **AND** pancrelipase (Lip-Prot-Amyl) (Zenpep) DR particles cap 10,000 Units, 10,000 Units, Per G Tube, PRN    oxidized cellulose (Surgical Snow) external sheet 2 Package, 2 each, Topical, Once PRN    dextrose 5%-sodium chloride 0.45% infusion, , Intravenous, Continuous    acetylcysteine (Mucomyst) 20 % nebulizer solution 2 mL, 2 mL, Nebulization, BID    ampicillin-sulbactam (Unasyn) 1.5 g in sodium chloride 0.9% 100mL IVPB-MAYO, 1.5 g, Intravenous, q12h    propofol (Diprivan) 10 mg/mL infusion premix, 5-50 mcg/kg/min (Dosing Weight), Intravenous, Continuous    DULoxetine (Cymbalta) DR cap 60  mg, 60 mg, Oral, BID    HYDROcodone-acetaminophen (Norco) 5-325 MG per tab 1 tablet, 1 tablet, Oral, Q6H PRN    ondansetron (Zofran) 4 MG/2ML injection 4 mg, 4 mg, Intravenous, Q6H PRN    polyethylene glycol (PEG 3350) (Miralax) 17 g oral packet 17 g, 17 g, Oral, Daily PRN    magnesium hydroxide (Milk of Magnesia) 400 MG/5ML oral suspension 30 mL, 30 mL, Oral, Daily PRN    bisacodyl (Dulcolax) 10 MG rectal suppository 10 mg, 10 mg, Rectal, Daily PRN    fleet enema (Fleet) rectal enema 133 mL, 1 enema, Rectal, Once PRN    heparin (Porcine) 1000 UNIT/ML injection 2,000 Units, 2,000 Units, Intravenous, PRN Dialysis    albuterol (Ventolin) (2.5 MG/3ML) 0.083% nebulizer solution 2.5 mg, 2.5 mg, Nebulization, Q6H PRN    heparin (Porcine) 5000 UNIT/ML injection 5,000 Units, 5,000 Units, Subcutaneous, 2 times per day    acetaminophen (Tylenol) tab 650 mg, 650 mg, Oral, Q6H PRN    aspirin DR tab 81 mg, 81 mg, Oral, Daily    [Held by provider] metoprolol succinate ER (Toprol XL) 24 hr tab 50 mg, 50 mg, Oral, BID    QUEtiapine ER (SEROquel XR) 24 hr tab 300 mg, 300 mg, Oral, Nightly  [2] No Known Allergies

## 2025-05-28 NOTE — CM/SW NOTE
CM uploaded HD flowsheets x2 (5/24 and 5/27 sessions) to pending [facility] w/ on-site HD referrals via [employer]. HD flowsheets required for on-site HD approval w/ accepting facility.    Patient discussed in interdisciplinary rounds.  Currently intubated - plan to re evaluate 5/29 for potential bronchoscopy.    / to remain available for support and/or discharge planning.     Plan: [facility] w/ on site HD - pending facility choice, insurance authorization, course of stay, medical clearance    Maria Fernanda Urena RN, BSN  Nurse   [phone]

## 2025-05-28 NOTE — PROCEDURES
Patient in acute respiratory failure, not tolerating noninvasive PPV, decision made to intubate patient.  7.5 ET tube used, intubated under direct visualization, 22 cm at the lip bilateral breath sounds appreciated CO2 detector with color change.    Chest x-ray pending

## 2025-05-28 NOTE — PROGRESS NOTES
[facility]  part of [facility]    Progress Note    Radha Winters Patient Status:  Inpatient    1960 MRN [id number]   Location [facility] 2W/SW Attending Fanny Majano MD   Hosp Day # 11 PCP JUAN MONTANEZ         Subjective:     Unable to perform ROS    Intubated on mechanical ventilation  Intubated overnight  Moderate endotracheal secretion and chest x-ray now better with no further atelectasis  FiO2 down to 50%  Opens her eyes moves extremities but currently on sedation  No pressors requirement  Objective:   Blood pressure 125/70, pulse 83, temperature 97.5 °F (36.4 °C), temperature source Temporal, resp. rate 16, height 5' 3\" (1.6 m), weight 234 lb 2.1 oz (106.2 kg), SpO2 98%.  Physical Exam  Vitals and nursing note reviewed.   Constitutional:       General: She is in acute distress.      Appearance: She is ill-appearing.   HENT:      Head: Atraumatic.      Mouth/Throat:      Mouth: Mucous membranes are dry.   Eyes:      General: No scleral icterus.  Cardiovascular:      Rate and Rhythm: Normal rate.      Heart sounds:      No gallop.   Pulmonary:      Effort: Respiratory distress present.      Breath sounds: No stridor. Rales present. No wheezing or rhonchi.      Comments: Better air exchange to both lungs  Basilar rales  Abdominal:      General: Abdomen is flat. Bowel sounds are normal. There is no distension.      Palpations: Abdomen is soft. There is no mass.      Tenderness: There is no guarding.   Musculoskeletal:      Cervical back: Neck supple.      Right lower leg: No edema.      Left lower leg: No edema.   Lymphadenopathy:      Cervical: No cervical adenopathy.   Skin:     General: Skin is dry.   Neurological:      General: No focal deficit present.         Results:   Lab Results   Component Value Date    WBC 9.7 2025    HGB 12.7 2025    HCT 39.9 2025    .0 2025    CREATSERUM 3.71 (H) 2025    BUN 29 (H) 2025    NA  135 (L) 05/28/2025    K 4.8 05/28/2025     05/28/2025    CO2 27.0 05/28/2025     (H) 05/28/2025    CA 8.9 05/28/2025    ALB 3.8 05/28/2025    ALKPHO 185 (H) 05/28/2025    BILT 0.3 05/28/2025    TP 5.9 05/28/2025     (H) 05/28/2025    ALT 37 05/28/2025    TSH 0.528 10/10/2023    LIP 36 05/14/2025    DDIMER 1.77 (H) 05/24/2025    MG 2.2 05/28/2025    PHOS 4.3 05/28/2025    TROPHS 197 (HH) 05/24/2025    CK 9,315 (HH) 05/27/2025    B12 581 10/10/2023       CT CHEST (CPT=71250)  Result Date: 5/28/2025  CONCLUSION:  1. Near complete consolidation of the right middle lobe with additional dependent airspace disease/consolidation in the left lower lobe and less pronounced patchy consolidations of the right lower lobe.  These findings are new since prior 5/17/2025 abdominal CT and most compatible with multifocal pneumonia/aspiration.  Follow-up to resolution is advised. 2. Other right upper lobe predominant ground-glass density opacities throughout the lungs are probably infectious (or less likely relate to asymmetric pulmonary edema).  These can be reassessed on anticipated follow-up imaging to ensure resolution. 3. Cardiomegaly with multi-vessel coronary artery calcification. 4. Dilatation of the main pulmonary artery trunk may relate to underlying pulmonary hypertension. 5. Endotracheal tube, right chest port, and enteric tube all appear well positioned by CT. 6. Large heterogeneous density 4.7 cm partially calcified left adrenal mass is unchanged.  Smaller well-circumscribed 1.5 cm right adrenal nodule is also unchanged. 7. Stable chronic T11 and T12 vertebral body compression fractures. 8. Lesser incidental findings as above.   Dictated by (CST): Christiano Basilio MD on 5/28/2025 at 8:02 AM     Finalized by (CST): Christiano Basilio MD on 5/28/2025 at 8:12 AM          XR CHEST AP PORTABLE  (CPT=71045)  Result Date: 5/28/2025  CONCLUSION:   Right basilar opacity which could reflect atelectasis with or  without superimposed pneumonia.    Dictated by (CST): Randal Wilkerson MD on 5/28/2025 at 7:33 AM     Finalized by (CST): Randal Wilkerson MD on 5/28/2025 at 7:35 AM          XR CHEST AP PORTABLE  (CPT=71045)  Result Date: 5/27/2025  CONCLUSION:  1. ET tube in satisfactory position with tip 2.6 cm above silverio. 2. Dialysis catheter remains with tip near RA SVC junction. 3. NG tube tip at least to distal esophagus.  Tip extends beyond the field of view. 4. CHF/fluid overload without significant change.  5. Left basilar pleural effusion with poor aeration in left lower lung.    Dictated by (CST): Javi Garcia MD on 5/27/2025 at 10:11 PM     Finalized by (CST): Javi Garcia MD on 5/27/2025 at 10:13 PM          XR CHEST AP PORTABLE  (CPT=71045)  Result Date: 5/27/2025  CONCLUSION:  1. CHF/fluid overload with left basilar pleural effusion. 2. No finding to account for the lack of ventilation in the left lung on the recent V/Q scan.  Findings suggest a large mucus plug which is no longer present.    Dictated by (CST): Javi Garcia MD on 5/27/2025 at 4:58 PM     Finalized by (CST): Javi Garcia MD on 5/27/2025 at 5:00 PM          NM LUNG VQ VENT / PERFUSION SCAN  (CPT=78582)  Result Date: 5/27/2025  CONCLUSION:  1. No pulmonary embolus. 2. No perfusion in the left lung suggests airway obstruction probably from a large mucus plug.  Updated chest x-ray follow-up recommended.  Findings were called to Dr. Reynaga.    Dictated by (CST): Javi Garcia MD on 5/27/2025 at 3:34 PM     Finalized by (CST): Javi Garcia MD on 5/27/2025 at 3:43 PM          XR CHEST AP PORTABLE  (CPT=71045)  Result Date: 5/26/2025  CONCLUSION:  1. Cardiomegaly and stable findings most consistent with moderate CHF/fluid overload including stable pulmonary edema and a small left pleural effusion. 2. Stable left basilar atelectasis.  Superimposed pneumonia cannot be excluded.     Dictated by (CST): Von Roberts MD on 5/26/2025 at 12:23 PM      Finalized by (CST): Von Roberts MD on 5/26/2025 at 12:25 PM                Assessment & Plan:      1-acute respiratory failure with hypoxia , multifactorial  - Aspiration pneumonia with mucous plugging s/p LLL atelectasis / resolved after intubation   - Pulmonary edema  - COPD with acute exacerbation ( extensive history of smoking )  - Obesity and CRUZITO  - Generalized weakness and fatigue and recurrent falls     Failed BiPAP and Airvo and intubated 5/27/25  CXR better after intubation and LLL atelectasis resolved   Chest ct reviewed with basilar infiltrate indicating aspiration pneumonitis    Plan :  Check sputum cultures  Continue with Unasyn  Steroid and bronchodilator and nebulizers  Vent support and management     2-acute on chronic kidney injury /anuric   Rhabdomyolysis , and now anuric    started with hemodialysis    Renal following     3-h/o HFrEF previous echo LVEF 35%  F/u echo      4-transaminitis likely shock liver  Improving  Mild fatty liver on CT otherwise negative  Supportive care     5-recurrent falls and admitted with rhabdomyolysis  PT and OT and rehab     6-DVT prophylaxis  Heparin subcu     7-poor nutrition status  Start NG tube feeding today      8-full code     9-depression  Cymbalta and Seroquel        Critical , High risk and complex  D/w staff and RT       Upon my evaluation, this patient had a high probability of imminent or life-threatening deterioration due to critical findings of laboratory tests, renal failure, and respiratory failure, which required my direct attention, intervention, and personal management.    I have personally provided 35 minutes of critical care time, exclusive of time spent on separately billable procedures.  Time includes review of all pertinent laboratory/radiology results, discussion with consultants, and monitoring for potential decompensation.  Performed interventions included managing mechanical ventilation.             Jonathan Casillas,  MD  5/28/2025

## 2025-05-28 NOTE — PROGRESS NOTES
[facility]  part of [facility]    Progress Note    Radha Winters Patient Status:  Inpatient    1960 MRN [id number]   Location [facility] 5SW/SE Attending Fanny Majano MD   Hosp Day # 11 PCP JUAN MONTANEZ       SUBJECTIVE:  Patient has been intubated overnight.  Patient responding to verbal stimuli.            OBJECTIVE:  Vital signs in last 24 hours:  /67 (BP Location: Left arm)   Pulse 91   Temp 97.5 °F (36.4 °C) (Temporal)   Resp 16   Ht 5' 3\" (1.6 m)   Wt 234 lb 2.1 oz (106.2 kg)   SpO2 97%   BMI 41.47 kg/m²     Intake/Output:    Intake/Output Summary (Last 24 hours) at 2025 1041  Last data filed at 2025 0500  Gross per 24 hour   Intake 456.3 ml   Output 3005 ml   Net -2548.7 ml       Wt Readings from Last 3 Encounters:   25 234 lb 2.1 oz (106.2 kg)   10/09/23 185 lb 3 oz (84 kg)   10/04/23 185 lb (83.9 kg)       Exam  Gen: No acute distress,   HEENT: No pallor  Pulm: Lungs bilateral coarse breath sound  CV: Heart with regular rate and rhythm, no peripheral edema  Abd: Abdomen soft, nontender, nondistended, no organomegaly, bowel sounds present  Skin: no rashes or lesions  CNS: Alert    Data Review:     Labs:   Lab Results   Component Value Date    WBC 9.7 2025    HGB 12.7 2025    HCT 39.9 2025    .0 2025    CREATSERUM 3.71 2025    BUN 29 2025     2025    K 4.8 2025     2025    CO2 27.0 2025     2025    CA 8.9 2025    ALB 3.8 2025    ALKPHO 185 2025    BILT 0.3 2025    TP 5.9 2025     2025    ALT 37 2025    MG 2.2 2025    PHOS 4.3 2025       LABS  Recent Labs   Lab 25  0811 25  0904 25  0940 25  0430 25  0358 25  0428 25  0419   RBC 5.09 4.52  --    < > 4.48 4.60 4.27   HGB 15.2 13.7  --    < > 13.4 13.6 12.7   HCT 46.0 41.0  --    < > 42.8 43.8  39.9   MCV 90.4 90.7  --    < > 95.5 95.2 93.4   MCH 29.9 30.3  --    < > 29.9 29.6 29.7   MCHC 33.0 33.4  --    < > 31.3 31.1 31.8   RDW 15.9* 15.8*  --    < > 16.5* 16.8* 16.7*   NEPRELIM 6.11 6.64  --    < > 8.59* 8.48* 8.52*   WBC 7.5 8.2  --    < > 10.3 9.9 9.7   .0* 111.0*  --    < > 111.0* 129.0* 153.0   * 193*  --   --  285* 279* 207*   * 189*  --   --  70* 46 37   DDIMER  --   --  1.77*  --   --   --   --    CK 7,754* 6,552*  --   --   --  9,315*  --    * 151*  --    < > 114* 112* 132*    < > = values in this interval not displayed.       Imaging:      Meds:   Current Hospital Medications[1]    Assessment  Problem List[2]  1. Acute Kidney Injury on Chronic Kidney Disease:  -  - Nephrology consultation obtained  Patient with worsening renal function.  Nephrology is following the patient  On hemodialysis       2. Rhabdomyolysis:  - Secondary to recurrent falls  - Possibly statin-induced  - Plan: Hold statin (Crestor)  Improving     3. Acute Transaminitis:  - Likely secondary to rhabdomyolysis  - May also be statin-related  - Will monitor with serial labs  Patient could also have a shock liver  Liver enzymes are improving       4. Recurrent Falls:  - Will check orthostatic vital signs  - Physical Therapy and Occupational Therapy consultations ordered     5. Coronary Artery Disease:  - Currently stable  - Patient denies chest pain     6. Hypothyroidism:  - Continue levothyroxine  Possible UTI.  on ceftriaxone.    Acute hypoxic respiratory failure  Patient failed BiPAP and Airvo.  Patient is intubated on ventilator.  Pulmonary following.      Chronic heart failure with reduced ejection fraction  Cardiomyopathy.  Patient to have follow-up echocardiogram.  Consider cardiology consult         Plan for close monitoring and adjustment of management based on clinical course.  Case discussed with nursing staff    Patient is more alert today.      Active Orders   LAB    Basic Metabolic Panel  (8)     Frequency: Tomorrow AM draw     Number of Occurrences: 1 Occurrences    CBC With Differential With Platelet     Frequency: Tomorrow AM draw     Number of Occurrences: 1 Occurrences   Imaging    XR CHEST AP PORTABLE  (CPT=71045)     Frequency: Once     Number of Occurrences: 1 Occurrences   Nourishments    Room Service Eligibility Until Discontinued     Frequency: Until Discontinued     Number of Occurrences: Until Specified    Room Service Notify RN Until Discontinued     Frequency: Until Discontinued     Number of Occurrences: Until Specified    Tube Feeding Diet Effective Now     Frequency: Effective Now     Number of Occurrences: Until Specified   Respiratory Care    BIPAP When Sleeping     Frequency: When Sleeping     Number of Occurrences: Until Specified    BIPAP When Sleeping     Frequency: When Sleeping     Number of Occurrences: Until Specified    Chest physiotherapy 4x Daily     Frequency: 4x Daily     Number of Occurrences: Until Specified     Order Comments: Left lower lobe atelectasis.      EZ-PAP 4x Daily     Frequency: 4x Daily     Number of Occurrences: Until Specified    Incentive spriometry: RT to teach pt Once     Frequency: Once     Number of Occurrences: 1 Occurrences    Mechanical ventilation Until Discontinued     Frequency: Until Discontinued     Number of Occurrences: Until Specified    Oxygen administration (adult) PRN     Frequency: PRN     Number of Occurrences: Until Specified    Respiratory care evaluation Once     Frequency: Once     Number of Occurrences: 1 Occurrences     Order Comments: If patient uses home CPAP/BIPAP, place on known home settings. If unknown, place on auto CPAP with upper limit 20 and lower limit 5 cm H2O      Ventilator management protocol Once     Frequency: Once     Number of Occurrences: 1 Occurrences   Dialysis    Hemodialysis inpatient     Frequency: Tomorrow     Number of Occurrences: 1 Occurrences    Hemodialysis inpatient     Frequency: Once      Number of Occurrences: 1 Occurrences     Order Comments: Bolus heparin 4000 U at start      Hemodialysis inpatient     Frequency: Tomorrow     Number of Occurrences: 1 Occurrences   Restraints    Restraints non-violent or non self-destr Continuous x 24 hours     Frequency: Continuous x 24 hours     Number of Occurrences: 24 Hours   Precaution    Aspiration precautions Continuous     Frequency: Continuous     Number of Occurrences: Until Specified   Microbiology    MRSA Screen by PCR     Frequency: Once     Number of Occurrences: 1 Occurrences    Sputum culture     Frequency: Once     Number of Occurrences: 1 Occurrences   Diet    NPO     Frequency: Effective Now     Number of Occurrences: Until Specified   Nursing    Accucheck     Frequency: BID     Number of Occurrences: Until Specified    Accucheck     Frequency: Q6H     Number of Occurrences: Until Specified    Assess using CAM-ICU     Frequency: Q Shift     Number of Occurrences: Until Specified    Assess using Critical Care Pain Observation Tool (CPOT)     Frequency: Now Then Q3H     Number of Occurrences: Until Specified    Assess using Sun Agitation Sedation Scale (RASS)     Frequency: Now Then Q3H     Number of Occurrences: Until Specified    Elevate head of bed >30 degrees     Frequency: Until Discontinued     Number of Occurrences: Until Specified     Order Comments: 30-45 degrees at all times unless contraindicated by physician order or patient condition.      Elevate head of bed greater than or equal to 30 degrees     Frequency: Until Discontinued     Number of Occurrences: Until Specified    Flush feeding tube     Frequency: PRN     Number of Occurrences: Until Specified     Order Comments: Flush feeding tube:  1.) Before and after bolus feedings.  2.) After medication administration.      For any tube feed interruptions, continue basal insulin, and correction scales. Hold any scheduled insulins, and resume when tube feed is restarted.      Frequency: Until Discontinued     Number of Occurrences: Until Specified    For non-patent tubes:     Frequency: PRN     Number of Occurrences: Until Specified     Order Comments: If water is unsuccessful in dislodging the clog, use the pancrealipase/sodium bicarb. May repeat x1 before calling physician for further orders.      For patients without a history of diabetes, discontinue Accuchecks and insulin correction scale if blood glucose <180 mg/dL for 48 hours     Frequency: Until Discontinued     Number of Occurrences: Until Specified    Give all morning medications unless otherwise specified     Frequency: Until Discontinued     Number of Occurrences: Until Specified     Order Comments: Give all morning medications unless otherwise specified.  DO NOT use Electrolyte protocol.      If patient uses CPAP/BIPAP, encourage patient to wear when sleeping (including daytime) and after any apneic episode or adverse event.     Frequency: Until Discontinued     Number of Occurrences: Until Specified    Initiate early mobility protocol     Frequency: Once     Number of Occurrences: 1 Occurrences    Initiate Medical Nutrition Therapy Protocol for Enteral Nutrition     Frequency: Until Discontinued     Number of Occurrences: Until Specified    Insert denny catheter     Frequency: Once     Number of Occurrences: 1 Occurrences    Intake and Output     Frequency: Per Unit Routine     Number of Occurrences: Until Specified     Order Comments: Record formula and water flushes separately.      May use port/central line     Frequency: Until Discontinued     Number of Occurrences: Until Specified    Measure weight     Frequency: Per Unit Routine     Number of Occurrences: Until Specified     Order Comments: Measure weight every Monday and Thursday for non critical care patients.      Monitor tube placement     Frequency: Q8H     Number of Occurrences: Until Specified    Notify attending physician for patients refusing CPAP      Frequency: Until Discontinued     Number of Occurrences: Until Specified     Order Comments: Document that patient was educated and refused CPAP, if applicable.      Notify attending physician for sustained desaturation <90% or prolonged or recurrent apnea     Frequency: Until Discontinued     Number of Occurrences: Until Specified     Order Comments: Notify attending physician for sustained desaturation <90% or prolonged or recurrent apnea      Notify physician     Frequency: Until Discontinued     Number of Occurrences: Until Specified    Notify physician of significant abdominal distension, pain, nausea, or emesis, and stop tube feeding     Frequency: Until Discontinued     Number of Occurrences: Until Specified    Notify Radiologist     Frequency: Until Discontinued     Number of Occurrences: Until Specified    Nurse Driven Indwelling Urinary Catheter Removal Protocol     Frequency: Until Discontinued     Number of Occurrences: Until Specified    Nursing communication (specify)     Frequency: Once     Number of Occurrences: 1 Occurrences     Order Comments: Hold blood thinners pre procedure      Nursing communication (specify)     Frequency: Once     Number of Occurrences: 1 Occurrences     Order Comments: RN, keep the patient off the left side.      Nursing communication - call [facility] to order dialysis     Frequency: Once     Number of Occurrences: 1 Occurrences     Order Comments: Call [facility] at [phone] to order dialysis.  Identify special circumstances and specify stat or routine treatment.      Patient may resume usual diet     Frequency: Once     Number of Occurrences: 1 Occurrences     Order Comments: Npo x one hour, then resume pre-procedure diet      Post procedure site assessment     Frequency: Per Unit Routine     Number of Occurrences: Until Specified     Order Comments: Every 15 minutes for 1 hour, then every 30 minutes for 1 hour, then every 60 minutes for 2 hours, then per unit  routine      Provide patient with sleep apnea education materials     Frequency: Once     Number of Occurrences: 1 Occurrences    Pulse oximetry     Frequency: Per Unit Routine     Number of Occurrences: Until Specified    Pulse oximetry     Frequency: Continuous     Number of Occurrences: Until Specified    Spontaneous awakening & breathing trial     Frequency: Daily     Number of Occurrences: Until Specified     Order Comments: Per protocol      Tube insertion     Frequency: Once     Number of Occurrences: 1 Occurrences    Tube insertion     Frequency: Once     Number of Occurrences: 1 Occurrences    Verify informed consent     Frequency: Once     Number of Occurrences: 1 Occurrences    Vital signs     Frequency: Per Unit Routine     Number of Occurrences: Until Specified     Order Comments: Every 15 minutes for 1 hour, then every 30 minutes for 1 hour, then every 60 minutes for 2 hours, then per unit routine.      Void prior to procedure     Frequency: Once     Number of Occurrences: 1 Occurrences   Consult    Consult to Interventional Radiology     Frequency: Once     Number of Occurrences: 1 Occurrences    Consult to Pulmonology     Frequency: Once     Number of Occurrences: 1 Occurrences   Medications    acetaminophen (Tylenol) tab 650 mg     Frequency: Q6H PRN     Dose: 650 mg     Route: Oral    acetylcysteine (Mucomyst) 20 % nebulizer solution 2 mL     Frequency: BID     Dose: 2 mL     Route: Nebulization    albuterol (Ventolin) (2.5 MG/3ML) 0.083% nebulizer solution 2.5 mg     Frequency: Q6H PRN     Dose: 2.5 mg     Route: Nebulization    ampicillin-sulbactam (Unasyn) 1.5 g in sodium chloride 0.9% 100mL IVPB-MAYO     Frequency: q12h     Dose: 1.5 g     Route: Intravenous    arformoterol (Brovana) 15 MCG/2ML nebulizer solution 15 mcg     Frequency: 2 times daily     Dose: 15 mcg     Route: Nebulization    aspirin DR tab 81 mg     Frequency: Daily     Dose: 81 mg     Route: Oral    bisacodyl (Dulcolax) 10 MG  rectal suppository 10 mg     Frequency: Daily PRN     Dose: 10 mg     Route: Rectal    budesonide (Pulmicort) 0.5 MG/2ML nebulizer suspension 0.5 mg     Frequency: 2 times daily     Dose: 0.5 mg     Route: Nebulization    busPIRone (Buspar) tab 30 mg     Frequency: TID     Dose: 30 mg     Route: Per G Tube    chlorhexidine gluconate (Peridex) 0.12 % oral solution 15 mL     Frequency: BID@0800,2000     Dose: 15 mL     Route: Mouth/Throat    chlorproMAZINE (Thorazine) tab 25 mg     Frequency: QID     Dose: 25 mg     Route: Per G Tube    dextrose 10% - sodium chloride 0.9% 1000 mL infusion     Frequency: Continuous     Route: Intravenous    dextrose 10% infusion (TPN no rate)     Frequency: Continuous PRN     Route: Intravenous    dextrose 5%-sodium chloride 0.45% infusion     Frequency: Continuous     Route: Intravenous    diazePAM (Valium) tab 5 mg     Frequency: Q8H PRN     Dose: 5 mg     Route: Per G Tube    docusate (Colace) 50 MG/5ML oral solution 100 mg     Frequency: BID     Dose: 100 mg     Route: Per NG Tube    DULoxetine (Cymbalta) DR cap 60 mg     Frequency: BID     Dose: 60 mg     Route: Oral    famotidine (Pepcid) 20 mg/2mL injection 20 mg     Frequency: Daily     Dose: 20 mg     Route: Intravenous    fentaNYL (Sublimaze) 50 mcg/mL injection 50 mcg     Frequency: Q30 Min PRN     Dose: 50 mcg     Route: Intravenous    fleet enema (Fleet) rectal enema 133 mL     Frequency: Once PRN     Dose: 1 enema     Route: Rectal    heparin (Porcine) 1000 UNIT/ML injection 2,000 Units     Frequency: PRN Dialysis     Dose: 2,000 Units     Route: Intravenous    heparin (Porcine) 5000 UNIT/ML injection 5,000 Units     Frequency: Q12H PATRICK     Dose: 5,000 Units     Route: Subcutaneous    HYDROcodone-acetaminophen (Norco) 5-325 MG per tab 1 tablet     Frequency: Q6H PRN     Dose: 1 tablet     Route: Oral    ipratropium-albuterol (Duoneb) 0.5-2.5 (3) MG/3ML inhalation solution 3 mL     Frequency: TID     Dose: 3 mL     Route:  Nebulization    levothyroxine (Synthroid) tab 150 mcg     Frequency: Before breakfast     Dose: 150 mcg     Route: Per G Tube    magnesium hydroxide (Milk of Magnesia) 400 MG/5ML oral suspension 30 mL     Frequency: Daily PRN     Dose: 30 mL     Route: Oral    methylPREDNISolone sodium succinate (Solu-MEDROL) injection 40 mg     Frequency: Daily     Dose: 40 mg     Route: Intravenous    metoprolol succinate ER (Toprol XL) 24 hr tab 50 mg     Frequency: BID     Dose: 50 mg     Route: Oral    midodrine (ProAmatine) tab 10 mg     Frequency: TID     Dose: 10 mg     Route: Per G Tube    mineral oil-white petrolatum (Artificial Tears) 83-15 % ophthalmic ointment 1 Application     Frequency: Nightly     Dose: 1 Application     Route: Both Eyes    norepinephrine (Levophed) 4 mg/250mL infusion premix     Frequency: Continuous     Dose: 0.5-50 mcg/min     Route: Intravenous    ondansetron (Zofran) 4 MG/2ML injection 4 mg     Frequency: Q6H PRN     Dose: 4 mg     Route: Intravenous    oxidized cellulose (Surgical Snow) external sheet 2 Package     Frequency: Once PRN     Dose: 2 each     Route: Topical    pancrelipase (Lip-Prot-Amyl) (Zenpep) DR particles cap 10,000 Units     Linked Order: And     Frequency: PRN     Dose: 10,000 Units     Route: Per G Tube    polyethylene glycol (PEG 3350) (Miralax) 17 g oral packet 17 g     Frequency: Daily PRN     Dose: 17 g     Route: Oral    propofol (Diprivan) 10 mg/mL infusion premix     Frequency: Continuous     Dose: 5-50 mcg/kg/min (Dosing Weight)     Route: Intravenous    QUEtiapine (SEROquel) tab 50 mg     Frequency: BID     Dose: 50 mg     Route: Per G Tube    QUEtiapine ER (SEROquel XR) 24 hr tab 300 mg     Frequency: Nightly     Dose: 300 mg     Route: Oral    senna (Senokot) 8.8 MG/5ML oral syrup 17.6 mg     Frequency: BID     Dose: 10 mL     Route: Per NG Tube    sodium bicarbonate tab 650 mg     Linked Order: And     Frequency: PRN     Dose: 650 mg     Route: Per G Tube        Fanny Majano MD           [1]   Current Facility-Administered Medications   Medication Dose Route Frequency    midodrine (ProAmatine) tab 10 mg  10 mg Per G Tube TID    busPIRone (Buspar) tab 30 mg  30 mg Per G Tube TID    chlorproMAZINE (Thorazine) tab 25 mg  25 mg Per G Tube QID    diazePAM (Valium) tab 5 mg  5 mg Per G Tube Q8H PRN    QUEtiapine (SEROquel) tab 50 mg  50 mg Per G Tube BID    levothyroxine (Synthroid) tab 150 mcg  150 mcg Per G Tube Before breakfast    fentaNYL (Sublimaze) 50 mcg/mL injection 50 mcg  50 mcg Intravenous Q30 Min PRN    senna (Senokot) 8.8 MG/5ML oral syrup 17.6 mg  10 mL Per NG Tube BID    docusate (Colace) 50 MG/5ML oral solution 100 mg  100 mg Per NG Tube BID    chlorhexidine gluconate (Peridex) 0.12 % oral solution 15 mL  15 mL Mouth/Throat BID@0800,2000    mineral oil-white petrolatum (Artificial Tears) 83-15 % ophthalmic ointment 1 Application  1 Application Both Eyes Nightly    famotidine (Pepcid) 20 mg/2mL injection 20 mg  20 mg Intravenous Daily    ipratropium-albuterol (Duoneb) 0.5-2.5 (3) MG/3ML inhalation solution 3 mL  3 mL Nebulization TID    [START ON 5/29/2025] methylPREDNISolone sodium succinate (Solu-MEDROL) injection 40 mg  40 mg Intravenous Daily    budesonide (Pulmicort) 0.5 MG/2ML nebulizer suspension 0.5 mg  0.5 mg Nebulization 2 times daily    arformoterol (Brovana) 15 MCG/2ML nebulizer solution 15 mcg  15 mcg Nebulization 2 times daily    dextrose 10% infusion (TPN no rate)   Intravenous Continuous PRN    sodium bicarbonate tab 650 mg  650 mg Per G Tube PRN    And    pancrelipase (Lip-Prot-Amyl) (Zenpep) DR particles cap 10,000 Units  10,000 Units Per G Tube PRN    oxidized cellulose (Surgical Snow) external sheet 2 Package  2 each Topical Once PRN    dextrose 5%-sodium chloride 0.45% infusion   Intravenous Continuous    acetylcysteine (Mucomyst) 20 % nebulizer solution 2 mL  2 mL Nebulization BID    ampicillin-sulbactam (Unasyn) 1.5 g in sodium  chloride 0.9% 100mL IVPB-MAYO  1.5 g Intravenous q12h    propofol (Diprivan) 10 mg/mL infusion premix  5-50 mcg/kg/min (Dosing Weight) Intravenous Continuous    DULoxetine (Cymbalta) DR cap 60 mg  60 mg Oral BID    HYDROcodone-acetaminophen (Norco) 5-325 MG per tab 1 tablet  1 tablet Oral Q6H PRN    dextrose 10% - sodium chloride 0.9% 1000 mL infusion   Intravenous Continuous    ondansetron (Zofran) 4 MG/2ML injection 4 mg  4 mg Intravenous Q6H PRN    polyethylene glycol (PEG 3350) (Miralax) 17 g oral packet 17 g  17 g Oral Daily PRN    magnesium hydroxide (Milk of Magnesia) 400 MG/5ML oral suspension 30 mL  30 mL Oral Daily PRN    bisacodyl (Dulcolax) 10 MG rectal suppository 10 mg  10 mg Rectal Daily PRN    fleet enema (Fleet) rectal enema 133 mL  1 enema Rectal Once PRN    norepinephrine (Levophed) 4 mg/250mL infusion premix  0.5-50 mcg/min Intravenous Continuous    heparin (Porcine) 1000 UNIT/ML injection 2,000 Units  2,000 Units Intravenous PRN Dialysis    albuterol (Ventolin) (2.5 MG/3ML) 0.083% nebulizer solution 2.5 mg  2.5 mg Nebulization Q6H PRN    heparin (Porcine) 5000 UNIT/ML injection 5,000 Units  5,000 Units Subcutaneous 2 times per day    acetaminophen (Tylenol) tab 650 mg  650 mg Oral Q6H PRN    aspirin DR tab 81 mg  81 mg Oral Daily    [Held by provider] metoprolol succinate ER (Toprol XL) 24 hr tab 50 mg  50 mg Oral BID    QUEtiapine ER (SEROquel XR) 24 hr tab 300 mg  300 mg Oral Nightly   [2]   Patient Active Problem List  Diagnosis    Closed fracture of proximal end of left humerus, unspecified fracture morphology, initial encounter    Frequent falls    Seizure-like activity (HCC)    Pituitary lesion (HCC)    Major depressive disorder, recurrent episode, moderate (HCC)    Generalized anxiety disorder    Acute renal failure superimposed on chronic kidney disease, unspecified acute renal failure type, unspecified CKD stage    Non-traumatic rhabdomyolysis    Transaminitis

## 2025-05-28 NOTE — PROGRESS NOTES
Received pt on BiPAP. Pt transitioned to [facility] per Dr Casillas. ABG later performed after pt had been off BiPAP for several hours, results shared w/ Dr Casillas. Pt later required BiPAP for transport to Nuclear Medicine as pt was unable to tolerate being prone on NRB. Pt tolerated transport well. Pt later placed back on [facility] per Dr Casillas upon return to room. Pt remains on the following settings:   05/27/25 1815   Oxygen Utilization   O2 Device High flow/High humidity   O2 Flow Rate (L/min) 60 L/min   FiO2 (%) 80 %   Heater Temperature 93.2 °F (34 °C)   SpO2 90 %     Nebs, EZPAP, CPT provided. Pt maintaining appropriate SpO2. RT to continue to monitor.

## 2025-05-28 NOTE — PAYOR COMM NOTE
--------------  CONTINUED STAY REVIEW    Payor: [facility]  Subscriber #:  [id number]  Authorization Number: [id number]    Admit date: 5/17/25  Admit time:  2:12 PM    REVIEW DOCUMENTATION:     Date of Service: 5/27/2025  9:42 PM     Signed         Called by RN to evaluate patient in room 217     On my arrival patient lying in bed, in obvious respiratory distress saturating in the low 80s breathing at over 40 a minute currently on nonrebreather and Vapotherm.  Patient for possible bronchoscopy tomorrow.     On exam  Temperature 98  Pulse 104  Respiration 40  /70  Pulse ox 84% on Vapotherm and nonrebreather  Confused  In obvious respiratory distress bilateral rhonchi  Heart tachycardic  Abdomen soft  Extremities no edema     Assessment and plan     Acute respiratory failure with hypoxia  Likely multifactorial with fluid overload, COPD and likely mucous plugging.  Not responding to noninvasive measures, has not been able to tolerate BiPAP plan to intubate patient.  Pulmonary to be notified     35 minutes of critical care time spent with patient including coordinating care with ancillary staff.                     Date of Service: 5/27/2025 10:46 AM     Signed         SUBJECTIVE:  Alert  Patient is on Airvo        OBJECTIVE:  Vital signs in last 24 hours:  /69 (BP Location: Left arm)   Pulse 95   Temp 97.2 °F (36.2 °C) (Temporal)   Resp 13   Ht 5' 3\" (1.6 m)   Wt 233 lb 0.4 oz (105.7 kg)   SpO2 97%   BMI 41.28 kg/m²      Intake/Output:     Intake/Output Summary (Last 24 hours) at 5/27/2025 1046  Last data filed at 5/27/2025 0600      Gross per 24 hour   Intake 550 ml   Output 30 ml   Net 520 ml             Wt Readings from Last 3 Encounters:   05/26/25 233 lb 0.4 oz (105.7 kg)   10/09/23 185 lb 3 oz (84 kg)   10/04/23 185 lb (83.9 kg)         Exam  Gen: No acute distress, alert  HEENT: No pallor  Pulm: Lungs bilateral coarse breath sound  CV: Heart with regular rate and  rhythm, no peripheral edema  Abd: Abdomen soft, nontender, nondistended, no organomegaly, bowel sounds present  Skin: no rashes or lesions  CNS: Alert     Data Review:      Labs:         Lab Results   Component Value Date     WBC 9.9 05/27/2025     HGB 13.6 05/27/2025     HCT 43.8 05/27/2025     .0 05/27/2025     CREATSERUM 4.76 05/27/2025     BUN 38 05/27/2025      05/27/2025     K 5.9 05/27/2025      05/27/2025     CO2 28.0 05/27/2025      05/27/2025     CA 8.6 05/27/2025     ALB 3.4 05/27/2025     ALKPHO 199 05/27/2025     BILT 0.2 05/27/2025     TP 5.4 05/27/2025      05/27/2025     ALT 46 05/27/2025     CK 9,315 05/27/2025         LABS           Recent Labs   Lab 05/23/25  0811 05/24/25  0904 05/24/25  0940 05/25/25  0430 05/26/25  0358 05/27/25  0428   RBC 5.09 4.52  --  4.61 4.48 4.60   HGB 15.2 13.7  --  14.0 13.4 13.6   HCT 46.0 41.0  --  42.9 42.8 43.8   MCV 90.4 90.7  --  93.1 95.5 95.2   MCH 29.9 30.3  --  30.4 29.9 29.6   MCHC 33.0 33.4  --  32.6 31.3 31.1   RDW 15.9* 15.8*  --  15.9* 16.5* 16.8*   NEPRELIM 6.11 6.64  --  8.22* 8.59* 8.48*   WBC 7.5 8.2  --  9.9 10.3 9.9   .0* 111.0*  --  108.0* 111.0* 129.0*   * 193*  --   --  285* 279*   * 189*  --   --  70* 46   DDIMER  --   --  1.77*  --   --   --    CK 7,754* 6,552*  --   --   --  9,315*   * 151*  --  124* 114* 112*         Imaging:        Meds:   [Current Hospital Medications]    [Current Hospital Medications]         Current Facility-Administered Medications   Medication Dose Route Frequency    budesonide (Pulmicort) 0.5 MG/2ML nebulizer suspension 0.5 mg  0.5 mg Nebulization 2 times daily    arformoterol (Brovana) 15 MCG/2ML nebulizer solution 15 mcg  15 mcg Nebulization 2 times daily    ipratropium-albuterol (Duoneb) 0.5-2.5 (3) MG/3ML inhalation solution 3 mL  3 mL Nebulization 4 times per day    QUEtiapine (SEROquel) tab 50 mg  50 mg Oral BID    chlorproMAZINE (Thorazine) tab 25 mg   25 mg Oral QID    busPIRone (Buspar) tab 30 mg  30 mg Oral TID    docusate sodium (Colace) cap 100 mg  100 mg Oral BID    DULoxetine (Cymbalta) DR cap 60 mg  60 mg Oral BID    potassium chloride (Klor-Con M10) tab 10 mEq  10 mEq Oral Daily    heparin (Porcine) 100 Units/mL lock flush 150 Units  1.5 mL Intravenous Once    HYDROcodone-acetaminophen (Norco) 5-325 MG per tab 1 tablet  1 tablet Oral Q6H PRN    albumin human (Albumin) 25% injection 25 g  25 g Intravenous Once    dextrose 10% - sodium chloride 0.9% 1000 mL infusion   Intravenous Continuous    ondansetron (Zofran) 4 MG/2ML injection 4 mg  4 mg Intravenous Q6H PRN    polyethylene glycol (PEG 3350) (Miralax) 17 g oral packet 17 g  17 g Oral Daily PRN    magnesium hydroxide (Milk of Magnesia) 400 MG/5ML oral suspension 30 mL  30 mL Oral Daily PRN    bisacodyl (Dulcolax) 10 MG rectal suppository 10 mg  10 mg Rectal Daily PRN    fleet enema (Fleet) rectal enema 133 mL  1 enema Rectal Once PRN    norepinephrine (Levophed) 4 mg/250mL infusion premix  0.5-50 mcg/min Intravenous Continuous    heparin (Porcine) 1000 UNIT/ML injection 2,000 Units  2,000 Units Intravenous PRN Dialysis    albuterol (Ventolin) (2.5 MG/3ML) 0.083% nebulizer solution 2.5 mg  2.5 mg Nebulization Q6H PRN    heparin (Porcine) 5000 UNIT/ML injection 5,000 Units  5,000 Units Subcutaneous 2 times per day    midodrine (ProAmatine) tab 10 mg  10 mg Oral TID    acetaminophen (Tylenol) tab 650 mg  650 mg Oral Q6H PRN    aspirin DR tab 81 mg  81 mg Oral Daily    diazePAM (Valium) tab 5 mg  5 mg Oral Q8H PRN    levothyroxine (Synthroid) tab 150 mcg  150 mcg Oral Before breakfast    [Held by provider] metoprolol succinate ER (Toprol XL) 24 hr tab 50 mg  50 mg Oral BID    QUEtiapine ER (SEROquel XR) 24 hr tab 300 mg  300 mg Oral Nightly        Assessment  [Problem List]    [Problem List]  Patient Active Problem List      Diagnosis    Closed fracture of proximal end of left humerus, unspecified fracture  morphology, initial encounter    Frequent falls    Seizure-like activity (HCC)    Pituitary lesion (HCC)    Major depressive disorder, recurrent episode, moderate (HCC)    Generalized anxiety disorder    Acute renal failure superimposed on chronic kidney disease, unspecified acute renal failure type, unspecified CKD stage    Non-traumatic rhabdomyolysis    Transaminitis     1. Acute Kidney Injury on Chronic Kidney Disease:  -  - Nephrology consultation obtained  Patient with worsening renal function.  Nephrology is following the patient  On hemodialysis        2. Rhabdomyolysis:  - Secondary to recurrent falls  - Possibly statin-induced  - Plan: Hold statin (Crestor)  Improving     3. Acute Transaminitis:  - Likely secondary to rhabdomyolysis  - May also be statin-related  - Will monitor with serial labs  Patient could also have a shock liver  Liver enzymes are improving        4. Recurrent Falls:  - Will check orthostatic vital signs  - Physical Therapy and Occupational Therapy consultations ordered     5. Coronary Artery Disease:  - Currently stable  - Patient denies chest pain     6. Hypothyroidism:  - Continue levothyroxine  Possible UTI.  on ceftriaxone.     Acute hypoxic respiratory failure patient is being seen by pulmonary.           Plan for close monitoring and adjustment of management based on clinical course.  Case discussed with nursing staff     Patient is more alert today.             Active Orders   LAB     Arterial blood gas       Frequency: Once       Number of Occurrences: 1 Occurrences     CBC With Differential With Platelet       Frequency: Tomorrow AM draw       Number of Occurrences: 1 Occurrences     Comp Metabolic Panel (14)       Frequency: Tomorrow AM draw       Number of Occurrences: 1 Occurrences   Imaging     XR CHEST AP PORTABLE  (CPT=71045)       Frequency: Once       Number of Occurrences: 1 Occurrences   Nourishments     Dietary Nutrition Supplements BID       Frequency: BID        Number of Occurrences: Until Specified       Order Comments: Nepro@ 10am and 2pm daily - variety of flavors        Room Service Eligibility Until Discontinued       Frequency: Until Discontinued       Number of Occurrences: Until Specified     Room Service Notify RN Until Discontinued       Frequency: Until Discontinued       Number of Occurrences: Until Specified   Respiratory Care     BIPAP When Sleeping       Frequency: When Sleeping       Number of Occurrences: Until Specified     BIPAP When Sleeping       Frequency: When Sleeping       Number of Occurrences: Until Specified     Chest physiotherapy 4x Daily       Frequency: 4x Daily       Number of Occurrences: Until Specified       Order Comments: Left lower lobe atelectasis.        EZ-PAP 4x Daily       Frequency: 4x Daily       Number of Occurrences: Until Specified     Incentive spriometry: RT to teach pt Once       Frequency: Once       Number of Occurrences: 1 Occurrences     Oxygen administration (adult) PRN       Frequency: PRN       Number of Occurrences: Until Specified     Respiratory care evaluation Once       Frequency: Once       Number of Occurrences: 1 Occurrences       Order Comments: If patient uses home CPAP/BIPAP, place on known home settings. If unknown, place on auto CPAP with upper limit 20 and lower limit 5 cm H2O      Dialysis     Hemodialysis inpatient       Frequency: Tomorrow       Number of Occurrences: 1 Occurrences     Hemodialysis inpatient       Frequency: Once       Number of Occurrences: 1 Occurrences       Order Comments: Bolus heparin 4000 U at start        Hemodialysis inpatient       Frequency: Tomorrow       Number of Occurrences: 1 Occurrences   Diet     Renal diet Renal; Sodium Restriction: 3-4 GM NA; Fluid Consistency: Nectar Thick / Mildly Thick Liquids; Texture Consistency: Pureed; Is Patient on Accuchecks? No       Frequency: Effective Now       Number of Occurrences: Until Specified       Order Comments: Meds  crushed      Nursing     Accucheck       Frequency: BID       Number of Occurrences: Until Specified     Give all morning medications unless otherwise specified       Frequency: Until Discontinued       Number of Occurrences: Until Specified       Order Comments: Give all morning medications unless otherwise specified.  DO NOT use Electrolyte protocol.        If patient uses CPAP/BIPAP, encourage patient to wear when sleeping (including daytime) and after any apneic episode or adverse event.       Frequency: Until Discontinued       Number of Occurrences: Until Specified     Insert denny catheter       Frequency: Once       Number of Occurrences: 1 Occurrences     May use port/central line       Frequency: Until Discontinued       Number of Occurrences: Until Specified     Notify attending physician for patients refusing CPAP       Frequency: Until Discontinued       Number of Occurrences: Until Specified       Order Comments: Document that patient was educated and refused CPAP, if applicable.        Notify attending physician for sustained desaturation <90% or prolonged or recurrent apnea       Frequency: Until Discontinued       Number of Occurrences: Until Specified       Order Comments: Notify attending physician for sustained desaturation <90% or prolonged or recurrent apnea        Notify Radiologist       Frequency: Until Discontinued       Number of Occurrences: Until Specified     Nurse Driven Indwelling Urinary Catheter Removal Protocol       Frequency: Until Discontinued       Number of Occurrences: Until Specified     Nursing communication (specify)       Frequency: Once       Number of Occurrences: 1 Occurrences       Order Comments: Hold blood thinners pre procedure        Nursing communication (specify)       Frequency: Once       Number of Occurrences: 1 Occurrences       Order Comments: RN, keep the patient off the left side.        Nursing communication - call [facility] to [facility]        Frequency: Once       Number of Occurrences: 1 Occurrences       Order Comments: Call [facility] at [phone] to order dialysis.  Identify special circumstances and specify stat or routine treatment.        Patient may resume usual diet       Frequency: Once       Number of Occurrences: 1 Occurrences       Order Comments: Npo x one hour, then resume pre-procedure diet        Post procedure site assessment       Frequency: Per Unit Routine       Number of Occurrences: Until Specified       Order Comments: Every 15 minutes for 1 hour, then every 30 minutes for 1 hour, then every 60 minutes for 2 hours, then per unit routine        Provide patient with sleep apnea education materials       Frequency: Once       Number of Occurrences: 1 Occurrences     Pulse oximetry       Frequency: Per Unit Routine       Number of Occurrences: Until Specified     Pulse oximetry       Frequency: Continuous       Number of Occurrences: Until Specified     Verify informed consent       Frequency: Once       Number of Occurrences: 1 Occurrences     Vital signs       Frequency: Per Unit Routine       Number of Occurrences: Until Specified       Order Comments: Every 15 minutes for 1 hour, then every 30 minutes for 1 hour, then every 60 minutes for 2 hours, then per unit routine.        Void prior to procedure       Frequency: Once       Number of Occurrences: 1 Occurrences   Consult     Consult to Interventional Radiology       Frequency: Once       Number of Occurrences: 1 Occurrences     Consult to Pulmonology       Frequency: Once       Number of Occurrences: 1 Occurrences   Medications     acetaminophen (Tylenol) tab 650 mg       Frequency: Q6H PRN       Dose: 650 mg       Route: Oral     albumin human (Albumin) 25% injection 25 g       Frequency: Once       Dose: 25 g       Route: Intravenous     albuterol (Ventolin) (2.5 MG/3ML) 0.083% nebulizer solution 2.5 mg       Frequency: Q6H PRN       Dose: 2.5 mg       Route: Nebulization      arformoterol (Brovana) 15 MCG/2ML nebulizer solution 15 mcg       Frequency: 2 times daily       Dose: 15 mcg       Route: Nebulization     aspirin DR tab 81 mg       Frequency: Daily       Dose: 81 mg       Route: Oral     bisacodyl (Dulcolax) 10 MG rectal suppository 10 mg       Frequency: Daily PRN       Dose: 10 mg       Route: Rectal     budesonide (Pulmicort) 0.5 MG/2ML nebulizer suspension 0.5 mg       Frequency: 2 times daily       Dose: 0.5 mg       Route: Nebulization     busPIRone (Buspar) tab 30 mg       Frequency: TID       Dose: 30 mg       Route: Oral     chlorproMAZINE (Thorazine) tab 25 mg       Frequency: QID       Dose: 25 mg       Route: Oral     dextrose 10% - sodium chloride 0.9% 1000 mL infusion       Frequency: Continuous       Route: Intravenous     diazePAM (Valium) tab 5 mg       Frequency: Q8H PRN       Dose: 5 mg       Route: Oral     docusate sodium (Colace) cap 100 mg       Frequency: BID       Dose: 100 mg       Route: Oral     DULoxetine (Cymbalta) DR cap 60 mg       Frequency: BID       Dose: 60 mg       Route: Oral     fleet enema (Fleet) rectal enema 133 mL       Frequency: Once PRN       Dose: 1 enema       Route: Rectal     heparin (Porcine) 100 Units/mL lock flush 150 Units       Frequency: Once       Dose: 1.5 mL       Route: Intravenous     heparin (Porcine) 1000 UNIT/ML injection 2,000 Units       Frequency: PRN Dialysis       Dose: 2,000 Units       Route: Intravenous     heparin (Porcine) 5000 UNIT/ML injection 5,000 Units       Frequency: Q12H PATRICK       Dose: 5,000 Units       Route: Subcutaneous     HYDROcodone-acetaminophen (Norco) 5-325 MG per tab 1 tablet       Frequency: Q6H PRN       Dose: 1 tablet       Route: Oral     ipratropium-albuterol (Duoneb) 0.5-2.5 (3) MG/3ML inhalation solution 3 mL       Frequency: Q6H PATRICK       Dose: 3 mL       Route: Nebulization     levothyroxine (Synthroid) tab 150 mcg       Frequency: Before breakfast       Dose: 150 mcg        Route: Oral     magnesium hydroxide (Milk of Magnesia) 400 MG/5ML oral suspension 30 mL       Frequency: Daily PRN       Dose: 30 mL       Route: Oral     metoprolol succinate ER (Toprol XL) 24 hr tab 50 mg       Frequency: BID       Dose: 50 mg       Route: Oral     midodrine (ProAmatine) tab 10 mg       Frequency: TID       Dose: 10 mg       Route: Oral     norepinephrine (Levophed) 4 mg/250mL infusion premix       Frequency: Continuous       Dose: 0.5-50 mcg/min       Route: Intravenous     ondansetron (Zofran) 4 MG/2ML injection 4 mg       Frequency: Q6H PRN       Dose: 4 mg       Route: Intravenous     polyethylene glycol (PEG 3350) (Miralax) 17 g oral packet 17 g       Frequency: Daily PRN       Dose: 17 g       Route: Oral     potassium chloride (Klor-Con M10) tab 10 mEq       Frequency: Daily       Dose: 10 mEq       Route: Oral     QUEtiapine (SEROquel) tab 50 mg       Frequency: BID       Dose: 50 mg       Route: Oral     QUEtiapine ER (SEROquel XR) 24 hr tab 300 mg       Frequency: Nightly       Dose: 300 mg       Route: Oral   Cardiology     CARD ECHO 2D DOPPLER (CPT=93306)       Frequency: Once       Number of Occurrences: 1 Occurrences         Fanny Majano MD                            MEDICATIONS ADMINISTERED IN LAST 1 DAY:  acetylcysteine (Mucomyst) 20 % nebulizer solution 2 mL       Date Action Dose Route User    5/28/2025 0749 Given 2 mL Nebulization John Abbott RCP    5/27/2025 1944 Given 2 mL Nebulization Phoebe Luis          ampicillin-sulbactam (Unasyn) 1.5 g in sodium chloride 0.9% 100mL IVPB-MAYO       Date Action Dose Route User    5/28/2025 0346 New Bag 1.5 g Intravenous Ivis Rodríguez RN    5/27/2025 1745 New Bag 1.5 g Intravenous Adele Bedoya RN          arformoterol (Brovana) 15 MCG/2ML nebulizer solution 15 mcg       Date Action Dose Route User    5/28/2025 0806 Given 15 mcg Nebulization John Abbott RCP    5/27/2025 1944 Given 15 mcg Nebulization Phoebe Luis     5/27/2025 1327 Given 15 mcg Nebulization Kristy Lagunasan          aspirin DR tab 81 mg       Date Action Dose Route User    5/28/2025 0913 Given 81 mg Oral Terrence Baker RN          budesonide (Pulmicort) 0.5 MG/2ML nebulizer suspension 0.5 mg       Date Action Dose Route User    5/28/2025 0806 Given 0.5 mg Nebulization Famarin, John R, RCP    5/27/2025 1944 Given 0.5 mg Nebulization Donile, Phoebe    5/27/2025 1335 Given 0.5 mg Nebulization BeboWilliam          busPIRone (Buspar) tab 30 mg       Date Action Dose Route User    5/27/2025 2028 Given 30 mg Oral Ivis Rodríguez RN          busPIRone (Buspar) tab 30 mg       Date Action Dose Route User    5/28/2025 0913 Given 30 mg Per G Tube Terrence Baker RN          chlorhexidine gluconate (Peridex) 0.12 % oral solution 15 mL       Date Action Dose Route User    5/28/2025 0914 Given 15 mL Mouth/Throat Terrence Baker RN          chlorproMAZINE (Thorazine) tab 25 mg       Date Action Dose Route User    5/27/2025 2028 Given 25 mg Oral Ivis Rodríguez RN    5/27/2025 1439 Given 25 mg Oral Adele Bedoya RN          chlorproMAZINE (Thorazine) tab 25 mg       Date Action Dose Route User    5/28/2025 0913 Given 25 mg Per G Tube Terrence Baker RN          dextrose 5%-sodium chloride 0.45% infusion       Date Action Dose Route User    5/27/2025 1746 New Bag (none) Intravenous Adele Bedoya RN          docusate (Colace) 50 MG/5ML oral solution 100 mg       Date Action Dose Route User    5/28/2025 0915 Given 100 mg Per NG Tube Terrence Baker RN          DULoxetine (Cymbalta) DR cap 60 mg       Date Action Dose Route User    5/28/2025 0913 Given 60 mg Oral Terrence Baker RN    5/27/2025 2028 Given 60 mg Oral Ivis Rodríguez RN          etomidate (Amidate) 2 mg/mL injection       Date Action Dose Route User    5/27/2025 2109 Given 10 mg Intravenous Ivis Rodríguez, ELLIS          etomidate (Amidate) 2 mg/mL injection 10 mg       Date Action  Dose Route User    5/27/2025 2109 Given 10 mg Intravenous Ivis Rodríguez RN          famotidine (Pepcid) 20 mg/2mL injection 20 mg       Date Action Dose Route User    5/28/2025 0913 Given 20 mg Intravenous Terrence Baker RN          heparin (Porcine) 1000 UNIT/ML injection 2,000 Units       Date Action Dose Route User    5/27/2025 1337 Given by Other 2,000 Units Intravenous Adele Bedoya RN          heparin (Porcine) 5000 UNIT/ML injection 5,000 Units       Date Action Dose Route User    5/27/2025 2027 Given 5,000 Units Subcutaneous (Right Lower Abdomen) Ivis Rodríguez RN          HYDROcodone-acetaminophen (Norco) 5-325 MG per tab 1 tablet       Date Action Dose Route User    5/28/2025 0545 Given 1 tablet Oral Ivis Rodríguez RN          ipratropium-albuterol (Duoneb) 0.5-2.5 (3) MG/3ML inhalation solution 3 mL       Date Action Dose Route User    5/27/2025 1316 Given 3 mL Nebulization Willima Lagunas          ipratropium-albuterol (Duoneb) 0.5-2.5 (3) MG/3ML inhalation solution 3 mL       Date Action Dose Route User    5/28/2025 0749 Given 3 mL Nebulization John Abbott RCP    5/28/2025 0155 Given 3 mL Nebulization Donile, Phoebe    5/27/2025 1944 Given 3 mL Nebulization Donile, Phoebe          levothyroxine (Synthroid) tab 150 mcg       Date Action Dose Route User    5/28/2025 0625 Given 150 mcg Per G Tube Ivis Rodríguez RN          methylPREDNISolone sodium succinate (Solu-MEDROL) injection 40 mg       Date Action Dose Route User    5/28/2025 0346 Given 40 mg Intravenous Ivis Rodríguez RN    5/27/2025 1746 Given 40 mg Intravenous Aedle Bedoya RN midodrine (ProAmatine) tab 10 mg       Date Action Dose Route User    5/27/2025 1439 Given 10 mg Oral Adele Bedoya RN          midodrine (ProAmatine) tab 10 mg       Date Action Dose Route User    5/28/2025 0624 Given 10 mg Per G Tube Rodríguez, Ivis, RN          propofol (Diprivan) 10 mg/mL infusion premix       Date Action Dose  Route User    5/28/2025 0800 Rate/Dose Verify 15 mcg/kg/min × 105.7 kg (Dosing Weight) Intravenous Terrence Baker RN    5/28/2025 0625 Rate/Dose Change 15 mcg/kg/min × 105.7 kg (Dosing Weight) Intravenous Deena Chavez RN    5/28/2025 0541 New Bag 20 mcg/kg/min × 105.7 kg (Dosing Weight) Intravenous Ivis Rodríguez RN    5/28/2025 0440 Rate/Dose Change 15 mcg/kg/min × 105.7 kg (Dosing Weight) Intravenous Deena Chavez RN    5/27/2025 2115 New Bag 10 mcg/kg/min × 105.7 kg (Dosing Weight) Intravenous Ivis Rodríguez RN          QUEtiapine (SEROquel) tab 50 mg       Date Action Dose Route User    5/28/2025 0913 Given 50 mg Per G Tube Terrence Baker RN          QUEtiapine ER (SEROquel XR) 24 hr tab 300 mg       Date Action Dose Route User    5/27/2025 2028 Given 300 mg Oral Ivis Rodríguez RN          senna (Senokot) 8.8 MG/5ML oral syrup 17.6 mg       Date Action Dose Route User    5/28/2025 0913 Given 17.6 mg Per NG Tube Terrence Baker RN            Vitals (last day)       Date/Time Temp Pulse Resp BP SpO2 Weight O2 Device O2 Flow Rate (L/min) [facility]    05/28/25 0800 97.5 °F (36.4 °C) 83 16 125/70 98 % -- Ventilator -- MV    05/28/25 0700 -- 88 16 105/61 96 % -- Ventilator -- MV    05/28/25 0625 -- -- -- 97/56 -- -- -- -- SF    05/28/25 0600 -- 95 18 157/135 98 % -- Ventilator -- MAEVE    05/28/25 0500 -- 96 19 142/75 90 % 234 lb 2.1 oz (106.2 kg) Ventilator -- MAEVE    05/28/25 0400 97.2 °F (36.2 °C) 99 17 122/75 97 % -- Ventilator -- MAEVE    05/28/25 0300 -- 93 18 117/64 93 % -- Ventilator -- JC 05/28/25 0200 -- 95 16 118/66 94 % -- Ventilator -- JC 05/28/25 0100 -- 96 20 105/62 93 % -- Ventilator -- JC 05/28/25 0000 97.5 °F (36.4 °C) 97 18 109/64 96 % -- Ventilator -- JC 05/27/25 2300 -- 99 19 127/69 96 % -- Ventilator -- JC 05/27/25 2200 -- 102 20 98/59 97 % -- Ventilator -- JC 05/27/25 2100 -- 109 23 90/60 87 % -- -- -- JC 05/27/25 2000 98.6 °F (37 °C) 103 22 112/70 91 % --  High flow/High humidity 60 L/min MAEVE    05/27/25 1944 -- -- -- -- -- -- High flow/High humidity 60 L/min CD    05/27/25 1900 -- 105 18 113/58 90 % -- High flow/High humidity -- EV    05/27/25 1815 -- 102 20 115/60 -- -- -- -- EV    05/27/25 1815 -- -- -- -- 90 % -- High flow/High humidity 60 L/min EB    05/27/25 1800 -- 103 22 110/59 90 % -- High flow/High humidity -- EV    05/27/25 1700 -- 107 19 117/62 91 % -- High flow/High humidity -- EV    05/27/25 1608 -- -- -- -- 92 % -- High flow/High humidity 60 L/min EB    05/27/25 1600 97.3 °F (36.3 °C) 103 17 89/60 91 % -- CPAP -- EV    05/27/25 1500 -- 100 19 112/57 93 % -- High flow/High humidity -- EV    05/27/25 1400 -- 94 17 90/37 95 % -- High flow/High humidity -- EV    05/27/25 1316 -- 96 15 -- 95 % -- High flow/High humidity 45 L/min EB    05/27/25 1300 -- 98 15 92/53 99 % -- -- -- EV    05/27/25 1200 97.5 °F (36.4 °C) 100 20 99/77 94 % -- -- -- EV    05/27/25 1100 -- 94 14 99/63 92 % -- -- -- EV    05/27/25 1000 -- 95 18 134/62 89 % -- -- -- EV    05/27/25 0900 -- 92 13 -- 97 % -- -- -- EV    05/27/25 0857 -- 95 13 -- 97 % -- High flow/High humidity  45 L/min EB    05/27/25 0800 97.6 °F (36.4 °C) 93 17 132/69 97 % -- CPAP -- EV    05/27/25 0600 -- 91 20 116/54 96 % -- CPAP -- SR    05/27/25 0400 97.2 °F (36.2 °C) 92 20 126/61 98 % -- CPAP -- SR    05/27/25 0200 -- 90 20 115/63 99 % -- CPAP -- SR    05/27/25 0000 96.8 °F (36 °C) 95 21 123/59 96 % -- CPAP -- SR          CIWA Scores (since admission)       None

## 2025-05-28 NOTE — PROGRESS NOTES
05/28/25 0725   VISIT TYPE   SLP Inpatient Visit Type (Documentation Required) Attempted Treatment   FOLLOW UP/PLAN   Follow Up Needed (Documentation Required) On hold     SLP attempt this AM. Pt with declining respiratory status requiring intubation on 5/27/25. SLP to place pt ON HOLD at this time. Please reorder SLP as pt extubated and appropriate.    Thank you.    Karen Ordonez M.S. CCC-SLP  Speech Language Pathologist  Phone Number [phone]

## 2025-05-28 NOTE — RESPIRATORY THERAPY NOTE
Patient received on Airvo 60L/80%. Decision made to intubate d/t respiratory distress and low SpO2. Intubated with 7.5 ETT secured at 23 cm. Current vent settings-       05/28/25 0155   Vent Information   Vent Mode VC/AC   Settings   FiO2 (%) (S)  60 %   Resp Rate (Set) 16   Vt (Set, mL) 430 mL   Waveform Decelerating ramp   PEEP/CPAP (cm H2O) 8 cm H20     Pt's SpO2 remains 90-92% on these settings. Transport to CT without issue. RT will continue to monitor.

## 2025-05-28 NOTE — RESPIRATORY THERAPY NOTE
Pt on current vent settings VC/16/430/+8/45% ; ETT 7.5 @ 23 cm  Suctioned pt as needed throughout the day.  Pt tolerating and saturating appropriately.  RT to continue to monitor.

## 2025-05-28 NOTE — CONSULTS
HPI    64-year-old female seen in cardiology consultation for cardiomyopathy. Previously documented ejection fraction of 35-40%. Currently intubated and unable to provide history. Presented to emergency department with markedly elevated CPK and creatinine greater than 6. Initially required non-rebreather and Vapotherm for respiratory support. Treated for acute and chronic systolic heart failure as well as end-stage renal disease requiring dialysis.      Past medical history: Coronary artery disease, hyperlipidemia, hypothyroidism, history of myocardial infarction  Allergies: None  Social history: Positive for smoking  Cardiac medications: Not documented  ROS    Unable to obtain due to intubation.     Physical Exam  Vitals: BP: 125/66 HR: 82  Gen: Intubated, sedated. HEENT: Oral mucosa moist. Lungs: CTA Heart: Normal rate. No murmur. Abdomen: Soft and non tender Extremities: No edema and warm to touch. Neuro: Unable to assess due to sedation. Psych: Unable to assess due to sedation. Skin: Normal perfusion. No rashes. Neck: Jvp not seen. Trachea midline.    Pertinent diagnostic findings: Echocardiogram demonstrated ejection fraction 30-35% with akinesia of the apical walls. ABG with pH 7.37. Chest X-ray with right basal opacity, possibly atelectasis with or without superimposed pneumonia. Creatinine 3.71. High-sensitivity troponin 79 (down from 197 four days ago). CPK 9,315. ECG showing sinus tachycardia with occasional premature ventricular complexes.        Assessment / Plan  1. Acute on Chronic Systolic Heart Failure -  Ejection fraction 30-35% with apical wall akinesia. Previous EF 35-40% 2015. Will institute heart failure therapy with Carvedilol 3.125 mg BID and Entresto 24/26 mg BID. Goal MAP 65.        I50.23 Acute on chronic systolic (congestive) heart failure        2. Acute Respiratory Failure -  Currently intubated. Chest x-ray shows right basal opacity consistent with atelectasis versus pneumonia. Evidence  of pulmonary edema pattern and possible aspiration pneumonia with mucus plugging.  J96.00 Acute respiratory failure, unspecified whether with hypoxia or hypercapnia      3. Acute on Chronic Kidney Disease -    Creatinine elevated at 3.71. Acute renal failure superimposed on chronic kidney disease. Rhabdomyolysis (CPK 9,315) may be contributing to acute kidney injury.        N17.9 Acute kidney failure, unspecified        4. Demand Ischemia -  Troponin mildly elevated at 79, down from 197 four days ago. ECG with sinus tachycardia and occasional PVCs. Findings consistent with demand ischemia.        I24.8 Other forms of acute ischemic heart disease        Discussion Notes    Plan as above.    Thank you for the consult

## 2025-05-28 NOTE — PHYSICAL THERAPY NOTE
Chart reviewed for PT treatment. Per chart pt is on full vent support/medical decline. Patient will be placed on hold, please re-order PT when medically appropriate.

## 2025-05-28 NOTE — PLAN OF CARE
Patient was saturating in the mid 80's and complaining of difficulty breathing, patient was intubated at 2110. OG was inserted and clamped. Patient started on propofol for sedation.    Problem: Patient Centered Care  Goal: Patient preferences are identified and integrated in the patient's plan of care  Description: Interventions:  - What would you like us to know as we care for you?  - Provide timely, complete, and accurate information to patient/family  - Incorporate patient and family knowledge, values, beliefs, and cultural backgrounds into the planning and delivery of care  - Encourage patient/family to participate in care and decision-making at the level they choose  - Honor patient and family perspectives and choices  Outcome: Not Progressing     Problem: PAIN - ADULT  Goal: Verbalizes/displays adequate comfort level or patient's stated pain goal  Description: INTERVENTIONS:  - Encourage pt to monitor pain and request assistance  - Assess pain using appropriate pain scale  - Administer analgesics based on type and severity of pain and evaluate response  - Implement non-pharmacological measures as appropriate and evaluate response  - Consider cultural and social influences on pain and pain management  - Manage/alleviate anxiety  - Utilize distraction and/or relaxation techniques  - Monitor for opioid side effects  - Notify MD/LIP if interventions unsuccessful or patient reports new pain  - Anticipate increased pain with activity and pre-medicate as appropriate  Outcome: Not Progressing     Problem: SAFETY ADULT - FALL  Goal: Free from fall injury  Description: INTERVENTIONS:  - Assess pt frequently for physical needs  - Identify cognitive and physical deficits and behaviors that affect risk of falls.  - [place] fall precautions as indicated by assessment.  - Educate pt/family on patient safety including physical limitations  - Instruct pt to call for assistance with activity based on assessment  - Modify  environment to reduce risk of injury  - Provide assistive devices as appropriate  - Consider OT/PT consult to assist with strengthening/mobility  - Encourage toileting schedule  Outcome: Not Progressing     Problem: DISCHARGE PLANNING  Goal: Discharge to home or other facility with appropriate resources  Description: INTERVENTIONS:  - Identify barriers to discharge w/pt and caregiver  - Include patient/family/discharge partner in discharge planning  - Arrange for needed discharge resources and transportation as appropriate  - Identify discharge learning needs (meds, wound care, etc)  - Arrange for interpreters to assist at discharge as needed  - Consider post-discharge preferences of patient/family/discharge partner  - Complete POLST form as appropriate  - Assess patient's ability to be responsible for managing their own health  - Refer to Case Management Department for coordinating discharge planning if the patient needs post-hospital services based on physician/LIP order or complex needs related to functional status, cognitive ability or social support system  Outcome: Not Progressing     Problem: SKIN/TISSUE INTEGRITY - ADULT  Goal: Skin integrity remains intact  Description: INTERVENTIONS  - Assess and document risk factors for pressure ulcer development  - Assess and document skin integrity  - Monitor for areas of redness and/or skin breakdown  - Initiate interventions, skin care algorithm/standards of care as needed  Outcome: Not Progressing     Problem: RESPIRATORY - ADULT  Goal: Achieves optimal ventilation and oxygenation  Description: INTERVENTIONS:  - Assess for changes in respiratory status  - Assess for changes in mentation and behavior  - Position to facilitate oxygenation and minimize respiratory effort  - Oxygen supplementation based on oxygen saturation or ABGs  - Provide Smoking Cessation handout, if applicable  - Encourage broncho-pulmonary hygiene including cough, deep breathe, Incentive  Spirometry  - Assess the need for suctioning and perform as needed  - Assess and instruct to report SOB or any respiratory difficulty  - Respiratory Therapy support as indicated  - Manage/alleviate anxiety  - Monitor for signs/symptoms of CO2 retention  Outcome: Not Progressing     Problem: GENITOURINARY - ADULT  Goal: Absence of urinary retention  Description: INTERVENTIONS:  - Assess patient’s ability to void and empty bladder  - Monitor intake/output and perform bladder scan as needed  - Follow urinary retention protocol/standard of care  - Consider collaborating with pharmacy to review patient's medication profile  - Implement strategies to promote bladder emptying  Outcome: Not Progressing     Problem: METABOLIC/FLUID AND ELECTROLYTES - ADULT  Goal: Glucose maintained within prescribed range  Description: INTERVENTIONS:  - Monitor Blood Glucose as ordered  - Assess for signs and symptoms of hyperglycemia and hypoglycemia  - Administer ordered medications to maintain glucose within target range  - Assess barriers to adequate nutritional intake and initiate nutrition consult as needed  - Instruct patient on self management of diabetes  Outcome: Not Progressing  Goal: Electrolytes maintained within normal limits  Description: INTERVENTIONS:  - Monitor labs and rhythm and assess patient for signs and symptoms of electrolyte imbalances  - Administer electrolyte replacement as ordered  - Monitor response to electrolyte replacements, including rhythm and repeat lab results as appropriate  - Fluid restriction as ordered  - Instruct patient on fluid and nutrition restrictions as appropriate  Outcome: Not Progressing  Goal: Hemodynamic stability and optimal renal function maintained  Description: INTERVENTIONS:  - Monitor labs and assess for signs and symptoms of volume excess or deficit  - Monitor intake, output and patient weight  - Monitor urine specific gravity, serum osmolarity and serum sodium as indicated or  ordered  - Monitor response to interventions for patient's volume status, including labs, urine output, blood pressure (other measures as available)  - Encourage oral intake as appropriate  - Instruct patient on fluid and nutrition restrictions as appropriate  Outcome: Not Progressing     Problem: HEMATOLOGIC - ADULT  Goal: Free from bleeding injury  Description: (Example usage: patient with low platelets)  INTERVENTIONS:  - Avoid intramuscular injections, enemas and rectal medication administration  - Ensure safe mobilization of patient  - Hold pressure on venipuncture sites to achieve adequate hemostasis  - Assess for signs and symptoms of internal bleeding  - Monitor lab trends  - Patient is to report abnormal signs of bleeding to staff  - Avoid use of toothpicks and dental floss  - Use electric shaver for shaving  - Use soft bristle tooth brush  - Limit straining and forceful nose blowing  Outcome: Not Progressing  Goal: Maintains hematologic stability  Description: INTERVENTIONS  - Assess for signs and symptoms of bleeding or hemorrhage  - Monitor labs and vital signs for trends  - Administer supportive blood products/factors, fluids and medications as ordered and appropriate  - Administer supportive blood products/factors as ordered and appropriate  Outcome: Not Progressing  Goal: Free from bleeding injury  Description: (Example usage: patient with low platelets)  INTERVENTIONS:  - Avoid intramuscular injections, enemas and rectal medication administration  - Ensure safe mobilization of patient  - Hold pressure on venipuncture sites to achieve adequate hemostasis  - Assess for signs and symptoms of internal bleeding  - Monitor lab trends  - Patient is to report abnormal signs of bleeding to staff  - Avoid use of toothpicks and dental floss  - Use electric shaver for shaving  - Use soft bristle tooth brush  - Limit straining and forceful nose blowing  Outcome: Not Progressing     Problem: Diabetes/Glucose  Control  Goal: Glucose maintained within prescribed range  Description: INTERVENTIONS:  - Monitor Blood Glucose as ordered  - Assess for signs and symptoms of hyperglycemia and hypoglycemia  - Administer ordered medications to maintain glucose within target range  - Assess barriers to adequate nutritional intake and initiate nutrition consult as needed  - Instruct patient on self management of diabetes  Outcome: Not Progressing     Problem: Delirium  Goal: Minimize duration of delirium  Description: Interventions:  - Encourage use of hearing aids, eye glasses  - Promote highest level of mobility daily  - Provide frequent reorientation  - Promote wakefulness i.e. lights on, blinds open  - Promote sleep, encourage patient's normal rest cycle i.e. lights off, TV off, minimize noise and interruptions  - Encourage family to assist in orientation and promotion of home routines  Outcome: Not Progressing     Problem: GASTROINTESTINAL - ADULT  Goal: Minimal or absence of nausea and vomiting  Description: INTERVENTIONS:  - Maintain adequate hydration with IV or PO as ordered and tolerated  - Nasogastric tube to low intermittent suction as ordered  - Evaluate effectiveness of ordered antiemetic medications  - Provide nonpharmacologic comfort measures as appropriate  - Advance diet as tolerated, if ordered  - Obtain nutritional consult as needed  - Evaluate fluid balance  Outcome: Not Progressing  Goal: Maintains or returns to baseline bowel function  Description: INTERVENTIONS:  - Assess bowel function  - Maintain adequate hydration with IV or PO as ordered and tolerated  - Evaluate effectiveness of GI medications  - Encourage mobilization and activity  - Obtain nutritional consult as needed  - Establish a toileting routine/schedule  - Consider collaborating with pharmacy to review patient's medication profile  Outcome: Not Progressing     Problem: Safety Risk - Non-Violent Restraints  Goal: Patient will remain free from  self-harm  Description: INTERVENTIONS:  - Apply the least restrictive restraint to prevent harm  - Notify patient and family of reasons restraints applied  - Assess for any contributing factors to confusion (electrolyte disturbances, delirium, medications)  - Discontinue any unnecessary medical devices as soon as possible  - Assess the patient's physical comfort, circulation, skin condition, hydration, nutrition and elimination needs   - Reorient and redirection as needed  - Assess for the need to continue restraints  5/28/2025 0358 by Ivis Rodríguez, RN  Outcome: Not Progressing  5/27/2025 2305 by Ivis Rodríguez, RN  Outcome: Not Progressing

## 2025-05-28 NOTE — PLAN OF CARE
Restraints started for patient safety.    Problem: Safety Risk - Non-Violent Restraints  Goal: Patient will remain free from self-harm  Description: INTERVENTIONS:  - Apply the least restrictive restraint to prevent harm  - Notify patient and family of reasons restraints applied  - Assess for any contributing factors to confusion (electrolyte disturbances, delirium, medications)  - Discontinue any unnecessary medical devices as soon as possible  - Assess the patient's physical comfort, circulation, skin condition, hydration, nutrition and elimination needs   - Reorient and redirection as needed  - Assess for the need to continue restraints  Outcome: Not Progressing

## 2025-05-28 NOTE — DIETARY NOTE
ADULT NUTRITION UPDATE NOTE    Pt is at high nutrition risk.  Pt does not meet malnutrition criteria.      RECOMMENDATIONS TO MD: See nutrition intervention for Enteral Nutrition (EN) rx. No BM since 5/15 on stool softeners and 5/21 s/p miralax. Will advise RN rx PRN meds. For constipation.     ADMITTING DIAGNOSIS:  Transaminitis [R74.01]  Non-traumatic rhabdomyolysis [M62.82]  Acute renal failure superimposed on chronic kidney disease, unspecified acute renal failure type, unspecified CKD stage [N17.9, N18.9]  PERTINENT PAST MEDICAL HISTORY: Past Medical History[1]    PATIENT STATUS:   Initial 05/20/25: Pt assessed due to verbal RN consult for poor appetite and PO intake. Pt presented to hospital s/p recurrent falls. Has PMH as listed above including 2-year h/o recurrent falls with previous evaluation at [facility] where falls were linked to hyponatremia. Per H&P pt reported poor appetite/intake for several days PTA however screened at no risk by RN on initial MST. Found to have SUSAN and rhabdomyolysis with emergent HD initiated on 5/19. Transferred to the CCU with hypotension. Stable on NC with 3L O2. Off pressor support early this AM. Pt sitting up in bed working on lunch tray, <25% consumed. Appetite remains poor. Reports she consumed the 10am Mighty Shake however felt the chocolate was too rich for her. Will trial alternative flavors today. See diet hx below. Pt reports typical wt on home standing scale ~220 lbs.    5/26/2025 Update:       Po intake remains poor, average meal intake 23% with per recorded ONS intake of some 50-75% Mighty shake supplements. Poor appetite vs CPAP. At re-visit, pt states she is very thirsty, asked RN for help and RD requested pt to alternately drink/sip water and Mighty shake supplements--pt agreed. Pt took 80% at 11 am and 50% at 2 pm with RN.  Attempted Swallow eval but pt on CPAP. Bowel regimen in place for constipation. HD yesterday with 2200 ml fluid removal.    Unable to add Mvi po  daily as these contains K. Encouraged pt to continue to increase po as feasible but if oral and ONS intake remains suboptimal to meet daily nutrition needs, consider temporary Enteral nutrition.   5/27/25 UPDATE: Overall 8 days poor nutrition intake. Pt is lethargic, unable to tolerate off BiPaP. S/p SLP BSE and dysphagia diet recommended.  Discussed at rounds and recommend tube feeds-orders received. Worsening renal function (SUSAN on CKD)  with noted hyperkalemia- will utilize renal failure and fiber enriched formula. Continue po diet and will adjust EN pending po intake contribution. Begin goal EN to 75% needs.   5/28/25 UPDATE: Tolerating tube feeds well at goal nutrition. Noted pt started on Levothyroxine (need to hold tf/guidelines) and still no BM. Pt newly intubtated 5/27 and sedated on Propofol providing ~ 250 kcals via lipids. Will adjust tube feeding accordingly- orders adjusted. RN alerted to rx constipation using prn meds.   FOOD/NUTRITION RELATED HISTORY:  Appetite: poor appetite PTA  Intake:  5/26 Po intake remains poor, average meal intake 23% with per recorded ONS intake of some 50-75% Mighty shake supplements. Poor appetite vs CPAP. Later, Pt took 80% at 11 am and 50% at 2 pm with RN.  Intake Meeting Needs: Poor to marginal ,   Percent Meals Eaten (last 6 days)       Date/Time Percent Meals Eaten (%)    05/22/25 1000 90 %     Percent Meals Eaten (%): oatmeal at 05/22/25 1000    05/23/25 1100 0 %     Percent Meals Eaten (%): pt refused breakfast. at 05/23/25 1100    05/23/25 1752 0 %     Percent Meals Eaten (%): pt has poor appetite at 05/23/25 1752    05/25/25 0800 25 %    05/25/25 2000 25 %    05/26/25 0914 0 %    05/26/25 1454 10 %    05/26/25 1832 10 %     Percent Meals Eaten (%): Pt ate several bites of mac & cheese before desatting; at 05/26/25 1832        Food Allergies: No Known Food Allergies (NKFA)  Cultural/Ethnic/Anglican Preferences: Not Obtained    GASTROINTESTINAL: +BM last reported on  5/15, constipation, Loss of appetite, Monitor swallow eval, and abdomen is soft, round, with active bowel sounds  UO: 3000 ml fluid removed from HD on 5/27   I/Os: +5.4.0L total this admission    MEDICATIONS: reviewed. Noted levothyroxine in am- hold tf accordingly.    dextrose 10%      propofol 20 mcg/kg/min (05/28/25 1400)      midodrine  10 mg Per G Tube TID    busPIRone HCl  30 mg Per G Tube TID    chlorproMAZINE  25 mg Per G Tube QID    QUEtiapine  50 mg Per G Tube BID    levothyroxine  150 mcg Per G Tube Before breakfast    senna  10 mL Per NG Tube BID    docusate  100 mg Per NG Tube BID    chlorhexidine gluconate  15 mL Mouth/Throat BID@0800,2000    mineral oil-white petrolatum  1 Application Both Eyes Nightly    famotidine  20 mg Intravenous Daily    ipratropium-albuterol  3 mL Nebulization TID    [START ON 5/29/2025] methylPREDNISolone  40 mg Intravenous Daily    insulin regular human  1-5 Units Subcutaneous 4 times per day    budesonide  0.5 mg Nebulization 2 times daily    arformoterol  15 mcg Nebulization 2 times daily    acetylcysteine  2 mL Nebulization BID    ampicillin-sulbactam  1.5 g Intravenous q12h    DULoxetine  60 mg Oral BID    heparin  5,000 Units Subcutaneous 2 times per day    aspirin  81 mg Oral Daily    [Held by provider] metoprolol succinate ER  50 mg Oral BID    QUEtiapine ER  300 mg Oral Nightly   PRN meds: PRN Medications[2]    LABS: reviewed. Mild hyponatremia. K+ wnl on renal formula. /Tube feeding.     Recent Labs     05/26/25  0358 05/27/25  0428 05/28/25  0419   * 112* 132*   BUN 18 38* 29*   CREATSERUM 3.34* 4.76* 3.71*   CA 8.3* 8.6* 8.9   MG 2.1  --  2.2   * 134* 135*   K 4.6 5.9* 4.8    100 100   CO2 28.0 28.0 27.0   PHOS  --   --  4.3   OSMOCALC 283 288 288       WEIGHT HISTORY:  Patient Weight(s) for the past 336 hrs:   Weight   05/28/25 0500 106.2 kg (234 lb 2.1 oz)   05/26/25 0526 105.7 kg (233 lb 0.4 oz)   05/25/25 0500 106.6 kg (235 lb 0.2 oz)    05/24/25 1000 107 kg (235 lb 14.3 oz)   05/24/25 0627 104.8 kg (231 lb 1.6 oz)   05/22/25 1300 96.1 kg (211 lb 13.8 oz)   05/21/25 0100 92.7 kg (204 lb 5.9 oz)   05/20/25 0700 96.6 kg (213 lb)   05/19/25 0734 97.5 kg (214 lb 15.2 oz)   05/17/25 1419 96.4 kg (212 lb 8.4 oz)   05/17/25 1002 84 kg (185 lb 3 oz)     Wt Readings from Last 10 Encounters:   05/28/25 106.2 kg (234 lb 2.1 oz)   10/09/23 84 kg (185 lb 3 oz)   10/04/23 83.9 kg (185 lb)   06/12/16 49.9 kg (110 lb)     ANTHROPOMETRICS:  HT: 160 cm (5' 3\")  Wt Readings from Last 1 Encounters:   05/28/25 106.2 kg (234 lb 2.1 oz)   Last weight: Fluid changes noted   5/17: 213#  Dosing Weight: 97 kg (213 lbs) - admission weight on 5/20/25, utilized for anthropometric calculations  BMI: Body mass index is 37.73 kg/m².  BMI CLASSIFICATION: 35-39.9 kg/m2 - obesity class II  IBW/lbs (Calculated) Female: 115 lbs           185% IBW  Usual Body Wt: 220 lbs       97% UBW    NUTRITION RELATED PHYSICAL FINDINGS:  - Nutrition Focused Physical Exam (NFPE): well nourished and no wasting noted  - Fluid Accumulation: +1 edema on multiple region of the body. See RN documentation for details  - Skin Integrity: at risk. See RN documentation for details    NUTRITION DIAGNOSIS/PROBLEM:   Inadequate oral intake related to Decreased ability to consume sufficient energy as evidenced by diet hx decreased PO x2 wks PTA and poor appetite/intake since admission x3 days.    Nutrition Diagnosis Progress:  Improvement on tube feeds to goal . (unresolved)    NUTRITION INTERVENTION:     NUTRITION PRESCRIPTION:   Estimated Nutrition needs: --dosing wt of 97 kg - wt taken on 5/20/25   Calories: 9272-8088 calories/day (15-20 calories per kg Dosing wt) [place] equation for intubated pt: 1515 kcals (MV 6.1, T-max 37 C)   Protein: 63-78 g protein/day (1.2-1.5 g protein/kg Ideal body wt (IBW) (52.3 kg))  Fluid Needs: 1585 ml/day (25 ml/kg adjusted BW for obesity (63.4 kg) chronological age method) -  adjust for clinical status (SUSAN on HD)  - Enteral Nutrition: Nepro goal 30 ml/hr x ave 21 hr infusion time (held for Levothyroxine), Prosource 1 pack/day as med flush. FWF: 30 ml q 4 hrs (180 ml). This provides pt ~ 1174 kcals  (1424 total kcals including lipid/Propofol) (98% goal kcals), 71 g protein (100% goal) , 504 ml h20, and  684 ml total h20 including FWF, 67% RDI's.   - Diet:       Procedures    NPO   -     - Nutrition Care Plan: EN support. Prior had Encouraged increased PO intake, Requested  with ordering meals, tray set up and/or feeding as needed, and recommended rx for constipation.   - ONS (Oral Nutrition Supplements)/Meals/Snacks: NPO  - Vitamin and mineral supplements: none. Will add daily renal vitamin supplement to assure >100% RDI's   - Feeding assistance: NPO (once eating: meal set up and assistance with menu selection and encouragement at meal times)  - Nutrition education: Prior to intubation: Discussed importance of adequate energy and protein intake; encouraged ONS intake; encouraged smaller, more frequent meals  - Coordination of nutrition care: collaboration with other providers and discussed in Care Rounds   - Discharge and transfer of nutrition care to new setting or provider: monitor plans - from home alone      MONITOR AND EVALUATE/NUTRITION GOALS:  - Food and Nutrient Intake:    Monitor: adequacy of po intake, for ability to resume ONS.   - Food and Nutrient Administration:    Monitor EN adequacy, tolerance, need to adjust pending po intake.   - Anthropometric Measurement:    Monitor weight  - Nutrition Goals:    allow wt loss due to fluid losses, EN + non-nutritive kcal >80% energy needs, good supplement intake, labs within acceptable limits, minimize lean body mass loss, prevent skin breakdown, maintain true wt within 5%, and improved GI status    RD will follow.     Megan Palomares RD, LDN, CNSC ([id number])               [1]   Past Medical History:   Essential hypertension     Heart attack (HCC)    Hyperlipidemia    Thyroid disease   [2]   diazePAM    fentaNYL    glucose **OR** glucose **OR** glucose-vitamin C **OR** dextrose **OR** glucose **OR** glucose **OR** glucose-vitamin C    dextrose 10%    sodium bicarbonate **AND** lipase-protease-amylase (Lip-Prot-Amyl)    oxidized cellulose    HYDROcodone-acetaminophen    ondansetron    polyethylene glycol (PEG 3350)    magnesium hydroxide    bisacodyl    fleet enema    heparin    albuterol    acetaminophen

## 2025-05-29 ENCOUNTER — APPOINTMENT (OUTPATIENT)
Dept: GENERAL RADIOLOGY | Facility: HOSPITAL | Age: 65
End: 2025-05-29
Attending: NURSE PRACTITIONER
Payer: MEDICAID

## 2025-05-29 ENCOUNTER — APPOINTMENT (OUTPATIENT)
Dept: GENERAL RADIOLOGY | Facility: HOSPITAL | Age: 65
End: 2025-05-29
Attending: INTERNAL MEDICINE
Payer: MEDICAID

## 2025-05-29 LAB
ANION GAP SERPL CALC-SCNC: 10 MMOL/L (ref 0–18)
BASOPHILS # BLD AUTO: 0.06 X10(3) UL (ref 0–0.2)
BASOPHILS NFR BLD AUTO: 0.6 %
BUN BLD-MCNC: 17 MG/DL (ref 9–23)
BUN/CREAT SERPL: 8.9 (ref 10–20)
CALCIUM BLD-MCNC: 8.4 MG/DL (ref 8.7–10.4)
CHLORIDE SERPL-SCNC: 104 MMOL/L (ref 98–112)
CO2 SERPL-SCNC: 27 MMOL/L (ref 21–32)
CREAT BLD-MCNC: 1.91 MG/DL (ref 0.55–1.02)
DEPRECATED RDW RBC AUTO: 55.8 FL (ref 35.1–46.3)
EGFRCR SERPLBLD CKD-EPI 2021: 29 ML/MIN/1.73M2 (ref 60–?)
EOSINOPHIL # BLD AUTO: 0.21 X10(3) UL (ref 0–0.7)
EOSINOPHIL NFR BLD AUTO: 2.2 %
ERYTHROCYTE [DISTWIDTH] IN BLOOD BY AUTOMATED COUNT: 16.5 % (ref 11–15)
GLUCOSE BLD-MCNC: 114 MG/DL (ref 70–99)
GLUCOSE BLDC GLUCOMTR-MCNC: 131 MG/DL (ref 70–99)
GLUCOSE BLDC GLUCOMTR-MCNC: 149 MG/DL (ref 70–99)
GLUCOSE BLDC GLUCOMTR-MCNC: 150 MG/DL (ref 70–99)
GLUCOSE BLDC GLUCOMTR-MCNC: 98 MG/DL (ref 70–99)
HCT VFR BLD AUTO: 38.1 % (ref 35–48)
HGB BLD-MCNC: 12 G/DL (ref 12–16)
IMM GRANULOCYTES # BLD AUTO: 0.05 X10(3) UL (ref 0–1)
IMM GRANULOCYTES NFR BLD: 0.5 %
LYMPHOCYTES # BLD AUTO: 1.22 X10(3) UL (ref 1–4)
LYMPHOCYTES NFR BLD AUTO: 12.6 %
MCH RBC QN AUTO: 29.4 PG (ref 26–34)
MCHC RBC AUTO-ENTMCNC: 31.5 G/DL (ref 31–37)
MCV RBC AUTO: 93.4 FL (ref 80–100)
MONOCYTES # BLD AUTO: 0.44 X10(3) UL (ref 0.1–1)
MONOCYTES NFR BLD AUTO: 4.6 %
NEUTROPHILS # BLD AUTO: 7.67 X10 (3) UL (ref 1.5–7.7)
NEUTROPHILS # BLD AUTO: 7.67 X10(3) UL (ref 1.5–7.7)
NEUTROPHILS NFR BLD AUTO: 79.5 %
OSMOLALITY SERPL CALC.SUM OF ELEC: 294 MOSM/KG (ref 275–295)
PLATELET # BLD AUTO: 161 10(3)UL (ref 150–450)
PLATELETS.RETICULATED NFR BLD AUTO: 5.4 % (ref 0–7)
POTASSIUM SERPL-SCNC: 4.4 MMOL/L (ref 3.5–5.1)
RBC # BLD AUTO: 4.08 X10(6)UL (ref 3.8–5.3)
SODIUM SERPL-SCNC: 141 MMOL/L (ref 136–145)
WBC # BLD AUTO: 9.7 X10(3) UL (ref 4–11)

## 2025-05-29 PROCEDURE — 99291 CRITICAL CARE FIRST HOUR: CPT | Performed by: INTERNAL MEDICINE

## 2025-05-29 PROCEDURE — 71045 X-RAY EXAM CHEST 1 VIEW: CPT | Performed by: INTERNAL MEDICINE

## 2025-05-29 RX ORDER — ALBUMIN (HUMAN) 12.5 G/50ML
25 SOLUTION INTRAVENOUS
Status: COMPLETED | OUTPATIENT
Start: 2025-05-29 | End: 2025-05-31

## 2025-05-29 NOTE — PROGRESS NOTES
Patient seen in follow up. Still intubated, but awake and responds with nodding head. Denies any cp.   Limited ROS due to ETT    Vitals:    05/29/25 1000   BP: 110/64   Pulse: 94   Resp: 23   Temp:        Intake/Output Summary (Last 24 hours) at 5/29/2025 1012  Last data filed at 5/29/2025 1000  Gross per 24 hour   Intake 1312.3 ml   Output 1088 ml   Net 224.3 ml     Wt Readings from Last 1 Encounters:   05/29/25 234 lb 9.1 oz (106.4 kg)        General: +ETT, +TF  Neck: Jugular venous pulsations not seen.  Lungs: Clear to auscultation.  Heart: RRR  Abdomen: Soft. Non tender  Extremities: mild edema.  Neurological: Alert. No focal deficits.  Psychiatric: cannot assess  Current Hospital Medications[1]  Prescriptions Prior to Admission[2]  XR CHEST AP PORTABLE  (CPT=71045)  Result Date: 5/29/2025  PROCEDURE: XR CHEST AP PORTABLE  (CPT=71045) TIME: 6:22 a.m..   COMPARISON: [facility], XR CHEST AP PORTABLE (CPT=71045), 5/28/2025, 6:36 AM.  INDICATIONS: Progress follow up.  TECHNIQUE:   Single view.   FINDINGS/IMPRESSION:   1. There is an endotracheal tube with tip approximately 5.5 cm above the silverio.  The thoracic component of an enteric feeding tube is identified traversing the thoracic esophagus.  The distal tip is not visualized but lies below the GE junction.  There is a right IJ PermCath with tips projecting over the cavoatrial junction.  2. The heart mediastinal structures are minimally enlarged.  Pulmonary vascularity/interstitial markings are slightly increased.  There are small bilateral pleural effusions.  The findings are compatible with slight to moderate interstitial edema.  3. Lung volumes are diminished.  There are small opacities at the lung bases which could represent atelectasis, infiltrate, or a combination in addition to small pleural effusions.     Dictated by (CST): Jordi River MD on 5/29/2025 at 7:25 AM     Finalized by (CST): Jordi River MD on 5/29/2025 at 7:30 AM           CT CHEST (CPT=71250)  Result Date: 5/28/2025  CONCLUSION:  1. Near complete consolidation of the right middle lobe with additional dependent airspace disease/consolidation in the left lower lobe and less pronounced patchy consolidations of the right lower lobe.  These findings are new since prior 5/17/2025 abdominal CT and most compatible with multifocal pneumonia/aspiration.  Follow-up to resolution is advised. 2. Other right upper lobe predominant ground-glass density opacities throughout the lungs are probably infectious (or less likely relate to asymmetric pulmonary edema).  These can be reassessed on anticipated follow-up imaging to ensure resolution. 3. Cardiomegaly with multi-vessel coronary artery calcification. 4. Dilatation of the main pulmonary artery trunk may relate to underlying pulmonary hypertension. 5. Endotracheal tube, right chest port, and enteric tube all appear well positioned by CT. 6. Large heterogeneous density 4.7 cm partially calcified left adrenal mass is unchanged.  Smaller well-circumscribed 1.5 cm right adrenal nodule is also unchanged. 7. Stable chronic T11 and T12 vertebral body compression fractures. 8. Lesser incidental findings as above.   Dictated by (CST): Christiano Basilio MD on 5/28/2025 at 8:02 AM     Finalized by (CST): Christiano Basilio MD on 5/28/2025 at 8:12 AM          XR CHEST AP PORTABLE  (CPT=71045)  Result Date: 5/28/2025  CONCLUSION:   Right basilar opacity which could reflect atelectasis with or without superimposed pneumonia.    Dictated by (CST): Randal Wilkerson MD on 5/28/2025 at 7:33 AM     Finalized by (CST): Randal Wilkerson MD on 5/28/2025 at 7:35 AM          XR CHEST AP PORTABLE  (CPT=71045)  Result Date: 5/27/2025  CONCLUSION:  1. ET tube in satisfactory position with tip 2.6 cm above silverio. 2. Dialysis catheter remains with tip near RA SVC junction. 3. NG tube tip at least to distal esophagus.  Tip extends beyond the field of view. 4. CHF/fluid  overload without significant change.  5. Left basilar pleural effusion with poor aeration in left lower lung.    Dictated by (CST): Javi Garcia MD on 5/27/2025 at 10:11 PM     Finalized by (CST): Javi Garcia MD on 5/27/2025 at 10:13 PM          XR CHEST AP PORTABLE  (CPT=71045)  Result Date: 5/27/2025  CONCLUSION:  1. CHF/fluid overload with left basilar pleural effusion. 2. No finding to account for the lack of ventilation in the left lung on the recent V/Q scan.  Findings suggest a large mucus plug which is no longer present.    Dictated by (CST): Javi Garcia MD on 5/27/2025 at 4:58 PM     Finalized by (CST): Javi Garcia MD on 5/27/2025 at 5:00 PM          NM LUNG VQ VENT / PERFUSION SCAN  (CPT=78582)  Result Date: 5/27/2025  CONCLUSION:  1. No pulmonary embolus. 2. No perfusion in the left lung suggests airway obstruction probably from a large mucus plug.  Updated chest x-ray follow-up recommended.  Findings were called to Dr. Reynaga.    Dictated by (CST): Javi Garcia MD on 5/27/2025 at 3:34 PM     Finalized by (CST): Javi Garcia MD on 5/27/2025 at 3:43 PM            Lab Results   Component Value Date    WBC 9.7 05/29/2025    HGB 12.0 05/29/2025    HCT 38.1 05/29/2025    .0 05/29/2025    CREATSERUM 1.91 05/29/2025    BUN 17 05/29/2025     05/29/2025    K 4.4 05/29/2025     05/29/2025    CO2 27.0 05/29/2025     05/29/2025    CA 8.4 05/29/2025       Assessment / Plan  1. Acute on Chronic Systolic Heart Failure -  Ejection fraction 30-35% with apical wall akinesia. Previous EF 35-40% 2015. Will institute heart failure therapy with Carvedilol 3.125 mg BID and Entresto 24/26 mg BID.            I50.23 Acute on chronic systolic (congestive) heart failure           2. Acute Respiratory Failure -  Currently intubated. Chest x-ray shows right basal opacity consistent with atelectasis versus pneumonia. Evidence of pulmonary edema pattern and possible aspiration pneumonia with mucus  plugging. On abx  J96.00 Acute respiratory failure, unspecified whether with hypoxia or hypercapnia        3. Acute on Chronic Kidney Disease -     Creatinine improving.  On HD.           N17.9 Acute kidney failure, unspecified           4. Demand Ischemia -  Troponin mildly elevated at 79, down from 197 four days ago. ECG with sinus tachycardia and occasional PVCs. Findings consistent with demand ischemia.           I24.8 Other forms of acute ischemic heart disease     [address]  [address]  [place] [address]  Phone: [phone]  [email or web address]         [1]   Current Facility-Administered Medications   Medication Dose Route Frequency    albumin human (Albumin) 25% injection 25 g  25 g Intravenous PRN Dialysis    midodrine (ProAmatine) tab 10 mg  10 mg Per G Tube TID    busPIRone (Buspar) tab 30 mg  30 mg Per G Tube TID    chlorproMAZINE (Thorazine) tab 25 mg  25 mg Per G Tube QID    diazePAM (Valium) tab 5 mg  5 mg Per G Tube Q8H PRN    QUEtiapine (SEROquel) tab 50 mg  50 mg Per G Tube BID    levothyroxine (Synthroid) tab 150 mcg  150 mcg Per G Tube Before breakfast    fentaNYL (Sublimaze) 50 mcg/mL injection 50 mcg  50 mcg Intravenous Q30 Min PRN    senna (Senokot) 8.8 MG/5ML oral syrup 17.6 mg  10 mL Per NG Tube BID    docusate (Colace) 50 MG/5ML oral solution 100 mg  100 mg Per NG Tube BID    chlorhexidine gluconate (Peridex) 0.12 % oral solution 15 mL  15 mL Mouth/Throat BID@0800,2000    mineral oil-white petrolatum (Artificial Tears) 83-15 % ophthalmic ointment 1 Application  1 Application Both Eyes Nightly    famotidine (Pepcid) 20 mg/2mL injection 20 mg  20 mg Intravenous Daily    ipratropium-albuterol (Duoneb) 0.5-2.5 (3) MG/3ML inhalation solution 3 mL  3 mL Nebulization TID    methylPREDNISolone sodium succinate (Solu-MEDROL) injection 40 mg  40 mg Intravenous Daily    glucose (Dex4) 15 GM/59ML oral liquid 15 g  15 g Oral Q15 Min PRN    Or    glucose (Glutose) 40% oral gel 15 g  15 g  Oral Q15 Min PRN    Or    glucose-vitamin C (Dex-4) chewable tab 4 tablet  4 tablet Oral Q15 Min PRN    Or    dextrose 50% injection 50 mL  50 mL Intravenous Q15 Min PRN    Or    glucose (Dex4) 15 GM/59ML oral liquid 30 g  30 g Oral Q15 Min PRN    Or    glucose (Glutose) 40% oral gel 30 g  30 g Oral Q15 Min PRN    Or    glucose-vitamin C (Dex-4) chewable tab 8 tablet  8 tablet Oral Q15 Min PRN    insulin regular human (Novolin R, Humulin R) 100 UNIT/ML injection 1-5 Units  1-5 Units Subcutaneous 4 times per day    carvedilol (Coreg) tab 3.125 mg  3.125 mg Oral BID with meals    sacubitril-valsartan (Entresto) 24-26 MG per tab 1 tablet  1 tablet Oral BID    budesonide (Pulmicort) 0.5 MG/2ML nebulizer suspension 0.5 mg  0.5 mg Nebulization 2 times daily    arformoterol (Brovana) 15 MCG/2ML nebulizer solution 15 mcg  15 mcg Nebulization 2 times daily    dextrose 10% infusion (TPN no rate)   Intravenous Continuous PRN    sodium bicarbonate tab 650 mg  650 mg Per G Tube PRN    And    pancrelipase (Lip-Prot-Amyl) (Zenpep) DR particles cap 10,000 Units  10,000 Units Per G Tube PRN    oxidized cellulose (Surgical Snow) external sheet 2 Package  2 each Topical Once PRN    acetylcysteine (Mucomyst) 20 % nebulizer solution 2 mL  2 mL Nebulization BID    ampicillin-sulbactam (Unasyn) 1.5 g in sodium chloride 0.9% 100mL IVPB-MAYO  1.5 g Intravenous q12h    propofol (Diprivan) 10 mg/mL infusion premix  5-50 mcg/kg/min (Dosing Weight) Intravenous Continuous    DULoxetine (Cymbalta) DR cap 60 mg  60 mg Oral BID    HYDROcodone-acetaminophen (Norco) 5-325 MG per tab 1 tablet  1 tablet Oral Q6H PRN    ondansetron (Zofran) 4 MG/2ML injection 4 mg  4 mg Intravenous Q6H PRN    polyethylene glycol (PEG 3350) (Miralax) 17 g oral packet 17 g  17 g Oral Daily PRN    magnesium hydroxide (Milk of Magnesia) 400 MG/5ML oral suspension 30 mL  30 mL Oral Daily PRN    bisacodyl (Dulcolax) 10 MG rectal suppository 10 mg  10 mg Rectal Daily PRN     fleet enema (Fleet) rectal enema 133 mL  1 enema Rectal Once PRN    heparin (Porcine) 1000 UNIT/ML injection 2,000 Units  2,000 Units Intravenous PRN Dialysis    albuterol (Ventolin) (2.5 MG/3ML) 0.083% nebulizer solution 2.5 mg  2.5 mg Nebulization Q6H PRN    heparin (Porcine) 5000 UNIT/ML injection 5,000 Units  5,000 Units Subcutaneous 2 times per day    acetaminophen (Tylenol) tab 650 mg  650 mg Oral Q6H PRN    aspirin DR tab 81 mg  81 mg Oral Daily    QUEtiapine ER (SEROquel XR) 24 hr tab 300 mg  300 mg Oral Nightly   [2]   Medications Prior to Admission   Medication Sig    busPIRone HCl 30 MG Oral Tab Take 1 tablet (30 mg total) by mouth 3 (three) times daily.    chlorproMAZINE 25 MG Oral Tab Take 1 tablet (25 mg total) by mouth 4 (four) times daily.    diazePAM 5 MG Oral Tab Take 1 tablet (5 mg total) by mouth every 8 (eight) hours as needed for Anxiety.    DULoxetine 60 MG Oral Cap DR Particles Take 1 capsule (60 mg total) by mouth 2 (two) times daily.    metFORMIN  MG Oral Tablet 24 Hr Take 2 tablets (1,000 mg total) by mouth daily. (Patient taking differently: Take 2 tablets (1,000 mg total) by mouth 2 (two) times daily with meals.)    metoprolol succinate ER 50 MG Oral Tablet 24 Hr Take 1 tablet (50 mg total) by mouth 2 (two) times daily.    QUEtiapine  MG Oral Tablet 24 Hr Take 1 tablet (300 mg total) by mouth nightly.    QUEtiapine 50 MG Oral Tab Take 1 tablet (50 mg total) by mouth 2 (two) times daily.    rosuvastatin 40 MG Oral Tab Take 1 tablet (40 mg total) by mouth before bedtime.    lidocaine 5 % External Patch Place 1 patch onto the skin daily.    [] ibuprofen 600 MG Oral Tab Take 1 tablet (600 mg total) by mouth every 6 (six) hours as needed.    levothyroxine 150 MCG Oral Tab Take 1 tablet (150 mcg total) by mouth before breakfast. Give on an empty stomach. On Mon, Tue, Wed, Thur, Fri and Saturday    levothyroxine 75 MCG Oral Tab Take 1 tablet (75 mcg total) by mouth before  breakfast. On Sundays only    aspirin 81 MG Oral Tab EC Take 1 tablet (81 mg total) by mouth in the morning.    methylPREDNISolone 4 MG Oral Tablet Therapy Pack Take as directed on dose pack instructions (Patient not taking: Reported on 5/17/2025)

## 2025-05-29 NOTE — PLAN OF CARE
Problem: Safety Risk - Non-Violent Restraints  Goal: Patient will remain free from self-harm  Description: INTERVENTIONS:  - Apply the least restrictive restraint to prevent harm  - Notify patient and family of reasons restraints applied  - Assess for any contributing factors to confusion (electrolyte disturbances, delirium, medications)  - Discontinue any unnecessary medical devices as soon as possible  - Assess the patient's physical comfort, circulation, skin condition, hydration, nutrition and elimination needs   - Reorient and redirection as needed  - Assess for the need to continue restraints  5/29/2025 1754 by Terrence Baker RN  Outcome: Not Progressing     Problem: Patient Centered Care  Goal: Patient preferences are identified and integrated in the patient's plan of care  Description: Interventions:  - What would you like us to know as we care for you?   - Provide timely, complete, and accurate information to patient/family  - Incorporate patient and family knowledge, values, beliefs, and cultural backgrounds into the planning and delivery of care  - Encourage patient/family to participate in care and decision-making at the level they choose  - Honor patient and family perspectives and choices  5/29/2025 1754 by Terrence Baker RN  Outcome: Progressing       Problem: PAIN - ADULT  Goal: Verbalizes/displays adequate comfort level or patient's stated pain goal  Description: INTERVENTIONS:  - Encourage pt to monitor pain and request assistance  - Assess pain using appropriate pain scale  - Administer analgesics based on type and severity of pain and evaluate response  - Implement non-pharmacological measures as appropriate and evaluate response  - Consider cultural and social influences on pain and pain management  - Manage/alleviate anxiety  - Utilize distraction and/or relaxation techniques  - Monitor for opioid side effects  - Notify MD/LIP if interventions unsuccessful or patient reports new  pain  - Anticipate increased pain with activity and pre-medicate as appropriate  5/29/2025 1754 by Terrence Baker, RN  Outcome: Progressing       Problem: SAFETY ADULT - FALL  Goal: Free from fall injury  Description: INTERVENTIONS:  - Assess pt frequently for physical needs  - Identify cognitive and physical deficits and behaviors that affect risk of falls.  - [facility] fall precautions as indicated by assessment.  - Educate pt/family on patient safety including physical limitations  - Instruct pt to call for assistance with activity based on assessment  - Modify environment to reduce risk of injury  - Provide assistive devices as appropriate  - Consider OT/PT consult to assist with strengthening/mobility  - Encourage toileting schedule  5/29/2025 1754 by Terrence Baker, RN  Outcome: Progressing       Problem: DISCHARGE PLANNING  Goal: Discharge to home or other facility with appropriate resources  Description: INTERVENTIONS:  - Identify barriers to discharge w/pt and caregiver  - Include patient/family/discharge partner in discharge planning  - Arrange for needed discharge resources and transportation as appropriate  - Identify discharge learning needs (meds, wound care, etc)  - Arrange for interpreters to assist at discharge as needed  - Consider post-discharge preferences of patient/family/discharge partner  - Complete POLST form as appropriate  - Assess patient's ability to be responsible for managing their own health  - Refer to Case Management Department for coordinating discharge planning if the patient needs post-hospital services based on physician/LIP order or complex needs related to functional status, cognitive ability or social support system  5/29/2025 1754 by Terrence Baker, RN  Outcome: Progressing       Problem: SKIN/TISSUE INTEGRITY - ADULT  Goal: Skin integrity remains intact  Description: INTERVENTIONS  - Assess and document risk factors for pressure ulcer development  - Assess and  document skin integrity  - Monitor for areas of redness and/or skin breakdown  - Initiate interventions, skin care algorithm/standards of care as needed  5/29/2025 1754 by Terrence Baker RN  Outcome: Progressing  5/29/2025 1754 by Terrence Baker RN  Outcome: Progressing     Problem: RESPIRATORY - ADULT  Goal: Achieves optimal ventilation and oxygenation  Description: INTERVENTIONS:  - Assess for changes in respiratory status  - Assess for changes in mentation and behavior  - Position to facilitate oxygenation and minimize respiratory effort  - Oxygen supplementation based on oxygen saturation or ABGs  - Provide Smoking Cessation handout, if applicable  - Encourage broncho-pulmonary hygiene including cough, deep breathe, Incentive Spirometry  - Assess the need for suctioning and perform as needed  - Assess and instruct to report SOB or any respiratory difficulty  - Respiratory Therapy support as indicated  - Manage/alleviate anxiety  - Monitor for signs/symptoms of CO2 retention  5/29/2025 1754 by Terrence Baker RN  Outcome: Progressing       Problem: GENITOURINARY - ADULT  Goal: Absence of urinary retention  Description: INTERVENTIONS:  - Assess patient’s ability to void and empty bladder  - Monitor intake/output and perform bladder scan as needed  - Follow urinary retention protocol/standard of care  - Consider collaborating with pharmacy to review patient's medication profile  - Implement strategies to promote bladder emptying  5/29/2025 1754 by Terrence Baker RN  Outcome: Progressing    Problem: METABOLIC/FLUID AND ELECTROLYTES - ADULT  Goal: Glucose maintained within prescribed range  Description: INTERVENTIONS:  - Monitor Blood Glucose as ordered  - Assess for signs and symptoms of hyperglycemia and hypoglycemia  - Administer ordered medications to maintain glucose within target range  - Assess barriers to adequate nutritional intake and initiate nutrition consult as needed  - Instruct patient  on self management of diabetes  5/29/2025 1754 by Terrence Baker RN  Outcome: Progressing    Goal: Electrolytes maintained within normal limits  Description: INTERVENTIONS:  - Monitor labs and rhythm and assess patient for signs and symptoms of electrolyte imbalances  - Administer electrolyte replacement as ordered  - Monitor response to electrolyte replacements, including rhythm and repeat lab results as appropriate  - Fluid restriction as ordered  - Instruct patient on fluid and nutrition restrictions as appropriate  5/29/2025 1754 by Terrence Baker RN  Outcome: Progressing    Goal: Hemodynamic stability and optimal renal function maintained  Description: INTERVENTIONS:  - Monitor labs and assess for signs and symptoms of volume excess or deficit  - Monitor intake, output and patient weight  - Monitor urine specific gravity, serum osmolarity and serum sodium as indicated or ordered  - Monitor response to interventions for patient's volume status, including labs, urine output, blood pressure (other measures as available)  - Encourage oral intake as appropriate  - Instruct patient on fluid and nutrition restrictions as appropriate  5/29/2025 1754 by Terrence Baker RN  Outcome: Progressing       Problem: HEMATOLOGIC - ADULT  Goal: Free from bleeding injury  Description: (Example usage: patient with low platelets)  INTERVENTIONS:  - Avoid intramuscular injections, enemas and rectal medication administration  - Ensure safe mobilization of patient  - Hold pressure on venipuncture sites to achieve adequate hemostasis  - Assess for signs and symptoms of internal bleeding  - Monitor lab trends  - Patient is to report abnormal signs of bleeding to staff  - Avoid use of toothpicks and dental floss  - Use electric shaver for shaving  - Use soft bristle tooth brush  - Limit straining and forceful nose blowing  5/29/2025 1754 by Terrence Baker RN  Outcome: Progressing    Goal: Maintains hematologic  stability  Description: INTERVENTIONS  - Assess for signs and symptoms of bleeding or hemorrhage  - Monitor labs and vital signs for trends  - Administer supportive blood products/factors, fluids and medications as ordered and appropriate  - Administer supportive blood products/factors as ordered and appropriate  5/29/2025 1754 by Terrence Baekr RN  Outcome: Progressing  5/29/2025 1754 by Terrence Baker RN  Outcome: Progressing  Goal: Free from bleeding injury  Description: (Example usage: patient with low platelets)  INTERVENTIONS:  - Avoid intramuscular injections, enemas and rectal medication administration  - Ensure safe mobilization of patient  - Hold pressure on venipuncture sites to achieve adequate hemostasis  - Assess for signs and symptoms of internal bleeding  - Monitor lab trends  - Patient is to report abnormal signs of bleeding to staff  - Avoid use of toothpicks and dental floss  - Use electric shaver for shaving  - Use soft bristle tooth brush  - Limit straining and forceful nose blowing  5/29/2025 1754 by Terrence Baker RN  Outcome: Progressing       Problem: Diabetes/Glucose Control  Goal: Glucose maintained within prescribed range  Description: INTERVENTIONS:  - Monitor Blood Glucose as ordered  - Assess for signs and symptoms of hyperglycemia and hypoglycemia  - Administer ordered medications to maintain glucose within target range  - Assess barriers to adequate nutritional intake and initiate nutrition consult as needed  - Instruct patient on self management of diabetes  5/29/2025 1754 by Terrence Baker RN  Outcome: Progressing       Problem: Delirium  Goal: Minimize duration of delirium  Description: Interventions:  - Encourage use of hearing aids, eye glasses  - Promote highest level of mobility daily  - Provide frequent reorientation  - Promote wakefulness i.e. lights on, blinds open  - Promote sleep, encourage patient's normal rest cycle i.e. lights off, TV off, minimize  noise and interruptions  - Encourage family to assist in orientation and promotion of home routines  5/29/2025 1754 by Terrence Baker RN  Outcome: Progressing    Problem: GASTROINTESTINAL - ADULT  Goal: Minimal or absence of nausea and vomiting  Description: INTERVENTIONS:  - Maintain adequate hydration with IV or PO as ordered and tolerated  - Nasogastric tube to low intermittent suction as ordered  - Evaluate effectiveness of ordered antiemetic medications  - Provide nonpharmacologic comfort measures as appropriate  - Advance diet as tolerated, if ordered  - Obtain nutritional consult as needed  - Evaluate fluid balance  5/29/2025 1754 by Terrence Baker, RN  Outcome: Progressing    Goal: Maintains or returns to baseline bowel function  Description: INTERVENTIONS:  - Assess bowel function  - Maintain adequate hydration with IV or PO as ordered and tolerated  - Evaluate effectiveness of GI medications  - Encourage mobilization and activity  - Obtain nutritional consult as needed  - Establish a toileting routine/schedule  - Consider collaborating with pharmacy to review patient's medication profile  5/29/2025 1754 by Terrence Baker RN  Outcome: Progressing

## 2025-05-29 NOTE — PROGRESS NOTES
[facility]  part of [facility]    Nephrology Progress Note    Radha Winters Patient Status:  Inpatient    1960 MRN [id number]   Location [facility] 2W/SW Attending Fanny Majano MD   Hosp Day # 12 PCP JUAN MONTANEZ     Subjective:   Radha Winters is a(n) 64 year old female     Seen and examined in the ICU, intubated on 40% FIO2, on propofol drip, hemodynamically stable.    Objective:   Blood pressure 97/56, pulse 90, temperature 96.7 °F (35.9 °C), temperature source Temporal, resp. rate 18, height 5' 3\" (1.6 m), weight 234 lb 9.1 oz (106.4 kg), SpO2 93%.    General appearance:  Intubated, sedated  Pulmonary: clear to auscultation bilaterally  Cardiovascular: S1, S2 normal, no murmur, click, rub or gallop, regular rate and rhythm  Abdominal: soft, non-tender; bowel sounds normal; no masses,  no organomegaly  Extremities: +edema  Pulses: 2+ and symmetric  Neurologic: Unable to assess patient is in ICU sedated.    Results:    Lab Results   Component Value Date    WBC 9.7 2025    HGB 12.0 2025    HCT 38.1 2025    .0 2025    CREATSERUM 1.91 (H) 2025    BUN 17 2025     2025    K 4.4 2025     2025    CO2 27.0 2025     (H) 2025    CA 8.4 (L) 2025    ALB 3.8 2025    ALKPHO 185 (H) 2025    BILT 0.3 2025    TP 5.9 2025     (H) 2025    ALT 37 2025    TSH 0.528 10/10/2023    LIP 36 2025    DDIMER 1.77 (H) 2025    MG 2.2 2025    PHOS 4.3 2025    TROPHS 79 (HH) 2025    CK 9,315 () 2025    B12 581 10/10/2023       XR CHEST AP PORTABLE  (CPT=71045)  Result Date: 2025  PROCEDURE: XR CHEST AP PORTABLE  (CPT=71045) TIME: 6:22 a.m..   COMPARISON: [facility], XR CHEST AP PORTABLE (CPT=71045), 2025, 6:36 AM.  INDICATIONS: Progress follow up.  TECHNIQUE:   Single view.   FINDINGS/IMPRESSION:    1. There is an endotracheal tube with tip approximately 5.5 cm above the silverio.  The thoracic component of an enteric feeding tube is identified traversing the thoracic esophagus.  The distal tip is not visualized but lies below the GE junction.  There is a right IJ PermCath with tips projecting over the cavoatrial junction.  2. The heart mediastinal structures are minimally enlarged.  Pulmonary vascularity/interstitial markings are slightly increased.  There are small bilateral pleural effusions.  The findings are compatible with slight to moderate interstitial edema.  3. Lung volumes are diminished.  There are small opacities at the lung bases which could represent atelectasis, infiltrate, or a combination in addition to small pleural effusions.     Dictated by (CST): Jordi River MD on 5/29/2025 at 7:25 AM     Finalized by (CST): Jordi River MD on 5/29/2025 at 7:30 AM          CT CHEST (CPT=71250)  Result Date: 5/28/2025  CONCLUSION:  1. Near complete consolidation of the right middle lobe with additional dependent airspace disease/consolidation in the left lower lobe and less pronounced patchy consolidations of the right lower lobe.  These findings are new since prior 5/17/2025 abdominal CT and most compatible with multifocal pneumonia/aspiration.  Follow-up to resolution is advised. 2. Other right upper lobe predominant ground-glass density opacities throughout the lungs are probably infectious (or less likely relate to asymmetric pulmonary edema).  These can be reassessed on anticipated follow-up imaging to ensure resolution. 3. Cardiomegaly with multi-vessel coronary artery calcification. 4. Dilatation of the main pulmonary artery trunk may relate to underlying pulmonary hypertension. 5. Endotracheal tube, right chest port, and enteric tube all appear well positioned by CT. 6. Large heterogeneous density 4.7 cm partially calcified left adrenal mass is unchanged.  Smaller well-circumscribed 1.5 cm  right adrenal nodule is also unchanged. 7. Stable chronic T11 and T12 vertebral body compression fractures. 8. Lesser incidental findings as above.   Dictated by (CST): Christiano Basilio MD on 5/28/2025 at 8:02 AM     Finalized by (CST): Christiano Basilio MD on 5/28/2025 at 8:12 AM          XR CHEST AP PORTABLE  (CPT=71045)  Result Date: 5/28/2025  CONCLUSION:   Right basilar opacity which could reflect atelectasis with or without superimposed pneumonia.    Dictated by (CST): Randal Wilkerson MD on 5/28/2025 at 7:33 AM     Finalized by (CST): Randal Wilkerson MD on 5/28/2025 at 7:35 AM          XR CHEST AP PORTABLE  (CPT=71045)  Result Date: 5/27/2025  CONCLUSION:  1. ET tube in satisfactory position with tip 2.6 cm above silverio. 2. Dialysis catheter remains with tip near RA SVC junction. 3. NG tube tip at least to distal esophagus.  Tip extends beyond the field of view. 4. CHF/fluid overload without significant change.  5. Left basilar pleural effusion with poor aeration in left lower lung.    Dictated by (CST): Javi Garcia MD on 5/27/2025 at 10:11 PM     Finalized by (CST): Javi Garcia MD on 5/27/2025 at 10:13 PM          XR CHEST AP PORTABLE  (CPT=71045)  Result Date: 5/27/2025  CONCLUSION:  1. CHF/fluid overload with left basilar pleural effusion. 2. No finding to account for the lack of ventilation in the left lung on the recent V/Q scan.  Findings suggest a large mucus plug which is no longer present.    Dictated by (CST): Javi Garcia MD on 5/27/2025 at 4:58 PM     Finalized by (CST): Javi Garcia MD on 5/27/2025 at 5:00 PM          NM LUNG VQ VENT / PERFUSION SCAN  (CPT=78582)  Result Date: 5/27/2025  CONCLUSION:  1. No pulmonary embolus. 2. No perfusion in the left lung suggests airway obstruction probably from a large mucus plug.  Updated chest x-ray follow-up recommended.  Findings were called to Dr. Reynaga.    Dictated by (CST): Javi Garcia MD on 5/27/2025 at 3:34 PM     Finalized by (CST):  Javi Garcia MD on 5/27/2025 at 3:43 PM                Assessment & Plan:     Acute renal failure superimposed on chronic kidney disease, unspecified acute renal failure type, unspecified CKD stage        Non-traumatic rhabdomyolysis        Transaminitis  Patient is a 65 y/o female with PMH of HTN, dyslipidemia, CAD admitted after multiple falls, Nephrology consulted for Acute Kidney Injury      Acute Kidney Injury:  Likely secondary to ATN, from Rhabdomyolysis, continue aggressive volume resuscitation, 0.9  ml/hr, continue to trend BMP, check Uric acid, Phos, Urine studies  CKD stage 3b vs 4:  Secondary to HTN nephrosclerosis, check Phos, PTH, renal US  Transaminitis:  Continue to trend LFTs, will check abdominal US        Recommendations:  Acute Kidney Injury likely unresolved ATN  HD with 2-2.5 liters UF as tolerated  Albumin and Midodine for MAP goal >65      May 23, 2025  Patient is nonoliguric.  Renal function is severely deranged.  Has a tunneled dialysis catheter in place.  Will dialyze as needed for volume control.  Currently patient shows no signs of recovery of kidney function and likely needs supportive care including dialysis as needed.     May 24, 2025  Patient had a rapid response called because of being short of breath and was clinically volume overloaded.  Patient transferred to progressive care unit.  Discussed with hospitalist.  Patient is receiving dialysis with intent to remove 2.2 L of fluid.  Continue supportive care and monitor electrolytes and renal function.     May 25, 2025  Patient is critically ill in ICU.  Today is day 2 of back-to-back dialysis.  Within goal  to remove 2.2 L of fluid in 3-1/2 hours.  Overall patient seems to have improved but still requiring BiPAP.The monitoring electrolyte and renal function.     May 26th 2025  Patient still not improved much with volume status. Plan for dialysis tomorrow with 4hrs and 3 Lit UF. Remains critically ill in ICU.      May 27th  2025  Patient doing better but still requiring high flow oxygen. She remains oligoanuric. Dialysis dependent. Monitor closely. Critically ill in ICU. Total time spent seeing patient was 45 minutes.     May 28, 2025  Patient remains oligoanuric.  Urinary catheter was removed.  Will do periodic bladder scans and place catheter if needed or do bladder voiding protocol.  Discussed with RN.  Patient will also receive hemodialysis today with intent to remove 1.5 L of fluid.  Vent settings are acceptable.  Blood pressure is stable.  Patient did have intubation yesterday because of volume issues and respiratory distress.  Currently remains critically ill in the ICU.    May 29th 2025  Remains intubated, sedated on HD, tolerating it well.        Bernardo Lewis MD  5/29/2025

## 2025-05-29 NOTE — PROGRESS NOTES
[facility]  part of [facility]    Progress Note    Radha Winters Patient Status:  Inpatient    1960 MRN [id number]   Location [facility] 2W/SW Attending Fanny Majano MD   Hosp Day # 12 PCP JUAN MONTANEZ       Subjective:     Unable to perform ROS    Intubated on mechanical ventilation and sedated  PEEP of 8 and FiO2 45%  When off sedation arousable and moves extremities and follows command  Watch the patient on SBT did well the first 15 to 20-minute and then become tachypneic  Mild oral and tracheal secretion  Not on pressors  Tolerating NG tube feeding  Objective:   Blood pressure 105/61, pulse 93, temperature 96.7 °F (35.9 °C), temperature source Temporal, resp. rate 18, height 5' 3\" (1.6 m), weight 234 lb 9.1 oz (106.4 kg), SpO2 94%.  Physical Exam  Vitals and nursing note reviewed.   Constitutional:       General: She is in acute distress.      Appearance: She is ill-appearing.   HENT:      Head: Atraumatic.      Nose: Nose normal.      Mouth/Throat:      Mouth: Mucous membranes are moist.   Eyes:      General: No scleral icterus.  Cardiovascular:      Rate and Rhythm: Normal rate.      Heart sounds:      No gallop.   Pulmonary:      Comments: Distant breath sounds with good air exchange to both lungs with minimal rales in the bases with no wheezes or rhonchi  Abdominal:      General: Abdomen is flat. Bowel sounds are normal. There is no distension.      Palpations: Abdomen is soft.      Tenderness: There is no guarding or rebound.   Musculoskeletal:      Right lower leg: No edema.      Left lower leg: No edema.   Lymphadenopathy:      Cervical: No cervical adenopathy.   Skin:     General: Skin is dry.   Neurological:      General: No focal deficit present.         Results:   Lab Results   Component Value Date    WBC 9.7 2025    HGB 12.0 2025    HCT 38.1 2025    .0 2025    CREATSERUM 1.91 (H) 2025    BUN 17 2025      05/29/2025    K 4.4 05/29/2025     05/29/2025    CO2 27.0 05/29/2025     (H) 05/29/2025    CA 8.4 (L) 05/29/2025    ALB 3.8 05/28/2025    ALKPHO 185 (H) 05/28/2025    BILT 0.3 05/28/2025    TP 5.9 05/28/2025     (H) 05/28/2025    ALT 37 05/28/2025    TSH 0.528 10/10/2023    LIP 36 05/14/2025    DDIMER 1.77 (H) 05/24/2025    MG 2.2 05/28/2025    PHOS 4.3 05/28/2025    TROPHS 79 (HH) 05/28/2025    CK 9,315 (HH) 05/27/2025    B12 581 10/10/2023       XR CHEST AP PORTABLE  (CPT=71045)  Result Date: 5/29/2025  PROCEDURE: XR CHEST AP PORTABLE  (CPT=71045) TIME: 6:22 a.m..   COMPARISON: [facility], XR CHEST AP PORTABLE (CPT=71045), 5/28/2025, 6:36 AM.  INDICATIONS: Progress follow up.  TECHNIQUE:   Single view.   FINDINGS/IMPRESSION:   1. There is an endotracheal tube with tip approximately 5.5 cm above the silverio.  The thoracic component of an enteric feeding tube is identified traversing the thoracic esophagus.  The distal tip is not visualized but lies below the GE junction.  There is a right IJ PermCath with tips projecting over the cavoatrial junction.  2. The heart mediastinal structures are minimally enlarged.  Pulmonary vascularity/interstitial markings are slightly increased.  There are small bilateral pleural effusions.  The findings are compatible with slight to moderate interstitial edema.  3. Lung volumes are diminished.  There are small opacities at the lung bases which could represent atelectasis, infiltrate, or a combination in addition to small pleural effusions.     Dictated by (CST): Jordi River MD on 5/29/2025 at 7:25 AM     Finalized by (CST): Jordi River MD on 5/29/2025 at 7:30 AM          CT CHEST (CPT=71250)  Result Date: 5/28/2025  CONCLUSION:  1. Near complete consolidation of the right middle lobe with additional dependent airspace disease/consolidation in the left lower lobe and less pronounced patchy consolidations of the right lower lobe.  These  findings are new since prior 5/17/2025 abdominal CT and most compatible with multifocal pneumonia/aspiration.  Follow-up to resolution is advised. 2. Other right upper lobe predominant ground-glass density opacities throughout the lungs are probably infectious (or less likely relate to asymmetric pulmonary edema).  These can be reassessed on anticipated follow-up imaging to ensure resolution. 3. Cardiomegaly with multi-vessel coronary artery calcification. 4. Dilatation of the main pulmonary artery trunk may relate to underlying pulmonary hypertension. 5. Endotracheal tube, right chest port, and enteric tube all appear well positioned by CT. 6. Large heterogeneous density 4.7 cm partially calcified left adrenal mass is unchanged.  Smaller well-circumscribed 1.5 cm right adrenal nodule is also unchanged. 7. Stable chronic T11 and T12 vertebral body compression fractures. 8. Lesser incidental findings as above.   Dictated by (CST): Christiano Basilio MD on 5/28/2025 at 8:02 AM     Finalized by (CST): Christiano Basilio MD on 5/28/2025 at 8:12 AM          XR CHEST AP PORTABLE  (CPT=71045)  Result Date: 5/28/2025  CONCLUSION:   Right basilar opacity which could reflect atelectasis with or without superimposed pneumonia.    Dictated by (CST): Randal Wilkerson MD on 5/28/2025 at 7:33 AM     Finalized by (CST): Randal Wilkerson MD on 5/28/2025 at 7:35 AM          XR CHEST AP PORTABLE  (CPT=71045)  Result Date: 5/27/2025  CONCLUSION:  1. ET tube in satisfactory position with tip 2.6 cm above silverio. 2. Dialysis catheter remains with tip near RA SVC junction. 3. NG tube tip at least to distal esophagus.  Tip extends beyond the field of view. 4. CHF/fluid overload without significant change.  5. Left basilar pleural effusion with poor aeration in left lower lung.    Dictated by (CST): Javi Garcia MD on 5/27/2025 at 10:11 PM     Finalized by (CST): Javi Garcia MD on 5/27/2025 at 10:13 PM          XR CHEST AP PORTABLE   (CPT=71045)  Result Date: 5/27/2025  CONCLUSION:  1. CHF/fluid overload with left basilar pleural effusion. 2. No finding to account for the lack of ventilation in the left lung on the recent V/Q scan.  Findings suggest a large mucus plug which is no longer present.    Dictated by (CST): Javi Garcia MD on 5/27/2025 at 4:58 PM     Finalized by (CST): Javi Garcia MD on 5/27/2025 at 5:00 PM          NM LUNG VQ VENT / PERFUSION SCAN  (CPT=78582)  Result Date: 5/27/2025  CONCLUSION:  1. No pulmonary embolus. 2. No perfusion in the left lung suggests airway obstruction probably from a large mucus plug.  Updated chest x-ray follow-up recommended.  Findings were called to Dr. Reynaga.    Dictated by (CST): Javi Garcia MD on 5/27/2025 at 3:34 PM     Finalized by (CST): Javi Garcia MD on 5/27/2025 at 3:43 PM                Assessment & Plan:      1-acute respiratory failure with hypoxia , multifactorial  - Aspiration pneumonia with mucous plugging s/p LLL atelectasis / resolved after intubation   - Pulmonary edema  - COPD with acute exacerbation ( extensive history of smoking )  - Obesity and CRUZITO  - Generalized weakness and fatigue and recurrent falls     Failed BiPAP and Airvo and intubated 5/27/25  CXR better after intubation and LLL atelectasis resolved   Chest ct reviewed with basilar infiltrate indicating aspiration pneumonitis  Did not tolerate SBT today  FiO2 at 45% and PEEP of 8     Continue with Unasyn  Steroid and bronchodilator and nebulizers  Vent support and management     2-acute on chronic kidney injury /anuric   Rhabdomyolysis , and now anuric    started with hemodialysis    Renal following     3-h/o HFrEF previous echo LVEF 35%  F/u echo      4-transaminitis likely shock liver  Improving  Mild fatty liver on CT otherwise negative  Supportive care     5-recurrent falls and admitted with rhabdomyolysis     6-DVT prophylaxis  Heparin subcu     7-poor nutrition status  Start NG tube feeding today       8-full code     9-depression  Cymbalta and Seroquel        Critical , High risk and complex  D/w staff and RT     Upon my evaluation, this patient had a high probability of imminent or life-threatening deterioration due to renal failure and respiratory failure, which required my direct attention, intervention, and personal management.    I have personally provided 35 minutes of critical care time, exclusive of time spent on separately billable procedures.  Time includes review of all pertinent laboratory/radiology results, discussion with consultants, and monitoring for potential decompensation.  Performed interventions included managing mechanical ventilation.             Jonathan Casillas MD  5/29/2025

## 2025-05-29 NOTE — PLAN OF CARE
HD completed.    Problem: Patient Centered Care  Goal: Patient preferences are identified and integrated in the patient's plan of care  Description: Interventions:  - What would you like us to know as we care for you?   - Provide timely, complete, and accurate information to patient/family  - Incorporate patient and family knowledge, values, beliefs, and cultural backgrounds into the planning and delivery of care  - Encourage patient/family to participate in care and decision-making at the level they choose  - Honor patient and family perspectives and choices  Outcome: Not Progressing     Problem: PAIN - ADULT  Goal: Verbalizes/displays adequate comfort level or patient's stated pain goal  Description: INTERVENTIONS:  - Encourage pt to monitor pain and request assistance  - Assess pain using appropriate pain scale  - Administer analgesics based on type and severity of pain and evaluate response  - Implement non-pharmacological measures as appropriate and evaluate response  - Consider cultural and social influences on pain and pain management  - Manage/alleviate anxiety  - Utilize distraction and/or relaxation techniques  - Monitor for opioid side effects  - Notify MD/LIP if interventions unsuccessful or patient reports new pain  - Anticipate increased pain with activity and pre-medicate as appropriate  Outcome: Not Progressing     Problem: SAFETY ADULT - FALL  Goal: Free from fall injury  Description: INTERVENTIONS:  - Assess pt frequently for physical needs  - Identify cognitive and physical deficits and behaviors that affect risk of falls.  - Girard fall precautions as indicated by assessment.  - Educate pt/family on patient safety including physical limitations  - Instruct pt to call for assistance with activity based on assessment  - Modify environment to reduce risk of injury  - Provide assistive devices as appropriate  - Consider OT/PT consult to assist with strengthening/mobility  - Encourage toileting  schedule  Outcome: Not Progressing     Problem: DISCHARGE PLANNING  Goal: Discharge to home or other facility with appropriate resources  Description: INTERVENTIONS:  - Identify barriers to discharge w/pt and caregiver  - Include patient/family/discharge partner in discharge planning  - Arrange for needed discharge resources and transportation as appropriate  - Identify discharge learning needs (meds, wound care, etc)  - Arrange for interpreters to assist at discharge as needed  - Consider post-discharge preferences of patient/family/discharge partner  - Complete POLST form as appropriate  - Assess patient's ability to be responsible for managing their own health  - Refer to Case Management Department for coordinating discharge planning if the patient needs post-hospital services based on physician/LIP order or complex needs related to functional status, cognitive ability or social support system  Outcome: Not Progressing     Problem: SKIN/TISSUE INTEGRITY - ADULT  Goal: Skin integrity remains intact  Description: INTERVENTIONS  - Assess and document risk factors for pressure ulcer development  - Assess and document skin integrity  - Monitor for areas of redness and/or skin breakdown  - Initiate interventions, skin care algorithm/standards of care as needed  Outcome: Not Progressing     Problem: RESPIRATORY - ADULT  Goal: Achieves optimal ventilation and oxygenation  Description: INTERVENTIONS:  - Assess for changes in respiratory status  - Assess for changes in mentation and behavior  - Position to facilitate oxygenation and minimize respiratory effort  - Oxygen supplementation based on oxygen saturation or ABGs  - Provide Smoking Cessation handout, if applicable  - Encourage broncho-pulmonary hygiene including cough, deep breathe, Incentive Spirometry  - Assess the need for suctioning and perform as needed  - Assess and instruct to report SOB or any respiratory difficulty  - Respiratory Therapy support as  indicated  - Manage/alleviate anxiety  - Monitor for signs/symptoms of CO2 retention  Outcome: Not Progressing     Problem: GENITOURINARY - ADULT  Goal: Absence of urinary retention  Description: INTERVENTIONS:  - Assess patient’s ability to void and empty bladder  - Monitor intake/output and perform bladder scan as needed  - Follow urinary retention protocol/standard of care  - Consider collaborating with pharmacy to review patient's medication profile  - Implement strategies to promote bladder emptying  Outcome: Not Progressing     Problem: METABOLIC/FLUID AND ELECTROLYTES - ADULT  Goal: Glucose maintained within prescribed range  Description: INTERVENTIONS:  - Monitor Blood Glucose as ordered  - Assess for signs and symptoms of hyperglycemia and hypoglycemia  - Administer ordered medications to maintain glucose within target range  - Assess barriers to adequate nutritional intake and initiate nutrition consult as needed  - Instruct patient on self management of diabetes  Outcome: Not Progressing  Goal: Electrolytes maintained within normal limits  Description: INTERVENTIONS:  - Monitor labs and rhythm and assess patient for signs and symptoms of electrolyte imbalances  - Administer electrolyte replacement as ordered  - Monitor response to electrolyte replacements, including rhythm and repeat lab results as appropriate  - Fluid restriction as ordered  - Instruct patient on fluid and nutrition restrictions as appropriate  Outcome: Not Progressing  Goal: Hemodynamic stability and optimal renal function maintained  Description: INTERVENTIONS:  - Monitor labs and assess for signs and symptoms of volume excess or deficit  - Monitor intake, output and patient weight  - Monitor urine specific gravity, serum osmolarity and serum sodium as indicated or ordered  - Monitor response to interventions for patient's volume status, including labs, urine output, blood pressure (other measures as available)  - Encourage oral intake  as appropriate  - Instruct patient on fluid and nutrition restrictions as appropriate  Outcome: Not Progressing     Problem: HEMATOLOGIC - ADULT  Goal: Free from bleeding injury  Description: (Example usage: patient with low platelets)  INTERVENTIONS:  - Avoid intramuscular injections, enemas and rectal medication administration  - Ensure safe mobilization of patient  - Hold pressure on venipuncture sites to achieve adequate hemostasis  - Assess for signs and symptoms of internal bleeding  - Monitor lab trends  - Patient is to report abnormal signs of bleeding to staff  - Avoid use of toothpicks and dental floss  - Use electric shaver for shaving  - Use soft bristle tooth brush  - Limit straining and forceful nose blowing  Outcome: Not Progressing  Goal: Maintains hematologic stability  Description: INTERVENTIONS  - Assess for signs and symptoms of bleeding or hemorrhage  - Monitor labs and vital signs for trends  - Administer supportive blood products/factors, fluids and medications as ordered and appropriate  - Administer supportive blood products/factors as ordered and appropriate  Outcome: Not Progressing  Goal: Free from bleeding injury  Description: (Example usage: patient with low platelets)  INTERVENTIONS:  - Avoid intramuscular injections, enemas and rectal medication administration  - Ensure safe mobilization of patient  - Hold pressure on venipuncture sites to achieve adequate hemostasis  - Assess for signs and symptoms of internal bleeding  - Monitor lab trends  - Patient is to report abnormal signs of bleeding to staff  - Avoid use of toothpicks and dental floss  - Use electric shaver for shaving  - Use soft bristle tooth brush  - Limit straining and forceful nose blowing  Outcome: Not Progressing     Problem: Diabetes/Glucose Control  Goal: Glucose maintained within prescribed range  Description: INTERVENTIONS:  - Monitor Blood Glucose as ordered  - Assess for signs and symptoms of hyperglycemia and  hypoglycemia  - Administer ordered medications to maintain glucose within target range  - Assess barriers to adequate nutritional intake and initiate nutrition consult as needed  - Instruct patient on self management of diabetes  Outcome: Not Progressing     Problem: Delirium  Goal: Minimize duration of delirium  Description: Interventions:  - Encourage use of hearing aids, eye glasses  - Promote highest level of mobility daily  - Provide frequent reorientation  - Promote wakefulness i.e. lights on, blinds open  - Promote sleep, encourage patient's normal rest cycle i.e. lights off, TV off, minimize noise and interruptions  - Encourage family to assist in orientation and promotion of home routines  Outcome: Not Progressing     Problem: GASTROINTESTINAL - ADULT  Goal: Minimal or absence of nausea and vomiting  Description: INTERVENTIONS:  - Maintain adequate hydration with IV or PO as ordered and tolerated  - Nasogastric tube to low intermittent suction as ordered  - Evaluate effectiveness of ordered antiemetic medications  - Provide nonpharmacologic comfort measures as appropriate  - Advance diet as tolerated, if ordered  - Obtain nutritional consult as needed  - Evaluate fluid balance  Outcome: Not Progressing  Goal: Maintains or returns to baseline bowel function  Description: INTERVENTIONS:  - Assess bowel function  - Maintain adequate hydration with IV or PO as ordered and tolerated  - Evaluate effectiveness of GI medications  - Encourage mobilization and activity  - Obtain nutritional consult as needed  - Establish a toileting routine/schedule  - Consider collaborating with pharmacy to review patient's medication profile  Outcome: Not Progressing     Problem: Safety Risk - Non-Violent Restraints  Goal: Patient will remain free from self-harm  Description: INTERVENTIONS:  - Apply the least restrictive restraint to prevent harm  - Notify patient and family of reasons restraints applied  - Assess for any  contributing factors to confusion (electrolyte disturbances, delirium, medications)  - Discontinue any unnecessary medical devices as soon as possible  - Assess the patient's physical comfort, circulation, skin condition, hydration, nutrition and elimination needs   - Reorient and redirection as needed  - Assess for the need to continue restraints  5/29/2025 0423 by Ivis Rodríguez, RN  Outcome: Not Progressing  5/28/2025 2149 by Ivis Rodríguez, RN  Outcome: Not Progressing

## 2025-05-29 NOTE — PROGRESS NOTES
05/28/25 1921   Vent Information   Vent ID    Vent Mode VC/AC   Settings   FiO2 (%) 45 %   Resp Rate (Set) 16   Vt (Set, mL) 430 mL   Waveform Decelerating ramp   PEEP/CPAP (cm H2O) 8 cm H20   Insp Flow (L/sec) 60 L/sec   Trigger Sensitivity Flow (L/min) 3 L/min   Humidification Heat and moisture exchanger     Pt received on full support on the above settings. ETT 7.5 secured 23 @ the lips. Bilateral BS auscultated. Snx provided as indicated. No events on shift. RT continue to monitor.

## 2025-05-29 NOTE — PROGRESS NOTES
[facility]  part of [facility]    Progress Note    Radha Winters Patient Status:  Inpatient    1960 MRN [id number]   Location [facility] 5SW/SE Attending Fanny Majano MD   Hosp Day # 12 PCP JUAN MONTANEZ       SUBJECTIVE:  Intubated.  Alert.  Responding verbal stimuli follow commands      OBJECTIVE:  Vital signs in last 24 hours:  BP 95/57 (BP Location: Left arm)   Pulse 82   Temp 96.5 °F (35.8 °C) (Temporal)   Resp 16   Ht 5' 3\" (1.6 m)   Wt 234 lb 9.1 oz (106.4 kg)   SpO2 94%   BMI 41.55 kg/m²     Intake/Output:    Intake/Output Summary (Last 24 hours) at 2025 0958  Last data filed at 2025 0614  Gross per 24 hour   Intake 1312.3 ml   Output 1088 ml   Net 224.3 ml       Wt Readings from Last 3 Encounters:   25 234 lb 9.1 oz (106.4 kg)   10/09/23 185 lb 3 oz (84 kg)   10/04/23 185 lb (83.9 kg)       Exam  Gen: No acute distress,   HEENT: No pallor  Pulm: Lungs bilateral coarse breath sounds  CV: Heart with regular rate and rhythm, no peripheral edema  Abd: Abdomen soft, nontender, nondistended, no organomegaly, bowel sounds present  Skin: no rashes or lesions  CNS: Alert    Data Review:     Labs:   Lab Results   Component Value Date    WBC 9.7 2025    HGB 12.0 2025    HCT 38.1 2025    .0 2025    CREATSERUM 1.91 2025    BUN 17 2025     2025    K 4.4 2025     2025    CO2 27.0 2025     2025    CA 8.4 2025       LABS  Recent Labs   Lab 25  0811 25  0904 25  0940 25  0430 25  0358 25  0428 25  0419 25  0606   RBC 5.09 4.52  --    < > 4.48 4.60 4.27 4.08   HGB 15.2 13.7  --    < > 13.4 13.6 12.7 12.0   HCT 46.0 41.0  --    < > 42.8 43.8 39.9 38.1   MCV 90.4 90.7  --    < > 95.5 95.2 93.4 93.4   MCH 29.9 30.3  --    < > 29.9 29.6 29.7 29.4   MCHC 33.0 33.4  --    < > 31.3 31.1 31.8 31.5   RDW 15.9* 15.8*  --     < > 16.5* 16.8* 16.7* 16.5*   NEPRELIM 6.11 6.64  --    < > 8.59* 8.48* 8.52* 7.67   WBC 7.5 8.2  --    < > 10.3 9.9 9.7 9.7   .0* 111.0*  --    < > 111.0* 129.0* 153.0 161.0   * 193*  --   --  285* 279* 207*  --    * 189*  --   --  70* 46 37  --    DDIMER  --   --  1.77*  --   --   --   --   --    CK 7,754* 6,552*  --   --   --  9,315*  --   --    * 151*  --    < > 114* 112* 132* 114*    < > = values in this interval not displayed.       Imaging:      Meds:   Current Hospital Medications[1]    Assessment  Problem List[2]  1. Acute Kidney Injury on Chronic Kidney Disease:  -  - Nephrology consultation obtained  Patient with worsening renal function.  Nephrology is following the patient  On hemodialysis       2. Rhabdomyolysis:  - Secondary to recurrent falls  - Possibly statin-induced  - Plan: Hold statin (Crestor)  Improving     3. Acute Transaminitis:  - Likely secondary to rhabdomyolysis  - May also be statin-related  - Will monitor with serial labs  Patient could also have a shock liver  Liver enzymes are improving       4. Recurrent Falls:  -    Patient will need physical therapy and Occupational Therapy after she is extubated      5. Coronary Artery Disease:  - Currently stable  - Patient denies chest pain     6. Hypothyroidism:  - Continue levothyroxine  Possible UTI.  on ceftriaxone.    Acute hypoxic respiratory failure  Patient failed BiPAP and Airvo.  Patient is intubated on ventilator.  Pulmonary following.      Chronic heart failure with reduced ejection fraction    Cardiomyopathy.  cardiology consulted         Plan for close monitoring and adjustment of management based on clinical course.  discussed with nursing staff        Active Orders   Imaging    XR CHEST AP PORTABLE  (CPT=71045)     Frequency: Once     Number of Occurrences: 1 Occurrences   Nourishments    Room Service Eligibility Until Discontinued     Frequency: Until Discontinued     Number of Occurrences: Until  Specified    Room Service Notify RN Until Discontinued     Frequency: Until Discontinued     Number of Occurrences: Until Specified    Tube Feeding Diet Effective Now     Frequency: Effective Now     Number of Occurrences: Until Specified   Respiratory Care    BIPAP When Sleeping     Frequency: When Sleeping     Number of Occurrences: Until Specified    BIPAP When Sleeping     Frequency: When Sleeping     Number of Occurrences: Until Specified    Chest physiotherapy 4x Daily     Frequency: 4x Daily     Number of Occurrences: Until Specified     Order Comments: Left lower lobe atelectasis.      EZ-PAP 4x Daily     Frequency: 4x Daily     Number of Occurrences: Until Specified    Incentive spriometry: RT to teach pt Once     Frequency: Once     Number of Occurrences: 1 Occurrences    Mechanical ventilation Until Discontinued     Frequency: Until Discontinued     Number of Occurrences: Until Specified    Oxygen administration (adult) PRN     Frequency: PRN     Number of Occurrences: Until Specified    Respiratory care evaluation Once     Frequency: Once     Number of Occurrences: 1 Occurrences     Order Comments: If patient uses home CPAP/BIPAP, place on known home settings. If unknown, place on auto CPAP with upper limit 20 and lower limit 5 cm H2O      Ventilator management protocol Once     Frequency: Once     Number of Occurrences: 1 Occurrences   Dialysis    Hemodialysis inpatient     Frequency: Tomorrow     Number of Occurrences: 1 Occurrences    Hemodialysis inpatient     Frequency: Once     Number of Occurrences: 1 Occurrences     Order Comments: Bolus heparin 4000 U at start      Hemodialysis inpatient     Frequency: Tomorrow     Number of Occurrences: 1 Occurrences    Hemodialysis inpatient     Frequency: Once     Number of Occurrences: 1 Occurrences     Order Comments: Keep MAP goal >65     Restraints    Restraints non-violent or non self-destr Continuous x 24 hours     Frequency: Continuous x 24 hours      Number of Occurrences: 24 Hours   Precaution    Aspiration precautions Continuous     Frequency: Continuous     Number of Occurrences: Until Specified   Diet    NPO     Frequency: Effective Now     Number of Occurrences: Until Specified   Nursing    Accucheck     Frequency: BID     Number of Occurrences: Until Specified    Accucheck     Frequency: Q6H     Number of Occurrences: Until Specified    Accucheck     Frequency: PRN     Number of Occurrences: Until Specified     Order Comments: For symptoms or suspicion of hypoglycemia      Assess using CAM-ICU     Frequency: Q Shift     Number of Occurrences: Until Specified    Assess using Critical Care Pain Observation Tool (CPOT)     Frequency: Now Then Q3H     Number of Occurrences: Until Specified    Assess using Sun Agitation Sedation Scale (RASS)     Frequency: Now Then Q3H     Number of Occurrences: Until Specified    Elevate head of bed >30 degrees     Frequency: Until Discontinued     Number of Occurrences: Until Specified     Order Comments: 30-45 degrees at all times unless contraindicated by physician order or patient condition.      Elevate head of bed greater than or equal to 30 degrees     Frequency: Until Discontinued     Number of Occurrences: Until Specified    Flush feeding tube     Frequency: PRN     Number of Occurrences: Until Specified     Order Comments: Flush feeding tube:  1.) Before and after bolus feedings.  2.) After medication administration.      For any tube feed interruptions, continue basal insulin, and correction scales. Hold any scheduled insulins, and resume when tube feed is restarted.     Frequency: Until Discontinued     Number of Occurrences: Until Specified    For non-patent tubes:     Frequency: PRN     Number of Occurrences: Until Specified     Order Comments: If water is unsuccessful in dislodging the clog, use the pancrealipase/sodium bicarb. May repeat x1 before calling physician for further orders.      For patients  without a history of diabetes, discontinue Accuchecks and insulin correction scale if blood glucose <180 mg/dL for 48 hours     Frequency: Until Discontinued     Number of Occurrences: Until Specified    Give all morning medications unless otherwise specified     Frequency: Until Discontinued     Number of Occurrences: Until Specified     Order Comments: Give all morning medications unless otherwise specified.  DO NOT use Electrolyte protocol.      If patient uses CPAP/BIPAP, encourage patient to wear when sleeping (including daytime) and after any apneic episode or adverse event.     Frequency: Until Discontinued     Number of Occurrences: Until Specified    Initiate early mobility protocol     Frequency: Once     Number of Occurrences: 1 Occurrences    Initiate Hypoglycemia Algorithm for Adults     Frequency: Until Discontinued     Number of Occurrences: Until Specified     Order Comments: For blood glucose less than 70      Initiate Medical Nutrition Therapy Protocol for Enteral Nutrition     Frequency: Until Discontinued     Number of Occurrences: Until Specified    Insert denny catheter     Frequency: Once     Number of Occurrences: 1 Occurrences    Intake and Output     Frequency: Per Unit Routine     Number of Occurrences: Until Specified     Order Comments: Record formula and water flushes separately.      May use port/central line     Frequency: Until Discontinued     Number of Occurrences: Until Specified    Measure weight     Frequency: Per Unit Routine     Number of Occurrences: Until Specified     Order Comments: Measure weight every Monday and Thursday for non critical care patients.      Monitor tube placement     Frequency: Q8H     Number of Occurrences: Until Specified    Notify attending physician for patients refusing CPAP     Frequency: Until Discontinued     Number of Occurrences: Until Specified     Order Comments: Document that patient was educated and refused CPAP, if applicable.      Notify  attending physician for sustained desaturation <90% or prolonged or recurrent apnea     Frequency: Until Discontinued     Number of Occurrences: Until Specified     Order Comments: Notify attending physician for sustained desaturation <90% or prolonged or recurrent apnea      Notify physician     Frequency: Until Discontinued     Number of Occurrences: Until Specified    Notify physician of significant abdominal distension, pain, nausea, or emesis, and stop tube feeding     Frequency: Until Discontinued     Number of Occurrences: Until Specified    Notify Radiologist     Frequency: Until Discontinued     Number of Occurrences: Until Specified    Nurse Driven Indwelling Urinary Catheter Removal Protocol     Frequency: Until Discontinued     Number of Occurrences: Until Specified    Nursing communication (specify)     Frequency: Once     Number of Occurrences: 1 Occurrences     Order Comments: Hold blood thinners pre procedure      Nursing communication (specify)     Frequency: Once     Number of Occurrences: 1 Occurrences     Order Comments: RN, keep the patient off the left side.      Nursing communication - call [facility] to order dialysis     Frequency: Once     Number of Occurrences: 1 Occurrences     Order Comments: Call [facility] at [phone] to order dialysis.  Identify special circumstances and specify stat or routine treatment.      Patient may resume usual diet     Frequency: Once     Number of Occurrences: 1 Occurrences     Order Comments: Npo x one hour, then resume pre-procedure diet      Post procedure site assessment     Frequency: Per Unit Routine     Number of Occurrences: Until Specified     Order Comments: Every 15 minutes for 1 hour, then every 30 minutes for 1 hour, then every 60 minutes for 2 hours, then per unit routine      Provide patient with sleep apnea education materials     Frequency: Once     Number of Occurrences: 1 Occurrences    Pulse oximetry     Frequency: Per Unit Routine      Number of Occurrences: Until Specified    Pulse oximetry     Frequency: Continuous     Number of Occurrences: Until Specified    Spontaneous awakening & breathing trial     Frequency: Daily     Number of Occurrences: Until Specified     Order Comments: Per protocol      Tube insertion     Frequency: Once     Number of Occurrences: 1 Occurrences    Tube insertion     Frequency: Once     Number of Occurrences: 1 Occurrences    Tube insertion     Frequency: Once     Number of Occurrences: 1 Occurrences    Verify informed consent     Frequency: Once     Number of Occurrences: 1 Occurrences    Vital signs     Frequency: Per Unit Routine     Number of Occurrences: Until Specified     Order Comments: Every 15 minutes for 1 hour, then every 30 minutes for 1 hour, then every 60 minutes for 2 hours, then per unit routine.      Void prior to procedure     Frequency: Once     Number of Occurrences: 1 Occurrences   Consult    Consult to Interventional Radiology     Frequency: Once     Number of Occurrences: 1 Occurrences    Consult to Pulmonology     Frequency: Once     Number of Occurrences: 1 Occurrences    Social work     Linked Order: And     Frequency: Once     Number of Occurrences: 1 Occurrences   Medications    acetaminophen (Tylenol) tab 650 mg     Frequency: Q6H PRN     Dose: 650 mg     Route: Oral    acetylcysteine (Mucomyst) 20 % nebulizer solution 2 mL     Frequency: BID     Dose: 2 mL     Route: Nebulization    albumin human (Albumin) 25% injection 25 g     Frequency: PRN Dialysis     Dose: 25 g     Route: Intravenous    albuterol (Ventolin) (2.5 MG/3ML) 0.083% nebulizer solution 2.5 mg     Frequency: Q6H PRN     Dose: 2.5 mg     Route: Nebulization    ampicillin-sulbactam (Unasyn) 1.5 g in sodium chloride 0.9% 100mL IVPB-MAYO     Frequency: q12h     Dose: 1.5 g     Route: Intravenous    arformoterol (Brovana) 15 MCG/2ML nebulizer solution 15 mcg     Frequency: 2 times daily     Dose: 15 mcg     Route: Nebulization     aspirin DR tab 81 mg     Frequency: Daily     Dose: 81 mg     Route: Oral    bisacodyl (Dulcolax) 10 MG rectal suppository 10 mg     Frequency: Daily PRN     Dose: 10 mg     Route: Rectal    budesonide (Pulmicort) 0.5 MG/2ML nebulizer suspension 0.5 mg     Frequency: 2 times daily     Dose: 0.5 mg     Route: Nebulization    busPIRone (Buspar) tab 30 mg     Frequency: TID     Dose: 30 mg     Route: Per G Tube    carvedilol (Coreg) tab 3.125 mg     Frequency: BID with meals     Dose: 3.125 mg     Route: Oral    chlorhexidine gluconate (Peridex) 0.12 % oral solution 15 mL     Frequency: BID@0800,2000     Dose: 15 mL     Route: Mouth/Throat    chlorproMAZINE (Thorazine) tab 25 mg     Frequency: QID     Dose: 25 mg     Route: Per G Tube    dextrose 10% infusion (TPN no rate)     Frequency: Continuous PRN     Route: Intravenous    dextrose 50% injection 50 mL     Linked Order: Or     Frequency: Q15 Min PRN     Dose: 50 mL     Route: Intravenous    diazePAM (Valium) tab 5 mg     Frequency: Q8H PRN     Dose: 5 mg     Route: Per G Tube    docusate (Colace) 50 MG/5ML oral solution 100 mg     Frequency: BID     Dose: 100 mg     Route: Per NG Tube    DULoxetine (Cymbalta) DR cap 60 mg     Frequency: BID     Dose: 60 mg     Route: Oral    famotidine (Pepcid) 20 mg/2mL injection 20 mg     Frequency: Daily     Dose: 20 mg     Route: Intravenous    fentaNYL (Sublimaze) 50 mcg/mL injection 50 mcg     Frequency: Q30 Min PRN     Dose: 50 mcg     Route: Intravenous    fleet enema (Fleet) rectal enema 133 mL     Frequency: Once PRN     Dose: 1 enema     Route: Rectal    glucose (Dex4) 15 GM/59ML oral liquid 15 g     Linked Order: Or     Frequency: Q15 Min PRN     Dose: 15 g     Route: Oral    glucose (Dex4) 15 GM/59ML oral liquid 30 g     Linked Order: Or     Frequency: Q15 Min PRN     Dose: 30 g     Route: Oral    glucose (Glutose) 40% oral gel 15 g     Linked Order: Or     Frequency: Q15 Min PRN     Dose: 15 g     Route: Oral     glucose (Glutose) 40% oral gel 30 g     Linked Order: Or     Frequency: Q15 Min PRN     Dose: 30 g     Route: Oral    glucose-vitamin C (Dex-4) chewable tab 4 tablet     Linked Order: Or     Frequency: Q15 Min PRN     Dose: 4 tablet     Route: Oral    glucose-vitamin C (Dex-4) chewable tab 8 tablet     Linked Order: Or     Frequency: Q15 Min PRN     Dose: 8 tablet     Route: Oral    heparin (Porcine) 1000 UNIT/ML injection 2,000 Units     Frequency: PRN Dialysis     Dose: 2,000 Units     Route: Intravenous    heparin (Porcine) 5000 UNIT/ML injection 5,000 Units     Frequency: Q12H PATRICK     Dose: 5,000 Units     Route: Subcutaneous    HYDROcodone-acetaminophen (Norco) 5-325 MG per tab 1 tablet     Frequency: Q6H PRN     Dose: 1 tablet     Route: Oral    insulin regular human (Novolin R, Humulin R) 100 UNIT/ML injection 1-5 Units     Frequency: Q6H PATRICK     Dose: 1-5 Units     Route: Subcutaneous    ipratropium-albuterol (Duoneb) 0.5-2.5 (3) MG/3ML inhalation solution 3 mL     Frequency: TID     Dose: 3 mL     Route: Nebulization    levothyroxine (Synthroid) tab 150 mcg     Frequency: Before breakfast     Dose: 150 mcg     Route: Per G Tube    magnesium hydroxide (Milk of Magnesia) 400 MG/5ML oral suspension 30 mL     Frequency: Daily PRN     Dose: 30 mL     Route: Oral    methylPREDNISolone sodium succinate (Solu-MEDROL) injection 40 mg     Frequency: Daily     Dose: 40 mg     Route: Intravenous    midodrine (ProAmatine) tab 10 mg     Frequency: TID     Dose: 10 mg     Route: Per G Tube    mineral oil-white petrolatum (Artificial Tears) 83-15 % ophthalmic ointment 1 Application     Frequency: Nightly     Dose: 1 Application     Route: Both Eyes    ondansetron (Zofran) 4 MG/2ML injection 4 mg     Frequency: Q6H PRN     Dose: 4 mg     Route: Intravenous    oxidized cellulose (Surgical Snow) external sheet 2 Package     Frequency: Once PRN     Dose: 2 each     Route: Topical    pancrelipase (Lip-Prot-Amyl) (Zenpep)   particles cap 10,000 Units     Linked Order: And     Frequency: PRN     Dose: 10,000 Units     Route: Per G Tube    polyethylene glycol (PEG 3350) (Miralax) 17 g oral packet 17 g     Frequency: Daily PRN     Dose: 17 g     Route: Oral    propofol (Diprivan) 10 mg/mL infusion premix     Frequency: Continuous     Dose: 5-50 mcg/kg/min (Dosing Weight)     Route: Intravenous    QUEtiapine (SEROquel) tab 50 mg     Frequency: BID     Dose: 50 mg     Route: Per G Tube    QUEtiapine ER (SEROquel XR) 24 hr tab 300 mg     Frequency: Nightly     Dose: 300 mg     Route: Oral    sacubitril-valsartan (Entresto) 24-26 MG per tab 1 tablet     Linked Order: And     Frequency: BID     Dose: 1 tablet     Route: Oral    senna (Senokot) 8.8 MG/5ML oral syrup 17.6 mg     Frequency: BID     Dose: 10 mL     Route: Per NG Tube    sodium bicarbonate tab 650 mg     Linked Order: And     Frequency: PRN     Dose: 650 mg     Route: Per G Tube       Fanny Majano MD           [1]   Current Facility-Administered Medications   Medication Dose Route Frequency    albumin human (Albumin) 25% injection 25 g  25 g Intravenous PRN Dialysis    midodrine (ProAmatine) tab 10 mg  10 mg Per G Tube TID    busPIRone (Buspar) tab 30 mg  30 mg Per G Tube TID    chlorproMAZINE (Thorazine) tab 25 mg  25 mg Per G Tube QID    diazePAM (Valium) tab 5 mg  5 mg Per G Tube Q8H PRN    QUEtiapine (SEROquel) tab 50 mg  50 mg Per G Tube BID    levothyroxine (Synthroid) tab 150 mcg  150 mcg Per G Tube Before breakfast    fentaNYL (Sublimaze) 50 mcg/mL injection 50 mcg  50 mcg Intravenous Q30 Min PRN    senna (Senokot) 8.8 MG/5ML oral syrup 17.6 mg  10 mL Per NG Tube BID    docusate (Colace) 50 MG/5ML oral solution 100 mg  100 mg Per NG Tube BID    chlorhexidine gluconate (Peridex) 0.12 % oral solution 15 mL  15 mL Mouth/Throat BID@0800,2000    mineral oil-white petrolatum (Artificial Tears) 83-15 % ophthalmic ointment 1 Application  1 Application Both Eyes Nightly     famotidine (Pepcid) 20 mg/2mL injection 20 mg  20 mg Intravenous Daily    ipratropium-albuterol (Duoneb) 0.5-2.5 (3) MG/3ML inhalation solution 3 mL  3 mL Nebulization TID    methylPREDNISolone sodium succinate (Solu-MEDROL) injection 40 mg  40 mg Intravenous Daily    glucose (Dex4) 15 GM/59ML oral liquid 15 g  15 g Oral Q15 Min PRN    Or    glucose (Glutose) 40% oral gel 15 g  15 g Oral Q15 Min PRN    Or    glucose-vitamin C (Dex-4) chewable tab 4 tablet  4 tablet Oral Q15 Min PRN    Or    dextrose 50% injection 50 mL  50 mL Intravenous Q15 Min PRN    Or    glucose (Dex4) 15 GM/59ML oral liquid 30 g  30 g Oral Q15 Min PRN    Or    glucose (Glutose) 40% oral gel 30 g  30 g Oral Q15 Min PRN    Or    glucose-vitamin C (Dex-4) chewable tab 8 tablet  8 tablet Oral Q15 Min PRN    insulin regular human (Novolin R, Humulin R) 100 UNIT/ML injection 1-5 Units  1-5 Units Subcutaneous 4 times per day    carvedilol (Coreg) tab 3.125 mg  3.125 mg Oral BID with meals    sacubitril-valsartan (Entresto) 24-26 MG per tab 1 tablet  1 tablet Oral BID    budesonide (Pulmicort) 0.5 MG/2ML nebulizer suspension 0.5 mg  0.5 mg Nebulization 2 times daily    arformoterol (Brovana) 15 MCG/2ML nebulizer solution 15 mcg  15 mcg Nebulization 2 times daily    dextrose 10% infusion (TPN no rate)   Intravenous Continuous PRN    sodium bicarbonate tab 650 mg  650 mg Per G Tube PRN    And    pancrelipase (Lip-Prot-Amyl) (Zenpep) DR particles cap 10,000 Units  10,000 Units Per G Tube PRN    oxidized cellulose (Surgical Snow) external sheet 2 Package  2 each Topical Once PRN    acetylcysteine (Mucomyst) 20 % nebulizer solution 2 mL  2 mL Nebulization BID    ampicillin-sulbactam (Unasyn) 1.5 g in sodium chloride 0.9% 100mL IVPB-MAYO  1.5 g Intravenous q12h    propofol (Diprivan) 10 mg/mL infusion premix  5-50 mcg/kg/min (Dosing Weight) Intravenous Continuous    DULoxetine (Cymbalta) DR cap 60 mg  60 mg Oral BID    HYDROcodone-acetaminophen (Norco) 5-325  MG per tab 1 tablet  1 tablet Oral Q6H PRN    ondansetron (Zofran) 4 MG/2ML injection 4 mg  4 mg Intravenous Q6H PRN    polyethylene glycol (PEG 3350) (Miralax) 17 g oral packet 17 g  17 g Oral Daily PRN    magnesium hydroxide (Milk of Magnesia) 400 MG/5ML oral suspension 30 mL  30 mL Oral Daily PRN    bisacodyl (Dulcolax) 10 MG rectal suppository 10 mg  10 mg Rectal Daily PRN    fleet enema (Fleet) rectal enema 133 mL  1 enema Rectal Once PRN    heparin (Porcine) 1000 UNIT/ML injection 2,000 Units  2,000 Units Intravenous PRN Dialysis    albuterol (Ventolin) (2.5 MG/3ML) 0.083% nebulizer solution 2.5 mg  2.5 mg Nebulization Q6H PRN    heparin (Porcine) 5000 UNIT/ML injection 5,000 Units  5,000 Units Subcutaneous 2 times per day    acetaminophen (Tylenol) tab 650 mg  650 mg Oral Q6H PRN    aspirin DR tab 81 mg  81 mg Oral Daily    QUEtiapine ER (SEROquel XR) 24 hr tab 300 mg  300 mg Oral Nightly   [2]   Patient Active Problem List  Diagnosis    Closed fracture of proximal end of left humerus, unspecified fracture morphology, initial encounter    Frequent falls    Seizure-like activity (HCC)    Pituitary lesion (HCC)    Major depressive disorder, recurrent episode, moderate (HCC)    Generalized anxiety disorder    Acute renal failure superimposed on chronic kidney disease, unspecified acute renal failure type, unspecified CKD stage    Non-traumatic rhabdomyolysis    Transaminitis

## 2025-05-29 NOTE — PAYOR COMM NOTE
--------------  CONTINUED STAY REVIEW    Payor: [facility]  Subscriber #:  [id number]  Authorization Number: [id number]    Admit date: 5/17/25  Admit time:  2:12 PM    REVIEW DOCUMENTATION:       Date of Service: 5/29/2025  2:01 PM     Signed       Expand All Collapse All       [facility]  part of [facility]     Progress Note        Subjective:     Unable to perform ROS     Intubated on mechanical ventilation and sedated  PEEP of 8 and FiO2 45%  When off sedation arousable and moves extremities and follows command  Watch the patient on SBT did well the first 15 to 20-minute and then become tachypneic  Mild oral and tracheal secretion  Not on pressors  Tolerating NG tube feeding  Objective:  Blood pressure 105/61, pulse 93, temperature 96.7 °F (35.9 °C), temperature source Temporal, resp. rate 18, height 5' 3\" (1.6 m), weight 234 lb 9.1 oz (106.4 kg), SpO2 94%.  Physical Exam  Vitals and nursing note reviewed.   Constitutional:       General: She is in acute distress.      Appearance: She is ill-appearing.   HENT:      Head: Atraumatic.      Nose: Nose normal.      Mouth/Throat:      Mouth: Mucous membranes are moist.   Eyes:      General: No scleral icterus.  Cardiovascular:      Rate and Rhythm: Normal rate.      Heart sounds:      No gallop.   Pulmonary:      Comments: Distant breath sounds with good air exchange to both lungs with minimal rales in the bases with no wheezes or rhonchi  Abdominal:      General: Abdomen is flat. Bowel sounds are normal. There is no distension.      Palpations: Abdomen is soft.      Tenderness: There is no guarding or rebound.   Musculoskeletal:      Right lower leg: No edema.      Left lower leg: No edema.   Lymphadenopathy:      Cervical: No cervical adenopathy.   Skin:     General: Skin is dry.   Neurological:      General: No focal deficit present.            Results:        Lab Results   Component Value Date     WBC 9.7  05/29/2025     HGB 12.0 05/29/2025     HCT 38.1 05/29/2025     .0 05/29/2025     CREATSERUM 1.91 (H) 05/29/2025     BUN 17 05/29/2025      05/29/2025     K 4.4 05/29/2025      05/29/2025     CO2 27.0 05/29/2025      (H) 05/29/2025     CA 8.4 (L) 05/29/2025     ALB 3.8 05/28/2025     ALKPHO 185 (H) 05/28/2025     BILT 0.3 05/28/2025     TP 5.9 05/28/2025      (H) 05/28/2025     ALT 37 05/28/2025     TSH 0.528 10/10/2023     LIP 36 05/14/2025     DDIMER 1.77 (H) 05/24/2025     MG 2.2 05/28/2025     PHOS 4.3 05/28/2025     TROPHS 79 (HH) 05/28/2025     CK 9,315 (HH) 05/27/2025     B12 581 10/10/2023         XR CHEST AP PORTABLE  (CPT=71045)  Result Date: 5/29/2025  PROCEDURE:   XR CHEST AP PORTABLE  (CPT=71045) TIME:              6:22 a.m..   COMPARISON:             [facility], XR CHEST AP PORTABLE (CPT=71045), 5/28/2025, 6:36 AM.  INDICATIONS:      Progress follow up.  TECHNIQUE:           Single view.   FINDINGS/IMPRESSION:           1. There is an endotracheal tube with tip approximately 5.5 cm above the silverio.  The thoracic component of an enteric feeding tube is identified traversing the thoracic esophagus.  The distal tip is not visualized but lies below the GE junction.  There is a right IJ PermCath with tips projecting over the cavoatrial junction.  2. The heart mediastinal structures are minimally enlarged.  Pulmonary vascularity/interstitial markings are slightly increased.  There are small bilateral pleural effusions.  The findings are compatible with slight to moderate interstitial edema.  3. Lung volumes are diminished.  There are small opacities at the lung bases which could represent atelectasis, infiltrate, or a combination in addition to small pleural effusions.     Dictated by (CST): Jordi River MD on 5/29/2025 at 7:25 AM     Finalized by (CST): Jordi River MD on 5/29/2025 at 7:30 AM           CT CHEST (TQU=13248)  Result Date:  5/28/2025  CONCLUSION:  1. Near complete consolidation of the right middle lobe with additional dependent airspace disease/consolidation in the left lower lobe and less pronounced patchy consolidations of the right lower lobe.  These findings are new since prior 5/17/2025 abdominal CT and most compatible with multifocal pneumonia/aspiration.  Follow-up to resolution is advised. 2. Other right upper lobe predominant ground-glass density opacities throughout the lungs are probably infectious (or less likely relate to asymmetric pulmonary edema).  These can be reassessed on anticipated follow-up imaging to ensure resolution. 3. Cardiomegaly with multi-vessel coronary artery calcification. 4. Dilatation of the main pulmonary artery trunk may relate to underlying pulmonary hypertension. 5. Endotracheal tube, right chest port, and enteric tube all appear well positioned by CT. 6. Large heterogeneous density 4.7 cm partially calcified left adrenal mass is unchanged.  Smaller well-circumscribed 1.5 cm right adrenal nodule is also unchanged. 7. Stable chronic T11 and T12 vertebral body compression fractures. 8. Lesser incidental findings as above.   Dictated by (CST): Christiano Basilio MD on 5/28/2025 at 8:02 AM     Finalized by (CST): Christiano Basilio MD on 5/28/2025 at 8:12 AM           XR CHEST AP PORTABLE  (CPT=71045)  Result Date: 5/28/2025  CONCLUSION:   Right basilar opacity which could reflect atelectasis with or without superimposed pneumonia.    Dictated by (CST): Randal Wilkerson MD on 5/28/2025 at 7:33 AM     Finalized by (CST): Randal Wilkerson MD on 5/28/2025 at 7:35 AM           XR CHEST AP PORTABLE  (CPT=71045)  Result Date: 5/27/2025  CONCLUSION:  1. ET tube in satisfactory position with tip 2.6 cm above silverio. 2. Dialysis catheter remains with tip near RA SVC junction. 3. NG tube tip at least to distal esophagus.  Tip extends beyond the field of view. 4. CHF/fluid overload without significant change.  5.  Left basilar pleural effusion with poor aeration in left lower lung.    Dictated by (CST): Javi Garcia MD on 5/27/2025 at 10:11 PM     Finalized by (CST): Javi Garcia MD on 5/27/2025 at 10:13 PM           XR CHEST AP PORTABLE  (CPT=71045)  Result Date: 5/27/2025  CONCLUSION:  1. CHF/fluid overload with left basilar pleural effusion. 2. No finding to account for the lack of ventilation in the left lung on the recent V/Q scan.  Findings suggest a large mucus plug which is no longer present.    Dictated by (CST): Javi Garcia MD on 5/27/2025 at 4:58 PM     Finalized by (CST): Javi Garcia MD on 5/27/2025 at 5:00 PM           NM LUNG VQ VENT / PERFUSION SCAN  (CPT=78582)  Result Date: 5/27/2025  CONCLUSION:  1. No pulmonary embolus. 2. No perfusion in the left lung suggests airway obstruction probably from a large mucus plug.  Updated chest x-ray follow-up recommended.  Findings were called to Dr. Reynaga.    Dictated by (CST): Javi Garcia MD on 5/27/2025 at 3:34 PM     Finalized by (CST): Javi Garcia MD on 5/27/2025 at 3:43 PM                  Assessment & Plan:  1-acute respiratory failure with hypoxia , multifactorial  - Aspiration pneumonia with mucous plugging s/p LLL atelectasis / resolved after intubation   - Pulmonary edema  - COPD with acute exacerbation ( extensive history of smoking )  - Obesity and CRUZITO  - Generalized weakness and fatigue and recurrent falls     Failed BiPAP and Airvo and intubated 5/27/25  CXR better after intubation and LLL atelectasis resolved   Chest ct reviewed with basilar infiltrate indicating aspiration pneumonitis  Did not tolerate SBT today  FiO2 at 45% and PEEP of 8     Continue with Unasyn  Steroid and bronchodilator and nebulizers  Vent support and management     2-acute on chronic kidney injury /anuric   Rhabdomyolysis , and now anuric    started with hemodialysis    Renal following     3-h/o HFrEF previous echo LVEF 35%  F/u echo      4-transaminitis likely shock  liver  Improving  Mild fatty liver on CT otherwise negative  Supportive care     5-recurrent falls and admitted with rhabdomyolysis     6-DVT prophylaxis  Heparin subcu     7-poor nutrition status  Start NG tube feeding today      8-full code     9-depression  Cymbalta and Seroquel        Critical , High risk and complex  D/w staff and RT      Upon my evaluation, this patient had a high probability of imminent or life-threatening deterioration due to renal failure and respiratory failure, which required my direct attention, intervention, and personal management.     I have personally provided 35 minutes of critical care time, exclusive of time spent on separately billable procedures.  Time includes review of all pertinent laboratory/radiology results, discussion with consultants, and monitoring for potential decompensation.  Performed interventions included managing mechanical ventilation.                  Jonathan Casillas MD  5/29/2025                                              MEDICATIONS ADMINISTERED IN LAST 1 DAY:  acetylcysteine (Mucomyst) 20 % nebulizer solution 2 mL       Date Action Dose Route User    5/29/2025 0746 Given 2 mL Nebulization [place], William    5/28/2025 1921 Given 2 mL Nebulization Thang Croftsiruby          albumin human (Albumin) 25% injection 25 g       Date Action Dose Route User    5/29/2025 1209 New Bag 25 g Intravenous Terrence Baker RN          ampicillin-sulbactam (Unasyn) 1.5 g in sodium chloride 0.9% 100mL IVPB-MAYO       Date Action Dose Route User    5/29/2025 0558 New Bag 1.5 g Intravenous Ivis Rodríguez RN    5/28/2025 1703 New Bag 1.5 g Intravenous Terrnece Baker, ELLIS          arformoterol (Brovana) 15 MCG/2ML nebulizer solution 15 mcg       Date Action Dose Route User    5/29/2025 0819 Given 15 mcg Nebulization Bebo, William    5/28/2025 1932 Given 15 mcg Nebulization Thang Croftsia          mineral oil-white petrolatum (Artificial Tears) 83-15 % ophthalmic ointment 1  Application       Date Action Dose Route User    5/28/2025 2228 Given 1 Application Both Eyes Ivis Rodríguez RN          aspirin DR tab 81 mg       Date Action Dose Route User    5/29/2025 0900 Given 81 mg Oral Terrence Baker RN          budesonide (Pulmicort) 0.5 MG/2ML nebulizer suspension 0.5 mg       Date Action Dose Route User    5/29/2025 0826 Given 0.5 mg Nebulization William Lagunas    5/28/2025 1941 Given 0.5 mg Nebulization Mara Croft          busPIRone (Buspar) tab 30 mg       Date Action Dose Route User    5/29/2025 0858 Given 30 mg Per G Tube Terrence Baker RN    5/28/2025 2010 Given 30 mg Per G Tube Ivis Rodríguez RN    5/28/2025 1720 Given 30 mg Per G Tube Terrence Baker RN          carvedilol (Coreg) tab 3.125 mg       Date Action Dose Route User    5/29/2025 0901 Given 3.125 mg Oral Terrence Baker RN    5/28/2025 1742 Given 3.125 mg Oral Terrence Baker RN          chlorhexidine gluconate (Peridex) 0.12 % oral solution 15 mL       Date Action Dose Route User    5/29/2025 0859 Given 15 mL Mouth/Throat Terrence Baker RN    5/28/2025 2009 Given 15 mL Mouth/Throat Ivis Rodríguez RN          chlorproMAZINE (Thorazine) tab 25 mg       Date Action Dose Route User    5/29/2025 1201 Given 25 mg Per G Tube Terrence Baker RN    5/29/2025 0903 Given 25 mg Per G Tube Terrence Baker RN    5/28/2025 2010 Given 25 mg Per G Tube Ivis Rodríguez RN    5/28/2025 1703 Given 25 mg Per G Tube Terrence Baker RN          docusate (Colace) 50 MG/5ML oral solution 100 mg       Date Action Dose Route User    5/29/2025 0859 Given 100 mg Per NG Tube Terrence Baker RN    5/28/2025 2010 Given 100 mg Per NG Tube Ivis Rodríguez RN          DULoxetine (Cymbalta) DR cap 60 mg       Date Action Dose Route User    5/29/2025 0901 Given 60 mg Oral Terrence Baker RN    5/28/2025 2010 Given 60 mg Oral Ivis Rodríguez, RN          famotidine (Pepcid) 20 mg/2mL injection 20 mg        Date Action Dose Route User    5/29/2025 0902 Given 20 mg Intravenous Terrence Baker RN          heparin (Porcine) 1000 UNIT/ML injection 2,000 Units       Date Action Dose Route User    5/29/2025 0456 Given by Other 2,000 Units Intravenous Ivis Rodríguez RN    5/29/2025 0455 Given by Other 2,000 Units Intravenous Ivis Rodríguez RN          heparin (Porcine) 5000 UNIT/ML injection 5,000 Units       Date Action Dose Route User    5/29/2025 0901 Given 5,000 Units Subcutaneous (Left Lower Abdomen) Terrence Baker RN    5/28/2025 2010 Given 5,000 Units Subcutaneous (Right Lower Abdomen) Ivis Rodríguez RN          ipratropium-albuterol (Duoneb) 0.5-2.5 (3) MG/3ML inhalation solution 3 mL       Date Action Dose Route User    5/29/2025 1301 Given 3 mL Nebulization Bebo, William    5/29/2025 0746 Given 3 mL Nebulization Bebo, William    5/28/2025 1920 Given 3 mL Nebulization Croft Tresia          levothyroxine (Synthroid) tab 150 mcg       Date Action Dose Route User    5/29/2025 0558 Given 150 mcg Per G Tube Ivis Rodríguez RN          methylPREDNISolone sodium succinate (Solu-MEDROL) injection 40 mg       Date Action Dose Route User    5/29/2025 0901 Given 40 mg Intravenous Terrence Baker RN          midodrine (ProAmatine) tab 10 mg       Date Action Dose Route User    5/29/2025 1201 Given 10 mg Per G Tube Terrence Baker RN    5/29/2025 0558 Given 10 mg Per G Tube Ivis Rodríguez RN    5/29/2025 0049 Given 10 mg Per G Tube Ivis Rodríguez RN          polyethylene glycol (PEG 3350) (Miralax) 17 g oral packet 17 g       Date Action Dose Route User    5/29/2025 0916 Given 17 g Oral Terrence Baker RN          propofol (Diprivan) 10 mg/mL infusion premix       Date Action Dose Route User    5/29/2025 1200 Rate/Dose Verify 10 mcg/kg/min × 105.7 kg (Dosing Weight) Intravenous Terrence Baker RN    5/29/2025 1125 Restarted 10 mcg/kg/min × 105.7 kg (Dosing Weight) Intravenous  Terrence Baker RN    5/29/2025 0614 New Bag 10 mcg/kg/min × 105.7 kg (Dosing Weight) Intravenous Ivis Rodríguez RN    5/29/2025 0030 Rate/Dose Change 10 mcg/kg/min × 105.7 kg (Dosing Weight) Intravenous Ivis Rodríguez RN    5/28/2025 2209 Rate/Dose Change 10 mcg/kg/min × 105.7 kg (Dosing Weight) Intravenous Ivis Rodríguez RN    5/28/2025 2000 Rate/Dose Change 15 mcg/kg/min × 105.7 kg (Dosing Weight) Intravenous Ivis Rodríguez RN    5/28/2025 1809 New Bag 20 mcg/kg/min × 105.7 kg (Dosing Weight) Intravenous Terrence Baker RN    5/28/2025 1600 Rate/Dose Verify 20 mcg/kg/min × 105.7 kg (Dosing Weight) Intravenous Terrence Baker RN          QUEtiapine (SEROquel) tab 50 mg       Date Action Dose Route User    5/29/2025 0902 Given 50 mg Per G Tube Terrence Baker RN    5/28/2025 1703 Given 50 mg Per G Tube Terrence Baker RN          QUEtiapine ER (SEROquel XR) 24 hr tab 300 mg       Date Action Dose Route User    5/28/2025 2010 Given 300 mg Oral Ivis Rodríguez RN          sacubitril-valsartan (Entresto) 24-26 MG per tab 1 tablet       Date Action Dose Route User    5/29/2025 0901 Given 1 tablet Oral Terrence Baker RN    5/28/2025 2010 Given 1 tablet Oral Ivis Rodríguez RN          senna (Senokot) 8.8 MG/5ML oral syrup 17.6 mg       Date Action Dose Route User    5/29/2025 0859 Given 17.6 mg Per NG Tube Terrence Baker RN    5/28/2025 2009 Given 17.6 mg Per NG Tube Ivis Rodríguez RN            Vitals (last day)       Date/Time Temp Pulse Resp BP SpO2 Weight O2 Device O2 Flow Rate (L/min) Who    05/29/25 1300 -- 93 18 105/61 94 % -- Ventilator -- MV    05/29/25 1200 96.7 °F (35.9 °C) 90 18 97/56 93 % -- Ventilator -- MV    05/29/25 1100 -- 94 21 118/58 91 % -- Ventilator -- MV    05/29/25 1000 -- 94 23 110/64 91 % -- Ventilator -- MV    05/29/25 0900 -- 86 16 95/48 94 % -- Ventilator -- MV    05/29/25 0800 96.5 °F (35.8 °C) 82 16 95/57 94 % -- Ventilator -- MV    05/29/25 0746  -- 80 16 -- 93 % -- -- -- EB    05/29/25 0700 -- 78 16 89/57 98 % -- Ventilator -- MV    05/29/25 0600 -- 85 21 91/59 98 % -- Ventilator -- MAEVE    05/29/25 0600 -- -- -- -- -- 234 lb 9.1 oz (106.4 kg) -- -- SF    05/29/25 0500 -- 85 16 89/59 97 % -- Ventilator -- MAEVE    05/29/25 0400 96.6 °F (35.9 °C) 85 15 89/62 97 % -- Ventilator -- MAEVE    05/29/25 0300 -- 85 15 99/58 97 % -- Ventilator -- MAEVE    05/29/25 0200 -- 86 15 98/55 96 % -- Ventilator -- MAEVE    05/29/25 0100 -- 82 15 86/50 95 % -- Ventilator -- MAEVE    05/29/25 0000 96.6 °F (35.9 °C) 85 16 118/55 94 % -- Ventilator -- MAEVE    05/28/25 2309 -- 84 15 112/60 95 % -- Ventilator -- MEAVE    05/28/25 2300 -- 81 14 98/53 93 % -- Ventilator -- MAEVE    05/28/25 2200 -- 86 15 107/57 94 % -- Ventilator -- MAEVE    05/28/25 2100 -- 80 16 109/60 95 % -- Ventilator -- MAEVE    05/28/25 2000 96.7 °F (35.9 °C) 80 16 121/64 95 % -- Ventilator -- MAEVE    05/28/25 1900 -- 85 15 120/65 95 % -- Ventilator -- MAEVE    05/28/25 1800 -- 84 15 119/65 95 % -- Ventilator -- MV    05/28/25 1700 -- 82 16 125/66 96 % -- Ventilator -- MV    05/28/25 1600 98.5 °F (36.9 °C) 84 16 130/63 95 % -- Ventilator -- MV    05/28/25 1500 -- 88 16 133/67 96 % -- Ventilator -- MV    05/28/25 1400 -- 89 17 135/75 97 % -- Ventilator -- MV    05/28/25 1300 -- 92 16 144/70 96 % -- Ventilator -- MV    05/28/25 1200 98.4 °F (36.9 °C) 94 17 145/71 96 % -- Ventilator -- MV    05/28/25 1100 -- 92 16 140/70 96 % -- Ventilator -- MV    05/28/25 1000 -- 91 16 134/67 97 % -- Ventilator -- MV    05/28/25 0900 -- 91 18 152/72 97 % -- Ventilator -- MV    05/28/25 0800 97.5 °F (36.4 °C) 83 16 125/70 98 % -- Ventilator -- MV    05/28/25 0700 -- 88 16 105/61 96 % -- Ventilator -- MV    05/28/25 0625 -- -- -- 97/56 -- -- -- -- SF    05/28/25 0600 -- 95 18 157/135 98 % -- Ventilator -- MAEVE    05/28/25 0500 -- 96 19 142/75 90 % 234 lb 2.1 oz (106.2 kg) Ventilator -- MAEVE    05/28/25 0400 97.2 °F (36.2 °C) 99 17 122/75 97 % -- Ventilator -- MAEVE     05/28/25 0300 -- 93 18 117/64 93 % -- Ventilator --     05/28/25 0200 -- 95 16 118/66 94 % -- Ventilator --     05/28/25 0100 -- 96 20 105/62 93 % -- Ventilator --     05/28/25 0000 97.5 °F (36.4 °C) 97 18 109/64 96 % -- Ventilator --           CIWA Scores (since admission)       None

## 2025-05-30 ENCOUNTER — APPOINTMENT (OUTPATIENT)
Dept: GENERAL RADIOLOGY | Facility: HOSPITAL | Age: 65
End: 2025-05-30
Attending: INTERNAL MEDICINE
Payer: MEDICAID

## 2025-05-30 LAB
ANION GAP SERPL CALC-SCNC: 8 MMOL/L (ref 0–18)
BASOPHILS # BLD AUTO: 0.07 X10(3) UL (ref 0–0.2)
BASOPHILS NFR BLD AUTO: 0.7 %
BUN BLD-MCNC: 22 MG/DL (ref 9–23)
BUN/CREAT SERPL: 9.5 (ref 10–20)
CALCIUM BLD-MCNC: 8.4 MG/DL (ref 8.7–10.4)
CHLORIDE SERPL-SCNC: 104 MMOL/L (ref 98–112)
CO2 SERPL-SCNC: 27 MMOL/L (ref 21–32)
CREAT BLD-MCNC: 2.32 MG/DL (ref 0.55–1.02)
DEPRECATED RDW RBC AUTO: 55.1 FL (ref 35.1–46.3)
EGFRCR SERPLBLD CKD-EPI 2021: 23 ML/MIN/1.73M2 (ref 60–?)
EOSINOPHIL # BLD AUTO: 0.2 X10(3) UL (ref 0–0.7)
EOSINOPHIL NFR BLD AUTO: 2 %
ERYTHROCYTE [DISTWIDTH] IN BLOOD BY AUTOMATED COUNT: 16.6 % (ref 11–15)
GLUCOSE BLD-MCNC: 169 MG/DL (ref 70–99)
GLUCOSE BLDC GLUCOMTR-MCNC: 103 MG/DL (ref 70–99)
GLUCOSE BLDC GLUCOMTR-MCNC: 117 MG/DL (ref 70–99)
GLUCOSE BLDC GLUCOMTR-MCNC: 170 MG/DL (ref 70–99)
GLUCOSE BLDC GLUCOMTR-MCNC: 175 MG/DL (ref 70–99)
HCT VFR BLD AUTO: 35.7 % (ref 35–48)
HGB BLD-MCNC: 11.4 G/DL (ref 12–16)
IMM GRANULOCYTES # BLD AUTO: 0.08 X10(3) UL (ref 0–1)
IMM GRANULOCYTES NFR BLD: 0.8 %
LYMPHOCYTES # BLD AUTO: 1.22 X10(3) UL (ref 1–4)
LYMPHOCYTES NFR BLD AUTO: 12.4 %
MCH RBC QN AUTO: 29.5 PG (ref 26–34)
MCHC RBC AUTO-ENTMCNC: 31.9 G/DL (ref 31–37)
MCV RBC AUTO: 92.2 FL (ref 80–100)
MONOCYTES # BLD AUTO: 0.48 X10(3) UL (ref 0.1–1)
MONOCYTES NFR BLD AUTO: 4.9 %
NEUTROPHILS # BLD AUTO: 7.76 X10 (3) UL (ref 1.5–7.7)
NEUTROPHILS # BLD AUTO: 7.76 X10(3) UL (ref 1.5–7.7)
NEUTROPHILS NFR BLD AUTO: 79.2 %
OSMOLALITY SERPL CALC.SUM OF ELEC: 295 MOSM/KG (ref 275–295)
PLATELET # BLD AUTO: 147 10(3)UL (ref 150–450)
POTASSIUM SERPL-SCNC: 3.2 MMOL/L (ref 3.5–5.1)
RBC # BLD AUTO: 3.87 X10(6)UL (ref 3.8–5.3)
SODIUM SERPL-SCNC: 139 MMOL/L (ref 136–145)
WBC # BLD AUTO: 9.8 X10(3) UL (ref 4–11)

## 2025-05-30 PROCEDURE — 71045 X-RAY EXAM CHEST 1 VIEW: CPT | Performed by: INTERNAL MEDICINE

## 2025-05-30 PROCEDURE — 99233 SBSQ HOSP IP/OBS HIGH 50: CPT | Performed by: INTERNAL MEDICINE

## 2025-05-30 RX ORDER — POTASSIUM CHLORIDE 1.5 G/1.58G
20 POWDER, FOR SOLUTION ORAL ONCE
Status: COMPLETED | OUTPATIENT
Start: 2025-05-30 | End: 2025-05-30

## 2025-05-30 NOTE — PROGRESS NOTES
[facility]  part of [facility]    Progress Note    Radha Winters Patient Status:  Inpatient    1960 MRN [id number]   Location [facility] 5SW/SE Attending Fanny Majano MD   Hosp Day # 13 PCP JUAN MONTANEZ       SUBJECTIVE:  Extubated.  Alert.  Patient tells me she is feeling good.      OBJECTIVE:  Vital signs in last 24 hours:  /55 (BP Location: Right arm)   Pulse 95   Temp 97 °F (36.1 °C) (Temporal)   Resp 24   Ht 5' 3\" (1.6 m)   Wt 234 lb 9.1 oz (106.4 kg)   SpO2 92%   BMI 41.55 kg/m²     Intake/Output:    Intake/Output Summary (Last 24 hours) at 2025 1056  Last data filed at 2025 2300  Gross per 24 hour   Intake 754.3 ml   Output 1500 ml   Net -745.7 ml       Wt Readings from Last 3 Encounters:   25 234 lb 9.1 oz (106.4 kg)   10/09/23 185 lb 3 oz (84 kg)   10/04/23 185 lb (83.9 kg)       Exam  Gen: No acute distress,   HEENT: No pallor  Pulm: Lungs clear to auscultation anteriorly  CV: Heart with regular rate and rhythm, no peripheral edema  Abd: Abdomen soft, nontender, nondistended, no organomegaly, bowel sounds present  Skin: no rashes or lesions  CNS: Alert    Data Review:     Labs:   Lab Results   Component Value Date    WBC 9.8 2025    HGB 11.4 2025    HCT 35.7 2025    .0 2025    CREATSERUM 2.32 2025    BUN 22 2025     2025    K 3.2 2025     2025    CO2 27.0 2025     2025    CA 8.4 2025       LABS  Recent Labs   Lab 25  0904 25  0940 25  0430 25  0358 25  0428 25  0419 25  0606 25  0351   RBC 4.52  --    < > 4.48 4.60 4.27 4.08 3.87   HGB 13.7  --    < > 13.4 13.6 12.7 12.0 11.4*   HCT 41.0  --    < > 42.8 43.8 39.9 38.1 35.7   MCV 90.7  --    < > 95.5 95.2 93.4 93.4 92.2   MCH 30.3  --    < > 29.9 29.6 29.7 29.4 29.5   MCHC 33.4  --    < > 31.3 31.1 31.8 31.5 31.9   RDW 15.8*  --    < >  16.5* 16.8* 16.7* 16.5* 16.6*   NEPRELIM 6.64  --    < > 8.59* 8.48* 8.52* 7.67 7.76*   WBC 8.2  --    < > 10.3 9.9 9.7 9.7 9.8   .0*  --    < > 111.0* 129.0* 153.0 161.0 147.0*   *  --   --  285* 279* 207*  --   --    *  --   --  70* 46 37  --   --    DDIMER  --  1.77*  --   --   --   --   --   --    CK 6,552*  --   --   --  9,315*  --   --   --    *  --    < > 114* 112* 132* 114* 169*    < > = values in this interval not displayed.       Imaging:      Meds:   Current Hospital Medications[1]    Assessment  Problem List[2]  1. Acute Kidney Injury on Chronic Kidney Disease:  -  - Nephrology consultation obtained  Patient with worsening renal function.  Nephrology is following the patient  On hemodialysis       2. Rhabdomyolysis:  - Secondary to recurrent falls  - Possibly statin-induced  - Plan: Hold statin (Crestor)  Improving     3. Acute Transaminitis:  - Likely secondary to rhabdomyolysis  - May also be statin-related  - Will monitor with serial labs  Patient could also have a shock liver  Liver enzymes are improving       4. Recurrent Falls:  -    Patient will need physical therapy and Occupational Therapy after she is extubated      5. Coronary Artery Disease:  - Currently stable  - Patient denies chest pain     6. Hypothyroidism:  - Continue levothyroxine  Possible UTI.  on ceftriaxone.    Acute hypoxic respiratory failure  Extubated today  Pulmonary following.      Chronic heart failure with reduced ejection fraction    Cardiomyopathy.  cardiology consulted         Plan for close monitoring and adjustment of management based on clinical course.  discussed with nursing staff        Active Orders   Nourishments    Room Service Eligibility Until Discontinued     Frequency: Until Discontinued     Number of Occurrences: Until Specified    Room Service Notify RN Until Discontinued     Frequency: Until Discontinued     Number of Occurrences: Until Specified    Tube Feeding Diet Effective  Now     Frequency: Effective Now     Number of Occurrences: Until Specified   Respiratory Care    BIPAP When Sleeping     Frequency: When Sleeping     Number of Occurrences: Until Specified    BIPAP When Sleeping     Frequency: When Sleeping     Number of Occurrences: Until Specified    Chest physiotherapy 4x Daily     Frequency: 4x Daily     Number of Occurrences: Until Specified     Order Comments: Left lower lobe atelectasis.      EZ-PAP 4x Daily     Frequency: 4x Daily     Number of Occurrences: Until Specified    Incentive spriometry: RT to teach pt Once     Frequency: Once     Number of Occurrences: 1 Occurrences    Mechanical ventilation Until Discontinued     Frequency: Until Discontinued     Number of Occurrences: Until Specified    Oxygen administration (adult) PRN     Frequency: PRN     Number of Occurrences: Until Specified    Respiratory care evaluation Once     Frequency: Once     Number of Occurrences: 1 Occurrences     Order Comments: If patient uses home CPAP/BIPAP, place on known home settings. If unknown, place on auto CPAP with upper limit 20 and lower limit 5 cm H2O      Ventilator management protocol Once     Frequency: Once     Number of Occurrences: 1 Occurrences   Dialysis    Hemodialysis inpatient     Frequency: Tomorrow     Number of Occurrences: 1 Occurrences    Hemodialysis inpatient     Frequency: Once     Number of Occurrences: 1 Occurrences     Order Comments: Bolus heparin 4000 U at start      Hemodialysis inpatient     Frequency: Tomorrow     Number of Occurrences: 1 Occurrences    Hemodialysis inpatient     Frequency: Tomorrow     Number of Occurrences: 1 Occurrences     Order Comments: Keep MAP goal >65     Restraints    Restraints non-violent or non self-destr Continuous x 24 hours     Frequency: Continuous x 24 hours     Number of Occurrences: 24 Hours   Precaution    Aspiration precautions Continuous     Frequency: Continuous     Number of Occurrences: Until Specified   Diet     NPO     Frequency: Effective Now     Number of Occurrences: Until Specified   Nursing    Accucheck     Frequency: BID     Number of Occurrences: Until Specified    Accucheck     Frequency: Q6H     Number of Occurrences: Until Specified    Accucheck     Frequency: PRN     Number of Occurrences: Until Specified     Order Comments: For symptoms or suspicion of hypoglycemia      Assess using CAM-ICU     Frequency: Q Shift     Number of Occurrences: Until Specified    Assess using Critical Care Pain Observation Tool (CPOT)     Frequency: Now Then Q3H     Number of Occurrences: Until Specified    Assess using Sun Agitation Sedation Scale (RASS)     Frequency: Now Then Q3H     Number of Occurrences: Until Specified    Elevate head of bed >30 degrees     Frequency: Until Discontinued     Number of Occurrences: Until Specified     Order Comments: 30-45 degrees at all times unless contraindicated by physician order or patient condition.      Elevate head of bed greater than or equal to 30 degrees     Frequency: Until Discontinued     Number of Occurrences: Until Specified    Flush feeding tube     Frequency: PRN     Number of Occurrences: Until Specified     Order Comments: Flush feeding tube:  1.) Before and after bolus feedings.  2.) After medication administration.      For any tube feed interruptions, continue basal insulin, and correction scales. Hold any scheduled insulins, and resume when tube feed is restarted.     Frequency: Until Discontinued     Number of Occurrences: Until Specified    For non-patent tubes:     Frequency: PRN     Number of Occurrences: Until Specified     Order Comments: If water is unsuccessful in dislodging the clog, use the pancrealipase/sodium bicarb. May repeat x1 before calling physician for further orders.      For patients without a history of diabetes, discontinue Accuchecks and insulin correction scale if blood glucose <180 mg/dL for 48 hours     Frequency: Until Discontinued      Number of Occurrences: Until Specified    Give all morning medications unless otherwise specified     Frequency: Until Discontinued     Number of Occurrences: Until Specified     Order Comments: Give all morning medications unless otherwise specified.  DO NOT use Electrolyte protocol.      If patient uses CPAP/BIPAP, encourage patient to wear when sleeping (including daytime) and after any apneic episode or adverse event.     Frequency: Until Discontinued     Number of Occurrences: Until Specified    Initiate early mobility protocol     Frequency: Once     Number of Occurrences: 1 Occurrences    Initiate Hypoglycemia Algorithm for Adults     Frequency: Until Discontinued     Number of Occurrences: Until Specified     Order Comments: For blood glucose less than 70      Initiate Medical Nutrition Therapy Protocol for Enteral Nutrition     Frequency: Until Discontinued     Number of Occurrences: Until Specified    Insert denny catheter     Frequency: Once     Number of Occurrences: 1 Occurrences    Intake and Output     Frequency: Per Unit Routine     Number of Occurrences: Until Specified     Order Comments: Record formula and water flushes separately.      May use port/central line     Frequency: Until Discontinued     Number of Occurrences: Until Specified    Measure weight     Frequency: Per Unit Routine     Number of Occurrences: Until Specified     Order Comments: Measure weight every Monday and Thursday for non critical care patients.      Monitor tube placement     Frequency: Q8H     Number of Occurrences: Until Specified    Notify attending physician for patients refusing CPAP     Frequency: Until Discontinued     Number of Occurrences: Until Specified     Order Comments: Document that patient was educated and refused CPAP, if applicable.      Notify attending physician for sustained desaturation <90% or prolonged or recurrent apnea     Frequency: Until Discontinued     Number of Occurrences: Until Specified      Order Comments: Notify attending physician for sustained desaturation <90% or prolonged or recurrent apnea      Notify physician     Frequency: Until Discontinued     Number of Occurrences: Until Specified    Notify physician of significant abdominal distension, pain, nausea, or emesis, and stop tube feeding     Frequency: Until Discontinued     Number of Occurrences: Until Specified    Notify Radiologist     Frequency: Until Discontinued     Number of Occurrences: Until Specified    Nurse Driven Indwelling Urinary Catheter Removal Protocol     Frequency: Until Discontinued     Number of Occurrences: Until Specified    Nursing communication (specify)     Frequency: Once     Number of Occurrences: 1 Occurrences     Order Comments: Hold blood thinners pre procedure      Nursing communication (specify)     Frequency: Once     Number of Occurrences: 1 Occurrences     Order Comments: RN, keep the patient off the left side.      Nursing communication (specify)     Frequency: Once     Number of Occurrences: 1 Occurrences     Order Comments: Removed OG and placed Dobhoff      Nursing communication (specify)     Frequency: Once     Number of Occurrences: 1 Occurrences     Order Comments: Dobhoff okay to use      Nursing communication - call [facility] to order dialysis     Frequency: Once     Number of Occurrences: 1 Occurrences     Order Comments: Call [facility] at [phone] to order dialysis.  Identify special circumstances and specify stat or routine treatment.      Patient may resume usual diet     Frequency: Once     Number of Occurrences: 1 Occurrences     Order Comments: Npo x one hour, then resume pre-procedure diet      Post procedure site assessment     Frequency: Per Unit Routine     Number of Occurrences: Until Specified     Order Comments: Every 15 minutes for 1 hour, then every 30 minutes for 1 hour, then every 60 minutes for 2 hours, then per unit routine      Provide patient with sleep apnea education  materials     Frequency: Once     Number of Occurrences: 1 Occurrences    Pulse oximetry     Frequency: Per Unit Routine     Number of Occurrences: Until Specified    Pulse oximetry     Frequency: Continuous     Number of Occurrences: Until Specified    Spontaneous awakening & breathing trial     Frequency: Daily     Number of Occurrences: Until Specified     Order Comments: Per protocol      Tube insertion     Frequency: Once     Number of Occurrences: 1 Occurrences    Tube insertion     Frequency: Once     Number of Occurrences: 1 Occurrences    Tube insertion     Frequency: Once     Number of Occurrences: 1 Occurrences    Tube insertion     Frequency: Once     Number of Occurrences: 1 Occurrences     Order Comments: Meds and tube feeding      Verify informed consent     Frequency: Once     Number of Occurrences: 1 Occurrences    Vital signs     Frequency: Per Unit Routine     Number of Occurrences: Until Specified     Order Comments: Every 15 minutes for 1 hour, then every 30 minutes for 1 hour, then every 60 minutes for 2 hours, then per unit routine.      Void prior to procedure     Frequency: Once     Number of Occurrences: 1 Occurrences   Consult    Consult to Interventional Radiology     Frequency: Once     Number of Occurrences: 1 Occurrences    Consult to Pulmonology     Frequency: Once     Number of Occurrences: 1 Occurrences    Social work     Linked Order: And     Frequency: Once     Number of Occurrences: 1 Occurrences   Medications    acetaminophen (Tylenol) tab 650 mg     Frequency: Q6H PRN     Dose: 650 mg     Route: Oral    acetylcysteine (Mucomyst) 20 % nebulizer solution 2 mL     Frequency: BID     Dose: 2 mL     Route: Nebulization    albumin human (Albumin) 25% injection 25 g     Frequency: PRN Dialysis     Dose: 25 g     Route: Intravenous    albuterol (Ventolin) (2.5 MG/3ML) 0.083% nebulizer solution 2.5 mg     Frequency: Q6H PRN     Dose: 2.5 mg     Route: Nebulization     ampicillin-sulbactam (Unasyn) 1.5 g in sodium chloride 0.9% 100mL IVPB-MAYO     Frequency: q12h     Dose: 1.5 g     Route: Intravenous    arformoterol (Brovana) 15 MCG/2ML nebulizer solution 15 mcg     Frequency: 2 times daily     Dose: 15 mcg     Route: Nebulization    aspirin DR tab 81 mg     Frequency: Daily     Dose: 81 mg     Route: Oral    bisacodyl (Dulcolax) 10 MG rectal suppository 10 mg     Frequency: Daily PRN     Dose: 10 mg     Route: Rectal    budesonide (Pulmicort) 0.5 MG/2ML nebulizer suspension 0.5 mg     Frequency: 2 times daily     Dose: 0.5 mg     Route: Nebulization    busPIRone (Buspar) tab 30 mg     Frequency: TID     Dose: 30 mg     Route: Per G Tube    carvedilol (Coreg) tab 3.125 mg     Frequency: BID with meals     Dose: 3.125 mg     Route: Oral    chlorhexidine gluconate (Peridex) 0.12 % oral solution 15 mL     Frequency: BID@0800,2000     Dose: 15 mL     Route: Mouth/Throat    chlorproMAZINE (Thorazine) tab 25 mg     Frequency: QID     Dose: 25 mg     Route: Per G Tube    dextrose 10% infusion (TPN no rate)     Frequency: Continuous PRN     Route: Intravenous    dextrose 50% injection 50 mL     Linked Order: Or     Frequency: Q15 Min PRN     Dose: 50 mL     Route: Intravenous    diazePAM (Valium) tab 5 mg     Frequency: Q8H PRN     Dose: 5 mg     Route: Per G Tube    docusate (Colace) 50 MG/5ML oral solution 100 mg     Frequency: BID     Dose: 100 mg     Route: Per NG Tube    DULoxetine (Cymbalta) DR cap 60 mg     Frequency: BID     Dose: 60 mg     Route: Oral    famotidine (Pepcid) 20 mg/2mL injection 20 mg     Frequency: Daily     Dose: 20 mg     Route: Intravenous    fentaNYL (Sublimaze) 50 mcg/mL injection 50 mcg     Frequency: Q30 Min PRN     Dose: 50 mcg     Route: Intravenous    fleet enema (Fleet) rectal enema 133 mL     Frequency: Once PRN     Dose: 1 enema     Route: Rectal    glucose (Dex4) 15 GM/59ML oral liquid 15 g     Linked Order: Or     Frequency: Q15 Min PRN     Dose: 15  g     Route: Oral    glucose (Dex4) 15 GM/59ML oral liquid 30 g     Linked Order: Or     Frequency: Q15 Min PRN     Dose: 30 g     Route: Oral    glucose (Glutose) 40% oral gel 15 g     Linked Order: Or     Frequency: Q15 Min PRN     Dose: 15 g     Route: Oral    glucose (Glutose) 40% oral gel 30 g     Linked Order: Or     Frequency: Q15 Min PRN     Dose: 30 g     Route: Oral    glucose-vitamin C (Dex-4) chewable tab 4 tablet     Linked Order: Or     Frequency: Q15 Min PRN     Dose: 4 tablet     Route: Oral    glucose-vitamin C (Dex-4) chewable tab 8 tablet     Linked Order: Or     Frequency: Q15 Min PRN     Dose: 8 tablet     Route: Oral    heparin (Porcine) 1000 UNIT/ML injection 2,000 Units     Frequency: PRN Dialysis     Dose: 2,000 Units     Route: Intravenous    heparin (Porcine) 5000 UNIT/ML injection 5,000 Units     Frequency: Q12H PATRICK     Dose: 5,000 Units     Route: Subcutaneous    HYDROcodone-acetaminophen (Norco) 5-325 MG per tab 1 tablet     Frequency: Q6H PRN     Dose: 1 tablet     Route: Oral    insulin regular human (Novolin R, Humulin R) 100 UNIT/ML injection 1-5 Units     Frequency: Q6H PATRICK     Dose: 1-5 Units     Route: Subcutaneous    ipratropium-albuterol (Duoneb) 0.5-2.5 (3) MG/3ML inhalation solution 3 mL     Frequency: TID     Dose: 3 mL     Route: Nebulization    levothyroxine (Synthroid) tab 150 mcg     Frequency: Before breakfast     Dose: 150 mcg     Route: Per G Tube    magnesium hydroxide (Milk of Magnesia) 400 MG/5ML oral suspension 30 mL     Frequency: Daily PRN     Dose: 30 mL     Route: Oral    methylPREDNISolone sodium succinate (Solu-MEDROL) injection 40 mg     Frequency: Daily     Dose: 40 mg     Route: Intravenous    midodrine (ProAmatine) tab 10 mg     Frequency: TID     Dose: 10 mg     Route: Per G Tube    mineral oil-white petrolatum (Artificial Tears) 83-15 % ophthalmic ointment 1 Application     Frequency: Nightly     Dose: 1 Application     Route: Both Eyes    ondansetron  (Zofran) 4 MG/2ML injection 4 mg     Frequency: Q6H PRN     Dose: 4 mg     Route: Intravenous    pancrelipase (Lip-Prot-Amyl) (Zenpep) DR particles cap 10,000 Units     Linked Order: And     Frequency: PRN     Dose: 10,000 Units     Route: Per G Tube    polyethylene glycol (PEG 3350) (Miralax) 17 g oral packet 17 g     Frequency: Daily PRN     Dose: 17 g     Route: Oral    propofol (Diprivan) 10 mg/mL infusion premix     Frequency: Continuous     Dose: 5-50 mcg/kg/min (Dosing Weight)     Route: Intravenous    QUEtiapine (SEROquel) tab 50 mg     Frequency: BID     Dose: 50 mg     Route: Per G Tube    QUEtiapine ER (SEROquel XR) 24 hr tab 300 mg     Frequency: Nightly     Dose: 300 mg     Route: Oral    sacubitril-valsartan (Entresto) 24-26 MG per tab 1 tablet     Linked Order: And     Frequency: BID     Dose: 1 tablet     Route: Oral    senna (Senokot) 8.8 MG/5ML oral syrup 17.6 mg     Frequency: BID     Dose: 10 mL     Route: Per NG Tube    sodium bicarbonate tab 650 mg     Linked Order: And     Frequency: PRN     Dose: 650 mg     Route: Per G Tube     Reviewed personally: current medical record, vital signs info, lab results, cardiac & radiographic films and reports.  Formulated plans for continued care for this patient.  RN to call us for any significant change  Scheduled tests: see orders   Other orders per treatment team.     *The above components were done for the patient encounter: Reviewing the patient's electronic chart, reviewing results, obtaining a medical history, counseling patient and/or family member, ordering medications, tests, or procedures, documenting clinical information in the electronic health record, refer to or communicate with other health care professionals as indicated, independently interpret results and providing care coordination      Fanny Majano MD           [1]   Current Facility-Administered Medications   Medication Dose Route Frequency    albumin human (Albumin) 25% injection  25 g  25 g Intravenous PRN Dialysis    midodrine (ProAmatine) tab 10 mg  10 mg Per G Tube TID    busPIRone (Buspar) tab 30 mg  30 mg Per G Tube TID    chlorproMAZINE (Thorazine) tab 25 mg  25 mg Per G Tube QID    diazePAM (Valium) tab 5 mg  5 mg Per G Tube Q8H PRN    QUEtiapine (SEROquel) tab 50 mg  50 mg Per G Tube BID    levothyroxine (Synthroid) tab 150 mcg  150 mcg Per G Tube Before breakfast    fentaNYL (Sublimaze) 50 mcg/mL injection 50 mcg  50 mcg Intravenous Q30 Min PRN    senna (Senokot) 8.8 MG/5ML oral syrup 17.6 mg  10 mL Per NG Tube BID    docusate (Colace) 50 MG/5ML oral solution 100 mg  100 mg Per NG Tube BID    chlorhexidine gluconate (Peridex) 0.12 % oral solution 15 mL  15 mL Mouth/Throat BID@0800,2000    mineral oil-white petrolatum (Artificial Tears) 83-15 % ophthalmic ointment 1 Application  1 Application Both Eyes Nightly    famotidine (Pepcid) 20 mg/2mL injection 20 mg  20 mg Intravenous Daily    ipratropium-albuterol (Duoneb) 0.5-2.5 (3) MG/3ML inhalation solution 3 mL  3 mL Nebulization TID    methylPREDNISolone sodium succinate (Solu-MEDROL) injection 40 mg  40 mg Intravenous Daily    glucose (Dex4) 15 GM/59ML oral liquid 15 g  15 g Oral Q15 Min PRN    Or    glucose (Glutose) 40% oral gel 15 g  15 g Oral Q15 Min PRN    Or    glucose-vitamin C (Dex-4) chewable tab 4 tablet  4 tablet Oral Q15 Min PRN    Or    dextrose 50% injection 50 mL  50 mL Intravenous Q15 Min PRN    Or    glucose (Dex4) 15 GM/59ML oral liquid 30 g  30 g Oral Q15 Min PRN    Or    glucose (Glutose) 40% oral gel 30 g  30 g Oral Q15 Min PRN    Or    glucose-vitamin C (Dex-4) chewable tab 8 tablet  8 tablet Oral Q15 Min PRN    insulin regular human (Novolin R, Humulin R) 100 UNIT/ML injection 1-5 Units  1-5 Units Subcutaneous 4 times per day    carvedilol (Coreg) tab 3.125 mg  3.125 mg Oral BID with meals    sacubitril-valsartan (Entresto) 24-26 MG per tab 1 tablet  1 tablet Oral BID    budesonide (Pulmicort) 0.5 MG/2ML  nebulizer suspension 0.5 mg  0.5 mg Nebulization 2 times daily    arformoterol (Brovana) 15 MCG/2ML nebulizer solution 15 mcg  15 mcg Nebulization 2 times daily    dextrose 10% infusion (TPN no rate)   Intravenous Continuous PRN    sodium bicarbonate tab 650 mg  650 mg Per G Tube PRN    And    pancrelipase (Lip-Prot-Amyl) (Zenpep) DR particles cap 10,000 Units  10,000 Units Per G Tube PRN    acetylcysteine (Mucomyst) 20 % nebulizer solution 2 mL  2 mL Nebulization BID    ampicillin-sulbactam (Unasyn) 1.5 g in sodium chloride 0.9% 100mL IVPB-MAYO  1.5 g Intravenous q12h    propofol (Diprivan) 10 mg/mL infusion premix  5-50 mcg/kg/min (Dosing Weight) Intravenous Continuous    DULoxetine (Cymbalta) DR cap 60 mg  60 mg Oral BID    HYDROcodone-acetaminophen (Norco) 5-325 MG per tab 1 tablet  1 tablet Oral Q6H PRN    ondansetron (Zofran) 4 MG/2ML injection 4 mg  4 mg Intravenous Q6H PRN    polyethylene glycol (PEG 3350) (Miralax) 17 g oral packet 17 g  17 g Oral Daily PRN    magnesium hydroxide (Milk of Magnesia) 400 MG/5ML oral suspension 30 mL  30 mL Oral Daily PRN    bisacodyl (Dulcolax) 10 MG rectal suppository 10 mg  10 mg Rectal Daily PRN    fleet enema (Fleet) rectal enema 133 mL  1 enema Rectal Once PRN    heparin (Porcine) 1000 UNIT/ML injection 2,000 Units  2,000 Units Intravenous PRN Dialysis    albuterol (Ventolin) (2.5 MG/3ML) 0.083% nebulizer solution 2.5 mg  2.5 mg Nebulization Q6H PRN    heparin (Porcine) 5000 UNIT/ML injection 5,000 Units  5,000 Units Subcutaneous 2 times per day    acetaminophen (Tylenol) tab 650 mg  650 mg Oral Q6H PRN    aspirin DR tab 81 mg  81 mg Oral Daily    QUEtiapine ER (SEROquel XR) 24 hr tab 300 mg  300 mg Oral Nightly   [2]   Patient Active Problem List  Diagnosis    Closed fracture of proximal end of left humerus, unspecified fracture morphology, initial encounter    Frequent falls    Seizure-like activity (HCC)    Pituitary lesion (HCC)    Major depressive disorder, recurrent  episode, moderate (HCC)    Generalized anxiety disorder    Acute renal failure superimposed on chronic kidney disease, unspecified acute renal failure type, unspecified CKD stage    Non-traumatic rhabdomyolysis    Transaminitis

## 2025-05-30 NOTE — PROGRESS NOTES
Patient seen in follow up. Extubated awake and responds Denies any cp.   Alert and oriented , smiling in NAD  HR and BP stable    Vitals:    05/30/25 0600   BP: 102/55   Pulse: 95   Resp: 24   Temp:        Intake/Output Summary (Last 24 hours) at 5/30/2025 1209  Last data filed at 5/29/2025 2300  Gross per 24 hour   Intake 754.3 ml   Output 1500 ml   Net -745.7 ml     Wt Readings from Last 1 Encounters:   05/29/25 234 lb 9.1 oz (106.4 kg)        General: NAD  Neck: Jugular venous pulsations not seen.  Lungs: Clear to auscultation.  Heart: RRR  Abdomen: Soft. Non tender  Extremities: mild edema.  Neurological: Alert. No focal deficits.  Psychiatric: cannot assess  Current Hospital Medications[1]  Prescriptions Prior to Admission[2]  XR CHEST AP PORTABLE  (CPT=71045)  Result Date: 5/30/2025  CONCLUSION:  1. Borderline cardiomegaly.  Tortuous aorta. 2. Support tubes and catheters are satisfactory. 3. Bilateral perihilar and lower lobe mixed alveolar and interstitial airspace opacification with interval progression.  Suspect multifocal pneumonitis Small bilateral effusions.    Dictated by (CST): Vinicius Claros MD on 5/30/2025 at 8:40 AM     Finalized by (CST): Vinicius Claros MD on 5/30/2025 at 8:45 AM          XR CHEST AP PORTABLE  (CPT=71045)  Result Date: 5/29/2025  CONCLUSION:  1. Feeding tube tip in distal gastric antrum about 15 cm from GE junction. 2. ET tube tip 5 cm above silverio. 3. Right-sided jugular catheter tip remains near RA SVC junction. 4. Left lung remains well expanded and clear.  Right lung is not fully included in the field of view. 5. Old left proximal humerus fracture.    Dictated by (CST): Javi Garcia MD on 5/29/2025 at 10:59 PM     Finalized by (CST): Javi Garcia MD on 5/29/2025 at 11:02 PM          XR CHEST AP PORTABLE  (CPT=71045)  Result Date: 5/29/2025  PROCEDURE: XR CHEST AP PORTABLE  (CPT=71045) TIME: 6:22 a.m..   COMPARISON: [facility], XR CHEST AP  PORTABLE (CPT=71045), 5/28/2025, 6:36 AM.  INDICATIONS: Progress follow up.  TECHNIQUE:   Single view.   FINDINGS/IMPRESSION:   1. There is an endotracheal tube with tip approximately 5.5 cm above the silverio.  The thoracic component of an enteric feeding tube is identified traversing the thoracic esophagus.  The distal tip is not visualized but lies below the GE junction.  There is a right IJ PermCath with tips projecting over the cavoatrial junction.  2. The heart mediastinal structures are minimally enlarged.  Pulmonary vascularity/interstitial markings are slightly increased.  There are small bilateral pleural effusions.  The findings are compatible with slight to moderate interstitial edema.  3. Lung volumes are diminished.  There are small opacities at the lung bases which could represent atelectasis, infiltrate, or a combination in addition to small pleural effusions.     Dictated by (CST): Jordi River MD on 5/29/2025 at 7:25 AM     Finalized by (CST): Jordi River MD on 5/29/2025 at 7:30 AM            Lab Results   Component Value Date    WBC 9.8 05/30/2025    HGB 11.4 05/30/2025    HCT 35.7 05/30/2025    .0 05/30/2025    CREATSERUM 2.32 05/30/2025    BUN 22 05/30/2025     05/30/2025    K 3.2 05/30/2025     05/30/2025    CO2 27.0 05/30/2025     05/30/2025    CA 8.4 05/30/2025       Assessment / Plan  1. Acute on Chronic Systolic Heart Failure -  Ejection fraction 30-35% with apical wall akinesia. Previous EF 35-40% 2015. Will institute heart failure therapy with Carvedilol 3.125 mg BID and Entresto 24/26 mg BID.            I50.23 Acute on chronic systolic (congestive) heart failure           2. Acute Respiratory Failure -  Currently intubated. Chest x-ray shows right basal opacity consistent with atelectasis versus pneumonia. Evidence of pulmonary edema pattern and possible aspiration pneumonia with mucus plugging. On abx  J96.00 Acute respiratory failure, unspecified  whether with hypoxia or hypercapnia        3. Acute on Chronic Kidney Disease -     Creatinine improving.  On HD.           N17.9 Acute kidney failure, unspecified           4. Demand Ischemia -  Troponin mildly elevated at 79, down from 197 four days ago. ECG with sinus tachycardia and occasional PVCs. Findings consistent with demand ischemia.           I24.8 Other forms of acute ischemic heart disease     Patient is extubated and in NAD  HR and BP stable  Await swallow eval, will continue GDMT as tolerated  ACE/ARB/Spironolactone cannot be added due to Renal insufficiency and soft BP, will consider once more stable  Can be transferred out of ICU    D/W Patient  D/W ICU nursing staff      Hayes Khan MD  [facility]    [address]  [address]  [place] [address]  Phone: [phone]  [email or web address]         [1]   Current Facility-Administered Medications   Medication Dose Route Frequency    albumin human (Albumin) 25% injection 25 g  25 g Intravenous PRN Dialysis    midodrine (ProAmatine) tab 10 mg  10 mg Per G Tube TID    busPIRone (Buspar) tab 30 mg  30 mg Per G Tube TID    chlorproMAZINE (Thorazine) tab 25 mg  25 mg Per G Tube QID    diazePAM (Valium) tab 5 mg  5 mg Per G Tube Q8H PRN    QUEtiapine (SEROquel) tab 50 mg  50 mg Per G Tube BID    levothyroxine (Synthroid) tab 150 mcg  150 mcg Per G Tube Before breakfast    senna (Senokot) 8.8 MG/5ML oral syrup 17.6 mg  10 mL Per NG Tube BID    docusate (Colace) 50 MG/5ML oral solution 100 mg  100 mg Per NG Tube BID    famotidine (Pepcid) 20 mg/2mL injection 20 mg  20 mg Intravenous Daily    ipratropium-albuterol (Duoneb) 0.5-2.5 (3) MG/3ML inhalation solution 3 mL  3 mL Nebulization TID    methylPREDNISolone sodium succinate (Solu-MEDROL) injection 40 mg  40 mg Intravenous Daily    glucose (Dex4) 15 GM/59ML oral liquid 15 g  15 g Oral Q15 Min PRN    Or    glucose (Glutose) 40% oral gel 15 g  15 g Oral Q15 Min PRN    Or    glucose-vitamin C  (Dex-4) chewable tab 4 tablet  4 tablet Oral Q15 Min PRN    Or    dextrose 50% injection 50 mL  50 mL Intravenous Q15 Min PRN    Or    glucose (Dex4) 15 GM/59ML oral liquid 30 g  30 g Oral Q15 Min PRN    Or    glucose (Glutose) 40% oral gel 30 g  30 g Oral Q15 Min PRN    Or    glucose-vitamin C (Dex-4) chewable tab 8 tablet  8 tablet Oral Q15 Min PRN    insulin regular human (Novolin R, Humulin R) 100 UNIT/ML injection 1-5 Units  1-5 Units Subcutaneous 4 times per day    carvedilol (Coreg) tab 3.125 mg  3.125 mg Oral BID with meals    sacubitril-valsartan (Entresto) 24-26 MG per tab 1 tablet  1 tablet Oral BID    budesonide (Pulmicort) 0.5 MG/2ML nebulizer suspension 0.5 mg  0.5 mg Nebulization 2 times daily    arformoterol (Brovana) 15 MCG/2ML nebulizer solution 15 mcg  15 mcg Nebulization 2 times daily    dextrose 10% infusion (TPN no rate)   Intravenous Continuous PRN    sodium bicarbonate tab 650 mg  650 mg Per G Tube PRN    And    pancrelipase (Lip-Prot-Amyl) (Zenpep) DR particles cap 10,000 Units  10,000 Units Per G Tube PRN    acetylcysteine (Mucomyst) 20 % nebulizer solution 2 mL  2 mL Nebulization BID    ampicillin-sulbactam (Unasyn) 1.5 g in sodium chloride 0.9% 100mL IVPB-MAYO  1.5 g Intravenous q12h    DULoxetine (Cymbalta) DR cap 60 mg  60 mg Oral BID    HYDROcodone-acetaminophen (Norco) 5-325 MG per tab 1 tablet  1 tablet Oral Q6H PRN    ondansetron (Zofran) 4 MG/2ML injection 4 mg  4 mg Intravenous Q6H PRN    polyethylene glycol (PEG 3350) (Miralax) 17 g oral packet 17 g  17 g Oral Daily PRN    magnesium hydroxide (Milk of Magnesia) 400 MG/5ML oral suspension 30 mL  30 mL Oral Daily PRN    bisacodyl (Dulcolax) 10 MG rectal suppository 10 mg  10 mg Rectal Daily PRN    fleet enema (Fleet) rectal enema 133 mL  1 enema Rectal Once PRN    heparin (Porcine) 1000 UNIT/ML injection 2,000 Units  2,000 Units Intravenous PRN Dialysis    albuterol (Ventolin) (2.5 MG/3ML) 0.083% nebulizer solution 2.5 mg  2.5 mg  Nebulization Q6H PRN    heparin (Porcine) 5000 UNIT/ML injection 5,000 Units  5,000 Units Subcutaneous 2 times per day    acetaminophen (Tylenol) tab 650 mg  650 mg Oral Q6H PRN    aspirin DR tab 81 mg  81 mg Oral Daily    QUEtiapine ER (SEROquel XR) 24 hr tab 300 mg  300 mg Oral Nightly   [2]   Medications Prior to Admission   Medication Sig    busPIRone HCl 30 MG Oral Tab Take 1 tablet (30 mg total) by mouth 3 (three) times daily.    chlorproMAZINE 25 MG Oral Tab Take 1 tablet (25 mg total) by mouth 4 (four) times daily.    diazePAM 5 MG Oral Tab Take 1 tablet (5 mg total) by mouth every 8 (eight) hours as needed for Anxiety.    DULoxetine 60 MG Oral Cap DR Particles Take 1 capsule (60 mg total) by mouth 2 (two) times daily.    metFORMIN  MG Oral Tablet 24 Hr Take 2 tablets (1,000 mg total) by mouth daily. (Patient taking differently: Take 2 tablets (1,000 mg total) by mouth 2 (two) times daily with meals.)    metoprolol succinate ER 50 MG Oral Tablet 24 Hr Take 1 tablet (50 mg total) by mouth 2 (two) times daily.    QUEtiapine  MG Oral Tablet 24 Hr Take 1 tablet (300 mg total) by mouth nightly.    QUEtiapine 50 MG Oral Tab Take 1 tablet (50 mg total) by mouth 2 (two) times daily.    rosuvastatin 40 MG Oral Tab Take 1 tablet (40 mg total) by mouth before bedtime.    lidocaine 5 % External Patch Place 1 patch onto the skin daily.    [] ibuprofen 600 MG Oral Tab Take 1 tablet (600 mg total) by mouth every 6 (six) hours as needed.    levothyroxine 150 MCG Oral Tab Take 1 tablet (150 mcg total) by mouth before breakfast. Give on an empty stomach. On Mon, Tue, Wed, Thur, Fri and Saturday    levothyroxine 75 MCG Oral Tab Take 1 tablet (75 mcg total) by mouth before breakfast. On Sundays only    aspirin 81 MG Oral Tab EC Take 1 tablet (81 mg total) by mouth in the morning.    methylPREDNISolone 4 MG Oral Tablet Therapy Pack Take as directed on dose pack instructions (Patient not taking: Reported on  5/17/2025)

## 2025-05-30 NOTE — CM/SW NOTE
Pt now extubated, on 15L high flow (RA baseline). Dobhoff TFs started. List of accepting [facility] facilities w/in-house HD provided to patient at bedside. Pt agreeable to provide choice on Monday 6/2. [employer] auth will be needed prior to dc.    Plan: [facility] w/in-house HD pending facility choice, ins auth, and medical clearance.    JANNIE Gonzales    [phone]

## 2025-05-30 NOTE — PLAN OF CARE
OG removed and Dobhoff placed.     Problem: Patient Centered Care  Goal: Patient preferences are identified and integrated in the patient's plan of care  Description: Interventions:  - What would you like us to know as we care for you?   - Provide timely, complete, and accurate information to patient/family  - Incorporate patient and family knowledge, values, beliefs, and cultural backgrounds into the planning and delivery of care  - Encourage patient/family to participate in care and decision-making at the level they choose  - Honor patient and family perspectives and choices  Outcome: Not Progressing     Problem: PAIN - ADULT  Goal: Verbalizes/displays adequate comfort level or patient's stated pain goal  Description: INTERVENTIONS:  - Encourage pt to monitor pain and request assistance  - Assess pain using appropriate pain scale  - Administer analgesics based on type and severity of pain and evaluate response  - Implement non-pharmacological measures as appropriate and evaluate response  - Consider cultural and social influences on pain and pain management  - Manage/alleviate anxiety  - Utilize distraction and/or relaxation techniques  - Monitor for opioid side effects  - Notify MD/LIP if interventions unsuccessful or patient reports new pain  - Anticipate increased pain with activity and pre-medicate as appropriate  Outcome: Not Progressing     Problem: SAFETY ADULT - FALL  Goal: Free from fall injury  Description: INTERVENTIONS:  - Assess pt frequently for physical needs  - Identify cognitive and physical deficits and behaviors that affect risk of falls.  - [place] fall precautions as indicated by assessment.  - Educate pt/family on patient safety including physical limitations  - Instruct pt to call for assistance with activity based on assessment  - Modify environment to reduce risk of injury  - Provide assistive devices as appropriate  - Consider OT/PT consult to assist with strengthening/mobility  -  Encourage toileting schedule  Outcome: Not Progressing     Problem: DISCHARGE PLANNING  Goal: Discharge to home or other facility with appropriate resources  Description: INTERVENTIONS:  - Identify barriers to discharge w/pt and caregiver  - Include patient/family/discharge partner in discharge planning  - Arrange for needed discharge resources and transportation as appropriate  - Identify discharge learning needs (meds, wound care, etc)  - Arrange for interpreters to assist at discharge as needed  - Consider post-discharge preferences of patient/family/discharge partner  - Complete POLST form as appropriate  - Assess patient's ability to be responsible for managing their own health  - Refer to Case Management Department for coordinating discharge planning if the patient needs post-hospital services based on physician/LIP order or complex needs related to functional status, cognitive ability or social support system  Outcome: Not Progressing     Problem: SKIN/TISSUE INTEGRITY - ADULT  Goal: Skin integrity remains intact  Description: INTERVENTIONS  - Assess and document risk factors for pressure ulcer development  - Assess and document skin integrity  - Monitor for areas of redness and/or skin breakdown  - Initiate interventions, skin care algorithm/standards of care as needed  Outcome: Not Progressing     Problem: RESPIRATORY - ADULT  Goal: Achieves optimal ventilation and oxygenation  Description: INTERVENTIONS:  - Assess for changes in respiratory status  - Assess for changes in mentation and behavior  - Position to facilitate oxygenation and minimize respiratory effort  - Oxygen supplementation based on oxygen saturation or ABGs  - Provide Smoking Cessation handout, if applicable  - Encourage broncho-pulmonary hygiene including cough, deep breathe, Incentive Spirometry  - Assess the need for suctioning and perform as needed  - Assess and instruct to report SOB or any respiratory difficulty  - Respiratory Therapy  support as indicated  - Manage/alleviate anxiety  - Monitor for signs/symptoms of CO2 retention  Outcome: Not Progressing     Problem: GENITOURINARY - ADULT  Goal: Absence of urinary retention  Description: INTERVENTIONS:  - Assess patient’s ability to void and empty bladder  - Monitor intake/output and perform bladder scan as needed  - Follow urinary retention protocol/standard of care  - Consider collaborating with pharmacy to review patient's medication profile  - Implement strategies to promote bladder emptying  Outcome: Not Progressing     Problem: METABOLIC/FLUID AND ELECTROLYTES - ADULT  Goal: Glucose maintained within prescribed range  Description: INTERVENTIONS:  - Monitor Blood Glucose as ordered  - Assess for signs and symptoms of hyperglycemia and hypoglycemia  - Administer ordered medications to maintain glucose within target range  - Assess barriers to adequate nutritional intake and initiate nutrition consult as needed  - Instruct patient on self management of diabetes  Outcome: Not Progressing  Goal: Electrolytes maintained within normal limits  Description: INTERVENTIONS:  - Monitor labs and rhythm and assess patient for signs and symptoms of electrolyte imbalances  - Administer electrolyte replacement as ordered  - Monitor response to electrolyte replacements, including rhythm and repeat lab results as appropriate  - Fluid restriction as ordered  - Instruct patient on fluid and nutrition restrictions as appropriate  Outcome: Not Progressing  Goal: Hemodynamic stability and optimal renal function maintained  Description: INTERVENTIONS:  - Monitor labs and assess for signs and symptoms of volume excess or deficit  - Monitor intake, output and patient weight  - Monitor urine specific gravity, serum osmolarity and serum sodium as indicated or ordered  - Monitor response to interventions for patient's volume status, including labs, urine output, blood pressure (other measures as available)  - Encourage  oral intake as appropriate  - Instruct patient on fluid and nutrition restrictions as appropriate  Outcome: Not Progressing     Problem: HEMATOLOGIC - ADULT  Goal: Free from bleeding injury  Description: (Example usage: patient with low platelets)  INTERVENTIONS:  - Avoid intramuscular injections, enemas and rectal medication administration  - Ensure safe mobilization of patient  - Hold pressure on venipuncture sites to achieve adequate hemostasis  - Assess for signs and symptoms of internal bleeding  - Monitor lab trends  - Patient is to report abnormal signs of bleeding to staff  - Avoid use of toothpicks and dental floss  - Use electric shaver for shaving  - Use soft bristle tooth brush  - Limit straining and forceful nose blowing  Outcome: Not Progressing  Goal: Maintains hematologic stability  Description: INTERVENTIONS  - Assess for signs and symptoms of bleeding or hemorrhage  - Monitor labs and vital signs for trends  - Administer supportive blood products/factors, fluids and medications as ordered and appropriate  - Administer supportive blood products/factors as ordered and appropriate  Outcome: Not Progressing  Goal: Free from bleeding injury  Description: (Example usage: patient with low platelets)  INTERVENTIONS:  - Avoid intramuscular injections, enemas and rectal medication administration  - Ensure safe mobilization of patient  - Hold pressure on venipuncture sites to achieve adequate hemostasis  - Assess for signs and symptoms of internal bleeding  - Monitor lab trends  - Patient is to report abnormal signs of bleeding to staff  - Avoid use of toothpicks and dental floss  - Use electric shaver for shaving  - Use soft bristle tooth brush  - Limit straining and forceful nose blowing  Outcome: Not Progressing     Problem: Diabetes/Glucose Control  Goal: Glucose maintained within prescribed range  Description: INTERVENTIONS:  - Monitor Blood Glucose as ordered  - Assess for signs and symptoms of  hyperglycemia and hypoglycemia  - Administer ordered medications to maintain glucose within target range  - Assess barriers to adequate nutritional intake and initiate nutrition consult as needed  - Instruct patient on self management of diabetes  Outcome: Not Progressing     Problem: Delirium  Goal: Minimize duration of delirium  Description: Interventions:  - Encourage use of hearing aids, eye glasses  - Promote highest level of mobility daily  - Provide frequent reorientation  - Promote wakefulness i.e. lights on, blinds open  - Promote sleep, encourage patient's normal rest cycle i.e. lights off, TV off, minimize noise and interruptions  - Encourage family to assist in orientation and promotion of home routines  Outcome: Not Progressing     Problem: GASTROINTESTINAL - ADULT  Goal: Minimal or absence of nausea and vomiting  Description: INTERVENTIONS:  - Maintain adequate hydration with IV or PO as ordered and tolerated  - Nasogastric tube to low intermittent suction as ordered  - Evaluate effectiveness of ordered antiemetic medications  - Provide nonpharmacologic comfort measures as appropriate  - Advance diet as tolerated, if ordered  - Obtain nutritional consult as needed  - Evaluate fluid balance  Outcome: Not Progressing  Goal: Maintains or returns to baseline bowel function  Description: INTERVENTIONS:  - Assess bowel function  - Maintain adequate hydration with IV or PO as ordered and tolerated  - Evaluate effectiveness of GI medications  - Encourage mobilization and activity  - Obtain nutritional consult as needed  - Establish a toileting routine/schedule  - Consider collaborating with pharmacy to review patient's medication profile  Outcome: Not Progressing     Problem: Safety Risk - Non-Violent Restraints  Goal: Patient will remain free from self-harm  Description: INTERVENTIONS:  - Apply the least restrictive restraint to prevent harm  - Notify patient and family of reasons restraints applied  - Assess  for any contributing factors to confusion (electrolyte disturbances, delirium, medications)  - Discontinue any unnecessary medical devices as soon as possible  - Assess the patient's physical comfort, circulation, skin condition, hydration, nutrition and elimination needs   - Reorient and redirection as needed  - Assess for the need to continue restraints  5/30/2025 0516 by Ivis Rodríguez, RN  Outcome: Not Progressing  5/29/2025 2256 by Ivis Rodríguez, RN  Outcome: Progressing

## 2025-05-30 NOTE — RESPIRATORY THERAPY NOTE
Patient received intubated and on ventilator VC+ 16/430/+8/40%. Bilateral breath sounds auscultated. Suction provided when indicated. No acute events during the night. RT will continue to monitor.       05/30/25 0502   Readings   Total RR 29   Minute Ventilation (L/min) 13.5 L/min   Expiratory Tidal Volume 567 mL   PIP Observed (cm H2O) 26 cm H2O   MAP (cm H2O) 14

## 2025-05-30 NOTE — RESPIRATORY THERAPY NOTE
Pt received on vent settings VC/16/430/8/40% ; ETT 7.5 @ 23 cm  Suctioned pt as needed throughout the morning  Pt tolerating and saturating appropriately     SBT done in am   Settings 5/+8/40% ; Increased PS 7     05/30/25 0841 05/30/25 0845 05/30/25 0910   Spontaneous Parameters   Spontaneous RR Rate 32 28 25   Spontaneous Minute Volume 12.8 9.04 12.1   Average Spontaneous Tidal Volume 355 364 429   $ Spontaneous Vital Capacity 0.985  --   --    Negative Inspiratory Force -33  --   --    Total RSBI 81 88 66   Weaning Trials   Patient self-extubated? No  --   --    Compassionate wean? No  --   --    Spontaneous Breathing Trial Time Initiated 0841  --   --    Spontaneous Breathing Trial Duration  --   --  25 MINS   Spontaneous Breathing Trial Method CPAP/PS  --   --    Spontaneous Breathing Trial Settings 5/+58/40% 7/+8/40%  --    Pre Trial HR  --  90  --    Pre Trial RR 32 28  --    Pre Trial SpO2  --  94 %  --    Pre Trial BP  --  109/71  --    Pre Trial Vt 355 364  --    Post Trial HR  --   --  91   Post Trial RR  --   --  25   Post Trial SpO2  --   --  93 %   Post Trial BP  --   --  122/63   Post Trial Vt  --   --  429     Pt extubated @ 0916 with RN  Pt placed on 15L HFNC  Pt tolerating and saturating appropriately  RT to continue to monitor

## 2025-05-30 NOTE — PROGRESS NOTES
[facility]  part of [facility]    Progress Note    Radha Winters Patient Status:  Inpatient    1960 MRN [id number]   Location [facility] 2W/SW Attending Fanny Majano MD   Hosp Day # 13 PCP JUAN MONTANEZ         Subjective:     Cardiovascular:  Negative for chest pain.   Gastrointestinal:  Negative for abdominal distention.   Psychiatric/Behavioral:  Negative for agitation.      Patient was seen and examined  Intubated on mechanical ventilation  Sedation was placed on hold and patient was awake and alert and moving extremity and following commands  I watched the patient on spontaneous breathing trial and she did very well and tolerated over 40 minutes with good parameters and hemodynamically stable with no significant oral or endotracheal secretion  Objective:   Blood pressure 102/55, pulse 95, temperature 97 °F (36.1 °C), temperature source Temporal, resp. rate 24, height 5' 3\" (1.6 m), weight 234 lb 9.1 oz (106.4 kg), SpO2 92%.  Physical Exam  Vitals and nursing note reviewed.   Constitutional:       General: She is not in acute distress.     Appearance: She is ill-appearing.   HENT:      Head: Atraumatic.      Nose: Nose normal.      Mouth/Throat:      Mouth: Mucous membranes are moist.   Eyes:      General: No scleral icterus.  Cardiovascular:      Rate and Rhythm: Normal rate.      Heart sounds:      No gallop.   Pulmonary:      Effort: No respiratory distress.      Breath sounds: No stridor. No wheezing, rhonchi or rales.   Abdominal:      General: Abdomen is flat. Bowel sounds are normal. There is no distension.      Palpations: Abdomen is soft.      Tenderness: There is no guarding.   Musculoskeletal:      Right lower leg: No edema.      Left lower leg: No edema.   Skin:     General: Skin is dry.   Neurological:      General: No focal deficit present.         Results:   Lab Results   Component Value Date    WBC 9.8 2025    HGB 11.4 (L) 2025    HCT  35.7 05/30/2025    .0 (L) 05/30/2025    CREATSERUM 2.32 (H) 05/30/2025    BUN 22 05/30/2025     05/30/2025    K 3.2 (L) 05/30/2025     05/30/2025    CO2 27.0 05/30/2025     (H) 05/30/2025    CA 8.4 (L) 05/30/2025    ALB 3.8 05/28/2025    ALKPHO 185 (H) 05/28/2025    BILT 0.3 05/28/2025    TP 5.9 05/28/2025     (H) 05/28/2025    ALT 37 05/28/2025    TSH 0.528 10/10/2023    LIP 36 05/14/2025    DDIMER 1.77 (H) 05/24/2025    MG 2.2 05/28/2025    PHOS 4.3 05/28/2025    TROPHS 79 (HH) 05/28/2025    CK 9,315 (HH) 05/27/2025    B12 581 10/10/2023       XR CHEST AP PORTABLE  (CPT=71045)  Result Date: 5/30/2025  CONCLUSION:  1. Borderline cardiomegaly.  Tortuous aorta. 2. Support tubes and catheters are satisfactory. 3. Bilateral perihilar and lower lobe mixed alveolar and interstitial airspace opacification with interval progression.  Suspect multifocal pneumonitis Small bilateral effusions.    Dictated by (CST): Vinicius Claros MD on 5/30/2025 at 8:40 AM     Finalized by (CST): Vinicius Claros MD on 5/30/2025 at 8:45 AM          XR CHEST AP PORTABLE  (CPT=71045)  Result Date: 5/29/2025  CONCLUSION:  1. Feeding tube tip in distal gastric antrum about 15 cm from GE junction. 2. ET tube tip 5 cm above silverio. 3. Right-sided jugular catheter tip remains near RA SVC junction. 4. Left lung remains well expanded and clear.  Right lung is not fully included in the field of view. 5. Old left proximal humerus fracture.    Dictated by (CST): Javi Garcia MD on 5/29/2025 at 10:59 PM     Finalized by (CST): Javi Garcia MD on 5/29/2025 at 11:02 PM          XR CHEST AP PORTABLE  (CPT=71045)  Result Date: 5/29/2025  PROCEDURE: XR CHEST AP PORTABLE  (CPT=71045) TIME: 6:22 a.m..   COMPARISON: [facility], XR CHEST AP PORTABLE (CPT=71045), 5/28/2025, 6:36 AM.  INDICATIONS: Progress follow up.  TECHNIQUE:   Single view.   FINDINGS/IMPRESSION:   1. There is an endotracheal tube with  tip approximately 5.5 cm above the silverio.  The thoracic component of an enteric feeding tube is identified traversing the thoracic esophagus.  The distal tip is not visualized but lies below the GE junction.  There is a right IJ PermCath with tips projecting over the cavoatrial junction.  2. The heart mediastinal structures are minimally enlarged.  Pulmonary vascularity/interstitial markings are slightly increased.  There are small bilateral pleural effusions.  The findings are compatible with slight to moderate interstitial edema.  3. Lung volumes are diminished.  There are small opacities at the lung bases which could represent atelectasis, infiltrate, or a combination in addition to small pleural effusions.     Dictated by (CST): Jordi River MD on 5/29/2025 at 7:25 AM     Finalized by (CST): Jordi River MD on 5/29/2025 at 7:30 AM                Assessment & Plan:      1-acute respiratory failure with hypoxia , multifactorial  - Aspiration pneumonia with mucous plugging s/p LLL atelectasis / resolved after intubation   - Pulmonary edema  - COPD with acute exacerbation ( extensive history of smoking )  - Obesity and CRUZITO  - Generalized weakness and fatigue and recurrent falls     Failed BiPAP and Airvo and intubated 5/27/25  CXR better after intubation and LLL atelectasis resolved     As discussed above doing very well with SBT and will proceed with extubation     Continue with Unasyn  Steroid and bronchodilator and nebulizers  Vent support and management     2-acute on chronic kidney injury /anuric   Rhabdomyolysis , and now anuric    started with hemodialysis    Renal following     3-h/o HFrEF previous echo LVEF 35%  F/u echo      4-transaminitis likely shock liver  Improving  Mild fatty liver on CT otherwise negative  Supportive care     5-recurrent falls and admitted with rhabdomyolysis     6-DVT prophylaxis  Heparin subcu     7-poor nutrition status  Tolerating  feeding via Keofeed     8-full code      9-depression  Cymbalta and Seroquel        D/w staff and RT               Jonathan Casillas MD  5/30/2025

## 2025-05-30 NOTE — PROGRESS NOTES
[facility]  part of [facility]    Nephrology Progress Note    Radha Winters Patient Status:  Inpatient    1960 MRN [id number]   Location [facility] 2W/SW Attending Fanny Majano MD   Hosp Day # 13 PCP JUAN MONTANEZ     Subjective:   Radha Winters is a(n) 64 year old female     Objective:   Blood pressure 102/55, pulse 95, temperature 97 °F (36.1 °C), temperature source Temporal, resp. rate 24, height 5' 3\" (1.6 m), weight 234 lb 9.1 oz (106.4 kg), SpO2 92%.    General appearance:  Intubated, sedated  Pulmonary: clear to auscultation bilaterally  Cardiovascular: S1, S2 normal, no murmur, click, rub or gallop, regular rate and rhythm  Abdominal: soft, non-tender; bowel sounds normal; no masses,  no organomegaly  Extremities: +edema  Pulses: 2+ and symmetric  Neurologic: Unable to assess patient is in ICU sedated.    Results:    Lab Results   Component Value Date    WBC 9.8 2025    HGB 11.4 (L) 2025    HCT 35.7 2025    .0 (L) 2025    CREATSERUM 2.32 (H) 2025    BUN 22 2025     2025    K 3.2 (L) 2025     2025    CO2 27.0 2025     (H) 2025    CA 8.4 (L) 2025    ALB 3.8 2025    ALKPHO 185 (H) 2025    BILT 0.3 2025    TP 5.9 2025     (H) 2025    ALT 37 2025    TSH 0.528 10/10/2023    LIP 36 2025    DDIMER 1.77 (H) 2025    MG 2.2 2025    PHOS 4.3 2025    TROPHS 79 (HH) 2025    CK 9,315 (HH) 2025    B12 581 10/10/2023       XR CHEST AP PORTABLE  (CPT=71045)  Result Date: 2025  CONCLUSION:  1. Borderline cardiomegaly.  Tortuous aorta. 2. Support tubes and catheters are satisfactory. 3. Bilateral perihilar and lower lobe mixed alveolar and interstitial airspace opacification with interval progression.  Suspect multifocal pneumonitis Small bilateral effusions.    Dictated by (CST): Vinicius Claros,  MD on 5/30/2025 at 8:40 AM     Finalized by (CST): Vinicius Claros MD on 5/30/2025 at 8:45 AM          XR CHEST AP PORTABLE  (CPT=71045)  Result Date: 5/29/2025  CONCLUSION:  1. Feeding tube tip in distal gastric antrum about 15 cm from GE junction. 2. ET tube tip 5 cm above silverio. 3. Right-sided jugular catheter tip remains near RA SVC junction. 4. Left lung remains well expanded and clear.  Right lung is not fully included in the field of view. 5. Old left proximal humerus fracture.    Dictated by (CST): Javi Garcia MD on 5/29/2025 at 10:59 PM     Finalized by (CST): Javi Garcia MD on 5/29/2025 at 11:02 PM          XR CHEST AP PORTABLE  (CPT=71045)  Result Date: 5/29/2025  PROCEDURE: XR CHEST AP PORTABLE  (CPT=71045) TIME: 6:22 a.m..   COMPARISON: [facility], XR CHEST AP PORTABLE (CPT=71045), 5/28/2025, 6:36 AM.  INDICATIONS: Progress follow up.  TECHNIQUE:   Single view.   FINDINGS/IMPRESSION:   1. There is an endotracheal tube with tip approximately 5.5 cm above the silverio.  The thoracic component of an enteric feeding tube is identified traversing the thoracic esophagus.  The distal tip is not visualized but lies below the GE junction.  There is a right IJ PermCath with tips projecting over the cavoatrial junction.  2. The heart mediastinal structures are minimally enlarged.  Pulmonary vascularity/interstitial markings are slightly increased.  There are small bilateral pleural effusions.  The findings are compatible with slight to moderate interstitial edema.  3. Lung volumes are diminished.  There are small opacities at the lung bases which could represent atelectasis, infiltrate, or a combination in addition to small pleural effusions.     Dictated by (CST): Jordi River MD on 5/29/2025 at 7:25 AM     Finalized by (CST): Jordi River MD on 5/29/2025 at 7:30 AM                Assessment & Plan:     Acute renal failure superimposed on chronic kidney disease, unspecified acute  renal failure type, unspecified CKD stage        Non-traumatic rhabdomyolysis        Transaminitis  Patient is a 63 y/o female with PMH of HTN, dyslipidemia, CAD admitted after multiple falls, Nephrology consulted for Acute Kidney Injury      Acute Kidney Injury:  Likely secondary to ATN, from Rhabdomyolysis, continue aggressive volume resuscitation, 0.9  ml/hr, continue to trend BMP, check Uric acid, Phos, Urine studies  CKD stage 3b vs 4:  Secondary to HTN nephrosclerosis, check Phos, PTH, renal US  Transaminitis:  Continue to trend LFTs, will check abdominal US        Recommendations:  Acute Kidney Injury likely unresolved ATN  HD with 2-2.5 liters UF as tolerated  Albumin and Midodine for MAP goal >65        May 23, 2025  Patient is nonoliguric.  Renal function is severely deranged.  Has a tunneled dialysis catheter in place.  Will dialyze as needed for volume control.  Currently patient shows no signs of recovery of kidney function and likely needs supportive care including dialysis as needed.     May 24, 2025  Patient had a rapid response called because of being short of breath and was clinically volume overloaded.  Patient transferred to progressive care unit.  Discussed with hospitalist.  Patient is receiving dialysis with intent to remove 2.2 L of fluid.  Continue supportive care and monitor electrolytes and renal function.     May 25, 2025  Patient is critically ill in ICU.  Today is day 2 of back-to-back dialysis.  Within goal  to remove 2.2 L of fluid in 3-1/2 hours.  Overall patient seems to have improved but still requiring BiPAP.The monitoring electrolyte and renal function.     May 26th 2025  Patient still not improved much with volume status. Plan for dialysis tomorrow with 4hrs and 3 Lit UF. Remains critically ill in ICU.      May 27th 2025  Patient doing better but still requiring high flow oxygen. She remains oligoanuric. Dialysis dependent. Monitor closely. Critically ill in ICU. Total  time spent seeing patient was 45 minutes.     May 28, 2025  Patient remains oligoanuric.  Urinary catheter was removed.  Will do periodic bladder scans and place catheter if needed or do bladder voiding protocol.  Discussed with RN.  Patient will also receive hemodialysis today with intent to remove 1.5 L of fluid.  Vent settings are acceptable.  Blood pressure is stable.  Patient did have intubation yesterday because of volume issues and respiratory distress.  Currently remains critically ill in the ICU.     May 29th 2025  Remains intubated, sedated on HD, tolerating it well.    May 30 th, 2025  Intubated, on full vent support, KCL replaced.             Bernardo Lewis MD  5/30/2025

## 2025-05-30 NOTE — PROGRESS NOTES
Received intubated pt on full vent support on the following vent settings: VC+ 16, 430, +8, 45%.  Nebs & CPT provided. Pt later placed on SBT on 5/5, 40% & pt displayed the following parameters:   05/29/25 1035 05/29/25 1104   Spontaneous Parameters   Spontaneous RR Rate 22 23   Spontaneous Minute Volume 8.9 9   Average Spontaneous Tidal Volume 331 362   $ Spontaneous Vital Capacity 0.827  --    Negative Inspiratory Force -45  --    Total RSBI 65 59     Pt later presented increased WOB after 30 mins on SBT w/ pt complaint of dyspnea. Pt subsequently placed back on full support, d/w Dr Casillas. PEEP returned to 8 after SBT for alveolar recruitment, FiO2 later weaned to 40%, pt tolerating well. Pplat WNL. ETT remains secured at 22cm at the lip. RT to continue to monitor.

## 2025-05-30 NOTE — DIETARY NOTE
ADULT NUTRITION UPDATE NOTE    RECOMMENDATIONS TO MD:   RD adjusted EN feeds now extubated and off sedation with propofol.   See nutrition intervention for Enteral Nutrition (EN) and free water flushes specifics.    PATIENT STATUS:   05/30/25 UPDATE: Brief note for EN adjustment. Pt extubated this AM. Off sedation with propofol. DHT remains in place with EN feeds at goal rate. Noted hypokalemia this AM with 20 mEq rider ordered per nephrology. Last HD on 5/29 with next scheduled HD on 5/31.      NUTRITION INTERVENTION:     NUTRITION PRESCRIPTION:   Estimated Nutrition needs: --dosing wt of 97 kg - wt taken on 5/20/25   Calories: 2875-7690 calories/day (15-20 calories per kg Dosing wt)  Protein:  g protein/day (1.5-2 g protein/kg Ideal body wt (IBW) (52.3 kg))  Fluid Needs: 1585 ml/day (25 ml/kg adjusted BW for obesity (63.4 kg) chronological age method) - adjust for clinical status (SUSAN on HD)    - Diet:       Procedures    NPO     - Enteral Nutrition:   Advance feeds of Nepro to goal rate 45 ml/hr per G-Tube/PEG.   Based on average 21 hour infusion time (hold per Prevacid guidelines) Goal rate provides 945 total TF volume, 1701 kcal, 77 grams protein, 687 ml total free water, and 100% RDI's.    Flush with 30 ml H2O q 4 hrs (to provide 180 ml total H2O from FWF daily and 867 ml total fluids daily with EN).  Meets 100% of estimated energy and 99% of estimated protein needs.      RD will follow per protocol.     Rosy Chaidez MS, RD, LDN, [facility]  [id number]

## 2025-05-31 LAB
ANION GAP SERPL CALC-SCNC: 10 MMOL/L (ref 0–18)
BASOPHILS # BLD AUTO: 0.11 X10(3) UL (ref 0–0.2)
BASOPHILS NFR BLD AUTO: 1 %
BUN BLD-MCNC: 42 MG/DL (ref 9–23)
BUN/CREAT SERPL: 11.3 (ref 10–20)
CALCIUM BLD-MCNC: 9 MG/DL (ref 8.7–10.4)
CHLORIDE SERPL-SCNC: 102 MMOL/L (ref 98–112)
CO2 SERPL-SCNC: 28 MMOL/L (ref 21–32)
CREAT BLD-MCNC: 3.71 MG/DL (ref 0.55–1.02)
DEPRECATED RDW RBC AUTO: 56.5 FL (ref 35.1–46.3)
EGFRCR SERPLBLD CKD-EPI 2021: 13 ML/MIN/1.73M2 (ref 60–?)
EOSINOPHIL # BLD AUTO: 0.25 X10(3) UL (ref 0–0.7)
EOSINOPHIL NFR BLD AUTO: 2.3 %
ERYTHROCYTE [DISTWIDTH] IN BLOOD BY AUTOMATED COUNT: 16.8 % (ref 11–15)
GLUCOSE BLD-MCNC: 112 MG/DL (ref 70–99)
GLUCOSE BLDC GLUCOMTR-MCNC: 103 MG/DL (ref 70–99)
GLUCOSE BLDC GLUCOMTR-MCNC: 110 MG/DL (ref 70–99)
GLUCOSE BLDC GLUCOMTR-MCNC: 121 MG/DL (ref 70–99)
GLUCOSE BLDC GLUCOMTR-MCNC: 152 MG/DL (ref 70–99)
GLUCOSE BLDC GLUCOMTR-MCNC: 98 MG/DL (ref 70–99)
HCT VFR BLD AUTO: 38.3 % (ref 35–48)
HGB BLD-MCNC: 12.1 G/DL (ref 12–16)
IMM GRANULOCYTES # BLD AUTO: 0.17 X10(3) UL (ref 0–1)
IMM GRANULOCYTES NFR BLD: 1.6 %
LYMPHOCYTES # BLD AUTO: 1.46 X10(3) UL (ref 1–4)
LYMPHOCYTES NFR BLD AUTO: 13.7 %
MCH RBC QN AUTO: 29.4 PG (ref 26–34)
MCHC RBC AUTO-ENTMCNC: 31.6 G/DL (ref 31–37)
MCV RBC AUTO: 93 FL (ref 80–100)
MONOCYTES # BLD AUTO: 0.52 X10(3) UL (ref 0.1–1)
MONOCYTES NFR BLD AUTO: 4.9 %
NEUTROPHILS # BLD AUTO: 8.14 X10 (3) UL (ref 1.5–7.7)
NEUTROPHILS # BLD AUTO: 8.14 X10(3) UL (ref 1.5–7.7)
NEUTROPHILS NFR BLD AUTO: 76.5 %
OSMOLALITY SERPL CALC.SUM OF ELEC: 301 MOSM/KG (ref 275–295)
PLATELET # BLD AUTO: 162 10(3)UL (ref 150–450)
POTASSIUM SERPL-SCNC: 4.1 MMOL/L (ref 3.5–5.1)
RBC # BLD AUTO: 4.12 X10(6)UL (ref 3.8–5.3)
SODIUM SERPL-SCNC: 140 MMOL/L (ref 136–145)
WBC # BLD AUTO: 10.7 X10(3) UL (ref 4–11)

## 2025-05-31 PROCEDURE — 99233 SBSQ HOSP IP/OBS HIGH 50: CPT | Performed by: INTERNAL MEDICINE

## 2025-05-31 RX ORDER — IPRATROPIUM BROMIDE AND ALBUTEROL SULFATE 2.5; .5 MG/3ML; MG/3ML
3 SOLUTION RESPIRATORY (INHALATION)
Status: DISCONTINUED | OUTPATIENT
Start: 2025-05-31 | End: 2025-06-07

## 2025-05-31 NOTE — CM/SW NOTE
Department  asked to send updates to [facility]idin referral for GENE.    Assigned SW/CM to follow up with patient/family on discharge plan.     Kelsy Armando, DSC

## 2025-05-31 NOTE — PLAN OF CARE
Problem: PAIN - ADULT  Goal: Verbalizes/displays adequate comfort level or patient's stated pain goal  Description: INTERVENTIONS:  - Encourage pt to monitor pain and request assistance  - Assess pain using appropriate pain scale  - Administer analgesics based on type and severity of pain and evaluate response  - Implement non-pharmacological measures as appropriate and evaluate response  - Consider cultural and social influences on pain and pain management  - Manage/alleviate anxiety  - Utilize distraction and/or relaxation techniques  - Monitor for opioid side effects  - Notify MD/LIP if interventions unsuccessful or patient reports new pain  - Anticipate increased pain with activity and pre-medicate as appropriate  5/31/2025 0258 by Rita Villegas RN  Outcome: Progressing  5/31/2025 0256 by Rita Villegas RN  Outcome: Progressing     Problem: SAFETY ADULT - FALL  Goal: Free from fall injury  Description: INTERVENTIONS:  - Assess pt frequently for physical needs  - Identify cognitive and physical deficits and behaviors that affect risk of falls.  - [place] fall precautions as indicated by assessment.  - Educate pt/family on patient safety including physical limitations  - Instruct pt to call for assistance with activity based on assessment  - Modify environment to reduce risk of injury  - Provide assistive devices as appropriate  - Consider OT/PT consult to assist with strengthening/mobility  - Encourage toileting schedule  5/31/2025 0258 by Rita Villegas RN  Outcome: Progressing  5/31/2025 0256 by Rita Villegas RN  Outcome: Progressing     Problem: SKIN/TISSUE INTEGRITY - ADULT  Goal: Skin integrity remains intact  Description: INTERVENTIONS  - Assess and document risk factors for pressure ulcer development  - Assess and document skin integrity  - Monitor for areas of redness and/or skin breakdown  - Initiate interventions, skin care algorithm/standards of care as needed  Outcome: Progressing      Problem: GENITOURINARY - ADULT  Goal: Absence of urinary retention  Description: INTERVENTIONS:  - Assess patient’s ability to void and empty bladder  - Monitor intake/output and perform bladder scan as needed  - Follow urinary retention protocol/standard of care  - Consider collaborating with pharmacy to review patient's medication profile  - Implement strategies to promote bladder emptying  Outcome: Progressing     Problem: METABOLIC/FLUID AND ELECTROLYTES - ADULT  Goal: Glucose maintained within prescribed range  Description: INTERVENTIONS:  - Monitor Blood Glucose as ordered  - Assess for signs and symptoms of hyperglycemia and hypoglycemia  - Administer ordered medications to maintain glucose within target range  - Assess barriers to adequate nutritional intake and initiate nutrition consult as needed  - Instruct patient on self management of diabetes  5/31/2025 0258 by Rita Villegas RN  Outcome: Progressing  5/31/2025 0256 by Rita Villegas, RN  Outcome: Progressing  Goal: Electrolytes maintained within normal limits  Description: INTERVENTIONS:  - Monitor labs and rhythm and assess patient for signs and symptoms of electrolyte imbalances  - Administer electrolyte replacement as ordered  - Monitor response to electrolyte replacements, including rhythm and repeat lab results as appropriate  - Fluid restriction as ordered  - Instruct patient on fluid and nutrition restrictions as appropriate  Outcome: Progressing  Goal: Hemodynamic stability and optimal renal function maintained  Description: INTERVENTIONS:  - Monitor labs and assess for signs and symptoms of volume excess or deficit  - Monitor intake, output and patient weight  - Monitor urine specific gravity, serum osmolarity and serum sodium as indicated or ordered  - Monitor response to interventions for patient's volume status, including labs, urine output, blood pressure (other measures as available)  - Encourage oral intake as appropriate  -  Instruct patient on fluid and nutrition restrictions as appropriate  Outcome: Progressing     Problem: HEMATOLOGIC - ADULT  Goal: Maintains hematologic stability  Description: INTERVENTIONS  - Assess for signs and symptoms of bleeding or hemorrhage  - Monitor labs and vital signs for trends  - Administer supportive blood products/factors, fluids and medications as ordered and appropriate  - Administer supportive blood products/factors as ordered and appropriate  5/31/2025 0258 by Rita Villegas RN  Outcome: Progressing  5/31/2025 0256 by Rita Villegas RN  Outcome: Progressing     Problem: Diabetes/Glucose Control  Goal: Glucose maintained within prescribed range  Description: INTERVENTIONS:  - Monitor Blood Glucose as ordered  - Assess for signs and symptoms of hyperglycemia and hypoglycemia  - Administer ordered medications to maintain glucose within target range  - Assess barriers to adequate nutritional intake and initiate nutrition consult as needed  - Instruct patient on self management of diabetes  5/31/2025 0258 by Rita Villegas RN  Outcome: Progressing  5/31/2025 0256 by Rita Villegas RN  Outcome: Progressing     Problem: GASTROINTESTINAL - ADULT  Goal: Minimal or absence of nausea and vomiting  Description: INTERVENTIONS:  - Maintain adequate hydration with IV or PO as ordered and tolerated  - Nasogastric tube to low intermittent suction as ordered  - Evaluate effectiveness of ordered antiemetic medications  - Provide nonpharmacologic comfort measures as appropriate  - Advance diet as tolerated, if ordered  - Obtain nutritional consult as needed  - Evaluate fluid balance  5/31/2025 0258 by Rita Villegas RN  Outcome: Progressing  5/31/2025 0256 by Rita Villegas RN  Outcome: Progressing

## 2025-05-31 NOTE — PROGRESS NOTES
[facility]  part of [facility]    Nephrology Progress Note    Radha Winters Patient Status:  Inpatient    1960 MRN [id number]   Location [facility] 2W/SW Attending Fanny Majano MD   Hosp Day # 14 PCP JUAN MONTANEZ     Subjective:   Radha Winters is a(n) 64 year old female     Seen and examined earlier today, on HD tolerating it well, now extubated, hemodynamically stable.      Objective:   Blood pressure 119/69, pulse 80, temperature 97.3 °F (36.3 °C), temperature source Temporal, resp. rate 12, height 5' 3\" (1.6 m), weight 234 lb 9.1 oz (106.4 kg), SpO2 97%.  Gen:  NAD, fatigued appearing  HEENT:  NC/AT, PERRLA  Neck:  Supple, no JVD  Chest:  Diminished breath sounds at bases  CVS:  S1S2, regular rate  Abdomen:  Soft, NT/ND  Ext:  + pedal edema  Neuro: No focal deficits appreciated.      Results:    Lab Results   Component Value Date    WBC 10.7 2025    HGB 12.1 2025    HCT 38.3 2025    .0 2025    CREATSERUM 3.71 (H) 2025    BUN 42 (H) 2025     2025    K 4.1 2025     2025    CO2 28.0 2025     (H) 2025    CA 9.0 2025    ALB 3.8 2025    ALKPHO 185 (H) 2025    BILT 0.3 2025    TP 5.9 2025     (H) 2025    ALT 37 2025    TSH 0.528 10/10/2023    LIP 36 2025    DDIMER 1.77 (H) 2025    MG 2.2 2025    PHOS 4.3 2025    TROPHS 79 (HH) 2025    CK 9,315 (HH) 2025    B12 581 10/10/2023       XR CHEST AP PORTABLE  (CPT=71045)  Result Date: 2025  CONCLUSION:  1. Borderline cardiomegaly.  Tortuous aorta. 2. Support tubes and catheters are satisfactory. 3. Bilateral perihilar and lower lobe mixed alveolar and interstitial airspace opacification with interval progression.  Suspect multifocal pneumonitis Small bilateral effusions.    Dictated by (CST): Vinicius Claros MD on 2025 at 8:40 AM      Finalized by (CST): Vinicius Claros MD on 5/30/2025 at 8:45 AM          XR CHEST AP PORTABLE  (CPT=71045)  Result Date: 5/29/2025  CONCLUSION:  1. Feeding tube tip in distal gastric antrum about 15 cm from GE junction. 2. ET tube tip 5 cm above silverio. 3. Right-sided jugular catheter tip remains near RA SVC junction. 4. Left lung remains well expanded and clear.  Right lung is not fully included in the field of view. 5. Old left proximal humerus fracture.    Dictated by (CST): Javi Garcia MD on 5/29/2025 at 10:59 PM     Finalized by (CST): Javi Garcia MD on 5/29/2025 at 11:02 PM                Assessment & Plan:     Acute renal failure superimposed on chronic kidney disease, unspecified acute renal failure type, unspecified CKD stage        Non-traumatic rhabdomyolysis        Transaminitis      Patient is a 65 y/o female with PMH of HTN, dyslipidemia, CAD admitted after multiple falls, Nephrology consulted for Acute Kidney Injury      Acute Kidney Injury:  Likely secondary to ATN, from Rhabdomyolysis, continue aggressive volume resuscitation, 0.9  ml/hr, continue to trend BMP, check Uric acid, Phos, Urine studies  CKD stage 3b vs 4:  Secondary to HTN nephrosclerosis, check Phos, PTH, renal US  Transaminitis:  Continue to trend LFTs, will check abdominal US        Recommendations:  Acute Kidney Injury likely unresolved ATN  HD with 2-2.5 liters UF as tolerated  Albumin and Midodine for MAP goal >65        May 23, 2025  Patient is nonoliguric.  Renal function is severely deranged.  Has a tunneled dialysis catheter in place.  Will dialyze as needed for volume control.  Currently patient shows no signs of recovery of kidney function and likely needs supportive care including dialysis as needed.     May 24, 2025  Patient had a rapid response called because of being short of breath and was clinically volume overloaded.  Patient transferred to progressive care unit.  Discussed with hospitalist.  Patient is  receiving dialysis with intent to remove 2.2 L of fluid.  Continue supportive care and monitor electrolytes and renal function.     May 25, 2025  Patient is critically ill in ICU.  Today is day 2 of back-to-back dialysis.  Within goal  to remove 2.2 L of fluid in 3-1/2 hours.  Overall patient seems to have improved but still requiring BiPAP.The monitoring electrolyte and renal function.     May 26th 2025  Patient still not improved much with volume status. Plan for dialysis tomorrow with 4hrs and 3 Lit UF. Remains critically ill in ICU.      May 27th 2025  Patient doing better but still requiring high flow oxygen. She remains oligoanuric. Dialysis dependent. Monitor closely. Critically ill in ICU. Total time spent seeing patient was 45 minutes.     May 28, 2025  Patient remains oligoanuric.  Urinary catheter was removed.  Will do periodic bladder scans and place catheter if needed or do bladder voiding protocol.  Discussed with RN.  Patient will also receive hemodialysis today with intent to remove 1.5 L of fluid.  Vent settings are acceptable.  Blood pressure is stable.  Patient did have intubation yesterday because of volume issues and respiratory distress.  Currently remains critically ill in the ICU.     May 29th 2025  Remains intubated, sedated on HD, tolerating it well.     May 30 th, 2025  Intubated, on full vent support, KCL replaced.       May 30th, 2025  Extubated, seen on HD, hemodynamically stable.       Bernardo Lewis MD  5/31/2025

## 2025-05-31 NOTE — PROGRESS NOTES
[facility]  part of [facility]    Progress Note      Assessment and Plan:   1.  Acute kidney injury with rhabdomyolysis-the patient remains on hemodialysis.      Recommendations:  1.  Dialysis  2.  As per nephrology.     2.  Transaminitis-vastly improved..     3.  DVT prophylaxis-subcutaneous heparin     4.  Hypotension-on midodrine      5.  Respiratory failure-had pulmonary edema and aspiration pneumonitis.  Now extubated and doing well.    Recommendations:  1.  Pulmonary toilet  2.  Nebulizer  3.  Unasyn  4.  Okay to stop methylprednisolone  5.  Volume reduction with dialysis     6.  Falls-rehabilitative services    7.  Rhabdomyolysis-will repeat CPK.  The patient had been falling.  If the CPK does not normalize, would consider myositis evaluation.      Subjective:   Radha Winters is a(n) 64 year old female who is wakeful and in good spirits and tolerated extubation well.  Objective:   Blood pressure 99/58, pulse 74, temperature 97.3 °F (36.3 °C), temperature source Temporal, resp. rate 12, height 5' 3\" (1.6 m), weight 234 lb 9.1 oz (106.4 kg), SpO2 97%.    Physical Exam alert white female  HEENT examination is unremarkable with pupils equal round and reactive to light and accommodation.   Neck without adenopathy, thyromegaly, JVD nor bruit.   Lungs diminished at bases to auscultation and percussion.  Cardiac regular rate and rhythm no murmur.   Abdomen nontender, without hepatosplenomegaly and no mass appreciable.   Extremities without clubbing cyanosis nor edema.   Neurologic grossly intact with symmetric tone and strength and reflex.  Skin without gross abnormality     Results:     Lab Results   Component Value Date    WBC 10.7 05/31/2025    HGB 12.1 05/31/2025    HCT 38.3 05/31/2025    .0 05/31/2025    CREATSERUM 3.71 05/31/2025    BUN 42 05/31/2025     05/31/2025    K 4.1 05/31/2025     05/31/2025    CO2 28.0 05/31/2025     05/31/2025    CA 9.0 05/31/2025      Chest x-ray-pulmonary edema with modest basilar effusions    Tommie Reynaga MD  Medical Director, Critical Care, [facility]  Medical Director, [facility]  Pager: [phone]

## 2025-05-31 NOTE — PLAN OF CARE
HD today- 2 L removed, Repeat HD tomorrow- will not remove any fluid          Problem: PAIN - ADULT  Goal: Verbalizes/displays adequate comfort level or patient's stated pain goal  Description: INTERVENTIONS:  - Encourage pt to monitor pain and request assistance  - Assess pain using appropriate pain scale  - Administer analgesics based on type and severity of pain and evaluate response  - Implement non-pharmacological measures as appropriate and evaluate response  - Consider cultural and social influences on pain and pain management  - Manage/alleviate anxiety  - Utilize distraction and/or relaxation techniques  - Monitor for opioid side effects  - Notify MD/LIP if interventions unsuccessful or patient reports new pain  - Anticipate increased pain with activity and pre-medicate as appropriate  Outcome: Progressing     Problem: SAFETY ADULT - FALL  Goal: Free from fall injury  Description: INTERVENTIONS:  - Assess pt frequently for physical needs  - Identify cognitive and physical deficits and behaviors that affect risk of falls.  - [place] fall precautions as indicated by assessment.  - Educate pt/family on patient safety including physical limitations  - Instruct pt to call for assistance with activity based on assessment  - Modify environment to reduce risk of injury  - Provide assistive devices as appropriate  - Consider OT/PT consult to assist with strengthening/mobility  - Encourage toileting schedule  Outcome: Progressing     Problem: SKIN/TISSUE INTEGRITY - ADULT  Goal: Skin integrity remains intact  Description: INTERVENTIONS  - Assess and document risk factors for pressure ulcer development  - Assess and document skin integrity  - Monitor for areas of redness and/or skin breakdown  - Initiate interventions, skin care algorithm/standards of care as needed  Outcome: Progressing     Problem: RESPIRATORY - ADULT  Goal: Achieves optimal ventilation and oxygenation  Description: INTERVENTIONS:  - Assess for  changes in respiratory status  - Assess for changes in mentation and behavior  - Position to facilitate oxygenation and minimize respiratory effort  - Oxygen supplementation based on oxygen saturation or ABGs  - Provide Smoking Cessation handout, if applicable  - Encourage broncho-pulmonary hygiene including cough, deep breathe, Incentive Spirometry  - Assess the need for suctioning and perform as needed  - Assess and instruct to report SOB or any respiratory difficulty  - Respiratory Therapy support as indicated  - Manage/alleviate anxiety  - Monitor for signs/symptoms of CO2 retention  Outcome: Progressing     Problem: GENITOURINARY - ADULT  Goal: Absence of urinary retention  Description: INTERVENTIONS:  - Assess patient’s ability to void and empty bladder  - Monitor intake/output and perform bladder scan as needed  - Follow urinary retention protocol/standard of care  - Consider collaborating with pharmacy to review patient's medication profile  - Implement strategies to promote bladder emptying  Outcome: Progressing     Problem: METABOLIC/FLUID AND ELECTROLYTES - ADULT  Goal: Glucose maintained within prescribed range  Description: INTERVENTIONS:  - Monitor Blood Glucose as ordered  - Assess for signs and symptoms of hyperglycemia and hypoglycemia  - Administer ordered medications to maintain glucose within target range  - Assess barriers to adequate nutritional intake and initiate nutrition consult as needed  - Instruct patient on self management of diabetes  Outcome: Progressing  Goal: Electrolytes maintained within normal limits  Description: INTERVENTIONS:  - Monitor labs and rhythm and assess patient for signs and symptoms of electrolyte imbalances  - Administer electrolyte replacement as ordered  - Monitor response to electrolyte replacements, including rhythm and repeat lab results as appropriate  - Fluid restriction as ordered  - Instruct patient on fluid and nutrition restrictions as  appropriate  Outcome: Progressing  Goal: Hemodynamic stability and optimal renal function maintained  Description: INTERVENTIONS:  - Monitor labs and assess for signs and symptoms of volume excess or deficit  - Monitor intake, output and patient weight  - Monitor urine specific gravity, serum osmolarity and serum sodium as indicated or ordered  - Monitor response to interventions for patient's volume status, including labs, urine output, blood pressure (other measures as available)  - Encourage oral intake as appropriate  - Instruct patient on fluid and nutrition restrictions as appropriate  Outcome: Progressing     Problem: HEMATOLOGIC - ADULT  Goal: Free from bleeding injury  Description: (Example usage: patient with low platelets)  INTERVENTIONS:  - Avoid intramuscular injections, enemas and rectal medication administration  - Ensure safe mobilization of patient  - Hold pressure on venipuncture sites to achieve adequate hemostasis  - Assess for signs and symptoms of internal bleeding  - Monitor lab trends  - Patient is to report abnormal signs of bleeding to staff  - Avoid use of toothpicks and dental floss  - Use electric shaver for shaving  - Use soft bristle tooth brush  - Limit straining and forceful nose blowing  Outcome: Progressing  Goal: Maintains hematologic stability  Description: INTERVENTIONS  - Assess for signs and symptoms of bleeding or hemorrhage  - Monitor labs and vital signs for trends  - Administer supportive blood products/factors, fluids and medications as ordered and appropriate  - Administer supportive blood products/factors as ordered and appropriate  Outcome: Progressing  Goal: Free from bleeding injury  Description: (Example usage: patient with low platelets)  INTERVENTIONS:  - Avoid intramuscular injections, enemas and rectal medication administration  - Ensure safe mobilization of patient  - Hold pressure on venipuncture sites to achieve adequate hemostasis  - Assess for signs and  symptoms of internal bleeding  - Monitor lab trends  - Patient is to report abnormal signs of bleeding to staff  - Avoid use of toothpicks and dental floss  - Use electric shaver for shaving  - Use soft bristle tooth brush  - Limit straining and forceful nose blowing  Outcome: Progressing     Problem: GASTROINTESTINAL - ADULT  Goal: Minimal or absence of nausea and vomiting  Description: INTERVENTIONS:  - Maintain adequate hydration with IV or PO as ordered and tolerated  - Nasogastric tube to low intermittent suction as ordered  - Evaluate effectiveness of ordered antiemetic medications  - Provide nonpharmacologic comfort measures as appropriate  - Advance diet as tolerated, if ordered  - Obtain nutritional consult as needed  - Evaluate fluid balance  Outcome: Progressing  Goal: Maintains or returns to baseline bowel function  Description: INTERVENTIONS:  - Assess bowel function  - Maintain adequate hydration with IV or PO as ordered and tolerated  - Evaluate effectiveness of GI medications  - Encourage mobilization and activity  - Obtain nutritional consult as needed  - Establish a toileting routine/schedule  - Consider collaborating with pharmacy to review patient's medication profile  Outcome: Progressing     Problem: Diabetes/Glucose Control  Goal: Glucose maintained within prescribed range  Description: INTERVENTIONS:  - Monitor Blood Glucose as ordered  - Assess for signs and symptoms of hyperglycemia and hypoglycemia  - Administer ordered medications to maintain glucose within target range  - Assess barriers to adequate nutritional intake and initiate nutrition consult as needed  - Instruct patient on self management of diabetes  Outcome: Progressing

## 2025-05-31 NOTE — PROGRESS NOTES
Patient seen in follow-up from her last visit now extubated.  No reported chest pain shortness of breath abdominal pain.  No new complaints.  Oxygenating well with normal blood pressure.    Vitals:    05/31/25 0600   BP: 119/69   Pulse: 80   Resp: 12   Temp:        Intake/Output Summary (Last 24 hours) at 5/31/2025 0925  Last data filed at 5/31/2025 0400  Gross per 24 hour   Intake 1164 ml   Output --   Net 1164 ml     Wt Readings from Last 1 Encounters:   05/29/25 234 lb 9.1 oz (106.4 kg)        General: No acute distress.  Neck: Jugular venous pulsations not seen.  Lungs: Clear to auscultation.  Heart: Normal rate. No murmurs.  Abdomen: Soft. Non tender  Extremities: No edema.  Neurological:   slow to respond but does answer questions  Psychiatric: Appropriate mood and affect.  Current Facility-Administered Medications   Medication Dose Route Frequency    ipratropium-albuterol (Duoneb) 0.5-2.5 (3) MG/3ML inhalation solution 3 mL  3 mL Nebulization 2 times daily    albumin human (Albumin) 25% injection 25 g  25 g Intravenous PRN Dialysis    midodrine (ProAmatine) tab 10 mg  10 mg Per G Tube TID    busPIRone (Buspar) tab 30 mg  30 mg Per G Tube TID    chlorproMAZINE (Thorazine) tab 25 mg  25 mg Per G Tube QID    diazePAM (Valium) tab 5 mg  5 mg Per G Tube Q8H PRN    QUEtiapine (SEROquel) tab 50 mg  50 mg Per G Tube BID    levothyroxine (Synthroid) tab 150 mcg  150 mcg Per G Tube Before breakfast    senna (Senokot) 8.8 MG/5ML oral syrup 17.6 mg  10 mL Per NG Tube BID    docusate (Colace) 50 MG/5ML oral solution 100 mg  100 mg Per NG Tube BID    famotidine (Pepcid) 20 mg/2mL injection 20 mg  20 mg Intravenous Daily    methylPREDNISolone sodium succinate (Solu-MEDROL) injection 40 mg  40 mg Intravenous Daily    glucose (Dex4) 15 GM/59ML oral liquid 15 g  15 g Oral Q15 Min PRN    Or    glucose (Glutose) 40% oral gel 15 g  15 g Oral Q15 Min PRN    Or    glucose-vitamin C (Dex-4) chewable tab 4 tablet  4 tablet Oral  Q15 Min PRN    Or    dextrose 50% injection 50 mL  50 mL Intravenous Q15 Min PRN    Or    glucose (Dex4) 15 GM/59ML oral liquid 30 g  30 g Oral Q15 Min PRN    Or    glucose (Glutose) 40% oral gel 30 g  30 g Oral Q15 Min PRN    Or    glucose-vitamin C (Dex-4) chewable tab 8 tablet  8 tablet Oral Q15 Min PRN    insulin regular human (Novolin R, Humulin R) 100 UNIT/ML injection 1-5 Units  1-5 Units Subcutaneous 4 times per day    carvedilol (Coreg) tab 3.125 mg  3.125 mg Oral BID with meals    sacubitril-valsartan (Entresto) 24-26 MG per tab 1 tablet  1 tablet Oral BID    budesonide (Pulmicort) 0.5 MG/2ML nebulizer suspension 0.5 mg  0.5 mg Nebulization 2 times daily    arformoterol (Brovana) 15 MCG/2ML nebulizer solution 15 mcg  15 mcg Nebulization 2 times daily    dextrose 10% infusion (TPN no rate)   Intravenous Continuous PRN    sodium bicarbonate tab 650 mg  650 mg Per G Tube PRN    And    pancrelipase (Lip-Prot-Amyl) (Zenpep) DR particles cap 10,000 Units  10,000 Units Per G Tube PRN    acetylcysteine (Mucomyst) 20 % nebulizer solution 2 mL  2 mL Nebulization BID    ampicillin-sulbactam (Unasyn) 1.5 g in sodium chloride 0.9% 100mL IVPB-MAYO  1.5 g Intravenous q12h    DULoxetine (Cymbalta) DR cap 60 mg  60 mg Oral BID    HYDROcodone-acetaminophen (Norco) 5-325 MG per tab 1 tablet  1 tablet Oral Q6H PRN    ondansetron (Zofran) 4 MG/2ML injection 4 mg  4 mg Intravenous Q6H PRN    polyethylene glycol (PEG 3350) (Miralax) 17 g oral packet 17 g  17 g Oral Daily PRN    magnesium hydroxide (Milk of Magnesia) 400 MG/5ML oral suspension 30 mL  30 mL Oral Daily PRN    bisacodyl (Dulcolax) 10 MG rectal suppository 10 mg  10 mg Rectal Daily PRN    fleet enema (Fleet) rectal enema 133 mL  1 enema Rectal Once PRN    heparin (Porcine) 1000 UNIT/ML injection 2,000 Units  2,000 Units Intravenous PRN Dialysis    albuterol (Ventolin) (2.5 MG/3ML) 0.083% nebulizer solution 2.5 mg  2.5 mg Nebulization Q6H PRN    heparin (Porcine) 5000  UNIT/ML injection 5,000 Units  5,000 Units Subcutaneous 2 times per day    acetaminophen (Tylenol) tab 650 mg  650 mg Oral Q6H PRN    aspirin DR tab 81 mg  81 mg Oral Daily    QUEtiapine ER (SEROquel XR) 24 hr tab 300 mg  300 mg Oral Nightly     Medications Prior to Admission   Medication Sig    busPIRone HCl 30 MG Oral Tab Take 1 tablet (30 mg total) by mouth 3 (three) times daily.    chlorproMAZINE 25 MG Oral Tab Take 1 tablet (25 mg total) by mouth 4 (four) times daily.    diazePAM 5 MG Oral Tab Take 1 tablet (5 mg total) by mouth every 8 (eight) hours as needed for Anxiety.    DULoxetine 60 MG Oral Cap DR Particles Take 1 capsule (60 mg total) by mouth 2 (two) times daily.    metFORMIN  MG Oral Tablet 24 Hr Take 2 tablets (1,000 mg total) by mouth daily. (Patient taking differently: Take 2 tablets (1,000 mg total) by mouth 2 (two) times daily with meals.)    metoprolol succinate ER 50 MG Oral Tablet 24 Hr Take 1 tablet (50 mg total) by mouth 2 (two) times daily.    QUEtiapine  MG Oral Tablet 24 Hr Take 1 tablet (300 mg total) by mouth nightly.    QUEtiapine 50 MG Oral Tab Take 1 tablet (50 mg total) by mouth 2 (two) times daily.    rosuvastatin 40 MG Oral Tab Take 1 tablet (40 mg total) by mouth before bedtime.    lidocaine 5 % External Patch Place 1 patch onto the skin daily.    [] ibuprofen 600 MG Oral Tab Take 1 tablet (600 mg total) by mouth every 6 (six) hours as needed.    levothyroxine 150 MCG Oral Tab Take 1 tablet (150 mcg total) by mouth before breakfast. Give on an empty stomach. On Mon, Tue, Wed, Thur, Fri and Saturday    levothyroxine 75 MCG Oral Tab Take 1 tablet (75 mcg total) by mouth before breakfast. On Sundays only    aspirin 81 MG Oral Tab EC Take 1 tablet (81 mg total) by mouth in the morning.    methylPREDNISolone 4 MG Oral Tablet Therapy Pack Take as directed on dose pack instructions (Patient not taking: Reported on 2025)     XR CHEST AP PORTABLE   (CPT=71045)  Result Date: 5/30/2025  CONCLUSION:  1. Borderline cardiomegaly.  Tortuous aorta. 2. Support tubes and catheters are satisfactory. 3. Bilateral perihilar and lower lobe mixed alveolar and interstitial airspace opacification with interval progression.  Suspect multifocal pneumonitis Small bilateral effusions.    Dictated by (CST): Vinicius Claros MD on 5/30/2025 at 8:40 AM     Finalized by (CST): Vinicius Claros MD on 5/30/2025 at 8:45 AM          XR CHEST AP PORTABLE  (CPT=71045)  Result Date: 5/29/2025  CONCLUSION:  1. Feeding tube tip in distal gastric antrum about 15 cm from GE junction. 2. ET tube tip 5 cm above silverio. 3. Right-sided jugular catheter tip remains near RA SVC junction. 4. Left lung remains well expanded and clear.  Right lung is not fully included in the field of view. 5. Old left proximal humerus fracture.    Dictated by (CST): Javi Garcia MD on 5/29/2025 at 10:59 PM     Finalized by (CST): Javi Garcia MD on 5/29/2025 at 11:02 PM          Lab Results   Component Value Date    WBC 10.7 05/31/2025    HGB 12.1 05/31/2025    HCT 38.3 05/31/2025    .0 05/31/2025    CREATSERUM 3.71 05/31/2025    BUN 42 05/31/2025     05/31/2025    K 4.1 05/31/2025     05/31/2025    CO2 28.0 05/31/2025     05/31/2025    CA 9.0 05/31/2025     Assessment / Plan  1. Acute on Chronic Systolic Heart Failure -  Ejection fraction 30-35% with apical wall akinesia. Previous EF 35-40% 2015. Will institute heart failure therapy with Carvedilol 3.125 mg BID and Entresto 24/26 mg BID.            I50.23 Acute on chronic systolic (congestive) heart failure           2. Acute Respiratory Failure -  Currently intubated. Chest x-ray shows right basal opacity consistent with atelectasis versus pneumonia. Evidence of pulmonary edema pattern and possible aspiration pneumonia with mucus plugging. On abx  J96.00 Acute respiratory failure, unspecified whether with hypoxia or hypercapnia         3. Acute on Chronic Kidney Disease -     Creatinine improving.  On HD.           N17.9 Acute kidney failure, unspecified           4. Demand Ischemia -  Troponin mildly elevated at 79, down from 197 four days ago. ECG with sinus tachycardia and occasional PVCs. Findings consistent with demand ischemia.           I24.8 Other forms of acute ischemic heart disease  PLAN:  Continue with combination of carvedilol as well as Entresto.  In future can consider spironolactone but we would need to monitor potassium level closely.  Point I will hold off as 4 days ago she did have hyperkalemia.

## 2025-05-31 NOTE — PROGRESS NOTES
[facility]  part of [facility]    Progress Note    Radha Winters Patient Status:  Inpatient    1960 MRN [id number]   Location [facility] 5SW/SE Attending Fanny Majano MD   Hosp Day # 14 PCP JUAN MONTANEZ       SUBJECTIVE:  Extubated.  Alert.    Denies any shortness of breath.  Wants to go home.        OBJECTIVE:  Vital signs in last 24 hours:  /69 (BP Location: Right arm)   Pulse 80   Temp 97.3 °F (36.3 °C) (Temporal)   Resp 12   Ht 5' 3\" (1.6 m)   Wt 234 lb 9.1 oz (106.4 kg)   SpO2 97%   BMI 41.55 kg/m²     Intake/Output:    Intake/Output Summary (Last 24 hours) at 2025 1051  Last data filed at 2025 0400  Gross per 24 hour   Intake 1164 ml   Output --   Net 1164 ml       Wt Readings from Last 3 Encounters:   25 234 lb 9.1 oz (106.4 kg)   10/09/23 185 lb 3 oz (84 kg)   10/04/23 185 lb (83.9 kg)       Exam  Gen: No acute distress,   HEENT: No pallor  Pulm: Lungs clear to auscultation anteriorly  CV: Heart with regular rate and rhythm, no peripheral edema  Abd: Abdomen soft, nontender, nondistended, no organomegaly, bowel sounds present  Skin: no rashes or lesions  CNS: Alert    Data Review:     Labs:   Lab Results   Component Value Date    WBC 10.7 2025    HGB 12.1 2025    HCT 38.3 2025    .0 2025    CREATSERUM 3.71 2025    BUN 42 2025     2025    K 4.1 2025     2025    CO2 28.0 2025     2025    CA 9.0 2025       LABS  Recent Labs   Lab 25  0358 25  0428 25  0419 25  0606 25  0351 25  0413   RBC 4.48 4.60 4.27 4.08 3.87 4.12   HGB 13.4 13.6 12.7 12.0 11.4* 12.1   HCT 42.8 43.8 39.9 38.1 35.7 38.3   MCV 95.5 95.2 93.4 93.4 92.2 93.0   MCH 29.9 29.6 29.7 29.4 29.5 29.4   MCHC 31.3 31.1 31.8 31.5 31.9 31.6   RDW 16.5* 16.8* 16.7* 16.5* 16.6* 16.8*   NEPRELIM 8.59* 8.48* 8.52* 7.67 7.76* 8.14*   WBC 10.3 9.9 9.7  9.7 9.8 10.7   .0* 129.0* 153.0 161.0 147.0* 162.0   * 279* 207*  --   --   --    ALT 70* 46 37  --   --   --    CK  --  9,315*  --   --   --   --    * 112* 132* 114* 169* 112*       Imaging:      Meds:   Current Hospital Medications[1]    Assessment  Problem List[2]  1. Acute Kidney Injury on Chronic Kidney Disease:  -  - Nephrology consultation obtained  Patient with worsening renal function.  Nephrology is following the patient  On hemodialysis       2. Rhabdomyolysis:  - Secondary to recurrent falls  - Possibly statin-induced  - Plan: Hold statin (Crestor)  Improving     3. Acute Transaminitis:  - Likely secondary to rhabdomyolysis  - May also be statin-related  - Will monitor with serial labs  Patient could also have a shock liver  Liver enzymes are improving       4. Recurrent Falls:  -    Patient will need physical therapy and Occupational Therapy after she is extubated      5. Coronary Artery Disease:  - Currently stable  - Patient denies chest pain     6. Hypothyroidism:  - Continue levothyroxine  Possible UTI.  on ceftriaxone.    Acute hypoxic respiratory failure  Extubated today  Pulmonary following.      Chronic heart failure with reduced ejection fraction    Cardiomyopathy.  cardiology consulted         Plan for close monitoring and adjustment of management based on clinical course.  discussed with nursing staff  Discussed with the patient that she is not stable to go to home.  Discussed with the nursing staff.        Active Orders   Nourishments    Room Service Eligibility Until Discontinued     Frequency: Until Discontinued     Number of Occurrences: Until Specified    Room Service Notify RN Until Discontinued     Frequency: Until Discontinued     Number of Occurrences: Until Specified    Tube Feeding Diet Effective Now     Frequency: Effective Now     Number of Occurrences: Until Specified   Respiratory Care    BIPAP When Sleeping     Frequency: When Sleeping     Number of  Occurrences: Until Specified    BIPAP When Sleeping     Frequency: When Sleeping     Number of Occurrences: Until Specified    Chest physiotherapy 4x Daily     Frequency: 4x Daily     Number of Occurrences: Until Specified     Order Comments: Left lower lobe atelectasis.      EZ-PAP 4x Daily     Frequency: 4x Daily     Number of Occurrences: Until Specified    Incentive spriometry: RT to teach pt Once     Frequency: Once     Number of Occurrences: 1 Occurrences    Oxygen administration (adult) PRN     Frequency: PRN     Number of Occurrences: Until Specified    Respiratory care evaluation Once     Frequency: Once     Number of Occurrences: 1 Occurrences     Order Comments: If patient uses home CPAP/BIPAP, place on known home settings. If unknown, place on auto CPAP with upper limit 20 and lower limit 5 cm H2O     Dialysis    Hemodialysis inpatient     Frequency: Once     Number of Occurrences: 1 Occurrences     Order Comments: Keep MAP goal >65     Precaution    Aspiration precautions Continuous     Frequency: Continuous     Number of Occurrences: Until Specified   Diet    NPO     Frequency: Effective Now     Number of Occurrences: Until Specified   Nursing    Accucheck     Frequency: BID     Number of Occurrences: Until Specified    Accucheck     Frequency: Q6H     Number of Occurrences: Until Specified    Accucheck     Frequency: PRN     Number of Occurrences: Until Specified     Order Comments: For symptoms or suspicion of hypoglycemia      Assess using Critical Care Pain Observation Tool (CPOT)     Frequency: Now Then Q3H     Number of Occurrences: Until Specified    Elevate head of bed >30 degrees     Frequency: Until Discontinued     Number of Occurrences: Until Specified     Order Comments: 30-45 degrees at all times unless contraindicated by physician order or patient condition.      Elevate head of bed greater than or equal to 30 degrees     Frequency: Until Discontinued     Number of Occurrences: Until  Specified    Flush feeding tube     Frequency: PRN     Number of Occurrences: Until Specified     Order Comments: Flush feeding tube:  1.) Before and after bolus feedings.  2.) After medication administration.      For any tube feed interruptions, continue basal insulin, and correction scales. Hold any scheduled insulins, and resume when tube feed is restarted.     Frequency: Until Discontinued     Number of Occurrences: Until Specified    For non-patent tubes:     Frequency: PRN     Number of Occurrences: Until Specified     Order Comments: If water is unsuccessful in dislodging the clog, use the pancrealipase/sodium bicarb. May repeat x1 before calling physician for further orders.      For patients without a history of diabetes, discontinue Accuchecks and insulin correction scale if blood glucose <180 mg/dL for 48 hours     Frequency: Until Discontinued     Number of Occurrences: Until Specified    Give all morning medications unless otherwise specified     Frequency: Until Discontinued     Number of Occurrences: Until Specified     Order Comments: Give all morning medications unless otherwise specified.  DO NOT use Electrolyte protocol.      If patient uses CPAP/BIPAP, encourage patient to wear when sleeping (including daytime) and after any apneic episode or adverse event.     Frequency: Until Discontinued     Number of Occurrences: Until Specified    Initiate early mobility protocol     Frequency: Once     Number of Occurrences: 1 Occurrences    Initiate Hypoglycemia Algorithm for Adults     Frequency: Until Discontinued     Number of Occurrences: Until Specified     Order Comments: For blood glucose less than 70      Initiate Medical Nutrition Therapy Protocol for Enteral Nutrition     Frequency: Until Discontinued     Number of Occurrences: Until Specified    Insert denny catheter     Frequency: Once     Number of Occurrences: 1 Occurrences    Intake and Output     Frequency: Per Unit Routine     Number of  Occurrences: Until Specified     Order Comments: Record formula and water flushes separately.      May use port/central line     Frequency: Until Discontinued     Number of Occurrences: Until Specified    Measure weight     Frequency: Per Unit Routine     Number of Occurrences: Until Specified     Order Comments: Measure weight every Monday and Thursday for non critical care patients.      Monitor tube placement     Frequency: Q8H     Number of Occurrences: Until Specified    Notify attending physician for patients refusing CPAP     Frequency: Until Discontinued     Number of Occurrences: Until Specified     Order Comments: Document that patient was educated and refused CPAP, if applicable.      Notify attending physician for sustained desaturation <90% or prolonged or recurrent apnea     Frequency: Until Discontinued     Number of Occurrences: Until Specified     Order Comments: Notify attending physician for sustained desaturation <90% or prolonged or recurrent apnea      Notify physician     Frequency: Until Discontinued     Number of Occurrences: Until Specified    Notify physician of significant abdominal distension, pain, nausea, or emesis, and stop tube feeding     Frequency: Until Discontinued     Number of Occurrences: Until Specified    Notify Radiologist     Frequency: Until Discontinued     Number of Occurrences: Until Specified    Nurse Driven Indwelling Urinary Catheter Removal Protocol     Frequency: Until Discontinued     Number of Occurrences: Until Specified    Nursing communication (specify)     Frequency: Once     Number of Occurrences: 1 Occurrences     Order Comments: Hold blood thinners pre procedure      Nursing communication (specify)     Frequency: Once     Number of Occurrences: 1 Occurrences     Order Comments: RN, keep the patient off the left side.      Nursing communication (specify)     Frequency: Once     Number of Occurrences: 1 Occurrences     Order Comments: Removed OG and placed  Dobhoff      Nursing communication (specify)     Frequency: Once     Number of Occurrences: 1 Occurrences     Order Comments: Dobhoff okay to use      Nursing communication - call [facility] to order dialysis     Frequency: Once     Number of Occurrences: 1 Occurrences     Order Comments: Call [facility] at [phone] to order dialysis.  Identify special circumstances and specify stat or routine treatment.      Patient may resume usual diet     Frequency: Once     Number of Occurrences: 1 Occurrences     Order Comments: Npo x one hour, then resume pre-procedure diet      Post procedure site assessment     Frequency: Per Unit Routine     Number of Occurrences: Until Specified     Order Comments: Every 15 minutes for 1 hour, then every 30 minutes for 1 hour, then every 60 minutes for 2 hours, then per unit routine      Provide patient with sleep apnea education materials     Frequency: Once     Number of Occurrences: 1 Occurrences    Pulse oximetry     Frequency: Per Unit Routine     Number of Occurrences: Until Specified    Pulse oximetry     Frequency: Continuous     Number of Occurrences: Until Specified    Tube insertion     Frequency: Once     Number of Occurrences: 1 Occurrences    Tube insertion     Frequency: Once     Number of Occurrences: 1 Occurrences    Tube insertion     Frequency: Once     Number of Occurrences: 1 Occurrences     Order Comments: Meds and tube feeding      Verify informed consent     Frequency: Once     Number of Occurrences: 1 Occurrences    Vital signs     Frequency: Per Unit Routine     Number of Occurrences: Until Specified     Order Comments: Every 15 minutes for 1 hour, then every 30 minutes for 1 hour, then every 60 minutes for 2 hours, then per unit routine.      Void prior to procedure     Frequency: Once     Number of Occurrences: 1 Occurrences   Consult    Consult to Interventional Radiology     Frequency: Once     Number of Occurrences: 1 Occurrences    Consult to  Pulmonology     Frequency: Once     Number of Occurrences: 1 Occurrences    Social work     Linked Order: And     Frequency: Once     Number of Occurrences: 1 Occurrences   Medications    acetaminophen (Tylenol) tab 650 mg     Frequency: Q6H PRN     Dose: 650 mg     Route: Oral    acetylcysteine (Mucomyst) 20 % nebulizer solution 2 mL     Frequency: BID     Dose: 2 mL     Route: Nebulization    albumin human (Albumin) 25% injection 25 g     Frequency: PRN Dialysis     Dose: 25 g     Route: Intravenous    albuterol (Ventolin) (2.5 MG/3ML) 0.083% nebulizer solution 2.5 mg     Frequency: Q6H PRN     Dose: 2.5 mg     Route: Nebulization    ampicillin-sulbactam (Unasyn) 1.5 g in sodium chloride 0.9% 100mL IVPB-MAYO     Frequency: q12h     Dose: 1.5 g     Route: Intravenous    arformoterol (Brovana) 15 MCG/2ML nebulizer solution 15 mcg     Frequency: 2 times daily     Dose: 15 mcg     Route: Nebulization    aspirin DR tab 81 mg     Frequency: Daily     Dose: 81 mg     Route: Oral    bisacodyl (Dulcolax) 10 MG rectal suppository 10 mg     Frequency: Daily PRN     Dose: 10 mg     Route: Rectal    budesonide (Pulmicort) 0.5 MG/2ML nebulizer suspension 0.5 mg     Frequency: 2 times daily     Dose: 0.5 mg     Route: Nebulization    busPIRone (Buspar) tab 30 mg     Frequency: TID     Dose: 30 mg     Route: Per G Tube    carvedilol (Coreg) tab 3.125 mg     Frequency: BID with meals     Dose: 3.125 mg     Route: Oral    chlorproMAZINE (Thorazine) tab 25 mg     Frequency: QID     Dose: 25 mg     Route: Per G Tube    dextrose 10% infusion (TPN no rate)     Frequency: Continuous PRN     Route: Intravenous    dextrose 50% injection 50 mL     Linked Order: Or     Frequency: Q15 Min PRN     Dose: 50 mL     Route: Intravenous    diazePAM (Valium) tab 5 mg     Frequency: Q8H PRN     Dose: 5 mg     Route: Per G Tube    docusate (Colace) 50 MG/5ML oral solution 100 mg     Frequency: BID     Dose: 100 mg     Route: Per NG Tube    DULoxetine  (Cymbalta) DR cap 60 mg     Frequency: BID     Dose: 60 mg     Route: Oral    famotidine (Pepcid) 20 mg/2mL injection 20 mg     Frequency: Daily     Dose: 20 mg     Route: Intravenous    fleet enema (Fleet) rectal enema 133 mL     Frequency: Once PRN     Dose: 1 enema     Route: Rectal    glucose (Dex4) 15 GM/59ML oral liquid 15 g     Linked Order: Or     Frequency: Q15 Min PRN     Dose: 15 g     Route: Oral    glucose (Dex4) 15 GM/59ML oral liquid 30 g     Linked Order: Or     Frequency: Q15 Min PRN     Dose: 30 g     Route: Oral    glucose (Glutose) 40% oral gel 15 g     Linked Order: Or     Frequency: Q15 Min PRN     Dose: 15 g     Route: Oral    glucose (Glutose) 40% oral gel 30 g     Linked Order: Or     Frequency: Q15 Min PRN     Dose: 30 g     Route: Oral    glucose-vitamin C (Dex-4) chewable tab 4 tablet     Linked Order: Or     Frequency: Q15 Min PRN     Dose: 4 tablet     Route: Oral    glucose-vitamin C (Dex-4) chewable tab 8 tablet     Linked Order: Or     Frequency: Q15 Min PRN     Dose: 8 tablet     Route: Oral    heparin (Porcine) 1000 UNIT/ML injection 2,000 Units     Frequency: PRN Dialysis     Dose: 2,000 Units     Route: Intravenous    heparin (Porcine) 5000 UNIT/ML injection 5,000 Units     Frequency: Q12H PATRICK     Dose: 5,000 Units     Route: Subcutaneous    HYDROcodone-acetaminophen (Norco) 5-325 MG per tab 1 tablet     Frequency: Q6H PRN     Dose: 1 tablet     Route: Oral    insulin regular human (Novolin R, Humulin R) 100 UNIT/ML injection 1-5 Units     Frequency: Q6H PATRICK     Dose: 1-5 Units     Route: Subcutaneous    ipratropium-albuterol (Duoneb) 0.5-2.5 (3) MG/3ML inhalation solution 3 mL     Frequency: 2 times daily     Dose: 3 mL     Route: Nebulization    levothyroxine (Synthroid) tab 150 mcg     Frequency: Before breakfast     Dose: 150 mcg     Route: Per G Tube    magnesium hydroxide (Milk of Magnesia) 400 MG/5ML oral suspension 30 mL     Frequency: Daily PRN     Dose: 30 mL     Route:  Oral    methylPREDNISolone sodium succinate (Solu-MEDROL) injection 40 mg     Frequency: Daily     Dose: 40 mg     Route: Intravenous    midodrine (ProAmatine) tab 10 mg     Frequency: TID     Dose: 10 mg     Route: Per G Tube    ondansetron (Zofran) 4 MG/2ML injection 4 mg     Frequency: Q6H PRN     Dose: 4 mg     Route: Intravenous    pancrelipase (Lip-Prot-Amyl) (Zenpep) DR particles cap 10,000 Units     Linked Order: And     Frequency: PRN     Dose: 10,000 Units     Route: Per G Tube    polyethylene glycol (PEG 3350) (Miralax) 17 g oral packet 17 g     Frequency: Daily PRN     Dose: 17 g     Route: Oral    QUEtiapine (SEROquel) tab 50 mg     Frequency: BID     Dose: 50 mg     Route: Per G Tube    QUEtiapine ER (SEROquel XR) 24 hr tab 300 mg     Frequency: Nightly     Dose: 300 mg     Route: Oral    sacubitril-valsartan (Entresto) 24-26 MG per tab 1 tablet     Linked Order: And     Frequency: BID     Dose: 1 tablet     Route: Oral    senna (Senokot) 8.8 MG/5ML oral syrup 17.6 mg     Frequency: BID     Dose: 10 mL     Route: Per NG Tube    sodium bicarbonate tab 650 mg     Linked Order: And     Frequency: PRN     Dose: 650 mg     Route: Per G Tube     Reviewed personally: current medical record, vital signs info, lab results, cardiac & radiographic films and reports.  Formulated plans for continued care for this patient.  RN to call us for any significant change  Scheduled tests: see orders   Other orders per treatment team.     *The above components were done for the patient encounter: Reviewing the patient's electronic chart, reviewing results, obtaining a medical history, counseling patient and/or family member, ordering medications, tests, or procedures, documenting clinical information in the electronic health record, refer to or communicate with other health care professionals as indicated, independently interpret results and providing care coordination      Fanny aMjano MD           [1]   Current  Facility-Administered Medications   Medication Dose Route Frequency    ipratropium-albuterol (Duoneb) 0.5-2.5 (3) MG/3ML inhalation solution 3 mL  3 mL Nebulization 2 times daily    albumin human (Albumin) 25% injection 25 g  25 g Intravenous PRN Dialysis    midodrine (ProAmatine) tab 10 mg  10 mg Per G Tube TID    busPIRone (Buspar) tab 30 mg  30 mg Per G Tube TID    chlorproMAZINE (Thorazine) tab 25 mg  25 mg Per G Tube QID    diazePAM (Valium) tab 5 mg  5 mg Per G Tube Q8H PRN    QUEtiapine (SEROquel) tab 50 mg  50 mg Per G Tube BID    levothyroxine (Synthroid) tab 150 mcg  150 mcg Per G Tube Before breakfast    senna (Senokot) 8.8 MG/5ML oral syrup 17.6 mg  10 mL Per NG Tube BID    docusate (Colace) 50 MG/5ML oral solution 100 mg  100 mg Per NG Tube BID    famotidine (Pepcid) 20 mg/2mL injection 20 mg  20 mg Intravenous Daily    methylPREDNISolone sodium succinate (Solu-MEDROL) injection 40 mg  40 mg Intravenous Daily    glucose (Dex4) 15 GM/59ML oral liquid 15 g  15 g Oral Q15 Min PRN    Or    glucose (Glutose) 40% oral gel 15 g  15 g Oral Q15 Min PRN    Or    glucose-vitamin C (Dex-4) chewable tab 4 tablet  4 tablet Oral Q15 Min PRN    Or    dextrose 50% injection 50 mL  50 mL Intravenous Q15 Min PRN    Or    glucose (Dex4) 15 GM/59ML oral liquid 30 g  30 g Oral Q15 Min PRN    Or    glucose (Glutose) 40% oral gel 30 g  30 g Oral Q15 Min PRN    Or    glucose-vitamin C (Dex-4) chewable tab 8 tablet  8 tablet Oral Q15 Min PRN    insulin regular human (Novolin R, Humulin R) 100 UNIT/ML injection 1-5 Units  1-5 Units Subcutaneous 4 times per day    carvedilol (Coreg) tab 3.125 mg  3.125 mg Oral BID with meals    sacubitril-valsartan (Entresto) 24-26 MG per tab 1 tablet  1 tablet Oral BID    budesonide (Pulmicort) 0.5 MG/2ML nebulizer suspension 0.5 mg  0.5 mg Nebulization 2 times daily    arformoterol (Brovana) 15 MCG/2ML nebulizer solution 15 mcg  15 mcg Nebulization 2 times daily    dextrose 10% infusion (TPN no  rate)   Intravenous Continuous PRN    sodium bicarbonate tab 650 mg  650 mg Per G Tube PRN    And    pancrelipase (Lip-Prot-Amyl) (Zenpep) DR particles cap 10,000 Units  10,000 Units Per G Tube PRN    acetylcysteine (Mucomyst) 20 % nebulizer solution 2 mL  2 mL Nebulization BID    ampicillin-sulbactam (Unasyn) 1.5 g in sodium chloride 0.9% 100mL IVPB-MAYO  1.5 g Intravenous q12h    DULoxetine (Cymbalta) DR cap 60 mg  60 mg Oral BID    HYDROcodone-acetaminophen (Norco) 5-325 MG per tab 1 tablet  1 tablet Oral Q6H PRN    ondansetron (Zofran) 4 MG/2ML injection 4 mg  4 mg Intravenous Q6H PRN    polyethylene glycol (PEG 3350) (Miralax) 17 g oral packet 17 g  17 g Oral Daily PRN    magnesium hydroxide (Milk of Magnesia) 400 MG/5ML oral suspension 30 mL  30 mL Oral Daily PRN    bisacodyl (Dulcolax) 10 MG rectal suppository 10 mg  10 mg Rectal Daily PRN    fleet enema (Fleet) rectal enema 133 mL  1 enema Rectal Once PRN    heparin (Porcine) 1000 UNIT/ML injection 2,000 Units  2,000 Units Intravenous PRN Dialysis    albuterol (Ventolin) (2.5 MG/3ML) 0.083% nebulizer solution 2.5 mg  2.5 mg Nebulization Q6H PRN    heparin (Porcine) 5000 UNIT/ML injection 5,000 Units  5,000 Units Subcutaneous 2 times per day    acetaminophen (Tylenol) tab 650 mg  650 mg Oral Q6H PRN    aspirin DR tab 81 mg  81 mg Oral Daily    QUEtiapine ER (SEROquel XR) 24 hr tab 300 mg  300 mg Oral Nightly   [2]   Patient Active Problem List  Diagnosis    Closed fracture of proximal end of left humerus, unspecified fracture morphology, initial encounter    Frequent falls    Seizure-like activity (HCC)    Pituitary lesion (HCC)    Major depressive disorder, recurrent episode, moderate (HCC)    Generalized anxiety disorder    Acute renal failure superimposed on chronic kidney disease, unspecified acute renal failure type, unspecified CKD stage    Non-traumatic rhabdomyolysis    Transaminitis

## 2025-06-01 ENCOUNTER — APPOINTMENT (OUTPATIENT)
Dept: GENERAL RADIOLOGY | Facility: HOSPITAL | Age: 65
End: 2025-06-01
Attending: INTERNAL MEDICINE
Payer: MEDICAID

## 2025-06-01 LAB
ALBUMIN SERPL-MCNC: 3.3 G/DL (ref 3.2–4.8)
ALBUMIN/GLOB SERPL: 1.9 {RATIO} (ref 1–2)
ALP LIVER SERPL-CCNC: 107 U/L (ref 50–130)
ALT SERPL-CCNC: 20 U/L (ref 10–49)
ANION GAP SERPL CALC-SCNC: 8 MMOL/L (ref 0–18)
AST SERPL-CCNC: 39 U/L (ref ?–34)
BASOPHILS # BLD AUTO: 0.09 X10(3) UL (ref 0–0.2)
BASOPHILS NFR BLD AUTO: 0.9 %
BILIRUB SERPL-MCNC: 0.2 MG/DL (ref 0.2–1.1)
BUN BLD-MCNC: 33 MG/DL (ref 9–23)
BUN/CREAT SERPL: 11.7 (ref 10–20)
CALCIUM BLD-MCNC: 8.6 MG/DL (ref 8.7–10.4)
CHLORIDE SERPL-SCNC: 103 MMOL/L (ref 98–112)
CK SERPL-CCNC: 446 U/L (ref 34–145)
CO2 SERPL-SCNC: 30 MMOL/L (ref 21–32)
CREAT BLD-MCNC: 2.81 MG/DL (ref 0.55–1.02)
DEPRECATED RDW RBC AUTO: 59.2 FL (ref 35.1–46.3)
EGFRCR SERPLBLD CKD-EPI 2021: 18 ML/MIN/1.73M2 (ref 60–?)
EOSINOPHIL # BLD AUTO: 0.23 X10(3) UL (ref 0–0.7)
EOSINOPHIL NFR BLD AUTO: 2.3 %
ERYTHROCYTE [DISTWIDTH] IN BLOOD BY AUTOMATED COUNT: 17 % (ref 11–15)
GLOBULIN PLAS-MCNC: 1.7 G/DL (ref 2–3.5)
GLUCOSE BLD-MCNC: 147 MG/DL (ref 70–99)
GLUCOSE BLDC GLUCOMTR-MCNC: 111 MG/DL (ref 70–99)
GLUCOSE BLDC GLUCOMTR-MCNC: 131 MG/DL (ref 70–99)
GLUCOSE BLDC GLUCOMTR-MCNC: 149 MG/DL (ref 70–99)
GLUCOSE BLDC GLUCOMTR-MCNC: 240 MG/DL (ref 70–99)
HCT VFR BLD AUTO: 35.6 % (ref 35–48)
HGB BLD-MCNC: 11.2 G/DL (ref 12–16)
IMM GRANULOCYTES # BLD AUTO: 0.21 X10(3) UL (ref 0–1)
IMM GRANULOCYTES NFR BLD: 2.1 %
LYMPHOCYTES # BLD AUTO: 1.19 X10(3) UL (ref 1–4)
LYMPHOCYTES NFR BLD AUTO: 12.1 %
MCH RBC QN AUTO: 30.3 PG (ref 26–34)
MCHC RBC AUTO-ENTMCNC: 31.5 G/DL (ref 31–37)
MCV RBC AUTO: 96.2 FL (ref 80–100)
MONOCYTES # BLD AUTO: 0.49 X10(3) UL (ref 0.1–1)
MONOCYTES NFR BLD AUTO: 5 %
NEUTROPHILS # BLD AUTO: 7.63 X10 (3) UL (ref 1.5–7.7)
NEUTROPHILS # BLD AUTO: 7.63 X10(3) UL (ref 1.5–7.7)
NEUTROPHILS NFR BLD AUTO: 77.6 %
OSMOLALITY SERPL CALC.SUM OF ELEC: 302 MOSM/KG (ref 275–295)
PLATELET # BLD AUTO: 170 10(3)UL (ref 150–450)
POTASSIUM SERPL-SCNC: 3.4 MMOL/L (ref 3.5–5.1)
PROT SERPL-MCNC: 5 G/DL (ref 5.7–8.2)
RBC # BLD AUTO: 3.7 X10(6)UL (ref 3.8–5.3)
SODIUM SERPL-SCNC: 141 MMOL/L (ref 136–145)
WBC # BLD AUTO: 9.8 X10(3) UL (ref 4–11)

## 2025-06-01 PROCEDURE — 71045 X-RAY EXAM CHEST 1 VIEW: CPT | Performed by: INTERNAL MEDICINE

## 2025-06-01 PROCEDURE — 99233 SBSQ HOSP IP/OBS HIGH 50: CPT | Performed by: INTERNAL MEDICINE

## 2025-06-01 RX ORDER — DOCUSATE SODIUM 100 MG/1
100 CAPSULE, LIQUID FILLED ORAL 2 TIMES DAILY
Status: DISCONTINUED | OUTPATIENT
Start: 2025-06-01 | End: 2025-06-07

## 2025-06-01 RX ORDER — SENNA AND DOCUSATE SODIUM 50; 8.6 MG/1; MG/1
2 TABLET, FILM COATED ORAL DAILY
Status: DISCONTINUED | OUTPATIENT
Start: 2025-06-01 | End: 2025-06-01

## 2025-06-01 RX ORDER — SENNOSIDES 8.6 MG
17.2 TABLET ORAL 2 TIMES DAILY
Status: DISCONTINUED | OUTPATIENT
Start: 2025-06-01 | End: 2025-06-07

## 2025-06-01 NOTE — PROGRESS NOTES
Daily Pulmonary/ICU/Critical Care Progress Note          SUBJECTIVE:  On 7 LHFNC from 15 LHFNC  Afebrile  On HD with no plans for fluid removal today       ALLERGIES:  Allergies[1]      MEDS:  Home Medications:  Medications Taking[2]  Scheduled Medication:  Scheduled Medications[3]  Continuous Infusing Medication:  Medication Infusions[4]  PRN Medications:  PRN Medications[5]       PHYSICAL EXAM:  BP 94/43 (BP Location: Left arm)   Pulse 84   Temp 97 °F (36.1 °C) (Temporal)   Resp 14   Ht 5' 3\" (1.6 m)   Wt 234 lb 9.1 oz (106.4 kg)   SpO2 95%   BMI 41.55 kg/m²      CONSTITUTIONAL: alert, oriented, no apparent distress  HEENT: atraumatic normocephalic  MOUTH: mucous membranes are moist. No OP exudates  NECK/THROAT: no JVD. Trachea midline. No obvious thyromegaly  LUNG: clear upper b/l no wheezing, + faint b/l crackles. Chest symmetric with respiratory motion  HEART: regular rate and rhythm, no obvious murmers or gallops noted  ABD: soft non tender. + bowel sounds. No organomegaly noted  EXT: no clubbing, cyanosis, or edema noted      IMAGES:   Reviewed in EMR  CXR this am looks like pulm edema pattern   Official read pending        LABS:  Recent Labs   Lab 05/30/25  0351 05/31/25  0413 06/01/25  0548   RBC 3.87 4.12 3.70*   HGB 11.4* 12.1 11.2*   HCT 35.7 38.3 35.6   MCV 92.2 93.0 96.2   MCH 29.5 29.4 30.3   MCHC 31.9 31.6 31.5   RDW 16.6* 16.8* 17.0*   NEPRELIM 7.76* 8.14* 7.63   WBC 9.8 10.7 9.8   .0* 162.0 170.0       Recent Labs   Lab 05/26/25  0358 05/27/25  0428 05/28/25  0419 05/29/25  0606 05/30/25  0351 05/31/25  0413   * 112* 132* 114* 169* 112*   BUN 18 38* 29* 17 22 42*   CREATSERUM 3.34* 4.76* 3.71* 1.91* 2.32* 3.71*   EGFRCR 15* 10* 13* 29* 23* 13*   CA 8.3* 8.6* 8.9 8.4* 8.4* 9.0   ALB 3.5 3.4 3.8  --   --   --    * 134* 135* 141 139 140   K 4.6 5.9* 4.8 4.4 3.2* 4.1    100 100 104 104 102   CO2 28.0 28.0 27.0 27.0 27.0 28.0   ALKPHO 232* 199* 185*  --   --   --    AST  285* 279* 207*  --   --   --    ALT 70* 46 37  --   --   --    BILT 0.3 0.2 0.3  --   --   --    TP 5.6* 5.4* 5.9  --   --   --          ASSESSMENT/PLAN:  SUSAN with rhabdo  -now started on HD  -renal following     Acute hypoxemic resp failure  -previously failed BIPAP and was intubated on 5/27/25.  -subsequently extubated on 5/30/25 and now on 7 LHFNC   -CXR with pulm edema pattern  -on HD now  -also being treated for possible asp PNA with unasyn    Possible COPD exac  -on nebs and solumedrol    HFrEF with LVEF 35%  -on cardiac meds managed by cardiology    Elevated LFTS  -thought to be d/t shock liver  -improving     Hypotension  -on midodrine    Proph  -DVT: hep subcutaneous  -nutrition: TF. Speech eval pending   -PT/OT eval    Dispo  -Full code      Thank you for the opportunity to care for Radharommel Brenner DO, MPH  Pulmonary Critical Care Medicine  [facility]          [1] No Known Allergies  [2]   Outpatient Medications Marked as Taking for the 5/17/25 encounter (Hospital Encounter)   Medication Sig Dispense Refill    busPIRone HCl 30 MG Oral Tab Take 1 tablet (30 mg total) by mouth 3 (three) times daily.      chlorproMAZINE 25 MG Oral Tab Take 1 tablet (25 mg total) by mouth 4 (four) times daily.      diazePAM 5 MG Oral Tab Take 1 tablet (5 mg total) by mouth every 8 (eight) hours as needed for Anxiety.      DULoxetine 60 MG Oral Cap DR Particles Take 1 capsule (60 mg total) by mouth 2 (two) times daily.      metFORMIN  MG Oral Tablet 24 Hr Take 2 tablets (1,000 mg total) by mouth daily. (Patient taking differently: Take 2 tablets (1,000 mg total) by mouth 2 (two) times daily with meals.)      metoprolol succinate ER 50 MG Oral Tablet 24 Hr Take 1 tablet (50 mg total) by mouth 2 (two) times daily.      QUEtiapine  MG Oral Tablet 24 Hr Take 1 tablet (300 mg total) by mouth nightly.      QUEtiapine 50 MG Oral Tab Take 1 tablet (50 mg total)  by mouth 2 (two) times daily.      rosuvastatin 40 MG Oral Tab Take 1 tablet (40 mg total) by mouth before bedtime.      lidocaine 5 % External Patch Place 1 patch onto the skin daily. 14 patch 0    [] ibuprofen 600 MG Oral Tab Take 1 tablet (600 mg total) by mouth every 6 (six) hours as needed. 28 tablet 0    levothyroxine 150 MCG Oral Tab Take 1 tablet (150 mcg total) by mouth before breakfast. Give on an empty stomach. On Mon, Tue, Wed, Thur, Fri and Saturday      levothyroxine 75 MCG Oral Tab Take 1 tablet (75 mcg total) by mouth before breakfast. On Sundays only      aspirin 81 MG Oral Tab EC Take 1 tablet (81 mg total) by mouth in the morning.     [3]    ipratropium-albuterol  3 mL Nebulization 2 times daily    midodrine  10 mg Per G Tube TID    busPIRone HCl  30 mg Per G Tube TID    chlorproMAZINE  25 mg Per G Tube QID    QUEtiapine  50 mg Per G Tube BID    levothyroxine  150 mcg Per G Tube Before breakfast    senna  10 mL Per NG Tube BID    docusate  100 mg Per NG Tube BID    famotidine  20 mg Intravenous Daily    methylPREDNISolone  40 mg Intravenous Daily    insulin regular human  1-5 Units Subcutaneous 4 times per day    carvedilol  3.125 mg Oral BID with meals    sacubitril-valsartan  1 tablet Oral BID    budesonide  0.5 mg Nebulization 2 times daily    arformoterol  15 mcg Nebulization 2 times daily    acetylcysteine  2 mL Nebulization BID    ampicillin-sulbactam  1.5 g Intravenous q12h    DULoxetine  60 mg Oral BID    heparin  5,000 Units Subcutaneous 2 times per day    aspirin  81 mg Oral Daily    QUEtiapine ER  300 mg Oral Nightly   [4]    dextrose 10%     [5]   diazePAM    glucose **OR** glucose **OR** glucose-vitamin C **OR** dextrose **OR** glucose **OR** glucose **OR** glucose-vitamin C    dextrose 10%    sodium bicarbonate **AND** lipase-protease-amylase (Lip-Prot-Amyl)    HYDROcodone-acetaminophen    ondansetron    polyethylene glycol (PEG 3350)    magnesium hydroxide    bisacodyl     fleet enema    heparin    albuterol    acetaminophen

## 2025-06-01 NOTE — PROGRESS NOTES
[facility]  part of [facility]    Nephrology Progress Note    Radha Winters Patient Status:  Inpatient    1960 MRN [id number]   Location [facility] 2W/SW Attending Fanny Majano MD   Hosp Day # 15 PCP JUAN MONTANEZ     Subjective:   aRdha Winters is a(n) 64 year old female '    No acute events or new issues reported, on HD, hemodynamically stable.      Objective:   Blood pressure 94/43, pulse 84, temperature 97 °F (36.1 °C), temperature source Temporal, resp. rate 14, height 5' 3\" (1.6 m), weight 234 lb 9.1 oz (106.4 kg), SpO2 95%.        Results:    Lab Results   Component Value Date    WBC 9.8 2025    HGB 11.2 (L) 2025    HCT 35.6 2025    .0 2025    CREATSERUM 2.81 (H) 2025    BUN 33 (H) 2025     2025    K 3.4 (L) 2025     2025    CO2 30.0 2025     (H) 2025    CA 8.6 (L) 2025    ALB 3.3 2025    ALKPHO 107 2025    BILT 0.2 2025    TP 5.0 (L) 2025    AST 39 (H) 2025    ALT 20 2025    TSH 0.528 10/10/2023    LIP 36 2025    DDIMER 1.77 (H) 2025    MG 2.2 2025    PHOS 4.3 2025    TROPHS 79 (HH) 2025     (H) 2025    B12 581 10/10/2023       XR CHEST AP PORTABLE  (CPT=71045)  Result Date: 2025  CONCLUSION:   Cardiomegaly with bilateral interstitial opacity likely mild edema.  Interval extubation.    Dictated by (CST): Randal Wilkerson MD on 2025 at 9:16 AM     Finalized by (CST): Randal Wilkerson MD on 2025 at 9:17 AM                Assessment & Plan:     Acute renal failure superimposed on chronic kidney disease, unspecified acute renal failure type, unspecified CKD stage        Non-traumatic rhabdomyolysis    Patient is a 65 y/o female with PMH of HTN, dyslipidemia, CAD admitted after multiple falls, Nephrology consulted for Acute Kidney Injury      Acute Kidney Injury:  Likely  secondary to ATN, from Rhabdomyolysis, continue aggressive volume resuscitation, 0.9  ml/hr, continue to trend BMP, check Uric acid, Phos, Urine studies  CKD stage 3b vs 4:  Secondary to HTN nephrosclerosis, check Phos, PTH, renal US  Transaminitis:  Continue to trend LFTs, will check abdominal US        Recommendations:  Acute Kidney Injury likely unresolved ATN  HD with 2-2.5 liters UF as tolerated  Albumin and Midodine for MAP goal >65        May 23, 2025  Patient is nonoliguric.  Renal function is severely deranged.  Has a tunneled dialysis catheter in place.  Will dialyze as needed for volume control.  Currently patient shows no signs of recovery of kidney function and likely needs supportive care including dialysis as needed.     May 24, 2025  Patient had a rapid response called because of being short of breath and was clinically volume overloaded.  Patient transferred to progressive care unit.  Discussed with hospitalist.  Patient is receiving dialysis with intent to remove 2.2 L of fluid.  Continue supportive care and monitor electrolytes and renal function.     May 25, 2025  Patient is critically ill in ICU.  Today is day 2 of back-to-back dialysis.  Within goal  to remove 2.2 L of fluid in 3-1/2 hours.  Overall patient seems to have improved but still requiring BiPAP.The monitoring electrolyte and renal function.     May 26th 2025  Patient still not improved much with volume status. Plan for dialysis tomorrow with 4hrs and 3 Lit UF. Remains critically ill in ICU.      May 27th 2025  Patient doing better but still requiring high flow oxygen. She remains oligoanuric. Dialysis dependent. Monitor closely. Critically ill in ICU. Total time spent seeing patient was 45 minutes.     May 28, 2025  Patient remains oligoanuric.  Urinary catheter was removed.  Will do periodic bladder scans and place catheter if needed or do bladder voiding protocol.  Discussed with RN.  Patient will also receive hemodialysis  today with intent to remove 1.5 L of fluid.  Vent settings are acceptable.  Blood pressure is stable.  Patient did have intubation yesterday because of volume issues and respiratory distress.  Currently remains critically ill in the ICU.     May 29th 2025  Remains intubated, sedated on HD, tolerating it well.     May 30 th, 2025  Intubated, on full vent support, KCL replaced.       May 31th, 2025  Extubated, seen on HD, hemodynamically stable.     June 1st, 2025  On HD, 4 K bath, hemodynamically stable             Bernardo Lewis MD  6/1/2025

## 2025-06-01 NOTE — SLP NOTE
ADULT SWALLOWING EVALUATION    ASSESSMENT    ASSESSMENT/OVERALL IMPRESSION:  PPE REQUIRED. THIS THERAPIST WORE GLOVE FOR DURATION OF EVALUATION. HANDS WASHED UPON ENTRANCE/EXIT.    Pt is a 64-year-old female with a history of hypertension, coronary artery disease, hyperlipidemia, hypothyroidism, and chronic leg weakness who presents with multiple issues. Pt was intubated on 5/27/25 for possible aspiration pneumonia.     SLP BSSE orders received and acknowledged. A swallow evaluation warranted as pt recently extubated 5/30/25. Last BSE during this admission recommended pureed and mildly thick liquids.     Pt afebrile with a clear vocal quality, on 7L/Min, with oxygen saturation at 95. Pt was fully awake and verbal for today's evaluation. Pt was positioned upright in bed. Pt with no complaints of pain, RN aware. Completed trials of thin liquids, puree, and regular solids. Pt is edentulous and states having dentures but not wearing them to eat. Pt reported preferring softer foods. Pt with adequate oral acceptance and bilabial seal across all trials. Mastication was slightly slowed with adequate oral clearance achieved. Pt's swallow response appeared timely with adequate hyolaryngeal elevation/excursion.  No clinical signs of aspiration (e.g., immediate/delayed throat clear, immediate/delayed cough, wet vocal quality, increased O2 effort) observed across all trials. Oxygen status remained unchanged t/o the entire evaluation.      6/1 CXR indicates \"There is mild bilateral interstitial opacity.  No significant pleural effusion.  No pneumothorax. \" .     At this time, pt presents with mild oral dysphagia and probable pharyngeal dysfunction. Recommend a soft bite size diet and thin liquids with strict adherence to safe swallowing compensatory strategies. NO STRAWS. Results and recommendations reviewed with RN.  SLP collaborated with RN for MD diet orders.        RECOMMENDATIONS   Diet Recommendations - Solids: Mechanical  soft chopped/ Soft & Bite Sized  Diet Recommendations - Liquids: Thin Liquids                        Compensatory Strategies Recommended: Alternate consistencies  Aspiration Precautions: Upright position, Slow rate, Small bites, Small sips, No straw  Medication Administration Recommendations: Crushed in puree, Whole in puree  Treatment Plan/Recommendations: Aspiration precautions    HISTORY   MEDICAL HISTORY  Reason for Referral: Other (Comment) (extubated)    Problem List  Principal Problem:    Acute renal failure superimposed on chronic kidney disease, unspecified acute renal failure type, unspecified CKD stage  Active Problems:    Non-traumatic rhabdomyolysis    Transaminitis      Past Medical History  Past Medical History[1]    Prior Living Situation: Home alone  Diet Prior to Admission: Regular, Thin liquids  Precautions: Aspiration    Patient/Family Goals: to eat/drink    SWALLOWING HISTORY  Current Diet Consistency: NPO  Dysphagia History: see above  Imaging Results: see above    SUBJECTIVE       OBJECTIVE   ORAL MOTOR EXAMINATION  Dentition: Edentulous  Symmetry: Within Functional Limits  Strength: Overall reduced     Range of Motion: Within Functional Limits  Rate of Motion: Within Functional Limits    Voice Quality: Clear  Respiratory Status: Supplemental O2, Nasal cannula  Consistencies Trialed: Thin liquids, Puree, Soft solid, Hard solid  Method of Presentation: Staff/Clinician assistance, Spoon, Cup, Single sips  Patient Positioned: Upright    Oral Phase of Swallow: Impaired  Bolus Retrieval: Intact  Bilabial Seal: Intact  Bolus Formation: Intact  Bolus Propulsion: Intact  Mastication: Impaired  Retention: Intact    Pharyngeal Phase of Swallow: Within Functional Limits  Laryngeal Elevation Timing: Appears intact  Laryngeal Elevation Strength: Appears intact  Laryngeal Elevation Coordination: Appears intact  (Please note: Silent aspiration cannot be evaluated clinically. Videofluoroscopic Swallow Study  is required to rule-out silent aspiration.)    Esophageal Phase of Swallow: No complaints consistent with possible esophageal involvement  Comments: n/a              GOALS  Goal #1 The patient will tolerate soft bite size consistency and thin liquids without overt signs or symptoms of aspiration with 90 % accuracy over 1 session(s).  In Progress   Goal #2 The patient/family/caregiver will demonstrate understanding and implementation of aspiration precautions and swallow strategies independently over 2 session(s).    In Progress   Goal #3 The patient will tolerate trial upgrade of easy to chew/regular consistency without overt signs or symptoms of aspiration with 90 % accuracy over 1 session(s).  In Progress     FOLLOW UP  Treatment Plan/Recommendations: Aspiration precautions  Duration: 1 week  Follow Up Needed (Documentation Required): Yes  SLP Follow-up Date: 06/02/25    Thank you for your referral.   If you have any questions, please contact Grisel Castillo, SLP Grisel Castillo, MSCCCSO[facility]  [phone]         [1]   Past Medical History:   Essential hypertension    Heart attack (HCC)    Hyperlipidemia    Thyroid disease

## 2025-06-01 NOTE — PLAN OF CARE
Problem: SAFETY ADULT - FALL  Goal: Free from fall injury  Description: INTERVENTIONS:  - Assess pt frequently for physical needs  - Identify cognitive and physical deficits and behaviors that affect risk of falls.  - [place] fall precautions as indicated by assessment.  - Educate pt/family on patient safety including physical limitations  - Instruct pt to call for assistance with activity based on assessment  - Modify environment to reduce risk of injury  - Provide assistive devices as appropriate  - Consider OT/PT consult to assist with strengthening/mobility  - Encourage toileting schedule  Outcome: Progressing     Problem: SKIN/TISSUE INTEGRITY - ADULT  Goal: Skin integrity remains intact  Description: INTERVENTIONS  - Assess and document risk factors for pressure ulcer development  - Assess and document skin integrity  - Monitor for areas of redness and/or skin breakdown  - Initiate interventions, skin care algorithm/standards of care as needed  Outcome: Progressing     Problem: METABOLIC/FLUID AND ELECTROLYTES - ADULT  Goal: Glucose maintained within prescribed range  Description: INTERVENTIONS:  - Monitor Blood Glucose as ordered  - Assess for signs and symptoms of hyperglycemia and hypoglycemia  - Administer ordered medications to maintain glucose within target range  - Assess barriers to adequate nutritional intake and initiate nutrition consult as needed  - Instruct patient on self management of diabetes  Outcome: Progressing  Goal: Electrolytes maintained within normal limits  Description: INTERVENTIONS:  - Monitor labs and rhythm and assess patient for signs and symptoms of electrolyte imbalances  - Administer electrolyte replacement as ordered  - Monitor response to electrolyte replacements, including rhythm and repeat lab results as appropriate  - Fluid restriction as ordered  - Instruct patient on fluid and nutrition restrictions as appropriate  Outcome: Progressing  Goal: Hemodynamic stability  and optimal renal function maintained  Description: INTERVENTIONS:  - Monitor labs and assess for signs and symptoms of volume excess or deficit  - Monitor intake, output and patient weight  - Monitor urine specific gravity, serum osmolarity and serum sodium as indicated or ordered  - Monitor response to interventions for patient's volume status, including labs, urine output, blood pressure (other measures as available)  - Encourage oral intake as appropriate  - Instruct patient on fluid and nutrition restrictions as appropriate  Outcome: Progressing     Problem: RESPIRATORY - ADULT  Goal: Achieves optimal ventilation and oxygenation  Description: INTERVENTIONS:  - Assess for changes in respiratory status  - Assess for changes in mentation and behavior  - Position to facilitate oxygenation and minimize respiratory effort  - Oxygen supplementation based on oxygen saturation or ABGs  - Provide Smoking Cessation handout, if applicable  - Encourage broncho-pulmonary hygiene including cough, deep breathe, Incentive Spirometry  - Assess the need for suctioning and perform as needed  - Assess and instruct to report SOB or any respiratory difficulty  - Respiratory Therapy support as indicated  - Manage/alleviate anxiety  - Monitor for signs/symptoms of CO2 retention  Outcome: Progressing     Problem: Delirium  Goal: Minimize duration of delirium  Description: Interventions:  - Encourage use of hearing aids, eye glasses  - Promote highest level of mobility daily  - Provide frequent reorientation  - Promote wakefulness i.e. lights on, blinds open  - Promote sleep, encourage patient's normal rest cycle i.e. lights off, TV off, minimize noise and interruptions  - Encourage family to assist in orientation and promotion of home routines  Outcome: Progressing

## 2025-06-01 NOTE — PROGRESS NOTES
[facility]  part of [facility]    Progress Note    Radha Winters Patient Status:  Inpatient    1960 MRN [id number]   Location [facility] 5SW/SE Attending Fanny Majano MD   Hosp Day # 15 PCP JUAN MONTANEZ       SUBJECTIVE:  Extubated.  Alert.    Denies any shortness of breath.     No Chest pain no shortness of breath        OBJECTIVE:  Vital signs in last 24 hours:  BP 94/43 (BP Location: Left arm)   Pulse 84   Temp 97 °F (36.1 °C) (Temporal)   Resp 14   Ht 5' 3\" (1.6 m)   Wt 234 lb 9.1 oz (106.4 kg)   SpO2 95%   BMI 41.55 kg/m²     Intake/Output:    Intake/Output Summary (Last 24 hours) at 2025 0702  Last data filed at 2025 0600  Gross per 24 hour   Intake 1542.1 ml   Output --   Net 1542.1 ml       Wt Readings from Last 3 Encounters:   25 234 lb 9.1 oz (106.4 kg)   10/09/23 185 lb 3 oz (84 kg)   10/04/23 185 lb (83.9 kg)       Exam  Gen: No acute distress,   HEENT: No pallor  Pulm: Lungs clear to auscultation anteriorly  CV: Heart with regular rate and rhythm, no peripheral edema  Abd: Abdomen soft, nontender, nondistended, no organomegaly, bowel sounds present  Skin: no rashes or lesions  CNS: Alert    Data Review:     Labs:   Lab Results   Component Value Date    WBC 9.8 2025    HGB 11.2 2025    HCT 35.6 2025    .0 2025       LABS  Recent Labs   Lab 25  0358 25  0428 25  0419 25  0606 25  0351 25  0413 25  0548   RBC 4.48 4.60 4.27 4.08 3.87 4.12 3.70*   HGB 13.4 13.6 12.7 12.0 11.4* 12.1 11.2*   HCT 42.8 43.8 39.9 38.1 35.7 38.3 35.6   MCV 95.5 95.2 93.4 93.4 92.2 93.0 96.2   MCH 29.9 29.6 29.7 29.4 29.5 29.4 30.3   MCHC 31.3 31.1 31.8 31.5 31.9 31.6 31.5   RDW 16.5* 16.8* 16.7* 16.5* 16.6* 16.8* 17.0*   NEPRELIM 8.59* 8.48* 8.52* 7.67 7.76* 8.14* 7.63   WBC 10.3 9.9 9.7 9.7 9.8 10.7 9.8   .0* 129.0* 153.0 161.0 147.0* 162.0 170.0   * 279* 207*  --   --   --    --    ALT 70* 46 37  --   --   --   --    CK  --  9,315*  --   --   --   --   --    * 112* 132* 114* 169* 112*  --        Imaging:      Meds:   Current Hospital Medications[1]    Assessment  Problem List[2]  1. Acute Kidney Injury on Chronic Kidney Disease:  -  - Nephrology consultation obtained  Patient with worsening renal function.  Nephrology is following the patient  On hemodialysis       2. Rhabdomyolysis:  - Secondary to recurrent falls  - Possibly statin-induced  - Plan: Hold statin (Crestor)  Improving     3. Acute Transaminitis: She improved  - Likely secondary to rhabdomyolysis  - May also be statin-related  - Will monitor with serial labs  Patient could also have a shock liver  Liver enzymes are improving       4. Recurrent Falls:  -    Patient will need physical therapy and Occupational Therapy after she is extubated      5. Coronary Artery Disease:  - Currently stable  - Patient denies chest pain     6. Hypothyroidism:  - Continue levothyroxine  Possible UTI.  on ceftriaxone.    Acute hypoxic respiratory failure  Extubated today  Pulmonary following.      Chronic heart failure with reduced ejection fraction    Cardiomyopathy.  cardiology consulted         Plan for close monitoring and adjustment of management based on clinical course.  discussed with nursing staff  Discussed with the patient that she is not stable to go to home.  Discussed with the nursing staff.        Active Orders   Nourishments    Room Service Eligibility Until Discontinued     Frequency: Until Discontinued     Number of Occurrences: Until Specified    Room Service Notify RN Until Discontinued     Frequency: Until Discontinued     Number of Occurrences: Until Specified    Tube Feeding Diet Effective Now     Frequency: Effective Now     Number of Occurrences: Until Specified   OT    OT Eval and Treat     Frequency: Once     Number of Occurrences: 1 Occurrences   PT    IP PT eval and treat     Frequency: Once     Number of  Occurrences: 1 Occurrences   Respiratory Care    BIPAP When Sleeping     Frequency: When Sleeping     Number of Occurrences: Until Specified    BIPAP When Sleeping     Frequency: When Sleeping     Number of Occurrences: Until Specified    Chest physiotherapy 4x Daily     Frequency: 4x Daily     Number of Occurrences: Until Specified     Order Comments: Left lower lobe atelectasis.      EZ-PAP 4x Daily     Frequency: 4x Daily     Number of Occurrences: Until Specified    Incentive spriometry: RT to teach pt Once     Frequency: Once     Number of Occurrences: 1 Occurrences    Oxygen administration (adult) PRN     Frequency: PRN     Number of Occurrences: Until Specified    Respiratory care evaluation Once     Frequency: Once     Number of Occurrences: 1 Occurrences     Order Comments: If patient uses home CPAP/BIPAP, place on known home settings. If unknown, place on auto CPAP with upper limit 20 and lower limit 5 cm H2O     SLP    SLP Eval and Treat     Frequency: Once     Number of Occurrences: 1 Occurrences   Dialysis    Hemodialysis inpatient     Frequency: Tomorrow     Number of Occurrences: 1 Occurrences     Order Comments: Keep MAP goal >65     Precaution    Aspiration precautions Continuous     Frequency: Continuous     Number of Occurrences: Until Specified   Lab Only    RAINBOW DRAW LAVENDER     Frequency: Once     Number of Occurrences: 1 Occurrences   Diet    NPO     Frequency: Effective Now     Number of Occurrences: Until Specified   Nursing    Accucheck     Frequency: BID     Number of Occurrences: Until Specified    Accucheck     Frequency: Q6H     Number of Occurrences: Until Specified    Accucheck     Frequency: PRN     Number of Occurrences: Until Specified     Order Comments: For symptoms or suspicion of hypoglycemia      Assess using Critical Care Pain Observation Tool (CPOT)     Frequency: Now Then Q3H     Number of Occurrences: Until Specified    Elevate head of bed >30 degrees     Frequency:  Until Discontinued     Number of Occurrences: Until Specified     Order Comments: 30-45 degrees at all times unless contraindicated by physician order or patient condition.      Elevate head of bed greater than or equal to 30 degrees     Frequency: Until Discontinued     Number of Occurrences: Until Specified    Flush feeding tube     Frequency: PRN     Number of Occurrences: Until Specified     Order Comments: Flush feeding tube:  1.) Before and after bolus feedings.  2.) After medication administration.      For any tube feed interruptions, continue basal insulin, and correction scales. Hold any scheduled insulins, and resume when tube feed is restarted.     Frequency: Until Discontinued     Number of Occurrences: Until Specified    For non-patent tubes:     Frequency: PRN     Number of Occurrences: Until Specified     Order Comments: If water is unsuccessful in dislodging the clog, use the pancrealipase/sodium bicarb. May repeat x1 before calling physician for further orders.      For patients without a history of diabetes, discontinue Accuchecks and insulin correction scale if blood glucose <180 mg/dL for 48 hours     Frequency: Until Discontinued     Number of Occurrences: Until Specified    Give all morning medications unless otherwise specified     Frequency: Until Discontinued     Number of Occurrences: Until Specified     Order Comments: Give all morning medications unless otherwise specified.  DO NOT use Electrolyte protocol.      If patient uses CPAP/BIPAP, encourage patient to wear when sleeping (including daytime) and after any apneic episode or adverse event.     Frequency: Until Discontinued     Number of Occurrences: Until Specified    Initiate early mobility protocol     Frequency: Once     Number of Occurrences: 1 Occurrences    Initiate Hypoglycemia Algorithm for Adults     Frequency: Until Discontinued     Number of Occurrences: Until Specified     Order Comments: For blood glucose less than 70       Initiate Medical Nutrition Therapy Protocol for Enteral Nutrition     Frequency: Until Discontinued     Number of Occurrences: Until Specified    Insert denny catheter     Frequency: Once     Number of Occurrences: 1 Occurrences    Intake and Output     Frequency: Per Unit Routine     Number of Occurrences: Until Specified     Order Comments: Record formula and water flushes separately.      May use port/central line     Frequency: Until Discontinued     Number of Occurrences: Until Specified    Measure weight     Frequency: Per Unit Routine     Number of Occurrences: Until Specified     Order Comments: Measure weight every Monday and Thursday for non critical care patients.      Monitor tube placement     Frequency: Q8H     Number of Occurrences: Until Specified    Notify attending physician for patients refusing CPAP     Frequency: Until Discontinued     Number of Occurrences: Until Specified     Order Comments: Document that patient was educated and refused CPAP, if applicable.      Notify attending physician for sustained desaturation <90% or prolonged or recurrent apnea     Frequency: Until Discontinued     Number of Occurrences: Until Specified     Order Comments: Notify attending physician for sustained desaturation <90% or prolonged or recurrent apnea      Notify physician     Frequency: Until Discontinued     Number of Occurrences: Until Specified    Notify physician of significant abdominal distension, pain, nausea, or emesis, and stop tube feeding     Frequency: Until Discontinued     Number of Occurrences: Until Specified    Notify Radiologist     Frequency: Until Discontinued     Number of Occurrences: Until Specified    Nurse Driven Indwelling Urinary Catheter Removal Protocol     Frequency: Until Discontinued     Number of Occurrences: Until Specified    Nursing communication (specify)     Frequency: Once     Number of Occurrences: 1 Occurrences     Order Comments: Hold blood thinners pre  procedure      Nursing communication (specify)     Frequency: Once     Number of Occurrences: 1 Occurrences     Order Comments: RN, keep the patient off the left side.      Nursing communication (specify)     Frequency: Once     Number of Occurrences: 1 Occurrences     Order Comments: Removed OG and placed Dobhoff      Nursing communication (specify)     Frequency: Once     Number of Occurrences: 1 Occurrences     Order Comments: Dobhoff okay to use      Nursing communication - call [facility] to order dialysis     Frequency: Once     Number of Occurrences: 1 Occurrences     Order Comments: Call [facility] at [phone] to order dialysis.  Identify special circumstances and specify stat or routine treatment.      Patient may resume usual diet     Frequency: Once     Number of Occurrences: 1 Occurrences     Order Comments: Npo x one hour, then resume pre-procedure diet      Post procedure site assessment     Frequency: Per Unit Routine     Number of Occurrences: Until Specified     Order Comments: Every 15 minutes for 1 hour, then every 30 minutes for 1 hour, then every 60 minutes for 2 hours, then per unit routine      Provide patient with sleep apnea education materials     Frequency: Once     Number of Occurrences: 1 Occurrences    Pulse oximetry     Frequency: Per Unit Routine     Number of Occurrences: Until Specified    Pulse oximetry     Frequency: Continuous     Number of Occurrences: Until Specified    Tube insertion     Frequency: Once     Number of Occurrences: 1 Occurrences    Tube insertion     Frequency: Once     Number of Occurrences: 1 Occurrences    Tube insertion     Frequency: Once     Number of Occurrences: 1 Occurrences     Order Comments: Meds and tube feeding      Verify informed consent     Frequency: Once     Number of Occurrences: 1 Occurrences    Vital signs     Frequency: Per Unit Routine     Number of Occurrences: Until Specified     Order Comments: Every 15 minutes for 1 hour, then  every 30 minutes for 1 hour, then every 60 minutes for 2 hours, then per unit routine.      Void prior to procedure     Frequency: Once     Number of Occurrences: 1 Occurrences   Consult    Consult to Interventional Radiology     Frequency: Once     Number of Occurrences: 1 Occurrences    Consult to Pulmonology     Frequency: Once     Number of Occurrences: 1 Occurrences    Social work     Linked Order: And     Frequency: Once     Number of Occurrences: 1 Occurrences   Medications    acetaminophen (Tylenol) tab 650 mg     Frequency: Q6H PRN     Dose: 650 mg     Route: Oral    acetylcysteine (Mucomyst) 20 % nebulizer solution 2 mL     Frequency: BID     Dose: 2 mL     Route: Nebulization    albuterol (Ventolin) (2.5 MG/3ML) 0.083% nebulizer solution 2.5 mg     Frequency: Q6H PRN     Dose: 2.5 mg     Route: Nebulization    ampicillin-sulbactam (Unasyn) 1.5 g in sodium chloride 0.9% 100mL IVPB-MAYO     Frequency: q12h     Dose: 1.5 g     Route: Intravenous    arformoterol (Brovana) 15 MCG/2ML nebulizer solution 15 mcg     Frequency: 2 times daily     Dose: 15 mcg     Route: Nebulization    aspirin DR tab 81 mg     Frequency: Daily     Dose: 81 mg     Route: Oral    bisacodyl (Dulcolax) 10 MG rectal suppository 10 mg     Frequency: Daily PRN     Dose: 10 mg     Route: Rectal    budesonide (Pulmicort) 0.5 MG/2ML nebulizer suspension 0.5 mg     Frequency: 2 times daily     Dose: 0.5 mg     Route: Nebulization    busPIRone (Buspar) tab 30 mg     Frequency: TID     Dose: 30 mg     Route: Per G Tube    carvedilol (Coreg) tab 3.125 mg     Frequency: BID with meals     Dose: 3.125 mg     Route: Oral    chlorproMAZINE (Thorazine) tab 25 mg     Frequency: QID     Dose: 25 mg     Route: Per G Tube    dextrose 10% infusion (TPN no rate)     Frequency: Continuous PRN     Route: Intravenous    dextrose 50% injection 50 mL     Linked Order: Or     Frequency: Q15 Min PRN     Dose: 50 mL     Route: Intravenous    diazePAM (Valium) tab 5  mg     Frequency: Q8H PRN     Dose: 5 mg     Route: Per G Tube    docusate (Colace) 50 MG/5ML oral solution 100 mg     Frequency: BID     Dose: 100 mg     Route: Per NG Tube    DULoxetine (Cymbalta) DR cap 60 mg     Frequency: BID     Dose: 60 mg     Route: Oral    famotidine (Pepcid) 20 mg/2mL injection 20 mg     Frequency: Daily     Dose: 20 mg     Route: Intravenous    fleet enema (Fleet) rectal enema 133 mL     Frequency: Once PRN     Dose: 1 enema     Route: Rectal    glucose (Dex4) 15 GM/59ML oral liquid 15 g     Linked Order: Or     Frequency: Q15 Min PRN     Dose: 15 g     Route: Oral    glucose (Dex4) 15 GM/59ML oral liquid 30 g     Linked Order: Or     Frequency: Q15 Min PRN     Dose: 30 g     Route: Oral    glucose (Glutose) 40% oral gel 15 g     Linked Order: Or     Frequency: Q15 Min PRN     Dose: 15 g     Route: Oral    glucose (Glutose) 40% oral gel 30 g     Linked Order: Or     Frequency: Q15 Min PRN     Dose: 30 g     Route: Oral    glucose-vitamin C (Dex-4) chewable tab 4 tablet     Linked Order: Or     Frequency: Q15 Min PRN     Dose: 4 tablet     Route: Oral    glucose-vitamin C (Dex-4) chewable tab 8 tablet     Linked Order: Or     Frequency: Q15 Min PRN     Dose: 8 tablet     Route: Oral    heparin (Porcine) 1000 UNIT/ML injection 2,000 Units     Frequency: PRN Dialysis     Dose: 2,000 Units     Route: Intravenous    heparin (Porcine) 5000 UNIT/ML injection 5,000 Units     Frequency: Q12H PARTICK     Dose: 5,000 Units     Route: Subcutaneous    HYDROcodone-acetaminophen (Norco) 5-325 MG per tab 1 tablet     Frequency: Q6H PRN     Dose: 1 tablet     Route: Oral    insulin regular human (Novolin R, Humulin R) 100 UNIT/ML injection 1-5 Units     Frequency: Q6H PATRICK     Dose: 1-5 Units     Route: Subcutaneous    ipratropium-albuterol (Duoneb) 0.5-2.5 (3) MG/3ML inhalation solution 3 mL     Frequency: 2 times daily     Dose: 3 mL     Route: Nebulization    levothyroxine (Synthroid) tab 150 mcg      Frequency: Before breakfast     Dose: 150 mcg     Route: Per G Tube    magnesium hydroxide (Milk of Magnesia) 400 MG/5ML oral suspension 30 mL     Frequency: Daily PRN     Dose: 30 mL     Route: Oral    methylPREDNISolone sodium succinate (Solu-MEDROL) injection 40 mg     Frequency: Daily     Dose: 40 mg     Route: Intravenous    midodrine (ProAmatine) tab 10 mg     Frequency: TID     Dose: 10 mg     Route: Per G Tube    ondansetron (Zofran) 4 MG/2ML injection 4 mg     Frequency: Q6H PRN     Dose: 4 mg     Route: Intravenous    pancrelipase (Lip-Prot-Amyl) (Zenpep) DR particles cap 10,000 Units     Linked Order: And     Frequency: PRN     Dose: 10,000 Units     Route: Per G Tube    polyethylene glycol (PEG 3350) (Miralax) 17 g oral packet 17 g     Frequency: Daily PRN     Dose: 17 g     Route: Oral    QUEtiapine (SEROquel) tab 50 mg     Frequency: BID     Dose: 50 mg     Route: Per G Tube    QUEtiapine ER (SEROquel XR) 24 hr tab 300 mg     Frequency: Nightly     Dose: 300 mg     Route: Oral    sacubitril-valsartan (Entresto) 24-26 MG per tab 1 tablet     Linked Order: And     Frequency: BID     Dose: 1 tablet     Route: Oral    senna (Senokot) 8.8 MG/5ML oral syrup 17.6 mg     Frequency: BID     Dose: 10 mL     Route: Per NG Tube    sodium bicarbonate tab 650 mg     Linked Order: And     Frequency: PRN     Dose: 650 mg     Route: Per G Tube     Reviewed personally: current medical record, vital signs info, lab results, cardiac & radiographic films and reports.  Formulated plans for continued care for this patient.  RN to call us for any significant change  Scheduled tests: see orders   Other orders per treatment team.     *The above components were done for the patient encounter: Reviewing the patient's electronic chart, reviewing results, obtaining a medical history, counseling patient and/or family member, ordering medications, tests, or procedures, documenting clinical information in the electronic health record,  refer to or communicate with other health care professionals as indicated, independently interpret results and providing care coordination      Fanny Majano MD           [1]   Current Facility-Administered Medications   Medication Dose Route Frequency    ipratropium-albuterol (Duoneb) 0.5-2.5 (3) MG/3ML inhalation solution 3 mL  3 mL Nebulization 2 times daily    midodrine (ProAmatine) tab 10 mg  10 mg Per G Tube TID    busPIRone (Buspar) tab 30 mg  30 mg Per G Tube TID    chlorproMAZINE (Thorazine) tab 25 mg  25 mg Per G Tube QID    diazePAM (Valium) tab 5 mg  5 mg Per G Tube Q8H PRN    QUEtiapine (SEROquel) tab 50 mg  50 mg Per G Tube BID    levothyroxine (Synthroid) tab 150 mcg  150 mcg Per G Tube Before breakfast    senna (Senokot) 8.8 MG/5ML oral syrup 17.6 mg  10 mL Per NG Tube BID    docusate (Colace) 50 MG/5ML oral solution 100 mg  100 mg Per NG Tube BID    famotidine (Pepcid) 20 mg/2mL injection 20 mg  20 mg Intravenous Daily    methylPREDNISolone sodium succinate (Solu-MEDROL) injection 40 mg  40 mg Intravenous Daily    glucose (Dex4) 15 GM/59ML oral liquid 15 g  15 g Oral Q15 Min PRN    Or    glucose (Glutose) 40% oral gel 15 g  15 g Oral Q15 Min PRN    Or    glucose-vitamin C (Dex-4) chewable tab 4 tablet  4 tablet Oral Q15 Min PRN    Or    dextrose 50% injection 50 mL  50 mL Intravenous Q15 Min PRN    Or    glucose (Dex4) 15 GM/59ML oral liquid 30 g  30 g Oral Q15 Min PRN    Or    glucose (Glutose) 40% oral gel 30 g  30 g Oral Q15 Min PRN    Or    glucose-vitamin C (Dex-4) chewable tab 8 tablet  8 tablet Oral Q15 Min PRN    insulin regular human (Novolin R, Humulin R) 100 UNIT/ML injection 1-5 Units  1-5 Units Subcutaneous 4 times per day    carvedilol (Coreg) tab 3.125 mg  3.125 mg Oral BID with meals    sacubitril-valsartan (Entresto) 24-26 MG per tab 1 tablet  1 tablet Oral BID    budesonide (Pulmicort) 0.5 MG/2ML nebulizer suspension 0.5 mg  0.5 mg Nebulization 2 times daily    arformoterol  (Brovana) 15 MCG/2ML nebulizer solution 15 mcg  15 mcg Nebulization 2 times daily    dextrose 10% infusion (TPN no rate)   Intravenous Continuous PRN    sodium bicarbonate tab 650 mg  650 mg Per G Tube PRN    And    pancrelipase (Lip-Prot-Amyl) (Zenpep) DR particles cap 10,000 Units  10,000 Units Per G Tube PRN    acetylcysteine (Mucomyst) 20 % nebulizer solution 2 mL  2 mL Nebulization BID    ampicillin-sulbactam (Unasyn) 1.5 g in sodium chloride 0.9% 100mL IVPB-MAYO  1.5 g Intravenous q12h    DULoxetine (Cymbalta) DR cap 60 mg  60 mg Oral BID    HYDROcodone-acetaminophen (Norco) 5-325 MG per tab 1 tablet  1 tablet Oral Q6H PRN    ondansetron (Zofran) 4 MG/2ML injection 4 mg  4 mg Intravenous Q6H PRN    polyethylene glycol (PEG 3350) (Miralax) 17 g oral packet 17 g  17 g Oral Daily PRN    magnesium hydroxide (Milk of Magnesia) 400 MG/5ML oral suspension 30 mL  30 mL Oral Daily PRN    bisacodyl (Dulcolax) 10 MG rectal suppository 10 mg  10 mg Rectal Daily PRN    fleet enema (Fleet) rectal enema 133 mL  1 enema Rectal Once PRN    heparin (Porcine) 1000 UNIT/ML injection 2,000 Units  2,000 Units Intravenous PRN Dialysis    albuterol (Ventolin) (2.5 MG/3ML) 0.083% nebulizer solution 2.5 mg  2.5 mg Nebulization Q6H PRN    heparin (Porcine) 5000 UNIT/ML injection 5,000 Units  5,000 Units Subcutaneous 2 times per day    acetaminophen (Tylenol) tab 650 mg  650 mg Oral Q6H PRN    aspirin DR tab 81 mg  81 mg Oral Daily    QUEtiapine ER (SEROquel XR) 24 hr tab 300 mg  300 mg Oral Nightly   [2]   Patient Active Problem List  Diagnosis    Closed fracture of proximal end of left humerus, unspecified fracture morphology, initial encounter    Frequent falls    Seizure-like activity (HCC)    Pituitary lesion (HCC)    Major depressive disorder, recurrent episode, moderate (HCC)    Generalized anxiety disorder    Acute renal failure superimposed on chronic kidney disease, unspecified acute renal failure type, unspecified CKD stage     Non-traumatic rhabdomyolysis    Transaminitis

## 2025-06-01 NOTE — PLAN OF CARE
HD, CXR- mild edema, speech eval-passed   Problem: SAFETY ADULT - FALL  Goal: Free from fall injury  Description: INTERVENTIONS:  - Assess pt frequently for physical needs  - Identify cognitive and physical deficits and behaviors that affect risk of falls.  - [place] fall precautions as indicated by assessment.  - Educate pt/family on patient safety including physical limitations  - Instruct pt to call for assistance with activity based on assessment  - Modify environment to reduce risk of injury  - Provide assistive devices as appropriate  - Consider OT/PT consult to assist with strengthening/mobility  - Encourage toileting schedule  Outcome: Progressing     Problem: SKIN/TISSUE INTEGRITY - ADULT  Goal: Skin integrity remains intact  Description: INTERVENTIONS  - Assess and document risk factors for pressure ulcer development  - Assess and document skin integrity  - Monitor for areas of redness and/or skin breakdown  - Initiate interventions, skin care algorithm/standards of care as needed  Outcome: Progressing     Problem: RESPIRATORY - ADULT  Goal: Achieves optimal ventilation and oxygenation  Description: INTERVENTIONS:  - Assess for changes in respiratory status  - Assess for changes in mentation and behavior  - Position to facilitate oxygenation and minimize respiratory effort  - Oxygen supplementation based on oxygen saturation or ABGs  - Provide Smoking Cessation handout, if applicable  - Encourage broncho-pulmonary hygiene including cough, deep breathe, Incentive Spirometry  - Assess the need for suctioning and perform as needed  - Assess and instruct to report SOB or any respiratory difficulty  - Respiratory Therapy support as indicated  - Manage/alleviate anxiety  - Monitor for signs/symptoms of CO2 retention  Outcome: Progressing     Problem: GENITOURINARY - ADULT  Goal: Absence of urinary retention  Description: INTERVENTIONS:  - Assess patient’s ability to void and empty bladder  - Monitor  intake/output and perform bladder scan as needed  - Follow urinary retention protocol/standard of care  - Consider collaborating with pharmacy to review patient's medication profile  - Implement strategies to promote bladder emptying  Outcome: Progressing     Problem: METABOLIC/FLUID AND ELECTROLYTES - ADULT  Goal: Glucose maintained within prescribed range  Description: INTERVENTIONS:  - Monitor Blood Glucose as ordered  - Assess for signs and symptoms of hyperglycemia and hypoglycemia  - Administer ordered medications to maintain glucose within target range  - Assess barriers to adequate nutritional intake and initiate nutrition consult as needed  - Instruct patient on self management of diabetes  Outcome: Progressing  Goal: Electrolytes maintained within normal limits  Description: INTERVENTIONS:  - Monitor labs and rhythm and assess patient for signs and symptoms of electrolyte imbalances  - Administer electrolyte replacement as ordered  - Monitor response to electrolyte replacements, including rhythm and repeat lab results as appropriate  - Fluid restriction as ordered  - Instruct patient on fluid and nutrition restrictions as appropriate  Outcome: Progressing  Goal: Hemodynamic stability and optimal renal function maintained  Description: INTERVENTIONS:  - Monitor labs and assess for signs and symptoms of volume excess or deficit  - Monitor intake, output and patient weight  - Monitor urine specific gravity, serum osmolarity and serum sodium as indicated or ordered  - Monitor response to interventions for patient's volume status, including labs, urine output, blood pressure (other measures as available)  - Encourage oral intake as appropriate  - Instruct patient on fluid and nutrition restrictions as appropriate  Outcome: Progressing     Problem: HEMATOLOGIC - ADULT  Goal: Free from bleeding injury  Description: (Example usage: patient with low platelets)  INTERVENTIONS:  - Avoid intramuscular injections,  enemas and rectal medication administration  - Ensure safe mobilization of patient  - Hold pressure on venipuncture sites to achieve adequate hemostasis  - Assess for signs and symptoms of internal bleeding  - Monitor lab trends  - Patient is to report abnormal signs of bleeding to staff  - Avoid use of toothpicks and dental floss  - Use electric shaver for shaving  - Use soft bristle tooth brush  - Limit straining and forceful nose blowing  Outcome: Progressing  Goal: Maintains hematologic stability  Description: INTERVENTIONS  - Assess for signs and symptoms of bleeding or hemorrhage  - Monitor labs and vital signs for trends  - Administer supportive blood products/factors, fluids and medications as ordered and appropriate  - Administer supportive blood products/factors as ordered and appropriate  Outcome: Progressing  Goal: Free from bleeding injury  Description: (Example usage: patient with low platelets)  INTERVENTIONS:  - Avoid intramuscular injections, enemas and rectal medication administration  - Ensure safe mobilization of patient  - Hold pressure on venipuncture sites to achieve adequate hemostasis  - Assess for signs and symptoms of internal bleeding  - Monitor lab trends  - Patient is to report abnormal signs of bleeding to staff  - Avoid use of toothpicks and dental floss  - Use electric shaver for shaving  - Use soft bristle tooth brush  - Limit straining and forceful nose blowing  Outcome: Progressing     Problem: GASTROINTESTINAL - ADULT  Goal: Minimal or absence of nausea and vomiting  Description: INTERVENTIONS:  - Maintain adequate hydration with IV or PO as ordered and tolerated  - Nasogastric tube to low intermittent suction as ordered  - Evaluate effectiveness of ordered antiemetic medications  - Provide nonpharmacologic comfort measures as appropriate  - Advance diet as tolerated, if ordered  - Obtain nutritional consult as needed  - Evaluate fluid balance  Outcome: Progressing  Goal:  Maintains or returns to baseline bowel function  Description: INTERVENTIONS:  - Assess bowel function  - Maintain adequate hydration with IV or PO as ordered and tolerated  - Evaluate effectiveness of GI medications  - Encourage mobilization and activity  - Obtain nutritional consult as needed  - Establish a toileting routine/schedule  - Consider collaborating with pharmacy to review patient's medication profile  Outcome: Progressing     Problem: Diabetes/Glucose Control  Goal: Glucose maintained within prescribed range  Description: INTERVENTIONS:  - Monitor Blood Glucose as ordered  - Assess for signs and symptoms of hyperglycemia and hypoglycemia  - Administer ordered medications to maintain glucose within target range  - Assess barriers to adequate nutritional intake and initiate nutrition consult as needed  - Instruct patient on self management of diabetes  Outcome: Progressing     Problem: Delirium  Goal: Minimize duration of delirium  Description: Interventions:  - Encourage use of hearing aids, eye glasses  - Promote highest level of mobility daily  - Provide frequent reorientation  - Promote wakefulness i.e. lights on, blinds open  - Promote sleep, encourage patient's normal rest cycle i.e. lights off, TV off, minimize noise and interruptions  - Encourage family to assist in orientation and promotion of home routines  Outcome: Not Progressing

## 2025-06-01 NOTE — PROGRESS NOTES
Patient seen in follow-up from her last visit now extubated.  No reported chest pain shortness of breath abdominal pain.  No new complaints.  Oxygenating well with normal blood pressure.    Vitals:    06/01/25 0600   BP: 94/43   Pulse: 84   Resp: 14   Temp:        Intake/Output Summary (Last 24 hours) at 6/1/2025 1258  Last data filed at 6/1/2025 0600  Gross per 24 hour   Intake 1542.1 ml   Output --   Net 1542.1 ml     Wt Readings from Last 1 Encounters:   05/29/25 234 lb 9.1 oz (106.4 kg)        General: No acute distress.  Neck: Jugular venous pulsations not seen.  Lungs: Clear to auscultation.  Heart: Normal rate. No murmurs.  Abdomen: Soft. Non tender  Extremities: No edema.  Neurological:   slow to respond but does answer questions  Psychiatric: Appropriate mood and affect.  Current Facility-Administered Medications   Medication Dose Route Frequency    ipratropium-albuterol (Duoneb) 0.5-2.5 (3) MG/3ML inhalation solution 3 mL  3 mL Nebulization 2 times daily    midodrine (ProAmatine) tab 10 mg  10 mg Per G Tube TID    busPIRone (Buspar) tab 30 mg  30 mg Per G Tube TID    chlorproMAZINE (Thorazine) tab 25 mg  25 mg Per G Tube QID    diazePAM (Valium) tab 5 mg  5 mg Per G Tube Q8H PRN    QUEtiapine (SEROquel) tab 50 mg  50 mg Per G Tube BID    levothyroxine (Synthroid) tab 150 mcg  150 mcg Per G Tube Before breakfast    senna (Senokot) 8.8 MG/5ML oral syrup 17.6 mg  10 mL Per NG Tube BID    docusate (Colace) 50 MG/5ML oral solution 100 mg  100 mg Per NG Tube BID    famotidine (Pepcid) 20 mg/2mL injection 20 mg  20 mg Intravenous Daily    methylPREDNISolone sodium succinate (Solu-MEDROL) injection 40 mg  40 mg Intravenous Daily    glucose (Dex4) 15 GM/59ML oral liquid 15 g  15 g Oral Q15 Min PRN    Or    glucose (Glutose) 40% oral gel 15 g  15 g Oral Q15 Min PRN    Or    glucose-vitamin C (Dex-4) chewable tab 4 tablet  4 tablet Oral Q15 Min PRN    Or    dextrose 50% injection 50 mL  50 mL Intravenous Q15 Min  PRN    Or    glucose (Dex4) 15 GM/59ML oral liquid 30 g  30 g Oral Q15 Min PRN    Or    glucose (Glutose) 40% oral gel 30 g  30 g Oral Q15 Min PRN    Or    glucose-vitamin C (Dex-4) chewable tab 8 tablet  8 tablet Oral Q15 Min PRN    insulin regular human (Novolin R, Humulin R) 100 UNIT/ML injection 1-5 Units  1-5 Units Subcutaneous 4 times per day    carvedilol (Coreg) tab 3.125 mg  3.125 mg Oral BID with meals    sacubitril-valsartan (Entresto) 24-26 MG per tab 1 tablet  1 tablet Oral BID    budesonide (Pulmicort) 0.5 MG/2ML nebulizer suspension 0.5 mg  0.5 mg Nebulization 2 times daily    arformoterol (Brovana) 15 MCG/2ML nebulizer solution 15 mcg  15 mcg Nebulization 2 times daily    dextrose 10% infusion (TPN no rate)   Intravenous Continuous PRN    sodium bicarbonate tab 650 mg  650 mg Per G Tube PRN    And    pancrelipase (Lip-Prot-Amyl) (Zenpep) DR particles cap 10,000 Units  10,000 Units Per G Tube PRN    acetylcysteine (Mucomyst) 20 % nebulizer solution 2 mL  2 mL Nebulization BID    ampicillin-sulbactam (Unasyn) 1.5 g in sodium chloride 0.9% 100mL IVPB-MAYO  1.5 g Intravenous q12h    DULoxetine (Cymbalta) DR cap 60 mg  60 mg Oral BID    HYDROcodone-acetaminophen (Norco) 5-325 MG per tab 1 tablet  1 tablet Oral Q6H PRN    ondansetron (Zofran) 4 MG/2ML injection 4 mg  4 mg Intravenous Q6H PRN    polyethylene glycol (PEG 3350) (Miralax) 17 g oral packet 17 g  17 g Oral Daily PRN    magnesium hydroxide (Milk of Magnesia) 400 MG/5ML oral suspension 30 mL  30 mL Oral Daily PRN    bisacodyl (Dulcolax) 10 MG rectal suppository 10 mg  10 mg Rectal Daily PRN    fleet enema (Fleet) rectal enema 133 mL  1 enema Rectal Once PRN    heparin (Porcine) 1000 UNIT/ML injection 2,000 Units  2,000 Units Intravenous PRN Dialysis    albuterol (Ventolin) (2.5 MG/3ML) 0.083% nebulizer solution 2.5 mg  2.5 mg Nebulization Q6H PRN    heparin (Porcine) 5000 UNIT/ML injection 5,000 Units  5,000 Units Subcutaneous 2 times per day     acetaminophen (Tylenol) tab 650 mg  650 mg Oral Q6H PRN    aspirin DR tab 81 mg  81 mg Oral Daily    QUEtiapine ER (SEROquel XR) 24 hr tab 300 mg  300 mg Oral Nightly     Medications Prior to Admission   Medication Sig    busPIRone HCl 30 MG Oral Tab Take 1 tablet (30 mg total) by mouth 3 (three) times daily.    chlorproMAZINE 25 MG Oral Tab Take 1 tablet (25 mg total) by mouth 4 (four) times daily.    diazePAM 5 MG Oral Tab Take 1 tablet (5 mg total) by mouth every 8 (eight) hours as needed for Anxiety.    DULoxetine 60 MG Oral Cap DR Particles Take 1 capsule (60 mg total) by mouth 2 (two) times daily.    metFORMIN  MG Oral Tablet 24 Hr Take 2 tablets (1,000 mg total) by mouth daily. (Patient taking differently: Take 2 tablets (1,000 mg total) by mouth 2 (two) times daily with meals.)    metoprolol succinate ER 50 MG Oral Tablet 24 Hr Take 1 tablet (50 mg total) by mouth 2 (two) times daily.    QUEtiapine  MG Oral Tablet 24 Hr Take 1 tablet (300 mg total) by mouth nightly.    QUEtiapine 50 MG Oral Tab Take 1 tablet (50 mg total) by mouth 2 (two) times daily.    rosuvastatin 40 MG Oral Tab Take 1 tablet (40 mg total) by mouth before bedtime.    lidocaine 5 % External Patch Place 1 patch onto the skin daily.    [] ibuprofen 600 MG Oral Tab Take 1 tablet (600 mg total) by mouth every 6 (six) hours as needed.    levothyroxine 150 MCG Oral Tab Take 1 tablet (150 mcg total) by mouth before breakfast. Give on an empty stomach. On Mon, Tue, Wed, Thur, Fri and Saturday    levothyroxine 75 MCG Oral Tab Take 1 tablet (75 mcg total) by mouth before breakfast. On Sundays only    aspirin 81 MG Oral Tab EC Take 1 tablet (81 mg total) by mouth in the morning.    methylPREDNISolone 4 MG Oral Tablet Therapy Pack Take as directed on dose pack instructions (Patient not taking: Reported on 2025)     XR CHEST AP PORTABLE  (CPT=71045)  Result Date: 2025  CONCLUSION:   Cardiomegaly with bilateral interstitial  opacity likely mild edema.  Interval extubation.    Dictated by (CST): Randal Wilkerson MD on 6/01/2025 at 9:16 AM     Finalized by (CST): Randal Wilkerson MD on 6/01/2025 at 9:17 AM            Lab Results   Component Value Date    WBC 9.8 06/01/2025    HGB 11.2 06/01/2025    HCT 35.6 06/01/2025    .0 06/01/2025    CREATSERUM 2.81 06/01/2025    BUN 33 06/01/2025     06/01/2025    K 3.4 06/01/2025     06/01/2025    CO2 30.0 06/01/2025     06/01/2025    CA 8.6 06/01/2025    ALB 3.3 06/01/2025    ALKPHO 107 06/01/2025    BILT 0.2 06/01/2025    TP 5.0 06/01/2025    AST 39 06/01/2025    ALT 20 06/01/2025     06/01/2025     Assessment / Plan  1. Acute on Chronic Systolic Heart Failure -  Ejection fraction 30-35% with apical wall akinesia. Previous EF 35-40% 2015. Will institute heart failure therapy with Carvedilol 3.125 mg BID and Entresto 24/26 mg BID.            I50.23 Acute on chronic systolic (congestive) heart failure           2. Acute Respiratory Failure -  Currently intubated. Chest x-ray shows right basal opacity consistent with atelectasis versus pneumonia. Evidence of pulmonary edema pattern and possible aspiration pneumonia with mucus plugging. On abx  J96.00 Acute respiratory failure, unspecified whether with hypoxia or hypercapnia        3. Acute on Chronic Kidney Disease -     Creatinine improving.  On HD.           N17.9 Acute kidney failure, unspecified           4. Demand Ischemia -  Troponin mildly elevated at 79, down from 197 four days ago. ECG with sinus tachycardia and occasional PVCs. Findings consistent with demand ischemia.           I24.8 Other forms of acute ischemic heart disease  PLAN:  Continue with combination of carvedilol as well as Entresto.  Getting dialysis and blood pressure borderline on dialysis and therefore we will not add any additional heart failure medications at this time.

## 2025-06-02 LAB
BASOPHILS # BLD AUTO: 0.11 X10(3) UL (ref 0–0.2)
BASOPHILS NFR BLD AUTO: 1.2 %
DEPRECATED RDW RBC AUTO: 61.8 FL (ref 35.1–46.3)
EOSINOPHIL # BLD AUTO: 0.21 X10(3) UL (ref 0–0.7)
EOSINOPHIL NFR BLD AUTO: 2.2 %
ERYTHROCYTE [DISTWIDTH] IN BLOOD BY AUTOMATED COUNT: 17.2 % (ref 11–15)
GLUCOSE BLDC GLUCOMTR-MCNC: 141 MG/DL (ref 70–99)
GLUCOSE BLDC GLUCOMTR-MCNC: 148 MG/DL (ref 70–99)
GLUCOSE BLDC GLUCOMTR-MCNC: 195 MG/DL (ref 70–99)
GLUCOSE BLDC GLUCOMTR-MCNC: 86 MG/DL (ref 70–99)
HCT VFR BLD AUTO: 38 % (ref 35–48)
HGB BLD-MCNC: 11.5 G/DL (ref 12–16)
IMM GRANULOCYTES # BLD AUTO: 0.24 X10(3) UL (ref 0–1)
IMM GRANULOCYTES NFR BLD: 2.5 %
LYMPHOCYTES # BLD AUTO: 1.32 X10(3) UL (ref 1–4)
LYMPHOCYTES NFR BLD AUTO: 13.9 %
MCH RBC QN AUTO: 29.8 PG (ref 26–34)
MCHC RBC AUTO-ENTMCNC: 30.3 G/DL (ref 31–37)
MCV RBC AUTO: 98.4 FL (ref 80–100)
MONOCYTES # BLD AUTO: 0.53 X10(3) UL (ref 0.1–1)
MONOCYTES NFR BLD AUTO: 5.6 %
NEUTROPHILS # BLD AUTO: 7.1 X10 (3) UL (ref 1.5–7.7)
NEUTROPHILS # BLD AUTO: 7.1 X10(3) UL (ref 1.5–7.7)
NEUTROPHILS NFR BLD AUTO: 74.6 %
PLATELET # BLD AUTO: 170 10(3)UL (ref 150–450)
RBC # BLD AUTO: 3.86 X10(6)UL (ref 3.8–5.3)
WBC # BLD AUTO: 9.5 X10(3) UL (ref 4–11)

## 2025-06-02 PROCEDURE — 99233 SBSQ HOSP IP/OBS HIGH 50: CPT | Performed by: INTERNAL MEDICINE

## 2025-06-02 RX ORDER — PREDNISONE 20 MG/1
20 TABLET ORAL
Status: DISCONTINUED | OUTPATIENT
Start: 2025-06-03 | End: 2025-06-03

## 2025-06-02 NOTE — PAYOR COMM NOTE
--------------  CONTINUED STAY REVIEW    Payor: [facility]  Subscriber #:  [id number]  Authorization Number: [id number]    Admit date: 5/17/25  Admit time:  2:12 PM    REVIEW DOCUMENTATION:  6/1       Date of Service: 6/1/2025 12:10 PM     Signed            [facility]  part of [facility]     Progress Note        SUBJECTIVE:  Extubated.  Alert.    Denies any shortness of breath.      No Chest pain no shortness of breath           OBJECTIVE:  Vital signs in last 24 hours:  BP 94/43 (BP Location: Left arm)   Pulse 84   Temp 97 °F (36.1 °C) (Temporal)   Resp 14   Ht 5' 3\" (1.6 m)   Wt 234 lb 9.1 oz (106.4 kg)   SpO2 95%   BMI 41.55 kg/m²      Intake/Output:     Intake/Output Summary (Last 24 hours) at 6/1/2025 0702  Last data filed at 6/1/2025 0600      Gross per 24 hour   Intake 1542.1 ml   Output --   Net 1542.1 ml             Wt Readings from Last 3 Encounters:   05/29/25 234 lb 9.1 oz (106.4 kg)   10/09/23 185 lb 3 oz (84 kg)   10/04/23 185 lb (83.9 kg)         Exam  Gen: No acute distress,   HEENT: No pallor  Pulm: Lungs clear to auscultation anteriorly  CV: Heart with regular rate and rhythm, no peripheral edema  Abd: Abdomen soft, nontender, nondistended, no organomegaly, bowel sounds present  Skin: no rashes or lesions  CNS: Alert     Data Review:      Labs:         Lab Results   Component Value Date     WBC 9.8 06/01/2025     HGB 11.2 06/01/2025     HCT 35.6 06/01/2025     .0 06/01/2025         LABS            Recent Labs   Lab 05/26/25  0358 05/27/25  0428 05/28/25  0419 05/29/25  0606 05/30/25  0351 05/31/25  0413 06/01/25  0548   RBC 4.48 4.60 4.27 4.08 3.87 4.12 3.70*   HGB 13.4 13.6 12.7 12.0 11.4* 12.1 11.2*   HCT 42.8 43.8 39.9 38.1 35.7 38.3 35.6   MCV 95.5 95.2 93.4 93.4 92.2 93.0 96.2   MCH 29.9 29.6 29.7 29.4 29.5 29.4 30.3   MCHC 31.3 31.1 31.8 31.5 31.9 31.6 31.5   RDW 16.5* 16.8* 16.7* 16.5* 16.6* 16.8* 17.0*   NEPRELIM 8.59* 8.48*  8.52* 7.67 7.76* 8.14* 7.63   WBC 10.3 9.9 9.7 9.7 9.8 10.7 9.8   .0* 129.0* 153.0 161.0 147.0* 162.0 170.0   * 279* 207*  --   --   --   --    ALT 70* 46 37  --   --   --   --    CK  --  9,315*  --   --   --   --   --    * 112* 132* 114* 169* 112*  --          Imaging:        Meds:   [Current Hospital Medications]    [Current Hospital Medications]         Current Facility-Administered Medications   Medication Dose Route Frequency    ipratropium-albuterol (Duoneb) 0.5-2.5 (3) MG/3ML inhalation solution 3 mL  3 mL Nebulization 2 times daily    midodrine (ProAmatine) tab 10 mg  10 mg Per G Tube TID    busPIRone (Buspar) tab 30 mg  30 mg Per G Tube TID    chlorproMAZINE (Thorazine) tab 25 mg  25 mg Per G Tube QID    diazePAM (Valium) tab 5 mg  5 mg Per G Tube Q8H PRN    QUEtiapine (SEROquel) tab 50 mg  50 mg Per G Tube BID    levothyroxine (Synthroid) tab 150 mcg  150 mcg Per G Tube Before breakfast    senna (Senokot) 8.8 MG/5ML oral syrup 17.6 mg  10 mL Per NG Tube BID    docusate (Colace) 50 MG/5ML oral solution 100 mg  100 mg Per NG Tube BID    famotidine (Pepcid) 20 mg/2mL injection 20 mg  20 mg Intravenous Daily    methylPREDNISolone sodium succinate (Solu-MEDROL) injection 40 mg  40 mg Intravenous Daily    glucose (Dex4) 15 GM/59ML oral liquid 15 g  15 g Oral Q15 Min PRN     Or    glucose (Glutose) 40% oral gel 15 g  15 g Oral Q15 Min PRN     Or    glucose-vitamin C (Dex-4) chewable tab 4 tablet  4 tablet Oral Q15 Min PRN     Or    dextrose 50% injection 50 mL  50 mL Intravenous Q15 Min PRN     Or    glucose (Dex4) 15 GM/59ML oral liquid 30 g  30 g Oral Q15 Min PRN     Or    glucose (Glutose) 40% oral gel 30 g  30 g Oral Q15 Min PRN     Or    glucose-vitamin C (Dex-4) chewable tab 8 tablet  8 tablet Oral Q15 Min PRN    insulin regular human (Novolin R, Humulin R) 100 UNIT/ML injection 1-5 Units  1-5 Units Subcutaneous 4 times per day    carvedilol (Coreg) tab 3.125 mg  3.125 mg Oral BID with  meals    sacubitril-valsartan (Entresto) 24-26 MG per tab 1 tablet  1 tablet Oral BID    budesonide (Pulmicort) 0.5 MG/2ML nebulizer suspension 0.5 mg  0.5 mg Nebulization 2 times daily    arformoterol (Brovana) 15 MCG/2ML nebulizer solution 15 mcg  15 mcg Nebulization 2 times daily    dextrose 10% infusion (TPN no rate)   Intravenous Continuous PRN    sodium bicarbonate tab 650 mg  650 mg Per G Tube PRN     And    pancrelipase (Lip-Prot-Amyl) (Zenpep) DR particles cap 10,000 Units  10,000 Units Per G Tube PRN    acetylcysteine (Mucomyst) 20 % nebulizer solution 2 mL  2 mL Nebulization BID    ampicillin-sulbactam (Unasyn) 1.5 g in sodium chloride 0.9% 100mL IVPB-MAYO  1.5 g Intravenous q12h    DULoxetine (Cymbalta) DR cap 60 mg  60 mg Oral BID    HYDROcodone-acetaminophen (Norco) 5-325 MG per tab 1 tablet  1 tablet Oral Q6H PRN    ondansetron (Zofran) 4 MG/2ML injection 4 mg  4 mg Intravenous Q6H PRN    polyethylene glycol (PEG 3350) (Miralax) 17 g oral packet 17 g  17 g Oral Daily PRN    magnesium hydroxide (Milk of Magnesia) 400 MG/5ML oral suspension 30 mL  30 mL Oral Daily PRN    bisacodyl (Dulcolax) 10 MG rectal suppository 10 mg  10 mg Rectal Daily PRN    fleet enema (Fleet) rectal enema 133 mL  1 enema Rectal Once PRN    heparin (Porcine) 1000 UNIT/ML injection 2,000 Units  2,000 Units Intravenous PRN Dialysis    albuterol (Ventolin) (2.5 MG/3ML) 0.083% nebulizer solution 2.5 mg  2.5 mg Nebulization Q6H PRN    heparin (Porcine) 5000 UNIT/ML injection 5,000 Units  5,000 Units Subcutaneous 2 times per day    acetaminophen (Tylenol) tab 650 mg  650 mg Oral Q6H PRN    aspirin DR tab 81 mg  81 mg Oral Daily    QUEtiapine ER (SEROquel XR) 24 hr tab 300 mg  300 mg Oral Nightly        Assessment  [Problem List]    [Problem List]  Patient Active Problem List      Diagnosis    Closed fracture of proximal end of left humerus, unspecified fracture morphology, initial encounter    Frequent falls    Seizure-like activity  (HCC)    Pituitary lesion (HCC)    Major depressive disorder, recurrent episode, moderate (HCC)    Generalized anxiety disorder    Acute renal failure superimposed on chronic kidney disease, unspecified acute renal failure type, unspecified CKD stage    Non-traumatic rhabdomyolysis    Transaminitis     1. Acute Kidney Injury on Chronic Kidney Disease:  -  - Nephrology consultation obtained  Patient with worsening renal function.  Nephrology is following the patient  On hemodialysis        2. Rhabdomyolysis:  - Secondary to recurrent falls  - Possibly statin-induced  - Plan: Hold statin (Crestor)  Improving     3. Acute Transaminitis: She improved  - Likely secondary to rhabdomyolysis  - May also be statin-related  - Will monitor with serial labs  Patient could also have a shock liver  Liver enzymes are improving        4. Recurrent Falls:  -     Patient will need physical therapy and Occupational Therapy after she is extubated        5. Coronary Artery Disease:  - Currently stable  - Patient denies chest pain     6. Hypothyroidism:  - Continue levothyroxine  Possible UTI.  on ceftriaxone.     Acute hypoxic respiratory failure  Extubated today  Pulmonary following.        Chronic heart failure with reduced ejection fraction     Cardiomyopathy.  cardiology consulted           Plan for close monitoring and adjustment of management based on clinical course.  discussed with nursing staff  Discussed with the patient that she is not stable to go to home.  Discussed with the nursing staff.                Active Orders   Nourishments     Room Service Eligibility Until Discontinued       Frequency: Until Discontinued       Number of Occurrences: Until Specified     Room Service Notify RN Until Discontinued       Frequency: Until Discontinued       Number of Occurrences: Until Specified     Tube Feeding Diet Effective Now       Frequency: Effective Now       Number of Occurrences: Until Specified   OT     OT Eval and Treat        Frequency: Once       Number of Occurrences: 1 Occurrences   PT     IP PT eval and treat       Frequency: Once       Number of Occurrences: 1 Occurrences   Respiratory Care     BIPAP When Sleeping       Frequency: When Sleeping       Number of Occurrences: Until Specified     BIPAP When Sleeping       Frequency: When Sleeping       Number of Occurrences: Until Specified     Chest physiotherapy 4x Daily       Frequency: 4x Daily       Number of Occurrences: Until Specified       Order Comments: Left lower lobe atelectasis.        EZ-PAP 4x Daily       Frequency: 4x Daily       Number of Occurrences: Until Specified     Incentive spriometry: RT to teach pt Once       Frequency: Once       Number of Occurrences: 1 Occurrences     Oxygen administration (adult) PRN       Frequency: PRN       Number of Occurrences: Until Specified     Respiratory care evaluation Once       Frequency: Once       Number of Occurrences: 1 Occurrences       Order Comments: If patient uses home CPAP/BIPAP, place on known home settings. If unknown, place on auto CPAP with upper limit 20 and lower limit 5 cm H2O      SLP     SLP Eval and Treat       Frequency: Once       Number of Occurrences: 1 Occurrences   Dialysis     Hemodialysis inpatient       Frequency: Tomorrow       Number of Occurrences: 1 Occurrences       Order Comments: Keep MAP goal >65      Precaution     Aspiration precautions Continuous       Frequency: Continuous       Number of Occurrences: Until Specified   Lab Only     RAINBOW DRAW LAVENDER       Frequency: Once       Number of Occurrences: 1 Occurrences   Diet     NPO       Frequency: Effective Now       Number of Occurrences: Until Specified   Nursing     Accucheck       Frequency: BID       Number of Occurrences: Until Specified     Accucheck       Frequency: Q6H       Number of Occurrences: Until Specified     Accucheck       Frequency: PRN       Number of Occurrences: Until Specified       Order Comments: For  symptoms or suspicion of hypoglycemia        Assess using Critical Care Pain Observation Tool (CPOT)       Frequency: Now Then Q3H       Number of Occurrences: Until Specified     Elevate head of bed >30 degrees       Frequency: Until Discontinued       Number of Occurrences: Until Specified       Order Comments: 30-45 degrees at all times unless contraindicated by physician order or patient condition.        Elevate head of bed greater than or equal to 30 degrees       Frequency: Until Discontinued       Number of Occurrences: Until Specified     Flush feeding tube       Frequency: PRN       Number of Occurrences: Until Specified       Order Comments: Flush feeding tube:  1.) Before and after bolus feedings.  2.) After medication administration.        For any tube feed interruptions, continue basal insulin, and correction scales. Hold any scheduled insulins, and resume when tube feed is restarted.       Frequency: Until Discontinued       Number of Occurrences: Until Specified     For non-patent tubes:       Frequency: PRN       Number of Occurrences: Until Specified       Order Comments: If water is unsuccessful in dislodging the clog, use the pancrealipase/sodium bicarb. May repeat x1 before calling physician for further orders.        For patients without a history of diabetes, discontinue Accuchecks and insulin correction scale if blood glucose <180 mg/dL for 48 hours       Frequency: Until Discontinued       Number of Occurrences: Until Specified     Give all morning medications unless otherwise specified       Frequency: Until Discontinued       Number of Occurrences: Until Specified       Order Comments: Give all morning medications unless otherwise specified.  DO NOT use Electrolyte protocol.        If patient uses CPAP/BIPAP, encourage patient to wear when sleeping (including daytime) and after any apneic episode or adverse event.       Frequency: Until Discontinued       Number of Occurrences: Until  Specified     Initiate early mobility protocol       Frequency: Once       Number of Occurrences: 1 Occurrences     Initiate Hypoglycemia Algorithm for Adults       Frequency: Until Discontinued       Number of Occurrences: Until Specified       Order Comments: For blood glucose less than 70        Initiate Medical Nutrition Therapy Protocol for Enteral Nutrition       Frequency: Until Discontinued       Number of Occurrences: Until Specified     Insert denny catheter       Frequency: Once       Number of Occurrences: 1 Occurrences     Intake and Output       Frequency: Per Unit Routine       Number of Occurrences: Until Specified       Order Comments: Record formula and water flushes separately.        May use port/central line       Frequency: Until Discontinued       Number of Occurrences: Until Specified     Measure weight       Frequency: Per Unit Routine       Number of Occurrences: Until Specified       Order Comments: Measure weight every Monday and Thursday for non critical care patients.        Monitor tube placement       Frequency: Q8H       Number of Occurrences: Until Specified     Notify attending physician for patients refusing CPAP       Frequency: Until Discontinued       Number of Occurrences: Until Specified       Order Comments: Document that patient was educated and refused CPAP, if applicable.        Notify attending physician for sustained desaturation <90% or prolonged or recurrent apnea       Frequency: Until Discontinued       Number of Occurrences: Until Specified       Order Comments: Notify attending physician for sustained desaturation <90% or prolonged or recurrent apnea        Notify physician       Frequency: Until Discontinued       Number of Occurrences: Until Specified     Notify physician of significant abdominal distension, pain, nausea, or emesis, and stop tube feeding       Frequency: Until Discontinued       Number of Occurrences: Until Specified     Notify Radiologist        Frequency: Until Discontinued       Number of Occurrences: Until Specified     Nurse Driven Indwelling Urinary Catheter Removal Protocol       Frequency: Until Discontinued       Number of Occurrences: Until Specified     Nursing communication (specify)       Frequency: Once       Number of Occurrences: 1 Occurrences       Order Comments: Hold blood thinners pre procedure        Nursing communication (specify)       Frequency: Once       Number of Occurrences: 1 Occurrences       Order Comments: RN, keep the patient off the left side.        Nursing communication (specify)       Frequency: Once       Number of Occurrences: 1 Occurrences       Order Comments: Removed OG and placed Dobhoff        Nursing communication (specify)       Frequency: Once       Number of Occurrences: 1 Occurrences       Order Comments: Dobhoff okay to use        Nursing communication - call [facility] to order dialysis       Frequency: Once       Number of Occurrences: 1 Occurrences       Order Comments: Call [facility] at [phone] to order dialysis.  Identify special circumstances and specify stat or routine treatment.        Patient may resume usual diet       Frequency: Once       Number of Occurrences: 1 Occurrences       Order Comments: Npo x one hour, then resume pre-procedure diet        Post procedure site assessment       Frequency: Per Unit Routine       Number of Occurrences: Until Specified       Order Comments: Every 15 minutes for 1 hour, then every 30 minutes for 1 hour, then every 60 minutes for 2 hours, then per unit routine        Provide patient with sleep apnea education materials       Frequency: Once       Number of Occurrences: 1 Occurrences     Pulse oximetry       Frequency: Per Unit Routine       Number of Occurrences: Until Specified     Pulse oximetry       Frequency: Continuous       Number of Occurrences: Until Specified     Tube insertion       Frequency: Once       Number of Occurrences: 1 Occurrences      Tube insertion       Frequency: Once       Number of Occurrences: 1 Occurrences     Tube insertion       Frequency: Once       Number of Occurrences: 1 Occurrences       Order Comments: Meds and tube feeding        Verify informed consent       Frequency: Once       Number of Occurrences: 1 Occurrences     Vital signs       Frequency: Per Unit Routine       Number of Occurrences: Until Specified       Order Comments: Every 15 minutes for 1 hour, then every 30 minutes for 1 hour, then every 60 minutes for 2 hours, then per unit routine.        Void prior to procedure       Frequency: Once       Number of Occurrences: 1 Occurrences   Consult     Consult to Interventional Radiology       Frequency: Once       Number of Occurrences: 1 Occurrences     Consult to Pulmonology       Frequency: Once       Number of Occurrences: 1 Occurrences     Social work       Linked Order: And       Frequency: Once       Number of Occurrences: 1 Occurrences   Medications     acetaminophen (Tylenol) tab 650 mg       Frequency: Q6H PRN       Dose: 650 mg       Route: Oral     acetylcysteine (Mucomyst) 20 % nebulizer solution 2 mL       Frequency: BID       Dose: 2 mL       Route: Nebulization     albuterol (Ventolin) (2.5 MG/3ML) 0.083% nebulizer solution 2.5 mg       Frequency: Q6H PRN       Dose: 2.5 mg       Route: Nebulization     ampicillin-sulbactam (Unasyn) 1.5 g in sodium chloride 0.9% 100mL IVPB-MAYO       Frequency: q12h       Dose: 1.5 g       Route: Intravenous     arformoterol (Brovana) 15 MCG/2ML nebulizer solution 15 mcg       Frequency: 2 times daily       Dose: 15 mcg       Route: Nebulization     aspirin DR tab 81 mg       Frequency: Daily       Dose: 81 mg       Route: Oral     bisacodyl (Dulcolax) 10 MG rectal suppository 10 mg       Frequency: Daily PRN       Dose: 10 mg       Route: Rectal     budesonide (Pulmicort) 0.5 MG/2ML nebulizer suspension 0.5 mg       Frequency: 2 times daily       Dose: 0.5 mg       Route:  Nebulization     busPIRone (Buspar) tab 30 mg       Frequency: TID       Dose: 30 mg       Route: Per G Tube     carvedilol (Coreg) tab 3.125 mg       Frequency: BID with meals       Dose: 3.125 mg       Route: Oral     chlorproMAZINE (Thorazine) tab 25 mg       Frequency: QID       Dose: 25 mg       Route: Per G Tube     dextrose 10% infusion (TPN no rate)       Frequency: Continuous PRN       Route: Intravenous     dextrose 50% injection 50 mL       Linked Order: Or       Frequency: Q15 Min PRN       Dose: 50 mL       Route: Intravenous     diazePAM (Valium) tab 5 mg       Frequency: Q8H PRN       Dose: 5 mg       Route: Per G Tube     docusate (Colace) 50 MG/5ML oral solution 100 mg       Frequency: BID       Dose: 100 mg       Route: Per NG Tube     DULoxetine (Cymbalta) DR cap 60 mg       Frequency: BID       Dose: 60 mg       Route: Oral     famotidine (Pepcid) 20 mg/2mL injection 20 mg       Frequency: Daily       Dose: 20 mg       Route: Intravenous     fleet enema (Fleet) rectal enema 133 mL       Frequency: Once PRN       Dose: 1 enema       Route: Rectal     glucose (Dex4) 15 GM/59ML oral liquid 15 g       Linked Order: Or       Frequency: Q15 Min PRN       Dose: 15 g       Route: Oral     glucose (Dex4) 15 GM/59ML oral liquid 30 g       Linked Order: Or       Frequency: Q15 Min PRN       Dose: 30 g       Route: Oral     glucose (Glutose) 40% oral gel 15 g       Linked Order: Or       Frequency: Q15 Min PRN       Dose: 15 g       Route: Oral     glucose (Glutose) 40% oral gel 30 g       Linked Order: Or       Frequency: Q15 Min PRN       Dose: 30 g       Route: Oral     glucose-vitamin C (Dex-4) chewable tab 4 tablet       Linked Order: Or       Frequency: Q15 Min PRN       Dose: 4 tablet       Route: Oral     glucose-vitamin C (Dex-4) chewable tab 8 tablet       Linked Order: Or       Frequency: Q15 Min PRN       Dose: 8 tablet       Route: Oral     heparin (Porcine) 1000 UNIT/ML injection 2,000 Units        Frequency: PRN Dialysis       Dose: 2,000 Units       Route: Intravenous     heparin (Porcine) 5000 UNIT/ML injection 5,000 Units       Frequency: Q12H PATRICK       Dose: 5,000 Units       Route: Subcutaneous     HYDROcodone-acetaminophen (Norco) 5-325 MG per tab 1 tablet       Frequency: Q6H PRN       Dose: 1 tablet       Route: Oral     insulin regular human (Novolin R, Humulin R) 100 UNIT/ML injection 1-5 Units       Frequency: Q6H PATRICK       Dose: 1-5 Units       Route: Subcutaneous     ipratropium-albuterol (Duoneb) 0.5-2.5 (3) MG/3ML inhalation solution 3 mL       Frequency: 2 times daily       Dose: 3 mL       Route: Nebulization     levothyroxine (Synthroid) tab 150 mcg       Frequency: Before breakfast       Dose: 150 mcg       Route: Per G Tube     magnesium hydroxide (Milk of Magnesia) 400 MG/5ML oral suspension 30 mL       Frequency: Daily PRN       Dose: 30 mL       Route: Oral     methylPREDNISolone sodium succinate (Solu-MEDROL) injection 40 mg       Frequency: Daily       Dose: 40 mg       Route: Intravenous     midodrine (ProAmatine) tab 10 mg       Frequency: TID       Dose: 10 mg       Route: Per G Tube     ondansetron (Zofran) 4 MG/2ML injection 4 mg       Frequency: Q6H PRN       Dose: 4 mg       Route: Intravenous     pancrelipase (Lip-Prot-Amyl) (Zenpep) DR particles cap 10,000 Units       Linked Order: And       Frequency: PRN       Dose: 10,000 Units       Route: Per G Tube     polyethylene glycol (PEG 3350) (Miralax) 17 g oral packet 17 g       Frequency: Daily PRN       Dose: 17 g       Route: Oral     QUEtiapine (SEROquel) tab 50 mg       Frequency: BID       Dose: 50 mg       Route: Per G Tube     QUEtiapine ER (SEROquel XR) 24 hr tab 300 mg       Frequency: Nightly       Dose: 300 mg       Route: Oral     sacubitril-valsartan (Entresto) 24-26 MG per tab 1 tablet       Linked Order: And       Frequency: BID       Dose: 1 tablet       Route: Oral     senna (Senokot) 8.8 MG/5ML oral syrup  17.6 mg       Frequency: BID       Dose: 10 mL       Route: Per NG Tube     sodium bicarbonate tab 650 mg       Linked Order: And       Frequency: PRN       Dose: 650 mg       Route: Per G Tube      Reviewed personally: current medical record, vital signs info, lab results, cardiac & radiographic films and reports.  Formulated plans for continued care for this patient.  RN to call us for any significant change  Scheduled tests: see orders   Other orders per treatment team.     *The above components were done for the patient encounter: Reviewing the patient's electronic chart, reviewing results, obtaining a medical history, counseling patient and/or family member, ordering medications, tests, or procedures, documenting clinical information in the electronic health record, refer to or communicate with other health care professionals as indicated, independently interpret results and providing care coordination      Fanny Majano MD                              Date of Service: 2025 12:27 PM     Signed            [facility]  part of [facility]     Progress Note           Radha Winters Patient Status:  Inpatient    1960 MRN [id number]   Location [facility] 5SW/SE Attending Fanny Majano MD   Hosp Day # 16 PCP JUAN MONTANEZ         SUBJECTIVE:  Feeling hungry.  No other complaints no shortness of breath     OBJECTIVE:  Vital signs in last 24 hours:  BP 96/56 (BP Location: Left arm)   Pulse 70   Temp 96.8 °F (36 °C) (Temporal)   Resp 12   Ht 5' 3\" (1.6 m)   Wt 233 lb 11 oz (106 kg)   SpO2 95%   BMI 41.40 kg/m²      Intake/Output:     Intake/Output Summary (Last 24 hours) at 2025 1227  Last data filed at 2025 0800      Gross per 24 hour   Intake 1795.1 ml   Output 0 ml   Net 1795.1 ml             Wt Readings from Last 3 Encounters:   25 233 lb 11 oz (106 kg)   10/09/23 185 lb 3 oz (84 kg)   10/04/23 185 lb (83.9 kg)         Exam  Gen: No acute  distress, patient undergoing hemodialysis  HEENT: No pallor  Pulm: Lungs clear to auscultation anteriorly  CV: Heart with regular rate and rhythm, no peripheral edema  Abd: Abdomen soft, nontender, nondistended, no organomegaly, bowel sounds present  Skin: no rashes or lesions  CNS: Alert     Data Review:      Labs:         Lab Results   Component Value Date     WBC 9.5 06/02/2025     HGB 11.5 06/02/2025     HCT 38.0 06/02/2025     .0 06/02/2025         LABS             Recent Labs   Lab 05/27/25  0428 05/28/25  0419 05/29/25  0606 05/30/25  0351 05/31/25  0413 06/01/25  0548 06/01/25  0942 06/02/25  0456   RBC 4.60 4.27   < > 3.87 4.12 3.70*  --  3.86   HGB 13.6 12.7   < > 11.4* 12.1 11.2*  --  11.5*   HCT 43.8 39.9   < > 35.7 38.3 35.6  --  38.0   MCV 95.2 93.4   < > 92.2 93.0 96.2  --  98.4   MCH 29.6 29.7   < > 29.5 29.4 30.3  --  29.8   MCHC 31.1 31.8   < > 31.9 31.6 31.5  --  30.3*   RDW 16.8* 16.7*   < > 16.6* 16.8* 17.0*  --  17.2*   NEPRELIM 8.48* 8.52*   < > 7.76* 8.14* 7.63  --  7.10   WBC 9.9 9.7   < > 9.8 10.7 9.8  --  9.5   .0* 153.0   < > 147.0* 162.0 170.0  --  170.0   * 207*  --   --   --   --  39*  --    ALT 46 37  --   --   --   --  20  --    CK 9,315*  --   --   --   --   --  446*  --    * 132*   < > 169* 112*  --  147*  --     < > = values in this interval not displayed.         Imaging:        Meds:   [Current Hospital Medications]    [Current Hospital Medications]         Current Facility-Administered Medications   Medication Dose Route Frequency    docusate sodium (Colace) cap 100 mg  100 mg Oral BID    sennosides (Senokot) tab 17.2 mg  17.2 mg Oral BID    ipratropium-albuterol (Duoneb) 0.5-2.5 (3) MG/3ML inhalation solution 3 mL  3 mL Nebulization 2 times daily    midodrine (ProAmatine) tab 10 mg  10 mg Per G Tube TID    busPIRone (Buspar) tab 30 mg  30 mg Per G Tube TID    chlorproMAZINE (Thorazine) tab 25 mg  25 mg Per G Tube QID    diazePAM (Valium) tab 5 mg  5  mg Per G Tube Q8H PRN    QUEtiapine (SEROquel) tab 50 mg  50 mg Per G Tube BID    levothyroxine (Synthroid) tab 150 mcg  150 mcg Per G Tube Before breakfast    famotidine (Pepcid) 20 mg/2mL injection 20 mg  20 mg Intravenous Daily    methylPREDNISolone sodium succinate (Solu-MEDROL) injection 40 mg  40 mg Intravenous Daily    glucose (Dex4) 15 GM/59ML oral liquid 15 g  15 g Oral Q15 Min PRN     Or    glucose (Glutose) 40% oral gel 15 g  15 g Oral Q15 Min PRN     Or    glucose-vitamin C (Dex-4) chewable tab 4 tablet  4 tablet Oral Q15 Min PRN     Or    dextrose 50% injection 50 mL  50 mL Intravenous Q15 Min PRN     Or    glucose (Dex4) 15 GM/59ML oral liquid 30 g  30 g Oral Q15 Min PRN     Or    glucose (Glutose) 40% oral gel 30 g  30 g Oral Q15 Min PRN     Or    glucose-vitamin C (Dex-4) chewable tab 8 tablet  8 tablet Oral Q15 Min PRN    insulin regular human (Novolin R, Humulin R) 100 UNIT/ML injection 1-5 Units  1-5 Units Subcutaneous 4 times per day    carvedilol (Coreg) tab 3.125 mg  3.125 mg Oral BID with meals    sacubitril-valsartan (Entresto) 24-26 MG per tab 1 tablet  1 tablet Oral BID    budesonide (Pulmicort) 0.5 MG/2ML nebulizer suspension 0.5 mg  0.5 mg Nebulization 2 times daily    arformoterol (Brovana) 15 MCG/2ML nebulizer solution 15 mcg  15 mcg Nebulization 2 times daily    dextrose 10% infusion (TPN no rate)   Intravenous Continuous PRN    sodium bicarbonate tab 650 mg  650 mg Per G Tube PRN     And    pancrelipase (Lip-Prot-Amyl) (Zenpep) DR particles cap 10,000 Units  10,000 Units Per G Tube PRN    acetylcysteine (Mucomyst) 20 % nebulizer solution 2 mL  2 mL Nebulization BID    ampicillin-sulbactam (Unasyn) 1.5 g in sodium chloride 0.9% 100mL IVPB-[facility]  1.5 g Intravenous q12h    DULoxetine (Cymbalta) DR cap 60 mg  60 mg Oral BID    HYDROcodone-acetaminophen (Norco) 5-325 MG per tab 1 tablet  1 tablet Oral Q6H PRN    ondansetron (Zofran) 4 MG/2ML injection 4 mg  4 mg Intravenous Q6H PRN     polyethylene glycol (PEG 3350) (Miralax) 17 g oral packet 17 g  17 g Oral Daily PRN    magnesium hydroxide (Milk of Magnesia) 400 MG/5ML oral suspension 30 mL  30 mL Oral Daily PRN    bisacodyl (Dulcolax) 10 MG rectal suppository 10 mg  10 mg Rectal Daily PRN    fleet enema (Fleet) rectal enema 133 mL  1 enema Rectal Once PRN    heparin (Porcine) 1000 UNIT/ML injection 2,000 Units  2,000 Units Intravenous PRN Dialysis    albuterol (Ventolin) (2.5 MG/3ML) 0.083% nebulizer solution 2.5 mg  2.5 mg Nebulization Q6H PRN    heparin (Porcine) 5000 UNIT/ML injection 5,000 Units  5,000 Units Subcutaneous 2 times per day    acetaminophen (Tylenol) tab 650 mg  650 mg Oral Q6H PRN    aspirin DR tab 81 mg  81 mg Oral Daily    QUEtiapine ER (SEROquel XR) 24 hr tab 300 mg  300 mg Oral Nightly        Assessment  [Problem List]    [Problem List]  Patient Active Problem List      Diagnosis    Closed fracture of proximal end of left humerus, unspecified fracture morphology, initial encounter    Frequent falls    Seizure-like activity (HCC)    Pituitary lesion (HCC)    Major depressive disorder, recurrent episode, moderate (HCC)    Generalized anxiety disorder    Acute renal failure superimposed on chronic kidney disease, unspecified acute renal failure type, unspecified CKD stage    Non-traumatic rhabdomyolysis    Transaminitis     1. Acute Kidney Injury on Chronic Kidney Disease:  -  - Nephrology consultation obtained  Patient with worsening renal function.  Nephrology is following the patient  On hemodialysis        2. Rhabdomyolysis:, Much improved  - Secondary to recurrent falls  - Possibly statin-induced  - Plan: Hold statin (Crestor)  Improving     3. Acute Transaminitis:  improved  - Likely secondary to rhabdomyolysis  - May also be statin-related  - Will monitor with serial labs  Patient could also have a shock liver  Liver enzymes are improving        4. Recurrent Falls:  -     Patient will need physical therapy and  Occupational Therapy after she is extubated        5. Coronary Artery Disease:  - Currently stable  - Patient denies chest pain     6. Hypothyroidism:  - Continue levothyroxine  Possible UTI.  on ceftriaxone.     Acute hypoxic respiratory failure  Extubated today  Pulmonary following.        Chronic heart failure with reduced ejection fraction     Cardiomyopathy.  cardiology consulted           Plan for close monitoring and adjustment of management based on clinical course.  discussed with nursing staff                Active Orders   LAB     Basic Metabolic Panel (8)       Frequency: Tomorrow AM draw       Number of Occurrences: 1 Occurrences     CBC With Differential With Platelet       Frequency: Tomorrow AM draw       Number of Occurrences: 1 Occurrences   Nourishments     Dietary Nutrition Supplements TID       Frequency: TID       Number of Occurrences: Until Specified       Order Comments: SF shakes @ B,L,D trays - variety of flavors        Room Service Eligibility Until Discontinued       Frequency: Until Discontinued       Number of Occurrences: Until Specified     Room Service Notify RN Until Discontinued       Frequency: Until Discontinued       Number of Occurrences: Until Specified     Tube Feeding Diet Effective Now       Frequency: Effective Now       Number of Occurrences: Until Specified   OT     OT Eval and Treat       Frequency: Once       Number of Occurrences: 1 Occurrences   PT     IP PT eval and treat       Frequency: Once       Number of Occurrences: 1 Occurrences   Respiratory Care     BIPAP When Sleeping       Frequency: When Sleeping       Number of Occurrences: Until Specified     BIPAP When Sleeping       Frequency: When Sleeping       Number of Occurrences: Until Specified     Chest physiotherapy 4x Daily       Frequency: 4x Daily       Number of Occurrences: Until Specified       Order Comments: Left lower lobe atelectasis.        EZ-PAP 4x Daily       Frequency: 4x Daily       Number  of Occurrences: Until Specified     Incentive spriometry: RT to teach pt Once       Frequency: Once       Number of Occurrences: 1 Occurrences     Oxygen administration (adult) PRN       Frequency: PRN       Number of Occurrences: Until Specified     Respiratory care evaluation Once       Frequency: Once       Number of Occurrences: 1 Occurrences       Order Comments: If patient uses home CPAP/BIPAP, place on known home settings. If unknown, place on auto CPAP with upper limit 20 and lower limit 5 cm H2O      Dialysis     Hemodialysis inpatient       Frequency: Tomorrow       Number of Occurrences: 1 Occurrences       Order Comments: Keep MAP goal >65      Precaution     Aspiration precautions Continuous       Frequency: Continuous       Number of Occurrences: Until Specified   Diet     Regular/General diet Sodium Restriction: 3-4 GM NA; Fluid Consistency: Thin Liquids ; Texture Consistency: Soft / Easy to Chew ; Is Patient on Accuchecks? Yes; Misc Restriction: No Straw       Frequency: Effective Now       Number of Occurrences: Until Specified       Order Comments: Medications crushed in pureed      Nursing     Accucheck       Frequency: BID       Number of Occurrences: Until Specified     Accucheck       Frequency: Q6H       Number of Occurrences: Until Specified     Accucheck       Frequency: PRN       Number of Occurrences: Until Specified       Order Comments: For symptoms or suspicion of hypoglycemia        Assess using Critical Care Pain Observation Tool (CPOT)       Frequency: Now Then Q3H       Number of Occurrences: Until Specified     Elevate head of bed >30 degrees       Frequency: Until Discontinued       Number of Occurrences: Until Specified       Order Comments: 30-45 degrees at all times unless contraindicated by physician order or patient condition.        Elevate head of bed greater than or equal to 30 degrees       Frequency: Until Discontinued       Number of Occurrences: Until Specified      Flush feeding tube       Frequency: PRN       Number of Occurrences: Until Specified       Order Comments: Flush feeding tube:  1.) Before and after bolus feedings.  2.) After medication administration.        For any tube feed interruptions, continue basal insulin, and correction scales. Hold any scheduled insulins, and resume when tube feed is restarted.       Frequency: Until Discontinued       Number of Occurrences: Until Specified     For non-patent tubes:       Frequency: PRN       Number of Occurrences: Until Specified       Order Comments: If water is unsuccessful in dislodging the clog, use the pancrealipase/sodium bicarb. May repeat x1 before calling physician for further orders.        For patients without a history of diabetes, discontinue Accuchecks and insulin correction scale if blood glucose <180 mg/dL for 48 hours       Frequency: Until Discontinued       Number of Occurrences: Until Specified     Give all morning medications unless otherwise specified       Frequency: Until Discontinued       Number of Occurrences: Until Specified       Order Comments: Give all morning medications unless otherwise specified.  DO NOT use Electrolyte protocol.        If patient uses CPAP/BIPAP, encourage patient to wear when sleeping (including daytime) and after any apneic episode or adverse event.       Frequency: Until Discontinued       Number of Occurrences: Until Specified     Initiate early mobility protocol       Frequency: Once       Number of Occurrences: 1 Occurrences     Initiate Hypoglycemia Algorithm for Adults       Frequency: Until Discontinued       Number of Occurrences: Until Specified       Order Comments: For blood glucose less than 70        Initiate Medical Nutrition Therapy Protocol for Enteral Nutrition       Frequency: Until Discontinued       Number of Occurrences: Until Specified     Insert denny catheter       Frequency: Once       Number of Occurrences: 1 Occurrences     Intake and  Output       Frequency: Per Unit Routine       Number of Occurrences: Until Specified       Order Comments: Record formula and water flushes separately.        May use port/central line       Frequency: Until Discontinued       Number of Occurrences: Until Specified     Measure weight       Frequency: Per Unit Routine       Number of Occurrences: Until Specified       Order Comments: Measure weight every Monday and Thursday for non critical care patients.        Monitor tube placement       Frequency: Q8H       Number of Occurrences: Until Specified     Notify attending physician for patients refusing CPAP       Frequency: Until Discontinued       Number of Occurrences: Until Specified       Order Comments: Document that patient was educated and refused CPAP, if applicable.        Notify attending physician for sustained desaturation <90% or prolonged or recurrent apnea       Frequency: Until Discontinued       Number of Occurrences: Until Specified       Order Comments: Notify attending physician for sustained desaturation <90% or prolonged or recurrent apnea        Notify physician       Frequency: Until Discontinued       Number of Occurrences: Until Specified     Notify physician of significant abdominal distension, pain, nausea, or emesis, and stop tube feeding       Frequency: Until Discontinued       Number of Occurrences: Until Specified     Notify Radiologist       Frequency: Until Discontinued       Number of Occurrences: Until Specified     Nurse Driven Indwelling Urinary Catheter Removal Protocol       Frequency: Until Discontinued       Number of Occurrences: Until Specified     Nursing communication (specify)       Frequency: Once       Number of Occurrences: 1 Occurrences       Order Comments: Hold blood thinners pre procedure        Nursing communication (specify)       Frequency: Once       Number of Occurrences: 1 Occurrences       Order Comments: RN, keep the patient off the left side.         Nursing communication (specify)       Frequency: Once       Number of Occurrences: 1 Occurrences       Order Comments: Removed OG and placed Dobhoff        Nursing communication (specify)       Frequency: Once       Number of Occurrences: 1 Occurrences       Order Comments: Dobhoff okay to use        Nursing communication - call [facility] to order dialysis       Frequency: Once       Number of Occurrences: 1 Occurrences       Order Comments: Call [facility] at [phone] to order dialysis.  Identify special circumstances and specify stat or routine treatment.        Patient may resume usual diet       Frequency: Once       Number of Occurrences: 1 Occurrences       Order Comments: Npo x one hour, then resume pre-procedure diet        Post procedure site assessment       Frequency: Per Unit Routine       Number of Occurrences: Until Specified       Order Comments: Every 15 minutes for 1 hour, then every 30 minutes for 1 hour, then every 60 minutes for 2 hours, then per unit routine        Provide patient with sleep apnea education materials       Frequency: Once       Number of Occurrences: 1 Occurrences     Pulse oximetry       Frequency: Per Unit Routine       Number of Occurrences: Until Specified     Pulse oximetry       Frequency: Continuous       Number of Occurrences: Until Specified     Tube insertion       Frequency: Once       Number of Occurrences: 1 Occurrences     Tube insertion       Frequency: Once       Number of Occurrences: 1 Occurrences     Tube insertion       Frequency: Once       Number of Occurrences: 1 Occurrences       Order Comments: Meds and tube feeding        Verify informed consent       Frequency: Once       Number of Occurrences: 1 Occurrences     Vital signs       Frequency: Per Unit Routine       Number of Occurrences: Until Specified       Order Comments: Every 15 minutes for 1 hour, then every 30 minutes for 1 hour, then every 60 minutes for 2 hours, then per unit routine.         Void prior to procedure       Frequency: Once       Number of Occurrences: 1 Occurrences   Consult     Consult to Interventional Radiology       Frequency: Once       Number of Occurrences: 1 Occurrences     Consult to Pulmonology       Frequency: Once       Number of Occurrences: 1 Occurrences     Social work       Linked Order: And       Frequency: Once       Number of Occurrences: 1 Occurrences   Medications     acetaminophen (Tylenol) tab 650 mg       Frequency: Q6H PRN       Dose: 650 mg       Route: Oral     acetylcysteine (Mucomyst) 20 % nebulizer solution 2 mL       Frequency: BID       Dose: 2 mL       Route: Nebulization     albuterol (Ventolin) (2.5 MG/3ML) 0.083% nebulizer solution 2.5 mg       Frequency: Q6H PRN       Dose: 2.5 mg       Route: Nebulization     ampicillin-sulbactam (Unasyn) 1.5 g in sodium chloride 0.9% 100mL IVPB-[facility]       Frequency: q12h       Dose: 1.5 g       Route: Intravenous     arformoterol (Brovana) 15 MCG/2ML nebulizer solution 15 mcg       Frequency: 2 times daily       Dose: 15 mcg       Route: Nebulization     aspirin DR tab 81 mg       Frequency: Daily       Dose: 81 mg       Route: Oral     bisacodyl (Dulcolax) 10 MG rectal suppository 10 mg       Frequency: Daily PRN       Dose: 10 mg       Route: Rectal     budesonide (Pulmicort) 0.5 MG/2ML nebulizer suspension 0.5 mg       Frequency: 2 times daily       Dose: 0.5 mg       Route: Nebulization     busPIRone (Buspar) tab 30 mg       Frequency: TID       Dose: 30 mg       Route: Per G Tube     carvedilol (Coreg) tab 3.125 mg       Frequency: BID with meals       Dose: 3.125 mg       Route: Oral     chlorproMAZINE (Thorazine) tab 25 mg       Frequency: QID       Dose: 25 mg       Route: Per G Tube     dextrose 10% infusion (TPN no rate)       Frequency: Continuous PRN       Route: Intravenous     dextrose 50% injection 50 mL       Linked Order: Or       Frequency: Q15 Min PRN       Dose: 50 mL       Route: Intravenous      diazePAM (Valium) tab 5 mg       Frequency: Q8H PRN       Dose: 5 mg       Route: Per G Tube     docusate sodium (Colace) cap 100 mg       Frequency: BID       Dose: 100 mg       Route: Oral     DULoxetine (Cymbalta) DR cap 60 mg       Frequency: BID       Dose: 60 mg       Route: Oral     famotidine (Pepcid) 20 mg/2mL injection 20 mg       Frequency: Daily       Dose: 20 mg       Route: Intravenous     fleet enema (Fleet) rectal enema 133 mL       Frequency: Once PRN       Dose: 1 enema       Route: Rectal     glucose (Dex4) 15 GM/59ML oral liquid 15 g       Linked Order: Or       Frequency: Q15 Min PRN       Dose: 15 g       Route: Oral     glucose (Dex4) 15 GM/59ML oral liquid 30 g       Linked Order: Or       Frequency: Q15 Min PRN       Dose: 30 g       Route: Oral     glucose (Glutose) 40% oral gel 15 g       Linked Order: Or       Frequency: Q15 Min PRN       Dose: 15 g       Route: Oral     glucose (Glutose) 40% oral gel 30 g       Linked Order: Or       Frequency: Q15 Min PRN       Dose: 30 g       Route: Oral     glucose-vitamin C (Dex-4) chewable tab 4 tablet       Linked Order: Or       Frequency: Q15 Min PRN       Dose: 4 tablet       Route: Oral     glucose-vitamin C (Dex-4) chewable tab 8 tablet       Linked Order: Or       Frequency: Q15 Min PRN       Dose: 8 tablet       Route: Oral     heparin (Porcine) 1000 UNIT/ML injection 2,000 Units       Frequency: PRN Dialysis       Dose: 2,000 Units       Route: Intravenous     heparin (Porcine) 5000 UNIT/ML injection 5,000 Units       Frequency: Q12H PATRICK       Dose: 5,000 Units       Route: Subcutaneous     HYDROcodone-acetaminophen (Norco) 5-325 MG per tab 1 tablet       Frequency: Q6H PRN       Dose: 1 tablet       Route: Oral     insulin regular human (Novolin R, Humulin R) 100 UNIT/ML injection 1-5 Units       Frequency: Q6H PATRICK       Dose: 1-5 Units       Route: Subcutaneous     ipratropium-albuterol (Duoneb) 0.5-2.5 (3) MG/3ML inhalation solution 3  mL       Frequency: 2 times daily       Dose: 3 mL       Route: Nebulization     levothyroxine (Synthroid) tab 150 mcg       Frequency: Before breakfast       Dose: 150 mcg       Route: Per G Tube     magnesium hydroxide (Milk of Magnesia) 400 MG/5ML oral suspension 30 mL       Frequency: Daily PRN       Dose: 30 mL       Route: Oral     methylPREDNISolone sodium succinate (Solu-MEDROL) injection 40 mg       Frequency: Daily       Dose: 40 mg       Route: Intravenous     midodrine (ProAmatine) tab 10 mg       Frequency: TID       Dose: 10 mg       Route: Per G Tube     ondansetron (Zofran) 4 MG/2ML injection 4 mg       Frequency: Q6H PRN       Dose: 4 mg       Route: Intravenous     pancrelipase (Lip-Prot-Amyl) (Zenpep) DR particles cap 10,000 Units       Linked Order: And       Frequency: PRN       Dose: 10,000 Units       Route: Per G Tube     polyethylene glycol (PEG 3350) (Miralax) 17 g oral packet 17 g       Frequency: Daily PRN       Dose: 17 g       Route: Oral     QUEtiapine (SEROquel) tab 50 mg       Frequency: BID       Dose: 50 mg       Route: Per G Tube     QUEtiapine ER (SEROquel XR) 24 hr tab 300 mg       Frequency: Nightly       Dose: 300 mg       Route: Oral     sacubitril-valsartan (Entresto) 24-26 MG per tab 1 tablet       Linked Order: And       Frequency: BID       Dose: 1 tablet       Route: Oral     sennosides (Senokot) tab 17.2 mg       Frequency: BID       Dose: 17.2 mg       Route: Oral     sodium bicarbonate tab 650 mg       Linked Order: And       Frequency: PRN       Dose: 650 mg       Route: Per G Tube      Reviewed personally: current medical record, vital signs info, lab results, cardiac & radiographic films and reports.  Formulated plans for continued care for this patient.  RN to call us for any significant change  Scheduled tests: see orders   Other orders per treatment team.     *The above components were done for the patient encounter: Reviewing the patient's electronic chart,  reviewing results, obtaining a medical history, counseling patient and/or family member, ordering medications, tests, or procedures, documenting clinical information in the electronic health record, refer to or communicate with other health care professionals as indicated, independently interpret results and providing care coordination      Fanny Majano MD                           MEDICATIONS ADMINISTERED IN LAST 1 DAY:  acetylcysteine (Mucomyst) 20 % nebulizer solution 2 mL       Date Action Dose Route User    6/2/2025 0829 Given 2 mL Nebulization Vamshi Bundy RCP    6/1/2025 1802 Given 2 mL Nebulization Praveen Medrano          ampicillin-sulbactam (Unasyn) 1.5 g in sodium chloride 0.9% 100mL IVPB-[facility]       Date Action Dose Route User    6/2/2025 0449 New Bag 1.5 g Intravenous Ethel Mckeon RN    6/1/2025 1642 New Bag 1.5 g Intravenous Kavitha Nguyen RN          arformoterol (Brovana) 15 MCG/2ML nebulizer solution 15 mcg       Date Action Dose Route User    6/2/2025 0829 Given 15 mcg Nebulization Vamshi Bundy, [facility]    6/1/2025 1802 Given 15 mcg Nebulization Praveen Medrano          aspirin DR tab 81 mg       Date Action Dose Route User    6/2/2025 0930 Given 81 mg Oral Ashley Obando RN          budesonide (Pulmicort) 0.5 MG/2ML nebulizer suspension 0.5 mg       Date Action Dose Route User    6/2/2025 0829 Given 0.5 mg Nebulization Vamshi Bundy RCP    6/1/2025 1803 Given 0.5 mg Nebulization Praveen Medrano          busPIRone (Buspar) tab 30 mg       Date Action Dose Route User    6/2/2025 0900 Given 30 mg Per G Tube Ashley Obando RN    6/1/2025 2125 Given 30 mg Per G Tube Ethel Mckeon RN    6/1/2025 1627 Given 30 mg Per G Tube Kavitha Nguyen RN          chlorproMAZINE (Thorazine) tab 25 mg       Date Action Dose Route User    6/2/2025 1304 Given 25 mg Per G Tube Ashley Obando RN    6/2/2025 0900 Given 25 mg Per G Tube Ashley Obando, RN    6/1/2025 2125 Given  25 mg Per G Tube Ethel Mckeon RN    6/1/2025 1700 Given 25 mg Per G Tube Kavitha Nguyen RN          docusate sodium (Colace) cap 100 mg       Date Action Dose Route User    6/2/2025 0900 Given 100 mg Oral Ashley Obando RN    6/1/2025 2200 Given 100 mg Oral Ethel Mckeon RN          docusate (Colace) 50 MG/5ML oral solution 100 mg       Date Action Dose Route User    6/1/2025 2126 Given 100 mg Per NG Tube Ethel Mckeon RN          DULoxetine (Cymbalta) DR cap 60 mg       Date Action Dose Route User    6/2/2025 0900 Given 60 mg Oral Ashley Obando RN    6/1/2025 2125 Given 60 mg Oral Ethel Mckeon RN          famotidine (Pepcid) 20 mg/2mL injection 20 mg       Date Action Dose Route User    6/2/2025 0930 Given 20 mg Intravenous Ashley Obando RN          heparin (Porcine) 1000 UNIT/ML injection 2,000 Units       Date Action Dose Route User    6/2/2025 1259 Given by Other 2,000 Units Intravenous Ashley Obando RN          heparin (Porcine) 5000 UNIT/ML injection 5,000 Units       Date Action Dose Route User    6/2/2025 0900 Given 5,000 Units Subcutaneous (Right Lower Abdomen) Ashley Obando RN    6/1/2025 2126 Given 5,000 Units Subcutaneous (Left Lower Abdomen) Ethel Mckeon RN          insulin regular human (Novolin R, Humulin R) 100 UNIT/ML injection 1-5 Units       Date Action Dose Route User    6/2/2025 0018 Given 2 Units Subcutaneous (Left Lower Arm) Ethel Mckeon RN          ipratropium-albuterol (Duoneb) 0.5-2.5 (3) MG/3ML inhalation solution 3 mL       Date Action Dose Route User    6/2/2025 0829 Given 3 mL Nebulization Vamshi Bundy RCP    6/1/2025 1803 Given 3 mL Nebulization Praveen Medrano          levothyroxine (Synthroid) tab 150 mcg       Date Action Dose Route User    6/2/2025 0616 Given 150 mcg Per G Tube Ethel Mckeon, ELLIS          methylPREDNISolone sodium succinate (Solu-MEDROL) injection 40 mg       Date Action Dose Route User    6/2/2025 0930 Given 40 mg  Intravenous Ashley Obando RN          midodrine (ProAmatine) tab 10 mg       Date Action Dose Route User    6/2/2025 1300 Given 10 mg Per G Tube Ashley Obando RN    6/2/2025 0616 Given 10 mg Per G Tube Ethel Mckeon RN    6/1/2025 1627 Given 10 mg Per G Tube Kavitha Nguyen RN          QUEtiapine (SEROquel) tab 50 mg       Date Action Dose Route User    6/2/2025 0900 Given 50 mg Per G Tube Ashley Obando RN    6/1/2025 1627 Given 50 mg Per G Tube Kavitha Nguyen RN          QUEtiapine ER (SEROquel XR) 24 hr tab 300 mg       Date Action Dose Route User    6/1/2025 2125 Given 300 mg Oral Ethel Mckeon RN          sacubitril-valsartan (Entresto) 24-26 MG per tab 1 tablet       Date Action Dose Route User    6/2/2025 0900 Given 1 tablet Oral Ashley Obando RN    6/1/2025 2125 Given 1 tablet Oral Ethel Mckeon RN          sennosides (Senokot) tab 17.2 mg       Date Action Dose Route User    6/2/2025 0900 Given 17.2 mg Oral Ashley Obando RN    6/1/2025 2300 Given 17.2 mg Oral Ethel Mckeon RN          senna (Senokot) 8.8 MG/5ML oral syrup 17.6 mg       Date Action Dose Route User    6/1/2025 2126 Given 17.6 mg Per NG Tube Ethel Mckeon RN            Vitals (last day)       Date/Time Temp Pulse Resp BP SpO2 Weight O2 Device O2 Flow Rate (L/min) Who    06/02/25 1500 -- 88 18 90/56 92 % -- High flow nasal cannula 3 L/min     06/02/25 1400 -- 87 19 -- -- -- -- -- SF    06/02/25 1315 -- 100 23 88/63 -- -- -- -- MJ    06/02/25 1200 96.7 °F (35.9 °C) 69 12 78/63 99 % -- High flow nasal cannula 3 L/min     06/02/25 1145 -- 71 13 100/64 99 % -- -- --     06/02/25 1000 -- 70 12 96/56 95 % -- High flow nasal cannula 3 L/min     06/02/25 0800 96.8 °F (36 °C) 77 14 110/56 96 % -- High flow nasal cannula 4 L/min     06/02/25 0600 -- 82 18 103/53 95 % -- High flow nasal cannula 4 L/min     06/02/25 0500 -- -- -- -- -- 233 lb 11 oz (106 kg) -- --     06/02/25 0400 96.8 °F (36 °C) 86 15  114/60 92 % -- High flow nasal cannula 4 L/min B    06/02/25 0300 -- 77 12 -- 91 % -- -- 4 L/min B    06/02/25 0200 -- 74 12 123/55 98 % -- High flow nasal cannula 3 L/min B    06/02/25 0100 -- 78 13 -- 95 % -- -- --     06/02/25 0000 97 °F (36.1 °C) 80 12 105/62 96 % -- High flow nasal cannula 5 L/min     06/01/25 2200 -- 85 18 105/54 95 % -- High flow nasal cannula 5 L/min     06/01/25 2100 -- 87 18 -- 92 % -- -- --     06/01/25 2000 96.9 °F (36.1 °C) 79 10 120/50 98 % -- High flow nasal cannula 5 L/min     06/01/25 1800 -- 82 13 106/59 96 % -- High flow nasal cannula 5 L/min     06/01/25 1600 -- 85 20 95/45 95 % -- High flow nasal cannula 5 L/min     06/01/25 1500 -- 92 23 126/80 95 % -- High flow nasal cannula 5 L/min     06/01/25 1400 -- 80 13 91/51 97 % -- High flow nasal cannula 5 L/min     06/01/25 1300 -- 84 16 66/54 94 % -- High flow nasal cannula 6 L/min     06/01/25 1200 -- 80 15 92/50 97 % -- -- 7 L/min     06/01/25 1100 -- 76 13 106/60 97 % -- -- 7 L/min     06/01/25 1000 -- 87 20 89/49 96 % -- -- 7 L/min     06/01/25 0900 -- 86 16 89/51 92 % -- -- 7 L/min     06/01/25 0800 -- 86 14 101/51 96 % -- -- 7 L/min     06/01/25 0700 -- 84 12 96/50 98 % -- -- 7 L/min     06/01/25 0600 -- 84 14 94/43 95 % -- High flow nasal cannula 7 L/min DL    06/01/25 0500 -- 83 16 86/47 95 % -- High flow nasal cannula 7 L/min DL    06/01/25 0400 97 °F (36.1 °C) 90 16 111/51 92 % -- High flow nasal cannula 7 L/min DL    06/01/25 0300 -- 88 15 90/49 92 % -- High flow nasal cannula 7 L/min DL    06/01/25 0200 -- 79 11 83/45 94 % -- High flow nasal cannula 7 L/min DL    06/01/25 0000 97.3 °F (36.3 °C) 79 12 94/47 95 % -- High flow nasal cannula -- DL          CIWA Scores (since admission)       None

## 2025-06-02 NOTE — OCCUPATIONAL THERAPY NOTE
OT orders received. Attempted to see pt for Re Eval. Pt unavailable - receiving HD at this time. Plan to reschedule.

## 2025-06-02 NOTE — DIETARY NOTE
ADULT NUTRITION REASSESSMENT    Pt is at high nutrition risk.  Pt does not meet malnutrition criteria.      RECOMMENDATIONS TO MD:   RD liberalized diet and added ONS TID to promote improved/adequate PO intake.  See Nutrition Intervention for diet and oral nutritional supplement (ONS) specifics     ADMITTING DIAGNOSIS:  Transaminitis [R74.01]  Non-traumatic rhabdomyolysis [M62.82]  Acute renal failure superimposed on chronic kidney disease, unspecified acute renal failure type, unspecified CKD stage [N17.9, N18.9]  PERTINENT PAST MEDICAL HISTORY: Past Medical History[1]    PATIENT STATUS:   Initial 05/20/25: Pt assessed due to verbal RN consult for poor appetite and PO intake. Pt presented to hospital s/p recurrent falls. Has PMH as listed above including 2-year h/o recurrent falls with [facility] evaluation [facility] where falls were linked to hyponatremia. Per H&P pt reported poor appetite/intake for several days PTA however screened at no risk by RN on initial MST. Found to have SUSAN and rhabdomyolysis with emergent HD initiated on 5/19. Transferred to the CCU with hypotension. Stable on NC with 3L O2. Off pressor support early this AM. Pt sitting up in bed working on lunch tray, <25% consumed. Appetite remains poor. Reports she consumed the 10am Mighty Shake however felt the chocolate was too rich for her. Will trial alternative flavors today. See diet hx below. Pt reports typical wt on home standing scale ~220 lbs.    5/26/2025 Update:       Po intake remains poor, average meal intake 23% with per recorded ONS intake of some 50-75% Mighty shake supplements. Poor appetite vs CPAP. At re-visit, pt states she is very thirsty, asked RN for help and RD requested pt to alternately drink/sip water and Mighty shake supplements--pt agreed. Pt took 80% at 11 am and 50% at 2 pm with RN.  Attempted Swallow eval but pt on CPAP. Bowel regimen in place for constipation. HD yesterday with 2200 ml fluid removal.   Unable to add Mvi  po daily as these contains K. Encouraged pt to continue to increase po as feasible but if oral and ONS intake remains suboptimal to meet daily nutrition needs, consider temporary Enteral nutrition.   5/27/25 UPDATE: Overall 8 days poor nutrition intake. Pt is lethargic, unable to tolerate off BiPaP. S/p SLP BSE and dysphagia diet recommended.  Discussed at rounds and recommend tube feeds-orders received. Worsening renal function (SUSAN on CKD)  with noted hyperkalemia- will utilize renal failure and fiber enriched formula. Continue po diet and will adjust EN pending po intake contribution. Begin goal EN to 75% needs.   5/28/25 UPDATE: Tolerating tube feeds well at goal nutrition. Noted pt started on Levothyroxine (need to hold tf/guidelines) and still no BM. Pt newly intubtated 5/27 and sedated on Propofol providing ~ 250 kcals via lipids. Will adjust tube feeding accordingly- orders adjusted. RN alerted to rx constipation using prn meds.   05/30/25 UPDATE: Brief note for EN adjustment. Pt extubated this AM. Off sedation with propofol. DHT remains in place with EN feeds at goal rate. Noted hypokalemia this AM with 20 mEq rider ordered per nephrology. Last HD on 5/29 with next scheduled HD on 5/31.    06/02/25 UPDATE: S/p HD this AM. Stable on HFNC 3L. DHT removed and EN feeds stopped on 6/1. S/p BSSE - safe for PO diet. Diet resumed however pt with poor PO intake. Skipped breakfast today d/t HD, consumed only 75% of mashed potatoes and Gingerale on lunch tray. Dinner tray at bedside - untouched. Encouraged pt to consume ONS - reports she likes them however refused at lunch today per RN.      FOOD/NUTRITION RELATED HISTORY:  Appetite: poor appetite PTA  Intake:  Poor PO intake 5/17 to 5/27; EN feeds 5/27 to 6/1; 6/2 PO diet resumed with poor PO intake  Intake Meeting Needs: No, but oral nutrition supplements (ONS) to maximize  Percent Meals Eaten (last 6 days)       Date/Time Percent Meals Eaten (%)    06/01/25 2200 50  %    06/02/25 0800 0 %        Food Allergies: No Known Food Allergies (NKFA)  Cultural/Ethnic/Church Preferences: Not Obtained    GASTROINTESTINAL: +BM last  recorded 5/30/25, Loss of appetite, Swallow evaluation noted, and abdomen is soft, round, with active bowel sounds  UO: 0 ml past 24 hrs; 2.7 L fluid removed from HD on 6/2   I/Os: net +4.8L total this admission    MEDICATIONS: reviewed   dextrose 10%        [START ON 6/3/2025] predniSONE  20 mg Oral Daily with breakfast    docusate sodium  100 mg Oral BID    sennosides  17.2 mg Oral BID    ipratropium-albuterol  3 mL Nebulization 2 times daily    midodrine  10 mg Per G Tube TID    busPIRone HCl  30 mg Per G Tube TID    chlorproMAZINE  25 mg Per G Tube QID    QUEtiapine  50 mg Per G Tube BID    levothyroxine  150 mcg Per G Tube Before breakfast    famotidine  20 mg Intravenous Daily    insulin regular human  1-5 Units Subcutaneous 4 times per day    carvedilol  3.125 mg Oral BID with meals    sacubitril-valsartan  1 tablet Oral BID    budesonide  0.5 mg Nebulization 2 times daily    arformoterol  15 mcg Nebulization 2 times daily    ampicillin-sulbactam  1.5 g Intravenous q12h    DULoxetine  60 mg Oral BID    heparin  5,000 Units Subcutaneous 2 times per day    aspirin  81 mg Oral Daily    QUEtiapine ER  300 mg Oral Nightly   PRN meds: PRN Medications[2]    LABS: reviewed; noted hypokalemia on 6/1 with no replacement given   Recent Labs     05/31/25  0413 06/01/25  0942   * 147*   BUN 42* 33*   CREATSERUM 3.71* 2.81*   CA 9.0 8.6*    141   K 4.1 3.4*    103   CO2 28.0 30.0   OSMOCALC 301* 302*     WEIGHT HISTORY:  Patient Weight(s) for the past 336 hrs:   Weight   06/02/25 0500 106 kg (233 lb 11 oz)   05/29/25 0600 106.4 kg (234 lb 9.1 oz)   05/28/25 0500 106.2 kg (234 lb 2.1 oz)   05/26/25 0526 105.7 kg (233 lb 0.4 oz)   05/25/25 0500 106.6 kg (235 lb 0.2 oz)   05/24/25 1000 107 kg (235 lb 14.3 oz)   05/24/25 0627 104.8 kg (231 lb 1.6 oz)    05/22/25 1300 96.1 kg (211 lb 13.8 oz)   05/21/25 0100 92.7 kg (204 lb 5.9 oz)   05/20/25 0700 96.6 kg (213 lb)     Wt Readings from Last 10 Encounters:   06/02/25 106 kg (233 lb 11 oz)   10/09/23 84 kg (185 lb 3 oz)   10/04/23 83.9 kg (185 lb)   06/12/16 49.9 kg (110 lb)     ANTHROPOMETRICS:  HT: 160 cm (5' 3\")  Wt Readings from Last 1 Encounters:   06/02/25 106 kg (233 lb 11 oz)   Last weight: Wt up 10% (21 lbs) from admission wt 213 lbs on 5/17   Dosing Weight: 97 kg (213 lbs) - admission weight on 5/17/25, utilized for anthropometric calculations  BMI: Body mass index is 37.65 kg/m².  BMI CLASSIFICATION: 35-39.9 kg/m2 - obesity class II  IBW/lbs (Calculated) Female: 115 lbs           185% IBW  Usual Body Wt: 220 lbs       97% UBW    NUTRITION RELATED PHYSICAL FINDINGS:  - Nutrition Focused Physical Exam (NFPE): well nourished and no wasting noted  - Fluid Accumulation: non-pitting Bilateral Lower extremity, Hand (s), and Feet and generalized. See RN documentation for details  - Skin Integrity: at risk. See RN documentation for details    NUTRITION DIAGNOSIS/PROBLEM:   Inadequate oral intake related to Decreased ability to consume sufficient energy as evidenced by diet hx decreased PO x2 wks PTA and poor appetite/intake since admission x3 days.   NUTRITION DIAGNOSIS PROGRESS:  Improvement (unresolved) - EN feeds 5/27-6/1; PO diet resumed with poor appetite/intake 6/1    NUTRITION INTERVENTION:     NUTRITION PRESCRIPTION:   Estimated Nutrition needs: --dosing wt of 97 kg - wt taken on 5/20/25         Calories: 0681-2734 calories/day (15-20 calories per kg Dosing wt)  Protein:  g protein/day (1.5-2 g protein/kg Ideal body wt (IBW) (52.3 kg))  Fluid Needs: 1585 ml/day (25 ml/kg adjusted BW for obesity (63.4 kg) chronological age method) - adjust for clinical status (SUSAN on HD)     - Diet:       Procedures    Regular/General diet Sodium Restriction: 3-4 GM NA; Fluid Consistency: Thin Liquids ; Texture  Consistency: Soft / Easy to Chew ; Is Patient on Accuchecks? Yes; Misc Restriction: No Straw       - Nutrition Care Plan: EN support. Prior had Liberalized diet, Encouraged increased PO intake, Initiated ONS (oral nutritional supplements), and Requested  with ordering meals, tray set up and/or feeding as needed  - ONS (Oral Nutrition Supplements)/Meals/Snacks: SF Shake (200 calories/ 7 g protein each) TID Vanilla or Chocolate   - Vitamin and mineral supplements: none  - Feeding assistance: meal set up and assistance with menu selection and encouragement at meal times  - Nutrition education: Discussed importance of adequate energy and protein intake; encouraged ONS intake; encouraged smaller, more frequent meals  - Coordination of nutrition care: collaboration with other providers and discussed in Care Rounds   - Discharge and transfer of nutrition care to new setting or provider: monitor plans - from home alone    MONITOR AND EVALUATE/NUTRITION GOALS:  - Food and Nutrient Intake:      Monitor: adequacy of PO intake and adequacy of supplement intake  - Food and Nutrient Administration:      Monitor: need for temporary enteral nutrition and goc/family wishes regarding nutrition  - Anthropometric Measurement:    Monitor weight  - Nutrition Goals:    allow wt loss due to fluid losses, PO and supplement greater than 75% of needs, good supplement intake, labs within acceptable limits, euglycemia, minimize lean body mass loss, and prevent skin breakdown    RD will follow per protocol.     Rosy Chaidez MS, RD, LDN, [facility]  [id number]               [1]   Past Medical History:   Essential hypertension    Heart attack (HCC)    Hyperlipidemia    Thyroid disease   [2]   diazePAM    glucose **OR** glucose **OR** glucose-vitamin C **OR** dextrose **OR** glucose **OR** glucose **OR** glucose-vitamin C    dextrose 10%    sodium bicarbonate **AND** lipase-protease-amylase (Lip-Prot-Amyl)    HYDROcodone-acetaminophen     ondansetron    polyethylene glycol (PEG 3350)    magnesium hydroxide    bisacodyl    fleet enema    heparin    albuterol    acetaminophen

## 2025-06-02 NOTE — PLAN OF CARE
Problem: Patient Centered Care  Goal: Patient preferences are identified and integrated in the patient's plan of care  Description: Interventions:  - What would you like us to know as we care for you?   - Provide timely, complete, and accurate information to patient/family  - Incorporate patient and family knowledge, values, beliefs, and cultural backgrounds into the planning and delivery of care  - Encourage patient/family to participate in care and decision-making at the level they choose  - Honor patient and family perspectives and choices  Outcome: Progressing     Problem: PAIN - ADULT  Goal: Verbalizes/displays adequate comfort level or patient's stated pain goal  Description: INTERVENTIONS:  - Encourage pt to monitor pain and request assistance  - Assess pain using appropriate pain scale  - Administer analgesics based on type and severity of pain and evaluate response  - Implement non-pharmacological measures as appropriate and evaluate response  - Consider cultural and social influences on pain and pain management  - Manage/alleviate anxiety  - Utilize distraction and/or relaxation techniques  - Monitor for opioid side effects  - Notify MD/LIP if interventions unsuccessful or patient reports new pain  - Anticipate increased pain with activity and pre-medicate as appropriate  Outcome: Progressing     Problem: SAFETY ADULT - FALL  Goal: Free from fall injury  Description: INTERVENTIONS:  - Assess pt frequently for physical needs  - Identify cognitive and physical deficits and behaviors that affect risk of falls.  - [place] fall precautions as indicated by assessment.  - Educate pt/family on patient safety including physical limitations  - Instruct pt to call for assistance with activity based on assessment  - Modify environment to reduce risk of injury  - Provide assistive devices as appropriate  - Consider OT/PT consult to assist with strengthening/mobility  - Encourage toileting schedule  Outcome:  Progressing     Problem: DISCHARGE PLANNING  Goal: Discharge to home or other facility with appropriate resources  Description: INTERVENTIONS:  - Identify barriers to discharge w/pt and caregiver  - Include patient/family/discharge partner in discharge planning  - Arrange for needed discharge resources and transportation as appropriate  - Identify discharge learning needs (meds, wound care, etc)  - Arrange for interpreters to assist at discharge as needed  - Consider post-discharge preferences of patient/family/discharge partner  - Complete POLST form as appropriate  - Assess patient's ability to be responsible for managing their own health  - Refer to Case Management Department for coordinating discharge planning if the patient needs post-hospital services based on physician/LIP order or complex needs related to functional status, cognitive ability or social support system  Outcome: Progressing     Problem: SKIN/TISSUE INTEGRITY - ADULT  Goal: Skin integrity remains intact  Description: INTERVENTIONS  - Assess and document risk factors for pressure ulcer development  - Assess and document skin integrity  - Monitor for areas of redness and/or skin breakdown  - Initiate interventions, skin care algorithm/standards of care as needed  Outcome: Progressing     Problem: RESPIRATORY - ADULT  Goal: Achieves optimal ventilation and oxygenation  Description: INTERVENTIONS:  - Assess for changes in respiratory status  - Assess for changes in mentation and behavior  - Position to facilitate oxygenation and minimize respiratory effort  - Oxygen supplementation based on oxygen saturation or ABGs  - Provide Smoking Cessation handout, if applicable  - Encourage broncho-pulmonary hygiene including cough, deep breathe, Incentive Spirometry  - Assess the need for suctioning and perform as needed  - Assess and instruct to report SOB or any respiratory difficulty  - Respiratory Therapy support as indicated  - Manage/alleviate  anxiety  - Monitor for signs/symptoms of CO2 retention  Outcome: Progressing     Problem: GENITOURINARY - ADULT  Goal: Absence of urinary retention  Description: INTERVENTIONS:  - Assess patient’s ability to void and empty bladder  - Monitor intake/output and perform bladder scan as needed  - Follow urinary retention protocol/standard of care  - Consider collaborating with pharmacy to review patient's medication profile  - Implement strategies to promote bladder emptying  Outcome: Progressing     Problem: METABOLIC/FLUID AND ELECTROLYTES - ADULT  Goal: Glucose maintained within prescribed range  Description: INTERVENTIONS:  - Monitor Blood Glucose as ordered  - Assess for signs and symptoms of hyperglycemia and hypoglycemia  - Administer ordered medications to maintain glucose within target range  - Assess barriers to adequate nutritional intake and initiate nutrition consult as needed  - Instruct patient on self management of diabetes  Outcome: Progressing  Goal: Electrolytes maintained within normal limits  Description: INTERVENTIONS:  - Monitor labs and rhythm and assess patient for signs and symptoms of electrolyte imbalances  - Administer electrolyte replacement as ordered  - Monitor response to electrolyte replacements, including rhythm and repeat lab results as appropriate  - Fluid restriction as ordered  - Instruct patient on fluid and nutrition restrictions as appropriate  Outcome: Progressing  Goal: Hemodynamic stability and optimal renal function maintained  Description: INTERVENTIONS:  - Monitor labs and assess for signs and symptoms of volume excess or deficit  - Monitor intake, output and patient weight  - Monitor urine specific gravity, serum osmolarity and serum sodium as indicated or ordered  - Monitor response to interventions for patient's volume status, including labs, urine output, blood pressure (other measures as available)  - Encourage oral intake as appropriate  - Instruct patient on fluid  and nutrition restrictions as appropriate  Outcome: Progressing     Problem: HEMATOLOGIC - ADULT  Goal: Free from bleeding injury  Description: (Example usage: patient with low platelets)  INTERVENTIONS:  - Avoid intramuscular injections, enemas and rectal medication administration  - Ensure safe mobilization of patient  - Hold pressure on venipuncture sites to achieve adequate hemostasis  - Assess for signs and symptoms of internal bleeding  - Monitor lab trends  - Patient is to report abnormal signs of bleeding to staff  - Avoid use of toothpicks and dental floss  - Use electric shaver for shaving  - Use soft bristle tooth brush  - Limit straining and forceful nose blowing  Outcome: Progressing  Goal: Maintains hematologic stability  Description: INTERVENTIONS  - Assess for signs and symptoms of bleeding or hemorrhage  - Monitor labs and vital signs for trends  - Administer supportive blood products/factors, fluids and medications as ordered and appropriate  - Administer supportive blood products/factors as ordered and appropriate  Outcome: Progressing  Goal: Free from bleeding injury  Description: (Example usage: patient with low platelets)  INTERVENTIONS:  - Avoid intramuscular injections, enemas and rectal medication administration  - Ensure safe mobilization of patient  - Hold pressure on venipuncture sites to achieve adequate hemostasis  - Assess for signs and symptoms of internal bleeding  - Monitor lab trends  - Patient is to report abnormal signs of bleeding to staff  - Avoid use of toothpicks and dental floss  - Use electric shaver for shaving  - Use soft bristle tooth brush  - Limit straining and forceful nose blowing  Outcome: Progressing     Problem: Diabetes/Glucose Control  Goal: Glucose maintained within prescribed range  Description: INTERVENTIONS:  - Monitor Blood Glucose as ordered  - Assess for signs and symptoms of hyperglycemia and hypoglycemia  - Administer ordered medications to maintain  glucose within target range  - Assess barriers to adequate nutritional intake and initiate nutrition consult as needed  - Instruct patient on self management of diabetes  Outcome: Progressing     Problem: Delirium  Goal: Minimize duration of delirium  Description: Interventions:  - Encourage use of hearing aids, eye glasses  - Promote highest level of mobility daily  - Provide frequent reorientation  - Promote wakefulness i.e. lights on, blinds open  - Promote sleep, encourage patient's normal rest cycle i.e. lights off, TV off, minimize noise and interruptions  - Encourage family to assist in orientation and promotion of home routines  Outcome: Progressing     Problem: GASTROINTESTINAL - ADULT  Goal: Minimal or absence of nausea and vomiting  Description: INTERVENTIONS:  - Maintain adequate hydration with IV or PO as ordered and tolerated  - Nasogastric tube to low intermittent suction as ordered  - Evaluate effectiveness of ordered antiemetic medications  - Provide nonpharmacologic comfort measures as appropriate  - Advance diet as tolerated, if ordered  - Obtain nutritional consult as needed  - Evaluate fluid balance  Outcome: Progressing  Goal: Maintains or returns to baseline bowel function  Description: INTERVENTIONS:  - Assess bowel function  - Maintain adequate hydration with IV or PO as ordered and tolerated  - Evaluate effectiveness of GI medications  - Encourage mobilization and activity  - Obtain nutritional consult as needed  - Establish a toileting routine/schedule  - Consider collaborating with pharmacy to review patient's medication profile  Outcome: Progressing

## 2025-06-02 NOTE — PROGRESS NOTES
[facility]  part of [facility]    Progress Note    Radha Winters Patient Status:  Inpatient    1960 MRN [id number]   Location [facility] 5SW/SE Attending Fanny Majano MD   Hosp Day # 16 PCP JUAN MONTANEZ       SUBJECTIVE:  Feeling hungry.  No other complaints no shortness of breath    OBJECTIVE:  Vital signs in last 24 hours:  BP 96/56 (BP Location: Left arm)   Pulse 70   Temp 96.8 °F (36 °C) (Temporal)   Resp 12   Ht 5' 3\" (1.6 m)   Wt 233 lb 11 oz (106 kg)   SpO2 95%   BMI 41.40 kg/m²     Intake/Output:    Intake/Output Summary (Last 24 hours) at 2025 1227  Last data filed at 2025 0800  Gross per 24 hour   Intake 1795.1 ml   Output 0 ml   Net 1795.1 ml       Wt Readings from Last 3 Encounters:   25 233 lb 11 oz (106 kg)   10/09/23 185 lb 3 oz (84 kg)   10/04/23 185 lb (83.9 kg)       Exam  Gen: No acute distress, patient undergoing hemodialysis  HEENT: No pallor  Pulm: Lungs clear to auscultation anteriorly  CV: Heart with regular rate and rhythm, no peripheral edema  Abd: Abdomen soft, nontender, nondistended, no organomegaly, bowel sounds present  Skin: no rashes or lesions  CNS: Alert    Data Review:     Labs:   Lab Results   Component Value Date    WBC 9.5 2025    HGB 11.5 2025    HCT 38.0 2025    .0 2025       LABS  Recent Labs   Lab 25  0428 25  0419 25  0606 25  0351 25  0413 25  0548 25  0942 25  0456   RBC 4.60 4.27   < > 3.87 4.12 3.70*  --  3.86   HGB 13.6 12.7   < > 11.4* 12.1 11.2*  --  11.5*   HCT 43.8 39.9   < > 35.7 38.3 35.6  --  38.0   MCV 95.2 93.4   < > 92.2 93.0 96.2  --  98.4   MCH 29.6 29.7   < > 29.5 29.4 30.3  --  29.8   MCHC 31.1 31.8   < > 31.9 31.6 31.5  --  30.3*   RDW 16.8* 16.7*   < > 16.6* 16.8* 17.0*  --  17.2*   NEPRELIM 8.48* 8.52*   < > 7.76* 8.14* 7.63  --  7.10   WBC 9.9 9.7   < > 9.8 10.7 9.8  --  9.5   .0* 153.0   < >  147.0* 162.0 170.0  --  170.0   * 207*  --   --   --   --  39*  --    ALT 46 37  --   --   --   --  20  --    CK 9,315*  --   --   --   --   --  446*  --    * 132*   < > 169* 112*  --  147*  --     < > = values in this interval not displayed.       Imaging:      Meds:   Current Hospital Medications[1]    Assessment  Problem List[2]  1. Acute Kidney Injury on Chronic Kidney Disease:  -  - Nephrology consultation obtained  Patient with worsening renal function.  Nephrology is following the patient  On hemodialysis       2. Rhabdomyolysis:, Much improved  - Secondary to recurrent falls  - Possibly statin-induced  - Plan: Hold statin (Crestor)  Improving     3. Acute Transaminitis:  improved  - Likely secondary to rhabdomyolysis  - May also be statin-related  - Will monitor with serial labs  Patient could also have a shock liver  Liver enzymes are improving       4. Recurrent Falls:  -    Patient will need physical therapy and Occupational Therapy after she is extubated      5. Coronary Artery Disease:  - Currently stable  - Patient denies chest pain     6. Hypothyroidism:  - Continue levothyroxine  Possible UTI.  on ceftriaxone.    Acute hypoxic respiratory failure  Extubated today  Pulmonary following.      Chronic heart failure with reduced ejection fraction    Cardiomyopathy.  cardiology consulted         Plan for close monitoring and adjustment of management based on clinical course.  discussed with nursing staff        Active Orders   LAB    Basic Metabolic Panel (8)     Frequency: Tomorrow AM draw     Number of Occurrences: 1 Occurrences    CBC With Differential With Platelet     Frequency: Tomorrow AM draw     Number of Occurrences: 1 Occurrences   Nourishments    Dietary Nutrition Supplements TID     Frequency: TID     Number of Occurrences: Until Specified     Order Comments: JESÚS davey @ B,L,D trays - variety of flavors      Room Service Eligibility Until Discontinued     Frequency: Until  Discontinued     Number of Occurrences: Until Specified    Room Service Notify RN Until Discontinued     Frequency: Until Discontinued     Number of Occurrences: Until Specified    Tube Feeding Diet Effective Now     Frequency: Effective Now     Number of Occurrences: Until Specified   OT    OT Eval and Treat     Frequency: Once     Number of Occurrences: 1 Occurrences   PT    IP PT eval and treat     Frequency: Once     Number of Occurrences: 1 Occurrences   Respiratory Care    BIPAP When Sleeping     Frequency: When Sleeping     Number of Occurrences: Until Specified    BIPAP When Sleeping     Frequency: When Sleeping     Number of Occurrences: Until Specified    Chest physiotherapy 4x Daily     Frequency: 4x Daily     Number of Occurrences: Until Specified     Order Comments: Left lower lobe atelectasis.      EZ-PAP 4x Daily     Frequency: 4x Daily     Number of Occurrences: Until Specified    Incentive spriometry: RT to teach pt Once     Frequency: Once     Number of Occurrences: 1 Occurrences    Oxygen administration (adult) PRN     Frequency: PRN     Number of Occurrences: Until Specified    Respiratory care evaluation Once     Frequency: Once     Number of Occurrences: 1 Occurrences     Order Comments: If patient uses home CPAP/BIPAP, place on known home settings. If unknown, place on auto CPAP with upper limit 20 and lower limit 5 cm H2O     Dialysis    Hemodialysis inpatient     Frequency: Tomorrow     Number of Occurrences: 1 Occurrences     Order Comments: Keep MAP goal >65     Precaution    Aspiration precautions Continuous     Frequency: Continuous     Number of Occurrences: Until Specified   Diet    Regular/General diet Sodium Restriction: 3-4 GM NA; Fluid Consistency: Thin Liquids ; Texture Consistency: Soft / Easy to Chew ; Is Patient on Accuchecks? Yes; Misc Restriction: No Straw     Frequency: Effective Now     Number of Occurrences: Until Specified     Order Comments: Medications crushed in  pureed     Nursing    Accucheck     Frequency: BID     Number of Occurrences: Until Specified    Accucheck     Frequency: Q6H     Number of Occurrences: Until Specified    Accucheck     Frequency: PRN     Number of Occurrences: Until Specified     Order Comments: For symptoms or suspicion of hypoglycemia      Assess using Critical Care Pain Observation Tool (CPOT)     Frequency: Now Then Q3H     Number of Occurrences: Until Specified    Elevate head of bed >30 degrees     Frequency: Until Discontinued     Number of Occurrences: Until Specified     Order Comments: 30-45 degrees at all times unless contraindicated by physician order or patient condition.      Elevate head of bed greater than or equal to 30 degrees     Frequency: Until Discontinued     Number of Occurrences: Until Specified    Flush feeding tube     Frequency: PRN     Number of Occurrences: Until Specified     Order Comments: Flush feeding tube:  1.) Before and after bolus feedings.  2.) After medication administration.      For any tube feed interruptions, continue basal insulin, and correction scales. Hold any scheduled insulins, and resume when tube feed is restarted.     Frequency: Until Discontinued     Number of Occurrences: Until Specified    For non-patent tubes:     Frequency: PRN     Number of Occurrences: Until Specified     Order Comments: If water is unsuccessful in dislodging the clog, use the pancrealipase/sodium bicarb. May repeat x1 before calling physician for further orders.      For patients without a history of diabetes, discontinue Accuchecks and insulin correction scale if blood glucose <180 mg/dL for 48 hours     Frequency: Until Discontinued     Number of Occurrences: Until Specified    Give all morning medications unless otherwise specified     Frequency: Until Discontinued     Number of Occurrences: Until Specified     Order Comments: Give all morning medications unless otherwise specified.  DO NOT use Electrolyte protocol.       If patient uses CPAP/BIPAP, encourage patient to wear when sleeping (including daytime) and after any apneic episode or adverse event.     Frequency: Until Discontinued     Number of Occurrences: Until Specified    Initiate early mobility protocol     Frequency: Once     Number of Occurrences: 1 Occurrences    Initiate Hypoglycemia Algorithm for Adults     Frequency: Until Discontinued     Number of Occurrences: Until Specified     Order Comments: For blood glucose less than 70      Initiate Medical Nutrition Therapy Protocol for Enteral Nutrition     Frequency: Until Discontinued     Number of Occurrences: Until Specified    Insert denny catheter     Frequency: Once     Number of Occurrences: 1 Occurrences    Intake and Output     Frequency: Per Unit Routine     Number of Occurrences: Until Specified     Order Comments: Record formula and water flushes separately.      May use port/central line     Frequency: Until Discontinued     Number of Occurrences: Until Specified    Measure weight     Frequency: Per Unit Routine     Number of Occurrences: Until Specified     Order Comments: Measure weight every Monday and Thursday for non critical care patients.      Monitor tube placement     Frequency: Q8H     Number of Occurrences: Until Specified    Notify attending physician for patients refusing CPAP     Frequency: Until Discontinued     Number of Occurrences: Until Specified     Order Comments: Document that patient was educated and refused CPAP, if applicable.      Notify attending physician for sustained desaturation <90% or prolonged or recurrent apnea     Frequency: Until Discontinued     Number of Occurrences: Until Specified     Order Comments: Notify attending physician for sustained desaturation <90% or prolonged or recurrent apnea      Notify physician     Frequency: Until Discontinued     Number of Occurrences: Until Specified    Notify physician of significant abdominal distension, pain, nausea, or  emesis, and stop tube feeding     Frequency: Until Discontinued     Number of Occurrences: Until Specified    Notify Radiologist     Frequency: Until Discontinued     Number of Occurrences: Until Specified    Nurse Driven Indwelling Urinary Catheter Removal Protocol     Frequency: Until Discontinued     Number of Occurrences: Until Specified    Nursing communication (specify)     Frequency: Once     Number of Occurrences: 1 Occurrences     Order Comments: Hold blood thinners pre procedure      Nursing communication (specify)     Frequency: Once     Number of Occurrences: 1 Occurrences     Order Comments: RN, keep the patient off the left side.      Nursing communication (specify)     Frequency: Once     Number of Occurrences: 1 Occurrences     Order Comments: Removed OG and placed Dobhoff      Nursing communication (specify)     Frequency: Once     Number of Occurrences: 1 Occurrences     Order Comments: Dobhoff okay to use      Nursing communication - call [facility] to order dialysis     Frequency: Once     Number of Occurrences: 1 Occurrences     Order Comments: Call [facility] at [phone] to order dialysis.  Identify special circumstances and specify stat or routine treatment.      Patient may resume usual diet     Frequency: Once     Number of Occurrences: 1 Occurrences     Order Comments: Npo x one hour, then resume pre-procedure diet      Post procedure site assessment     Frequency: Per Unit Routine     Number of Occurrences: Until Specified     Order Comments: Every 15 minutes for 1 hour, then every 30 minutes for 1 hour, then every 60 minutes for 2 hours, then per unit routine      Provide patient with sleep apnea education materials     Frequency: Once     Number of Occurrences: 1 Occurrences    Pulse oximetry     Frequency: Per Unit Routine     Number of Occurrences: Until Specified    Pulse oximetry     Frequency: Continuous     Number of Occurrences: Until Specified    Tube insertion     Frequency:  Once     Number of Occurrences: 1 Occurrences    Tube insertion     Frequency: Once     Number of Occurrences: 1 Occurrences    Tube insertion     Frequency: Once     Number of Occurrences: 1 Occurrences     Order Comments: Meds and tube feeding      Verify informed consent     Frequency: Once     Number of Occurrences: 1 Occurrences    Vital signs     Frequency: Per Unit Routine     Number of Occurrences: Until Specified     Order Comments: Every 15 minutes for 1 hour, then every 30 minutes for 1 hour, then every 60 minutes for 2 hours, then per unit routine.      Void prior to procedure     Frequency: Once     Number of Occurrences: 1 Occurrences   Consult    Consult to Interventional Radiology     Frequency: Once     Number of Occurrences: 1 Occurrences    Consult to Pulmonology     Frequency: Once     Number of Occurrences: 1 Occurrences    Social work     Linked Order: And     Frequency: Once     Number of Occurrences: 1 Occurrences   Medications    acetaminophen (Tylenol) tab 650 mg     Frequency: Q6H PRN     Dose: 650 mg     Route: Oral    acetylcysteine (Mucomyst) 20 % nebulizer solution 2 mL     Frequency: BID     Dose: 2 mL     Route: Nebulization    albuterol (Ventolin) (2.5 MG/3ML) 0.083% nebulizer solution 2.5 mg     Frequency: Q6H PRN     Dose: 2.5 mg     Route: Nebulization    ampicillin-sulbactam (Unasyn) 1.5 g in sodium chloride 0.9% 100mL IVPB-MAYO     Frequency: q12h     Dose: 1.5 g     Route: Intravenous    arformoterol (Brovana) 15 MCG/2ML nebulizer solution 15 mcg     Frequency: 2 times daily     Dose: 15 mcg     Route: Nebulization    aspirin DR tab 81 mg     Frequency: Daily     Dose: 81 mg     Route: Oral    bisacodyl (Dulcolax) 10 MG rectal suppository 10 mg     Frequency: Daily PRN     Dose: 10 mg     Route: Rectal    budesonide (Pulmicort) 0.5 MG/2ML nebulizer suspension 0.5 mg     Frequency: 2 times daily     Dose: 0.5 mg     Route: Nebulization    busPIRone (Buspar) tab 30 mg      Frequency: TID     Dose: 30 mg     Route: Per G Tube    carvedilol (Coreg) tab 3.125 mg     Frequency: BID with meals     Dose: 3.125 mg     Route: Oral    chlorproMAZINE (Thorazine) tab 25 mg     Frequency: QID     Dose: 25 mg     Route: Per G Tube    dextrose 10% infusion (TPN no rate)     Frequency: Continuous PRN     Route: Intravenous    dextrose 50% injection 50 mL     Linked Order: Or     Frequency: Q15 Min PRN     Dose: 50 mL     Route: Intravenous    diazePAM (Valium) tab 5 mg     Frequency: Q8H PRN     Dose: 5 mg     Route: Per G Tube    docusate sodium (Colace) cap 100 mg     Frequency: BID     Dose: 100 mg     Route: Oral    DULoxetine (Cymbalta) DR cap 60 mg     Frequency: BID     Dose: 60 mg     Route: Oral    famotidine (Pepcid) 20 mg/2mL injection 20 mg     Frequency: Daily     Dose: 20 mg     Route: Intravenous    fleet enema (Fleet) rectal enema 133 mL     Frequency: Once PRN     Dose: 1 enema     Route: Rectal    glucose (Dex4) 15 GM/59ML oral liquid 15 g     Linked Order: Or     Frequency: Q15 Min PRN     Dose: 15 g     Route: Oral    glucose (Dex4) 15 GM/59ML oral liquid 30 g     Linked Order: Or     Frequency: Q15 Min PRN     Dose: 30 g     Route: Oral    glucose (Glutose) 40% oral gel 15 g     Linked Order: Or     Frequency: Q15 Min PRN     Dose: 15 g     Route: Oral    glucose (Glutose) 40% oral gel 30 g     Linked Order: Or     Frequency: Q15 Min PRN     Dose: 30 g     Route: Oral    glucose-vitamin C (Dex-4) chewable tab 4 tablet     Linked Order: Or     Frequency: Q15 Min PRN     Dose: 4 tablet     Route: Oral    glucose-vitamin C (Dex-4) chewable tab 8 tablet     Linked Order: Or     Frequency: Q15 Min PRN     Dose: 8 tablet     Route: Oral    heparin (Porcine) 1000 UNIT/ML injection 2,000 Units     Frequency: PRN Dialysis     Dose: 2,000 Units     Route: Intravenous    heparin (Porcine) 5000 UNIT/ML injection 5,000 Units     Frequency: Q12H PATRICK     Dose: 5,000 Units     Route:  Subcutaneous    HYDROcodone-acetaminophen (Norco) 5-325 MG per tab 1 tablet     Frequency: Q6H PRN     Dose: 1 tablet     Route: Oral    insulin regular human (Novolin R, Humulin R) 100 UNIT/ML injection 1-5 Units     Frequency: Q6H PATRICK     Dose: 1-5 Units     Route: Subcutaneous    ipratropium-albuterol (Duoneb) 0.5-2.5 (3) MG/3ML inhalation solution 3 mL     Frequency: 2 times daily     Dose: 3 mL     Route: Nebulization    levothyroxine (Synthroid) tab 150 mcg     Frequency: Before breakfast     Dose: 150 mcg     Route: Per G Tube    magnesium hydroxide (Milk of Magnesia) 400 MG/5ML oral suspension 30 mL     Frequency: Daily PRN     Dose: 30 mL     Route: Oral    methylPREDNISolone sodium succinate (Solu-MEDROL) injection 40 mg     Frequency: Daily     Dose: 40 mg     Route: Intravenous    midodrine (ProAmatine) tab 10 mg     Frequency: TID     Dose: 10 mg     Route: Per G Tube    ondansetron (Zofran) 4 MG/2ML injection 4 mg     Frequency: Q6H PRN     Dose: 4 mg     Route: Intravenous    pancrelipase (Lip-Prot-Amyl) (Zenpep) DR particles cap 10,000 Units     Linked Order: And     Frequency: PRN     Dose: 10,000 Units     Route: Per G Tube    polyethylene glycol (PEG 3350) (Miralax) 17 g oral packet 17 g     Frequency: Daily PRN     Dose: 17 g     Route: Oral    QUEtiapine (SEROquel) tab 50 mg     Frequency: BID     Dose: 50 mg     Route: Per G Tube    QUEtiapine ER (SEROquel XR) 24 hr tab 300 mg     Frequency: Nightly     Dose: 300 mg     Route: Oral    sacubitril-valsartan (Entresto) 24-26 MG per tab 1 tablet     Linked Order: And     Frequency: BID     Dose: 1 tablet     Route: Oral    sennosides (Senokot) tab 17.2 mg     Frequency: BID     Dose: 17.2 mg     Route: Oral    sodium bicarbonate tab 650 mg     Linked Order: And     Frequency: PRN     Dose: 650 mg     Route: Per G Tube     Reviewed personally: current medical record, vital signs info, lab results, cardiac & radiographic films and  reports.  Formulated plans for continued care for this patient.  RN to call us for any significant change  Scheduled tests: see orders   Other orders per treatment team.     *The above components were done for the patient encounter: Reviewing the patient's electronic chart, reviewing results, obtaining a medical history, counseling patient and/or family member, ordering medications, tests, or procedures, documenting clinical information in the electronic health record, refer to or communicate with other health care professionals as indicated, independently interpret results and providing care coordination      Fanny Majano MD           [1]   Current Facility-Administered Medications   Medication Dose Route Frequency    docusate sodium (Colace) cap 100 mg  100 mg Oral BID    sennosides (Senokot) tab 17.2 mg  17.2 mg Oral BID    ipratropium-albuterol (Duoneb) 0.5-2.5 (3) MG/3ML inhalation solution 3 mL  3 mL Nebulization 2 times daily    midodrine (ProAmatine) tab 10 mg  10 mg Per G Tube TID    busPIRone (Buspar) tab 30 mg  30 mg Per G Tube TID    chlorproMAZINE (Thorazine) tab 25 mg  25 mg Per G Tube QID    diazePAM (Valium) tab 5 mg  5 mg Per G Tube Q8H PRN    QUEtiapine (SEROquel) tab 50 mg  50 mg Per G Tube BID    levothyroxine (Synthroid) tab 150 mcg  150 mcg Per G Tube Before breakfast    famotidine (Pepcid) 20 mg/2mL injection 20 mg  20 mg Intravenous Daily    methylPREDNISolone sodium succinate (Solu-MEDROL) injection 40 mg  40 mg Intravenous Daily    glucose (Dex4) 15 GM/59ML oral liquid 15 g  15 g Oral Q15 Min PRN    Or    glucose (Glutose) 40% oral gel 15 g  15 g Oral Q15 Min PRN    Or    glucose-vitamin C (Dex-4) chewable tab 4 tablet  4 tablet Oral Q15 Min PRN    Or    dextrose 50% injection 50 mL  50 mL Intravenous Q15 Min PRN    Or    glucose (Dex4) 15 GM/59ML oral liquid 30 g  30 g Oral Q15 Min PRN    Or    glucose (Glutose) 40% oral gel 30 g  30 g Oral Q15 Min PRN    Or    glucose-vitamin C  (Dex-4) chewable tab 8 tablet  8 tablet Oral Q15 Min PRN    insulin regular human (Novolin R, Humulin R) 100 UNIT/ML injection 1-5 Units  1-5 Units Subcutaneous 4 times per day    carvedilol (Coreg) tab 3.125 mg  3.125 mg Oral BID with meals    sacubitril-valsartan (Entresto) 24-26 MG per tab 1 tablet  1 tablet Oral BID    budesonide (Pulmicort) 0.5 MG/2ML nebulizer suspension 0.5 mg  0.5 mg Nebulization 2 times daily    arformoterol (Brovana) 15 MCG/2ML nebulizer solution 15 mcg  15 mcg Nebulization 2 times daily    dextrose 10% infusion (TPN no rate)   Intravenous Continuous PRN    sodium bicarbonate tab 650 mg  650 mg Per G Tube PRN    And    pancrelipase (Lip-Prot-Amyl) (Zenpep) DR particles cap 10,000 Units  10,000 Units Per G Tube PRN    acetylcysteine (Mucomyst) 20 % nebulizer solution 2 mL  2 mL Nebulization BID    ampicillin-sulbactam (Unasyn) 1.5 g in sodium chloride 0.9% 100mL IVPB-MAYO  1.5 g Intravenous q12h    DULoxetine (Cymbalta) DR cap 60 mg  60 mg Oral BID    HYDROcodone-acetaminophen (Norco) 5-325 MG per tab 1 tablet  1 tablet Oral Q6H PRN    ondansetron (Zofran) 4 MG/2ML injection 4 mg  4 mg Intravenous Q6H PRN    polyethylene glycol (PEG 3350) (Miralax) 17 g oral packet 17 g  17 g Oral Daily PRN    magnesium hydroxide (Milk of Magnesia) 400 MG/5ML oral suspension 30 mL  30 mL Oral Daily PRN    bisacodyl (Dulcolax) 10 MG rectal suppository 10 mg  10 mg Rectal Daily PRN    fleet enema (Fleet) rectal enema 133 mL  1 enema Rectal Once PRN    heparin (Porcine) 1000 UNIT/ML injection 2,000 Units  2,000 Units Intravenous PRN Dialysis    albuterol (Ventolin) (2.5 MG/3ML) 0.083% nebulizer solution 2.5 mg  2.5 mg Nebulization Q6H PRN    heparin (Porcine) 5000 UNIT/ML injection 5,000 Units  5,000 Units Subcutaneous 2 times per day    acetaminophen (Tylenol) tab 650 mg  650 mg Oral Q6H PRN    aspirin DR tab 81 mg  81 mg Oral Daily    QUEtiapine ER (SEROquel XR) 24 hr tab 300 mg  300 mg Oral Nightly   [2]    Patient Active Problem List  Diagnosis    Closed fracture of proximal end of left humerus, unspecified fracture morphology, initial encounter    Frequent falls    Seizure-like activity (HCC)    Pituitary lesion (HCC)    Major depressive disorder, recurrent episode, moderate (HCC)    Generalized anxiety disorder    Acute renal failure superimposed on chronic kidney disease, unspecified acute renal failure type, unspecified CKD stage    Non-traumatic rhabdomyolysis    Transaminitis

## 2025-06-02 NOTE — PROGRESS NOTES
[facility]  part of [facility]    Nephrology Progress Note    Radha Winters Patient Status:  Inpatient    1960 MRN [id number]   Location [facility] 2W/SW Attending Fanny Majano MD   Hosp Day # 16 PCP JUAN MONTANEZ     Subjective:   Radha Winters is a(n) 64 year old female     No acute events or new issues reported, had HD earlier tolerated it well.      Objective:   Blood pressure 90/56, pulse 88, temperature 96.8 °F (36 °C), resp. rate 18, height 5' 3\" (1.6 m), weight 233 lb 11 oz (106 kg), SpO2 92%.        Results:    Lab Results   Component Value Date    WBC 9.5 2025    HGB 11.5 (L) 2025    HCT 38.0 2025    .0 2025    CREATSERUM 2.81 (H) 2025    BUN 33 (H) 2025     2025    K 3.4 (L) 2025     2025    CO2 30.0 2025     (H) 2025    CA 8.6 (L) 2025    ALB 3.3 2025    ALKPHO 107 2025    BILT 0.2 2025    TP 5.0 (L) 2025    AST 39 (H) 2025    ALT 20 2025    TSH 0.528 10/10/2023    LIP 36 2025    DDIMER 1.77 (H) 2025    MG 2.2 2025    PHOS 4.3 2025    TROPHS 79 (HH) 2025     (H) 2025    B12 581 10/10/2023       XR CHEST AP PORTABLE  (CPT=71045)  Result Date: 2025  CONCLUSION:   Cardiomegaly with bilateral interstitial opacity likely mild edema.  Interval extubation.    Dictated by (CST): Randal Wilkerson MD on 2025 at 9:16 AM     Finalized by (CST): Randal Wilkerson MD on 2025 at 9:17 AM                Assessment & Plan:     Acute renal failure superimposed on chronic kidney disease, unspecified acute renal failure type, unspecified CKD stage        Non-traumatic rhabdomyolysis        Transaminitis    Acute renal failure superimposed on chronic kidney disease, unspecified acute renal failure type, unspecified CKD stage          Non-traumatic rhabdomyolysis     Patient is a 63 y/o  female with PMH of HTN, dyslipidemia, CAD admitted after multiple falls, Nephrology consulted for Acute Kidney Injury      Acute Kidney Injury:  Likely secondary to ATN, from Rhabdomyolysis, continue aggressive volume resuscitation, 0.9  ml/hr, continue to trend BMP, check Uric acid, Phos, Urine studies  CKD stage 3b vs 4:  Secondary to HTN nephrosclerosis, check Phos, PTH, renal US  Transaminitis:  Continue to trend LFTs, will check abdominal US        Recommendations:  Acute Kidney Injury likely unresolved ATN  HD with 2-2.5 liters UF as tolerated  Albumin and Midodine for MAP goal >65        May 23, 2025  Patient is nonoliguric.  Renal function is severely deranged.  Has a tunneled dialysis catheter in place.  Will dialyze as needed for volume control.  Currently patient shows no signs of recovery of kidney function and likely needs supportive care including dialysis as needed.     May 24, 2025  Patient had a rapid response called because of being short of breath and was clinically volume overloaded.  Patient transferred to progressive care unit.  Discussed with hospitalist.  Patient is receiving dialysis with intent to remove 2.2 L of fluid.  Continue supportive care and monitor electrolytes and renal function.     May 25, 2025  Patient is critically ill in ICU.  Today is day 2 of back-to-back dialysis.  Within goal  to remove 2.2 L of fluid in 3-1/2 hours.  Overall patient seems to have improved but still requiring BiPAP.The monitoring electrolyte and renal function.     May 26th 2025  Patient still not improved much with volume status. Plan for dialysis tomorrow with 4hrs and 3 Lit UF. Remains critically ill in ICU.      May 27th 2025  Patient doing better but still requiring high flow oxygen. She remains oligoanuric. Dialysis dependent. Monitor closely. Critically ill in ICU. Total time spent seeing patient was 45 minutes.     May 28, 2025  Patient remains oligoanuric.  Urinary catheter was removed.   Will do periodic bladder scans and place catheter if needed or do bladder voiding protocol.  Discussed with RN.  Patient will also receive hemodialysis today with intent to remove 1.5 L of fluid.  Vent settings are acceptable.  Blood pressure is stable.  Patient did have intubation yesterday because of volume issues and respiratory distress.  Currently remains critically ill in the ICU.     May 29th 2025  Remains intubated, sedated on HD, tolerating it well.     May 30 th, 2025  Intubated, on full vent support, KCL replaced.       May 31th, 2025  Extubated, seen on HD, hemodynamically stable.      June 1st, 2025  On HD, 4 K bath, hemodynamically stable       June 2nd, 2025  Had HD, hemodynamically stable.             Bernardo Lewis MD  6/2/2025

## 2025-06-02 NOTE — PHYSICAL THERAPY NOTE
PHYSICAL THERAPY EVALUATION - INPATIENT     Room Number: 217/217-A  Evaluation Date: 6/2/2025  Type of Evaluation: Re-evaluation   Physician Order: PT Eval and Treat    Presenting Problem: AMS w/multiple falls, rhabdo w/elevated CPK and hypotension  Co-Morbidities : falls, HTN, CAD, hypothyroid, chronic leg weakness  Reason for Therapy: Mobility Dysfunction and Discharge Planning    PHYSICAL THERAPY ASSESSMENT   Patient is a 64 year old female admitted 5/17/2025 and seen today for re-evaluation after complicated hospital course requiring intubation and dialysis, extubated 5/30.  Prior to admission, patient's baseline is living in an apartment alone and independent with all mobility including driving but reported history of frequent falls.  Patient is currently functioning below baseline with bed mobility, transfers, gait, stair negotiation, maintaining seated position, standing prolonged periods, and performing household tasks.  Patient is requiring maximum assist as a result of the following impairments: decreased functional strength, decreased endurance/aerobic capacity, pain, impaired standing balance, impaired motor planning, decreased muscular endurance, medical status, and increased O2 needs from baseline as well as poor insight into deficits.  Physical Therapy will continue to follow for duration of hospitalization.    Patient will benefit from continued skilled PT Services to promote return to prior level of function and safety with continuous assistance and gradual rehabilitative therapy .    PLAN DURING HOSPITALIZATION  Nursing Mobility Recommendation : Lift Equipment  PT Device Recommendation: Other (Comment) (undetermined)  PT Treatment Plan: Bed mobility, Endurance, Energy conservation, Patient education, Gait training, Range of motion, Strengthening, Transfer training, Balance training  Rehab Potential : Fair  Frequency (Obs): 3-5x/week     PHYSICAL THERAPY MEDICAL/SOCIAL HISTORY   History related to  current admission: Presenting Problem: 64-year-old female with a history of hypertension, coronary artery disease, hyperlipidemia, hypothyroidism, and chronic leg weakness who presents with multiple issues. She has a two-year history of recurrent falls and was previously evaluated at [facility], where she was told her falls were related to her sodium levels. The patient experienced a fall 2-3 days ago and presented to the emergency room but refused admission at that time. Laboratory studies then revealed elevated creatinine. Today, she experienced two more falls - initially landing on her back, followed by a second fall. She developed occipital headache and back pain following these events. She denies loss of consciousness, neck pain, or leg pain. Additionally, the patient reports poor oral intake and decreased appetite over the past several days.      Problem List  Principal Problem:    Acute renal failure superimposed on chronic kidney disease, unspecified acute renal failure type, unspecified CKD stage  Active Problems:    Non-traumatic rhabdomyolysis    Transaminitis      HOME SITUATION  Type of Home: Apartment  Home Layout: One level, Ramped entrance, Elevator  Stairs to Enter : 0        Stairs to Bedroom: 0         Lives With: Alone    Drives: Yes   Patient Regularly Uses: Glasses, Rolling walker     Prior Level of Kidder: Living in an apartment alone, driving, shopping and managing  the home. She uses a RW at baseline.    SUBJECTIVE  \"I feel so weak! My legs just shake and I'm afraid I'll fall\"    PHYSICAL THERAPY EXAMINATION   OBJECTIVE  Precautions: Bed/chair alarm, Limb alert - right, Limb alert - left  Fall Risk: High fall risk    WEIGHT BEARING RESTRICTION       PAIN ASSESSMENT  Rating: Unable to rate  Location: sensitivity on her skin all over  Management Techniques: Activity promotion, Repositioning    COGNITION  Overall Cognitive Status:  A&Ox3    RANGE OF MOTION AND STRENGTH  ASSESSMENT  Upper extremity ROM and strength are within functional limits     Lower extremity ROM is within functional limits but tight gastroc/soleus as well as hip flexors    Lower extremity strength is within functional limits but limited endurance, able to lift each leg against gravity in bed    BALANCE  Static Sitting: Good  Dynamic Sitting: Fair +  Static Standing: Poor +  Dynamic Standing: Poor -    ACTIVITY TOLERANCE  Pulse: 100 (with activity; at rest 90)  Heart Rate Source: Monitor  Resp: 23  BP: (!) 88/63 (EOB and then after t/f to chair 94/63 and pt asymptomatic)  BP Location: Left arm  BP Method: Automatic  Patient Position: Sitting    O2 WALK  Oxygen Therapy  SPO2% on Oxygen at Rest: 99  At rest oxygen flow (liters per minute): 4.5  SPO2% Ambulation on Oxygen: 93  Ambulation oxygen flow (liters per minute): 4.5    AM-PAC '6-Clicks' INPATIENT SHORT FORM - BASIC MOBILITY  How much difficulty does the patient currently have...  Patient Difficulty: Turning over in bed (including adjusting bedclothes, sheets and blankets)?: A Little   Patient Difficulty: Sitting down on and standing up from a chair with arms (e.g., wheelchair, bedside commode, etc.): A Lot   Patient Difficulty: Moving from lying on back to sitting on the side of the bed?: A Lot   How much help from another person does the patient currently need...   Help from Another: Moving to and from a bed to a chair (including a wheelchair)?: A Lot   Help from Another: Need to walk in hospital room?: Total   Help from Another: Climbing 3-5 steps with a railing?: Total     AM-PAC Score:  Raw Score: 11   Approx Degree of Impairment: 72.57%   Standardized Score (AM-PAC Scale): 33.86   CMS Modifier (G-Code): CL    FUNCTIONAL ABILITY STATUS  Functional Mobility/Gait Assessment  Gait Assistance: Not tested (tolerated standing 30 seconds x 2 reps and pivot with RW to chair)  Rolling: maximum assist  Supine to Sit: maximum assist  Sit to Supine: maximum  assist  Sit to Stand: maximum assist and second person SBA for safety    Exercise/Education Provided:  Bed mobility  Energy conservation  Functional activity tolerated  Neuromuscular re-educate  Posture  ROM  Strengthening  Transfer training    Skilled Therapy Provided: RN approves participation in therapy session, seen today for re-evaluation after decline in medical status and intubation, extubated 5/30 and cleared to participate in therapy. She presents awake and alert in the bed with mild delay in responses but overall improved. She reports arms and legs fatigue easily today and feel very heavy but she is agreeable to get out of bed. She does have fear of falling with all standing and transfers, unable to walk today but able too stand several times and take steps to the chair. Limited strength and flexibility limit all mobility. She is up in chair with all needs in reach, educated to perform LE AROM in bed and chair, work on weight shifting and deep breathing and call RN staff to mobilize. She may need lift back to bed due to bed height. RN aware.    The patient's Approx Degree of Impairment: 72.57% has been calculated based on documentation in the [facility] '6 clicks' Inpatient Basic Mobility Short Form.  Research supports that patients with this level of impairment may benefit from inpatient rehab and GR/GENE is the recommendation.  Final disposition will be made by interdisciplinary medical team.    Patient End of Session: Up in chair, With  staff, Needs met, Call light within reach, RN aware of session/findings, All patient questions and concerns addressed, Hospital anti-slip socks, Alarm set    CURRENT GOALS  Goals to be met by: 6/20/25  Patient Goal Patient's self-stated goal is: to go to rehab and get independent again   Goal #1 Patient is able to demonstrate supine - sit EOB @ level: minimum assistance     Goal #1   Current Status    Goal #2 Patient is able to demonstrate transfers Sit to/from Stand at  assistance level: minimum assistance with walker - rolling     Goal #2  Current Status    Goal #3 Patient is able to ambulate 15 feet with assist device: walker - rolling at assistance level: moderate assistance of 2 people with chair follow   Goal #3   Current Status    Goal #4 Patient will negotiate bed to/from chair transfers with RW and moderate assistance   Goal #4   Current Status    Goal #5 Patient to demonstrate independence with home activity/exercise instructions provided to patient in preparation for discharge.   Goal #5   Current Status    Goal #6    Goal #6  Current Status      Re-evaluation  Therapeutic Activity:  27 minutes

## 2025-06-02 NOTE — PROGRESS NOTES
Patient seen in follow-up from her last visit now extubated.  No reported chest pain shortness of breath abdominal pain.  No new complaints.  Oxygenating well with normal blood pressure.  Patient seen in ICU with nurse by the bedside  S/P HD      Vitals:    06/02/25 1200   BP: (!) 78/63   Pulse: 69   Resp: 12   Temp: 96.7 °F (35.9 °C)       Intake/Output Summary (Last 24 hours) at 6/2/2025 1313  Last data filed at 6/2/2025 0800  Gross per 24 hour   Intake 1795.1 ml   Output 0 ml   Net 1795.1 ml     Wt Readings from Last 1 Encounters:   06/02/25 233 lb 11 oz (106 kg)        General: No acute distress.  Neck: Jugular venous pulsations not seen.  Lungs: Clear to auscultation.  Heart: Normal rate. No murmurs.  Abdomen: Soft. Non tender  Extremities: No edema.  Neurological:   slow to respond but does answer questions  Psychiatric: Appropriate mood and affect.  Current Facility-Administered Medications   Medication Dose Route Frequency    docusate sodium (Colace) cap 100 mg  100 mg Oral BID    sennosides (Senokot) tab 17.2 mg  17.2 mg Oral BID    ipratropium-albuterol (Duoneb) 0.5-2.5 (3) MG/3ML inhalation solution 3 mL  3 mL Nebulization 2 times daily    midodrine (ProAmatine) tab 10 mg  10 mg Per G Tube TID    busPIRone (Buspar) tab 30 mg  30 mg Per G Tube TID    chlorproMAZINE (Thorazine) tab 25 mg  25 mg Per G Tube QID    diazePAM (Valium) tab 5 mg  5 mg Per G Tube Q8H PRN    QUEtiapine (SEROquel) tab 50 mg  50 mg Per G Tube BID    levothyroxine (Synthroid) tab 150 mcg  150 mcg Per G Tube Before breakfast    famotidine (Pepcid) 20 mg/2mL injection 20 mg  20 mg Intravenous Daily    methylPREDNISolone sodium succinate (Solu-MEDROL) injection 40 mg  40 mg Intravenous Daily    glucose (Dex4) 15 GM/59ML oral liquid 15 g  15 g Oral Q15 Min PRN    Or    glucose (Glutose) 40% oral gel 15 g  15 g Oral Q15 Min PRN    Or    glucose-vitamin C (Dex-4) chewable tab 4 tablet  4 tablet Oral Q15 Min PRN    Or    dextrose 50%  injection 50 mL  50 mL Intravenous Q15 Min PRN    Or    glucose (Dex4) 15 GM/59ML oral liquid 30 g  30 g Oral Q15 Min PRN    Or    glucose (Glutose) 40% oral gel 30 g  30 g Oral Q15 Min PRN    Or    glucose-vitamin C (Dex-4) chewable tab 8 tablet  8 tablet Oral Q15 Min PRN    insulin regular human (Novolin R, Humulin R) 100 UNIT/ML injection 1-5 Units  1-5 Units Subcutaneous 4 times per day    carvedilol (Coreg) tab 3.125 mg  3.125 mg Oral BID with meals    sacubitril-valsartan (Entresto) 24-26 MG per tab 1 tablet  1 tablet Oral BID    budesonide (Pulmicort) 0.5 MG/2ML nebulizer suspension 0.5 mg  0.5 mg Nebulization 2 times daily    arformoterol (Brovana) 15 MCG/2ML nebulizer solution 15 mcg  15 mcg Nebulization 2 times daily    dextrose 10% infusion (TPN no rate)   Intravenous Continuous PRN    sodium bicarbonate tab 650 mg  650 mg Per G Tube PRN    And    pancrelipase (Lip-Prot-Amyl) (Zenpep) DR particles cap 10,000 Units  10,000 Units Per G Tube PRN    acetylcysteine (Mucomyst) 20 % nebulizer solution 2 mL  2 mL Nebulization BID    ampicillin-sulbactam (Unasyn) 1.5 g in sodium chloride 0.9% 100mL IVPB-MAYO  1.5 g Intravenous q12h    DULoxetine (Cymbalta) DR cap 60 mg  60 mg Oral BID    HYDROcodone-acetaminophen (Norco) 5-325 MG per tab 1 tablet  1 tablet Oral Q6H PRN    ondansetron (Zofran) 4 MG/2ML injection 4 mg  4 mg Intravenous Q6H PRN    polyethylene glycol (PEG 3350) (Miralax) 17 g oral packet 17 g  17 g Oral Daily PRN    magnesium hydroxide (Milk of Magnesia) 400 MG/5ML oral suspension 30 mL  30 mL Oral Daily PRN    bisacodyl (Dulcolax) 10 MG rectal suppository 10 mg  10 mg Rectal Daily PRN    fleet enema (Fleet) rectal enema 133 mL  1 enema Rectal Once PRN    heparin (Porcine) 1000 UNIT/ML injection 2,000 Units  2,000 Units Intravenous PRN Dialysis    albuterol (Ventolin) (2.5 MG/3ML) 0.083% nebulizer solution 2.5 mg  2.5 mg Nebulization Q6H PRN    heparin (Porcine) 5000 UNIT/ML injection 5,000 Units   5,000 Units Subcutaneous 2 times per day    acetaminophen (Tylenol) tab 650 mg  650 mg Oral Q6H PRN    aspirin DR tab 81 mg  81 mg Oral Daily    QUEtiapine ER (SEROquel XR) 24 hr tab 300 mg  300 mg Oral Nightly     Medications Prior to Admission   Medication Sig    busPIRone HCl 30 MG Oral Tab Take 1 tablet (30 mg total) by mouth 3 (three) times daily.    chlorproMAZINE 25 MG Oral Tab Take 1 tablet (25 mg total) by mouth 4 (four) times daily.    diazePAM 5 MG Oral Tab Take 1 tablet (5 mg total) by mouth every 8 (eight) hours as needed for Anxiety.    DULoxetine 60 MG Oral Cap DR Particles Take 1 capsule (60 mg total) by mouth 2 (two) times daily.    metFORMIN  MG Oral Tablet 24 Hr Take 2 tablets (1,000 mg total) by mouth daily. (Patient taking differently: Take 2 tablets (1,000 mg total) by mouth 2 (two) times daily with meals.)    metoprolol succinate ER 50 MG Oral Tablet 24 Hr Take 1 tablet (50 mg total) by mouth 2 (two) times daily.    QUEtiapine  MG Oral Tablet 24 Hr Take 1 tablet (300 mg total) by mouth nightly.    QUEtiapine 50 MG Oral Tab Take 1 tablet (50 mg total) by mouth 2 (two) times daily.    rosuvastatin 40 MG Oral Tab Take 1 tablet (40 mg total) by mouth before bedtime.    lidocaine 5 % External Patch Place 1 patch onto the skin daily.    [] ibuprofen 600 MG Oral Tab Take 1 tablet (600 mg total) by mouth every 6 (six) hours as needed.    levothyroxine 150 MCG Oral Tab Take 1 tablet (150 mcg total) by mouth before breakfast. Give on an empty stomach. On Mon, Tue, Wed, Thur, Fri and Saturday    levothyroxine 75 MCG Oral Tab Take 1 tablet (75 mcg total) by mouth before breakfast. On Sundays only    aspirin 81 MG Oral Tab EC Take 1 tablet (81 mg total) by mouth in the morning.    methylPREDNISolone 4 MG Oral Tablet Therapy Pack Take as directed on dose pack instructions (Patient not taking: Reported on 2025)     XR CHEST AP PORTABLE  (CPT=71045)  Result Date: 2025  CONCLUSION:    Cardiomegaly with bilateral interstitial opacity likely mild edema.  Interval extubation.    Dictated by (CST): Randal Wilkerson MD on 6/01/2025 at 9:16 AM     Finalized by (CST): Randal Wilkerson MD on 6/01/2025 at 9:17 AM            Lab Results   Component Value Date    WBC 9.5 06/02/2025    HGB 11.5 06/02/2025    HCT 38.0 06/02/2025    .0 06/02/2025     Assessment / Plan  1. Acute on Chronic Systolic Heart Failure -  Ejection fraction 30-35% with apical wall akinesia. Previous EF 35-40% 2015. Will institute heart failure therapy with Carvedilol 3.125 mg BID and Entresto 24/26 mg BID.            I50.23 Acute on chronic systolic (congestive) heart failure           2. Acute Respiratory Failure -  Currently intubated. Chest x-ray shows right basal opacity consistent with atelectasis versus pneumonia. Evidence of pulmonary edema pattern and possible aspiration pneumonia with mucus plugging. On abx  J96.00 Acute respiratory failure, unspecified whether with hypoxia or hypercapnia        3. Acute on Chronic Kidney Disease -     Creatinine improving.  On HD.           N17.9 Acute kidney failure, unspecified           4. Demand Ischemia -  Troponin mildly elevated at 79, down from 197 four days ago. ECG with sinus tachycardia and occasional PVCs. Findings consistent with demand ischemia.           I24.8 Other forms of acute ischemic heart disease  PLAN:  Continue with combination of carvedilol as well as Entresto.  Getting dialysis and blood pressure borderline on dialysis and therefore we will not add any additional heart failure medications at this time.      D/W patient and nursing staff    Hayes Khan MD  [facility]

## 2025-06-02 NOTE — OCCUPATIONAL THERAPY NOTE
OCCUPATIONAL THERAPY EVALUATION - INPATIENT     Room Number: 217/217-A  Evaluation Date: 6/2/2025  Type of Evaluation: Re-evaluation  Presenting Problem: 64-year-old female with a history of hypertension, coronary artery disease, hyperlipidemia, hypothyroidism, and chronic leg weakness who presents with multiple issues. She has a two-year history of recurrent falls and was previously evaluated at [facility], where she was told her falls were related to her sodium levels. The patient experienced a fall 2-3 days ago and presented to the emergency room but refused admission at that time. Laboratory studies then revealed elevated creatinine. Today, she experienced two more falls - initially landing on her back, followed by a second fall. She developed occipital headache and back pain following these events. She denies loss of consciousness, neck pain, or leg pain. Additionally, the patient reports poor oral intake and decreased appetite over the past several days.    Physician Order: IP Consult to Occupational Therapy  Reason for Therapy: ADL/IADL Dysfunction and Discharge Planning    OCCUPATIONAL THERAPY ASSESSMENT   Patient is a 64 year old female admitted 5/17/2025 and seen this afternoon for a Re Evaluation. Patient is currently functioning below baseline with ADls and functional mobility.  Patient is requiring maximum assist as a result of the following impairments: decreased functional strength, decreased functional reach, decreased endurance, pain, decreased muscular endurance, medical status, increased O2 needs from baseline, and decreased insight to deficits. Occupational Therapy will continue to follow for duration of hospitalization.    Patient will benefit from continued skilled OT Services to promote return to prior level of function and safety with continuous assistance and gradual rehabilitative therapy.    PLAN DURING HOSPITALIZATION  OT Device Recommendations: None  OT Treatment  Plan: Balance activities, Energy conservation/work simplification techniques, ADL training, UE strengthening/ROM, Functional transfer training, Endurance training, Patient/Family education, Patient/Family training, Equipment eval/education, Compensatory technique education     OCCUPATIONAL THERAPY MEDICAL/SOCIAL HISTORY   Problem List  Principal Problem:    Acute renal failure superimposed on chronic kidney disease, unspecified acute renal failure type, unspecified CKD stage  Active Problems:    Non-traumatic rhabdomyolysis    Transaminitis    HOME SITUATION  Type of Home: Apartment  Home Layout: One level; Ramped entrance; Elevator  Lives With: Alone  Toilet and Equipment: Standard height toilet  Shower/Tub and Equipment: Tub transfer bench; Tub-shower combo; Grab bar  Drives: Yes  Patient Regularly Uses: Glasses; Rolling walker    SUBJECTIVE  \"My legs start shaking and I feel like I am going to fall\"    OCCUPATIONAL THERAPY EXAMINATION      OBJECTIVE  Precautions: Bed/chair alarm; Limb alert - right; Limb alert - left  Fall Risk: High fall risk      PAIN ASSESSMENT  Ratin      ACTIVITY TOLERANCE  Pulse: 87     Resp: 19 (33 with activity)  BP:  (sitting EOB: 88/63; post xfer to chair: 94/63)     O2 SATURATIONS  Oxygen Therapy  SPO2% on Oxygen at Rest: 99  At rest oxygen flow (liters per minute): 4.5  SPO2% Ambulation on Oxygen: 93  Ambulation oxygen flow (liters per minute): 4.5    COGNITION  Awake, alert, follows commands    Communication: able to express needs    Behavioral/Emotional/Social: cooperative, somewhat anxious/fearful of falling    RANGE OF MOTION   Upper extremity ROM is within functional limits     STRENGTH ASSESSMENT  Upper extremity strength is within functional limits       ACTIVITIES OF DAILY LIVING ASSESSMENT  AM-PAC ‘6-Clicks’ Inpatient Daily Activity Short Form  How much help from another person does the patient currently need…  -   Putting on and taking off regular lower body clothing?: A  Lot  -   Bathing (including washing, rinsing, drying)?: A Lot  -   Toileting, which includes using toilet, bedpan or urinal? : A Lot  -   Putting on and taking off regular upper body clothing?: A Little  -   Taking care of personal grooming such as brushing teeth?: A Little  -   Eating meals?: None    AM-PAC Score:  Score: 16  Approx Degree of Impairment: 53.32%  Standardized Score (AM-PAC Scale): 35.96  CMS Modifier (G-Code): CK    FUNCTIONAL TRANSFER ASSESSMENT  Sit to Stand: Edge of Bed  Edge of Bed: Moderate Assist    BED MOBILITY  Rolling: Maximum Assist  Supine to Sit : Maximum Assist    FUNCTIONAL ADL ASSESSMENT  Grooming: set up from supported sitting  UE self care: Min A from supported sitting  LE self care: Max A    THERAPEUTIC EXERCISE  Encouraged UE/LE AROM from supported sitting and up for meals with staff assist    Skilled Therapy Provided: Pt is being seen this date for re-evaluation having experienced a decline in medical status requiring intubation since initial eval was completed on 5/18. Pt was extubated on 5/30 and is cleared to participate in OOB.   Pt received reclined in bed having recently completed HD; no visitors present. Pt remains awake and alert. Pt indicates generalized fatigue with LE weakness. Pt demo/expresses some fear of falling with xfer OOB. Pt is an overall Max A for bed mobility, Mod A able to bear weight with support of R/W and navigate several small steps to bed side chair with Mod A x2. Pt with limited functional reach d/t body habitus and lack of flexibility which limits performance in LE self care.     EDUCATION PROVIDED  Patient Education : Role of Occupational Therapy; Functional Transfer Techniques; Fall Prevention (activity promotion, HEP for JENA UE/LE AROM)  Patient's Response to Education: Verbalized Understanding; Does Not Demonstrate Skills Needed for Learning    The patient's Approx Degree of Impairment: 53.32% has been calculated based on documentation in the  [facility] '6 clicks' Inpatient Daily Activity Short Form.  Research supports that patients with this level of impairment may benefit from GENE.  Final disposition will be made by interdisciplinary medical team.     Patient End of Session: Up in chair, Call light within reach, All patient questions and concerns addressed, Hospital anti-slip socks    OT Goals  Patients self stated goal is: regain strength     Patient will complete functional transfer with Min A  Comment:     Patient will complete toileting with Min A  Comment:     Patient will tolerate standing for 3 minutes in prep for adls with CGA   Comment:    Patient will complete LE dressing using LHAE as indicated with Ren  Comment:          Goals  on: 25  Frequency: 3-5x/week    Self-Care Home Management: 35 minutes

## 2025-06-02 NOTE — PROGRESS NOTES
[facility]  part of [facility]    Progress Note    Radha Winters Patient Status:  Inpatient    1960 MRN [id number]   Location [facility] 2W/SW Attending Fanny Majano MD   Hosp Day # 16 PCP JUAN MONTANEZ       Subjective:   Subjective:  Patient was seen and examined  Comfortable through the of oxygen  Tolerating diet  Receiving hemodialysis earlier  No abdominal pain  Minimal cough with no fever  Objective:   Blood pressure 90/56, pulse 88, temperature 96.7 °F (35.9 °C), temperature source Temporal, resp. rate 18, height 5' 3\" (1.6 m), weight 233 lb 11 oz (106 kg), SpO2 92%.  Physical Exam  Constitutional:       General: She is not in acute distress.     Appearance: She is ill-appearing.   HENT:      Head: Atraumatic.   Eyes:      General: No scleral icterus.  Cardiovascular:      Rate and Rhythm: Normal rate.      Heart sounds:      No gallop.   Pulmonary:      Comments: Distant breath sounds  No wheezes or rales or rhonchi  Abdominal:      General: Abdomen is flat. Bowel sounds are normal.      Palpations: Abdomen is soft.      Tenderness: There is no guarding.   Musculoskeletal:      Cervical back: Normal range of motion.      Right lower leg: No edema.      Left lower leg: No edema.   Skin:     General: Skin is dry.   Neurological:      General: No focal deficit present.      Mental Status: She is oriented to person, place, and time.         Results:   Lab Results   Component Value Date    WBC 9.5 2025    HGB 11.5 (L) 2025    HCT 38.0 2025    .0 2025    CREATSERUM 2.81 (H) 2025    BUN 33 (H) 2025     2025    K 3.4 (L) 2025     2025    CO2 30.0 2025     (H) 2025    CA 8.6 (L) 2025    ALB 3.3 2025    ALKPHO 107 2025    BILT 0.2 2025    TP 5.0 (L) 2025    AST 39 (H) 2025    ALT 20 2025    TSH 0.528 10/10/2023    LIP 36 2025     DDIMER 1.77 (H) 05/24/2025    MG 2.2 05/28/2025    PHOS 4.3 05/28/2025    TROPHS 79 (HH) 05/28/2025     (H) 06/01/2025    B12 581 10/10/2023       XR CHEST AP PORTABLE  (CPT=71045)  Result Date: 6/1/2025  CONCLUSION:   Cardiomegaly with bilateral interstitial opacity likely mild edema.  Interval extubation.    Dictated by (CST): Randal Wilkerson MD on 6/01/2025 at 9:16 AM     Finalized by (CST): Randal Wilkerson MD on 6/01/2025 at 9:17 AM                Assessment & Plan:       1-acute respiratory failure with hypoxia , multifactorial  - Aspiration pneumonia  - Pulmonary edema  - COPD with acute exacerbation ( extensive history of smoking )  - Obesity and CRUZITO  - Generalized weakness and fatigue and recurrent falls     Failed BiPAP and Airvo and intubated 5/27/25  Extubated 5/30/25   On 3 L NC      Completed course of antibiotics with Unasyn / will stop today   Taper Steroid and bronchodilator and nebulizers  O2 therapy   Aspiration precautions   Pt/ot     2-acute on chronic kidney injury /anuric   Rhabdomyolysis , and now anuric   On hemodialysis    Per Renal      3-h/o HFrEF / LVEF 35%     4-recurrent falls and admitted with rhabdomyolysis  Pt/ot   Will need rehab      5-DVT prophylaxis  Heparin subcu     6-depression  Cymbalta and Seroquel                  Jonathan Casillas MD  6/2/2025

## 2025-06-02 NOTE — PLAN OF CARE
Problem: PAIN - ADULT  Goal: Verbalizes/displays adequate comfort level or patient's stated pain goal  Description: INTERVENTIONS:  - Encourage pt to monitor pain and request assistance  - Assess pain using appropriate pain scale  - Administer analgesics based on type and severity of pain and evaluate response  - Implement non-pharmacological measures as appropriate and evaluate response  - Consider cultural and social influences on pain and pain management  - Manage/alleviate anxiety  - Utilize distraction and/or relaxation techniques  - Monitor for opioid side effects  - Notify MD/LIP if interventions unsuccessful or patient reports new pain  - Anticipate increased pain with activity and pre-medicate as appropriate  Outcome: Progressing     Problem: RESPIRATORY - ADULT  Goal: Achieves optimal ventilation and oxygenation  Description: INTERVENTIONS:  - Assess for changes in respiratory status  - Assess for changes in mentation and behavior  - Position to facilitate oxygenation and minimize respiratory effort  - Oxygen supplementation based on oxygen saturation or ABGs  - Provide Smoking Cessation handout, if applicable  - Encourage broncho-pulmonary hygiene including cough, deep breathe, Incentive Spirometry  - Assess the need for suctioning and perform as needed  - Assess and instruct to report SOB or any respiratory difficulty  - Respiratory Therapy support as indicated  - Manage/alleviate anxiety  - Monitor for signs/symptoms of CO2 retention  Outcome: Progressing     Problem: GENITOURINARY - ADULT  Goal: Absence of urinary retention  Description: INTERVENTIONS:  - Assess patient’s ability to void and empty bladder  - Monitor intake/output and perform bladder scan as needed  - Follow urinary retention protocol/standard of care  - Consider collaborating with pharmacy to review patient's medication profile  - Implement strategies to promote bladder emptying  Outcome: Progressing

## 2025-06-02 NOTE — CM/SW NOTE
Per protocol order received to check cost on entresto. CM spoke to Leatha at [facility] ([phone]) that medication was e prescribed to. Per Leatha, no cost to patient for entresto.     Patient discussed during nursing rounds. Patient will need insurance auth for GENE. PT/OT tried to work with patient this AM for updated notes but patient receiving HD. CM discussed with RN that updated notes are needed for auth. RN confirms she will follow up with therapy.     CM/SW to follow up with patient on GENE choice.     Addendum 1545: CM followed up with patient at bedside. Patient alert and oriented during discussion. Pt provided choice for GENE: [facility]. CM offered to call family/friends to provide update and patient politely declines. BTL reserved in aidin and requested facility start HD approval. Per RN, pt likely ready for dc in next few days. CM requested [facility] to start evicore auth, OT note is in- still need PT note.     Ashlyn Jacobsen, RN, BSN

## 2025-06-03 LAB
ANION GAP SERPL CALC-SCNC: 9 MMOL/L (ref 0–18)
BASOPHILS # BLD AUTO: 0.09 X10(3) UL (ref 0–0.2)
BASOPHILS NFR BLD AUTO: 0.8 %
BUN BLD-MCNC: 25 MG/DL (ref 9–23)
BUN/CREAT SERPL: 10 (ref 10–20)
CALCIUM BLD-MCNC: 8.7 MG/DL (ref 8.7–10.4)
CHLORIDE SERPL-SCNC: 103 MMOL/L (ref 98–112)
CO2 SERPL-SCNC: 26 MMOL/L (ref 21–32)
CREAT BLD-MCNC: 2.5 MG/DL (ref 0.55–1.02)
DEPRECATED RDW RBC AUTO: 61.5 FL (ref 35.1–46.3)
EGFRCR SERPLBLD CKD-EPI 2021: 21 ML/MIN/1.73M2 (ref 60–?)
EOSINOPHIL # BLD AUTO: 0.2 X10(3) UL (ref 0–0.7)
EOSINOPHIL NFR BLD AUTO: 1.8 %
ERYTHROCYTE [DISTWIDTH] IN BLOOD BY AUTOMATED COUNT: 17.2 % (ref 11–15)
GLUCOSE BLD-MCNC: 116 MG/DL (ref 70–99)
GLUCOSE BLDC GLUCOMTR-MCNC: 125 MG/DL (ref 70–99)
GLUCOSE BLDC GLUCOMTR-MCNC: 139 MG/DL (ref 70–99)
GLUCOSE BLDC GLUCOMTR-MCNC: 144 MG/DL (ref 70–99)
GLUCOSE BLDC GLUCOMTR-MCNC: 182 MG/DL (ref 70–99)
HCT VFR BLD AUTO: 36.9 % (ref 35–48)
HGB BLD-MCNC: 11.3 G/DL (ref 12–16)
IMM GRANULOCYTES # BLD AUTO: 0.19 X10(3) UL (ref 0–1)
IMM GRANULOCYTES NFR BLD: 1.7 %
LYMPHOCYTES # BLD AUTO: 1.48 X10(3) UL (ref 1–4)
LYMPHOCYTES NFR BLD AUTO: 13.6 %
MCH RBC QN AUTO: 30 PG (ref 26–34)
MCHC RBC AUTO-ENTMCNC: 30.6 G/DL (ref 31–37)
MCV RBC AUTO: 97.9 FL (ref 80–100)
MONOCYTES # BLD AUTO: 0.56 X10(3) UL (ref 0.1–1)
MONOCYTES NFR BLD AUTO: 5.2 %
NEUTROPHILS # BLD AUTO: 8.35 X10 (3) UL (ref 1.5–7.7)
NEUTROPHILS # BLD AUTO: 8.35 X10(3) UL (ref 1.5–7.7)
NEUTROPHILS NFR BLD AUTO: 76.9 %
OSMOLALITY SERPL CALC.SUM OF ELEC: 291 MOSM/KG (ref 275–295)
PLATELET # BLD AUTO: 164 10(3)UL (ref 150–450)
POTASSIUM SERPL-SCNC: 4.5 MMOL/L (ref 3.5–5.1)
RBC # BLD AUTO: 3.77 X10(6)UL (ref 3.8–5.3)
SODIUM SERPL-SCNC: 138 MMOL/L (ref 136–145)
WBC # BLD AUTO: 10.9 X10(3) UL (ref 4–11)

## 2025-06-03 PROCEDURE — 99233 SBSQ HOSP IP/OBS HIGH 50: CPT | Performed by: INTERNAL MEDICINE

## 2025-06-03 RX ORDER — MIDODRINE HYDROCHLORIDE 5 MG/1
5 TABLET ORAL
Status: COMPLETED | OUTPATIENT
Start: 2025-06-03 | End: 2025-06-04

## 2025-06-03 RX ORDER — PREDNISONE 10 MG/1
10 TABLET ORAL
Status: DISCONTINUED | OUTPATIENT
Start: 2025-06-04 | End: 2025-06-04

## 2025-06-03 NOTE — PLAN OF CARE
Problem: Patient Centered Care  Goal: Patient preferences are identified and integrated in the patient's plan of care  Description: Interventions:  - What would you like us to know as we care for you? From home alone   - Provide timely, complete, and accurate information to patient/family  - Incorporate patient and family knowledge, values, beliefs, and cultural backgrounds into the planning and delivery of care  - Encourage patient/family to participate in care and decision-making at the level they choose  - Honor patient and family perspectives and choices  6/2/2025 2331 by Blake Clay  Outcome: Progressing  6/2/2025 2330 by Blake Clay  Outcome: Progressing     Problem: PAIN - ADULT  Goal: Verbalizes/displays adequate comfort level or patient's stated pain goal  Description: INTERVENTIONS:  - Encourage pt to monitor pain and request assistance  - Assess pain using appropriate pain scale  - Administer analgesics based on type and severity of pain and evaluate response  - Implement non-pharmacological measures as appropriate and evaluate response  - Consider cultural and social influences on pain and pain management  - Manage/alleviate anxiety  - Utilize distraction and/or relaxation techniques  - Monitor for opioid side effects  - Notify MD/LIP if interventions unsuccessful or patient reports new pain  - Anticipate increased pain with activity and pre-medicate as appropriate  6/2/2025 2331 by Blake Clay  Outcome: Progressing  6/2/2025 2330 by Blake Clay  Outcome: Progressing     Problem: SAFETY ADULT - FALL  Goal: Free from fall injury  Description: INTERVENTIONS:  - Assess pt frequently for physical needs  - Identify cognitive and physical deficits and behaviors that affect risk of falls.  - [place] fall precautions as indicated by assessment.  - Educate pt/family on patient safety including physical limitations  - Instruct pt to call for assistance with activity based on assessment  -  Modify environment to reduce risk of injury  - Provide assistive devices as appropriate  - Consider OT/PT consult to assist with strengthening/mobility  - Encourage toileting schedule  6/2/2025 2331 by Blake Clay  Outcome: Progressing  6/2/2025 2330 by lBake Clay  Outcome: Progressing     Problem: DISCHARGE PLANNING  Goal: Discharge to home or other facility with appropriate resources  Description: INTERVENTIONS:  - Identify barriers to discharge w/pt and caregiver  - Include patient/family/discharge partner in discharge planning  - Arrange for needed discharge resources and transportation as appropriate  - Identify discharge learning needs (meds, wound care, etc)  - Arrange for interpreters to assist at discharge as needed  - Consider post-discharge preferences of patient/family/discharge partner  - Complete POLST form as appropriate  - Assess patient's ability to be responsible for managing their own health  - Refer to Case Management Department for coordinating discharge planning if the patient needs post-hospital services based on physician/LIP order or complex needs related to functional status, cognitive ability or social support system  6/2/2025 2331 by Blake Clay  Outcome: Progressing  6/2/2025 2330 by Blake Clay  Outcome: Progressing     Problem: RISK FOR INFECTION - ADULT  Goal: Absence of fever/infection during anticipated neutropenic period  Description: INTERVENTIONS  - Monitor WBC  - Administer growth factors as ordered  - Implement neutropenic guidelines  Outcome: Progressing     Problem: SKIN/TISSUE INTEGRITY - ADULT  Goal: Skin integrity remains intact  Description: INTERVENTIONS  - Assess and document risk factors for pressure ulcer development  - Assess and document skin integrity  - Monitor for areas of redness and/or skin breakdown  - Initiate interventions, skin care algorithm/standards of care as needed  6/2/2025 2331 by Blake Clay  Outcome: Progressing  6/2/2025  2330 by Blake Clay  Outcome: Progressing     Problem: RESPIRATORY - ADULT  Goal: Achieves optimal ventilation and oxygenation  Description: INTERVENTIONS:  - Assess for changes in respiratory status  - Assess for changes in mentation and behavior  - Position to facilitate oxygenation and minimize respiratory effort  - Oxygen supplementation based on oxygen saturation or ABGs  - Provide Smoking Cessation handout, if applicable  - Encourage broncho-pulmonary hygiene including cough, deep breathe, Incentive Spirometry  - Assess the need for suctioning and perform as needed  - Assess and instruct to report SOB or any respiratory difficulty  - Respiratory Therapy support as indicated  - Manage/alleviate anxiety  - Monitor for signs/symptoms of CO2 retention  6/2/2025 2331 by Blake Clay  Outcome: Progressing  6/2/2025 2330 by Blake Clay  Outcome: Progressing     Problem: GENITOURINARY - ADULT  Goal: Absence of urinary retention  Description: INTERVENTIONS:  - Assess patient’s ability to void and empty bladder  - Monitor intake/output and perform bladder scan as needed  - Follow urinary retention protocol/standard of care  - Consider collaborating with pharmacy to review patient's medication profile  - Implement strategies to promote bladder emptying  6/2/2025 2331 by Blake Clay  Outcome: Progressing  6/2/2025 2330 by Blake Clay  Outcome: Progressing     Problem: METABOLIC/FLUID AND ELECTROLYTES - ADULT  Goal: Glucose maintained within prescribed range  Description: INTERVENTIONS:  - Monitor Blood Glucose as ordered  - Assess for signs and symptoms of hyperglycemia and hypoglycemia  - Administer ordered medications to maintain glucose within target range  - Assess barriers to adequate nutritional intake and initiate nutrition consult as needed  - Instruct patient on self management of diabetes  6/2/2025 2331 by Blake Clay  Outcome: Progressing  6/2/2025 2330 by Blake Clay  Outcome:  Progressing  Goal: Electrolytes maintained within normal limits  Description: INTERVENTIONS:  - Monitor labs and rhythm and assess patient for signs and symptoms of electrolyte imbalances  - Administer electrolyte replacement as ordered  - Monitor response to electrolyte replacements, including rhythm and repeat lab results as appropriate  - Fluid restriction as ordered  - Instruct patient on fluid and nutrition restrictions as appropriate  6/2/2025 2331 by Blake Clay  Outcome: Progressing  6/2/2025 2330 by Blake Clay  Outcome: Progressing  Goal: Hemodynamic stability and optimal renal function maintained  Description: INTERVENTIONS:  - Monitor labs and assess for signs and symptoms of volume excess or deficit  - Monitor intake, output and patient weight  - Monitor urine specific gravity, serum osmolarity and serum sodium as indicated or ordered  - Monitor response to interventions for patient's volume status, including labs, urine output, blood pressure (other measures as available)  - Encourage oral intake as appropriate  - Instruct patient on fluid and nutrition restrictions as appropriate  6/2/2025 2331 by Blake Clay  Outcome: Progressing  6/2/2025 2330 by Blake Clay  Outcome: Progressing     Problem: HEMATOLOGIC - ADULT  Goal: Free from bleeding injury  Description: (Example usage: patient with low platelets)  INTERVENTIONS:  - Avoid intramuscular injections, enemas and rectal medication administration  - Ensure safe mobilization of patient  - Hold pressure on venipuncture sites to achieve adequate hemostasis  - Assess for signs and symptoms of internal bleeding  - Monitor lab trends  - Patient is to report abnormal signs of bleeding to staff  - Avoid use of toothpicks and dental floss  - Use electric shaver for shaving  - Use soft bristle tooth brush  - Limit straining and forceful nose blowing  6/2/2025 2331 by Blake Clay  Outcome: Progressing  6/2/2025 2330 by Danna  Blake  Outcome: Progressing  Goal: Maintains hematologic stability  Description: INTERVENTIONS  - Assess for signs and symptoms of bleeding or hemorrhage  - Monitor labs and vital signs for trends  - Administer supportive blood products/factors, fluids and medications as ordered and appropriate  - Administer supportive blood products/factors as ordered and appropriate  6/2/2025 2331 by Blake Clay  Outcome: Progressing  6/2/2025 2330 by Blake Clay  Outcome: Progressing  Goal: Free from bleeding injury  Description: (Example usage: patient with low platelets)  INTERVENTIONS:  - Avoid intramuscular injections, enemas and rectal medication administration  - Ensure safe mobilization of patient  - Hold pressure on venipuncture sites to achieve adequate hemostasis  - Assess for signs and symptoms of internal bleeding  - Monitor lab trends  - Patient is to report abnormal signs of bleeding to staff  - Avoid use of toothpicks and dental floss  - Use electric shaver for shaving  - Use soft bristle tooth brush  - Limit straining and forceful nose blowing  6/2/2025 2331 by Blake Clay  Outcome: Progressing  6/2/2025 2330 by Blake Clay  Outcome: Progressing     Problem: GASTROINTESTINAL - ADULT  Goal: Minimal or absence of nausea and vomiting  Description: INTERVENTIONS:  - Maintain adequate hydration with IV or PO as ordered and tolerated  - Nasogastric tube to low intermittent suction as ordered  - Evaluate effectiveness of ordered antiemetic medications  - Provide nonpharmacologic comfort measures as appropriate  - Advance diet as tolerated, if ordered  - Obtain nutritional consult as needed  - Evaluate fluid balance  6/2/2025 2331 by Blake Clay  Outcome: Progressing  6/2/2025 2330 by Blake Clay  Outcome: Progressing  Goal: Maintains or returns to baseline bowel function  Description: INTERVENTIONS:  - Assess bowel function  - Maintain adequate hydration with IV or PO as ordered and  tolerated  - Evaluate effectiveness of GI medications  - Encourage mobilization and activity  - Obtain nutritional consult as needed  - Establish a toileting routine/schedule  - Consider collaborating with pharmacy to review patient's medication profile  6/2/2025 2331 by Blake Clay  Outcome: Progressing  6/2/2025 2330 by Blake Clay  Outcome: Progressing     Problem: Diabetes/Glucose Control  Goal: Glucose maintained within prescribed range  Description: INTERVENTIONS:  - Monitor Blood Glucose as ordered  - Assess for signs and symptoms of hyperglycemia and hypoglycemia  - Administer ordered medications to maintain glucose within target range  - Assess barriers to adequate nutritional intake and initiate nutrition consult as needed  - Instruct patient on self management of diabetes  6/2/2025 2331 by Blake Clay  Outcome: Progressing  6/2/2025 2330 by Blake Clay  Outcome: Progressing     Problem: Delirium  Goal: Minimize duration of delirium  Description: Interventions:  - Encourage use of hearing aids, eye glasses  - Promote highest level of mobility daily  - Provide frequent reorientation  - Promote wakefulness i.e. lights on, blinds open  - Promote sleep, encourage patient's normal rest cycle i.e. lights off, TV off, minimize noise and interruptions  - Encourage family to assist in orientation and promotion of home routines  6/2/2025 2331 by Blake Clay  Outcome: Progressing  6/2/2025 2330 by Blake Clay  Outcome: Progressing     Problem: Patient/Family Goals  Goal: Patient/Family Long Term Goal  Description: Patient's Long Term Goal: To discharge from hospital     Interventions:  -  Monitor vital signs   - Monitor appropriate labs   - Monitor blood glucose levels   - Pain management   - Administer medications per order   - Follow MD orders   - Diagnostics per order   - Update/inform patient and family on plan of care   - Discharge planning    - See additional Care Plan goals for  specific interventions  Outcome: Progressing  Goal: Patient/Family Short Term Goal  Description: Patient's Short Term Goal: To feel better    Interventions:   - Monitor vital signs   - Monitor appropriate labs   - Monitor blood glucose levels   - Pain management   - Administer medications per order   - Follow MD orders   - Diagnostics per order   - Update/inform patient and family on plan of care   - See additional Care Plan goals for specific interventions  Outcome: Progressing     Monitoring vital signs, stable at this time. No acute changes at this moment.  Monitoring blood glucose levels. Fall precautions in place- bed alarm on, bed locked in lowest position, call light within reach. Frequent rounding by nursing staff.

## 2025-06-03 NOTE — SLP NOTE
SPEECH DAILY NOTE - INPATIENT    ASSESSMENT & PLAN   ASSESSMENT  PPE REQUIRED. THIS THERAPIST WORE GLOVES, DROPLET MASK, AND GOGGLES FOR DURATION OF EVALUATION. HANDS WASHED UPON ENTRANCE/EXIT.    SLP f/u for ongoing dysphagia tx/meal assessment per recommendations of bite sized/thin per BSE. RN reports pt tolerates diet and medication well with no overt clinical s/s aspiration. Pt denies any swallowing challenges. It should be noted that initial diet order was placed as EASY TO CHEW instead of BITE SIZED.      Pt positioned upright in bed. Pt afebrile, tolerating 3L/Min O2NC with oxygen status 93 prior to the start of oral trials. SLP reviewed aspiration precautions and safe swallowing compensatory strategies with the patient. Safe swallow guidelines remain written on the white board in purple. Diet recommendations remain on the whiteboard in the room. Patient v/u. Provided minimal assistance, pt tolerates easy to chew and thin liquids via open cup with no overt clinical signs/symptoms of aspiration. Pt with prolonged mastication due to being edentulous, however, no CSA. Pt with poor intake and does not desire to downgrade diet. Oxygen status remained >92 t/o the entire session. Respiratory Rate WFL. Oral/buccal cavities clear of residue following all trials.      PLAN: SLP to f/u x1 meal assessment, monitor CXR, and VFSS if clinically warranted.     Diet Recommendations - Solids: Soft/ Easy to chew  Diet Recommendations - Liquids: Thin Liquids    Compensatory Strategies Recommended: Alternate consistencies  Aspiration Precautions: Upright position, Slow rate, Small bites, Small sips, No straw  Medication Administration Recommendations: Crushed in puree, Whole in puree    Patient Experiencing Pain: No      Treatment Plan  Treatment Plan/Recommendations: Aspiration precautions    Interdisciplinary Communication: Discussed with RN  Recommendations posted at bedside            GOALS  Goal #1 The patient will tolerate  ETC consistency and thin liquids without overt signs or symptoms of aspiration with 90 % accuracy over 1 session(s). Goal modified 6/3   Goal #2 The patient/family/caregiver will demonstrate understanding and implementation of aspiration precautions and swallow strategies independently over 2 session(s).    In Progress   Goal #3 The patient will tolerate trial upgrade of easy to chew/regular consistency without overt signs or symptoms of aspiration with 90 % accuracy over 1 session(s). Goal met 6/4     FOLLOW UP  Follow Up Needed (Documentation Required): Yes  SLP Follow-up Date: 06/04/25  Duration: 1 week    Session: 1    If you have any questions, please contact VANESSA Strange M.S. CCC-SLP  Speech Language Pathologist  Phone Number [phone]

## 2025-06-03 NOTE — PROGRESS NOTES
[facility]  part of [facility]    Progress Note    Radha Winters Patient Status:  Inpatient    1960 MRN [id number]   Location [facility] 2W/SW Attending Fanny Majano MD   Hosp Day # 17 PCP JUAN MONTANEZ       Subjective:   Subjective:  Patient was seen and examined  Comfortable on 3 L of oxygen  Tolerating diet  No abdominal pain  No fever or chills  Feeling better overall and she was up to the chair for few hours yesterday  Objective:   Blood pressure 92/54, pulse 83, temperature 97.1 °F (36.2 °C), temperature source Temporal, resp. rate 14, height 5' 3\" (1.6 m), weight 233 lb 11 oz (106 kg), SpO2 91%.  Physical Exam  Constitutional:       General: She is not in acute distress.     Appearance: She is ill-appearing.   HENT:      Head: Atraumatic.      Mouth/Throat:      Mouth: Mucous membranes are moist.   Eyes:      General: No scleral icterus.  Cardiovascular:      Rate and Rhythm: Normal rate.      Heart sounds:      No gallop.   Pulmonary:      Effort: No respiratory distress.      Breath sounds: No stridor. No wheezing, rhonchi or rales.   Abdominal:      General: Abdomen is flat. Bowel sounds are normal.      Palpations: Abdomen is soft.      Tenderness: There is no guarding.   Musculoskeletal:      Cervical back: Normal range of motion.      Right lower leg: No edema.      Left lower leg: No edema.   Skin:     General: Skin is dry.   Neurological:      Mental Status: She is oriented to person, place, and time.         Results:   Lab Results   Component Value Date    WBC 10.9 2025    HGB 11.3 (L) 2025    HCT 36.9 2025    .0 2025    CREATSERUM 2.50 (H) 2025    BUN 25 (H) 2025     2025    K 4.5 2025     2025    CO2 26.0 2025     (H) 2025    CA 8.7 2025    ALB 3.3 2025    ALKPHO 107 2025    BILT 0.2 2025    TP 5.0 (L) 2025    AST 39 (H) 2025     ALT 20 06/01/2025    TSH 0.528 10/10/2023    LIP 36 05/14/2025    DDIMER 1.77 (H) 05/24/2025    MG 2.2 05/28/2025    PHOS 4.3 05/28/2025    TROPHS 79 (HH) 05/28/2025     (H) 06/01/2025    B12 581 10/10/2023           Assessment & Plan:      1-acute respiratory failure with hypoxia , multifactorial  - Aspiration pneumonia  - Pulmonary edema  - COPD with acute exacerbation ( extensive history of smoking )  - Obesity and CRUZITO  - Generalized weakness and fatigue and recurrent falls     intubated 5/27/25  Extubated 5/30/25 and tolerated well   Better overall   On 3 L NC      Completed course of antibiotics with Unasyn   Taper Steroid and bronchodilator and nebulizers  O2 therapy   Aspiration precautions   Pt/ot     2-acute on chronic kidney injury /anuric   Rhabdomyolysis , and now anuric   On hemodialysis    Per Renal      3-h/o HFrEF / LVEF 35%     4-recurrent falls and admitted with rhabdomyolysis  Pt/ot   Will need rehab      5-DVT prophylaxis  Heparin subcu     6-depression  Cymbalta and Seroquel    Okay to transfer to medical floor  D/w staff                   Jonathan Casillas MD  6/3/2025

## 2025-06-03 NOTE — PROGRESS NOTES
[facility]  part of [facility]    Nephrology Progress Note    Radha Winters Patient Status:  Inpatient    1960 MRN [id number]   Location [facility] 2W/SW Attending Fanny Majano MD   Hosp Day # 17 PCP JUAN MONTANEZ     Subjective:   Radha Winters is a(n) 64 year old female     Seen and examined, while in the ICU, hemodynamically stable, on NC.    Objective:   Blood pressure 118/56, pulse 87, temperature 97.4 °F (36.3 °C), temperature source Temporal, resp. rate 14, height 5' 3\" (1.6 m), weight 233 lb 11 oz (106 kg), SpO2 93%.        Results:    Lab Results   Component Value Date    WBC 10.9 2025    HGB 11.3 (L) 2025    HCT 36.9 2025    .0 2025    CREATSERUM 2.50 (H) 2025    BUN 25 (H) 2025     2025    K 4.5 2025     2025    CO2 26.0 2025     (H) 2025    CA 8.7 2025    ALB 3.3 2025    ALKPHO 107 2025    BILT 0.2 2025    TP 5.0 (L) 2025    AST 39 (H) 2025    ALT 20 2025    TSH 0.528 10/10/2023    LIP 36 2025    DDIMER 1.77 (H) 2025    MG 2.2 2025    PHOS 4.3 2025    TROPHS 79 (HH) 2025     (H) 2025    B12 581 10/10/2023       No results found.        Assessment & Plan:     Acute renal failure superimposed on chronic kidney disease, unspecified acute renal failure type, unspecified CKD stage        Non-traumatic rhabdomyolysis        Transaminitis  Acute renal failure superimposed on chronic kidney disease, unspecified acute renal failure type, unspecified CKD stage          Non-traumatic rhabdomyolysis          Transaminitis     Acute renal failure superimposed on chronic kidney disease, unspecified acute renal failure type, unspecified CKD stage          Non-traumatic rhabdomyolysis     Patient is a 65 y/o female with PMH of HTN, dyslipidemia, CAD admitted after multiple falls, Nephrology consulted  for Acute Kidney Injury      Acute Kidney Injury:  Likely secondary to ATN, from Rhabdomyolysis, continue aggressive volume resuscitation, 0.9  ml/hr, continue to trend BMP, check Uric acid, Phos, Urine studies  CKD stage 3b vs 4:  Secondary to HTN nephrosclerosis, check Phos, PTH, renal US  Transaminitis:  Continue to trend LFTs, will check abdominal US        Recommendations:  Acute Kidney Injury likely unresolved ATN  HD with 2-2.5 liters UF as tolerated  Albumin and Midodine for MAP goal >65        May 23, 2025  Patient is nonoliguric.  Renal function is severely deranged.  Has a tunneled dialysis catheter in place.  Will dialyze as needed for volume control.  Currently patient shows no signs of recovery of kidney function and likely needs supportive care including dialysis as needed.     May 24, 2025  Patient had a rapid response called because of being short of breath and was clinically volume overloaded.  Patient transferred to progressive care unit.  Discussed with hospitalist.  Patient is receiving dialysis with intent to remove 2.2 L of fluid.  Continue supportive care and monitor electrolytes and renal function.     May 25, 2025  Patient is critically ill in ICU.  Today is day 2 of back-to-back dialysis.  Within goal  to remove 2.2 L of fluid in 3-1/2 hours.  Overall patient seems to have improved but still requiring BiPAP.The monitoring electrolyte and renal function.     May 26th 2025  Patient still not improved much with volume status. Plan for dialysis tomorrow with 4hrs and 3 Lit UF. Remains critically ill in ICU.      May 27th 2025  Patient doing better but still requiring high flow oxygen. She remains oligoanuric. Dialysis dependent. Monitor closely. Critically ill in ICU. Total time spent seeing patient was 45 minutes.     May 28, 2025  Patient remains oligoanuric.  Urinary catheter was removed.  Will do periodic bladder scans and place catheter if needed or do bladder voiding protocol.   Discussed with RN.  Patient will also receive hemodialysis today with intent to remove 1.5 L of fluid.  Vent settings are acceptable.  Blood pressure is stable.  Patient did have intubation yesterday because of volume issues and respiratory distress.  Currently remains critically ill in the ICU.     May 29th 2025  Remains intubated, sedated on HD, tolerating it well.     May 30 th, 2025  Intubated, on full vent support, KCL replaced.       May 31th, 2025  Extubated, seen on HD, hemodynamically stable.      June 1st, 2025  On HD, 4 K bath, hemodynamically stable        June 2nd, 2025  Had HD, hemodynamically stable.        June 3rd, 2025  Seen and examined, hemodynamically stable, plan for HD tomorrow.        Bernardo Lewis MD  6/3/2025

## 2025-06-03 NOTE — PROGRESS NOTES
Patient seen in follow-up from her last visit now extubated.  No reported chest pain shortness of breath abdominal pain.  No new complaints.  Oxygenating well with normal blood pressure.  Patient seen in ICU with nurse by the bedside  S/P HD      Vitals:    06/03/25 0528   BP: 118/56   Pulse: 87   Resp: 14   Temp: 97.4 °F (36.3 °C)       Intake/Output Summary (Last 24 hours) at 6/3/2025 1058  Last data filed at 6/3/2025 0529  Gross per 24 hour   Intake 130 ml   Output 2700 ml   Net -2570 ml     Wt Readings from Last 1 Encounters:   06/02/25 233 lb 11 oz (106 kg)        General: No acute distress.  Neck: Jugular venous pulsations not seen.  Lungs: Clear to auscultation.  Heart: Normal rate. No murmurs.  Abdomen: Soft. Non tender  Extremities: No edema.  Neurological:   slow to respond but does answer questions  Psychiatric: Appropriate mood and affect.  Current Facility-Administered Medications   Medication Dose Route Frequency    predniSONE (Deltasone) tab 20 mg  20 mg Oral Daily with breakfast    insulin aspart (NovoLOG) 100 Units/mL FlexPen 1-5 Units  1-5 Units Subcutaneous TID CC    docusate sodium (Colace) cap 100 mg  100 mg Oral BID    sennosides (Senokot) tab 17.2 mg  17.2 mg Oral BID    ipratropium-albuterol (Duoneb) 0.5-2.5 (3) MG/3ML inhalation solution 3 mL  3 mL Nebulization 2 times daily    midodrine (ProAmatine) tab 10 mg  10 mg Per G Tube TID    busPIRone (Buspar) tab 30 mg  30 mg Per G Tube TID    chlorproMAZINE (Thorazine) tab 25 mg  25 mg Per G Tube QID    diazePAM (Valium) tab 5 mg  5 mg Per G Tube Q8H PRN    QUEtiapine (SEROquel) tab 50 mg  50 mg Per G Tube BID    levothyroxine (Synthroid) tab 150 mcg  150 mcg Per G Tube Before breakfast    famotidine (Pepcid) 20 mg/2mL injection 20 mg  20 mg Intravenous Daily    glucose (Dex4) 15 GM/59ML oral liquid 15 g  15 g Oral Q15 Min PRN    Or    glucose (Glutose) 40% oral gel 15 g  15 g Oral Q15 Min PRN    Or    glucose-vitamin C (Dex-4) chewable tab 4  tablet  4 tablet Oral Q15 Min PRN    Or    dextrose 50% injection 50 mL  50 mL Intravenous Q15 Min PRN    Or    glucose (Dex4) 15 GM/59ML oral liquid 30 g  30 g Oral Q15 Min PRN    Or    glucose (Glutose) 40% oral gel 30 g  30 g Oral Q15 Min PRN    Or    glucose-vitamin C (Dex-4) chewable tab 8 tablet  8 tablet Oral Q15 Min PRN    carvedilol (Coreg) tab 3.125 mg  3.125 mg Oral BID with meals    sacubitril-valsartan (Entresto) 24-26 MG per tab 1 tablet  1 tablet Oral BID    budesonide (Pulmicort) 0.5 MG/2ML nebulizer suspension 0.5 mg  0.5 mg Nebulization 2 times daily    arformoterol (Brovana) 15 MCG/2ML nebulizer solution 15 mcg  15 mcg Nebulization 2 times daily    dextrose 10% infusion (TPN no rate)   Intravenous Continuous PRN    sodium bicarbonate tab 650 mg  650 mg Per G Tube PRN    And    pancrelipase (Lip-Prot-Amyl) (Zenpep) DR particles cap 10,000 Units  10,000 Units Per G Tube PRN    DULoxetine (Cymbalta) DR cap 60 mg  60 mg Oral BID    HYDROcodone-acetaminophen (Norco) 5-325 MG per tab 1 tablet  1 tablet Oral Q6H PRN    ondansetron (Zofran) 4 MG/2ML injection 4 mg  4 mg Intravenous Q6H PRN    polyethylene glycol (PEG 3350) (Miralax) 17 g oral packet 17 g  17 g Oral Daily PRN    magnesium hydroxide (Milk of Magnesia) 400 MG/5ML oral suspension 30 mL  30 mL Oral Daily PRN    bisacodyl (Dulcolax) 10 MG rectal suppository 10 mg  10 mg Rectal Daily PRN    fleet enema (Fleet) rectal enema 133 mL  1 enema Rectal Once PRN    heparin (Porcine) 1000 UNIT/ML injection 2,000 Units  2,000 Units Intravenous PRN Dialysis    albuterol (Ventolin) (2.5 MG/3ML) 0.083% nebulizer solution 2.5 mg  2.5 mg Nebulization Q6H PRN    heparin (Porcine) 5000 UNIT/ML injection 5,000 Units  5,000 Units Subcutaneous 2 times per day    acetaminophen (Tylenol) tab 650 mg  650 mg Oral Q6H PRN    aspirin DR tab 81 mg  81 mg Oral Daily    QUEtiapine ER (SEROquel XR) 24 hr tab 300 mg  300 mg Oral Nightly     Medications Prior to Admission    Medication Sig    busPIRone HCl 30 MG Oral Tab Take 1 tablet (30 mg total) by mouth 3 (three) times daily.    chlorproMAZINE 25 MG Oral Tab Take 1 tablet (25 mg total) by mouth 4 (four) times daily.    diazePAM 5 MG Oral Tab Take 1 tablet (5 mg total) by mouth every 8 (eight) hours as needed for Anxiety.    DULoxetine 60 MG Oral Cap DR Particles Take 1 capsule (60 mg total) by mouth 2 (two) times daily.    metFORMIN  MG Oral Tablet 24 Hr Take 2 tablets (1,000 mg total) by mouth daily. (Patient taking differently: Take 2 tablets (1,000 mg total) by mouth 2 (two) times daily with meals.)    metoprolol succinate ER 50 MG Oral Tablet 24 Hr Take 1 tablet (50 mg total) by mouth 2 (two) times daily.    QUEtiapine  MG Oral Tablet 24 Hr Take 1 tablet (300 mg total) by mouth nightly.    QUEtiapine 50 MG Oral Tab Take 1 tablet (50 mg total) by mouth 2 (two) times daily.    rosuvastatin 40 MG Oral Tab Take 1 tablet (40 mg total) by mouth before bedtime.    lidocaine 5 % External Patch Place 1 patch onto the skin daily.    [] ibuprofen 600 MG Oral Tab Take 1 tablet (600 mg total) by mouth every 6 (six) hours as needed.    levothyroxine 150 MCG Oral Tab Take 1 tablet (150 mcg total) by mouth before breakfast. Give on an empty stomach. On Mon, Tue, Wed, Thur, Fri and Saturday    levothyroxine 75 MCG Oral Tab Take 1 tablet (75 mcg total) by mouth before breakfast. On Sundays only    aspirin 81 MG Oral Tab EC Take 1 tablet (81 mg total) by mouth in the morning.    methylPREDNISolone 4 MG Oral Tablet Therapy Pack Take as directed on dose pack instructions (Patient not taking: Reported on 2025)     No results found.      Lab Results   Component Value Date    WBC 10.9 2025    HGB 11.3 2025    HCT 36.9 2025    .0 2025    CREATSERUM 2.50 2025    BUN 25 2025     2025    K 4.5 2025     2025    CO2 26.0 2025     2025    CA  8.7 06/03/2025     Assessment / Plan  1. Acute on Chronic Systolic Heart Failure -  Ejection fraction 30-35% with apical wall akinesia. Previous EF 35-40% 2015. Will institute heart failure therapy with Carvedilol 3.125 mg BID and Entresto 24/26 mg BID.            I50.23 Acute on chronic systolic (congestive) heart failure           2. Acute Respiratory Failure -  Currently intubated. Chest x-ray shows right basal opacity consistent with atelectasis versus pneumonia. Evidence of pulmonary edema pattern and possible aspiration pneumonia with mucus plugging. On abx  J96.00 Acute respiratory failure, unspecified whether with hypoxia or hypercapnia        3. Acute on Chronic Kidney Disease -     Creatinine improving.  On HD.           N17.9 Acute kidney failure, unspecified           4. Demand Ischemia -  Troponin mildly elevated at 79, down from 197 four days ago. ECG with sinus tachycardia and occasional PVCs. Findings consistent with demand ischemia.           I24.8 Other forms of acute ischemic heart disease  PLAN:  Continue with combination of carvedilol as well as Entresto.  Getting dialysis and blood pressure borderline on dialysis and therefore we will not add any additional heart failure medications at this time.

## 2025-06-03 NOTE — PROGRESS NOTES
[facility]  part of [facility]    Progress Note    Radha Winters Patient Status:  Inpatient    1960 MRN [id number]   Location [facility] 5SW/SE Attending Fanny Majano MD   Hosp Day # 17 PCP JUAN MONTANEZ       SUBJECTIVE:  no shortness of breath  No chest pain    OBJECTIVE:  Vital signs in last 24 hours:  /56 (BP Location: Left arm)   Pulse 87   Temp 97 °F (36.1 °C) (Temporal)   Resp 14   Ht 5' 3\" (1.6 m)   Wt 233 lb 11 oz (106 kg)   SpO2 93%   BMI 41.40 kg/m²     Intake/Output:    Intake/Output Summary (Last 24 hours) at 6/3/2025 0614  Last data filed at 6/3/2025 0529  Gross per 24 hour   Intake 280 ml   Output 2700 ml   Net -2420 ml       Wt Readings from Last 3 Encounters:   25 233 lb 11 oz (106 kg)   10/09/23 185 lb 3 oz (84 kg)   10/04/23 185 lb (83.9 kg)       Exam  Gen: No acute distress,  HEENT: No pallor  Pulm: Lungs clear to auscultation anteriorly  CV: Heart with regular rate and rhythm, no peripheral edema  Abd: Abdomen soft, nontender, nondistended, no organomegaly, bowel sounds present  Skin: no rashes or lesions  CNS: Alert    Data Review:     Labs:   Lab Results   Component Value Date    WBC 10.9 2025    HGB 11.3 2025    HCT 36.9 2025    .0 2025    CREATSERUM 2.50 2025    BUN 25 2025     2025    K 4.5 2025     2025    CO2 26.0 2025     2025    CA 8.7 2025       LABS  Recent Labs   Lab 25  0419 25  0606 25  0413 25  0548 25  0942 25  0456 25  0351   RBC 4.27   < > 4.12 3.70*  --  3.86 3.77*   HGB 12.7   < > 12.1 11.2*  --  11.5* 11.3*   HCT 39.9   < > 38.3 35.6  --  38.0 36.9   MCV 93.4   < > 93.0 96.2  --  98.4 97.9   MCH 29.7   < > 29.4 30.3  --  29.8 30.0   MCHC 31.8   < > 31.6 31.5  --  30.3* 30.6*   RDW 16.7*   < > 16.8* 17.0*  --  17.2* 17.2*   NEPRELIM 8.52*   < > 8.14* 7.63  --  7.10 8.35*    WBC 9.7   < > 10.7 9.8  --  9.5 10.9   .0   < > 162.0 170.0  --  170.0 164.0   *  --   --   --  39*  --   --    ALT 37  --   --   --  20  --   --    CK  --   --   --   --  446*  --   --    *   < > 112*  --  147*  --  116*    < > = values in this interval not displayed.       Imaging:      Meds:   Current Hospital Medications[1]    Assessment  Problem List[2]  1. Acute Kidney Injury on Chronic Kidney Disease:  -  - Nephrology consultation obtained  Patient with worsening renal function.  Nephrology is following the patient  On hemodialysis       2. Rhabdomyolysis:, Much improved  - Secondary to recurrent falls  - Possibly statin-induced  - Plan: Hold statin (Crestor)  Improving     3. Acute Transaminitis:  improved  - Likely secondary to rhabdomyolysis  - May also be statin-related  - Will monitor with serial labs  Patient could also have a shock liver  Liver enzymes are improving       4. Recurrent Falls:  -    Patient will need physical therapy and Occupational Therapy after she is extubated      5. Coronary Artery Disease:  - Currently stable  - Patient denies chest pain     6. Hypothyroidism:  - Continue levothyroxine  Possible UTI.  on ceftriaxone.    Acute hypoxic respiratory failure  Extubated today  Pulmonary following.      Chronic heart failure with reduced ejection fraction    Cardiomyopathy.  cardiology consulted         Plan for close monitoring and adjustment of management based on clinical course.  discussed with nursing staff  Discussed with the nephrologist      Active Orders   Nourishments    Dietary Nutrition Supplements TID     Frequency: TID     Number of Occurrences: Until Specified     Order Comments: JESÚS davey @ 10am, 2pm and HS - vanilla      Room Service Eligibility Until Discontinued     Frequency: Until Discontinued     Number of Occurrences: Until Specified    Room Service Notify RN Until Discontinued     Frequency: Until Discontinued     Number of Occurrences:  Until Specified   PT    IP PT eval and treat     Frequency: Once     Number of Occurrences: 1 Occurrences   Respiratory Care    Acapella TID     Frequency: TID     Number of Occurrences: Until Specified    BIPAP When Sleeping     Frequency: When Sleeping     Number of Occurrences: Until Specified    BIPAP When Sleeping     Frequency: When Sleeping     Number of Occurrences: Until Specified    Chest physiotherapy 4x Daily     Frequency: 4x Daily     Number of Occurrences: Until Specified     Order Comments: Left lower lobe atelectasis.      EZ-PAP 4x Daily     Frequency: 4x Daily     Number of Occurrences: Until Specified    Incentive spriometry: RT to teach pt Once     Frequency: Once     Number of Occurrences: 1 Occurrences    Oxygen administration (adult) PRN     Frequency: PRN     Number of Occurrences: Until Specified    Respiratory care evaluation Once     Frequency: Once     Number of Occurrences: 1 Occurrences     Order Comments: If patient uses home CPAP/BIPAP, place on known home settings. If unknown, place on auto CPAP with upper limit 20 and lower limit 5 cm H2O     Dialysis    Hemodialysis inpatient     Frequency: Tomorrow     Number of Occurrences: 1 Occurrences     Order Comments: Keep MAP goal >65     Transfer    Transfer patient Once     Frequency: Once     Number of Occurrences: 1 Occurrences   Precaution    Aspiration precautions Continuous     Frequency: Continuous     Number of Occurrences: Until Specified   Diet    Regular/General diet Sodium Restriction: 3-4 GM NA; Fluid Consistency: Thin Liquids ; Texture Consistency: Soft / Easy to Chew ; Is Patient on Accuchecks? Yes; Misc Restriction: No Straw     Frequency: Effective Now     Number of Occurrences: Until Specified     Order Comments: Medications crushed in pureed     Nursing    Accucheck     Frequency: PRN     Number of Occurrences: Until Specified     Order Comments: For symptoms or suspicion of hypoglycemia      Accucheck     Frequency:  TID AC and HS     Number of Occurrences: Until Specified    Assess using Critical Care Pain Observation Tool (CPOT)     Frequency: Now Then Q3H     Number of Occurrences: Until Specified    Elevate head of bed >30 degrees     Frequency: Until Discontinued     Number of Occurrences: Until Specified     Order Comments: 30-45 degrees at all times unless contraindicated by physician order or patient condition.      Elevate head of bed greater than or equal to 30 degrees     Frequency: Until Discontinued     Number of Occurrences: Until Specified    Flush feeding tube     Frequency: PRN     Number of Occurrences: Until Specified     Order Comments: Flush feeding tube:  1.) Before and after bolus feedings.  2.) After medication administration.      For any tube feed interruptions, continue basal insulin, and correction scales. Hold any scheduled insulins, and resume when tube feed is restarted.     Frequency: Until Discontinued     Number of Occurrences: Until Specified    For non-patent tubes:     Frequency: PRN     Number of Occurrences: Until Specified     Order Comments: If water is unsuccessful in dislodging the clog, use the pancrealipase/sodium bicarb. May repeat x1 before calling physician for further orders.      For patients without a history of diabetes, discontinue Accuchecks and insulin correction scale if blood glucose <180 mg/dL for 48 hours     Frequency: Until Discontinued     Number of Occurrences: Until Specified    Give all morning medications unless otherwise specified     Frequency: Until Discontinued     Number of Occurrences: Until Specified     Order Comments: Give all morning medications unless otherwise specified.  DO NOT use Electrolyte protocol.      If patient uses CPAP/BIPAP, encourage patient to wear when sleeping (including daytime) and after any apneic episode or adverse event.     Frequency: Until Discontinued     Number of Occurrences: Until Specified    Initiate early mobility protocol      Frequency: Once     Number of Occurrences: 1 Occurrences    Initiate Hypoglycemia Algorithm for Adults     Frequency: Until Discontinued     Number of Occurrences: Until Specified     Order Comments: For blood glucose less than 70      Initiate Medical Nutrition Therapy Protocol for Enteral Nutrition     Frequency: Until Discontinued     Number of Occurrences: Until Specified    Insert denny catheter     Frequency: Once     Number of Occurrences: 1 Occurrences    Intake and Output     Frequency: Per Unit Routine     Number of Occurrences: Until Specified     Order Comments: Record formula and water flushes separately.      May use port/central line     Frequency: Until Discontinued     Number of Occurrences: Until Specified    Measure weight     Frequency: Per Unit Routine     Number of Occurrences: Until Specified     Order Comments: Measure weight every Monday and Thursday for non critical care patients.      Monitor tube placement     Frequency: Q8H     Number of Occurrences: Until Specified    Notify attending physician for patients refusing CPAP     Frequency: Until Discontinued     Number of Occurrences: Until Specified     Order Comments: Document that patient was educated and refused CPAP, if applicable.      Notify attending physician for sustained desaturation <90% or prolonged or recurrent apnea     Frequency: Until Discontinued     Number of Occurrences: Until Specified     Order Comments: Notify attending physician for sustained desaturation <90% or prolonged or recurrent apnea      Notify physician     Frequency: Until Discontinued     Number of Occurrences: Until Specified    Notify physician of significant abdominal distension, pain, nausea, or emesis, and stop tube feeding     Frequency: Until Discontinued     Number of Occurrences: Until Specified    Notify Radiologist     Frequency: Until Discontinued     Number of Occurrences: Until Specified    Nurse Driven Indwelling Urinary Catheter  Removal Protocol     Frequency: Until Discontinued     Number of Occurrences: Until Specified    Nursing communication (specify)     Frequency: Once     Number of Occurrences: 1 Occurrences     Order Comments: Hold blood thinners pre procedure      Nursing communication (specify)     Frequency: Once     Number of Occurrences: 1 Occurrences     Order Comments: RN, keep the patient off the left side.      Nursing communication (specify)     Frequency: Once     Number of Occurrences: 1 Occurrences     Order Comments: Removed OG and placed Dobhoff      Nursing communication (specify)     Frequency: Once     Number of Occurrences: 1 Occurrences     Order Comments: Dobhoff okay to use      Nursing communication - call [facility] to order dialysis     Frequency: Once     Number of Occurrences: 1 Occurrences     Order Comments: Call [facility] at [phone] to order dialysis.  Identify special circumstances and specify stat or routine treatment.      Patient may resume usual diet     Frequency: Once     Number of Occurrences: 1 Occurrences     Order Comments: Npo x one hour, then resume pre-procedure diet      Post procedure site assessment     Frequency: Per Unit Routine     Number of Occurrences: Until Specified     Order Comments: Every 15 minutes for 1 hour, then every 30 minutes for 1 hour, then every 60 minutes for 2 hours, then per unit routine      Provide patient with sleep apnea education materials     Frequency: Once     Number of Occurrences: 1 Occurrences    Pulse oximetry     Frequency: Per Unit Routine     Number of Occurrences: Until Specified    Pulse oximetry     Frequency: Continuous     Number of Occurrences: Until Specified    Tube insertion     Frequency: Once     Number of Occurrences: 1 Occurrences    Tube insertion     Frequency: Once     Number of Occurrences: 1 Occurrences    Tube insertion     Frequency: Once     Number of Occurrences: 1 Occurrences     Order Comments: Meds and tube feeding       Verify informed consent     Frequency: Once     Number of Occurrences: 1 Occurrences    Vital signs     Frequency: Per Unit Routine     Number of Occurrences: Until Specified     Order Comments: Every 15 minutes for 1 hour, then every 30 minutes for 1 hour, then every 60 minutes for 2 hours, then per unit routine.      Void prior to procedure     Frequency: Once     Number of Occurrences: 1 Occurrences   Consult    Consult to Interventional Radiology     Frequency: Once     Number of Occurrences: 1 Occurrences    Consult to Pulmonology     Frequency: Once     Number of Occurrences: 1 Occurrences   Medications    acetaminophen (Tylenol) tab 650 mg     Frequency: Q6H PRN     Dose: 650 mg     Route: Oral    albuterol (Ventolin) (2.5 MG/3ML) 0.083% nebulizer solution 2.5 mg     Frequency: Q6H PRN     Dose: 2.5 mg     Route: Nebulization    arformoterol (Brovana) 15 MCG/2ML nebulizer solution 15 mcg     Frequency: 2 times daily     Dose: 15 mcg     Route: Nebulization    aspirin DR tab 81 mg     Frequency: Daily     Dose: 81 mg     Route: Oral    bisacodyl (Dulcolax) 10 MG rectal suppository 10 mg     Frequency: Daily PRN     Dose: 10 mg     Route: Rectal    budesonide (Pulmicort) 0.5 MG/2ML nebulizer suspension 0.5 mg     Frequency: 2 times daily     Dose: 0.5 mg     Route: Nebulization    busPIRone (Buspar) tab 30 mg     Frequency: TID     Dose: 30 mg     Route: Per G Tube    carvedilol (Coreg) tab 3.125 mg     Frequency: BID with meals     Dose: 3.125 mg     Route: Oral    chlorproMAZINE (Thorazine) tab 25 mg     Frequency: QID     Dose: 25 mg     Route: Per G Tube    dextrose 10% infusion (TPN no rate)     Frequency: Continuous PRN     Route: Intravenous    dextrose 50% injection 50 mL     Linked Order: Or     Frequency: Q15 Min PRN     Dose: 50 mL     Route: Intravenous    diazePAM (Valium) tab 5 mg     Frequency: Q8H PRN     Dose: 5 mg     Route: Per G Tube    docusate sodium (Colace) cap 100 mg     Frequency:  BID     Dose: 100 mg     Route: Oral    DULoxetine (Cymbalta) DR cap 60 mg     Frequency: BID     Dose: 60 mg     Route: Oral    famotidine (Pepcid) 20 mg/2mL injection 20 mg     Frequency: Daily     Dose: 20 mg     Route: Intravenous    fleet enema (Fleet) rectal enema 133 mL     Frequency: Once PRN     Dose: 1 enema     Route: Rectal    glucose (Dex4) 15 GM/59ML oral liquid 15 g     Linked Order: Or     Frequency: Q15 Min PRN     Dose: 15 g     Route: Oral    glucose (Dex4) 15 GM/59ML oral liquid 30 g     Linked Order: Or     Frequency: Q15 Min PRN     Dose: 30 g     Route: Oral    glucose (Glutose) 40% oral gel 15 g     Linked Order: Or     Frequency: Q15 Min PRN     Dose: 15 g     Route: Oral    glucose (Glutose) 40% oral gel 30 g     Linked Order: Or     Frequency: Q15 Min PRN     Dose: 30 g     Route: Oral    glucose-vitamin C (Dex-4) chewable tab 4 tablet     Linked Order: Or     Frequency: Q15 Min PRN     Dose: 4 tablet     Route: Oral    glucose-vitamin C (Dex-4) chewable tab 8 tablet     Linked Order: Or     Frequency: Q15 Min PRN     Dose: 8 tablet     Route: Oral    heparin (Porcine) 1000 UNIT/ML injection 2,000 Units     Frequency: PRN Dialysis     Dose: 2,000 Units     Route: Intravenous    heparin (Porcine) 5000 UNIT/ML injection 5,000 Units     Frequency: Q12H PATRCIK     Dose: 5,000 Units     Route: Subcutaneous    HYDROcodone-acetaminophen (Norco) 5-325 MG per tab 1 tablet     Frequency: Q6H PRN     Dose: 1 tablet     Route: Oral    insulin aspart (NovoLOG) 100 Units/mL FlexPen 1-5 Units     Frequency: TID CC     Dose: 1-5 Units     Route: Subcutaneous     Order Comments: For insulin aspart (NovoLOG) doses > 60 Units, change product to dispense to be a insulin aspart (NovoLOG) *VIAL*    ipratropium-albuterol (Duoneb) 0.5-2.5 (3) MG/3ML inhalation solution 3 mL     Frequency: 2 times daily     Dose: 3 mL     Route: Nebulization    levothyroxine (Synthroid) tab 150 mcg     Frequency: Before breakfast      Dose: 150 mcg     Route: Per G Tube    magnesium hydroxide (Milk of Magnesia) 400 MG/5ML oral suspension 30 mL     Frequency: Daily PRN     Dose: 30 mL     Route: Oral    midodrine (ProAmatine) tab 10 mg     Frequency: TID     Dose: 10 mg     Route: Per G Tube    ondansetron (Zofran) 4 MG/2ML injection 4 mg     Frequency: Q6H PRN     Dose: 4 mg     Route: Intravenous    pancrelipase (Lip-Prot-Amyl) (Zenpep) DR particles cap 10,000 Units     Linked Order: And     Frequency: PRN     Dose: 10,000 Units     Route: Per G Tube    polyethylene glycol (PEG 3350) (Miralax) 17 g oral packet 17 g     Frequency: Daily PRN     Dose: 17 g     Route: Oral    predniSONE (Deltasone) tab 20 mg     Frequency: Daily with breakfast     Dose: 20 mg     Route: Oral    QUEtiapine (SEROquel) tab 50 mg     Frequency: BID     Dose: 50 mg     Route: Per G Tube    QUEtiapine ER (SEROquel XR) 24 hr tab 300 mg     Frequency: Nightly     Dose: 300 mg     Route: Oral    sacubitril-valsartan (Entresto) 24-26 MG per tab 1 tablet     Frequency: BID     Dose: 1 tablet     Route: Oral    sennosides (Senokot) tab 17.2 mg     Frequency: BID     Dose: 17.2 mg     Route: Oral    sodium bicarbonate tab 650 mg     Linked Order: And     Frequency: PRN     Dose: 650 mg     Route: Per G Tube     Reviewed personally: current medical record, vital signs info, lab results, cardiac & radiographic films and reports.  Formulated plans for continued care for this patient.  RN to call us for any significant change  Scheduled tests: see orders   Other orders per treatment team.     *The above components were done for the patient encounter: Reviewing the patient's electronic chart, reviewing results, obtaining a medical history, counseling patient and/or family member, ordering medications, tests, or procedures, documenting clinical information in the electronic health record, refer to or communicate with other health care professionals as indicated, independently  interpret results and providing care coordination      Fanny Majano MD           [1]   Current Facility-Administered Medications   Medication Dose Route Frequency    predniSONE (Deltasone) tab 20 mg  20 mg Oral Daily with breakfast    insulin aspart (NovoLOG) 100 Units/mL FlexPen 1-5 Units  1-5 Units Subcutaneous TID CC    docusate sodium (Colace) cap 100 mg  100 mg Oral BID    sennosides (Senokot) tab 17.2 mg  17.2 mg Oral BID    ipratropium-albuterol (Duoneb) 0.5-2.5 (3) MG/3ML inhalation solution 3 mL  3 mL Nebulization 2 times daily    midodrine (ProAmatine) tab 10 mg  10 mg Per G Tube TID    busPIRone (Buspar) tab 30 mg  30 mg Per G Tube TID    chlorproMAZINE (Thorazine) tab 25 mg  25 mg Per G Tube QID    diazePAM (Valium) tab 5 mg  5 mg Per G Tube Q8H PRN    QUEtiapine (SEROquel) tab 50 mg  50 mg Per G Tube BID    levothyroxine (Synthroid) tab 150 mcg  150 mcg Per G Tube Before breakfast    famotidine (Pepcid) 20 mg/2mL injection 20 mg  20 mg Intravenous Daily    glucose (Dex4) 15 GM/59ML oral liquid 15 g  15 g Oral Q15 Min PRN    Or    glucose (Glutose) 40% oral gel 15 g  15 g Oral Q15 Min PRN    Or    glucose-vitamin C (Dex-4) chewable tab 4 tablet  4 tablet Oral Q15 Min PRN    Or    dextrose 50% injection 50 mL  50 mL Intravenous Q15 Min PRN    Or    glucose (Dex4) 15 GM/59ML oral liquid 30 g  30 g Oral Q15 Min PRN    Or    glucose (Glutose) 40% oral gel 30 g  30 g Oral Q15 Min PRN    Or    glucose-vitamin C (Dex-4) chewable tab 8 tablet  8 tablet Oral Q15 Min PRN    carvedilol (Coreg) tab 3.125 mg  3.125 mg Oral BID with meals    sacubitril-valsartan (Entresto) 24-26 MG per tab 1 tablet  1 tablet Oral BID    budesonide (Pulmicort) 0.5 MG/2ML nebulizer suspension 0.5 mg  0.5 mg Nebulization 2 times daily    arformoterol (Brovana) 15 MCG/2ML nebulizer solution 15 mcg  15 mcg Nebulization 2 times daily    dextrose 10% infusion (TPN no rate)   Intravenous Continuous PRN    sodium bicarbonate tab 650 mg   650 mg Per G Tube PRN    And    pancrelipase (Lip-Prot-Amyl) (Zenpep) DR particles cap 10,000 Units  10,000 Units Per G Tube PRN    DULoxetine (Cymbalta) DR cap 60 mg  60 mg Oral BID    HYDROcodone-acetaminophen (Norco) 5-325 MG per tab 1 tablet  1 tablet Oral Q6H PRN    ondansetron (Zofran) 4 MG/2ML injection 4 mg  4 mg Intravenous Q6H PRN    polyethylene glycol (PEG 3350) (Miralax) 17 g oral packet 17 g  17 g Oral Daily PRN    magnesium hydroxide (Milk of Magnesia) 400 MG/5ML oral suspension 30 mL  30 mL Oral Daily PRN    bisacodyl (Dulcolax) 10 MG rectal suppository 10 mg  10 mg Rectal Daily PRN    fleet enema (Fleet) rectal enema 133 mL  1 enema Rectal Once PRN    heparin (Porcine) 1000 UNIT/ML injection 2,000 Units  2,000 Units Intravenous PRN Dialysis    albuterol (Ventolin) (2.5 MG/3ML) 0.083% nebulizer solution 2.5 mg  2.5 mg Nebulization Q6H PRN    heparin (Porcine) 5000 UNIT/ML injection 5,000 Units  5,000 Units Subcutaneous 2 times per day    acetaminophen (Tylenol) tab 650 mg  650 mg Oral Q6H PRN    aspirin DR tab 81 mg  81 mg Oral Daily    QUEtiapine ER (SEROquel XR) 24 hr tab 300 mg  300 mg Oral Nightly   [2]   Patient Active Problem List  Diagnosis    Closed fracture of proximal end of left humerus, unspecified fracture morphology, initial encounter    Frequent falls    Seizure-like activity (HCC)    Pituitary lesion (HCC)    Major depressive disorder, recurrent episode, moderate (HCC)    Generalized anxiety disorder    Acute renal failure superimposed on chronic kidney disease, unspecified acute renal failure type, unspecified CKD stage    Non-traumatic rhabdomyolysis    Transaminitis

## 2025-06-03 NOTE — PLAN OF CARE
Safety measures maintained. Call light within reach. Transfer order in. Report given to Blake CORRAL.      Problem: Patient Centered Care  Goal: Patient preferences are identified and integrated in the patient's plan of care  Description: Interventions:  - What would you like us to know as we care for you? From home alone.   - Provide timely, complete, and accurate information to patient/family  - Incorporate patient and family knowledge, values, beliefs, and cultural backgrounds into the planning and delivery of care  - Encourage patient/family to participate in care and decision-making at the level they choose  - Honor patient and family perspectives and choices  Outcome: Progressing     Problem: PAIN - ADULT  Goal: Verbalizes/displays adequate comfort level or patient's stated pain goal  Description: INTERVENTIONS:  - Encourage pt to monitor pain and request assistance  - Assess pain using appropriate pain scale  - Administer analgesics based on type and severity of pain and evaluate response  - Implement non-pharmacological measures as appropriate and evaluate response  - Consider cultural and social influences on pain and pain management  - Manage/alleviate anxiety  - Utilize distraction and/or relaxation techniques  - Monitor for opioid side effects  - Notify MD/LIP if interventions unsuccessful or patient reports new pain  - Anticipate increased pain with activity and pre-medicate as appropriate  Outcome: Progressing     Problem: SAFETY ADULT - FALL  Goal: Free from fall injury  Description: INTERVENTIONS:  - Assess pt frequently for physical needs  - Identify cognitive and physical deficits and behaviors that affect risk of falls.  - [place] fall precautions as indicated by assessment.  - Educate pt/family on patient safety including physical limitations  - Instruct pt to call for assistance with activity based on assessment  - Modify environment to reduce risk of injury  - Provide assistive devices as  appropriate  - Consider OT/PT consult to assist with strengthening/mobility  - Encourage toileting schedule  Outcome: Progressing     Problem: DISCHARGE PLANNING  Goal: Discharge to home or other facility with appropriate resources  Description: INTERVENTIONS:  - Identify barriers to discharge w/pt and caregiver  - Include patient/family/discharge partner in discharge planning  - Arrange for needed discharge resources and transportation as appropriate  - Identify discharge learning needs (meds, wound care, etc)  - Arrange for interpreters to assist at discharge as needed  - Consider post-discharge preferences of patient/family/discharge partner  - Complete POLST form as appropriate  - Assess patient's ability to be responsible for managing their own health  - Refer to Case Management Department for coordinating discharge planning if the patient needs post-hospital services based on physician/LIP order or complex needs related to functional status, cognitive ability or social support system  Outcome: Progressing     Problem: RISK FOR INFECTION - ADULT  Goal: Absence of fever/infection during anticipated neutropenic period  Description: INTERVENTIONS  - Monitor WBC  - Administer growth factors as ordered  - Implement neutropenic guidelines  Outcome: Progressing     Problem: SKIN/TISSUE INTEGRITY - ADULT  Goal: Skin integrity remains intact  Description: INTERVENTIONS  - Assess and document risk factors for pressure ulcer development  - Assess and document skin integrity  - Monitor for areas of redness and/or skin breakdown  - Initiate interventions, skin care algorithm/standards of care as needed  Outcome: Progressing     Problem: RESPIRATORY - ADULT  Goal: Achieves optimal ventilation and oxygenation  Description: INTERVENTIONS:  - Assess for changes in respiratory status  - Assess for changes in mentation and behavior  - Position to facilitate oxygenation and minimize respiratory effort  - Oxygen supplementation  based on oxygen saturation or ABGs  - Provide Smoking Cessation handout, if applicable  - Encourage broncho-pulmonary hygiene including cough, deep breathe, Incentive Spirometry  - Assess the need for suctioning and perform as needed  - Assess and instruct to report SOB or any respiratory difficulty  - Respiratory Therapy support as indicated  - Manage/alleviate anxiety  - Monitor for signs/symptoms of CO2 retention  Outcome: Progressing     Problem: GENITOURINARY - ADULT  Goal: Absence of urinary retention  Description: INTERVENTIONS:  - Assess patient’s ability to void and empty bladder  - Monitor intake/output and perform bladder scan as needed  - Follow urinary retention protocol/standard of care  - Consider collaborating with pharmacy to review patient's medication profile  - Implement strategies to promote bladder emptying  Outcome: Progressing     Problem: METABOLIC/FLUID AND ELECTROLYTES - ADULT  Goal: Glucose maintained within prescribed range  Description: INTERVENTIONS:  - Monitor Blood Glucose as ordered  - Assess for signs and symptoms of hyperglycemia and hypoglycemia  - Administer ordered medications to maintain glucose within target range  - Assess barriers to adequate nutritional intake and initiate nutrition consult as needed  - Instruct patient on self management of diabetes  Outcome: Progressing  Goal: Electrolytes maintained within normal limits  Description: INTERVENTIONS:  - Monitor labs and rhythm and assess patient for signs and symptoms of electrolyte imbalances  - Administer electrolyte replacement as ordered  - Monitor response to electrolyte replacements, including rhythm and repeat lab results as appropriate  - Fluid restriction as ordered  - Instruct patient on fluid and nutrition restrictions as appropriate  Outcome: Progressing  Goal: Hemodynamic stability and optimal renal function maintained  Description: INTERVENTIONS:  - Monitor labs and assess for signs and symptoms of volume  excess or deficit  - Monitor intake, output and patient weight  - Monitor urine specific gravity, serum osmolarity and serum sodium as indicated or ordered  - Monitor response to interventions for patient's volume status, including labs, urine output, blood pressure (other measures as available)  - Encourage oral intake as appropriate  - Instruct patient on fluid and nutrition restrictions as appropriate  Outcome: Progressing     Problem: HEMATOLOGIC - ADULT  Goal: Free from bleeding injury  Description: (Example usage: patient with low platelets)  INTERVENTIONS:  - Avoid intramuscular injections, enemas and rectal medication administration  - Ensure safe mobilization of patient  - Hold pressure on venipuncture sites to achieve adequate hemostasis  - Assess for signs and symptoms of internal bleeding  - Monitor lab trends  - Patient is to report abnormal signs of bleeding to staff  - Avoid use of toothpicks and dental floss  - Use electric shaver for shaving  - Use soft bristle tooth brush  - Limit straining and forceful nose blowing  Outcome: Progressing  Goal: Maintains hematologic stability  Description: INTERVENTIONS  - Assess for signs and symptoms of bleeding or hemorrhage  - Monitor labs and vital signs for trends  - Administer supportive blood products/factors, fluids and medications as ordered and appropriate  - Administer supportive blood products/factors as ordered and appropriate  Outcome: Progressing  Goal: Free from bleeding injury  Description: (Example usage: patient with low platelets)  INTERVENTIONS:  - Avoid intramuscular injections, enemas and rectal medication administration  - Ensure safe mobilization of patient  - Hold pressure on venipuncture sites to achieve adequate hemostasis  - Assess for signs and symptoms of internal bleeding  - Monitor lab trends  - Patient is to report abnormal signs of bleeding to staff  - Avoid use of toothpicks and dental floss  - Use electric shaver for shaving  -  Use soft bristle tooth brush  - Limit straining and forceful nose blowing  Outcome: Progressing     Problem: GASTROINTESTINAL - ADULT  Goal: Minimal or absence of nausea and vomiting  Description: INTERVENTIONS:  - Maintain adequate hydration with IV or PO as ordered and tolerated  - Nasogastric tube to low intermittent suction as ordered  - Evaluate effectiveness of ordered antiemetic medications  - Provide nonpharmacologic comfort measures as appropriate  - Advance diet as tolerated, if ordered  - Obtain nutritional consult as needed  - Evaluate fluid balance  Outcome: Progressing  Goal: Maintains or returns to baseline bowel function  Description: INTERVENTIONS:  - Assess bowel function  - Maintain adequate hydration with IV or PO as ordered and tolerated  - Evaluate effectiveness of GI medications  - Encourage mobilization and activity  - Obtain nutritional consult as needed  - Establish a toileting routine/schedule  - Consider collaborating with pharmacy to review patient's medication profile  Outcome: Progressing     Problem: Diabetes/Glucose Control  Goal: Glucose maintained within prescribed range  Description: INTERVENTIONS:  - Monitor Blood Glucose as ordered  - Assess for signs and symptoms of hyperglycemia and hypoglycemia  - Administer ordered medications to maintain glucose within target range  - Assess barriers to adequate nutritional intake and initiate nutrition consult as needed  - Instruct patient on self management of diabetes  Outcome: Progressing     Problem: Delirium  Goal: Minimize duration of delirium  Description: Interventions:  - Encourage use of hearing aids, eye glasses  - Promote highest level of mobility daily  - Provide frequent reorientation  - Promote wakefulness i.e. lights on, blinds open  - Promote sleep, encourage patient's normal rest cycle i.e. lights off, TV off, minimize noise and interruptions  - Encourage family to assist in orientation and promotion of home  routines  Outcome: Progressing

## 2025-06-03 NOTE — CM/SW NOTE
CM notified Dr. Majano via [employer] at 1455 of [employer] authorization approval - valid through 6/9.    CM awaiting confirmation from [facility] liaison Cordelia regarding on site HD approval.    Per chart review, patient with anticipated transfer to medical floor.    / to remain available for support and/or discharge planning.     Plan: [facility] w/ on site HD - pending on site HD approval, medical clearance    Maria Fernanda Urena RN, BSN  Nurse   [phone]

## 2025-06-03 NOTE — CM/SW NOTE
Prior Authorization - Destination  Destination Type: Skilled nursing facility  Service Provider: [facility]  Payer Communication Destination Comments: [facility] Authorization Number:[id number]  Prior Authorization Status: Approved  Prior Authorization Start Date: 06/03/25  6/3 to 6/9    Kelsy Armando DSC

## 2025-06-04 LAB
GLUCOSE BLDC GLUCOMTR-MCNC: 102 MG/DL (ref 70–99)
GLUCOSE BLDC GLUCOMTR-MCNC: 125 MG/DL (ref 70–99)
GLUCOSE BLDC GLUCOMTR-MCNC: 144 MG/DL (ref 70–99)
GLUCOSE BLDC GLUCOMTR-MCNC: 87 MG/DL (ref 70–99)

## 2025-06-04 PROCEDURE — 99232 SBSQ HOSP IP/OBS MODERATE 35: CPT | Performed by: INTERNAL MEDICINE

## 2025-06-04 NOTE — SLP NOTE
SPEECH DAILY NOTE - INPATIENT    ASSESSMENT & PLAN   ASSESSMENT  PPE REQUIRED. THIS THERAPIST WORE GLOVES FOR DURATION OF SESSION. HANDS WASHED UPON ENTRANCE/EXIT.    SLP f/u for ongoing dysphagia tx/meal assessment per recommendations of soft easy to chew/thin liquids per swallow f/u. RN reports pt tolerates diet and medication well with no overt clinical s/s aspiration. Pt denies any swallowing challenges.     Pt positioned upright in bed, alert/cooperative. Pt afebrile, tolerating 3L/HF O2NC with oxygen status 92% prior to the start of oral trials. SLP reviewed aspiration precautions and safe swallowing compensatory strategies with the patient. Safe swallow guidelines remain written on the white board in purple. Patient v/u. Provided no assistance, pt tolerates soft easy to chew consistency and thin liquids via straw with no overt clinical signs/symptoms of aspiration. Pt presented with trials of hard solids. Pt with adequate oral acceptance and bilabial seal. Pt with intact bite, adequate mastication, and timely A/P transfer. Pharyngeal swallow response appears delayed with reduced laryngeal elevation/excursion. No clinical signs of aspiration (e.g., immediate/delayed throat clear, immediate/delayed cough, wet vocal quality, increased O2 effort) observed across all trials. Oxygen status remained stable t/o the entire session. Recommend upgrade to regular consistency and remain on thin liquids with strict adherence to aspiration precautions and swallow strategies. No further swallow services warranted at this time. Please re consult if needed. RN alerted with results and recommendations.     MOST RECENT CXR 6/1  CONCLUSION:   Cardiomegaly with bilateral interstitial opacity likely mild edema.   Interval extubation        Diet Recommendations - Solids: Regular  Diet Recommendations - Liquids: Thin Liquids    Compensatory Strategies Recommended:  (na)  Aspiration Precautions: Upright position, Slow rate, Small  bites, Small sips  Medication Administration Recommendations:  (as tolerated)    Patient Experiencing Pain: No              Treatment Plan  Treatment Plan/Recommendations: No further inpatient SLP service warranted, Aspiration precautions    Interdisciplinary Communication: Plan posted at bedside          GOALS  Goal #1 The patient will tolerate regular consistency and thin liquids without overt signs or symptoms of aspiration with 90 % accuracy over 1 session(s). Goal modified/met 6/4   Goal #2 The patient/family/caregiver will demonstrate understanding and implementation of aspiration precautions and swallow strategies independently over 2 session(s).    Met 6/4   Goal #3 The patient will tolerate trial upgrade of easy to chew/regular consistency without overt signs or symptoms of aspiration with 90 % accuracy over 1 session(s). Goal met 6/4     FOLLOW UP  Follow Up Needed (Documentation Required): No  SLP Follow-up Date: 06/04/25  Duration: 1 week    Session: 2    If you have any questions, please contact VANESSA Mijares M.S. CCC-SLP  Speech Language Pathologist  Phone Number [phone]

## 2025-06-04 NOTE — PROGRESS NOTES
[facility]  part of [facility]    Progress Note    Radha Winters Patient Status:  Inpatient    1960 MRN [id number]   Location [facility] 5SW/SE Attending Fanny Majano MD   Hosp Day # 18 PCP JUAN MONTANEZ       SUBJECTIVE:  no shortness of breath  No chest pain    OBJECTIVE:  Vital signs in last 24 hours:  /63   Pulse 81   Temp 98 °F (36.7 °C) (Axillary)   Resp 17   Ht 5' 3\" (1.6 m)   Wt 233 lb 11 oz (106 kg)   SpO2 92%   BMI 41.40 kg/m²     Intake/Output:    Intake/Output Summary (Last 24 hours) at 2025  Last data filed at 6/3/2025 1954  Gross per 24 hour   Intake --   Output 0 ml   Net 0 ml       Wt Readings from Last 3 Encounters:   25 233 lb 11 oz (106 kg)   10/09/23 185 lb 3 oz (84 kg)   10/04/23 185 lb (83.9 kg)       Exam  Gen: No acute distress,  HEENT: No pallor  Pulm: Lungs clear to auscultation anteriorly  CV: Heart with regular rate and rhythm, no peripheral edema  Abd: Abdomen soft, nontender, nondistended, no organomegaly, bowel sounds present  Skin: no rashes or lesions  CNS: Alert    Data Review:     Labs:          LABS  Recent Labs   Lab 25  0413 25  0548 25  0942 25  0456 25  0351   RBC 4.12 3.70*  --  3.86 3.77*   HGB 12.1 11.2*  --  11.5* 11.3*   HCT 38.3 35.6  --  38.0 36.9   MCV 93.0 96.2  --  98.4 97.9   MCH 29.4 30.3  --  29.8 30.0   MCHC 31.6 31.5  --  30.3* 30.6*   RDW 16.8* 17.0*  --  17.2* 17.2*   NEPRELIM 8.14* 7.63  --  7.10 8.35*   WBC 10.7 9.8  --  9.5 10.9   .0 170.0  --  170.0 164.0   AST  --   --  39*  --   --    ALT  --   --  20  --   --    CK  --   --  446*  --   --    *  --  147*  --  116*       Imaging:      Meds:   Current Hospital Medications[1]    Assessment  Problem List[2]  1. Acute Kidney Injury on Chronic Kidney Disease:  -  - Nephrology consultation obtained  Patient with worsening renal function.  Nephrology is following the patient  On hemodialysis        2. Rhabdomyolysis:, Much improved  - Secondary to recurrent falls  - Possibly statin-induced  - Plan: Hold statin (Crestor)  Improving     3. Acute Transaminitis:  improved  - Likely secondary to rhabdomyolysis  - May also be statin-related  - Will monitor with serial labs  Patient could also have a shock liver  Liver enzymes are improving       4. Recurrent Falls:  -    Patient will need physical therapy and Occupational Therapy after she is extubated      5. Coronary Artery Disease:  - Currently stable  - Patient denies chest pain     6. Hypothyroidism:  - Continue levothyroxine  Possible UTI.  on ceftriaxone.    Acute hypoxic respiratory failure  Extubated today  Pulmonary following.      Chronic heart failure with reduced ejection fraction    Cardiomyopathy.  cardiology consulted         Plan for close monitoring and adjustment of management based on clinical course.  discussed with nursing staff  Discussed with the nephrologist      Active Orders   Nourishments    Dietary Nutrition Supplements TID     Frequency: TID     Number of Occurrences: Until Specified     Order Comments: JESÚS davey @ 10am, 2pm [facility]      Room Service Eligibility Until Discontinued     Frequency: Until Discontinued     Number of Occurrences: Until Specified    Room Service Notify RN Until Discontinued     Frequency: Until Discontinued     Number of Occurrences: Until Specified   Respiratory Care    Acapella TID     Frequency: TID     Number of Occurrences: Until Specified    BIPAP When Sleeping     Frequency: When Sleeping     Number of Occurrences: Until Specified    BIPAP When Sleeping     Frequency: When Sleeping     Number of Occurrences: Until Specified    Chest physiotherapy 4x Daily     Frequency: 4x Daily     Number of Occurrences: Until Specified     Order Comments: Left lower lobe atelectasis.      EZ-PAP 4x Daily     Frequency: 4x Daily     Number of Occurrences: Until Specified    Incentive spriometry: RT to teach  pt Once     Frequency: Once     Number of Occurrences: 1 Occurrences    Oxygen administration (adult) PRN     Frequency: PRN     Number of Occurrences: Until Specified    Respiratory care evaluation Once     Frequency: Once     Number of Occurrences: 1 Occurrences     Order Comments: If patient uses home CPAP/BIPAP, place on known home settings. If unknown, place on auto CPAP with upper limit 20 and lower limit 5 cm H2O     Dialysis    Hemodialysis inpatient     Frequency: Tomorrow     Number of Occurrences: 1 Occurrences     Order Comments: Keep MAP goal >65     Precaution    Aspiration precautions Continuous     Frequency: Continuous     Number of Occurrences: Until Specified   Diet    Regular/General diet Sodium Restriction: 3-4 GM NA; Fluid Consistency: Thin Liquids ; Texture Consistency: Soft / Easy to Chew ; Is Patient on Accuchecks? Yes; Misc Restriction: No Straw     Frequency: Effective Now     Number of Occurrences: Until Specified     Order Comments: Medications crushed in pureed     Nursing    Accucheck     Frequency: PRN     Number of Occurrences: Until Specified     Order Comments: For symptoms or suspicion of hypoglycemia      Accucheck     Frequency: TID AC and HS     Number of Occurrences: Until Specified    Assess using Critical Care Pain Observation Tool (CPOT)     Frequency: Now Then Q3H     Number of Occurrences: Until Specified    Cardiac monitoring     Frequency: Until Discontinued     Number of Occurrences: Until Specified    Elevate head of bed >30 degrees     Frequency: Until Discontinued     Number of Occurrences: Until Specified     Order Comments: 30-45 degrees at all times unless contraindicated by physician order or patient condition.      Elevate head of bed greater than or equal to 30 degrees     Frequency: Until Discontinued     Number of Occurrences: Until Specified    Flush feeding tube     Frequency: PRN     Number of Occurrences: Until Specified     Order Comments: Flush feeding  tube:  1.) Before and after bolus feedings.  2.) After medication administration.      For any tube feed interruptions, continue basal insulin, and correction scales. Hold any scheduled insulins, and resume when tube feed is restarted.     Frequency: Until Discontinued     Number of Occurrences: Until Specified    For non-patent tubes:     Frequency: PRN     Number of Occurrences: Until Specified     Order Comments: If water is unsuccessful in dislodging the clog, use the pancrealipase/sodium bicarb. May repeat x1 before calling physician for further orders.      For patients without a history of diabetes, discontinue Accuchecks and insulin correction scale if blood glucose <180 mg/dL for 48 hours     Frequency: Until Discontinued     Number of Occurrences: Until Specified    Give all morning medications unless otherwise specified     Frequency: Until Discontinued     Number of Occurrences: Until Specified     Order Comments: Give all morning medications unless otherwise specified.  DO NOT use Electrolyte protocol.      If patient uses CPAP/BIPAP, encourage patient to wear when sleeping (including daytime) and after any apneic episode or adverse event.     Frequency: Until Discontinued     Number of Occurrences: Until Specified    Initiate early mobility protocol     Frequency: Once     Number of Occurrences: 1 Occurrences    Initiate Hypoglycemia Algorithm for Adults     Frequency: Until Discontinued     Number of Occurrences: Until Specified     Order Comments: For blood glucose less than 70      Initiate Medical Nutrition Therapy Protocol for Enteral Nutrition     Frequency: Until Discontinued     Number of Occurrences: Until Specified    Insert denny catheter     Frequency: Once     Number of Occurrences: 1 Occurrences    Intake and Output     Frequency: Per Unit Routine     Number of Occurrences: Until Specified     Order Comments: Record formula and water flushes separately.      May use port/central line      Frequency: Until Discontinued     Number of Occurrences: Until Specified    Measure weight     Frequency: Per Unit Routine     Number of Occurrences: Until Specified     Order Comments: Measure weight every Monday and Thursday for non critical care patients.      Monitor tube placement     Frequency: Q8H     Number of Occurrences: Until Specified    Notify attending physician for patients refusing CPAP     Frequency: Until Discontinued     Number of Occurrences: Until Specified     Order Comments: Document that patient was educated and refused CPAP, if applicable.      Notify attending physician for sustained desaturation <90% or prolonged or recurrent apnea     Frequency: Until Discontinued     Number of Occurrences: Until Specified     Order Comments: Notify attending physician for sustained desaturation <90% or prolonged or recurrent apnea      Notify physician     Frequency: Until Discontinued     Number of Occurrences: Until Specified    Notify physician of significant abdominal distension, pain, nausea, or emesis, and stop tube feeding     Frequency: Until Discontinued     Number of Occurrences: Until Specified    Notify Radiologist     Frequency: Until Discontinued     Number of Occurrences: Until Specified    Nurse Driven Indwelling Urinary Catheter Removal Protocol     Frequency: Until Discontinued     Number of Occurrences: Until Specified    Nursing communication (specify)     Frequency: Once     Number of Occurrences: 1 Occurrences     Order Comments: Hold blood thinners pre procedure      Nursing communication (specify)     Frequency: Once     Number of Occurrences: 1 Occurrences     Order Comments: RN, keep the patient off the left side.      Nursing communication (specify)     Frequency: Once     Number of Occurrences: 1 Occurrences     Order Comments: Removed OG and placed Dobhoff      Nursing communication (specify)     Frequency: Once     Number of Occurrences: 1 Occurrences     Order Comments:  Eduar kingay to use      Nursing communication - call [facility] to order dialysis     Frequency: Once     Number of Occurrences: 1 Occurrences     Order Comments: Call [facility] at [phone] to order dialysis.  Identify special circumstances and specify stat or routine treatment.      Patient may resume usual diet     Frequency: Once     Number of Occurrences: 1 Occurrences     Order Comments: Npo x one hour, then resume pre-procedure diet      Post procedure site assessment     Frequency: Per Unit Routine     Number of Occurrences: Until Specified     Order Comments: Every 15 minutes for 1 hour, then every 30 minutes for 1 hour, then every 60 minutes for 2 hours, then per unit routine      Provide patient with sleep apnea education materials     Frequency: Once     Number of Occurrences: 1 Occurrences    Pulse oximetry     Frequency: Per Unit Routine     Number of Occurrences: Until Specified    Pulse oximetry     Frequency: Continuous     Number of Occurrences: Until Specified    Tube insertion     Frequency: Once     Number of Occurrences: 1 Occurrences    Tube insertion     Frequency: Once     Number of Occurrences: 1 Occurrences    Tube insertion     Frequency: Once     Number of Occurrences: 1 Occurrences     Order Comments: Meds and tube feeding      Up to chair Once     Frequency: Once     Number of Occurrences: 1 Occurrences    Verify informed consent     Frequency: Once     Number of Occurrences: 1 Occurrences    Vital signs     Frequency: Per Unit Routine     Number of Occurrences: Until Specified     Order Comments: Every 15 minutes for 1 hour, then every 30 minutes for 1 hour, then every 60 minutes for 2 hours, then per unit routine.      Void prior to procedure     Frequency: Once     Number of Occurrences: 1 Occurrences   Consult    Consult to Interventional Radiology     Frequency: Once     Number of Occurrences: 1 Occurrences    Consult to Pulmonology     Frequency: Once     Number of  Occurrences: 1 Occurrences   Medications    acetaminophen (Tylenol) tab 650 mg     Frequency: Q6H PRN     Dose: 650 mg     Route: Oral    albuterol (Ventolin) (2.5 MG/3ML) 0.083% nebulizer solution 2.5 mg     Frequency: Q6H PRN     Dose: 2.5 mg     Route: Nebulization    arformoterol (Brovana) 15 MCG/2ML nebulizer solution 15 mcg     Frequency: 2 times daily     Dose: 15 mcg     Route: Nebulization    aspirin DR tab 81 mg     Frequency: Daily     Dose: 81 mg     Route: Oral    bisacodyl (Dulcolax) 10 MG rectal suppository 10 mg     Frequency: Daily PRN     Dose: 10 mg     Route: Rectal    budesonide (Pulmicort) 0.5 MG/2ML nebulizer suspension 0.5 mg     Frequency: 2 times daily     Dose: 0.5 mg     Route: Nebulization    busPIRone (Buspar) tab 30 mg     Frequency: TID     Dose: 30 mg     Route: Per G Tube    carvedilol (Coreg) tab 3.125 mg     Frequency: BID with meals     Dose: 3.125 mg     Route: Oral    chlorproMAZINE (Thorazine) tab 25 mg     Frequency: QID     Dose: 25 mg     Route: Per G Tube    dextrose 10% infusion (TPN no rate)     Frequency: Continuous PRN     Route: Intravenous    dextrose 50% injection 50 mL     Linked Order: Or     Frequency: Q15 Min PRN     Dose: 50 mL     Route: Intravenous    diazePAM (Valium) tab 5 mg     Frequency: Q8H PRN     Dose: 5 mg     Route: Per G Tube    docusate sodium (Colace) cap 100 mg     Frequency: BID     Dose: 100 mg     Route: Oral    DULoxetine (Cymbalta) DR cap 60 mg     Frequency: BID     Dose: 60 mg     Route: Oral    famotidine (Pepcid) 20 mg/2mL injection 20 mg     Frequency: Daily     Dose: 20 mg     Route: Intravenous    fleet enema (Fleet) rectal enema 133 mL     Frequency: Once PRN     Dose: 1 enema     Route: Rectal    glucose (Dex4) 15 GM/59ML oral liquid 15 g     Linked Order: Or     Frequency: Q15 Min PRN     Dose: 15 g     Route: Oral    glucose (Dex4) 15 GM/59ML oral liquid 30 g     Linked Order: Or     Frequency: Q15 Min PRN     Dose: 30 g      Route: Oral    glucose (Glutose) 40% oral gel 15 g     Linked Order: Or     Frequency: Q15 Min PRN     Dose: 15 g     Route: Oral    glucose (Glutose) 40% oral gel 30 g     Linked Order: Or     Frequency: Q15 Min PRN     Dose: 30 g     Route: Oral    glucose-vitamin C (Dex-4) chewable tab 4 tablet     Linked Order: Or     Frequency: Q15 Min PRN     Dose: 4 tablet     Route: Oral    glucose-vitamin C (Dex-4) chewable tab 8 tablet     Linked Order: Or     Frequency: Q15 Min PRN     Dose: 8 tablet     Route: Oral    heparin (Porcine) 1000 UNIT/ML injection 2,000 Units     Frequency: PRN Dialysis     Dose: 2,000 Units     Route: Intravenous    heparin (Porcine) 5000 UNIT/ML injection 5,000 Units     Frequency: Q12H PATRICK     Dose: 5,000 Units     Route: Subcutaneous    HYDROcodone-acetaminophen (Norco) 5-325 MG per tab 1 tablet     Frequency: Q6H PRN     Dose: 1 tablet     Route: Oral    insulin aspart (NovoLOG) 100 Units/mL FlexPen 1-5 Units     Frequency: TID CC     Dose: 1-5 Units     Route: Subcutaneous     Order Comments: For insulin aspart (NovoLOG) doses > 60 Units, change product to dispense to be a insulin aspart (NovoLOG) *VIAL*    ipratropium-albuterol (Duoneb) 0.5-2.5 (3) MG/3ML inhalation solution 3 mL     Frequency: 2 times daily     Dose: 3 mL     Route: Nebulization    levothyroxine (Synthroid) tab 150 mcg     Frequency: Before breakfast     Dose: 150 mcg     Route: Per G Tube    magnesium hydroxide (Milk of Magnesia) 400 MG/5ML oral suspension 30 mL     Frequency: Daily PRN     Dose: 30 mL     Route: Oral    midodrine (ProAmatine) tab 10 mg     Frequency: TID     Dose: 10 mg     Route: Per G Tube    ondansetron (Zofran) 4 MG/2ML injection 4 mg     Frequency: Q6H PRN     Dose: 4 mg     Route: Intravenous    pancrelipase (Lip-Prot-Amyl) (Zenpep) DR particles cap 10,000 Units     Linked Order: And     Frequency: PRN     Dose: 10,000 Units     Route: Per G Tube    polyethylene glycol (PEG 3350) (Miralax) 17 g  oral packet 17 g     Frequency: Daily PRN     Dose: 17 g     Route: Oral    predniSONE (Deltasone) tab 10 mg     Frequency: Daily with breakfast     Dose: 10 mg     Route: Oral    QUEtiapine (SEROquel) tab 50 mg     Frequency: BID     Dose: 50 mg     Route: Per G Tube    QUEtiapine ER (SEROquel XR) 24 hr tab 300 mg     Frequency: Nightly     Dose: 300 mg     Route: Oral    sacubitril-valsartan (Entresto) 24-26 MG per tab 1 tablet     Frequency: BID     Dose: 1 tablet     Route: Oral    sennosides (Senokot) tab 17.2 mg     Frequency: BID     Dose: 17.2 mg     Route: Oral    sodium bicarbonate tab 650 mg     Linked Order: And     Frequency: PRN     Dose: 650 mg     Route: Per G Tube     Reviewed personally: current medical record, vital signs info, lab results, cardiac & radiographic films and reports.  Formulated plans for continued care for this patient.  RN to call us for any significant change  Scheduled tests: see orders   Other orders per treatment team.     *The above components were done for the patient encounter: Reviewing the patient's electronic chart, reviewing results, obtaining a medical history, counseling patient and/or family member, ordering medications, tests, or procedures, documenting clinical information in the electronic health record, refer to or communicate with other health care professionals as indicated, independently interpret results and providing care coordination      Fanny Majano MD           [1]   Current Facility-Administered Medications   Medication Dose Route Frequency    predniSONE (Deltasone) tab 10 mg  10 mg Oral Daily with breakfast    insulin aspart (NovoLOG) 100 Units/mL FlexPen 1-5 Units  1-5 Units Subcutaneous TID CC    docusate sodium (Colace) cap 100 mg  100 mg Oral BID    sennosides (Senokot) tab 17.2 mg  17.2 mg Oral BID    ipratropium-albuterol (Duoneb) 0.5-2.5 (3) MG/3ML inhalation solution 3 mL  3 mL Nebulization 2 times daily    midodrine (ProAmatine) tab 10 mg   10 mg Per G Tube TID    busPIRone (Buspar) tab 30 mg  30 mg Per G Tube TID    chlorproMAZINE (Thorazine) tab 25 mg  25 mg Per G Tube QID    diazePAM (Valium) tab 5 mg  5 mg Per G Tube Q8H PRN    QUEtiapine (SEROquel) tab 50 mg  50 mg Per G Tube BID    levothyroxine (Synthroid) tab 150 mcg  150 mcg Per G Tube Before breakfast    famotidine (Pepcid) 20 mg/2mL injection 20 mg  20 mg Intravenous Daily    glucose (Dex4) 15 GM/59ML oral liquid 15 g  15 g Oral Q15 Min PRN    Or    glucose (Glutose) 40% oral gel 15 g  15 g Oral Q15 Min PRN    Or    glucose-vitamin C (Dex-4) chewable tab 4 tablet  4 tablet Oral Q15 Min PRN    Or    dextrose 50% injection 50 mL  50 mL Intravenous Q15 Min PRN    Or    glucose (Dex4) 15 GM/59ML oral liquid 30 g  30 g Oral Q15 Min PRN    Or    glucose (Glutose) 40% oral gel 30 g  30 g Oral Q15 Min PRN    Or    glucose-vitamin C (Dex-4) chewable tab 8 tablet  8 tablet Oral Q15 Min PRN    carvedilol (Coreg) tab 3.125 mg  3.125 mg Oral BID with meals    sacubitril-valsartan (Entresto) 24-26 MG per tab 1 tablet  1 tablet Oral BID    budesonide (Pulmicort) 0.5 MG/2ML nebulizer suspension 0.5 mg  0.5 mg Nebulization 2 times daily    arformoterol (Brovana) 15 MCG/2ML nebulizer solution 15 mcg  15 mcg Nebulization 2 times daily    dextrose 10% infusion (TPN no rate)   Intravenous Continuous PRN    sodium bicarbonate tab 650 mg  650 mg Per G Tube PRN    And    pancrelipase (Lip-Prot-Amyl) (Zenpep) DR particles cap 10,000 Units  10,000 Units Per G Tube PRN    DULoxetine (Cymbalta) DR cap 60 mg  60 mg Oral BID    HYDROcodone-acetaminophen (Norco) 5-325 MG per tab 1 tablet  1 tablet Oral Q6H PRN    ondansetron (Zofran) 4 MG/2ML injection 4 mg  4 mg Intravenous Q6H PRN    polyethylene glycol (PEG 3350) (Miralax) 17 g oral packet 17 g  17 g Oral Daily PRN    magnesium hydroxide (Milk of Magnesia) 400 MG/5ML oral suspension 30 mL  30 mL Oral Daily PRN    bisacodyl (Dulcolax) 10 MG rectal suppository 10 mg  10  mg Rectal Daily PRN    fleet enema (Fleet) rectal enema 133 mL  1 enema Rectal Once PRN    heparin (Porcine) 1000 UNIT/ML injection 2,000 Units  2,000 Units Intravenous PRN Dialysis    albuterol (Ventolin) (2.5 MG/3ML) 0.083% nebulizer solution 2.5 mg  2.5 mg Nebulization Q6H PRN    heparin (Porcine) 5000 UNIT/ML injection 5,000 Units  5,000 Units Subcutaneous 2 times per day    acetaminophen (Tylenol) tab 650 mg  650 mg Oral Q6H PRN    aspirin DR tab 81 mg  81 mg Oral Daily    QUEtiapine ER (SEROquel XR) 24 hr tab 300 mg  300 mg Oral Nightly   [2]   Patient Active Problem List  Diagnosis    Closed fracture of proximal end of left humerus, unspecified fracture morphology, initial encounter    Frequent falls    Seizure-like activity (HCC)    Pituitary lesion (HCC)    Major depressive disorder, recurrent episode, moderate (HCC)    Generalized anxiety disorder    Acute renal failure superimposed on chronic kidney disease, unspecified acute renal failure type, unspecified CKD stage    Non-traumatic rhabdomyolysis    Transaminitis

## 2025-06-04 NOTE — PROGRESS NOTES
[facility]  part of [facility]    Progress Note    Radha Winters Patient Status:  Inpatient    1960 MRN [id number]   Location [facility]5W Attending Fanny Majano MD   Hosp Day # 18 PCP JUAN MONTANEZ       Subjective:   Subjective:  Patient was seen and examined  Feeling much better overall  Comfortable on 2 L of oxygen  No active cough or sputum or wheezes  Objective:   Blood pressure 117/58, pulse 78, temperature 97.6 °F (36.4 °C), temperature source Oral, resp. rate 18, height 5' 3\" (1.6 m), weight 233 lb 11 oz (106 kg), SpO2 92%.  Physical Exam  Constitutional:       General: She is not in acute distress.  HENT:      Head: Atraumatic.   Eyes:      General: No scleral icterus.  Cardiovascular:      Rate and Rhythm: Normal rate.      Heart sounds:      No gallop.   Pulmonary:      Effort: No respiratory distress.      Breath sounds: No stridor. No wheezing, rhonchi or rales.   Abdominal:      General: Abdomen is flat. Bowel sounds are normal. There is no distension.      Palpations: Abdomen is soft.   Musculoskeletal:      Cervical back: Normal range of motion.      Right lower leg: No edema.      Left lower leg: No edema.   Skin:     General: Skin is dry.   Neurological:      Mental Status: She is oriented to person, place, and time.         Results:   Lab Results   Component Value Date    WBC 10.9 2025    HGB 11.3 (L) 2025    HCT 36.9 2025    .0 2025    CREATSERUM 2.50 (H) 2025    BUN 25 (H) 2025     2025    K 4.5 2025     2025    CO2 26.0 2025     (H) 2025    CA 8.7 2025    ALB 3.3 2025    ALKPHO 107 2025    BILT 0.2 2025    TP 5.0 (L) 2025    AST 39 (H) 2025    ALT 20 2025    TSH 0.528 10/10/2023    LIP 36 2025    DDIMER 1.77 (H) 2025    MG 2.2 2025    PHOS 4.3 2025    TROPHS 79 (HH) 2025     (H)  06/01/2025    B12 581 10/10/2023         Assessment & Plan:     1-acute respiratory failure with hypoxia , multifactorial  - Aspiration pneumonia  - Pulmonary edema  - COPD with acute exacerbation ( extensive history of smoking )  - Obesity and CRUZITO  - Generalized weakness and fatigue and recurrent falls     intubated 5/27/25  Extubated 5/30/25 and tolerated well   Better overall   On 2-3 L NC      Completed course of antibiotics with Unasyn   Taper Steroid and bronchodilator and nebulizers  O2 therapy   Aspiration precautions   Pt/ot     2-acute on chronic kidney injury /anuric   Rhabdomyolysis , and now anuric   On hemodialysis    Per Renal      3-h/o HFrEF / LVEF 35%     4-recurrent falls and admitted with rhabdomyolysis  Pt/ot   Will need rehab      5-DVT prophylaxis  Heparin subcu     6-depression  Cymbalta and Seroquel     Much better overall  Discharge planning                       Jonathan Casillas MD  6/4/2025

## 2025-06-04 NOTE — PLAN OF CARE
Problem: Patient Centered Care  Goal: Patient preferences are identified and integrated in the patient's plan of care  Description: Interventions:  - What would you like us to know as we care for you? From home alone.   - Provide timely, complete, and accurate information to patient/family  - Incorporate patient and family knowledge, values, beliefs, and cultural backgrounds into the planning and delivery of care  - Encourage patient/family to participate in care and decision-making at the level they choose  - Honor patient and family perspectives and choices  Outcome: Progressing     Problem: PAIN - ADULT  Goal: Verbalizes/displays adequate comfort level or patient's stated pain goal  Description: INTERVENTIONS:  - Encourage pt to monitor pain and request assistance  - Assess pain using appropriate pain scale  - Administer analgesics based on type and severity of pain and evaluate response  - Implement non-pharmacological measures as appropriate and evaluate response  - Consider cultural and social influences on pain and pain management  - Manage/alleviate anxiety  - Utilize distraction and/or relaxation techniques  - Monitor for opioid side effects  - Notify MD/LIP if interventions unsuccessful or patient reports new pain  - Anticipate increased pain with activity and pre-medicate as appropriate  Outcome: Progressing     Problem: SAFETY ADULT - FALL  Goal: Free from fall injury  Description: INTERVENTIONS:  - Assess pt frequently for physical needs  - Identify cognitive and physical deficits and behaviors that affect risk of falls.  - [place] fall precautions as indicated by assessment.  - Educate pt/family on patient safety including physical limitations  - Instruct pt to call for assistance with activity based on assessment  - Modify environment to reduce risk of injury  - Provide assistive devices as appropriate  - Consider OT/PT consult to assist with strengthening/mobility  - Encourage toileting  schedule  Outcome: Progressing     Problem: DISCHARGE PLANNING  Goal: Discharge to home or other facility with appropriate resources  Description: INTERVENTIONS:  - Identify barriers to discharge w/pt and caregiver  - Include patient/family/discharge partner in discharge planning  - Arrange for needed discharge resources and transportation as appropriate  - Identify discharge learning needs (meds, wound care, etc)  - Arrange for interpreters to assist at discharge as needed  - Consider post-discharge preferences of patient/family/discharge partner  - Complete POLST form as appropriate  - Assess patient's ability to be responsible for managing their own health  - Refer to Case Management Department for coordinating discharge planning if the patient needs post-hospital services based on physician/LIP order or complex needs related to functional status, cognitive ability or social support system  Outcome: Progressing     Problem: RISK FOR INFECTION - ADULT  Goal: Absence of fever/infection during anticipated neutropenic period  Description: INTERVENTIONS  - Monitor WBC  - Administer growth factors as ordered  - Implement neutropenic guidelines  Outcome: Progressing     Problem: SKIN/TISSUE INTEGRITY - ADULT  Goal: Skin integrity remains intact  Description: INTERVENTIONS  - Assess and document risk factors for pressure ulcer development  - Assess and document skin integrity  - Monitor for areas of redness and/or skin breakdown  - Initiate interventions, skin care algorithm/standards of care as needed  Outcome: Progressing     Problem: RESPIRATORY - ADULT  Goal: Achieves optimal ventilation and oxygenation  Description: INTERVENTIONS:  - Assess for changes in respiratory status  - Assess for changes in mentation and behavior  - Position to facilitate oxygenation and minimize respiratory effort  - Oxygen supplementation based on oxygen saturation or ABGs  - Provide Smoking Cessation handout, if applicable  - Encourage  broncho-pulmonary hygiene including cough, deep breathe, Incentive Spirometry  - Assess the need for suctioning and perform as needed  - Assess and instruct to report SOB or any respiratory difficulty  - Respiratory Therapy support as indicated  - Manage/alleviate anxiety  - Monitor for signs/symptoms of CO2 retention  Outcome: Progressing     Problem: GENITOURINARY - ADULT  Goal: Absence of urinary retention  Description: INTERVENTIONS:  - Assess patient’s ability to void and empty bladder  - Monitor intake/output and perform bladder scan as needed  - Follow urinary retention protocol/standard of care  - Consider collaborating with pharmacy to review patient's medication profile  - Implement strategies to promote bladder emptying  Outcome: Progressing     Problem: METABOLIC/FLUID AND ELECTROLYTES - ADULT  Goal: Glucose maintained within prescribed range  Description: INTERVENTIONS:  - Monitor Blood Glucose as ordered  - Assess for signs and symptoms of hyperglycemia and hypoglycemia  - Administer ordered medications to maintain glucose within target range  - Assess barriers to adequate nutritional intake and initiate nutrition consult as needed  - Instruct patient on self management of diabetes  Outcome: Progressing  Goal: Electrolytes maintained within normal limits  Description: INTERVENTIONS:  - Monitor labs and rhythm and assess patient for signs and symptoms of electrolyte imbalances  - Administer electrolyte replacement as ordered  - Monitor response to electrolyte replacements, including rhythm and repeat lab results as appropriate  - Fluid restriction as ordered  - Instruct patient on fluid and nutrition restrictions as appropriate  Outcome: Progressing  Goal: Hemodynamic stability and optimal renal function maintained  Description: INTERVENTIONS:  - Monitor labs and assess for signs and symptoms of volume excess or deficit  - Monitor intake, output and patient weight  - Monitor urine specific gravity,  serum osmolarity and serum sodium as indicated or ordered  - Monitor response to interventions for patient's volume status, including labs, urine output, blood pressure (other measures as available)  - Encourage oral intake as appropriate  - Instruct patient on fluid and nutrition restrictions as appropriate  Outcome: Progressing     Problem: HEMATOLOGIC - ADULT  Goal: Free from bleeding injury  Description: (Example usage: patient with low platelets)  INTERVENTIONS:  - Avoid intramuscular injections, enemas and rectal medication administration  - Ensure safe mobilization of patient  - Hold pressure on venipuncture sites to achieve adequate hemostasis  - Assess for signs and symptoms of internal bleeding  - Monitor lab trends  - Patient is to report abnormal signs of bleeding to staff  - Avoid use of toothpicks and dental floss  - Use electric shaver for shaving  - Use soft bristle tooth brush  - Limit straining and forceful nose blowing  Outcome: Progressing  Goal: Maintains hematologic stability  Description: INTERVENTIONS  - Assess for signs and symptoms of bleeding or hemorrhage  - Monitor labs and vital signs for trends  - Administer supportive blood products/factors, fluids and medications as ordered and appropriate  - Administer supportive blood products/factors as ordered and appropriate  Outcome: Progressing  Goal: Free from bleeding injury  Description: (Example usage: patient with low platelets)  INTERVENTIONS:  - Avoid intramuscular injections, enemas and rectal medication administration  - Ensure safe mobilization of patient  - Hold pressure on venipuncture sites to achieve adequate hemostasis  - Assess for signs and symptoms of internal bleeding  - Monitor lab trends  - Patient is to report abnormal signs of bleeding to staff  - Avoid use of toothpicks and dental floss  - Use electric shaver for shaving  - Use soft bristle tooth brush  - Limit straining and forceful nose blowing  Outcome: Progressing      Problem: GASTROINTESTINAL - ADULT  Goal: Minimal or absence of nausea and vomiting  Description: INTERVENTIONS:  - Maintain adequate hydration with IV or PO as ordered and tolerated  - Nasogastric tube to low intermittent suction as ordered  - Evaluate effectiveness of ordered antiemetic medications  - Provide nonpharmacologic comfort measures as appropriate  - Advance diet as tolerated, if ordered  - Obtain nutritional consult as needed  - Evaluate fluid balance  Outcome: Progressing  Goal: Maintains or returns to baseline bowel function  Description: INTERVENTIONS:  - Assess bowel function  - Maintain adequate hydration with IV or PO as ordered and tolerated  - Evaluate effectiveness of GI medications  - Encourage mobilization and activity  - Obtain nutritional consult as needed  - Establish a toileting routine/schedule  - Consider collaborating with pharmacy to review patient's medication profile  Outcome: Progressing     Problem: Diabetes/Glucose Control  Goal: Glucose maintained within prescribed range  Description: INTERVENTIONS:  - Monitor Blood Glucose as ordered  - Assess for signs and symptoms of hyperglycemia and hypoglycemia  - Administer ordered medications to maintain glucose within target range  - Assess barriers to adequate nutritional intake and initiate nutrition consult as needed  - Instruct patient on self management of diabetes  Outcome: Progressing     Problem: Delirium  Goal: Minimize duration of delirium  Description: Interventions:  - Encourage use of hearing aids, eye glasses  - Promote highest level of mobility daily  - Provide frequent reorientation  - Promote wakefulness i.e. lights on, blinds open  - Promote sleep, encourage patient's normal rest cycle i.e. lights off, TV off, minimize noise and interruptions  - Encourage family to assist in orientation and promotion of home routines  Outcome: Progressing     Monitoring vital signs, stable at this time. No acute changes at this  moment.  Monitoring blood glucose levels.  Pain medication provided as needed. Fall precautions in place- bed alarm on, bed locked in lowest position, call light within reach. Frequent rounding by nursing staff.

## 2025-06-05 LAB
ALBUMIN SERPL-MCNC: 3.3 G/DL (ref 3.2–4.8)
ALBUMIN/GLOB SERPL: 1.9 {RATIO} (ref 1–2)
ALP LIVER SERPL-CCNC: 101 U/L (ref 50–130)
ALT SERPL-CCNC: 20 U/L (ref 10–49)
ANION GAP SERPL CALC-SCNC: 10 MMOL/L (ref 0–18)
AST SERPL-CCNC: 21 U/L (ref ?–34)
BASOPHILS # BLD AUTO: 0.1 X10(3) UL (ref 0–0.2)
BASOPHILS NFR BLD AUTO: 1.4 %
BILIRUB SERPL-MCNC: 0.2 MG/DL (ref 0.2–1.1)
BUN BLD-MCNC: 57 MG/DL (ref 9–23)
BUN/CREAT SERPL: 10.7 (ref 10–20)
CALCIUM BLD-MCNC: 8.9 MG/DL (ref 8.7–10.4)
CHLORIDE SERPL-SCNC: 102 MMOL/L (ref 98–112)
CO2 SERPL-SCNC: 23 MMOL/L (ref 21–32)
CREAT BLD-MCNC: 5.33 MG/DL (ref 0.55–1.02)
DEPRECATED RDW RBC AUTO: 61 FL (ref 35.1–46.3)
EGFRCR SERPLBLD CKD-EPI 2021: 9 ML/MIN/1.73M2 (ref 60–?)
EOSINOPHIL # BLD AUTO: 0.16 X10(3) UL (ref 0–0.7)
EOSINOPHIL NFR BLD AUTO: 2.3 %
ERYTHROCYTE [DISTWIDTH] IN BLOOD BY AUTOMATED COUNT: 17.1 % (ref 11–15)
GLOBULIN PLAS-MCNC: 1.7 G/DL (ref 2–3.5)
GLUCOSE BLD-MCNC: 90 MG/DL (ref 70–99)
GLUCOSE BLDC GLUCOMTR-MCNC: 106 MG/DL (ref 70–99)
GLUCOSE BLDC GLUCOMTR-MCNC: 107 MG/DL (ref 70–99)
GLUCOSE BLDC GLUCOMTR-MCNC: 83 MG/DL (ref 70–99)
GLUCOSE BLDC GLUCOMTR-MCNC: 90 MG/DL (ref 70–99)
HCT VFR BLD AUTO: 34.4 % (ref 35–48)
HGB BLD-MCNC: 10.7 G/DL (ref 12–16)
IMM GRANULOCYTES # BLD AUTO: 0.14 X10(3) UL (ref 0–1)
IMM GRANULOCYTES NFR BLD: 2 %
LYMPHOCYTES # BLD AUTO: 1.37 X10(3) UL (ref 1–4)
LYMPHOCYTES NFR BLD AUTO: 19.4 %
MCH RBC QN AUTO: 30.4 PG (ref 26–34)
MCHC RBC AUTO-ENTMCNC: 31.1 G/DL (ref 31–37)
MCV RBC AUTO: 97.7 FL (ref 80–100)
MONOCYTES # BLD AUTO: 0.47 X10(3) UL (ref 0.1–1)
MONOCYTES NFR BLD AUTO: 6.7 %
NEUTROPHILS # BLD AUTO: 4.81 X10 (3) UL (ref 1.5–7.7)
NEUTROPHILS # BLD AUTO: 4.81 X10(3) UL (ref 1.5–7.7)
NEUTROPHILS NFR BLD AUTO: 68.2 %
OSMOLALITY SERPL CALC.SUM OF ELEC: 295 MOSM/KG (ref 275–295)
PLATELET # BLD AUTO: 188 10(3)UL (ref 150–450)
POTASSIUM SERPL-SCNC: 4.4 MMOL/L (ref 3.5–5.1)
PROT SERPL-MCNC: 5 G/DL (ref 5.7–8.2)
RBC # BLD AUTO: 3.52 X10(6)UL (ref 3.8–5.3)
SODIUM SERPL-SCNC: 135 MMOL/L (ref 136–145)
WBC # BLD AUTO: 7.1 X10(3) UL (ref 4–11)

## 2025-06-05 PROCEDURE — 99232 SBSQ HOSP IP/OBS MODERATE 35: CPT | Performed by: PHYSICIAN ASSISTANT

## 2025-06-05 NOTE — PLAN OF CARE
Problem: SKIN/TISSUE INTEGRITY - ADULT  Goal: Skin integrity remains intact  Description: INTERVENTIONS  - Assess and document risk factors for pressure ulcer development  - Assess and document skin integrity  - Monitor for areas of redness and/or skin breakdown  - Initiate interventions, skin care algorithm/standards of care as needed  Outcome: Progressing     Problem: RESPIRATORY - ADULT  Goal: Achieves optimal ventilation and oxygenation  Description: INTERVENTIONS:  - Assess for changes in respiratory status  - Assess for changes in mentation and behavior  - Position to facilitate oxygenation and minimize respiratory effort  - Oxygen supplementation based on oxygen saturation or ABGs  - Provide Smoking Cessation handout, if applicable  - Encourage broncho-pulmonary hygiene including cough, deep breathe, Incentive Spirometry  - Assess the need for suctioning and perform as needed  - Assess and instruct to report SOB or any respiratory difficulty  - Respiratory Therapy support as indicated  - Manage/alleviate anxiety  - Monitor for signs/symptoms of CO2 retention  Outcome: Progressing     Problem: GENITOURINARY - ADULT  Goal: Absence of urinary retention  Description: INTERVENTIONS:  - Assess patient’s ability to void and empty bladder  - Monitor intake/output and perform bladder scan as needed  - Follow urinary retention protocol/standard of care  - Consider collaborating with pharmacy to review patient's medication profile  - Implement strategies to promote bladder emptying  Outcome: Progressing     Problem: METABOLIC/FLUID AND ELECTROLYTES - ADULT  Goal: Glucose maintained within prescribed range  Description: INTERVENTIONS:  - Monitor Blood Glucose as ordered  - Assess for signs and symptoms of hyperglycemia and hypoglycemia  - Administer ordered medications to maintain glucose within target range  - Assess barriers to adequate nutritional intake and initiate nutrition consult as needed  - Instruct patient  on self management of diabetes  Outcome: Progressing

## 2025-06-05 NOTE — PLAN OF CARE
Patient had hemodialysis today. Per Candy RN, 2L removed. Patient educated on fluid and electrolyte balance by Candy RN, reinforced by this RN at bedside.       Problem: Patient Centered Care  Goal: Patient preferences are identified and integrated in the patient's plan of care  Description: Interventions:  - What would you like us to know as we care for you? I live at home alone  - Provide timely, complete, and accurate information to patient/family  - Incorporate patient and family knowledge, values, beliefs, and cultural backgrounds into the planning and delivery of care  - Encourage patient/family to participate in care and decision-making at the level they choose  - Honor patient and family perspectives and choices  Outcome: Progressing     Problem: PAIN - ADULT  Goal: Verbalizes/displays adequate comfort level or patient's stated pain goal  Description: INTERVENTIONS:  - Encourage pt to monitor pain and request assistance  - Assess pain using appropriate pain scale  - Administer analgesics based on type and severity of pain and evaluate response  - Implement non-pharmacological measures as appropriate and evaluate response  - Consider cultural and social influences on pain and pain management  - Manage/alleviate anxiety  - Utilize distraction and/or relaxation techniques  - Monitor for opioid side effects  - Notify MD/LIP if interventions unsuccessful or patient reports new pain  - Anticipate increased pain with activity and pre-medicate as appropriate  Outcome: Progressing     Problem: SAFETY ADULT - FALL  Goal: Free from fall injury  Description: INTERVENTIONS:  - Assess pt frequently for physical needs  - Identify cognitive and physical deficits and behaviors that affect risk of falls.  - [place] fall precautions as indicated by assessment.  - Educate pt/family on patient safety including physical limitations  - Instruct pt to call for assistance with activity based on assessment  - Modify  environment to reduce risk of injury  - Provide assistive devices as appropriate  - Consider OT/PT consult to assist with strengthening/mobility  - Encourage toileting schedule  Outcome: Progressing     Problem: DISCHARGE PLANNING  Goal: Discharge to home or other facility with appropriate resources  Description: INTERVENTIONS:  - Identify barriers to discharge w/pt and caregiver  - Include patient/family/discharge partner in discharge planning  - Arrange for needed discharge resources and transportation as appropriate  - Identify discharge learning needs (meds, wound care, etc)  - Arrange for interpreters to assist at discharge as needed  - Consider post-discharge preferences of patient/family/discharge partner  - Complete POLST form as appropriate  - Assess patient's ability to be responsible for managing their own health  - Refer to Case Management Department for coordinating discharge planning if the patient needs post-hospital services based on physician/LIP order or complex needs related to functional status, cognitive ability or social support system  Outcome: Progressing     Problem: SKIN/TISSUE INTEGRITY - ADULT  Goal: Skin integrity remains intact  Description: INTERVENTIONS  - Assess and document risk factors for pressure ulcer development  - Assess and document skin integrity  - Monitor for areas of redness and/or skin breakdown  - Initiate interventions, skin care algorithm/standards of care as needed  Outcome: Progressing     Problem: RESPIRATORY - ADULT  Goal: Achieves optimal ventilation and oxygenation  Description: INTERVENTIONS:  - Assess for changes in respiratory status  - Assess for changes in mentation and behavior  - Position to facilitate oxygenation and minimize respiratory effort  - Oxygen supplementation based on oxygen saturation or ABGs  - Provide Smoking Cessation handout, if applicable  - Encourage broncho-pulmonary hygiene including cough, deep breathe, Incentive Spirometry  - Assess  the need for suctioning and perform as needed  - Assess and instruct to report SOB or any respiratory difficulty  - Respiratory Therapy support as indicated  - Manage/alleviate anxiety  - Monitor for signs/symptoms of CO2 retention  Outcome: Progressing     Problem: GENITOURINARY - ADULT  Goal: Absence of urinary retention  Description: INTERVENTIONS:  - Assess patient’s ability to void and empty bladder  - Monitor intake/output and perform bladder scan as needed  - Follow urinary retention protocol/standard of care  - Consider collaborating with pharmacy to review patient's medication profile  - Implement strategies to promote bladder emptying  Outcome: Progressing     Problem: METABOLIC/FLUID AND ELECTROLYTES - ADULT  Goal: Glucose maintained within prescribed range  Description: INTERVENTIONS:  - Monitor Blood Glucose as ordered  - Assess for signs and symptoms of hyperglycemia and hypoglycemia  - Administer ordered medications to maintain glucose within target range  - Assess barriers to adequate nutritional intake and initiate nutrition consult as needed  - Instruct patient on self management of diabetes  Outcome: Progressing  Goal: Electrolytes maintained within normal limits  Description: INTERVENTIONS:  - Monitor labs and rhythm and assess patient for signs and symptoms of electrolyte imbalances  - Administer electrolyte replacement as ordered  - Monitor response to electrolyte replacements, including rhythm and repeat lab results as appropriate  - Fluid restriction as ordered  - Instruct patient on fluid and nutrition restrictions as appropriate  Outcome: Progressing  Goal: Hemodynamic stability and optimal renal function maintained  Description: INTERVENTIONS:  - Monitor labs and assess for signs and symptoms of volume excess or deficit  - Monitor intake, output and patient weight  - Monitor urine specific gravity, serum osmolarity and serum sodium as indicated or ordered  - Monitor response to  interventions for patient's volume status, including labs, urine output, blood pressure (other measures as available)  - Encourage oral intake as appropriate  - Instruct patient on fluid and nutrition restrictions as appropriate  Outcome: Progressing     Problem: HEMATOLOGIC - ADULT  Goal: Free from bleeding injury  Description: (Example usage: patient with low platelets)  INTERVENTIONS:  - Avoid intramuscular injections, enemas and rectal medication administration  - Ensure safe mobilization of patient  - Hold pressure on venipuncture sites to achieve adequate hemostasis  - Assess for signs and symptoms of internal bleeding  - Monitor lab trends  - Patient is to report abnormal signs of bleeding to staff  - Avoid use of toothpicks and dental floss  - Use electric shaver for shaving  - Use soft bristle tooth brush  - Limit straining and forceful nose blowing  Outcome: Progressing  Goal: Maintains hematologic stability  Description: INTERVENTIONS  - Assess for signs and symptoms of bleeding or hemorrhage  - Monitor labs and vital signs for trends  - Administer supportive blood products/factors, fluids and medications as ordered and appropriate  - Administer supportive blood products/factors as ordered and appropriate  Outcome: Progressing  Goal: Free from bleeding injury  Description: (Example usage: patient with low platelets)  INTERVENTIONS:  - Avoid intramuscular injections, enemas and rectal medication administration  - Ensure safe mobilization of patient  - Hold pressure on venipuncture sites to achieve adequate hemostasis  - Assess for signs and symptoms of internal bleeding  - Monitor lab trends  - Patient is to report abnormal signs of bleeding to staff  - Avoid use of toothpicks and dental floss  - Use electric shaver for shaving  - Use soft bristle tooth brush  - Limit straining and forceful nose blowing  Outcome: Progressing     Problem: Diabetes/Glucose Control  Goal: Glucose maintained within prescribed  range  Description: INTERVENTIONS:  - Monitor Blood Glucose as ordered  - Assess for signs and symptoms of hyperglycemia and hypoglycemia  - Administer ordered medications to maintain glucose within target range  - Assess barriers to adequate nutritional intake and initiate nutrition consult as needed  - Instruct patient on self management of diabetes  Outcome: Progressing     Problem: GASTROINTESTINAL - ADULT  Goal: Minimal or absence of nausea and vomiting  Description: INTERVENTIONS:  - Maintain adequate hydration with IV or PO as ordered and tolerated  - Nasogastric tube to low intermittent suction as ordered  - Evaluate effectiveness of ordered antiemetic medications  - Provide nonpharmacologic comfort measures as appropriate  - Advance diet as tolerated, if ordered  - Obtain nutritional consult as needed  - Evaluate fluid balance  Outcome: Progressing  Goal: Maintains or returns to baseline bowel function  Description: INTERVENTIONS:  - Assess bowel function  - Maintain adequate hydration with IV or PO as ordered and tolerated  - Evaluate effectiveness of GI medications  - Encourage mobilization and activity  - Obtain nutritional consult as needed  - Establish a toileting routine/schedule  - Consider collaborating with pharmacy to review patient's medication profile  Outcome: Progressing     Problem: RISK FOR INFECTION - ADULT  Goal: Absence of fever/infection during anticipated neutropenic period  Description: INTERVENTIONS  - Monitor WBC  - Administer growth factors as ordered  - Implement neutropenic guidelines  Outcome: Progressing     Problem: Patient/Family Goals  Goal: Patient/Family Long Term Goal  Description: Patient's Long Term Goal: Discharge from hospital    Interventions:  - Monitor vital signs  - Monitor appropriate labs  - Monitor blood glucose levels  - Pain management  - Oxygen and respiratory intervention as needed  - Administer medications per order  - Follow MD orders  - Diagnostics per  order  - Update / inform patient and family on plan of care  - Discharge planning  - See additional Care Plan goals for specific interventions  Outcome: Progressing  Goal: Patient/Family Short Term Goal  Description: Patient's Short Term Goal: Improve strength    Interventions:   - Monitor vital signs  - Monitor appropriate labs  - Monitor blood glucose levels  - Pain management  - Oxygen and respiratory intervention as needed  - Administer medications per order  - Follow MD orders  - Diagnostics per order  - Update / inform patient and family on plan of care  - Discharge planning  - See additional Care Plan goals for specific interventions  Outcome: Progressing     Problem: Delirium  Goal: Minimize duration of delirium  Description: Interventions:  - Encourage use of hearing aids, eye glasses  - Promote highest level of mobility daily  - Provide frequent reorientation  - Promote wakefulness i.e. lights on, blinds open  - Promote sleep, encourage patient's normal rest cycle i.e. lights off, TV off, minimize noise and interruptions  - Encourage family to assist in orientation and promotion of home routines  Outcome: Not Progressing

## 2025-06-05 NOTE — PROGRESS NOTES
[facility]  part of [facility]    Progress Note    Radha Winters Patient Status:  Inpatient    1960 MRN [id number]   Location [facility]5W Attending Fanny Majano MD   Hosp Day # 19 PCP JUAN MONTANEZ     Subjective:   Seen and examined while resting in bed. Pulled out IV. Had HD today. No complaints. Poor historian.    Objective:   Blood pressure 105/60, pulse 76, temperature 97.9 °F (36.6 °C), temperature source Axillary, resp. rate 16, height 5' 3\" (1.6 m), weight 213 lb 9.6 oz (96.9 kg), SpO2 97%.  Physical Exam  Vitals and nursing note reviewed.   Constitutional:       General: She is awake. She is not in acute distress.     Appearance: She is ill-appearing.      Interventions: Nasal cannula in place.   HENT:      Head: Normocephalic and atraumatic.   Cardiovascular:      Rate and Rhythm: Normal rate and regular rhythm.   Pulmonary:      Effort: Pulmonary effort is normal. No respiratory distress.      Breath sounds: Rales (faint bilateral crackles) present. No wheezing or rhonchi.   Abdominal:      General: There is no distension.      Palpations: Abdomen is soft.      Tenderness: There is no abdominal tenderness.   Musculoskeletal:      Cervical back: Normal range of motion and neck supple.      Right lower leg: No edema.      Left lower leg: No edema.   Skin:     General: Skin is warm and dry.   Neurological:      General: No focal deficit present.      Mental Status: She is alert.      Comments: A&Ox3 to person, place, year  Confused and forgetful at times   Psychiatric:         Mood and Affect: Mood normal.       Results:   Lab Results   Component Value Date    WBC 7.1 2025    HGB 10.7 (L) 2025    HCT 34.4 (L) 2025    .0 2025    CREATSERUM 5.33 (H) 2025    BUN 57 (H) 2025     (L) 2025    K 4.4 2025     2025    CO2 23.0 2025    GLU 90 2025    CA 8.9 2025    ALB 3.3  06/05/2025    ALKPHO 101 06/05/2025    BILT 0.2 06/05/2025    TP 5.0 (L) 06/05/2025    AST 21 06/05/2025    ALT 20 06/05/2025    TSH 0.528 10/10/2023    LIP 36 05/14/2025    DDIMER 1.77 (H) 05/24/2025    MG 2.2 05/28/2025    PHOS 4.3 05/28/2025    TROPHS 79 (HH) 05/28/2025     (H) 06/01/2025    B12 581 10/10/2023     CXR 6/1/25:  Cardiomegaly with bilateral interstitial opacity likely mild edema.      Interval extubation.       Assessment & Plan:   Acute on chronic kidney injury with rhabdomyolysis  Recurrent falls at home  Anuric, remains on HD  CK markedly elevated on admit; now improved to 446  Last HD today  Plan:  -HD as per renal    Acute hypoxemic respiratory failure  Multifactorial with aspiration pneumonia, pulmonary edema, COPD with acute exacerbation  Intubated 5/27 and extubated 5/30  Completed course of antibiotics (unasyn)  Weaned off steroids  Improving - down to 1 L O2  Plan:  -O2 and wean as tolerated  -Aspiration precautions  -Volume management with HD    COPD with exacerbation  Weaned off steroids  Improved  Plan:  -Brovana and Pulmicort nebs  -DuoNebs    HFrEF  Echo with EF 30-35%  Plan:  -Entresto and carvedilol as per cardiology    Hypotension  Plan:  -Midodrine    DVT prophylaxis: Subcutaneous heparin    Code status: Full code    D/w RN.    Marck Perdomo PA-C  Pulmonary Medicine  6/5/2025

## 2025-06-05 NOTE — PROGRESS NOTES
Patient seen in follow-up. Patient denies chest pain or shortness of breath at rest. Denies cough or congestion. Denies dizziness or lightheadedness. Denies abdominal pain or nausea.  Slow to respond.      Vitals:    06/05/25 0437   BP: 128/57   Pulse: 76   Resp: 18   Temp: 97.6 °F (36.4 °C)       Intake/Output Summary (Last 24 hours) at 6/5/2025 0855  Last data filed at 6/5/2025 0600  Gross per 24 hour   Intake 468 ml   Output 0 ml   Net 468 ml     Wt Readings from Last 1 Encounters:   06/05/25 213 lb 9.6 oz (96.9 kg)        General: No acute distress.  Neck: Jugular venous pulsations not seen.  Lungs: Clear to auscultation.  Heart: Normal rate. No murmurs.  Abdomen: Soft. Non tender  Extremities: No edema.  Neurological:   slow to respond but does answer questions  Psychiatric: Appropriate mood and affect.  Current Facility-Administered Medications   Medication Dose Route Frequency    insulin aspart (NovoLOG) 100 Units/mL FlexPen 1-5 Units  1-5 Units Subcutaneous TID CC    docusate sodium (Colace) cap 100 mg  100 mg Oral BID    sennosides (Senokot) tab 17.2 mg  17.2 mg Oral BID    ipratropium-albuterol (Duoneb) 0.5-2.5 (3) MG/3ML inhalation solution 3 mL  3 mL Nebulization 2 times daily    midodrine (ProAmatine) tab 10 mg  10 mg Per G Tube TID    busPIRone (Buspar) tab 30 mg  30 mg Per G Tube TID    chlorproMAZINE (Thorazine) tab 25 mg  25 mg Per G Tube QID    diazePAM (Valium) tab 5 mg  5 mg Per G Tube Q8H PRN    QUEtiapine (SEROquel) tab 50 mg  50 mg Per G Tube BID    levothyroxine (Synthroid) tab 150 mcg  150 mcg Per G Tube Before breakfast    famotidine (Pepcid) 20 mg/2mL injection 20 mg  20 mg Intravenous Daily    glucose (Dex4) 15 GM/59ML oral liquid 15 g  15 g Oral Q15 Min PRN    Or    glucose (Glutose) 40% oral gel 15 g  15 g Oral Q15 Min PRN    Or    glucose-vitamin C (Dex-4) chewable tab 4 tablet  4 tablet Oral Q15 Min PRN    Or    dextrose 50% injection 50 mL  50 mL Intravenous Q15 Min PRN    Or     glucose (Dex4) 15 GM/59ML oral liquid 30 g  30 g Oral Q15 Min PRN    Or    glucose (Glutose) 40% oral gel 30 g  30 g Oral Q15 Min PRN    Or    glucose-vitamin C (Dex-4) chewable tab 8 tablet  8 tablet Oral Q15 Min PRN    carvedilol (Coreg) tab 3.125 mg  3.125 mg Oral BID with meals    sacubitril-valsartan (Entresto) 24-26 MG per tab 1 tablet  1 tablet Oral BID    budesonide (Pulmicort) 0.5 MG/2ML nebulizer suspension 0.5 mg  0.5 mg Nebulization 2 times daily    arformoterol (Brovana) 15 MCG/2ML nebulizer solution 15 mcg  15 mcg Nebulization 2 times daily    dextrose 10% infusion (TPN no rate)   Intravenous Continuous PRN    sodium bicarbonate tab 650 mg  650 mg Per G Tube PRN    And    pancrelipase (Lip-Prot-Amyl) (Zenpep) DR particles cap 10,000 Units  10,000 Units Per G Tube PRN    DULoxetine (Cymbalta) DR cap 60 mg  60 mg Oral BID    HYDROcodone-acetaminophen (Norco) 5-325 MG per tab 1 tablet  1 tablet Oral Q6H PRN    ondansetron (Zofran) 4 MG/2ML injection 4 mg  4 mg Intravenous Q6H PRN    polyethylene glycol (PEG 3350) (Miralax) 17 g oral packet 17 g  17 g Oral Daily PRN    magnesium hydroxide (Milk of Magnesia) 400 MG/5ML oral suspension 30 mL  30 mL Oral Daily PRN    bisacodyl (Dulcolax) 10 MG rectal suppository 10 mg  10 mg Rectal Daily PRN    fleet enema (Fleet) rectal enema 133 mL  1 enema Rectal Once PRN    heparin (Porcine) 1000 UNIT/ML injection 2,000 Units  2,000 Units Intravenous PRN Dialysis    albuterol (Ventolin) (2.5 MG/3ML) 0.083% nebulizer solution 2.5 mg  2.5 mg Nebulization Q6H PRN    heparin (Porcine) 5000 UNIT/ML injection 5,000 Units  5,000 Units Subcutaneous 2 times per day    acetaminophen (Tylenol) tab 650 mg  650 mg Oral Q6H PRN    aspirin DR tab 81 mg  81 mg Oral Daily    QUEtiapine ER (SEROquel XR) 24 hr tab 300 mg  300 mg Oral Nightly     Medications Prior to Admission   Medication Sig    busPIRone HCl 30 MG Oral Tab Take 1 tablet (30 mg total) by mouth 3 (three) times daily.     chlorproMAZINE 25 MG Oral Tab Take 1 tablet (25 mg total) by mouth 4 (four) times daily.    diazePAM 5 MG Oral Tab Take 1 tablet (5 mg total) by mouth every 8 (eight) hours as needed for Anxiety.    DULoxetine 60 MG Oral Cap DR Particles Take 1 capsule (60 mg total) by mouth 2 (two) times daily.    metFORMIN  MG Oral Tablet 24 Hr Take 2 tablets (1,000 mg total) by mouth daily. (Patient taking differently: Take 2 tablets (1,000 mg total) by mouth 2 (two) times daily with meals.)    metoprolol succinate ER 50 MG Oral Tablet 24 Hr Take 1 tablet (50 mg total) by mouth 2 (two) times daily.    QUEtiapine  MG Oral Tablet 24 Hr Take 1 tablet (300 mg total) by mouth nightly.    QUEtiapine 50 MG Oral Tab Take 1 tablet (50 mg total) by mouth 2 (two) times daily.    rosuvastatin 40 MG Oral Tab Take 1 tablet (40 mg total) by mouth before bedtime.    lidocaine 5 % External Patch Place 1 patch onto the skin daily.    [] ibuprofen 600 MG Oral Tab Take 1 tablet (600 mg total) by mouth every 6 (six) hours as needed.    levothyroxine 150 MCG Oral Tab Take 1 tablet (150 mcg total) by mouth before breakfast. Give on an empty stomach. On Mon, Tue, Wed, Thur, Fri and Saturday    levothyroxine 75 MCG Oral Tab Take 1 tablet (75 mcg total) by mouth before breakfast. On Sundays only    aspirin 81 MG Oral Tab EC Take 1 tablet (81 mg total) by mouth in the morning.    methylPREDNISolone 4 MG Oral Tablet Therapy Pack Take as directed on dose pack instructions (Patient not taking: Reported on 2025)     No results found.      Lab Results   Component Value Date    WBC 7.1 2025    HGB 10.7 2025    HCT 34.4 2025    .0 2025    CREATSERUM 5.33 2025    BUN 57 2025     2025    K 4.4 2025     2025    CO2 23.0 2025    GLU 90 2025    CA 8.9 2025    ALB 3.3 2025    ALKPHO 101 2025    BILT 0.2 2025    TP 5.0 2025    AST 21  06/05/2025    ALT 20 06/05/2025       Assessment / Plan  1. Acute on Chronic Systolic Heart Failure -  Ejection fraction 30-35% with apical wall akinesia. Previous EF 35-40% 2015. Will institute heart failure therapy with Carvedilol 3.125 mg BID and Entresto 24/26 mg BID.            I50.23 Acute on chronic systolic (congestive) heart failure           2. Acute Respiratory Failure -  Currently intubated. Chest x-ray shows right basal opacity consistent with atelectasis versus pneumonia. Evidence of pulmonary edema pattern and possible aspiration pneumonia with mucus plugging. On abx  J96.00 Acute respiratory failure, unspecified whether with hypoxia or hypercapnia        3. Acute on Chronic Kidney Disease -     Creatinine improving.  On HD.           N17.9 Acute kidney failure, unspecified           4. Demand Ischemia -  Troponin mildly elevated at 79, down from 197 four days ago. ECG with sinus tachycardia and occasional PVCs. Findings consistent with demand ischemia.           I24.8 Other forms of acute ischemic heart disease    Plan:  Feeling well. Still on some oxygen. Nurse will attempt to wean off today. On GDMT for HF. Discharge planning.

## 2025-06-05 NOTE — PROGRESS NOTES
[facility]  part of [facility]    Nephrology Progress Note    Radha Winetrs Patient Status:  Inpatient    1960 MRN [id number]   Location [facility]5W Attending Fanny Majano MD   Hosp Day # 18 PCP JUAN MONTANEZ     Subjective:   Radha Winters is a(n) 64 year old female     No acute events or new issues reported.    Objective:   Blood pressure 101/48, pulse 74, temperature 97.6 °F (36.4 °C), temperature source Oral, resp. rate 20, height 5' 3\" (1.6 m), weight 233 lb 11 oz (106 kg), SpO2 93%.    Gen:  NAD, fatigued appearing  HEENT:  NC/AT, PERRLA  Neck:  Supple, no JVD  Chest:  Diminished breath sounds at bases  CVS:  S1S2, regular rate  Abdomen:  Soft, NT/ND  Ext:  + pedal edema  Neuro: No focal deficits appreciated.    Results:    Lab Results   Component Value Date    WBC 10.9 2025    HGB 11.3 (L) 2025    HCT 36.9 2025    .0 2025    CREATSERUM 2.50 (H) 2025    BUN 25 (H) 2025     2025    K 4.5 2025     2025    CO2 26.0 2025     (H) 2025    CA 8.7 2025    ALB 3.3 2025    ALKPHO 107 2025    BILT 0.2 2025    TP 5.0 (L) 2025    AST 39 (H) 2025    ALT 20 2025    TSH 0.528 10/10/2023    LIP 36 2025    DDIMER 1.77 (H) 2025    MG 2.2 2025    PHOS 4.3 2025    TROPHS 79 (HH) 2025     (H) 2025    B12 581 10/10/2023       No results found.        Assessment & Plan:     Acute renal failure superimposed on chronic kidney disease, unspecified acute renal failure type, unspecified CKD stage        Non-traumatic rhabdomyolysis        Transaminitis    Acute renal failure superimposed on chronic kidney disease, unspecified acute renal failure type, unspecified CKD stage          Non-traumatic rhabdomyolysis          Transaminitis  Acute renal failure superimposed on chronic kidney disease, unspecified acute renal  failure type, unspecified CKD stage          Non-traumatic rhabdomyolysis          Transaminitis     Acute renal failure superimposed on chronic kidney disease, unspecified acute renal failure type, unspecified CKD stage          Non-traumatic rhabdomyolysis     Patient is a 63 y/o female with PMH of HTN, dyslipidemia, CAD admitted after multiple falls, Nephrology consulted for Acute Kidney Injury      Acute Kidney Injury:  Likely secondary to ATN, from Rhabdomyolysis, continue aggressive volume resuscitation, 0.9  ml/hr, continue to trend BMP, check Uric acid, Phos, Urine studies  CKD stage 3b vs 4:  Secondary to HTN nephrosclerosis, check Phos, PTH, renal US  Transaminitis:  Continue to trend LFTs, will check abdominal US        Recommendations:  Acute Kidney Injury likely unresolved ATN  HD with 2-2.5 liters UF as tolerated  Albumin and Midodine for MAP goal >65        May 23, 2025  Patient is nonoliguric.  Renal function is severely deranged.  Has a tunneled dialysis catheter in place.  Will dialyze as needed for volume control.  Currently patient shows no signs of recovery of kidney function and likely needs supportive care including dialysis as needed.     May 24, 2025  Patient had a rapid response called because of being short of breath and was clinically volume overloaded.  Patient transferred to progressive care unit.  Discussed with hospitalist.  Patient is receiving dialysis with intent to remove 2.2 L of fluid.  Continue supportive care and monitor electrolytes and renal function.     May 25, 2025  Patient is critically ill in ICU.  Today is day 2 of back-to-back dialysis.  Within goal  to remove 2.2 L of fluid in 3-1/2 hours.  Overall patient seems to have improved but still requiring BiPAP.The monitoring electrolyte and renal function.     May 26th 2025  Patient still not improved much with volume status. Plan for dialysis tomorrow with 4hrs and 3 Lit UF. Remains critically ill in ICU.      May  27th 2025  Patient doing better but still requiring high flow oxygen. She remains oligoanuric. Dialysis dependent. Monitor closely. Critically ill in ICU. Total time spent seeing patient was 45 minutes.     May 28, 2025  Patient remains oligoanuric.  Urinary catheter was removed.  Will do periodic bladder scans and place catheter if needed or do bladder voiding protocol.  Discussed with RN.  Patient will also receive hemodialysis today with intent to remove 1.5 L of fluid.  Vent settings are acceptable.  Blood pressure is stable.  Patient did have intubation yesterday because of volume issues and respiratory distress.  Currently remains critically ill in the ICU.     May 29th 2025  Remains intubated, sedated on HD, tolerating it well.     May 30 th, 2025  Intubated, on full vent support, KCL replaced.       May 31th, 2025  Extubated, seen on HD, hemodynamically stable.      June 1st, 2025  On HD, 4 K bath, hemodynamically stable        June 2nd, 2025  Had HD, hemodynamically stable.        June 3rd, 2025  Seen and examined, hemodynamically stable, plan for HD tomorrow.    June 4th, 2025  Seen and examined earlier, hemodynamically stable         Bernardo Lewis MD  6/4/2025

## 2025-06-05 NOTE — PROGRESS NOTES
[facility]  part of [facility]    Nephrology Progress Note    Radha Winters Patient Status:  Inpatient    1960 MRN [id number]   Location [facility]5W Attending Fanny Majano MD   Hosp Day # 19 PCP JUAN MONTANEZ     Subjective:   Radha Winters is a(n) 64 year old female     Objective:   Blood pressure 128/57, pulse 76, temperature 97.6 °F (36.4 °C), temperature source Oral, resp. rate 18, height 5' 3\" (1.6 m), weight 213 lb 9.6 oz (96.9 kg), SpO2 96%.    Gen:  NAD, fatigued appearing  HEENT:  NC/AT, PERRLA  Neck:  Supple, no JVD  Chest:  Diminished breath sounds at bases  CVS:  S1S2, regular rate  Abdomen:  Soft, NT/ND  Ext:  + pedal edema  Neuro: No focal deficits appreciated.      Results:    Lab Results   Component Value Date    WBC 7.1 2025    HGB 10.7 (L) 2025    HCT 34.4 (L) 2025    .0 2025    CREATSERUM 5.33 (H) 2025    BUN 57 (H) 2025     (L) 2025    K 4.4 2025     2025    CO2 23.0 2025    GLU 90 2025    CA 8.9 2025    ALB 3.3 2025    ALKPHO 101 2025    BILT 0.2 2025    TP 5.0 (L) 2025    AST 21 2025    ALT 20 2025    TSH 0.528 10/10/2023    LIP 36 2025    DDIMER 1.77 (H) 2025    MG 2.2 2025    PHOS 4.3 2025    TROPHS 79 (HH) 2025     (H) 2025    B12 581 10/10/2023       No results found.        Assessment & Plan:     Acute renal failure superimposed on chronic kidney disease, unspecified acute renal failure type, unspecified CKD stage        Non-traumatic rhabdomyolysis        Transaminitis    Acute renal failure superimposed on chronic kidney disease, unspecified acute renal failure type, unspecified CKD stage          Non-traumatic rhabdomyolysis          Transaminitis     Acute renal failure superimposed on chronic kidney disease, unspecified acute renal failure type, unspecified CKD stage           Non-traumatic rhabdomyolysis          Transaminitis  Acute renal failure superimposed on chronic kidney disease, unspecified acute renal failure type, unspecified CKD stage          Non-traumatic rhabdomyolysis          Transaminitis     Acute renal failure superimposed on chronic kidney disease, unspecified acute renal failure type, unspecified CKD stage          Non-traumatic rhabdomyolysis     Patient is a 65 y/o female with PMH of HTN, dyslipidemia, CAD admitted after multiple falls, Nephrology consulted for Acute Kidney Injury      Acute Kidney Injury:  Likely secondary to ATN, from Rhabdomyolysis, continue aggressive volume resuscitation, 0.9  ml/hr, continue to trend BMP, check Uric acid, Phos, Urine studies  CKD stage 3b vs 4:  Secondary to HTN nephrosclerosis, check Phos, PTH, renal US  Transaminitis:  Continue to trend LFTs, will check abdominal US        Recommendations:  Acute Kidney Injury likely unresolved ATN  HD with 2-2.5 liters UF as tolerated  Albumin and Midodine for MAP goal >65        May 23, 2025  Patient is nonoliguric.  Renal function is severely deranged.  Has a tunneled dialysis catheter in place.  Will dialyze as needed for volume control.  Currently patient shows no signs of recovery of kidney function and likely needs supportive care including dialysis as needed.     May 24, 2025  Patient had a rapid response called because of being short of breath and was clinically volume overloaded.  Patient transferred to progressive care unit.  Discussed with hospitalist.  Patient is receiving dialysis with intent to remove 2.2 L of fluid.  Continue supportive care and monitor electrolytes and renal function.     May 25, 2025  Patient is critically ill in ICU.  Today is day 2 of back-to-back dialysis.  Within goal  to remove 2.2 L of fluid in 3-1/2 hours.  Overall patient seems to have improved but still requiring BiPAP.The monitoring electrolyte and renal function.     May 26th  2025  Patient still not improved much with volume status. Plan for dialysis tomorrow with 4hrs and 3 Lit UF. Remains critically ill in ICU.      May 27th 2025  Patient doing better but still requiring high flow oxygen. She remains oligoanuric. Dialysis dependent. Monitor closely. Critically ill in ICU. Total time spent seeing patient was 45 minutes.     May 28, 2025  Patient remains oligoanuric.  Urinary catheter was removed.  Will do periodic bladder scans and place catheter if needed or do bladder voiding protocol.  Discussed with RN.  Patient will also receive hemodialysis today with intent to remove 1.5 L of fluid.  Vent settings are acceptable.  Blood pressure is stable.  Patient did have intubation yesterday because of volume issues and respiratory distress.  Currently remains critically ill in the ICU.     May 29th 2025  Remains intubated, sedated on HD, tolerating it well.     May 30 th, 2025  Intubated, on full vent support, KCL replaced.       May 31th, 2025  Extubated, seen on HD, hemodynamically stable.      June 1st, 2025  On HD, 4 K bath, hemodynamically stable        June 2nd, 2025  Had HD, hemodynamically stable.        June 3rd, 2025  Seen and examined, hemodynamically stable, plan for HD tomorrow.     June 4th, 2025  Seen and examined earlier, hemodynamically stable    June 5th 2025  Patient had HD earlier, 2 liters removed.    June 6th 2025  Seen and examined earlier, hemodynamically stable.         Bernardo Lewis MD  6/5/2025

## 2025-06-05 NOTE — PROGRESS NOTES
Patient seen in follow-up. Patient denies chest pain or shortness of breath at rest. Denies cough or congestion. Denies dizziness or lightheadedness. Denies abdominal pain or nausea.  Slow to respond.      Vitals:    06/04/25 1722   BP: 104/30   Pulse: 72   Resp: 18   Temp: 97.4 °F (36.3 °C)       Intake/Output Summary (Last 24 hours) at 6/4/2025 2023  Last data filed at 6/4/2025 1900  Gross per 24 hour   Intake 218 ml   Output 0 ml   Net 218 ml     Wt Readings from Last 1 Encounters:   06/02/25 233 lb 11 oz (106 kg)        General: No acute distress.  Neck: Jugular venous pulsations not seen.  Lungs: Clear to auscultation.  Heart: Normal rate. No murmurs.  Abdomen: Soft. Non tender  Extremities: No edema.  Neurological:   slow to respond but does answer questions  Psychiatric: Appropriate mood and affect.  Current Facility-Administered Medications   Medication Dose Route Frequency    insulin aspart (NovoLOG) 100 Units/mL FlexPen 1-5 Units  1-5 Units Subcutaneous TID CC    docusate sodium (Colace) cap 100 mg  100 mg Oral BID    sennosides (Senokot) tab 17.2 mg  17.2 mg Oral BID    ipratropium-albuterol (Duoneb) 0.5-2.5 (3) MG/3ML inhalation solution 3 mL  3 mL Nebulization 2 times daily    midodrine (ProAmatine) tab 10 mg  10 mg Per G Tube TID    busPIRone (Buspar) tab 30 mg  30 mg Per G Tube TID    chlorproMAZINE (Thorazine) tab 25 mg  25 mg Per G Tube QID    diazePAM (Valium) tab 5 mg  5 mg Per G Tube Q8H PRN    QUEtiapine (SEROquel) tab 50 mg  50 mg Per G Tube BID    levothyroxine (Synthroid) tab 150 mcg  150 mcg Per G Tube Before breakfast    famotidine (Pepcid) 20 mg/2mL injection 20 mg  20 mg Intravenous Daily    glucose (Dex4) 15 GM/59ML oral liquid 15 g  15 g Oral Q15 Min PRN    Or    glucose (Glutose) 40% oral gel 15 g  15 g Oral Q15 Min PRN    Or    glucose-vitamin C (Dex-4) chewable tab 4 tablet  4 tablet Oral Q15 Min PRN    Or    dextrose 50% injection 50 mL  50 mL Intravenous Q15 Min PRN    Or     glucose (Dex4) 15 GM/59ML oral liquid 30 g  30 g Oral Q15 Min PRN    Or    glucose (Glutose) 40% oral gel 30 g  30 g Oral Q15 Min PRN    Or    glucose-vitamin C (Dex-4) chewable tab 8 tablet  8 tablet Oral Q15 Min PRN    carvedilol (Coreg) tab 3.125 mg  3.125 mg Oral BID with meals    sacubitril-valsartan (Entresto) 24-26 MG per tab 1 tablet  1 tablet Oral BID    budesonide (Pulmicort) 0.5 MG/2ML nebulizer suspension 0.5 mg  0.5 mg Nebulization 2 times daily    arformoterol (Brovana) 15 MCG/2ML nebulizer solution 15 mcg  15 mcg Nebulization 2 times daily    dextrose 10% infusion (TPN no rate)   Intravenous Continuous PRN    sodium bicarbonate tab 650 mg  650 mg Per G Tube PRN    And    pancrelipase (Lip-Prot-Amyl) (Zenpep) DR particles cap 10,000 Units  10,000 Units Per G Tube PRN    DULoxetine (Cymbalta) DR cap 60 mg  60 mg Oral BID    HYDROcodone-acetaminophen (Norco) 5-325 MG per tab 1 tablet  1 tablet Oral Q6H PRN    ondansetron (Zofran) 4 MG/2ML injection 4 mg  4 mg Intravenous Q6H PRN    polyethylene glycol (PEG 3350) (Miralax) 17 g oral packet 17 g  17 g Oral Daily PRN    magnesium hydroxide (Milk of Magnesia) 400 MG/5ML oral suspension 30 mL  30 mL Oral Daily PRN    bisacodyl (Dulcolax) 10 MG rectal suppository 10 mg  10 mg Rectal Daily PRN    fleet enema (Fleet) rectal enema 133 mL  1 enema Rectal Once PRN    heparin (Porcine) 1000 UNIT/ML injection 2,000 Units  2,000 Units Intravenous PRN Dialysis    albuterol (Ventolin) (2.5 MG/3ML) 0.083% nebulizer solution 2.5 mg  2.5 mg Nebulization Q6H PRN    heparin (Porcine) 5000 UNIT/ML injection 5,000 Units  5,000 Units Subcutaneous 2 times per day    acetaminophen (Tylenol) tab 650 mg  650 mg Oral Q6H PRN    aspirin DR tab 81 mg  81 mg Oral Daily    QUEtiapine ER (SEROquel XR) 24 hr tab 300 mg  300 mg Oral Nightly     Medications Prior to Admission   Medication Sig    busPIRone HCl 30 MG Oral Tab Take 1 tablet (30 mg total) by mouth 3 (three) times daily.     chlorproMAZINE 25 MG Oral Tab Take 1 tablet (25 mg total) by mouth 4 (four) times daily.    diazePAM 5 MG Oral Tab Take 1 tablet (5 mg total) by mouth every 8 (eight) hours as needed for Anxiety.    DULoxetine 60 MG Oral Cap DR Particles Take 1 capsule (60 mg total) by mouth 2 (two) times daily.    metFORMIN  MG Oral Tablet 24 Hr Take 2 tablets (1,000 mg total) by mouth daily. (Patient taking differently: Take 2 tablets (1,000 mg total) by mouth 2 (two) times daily with meals.)    metoprolol succinate ER 50 MG Oral Tablet 24 Hr Take 1 tablet (50 mg total) by mouth 2 (two) times daily.    QUEtiapine  MG Oral Tablet 24 Hr Take 1 tablet (300 mg total) by mouth nightly.    QUEtiapine 50 MG Oral Tab Take 1 tablet (50 mg total) by mouth 2 (two) times daily.    rosuvastatin 40 MG Oral Tab Take 1 tablet (40 mg total) by mouth before bedtime.    lidocaine 5 % External Patch Place 1 patch onto the skin daily.    [] ibuprofen 600 MG Oral Tab Take 1 tablet (600 mg total) by mouth every 6 (six) hours as needed.    levothyroxine 150 MCG Oral Tab Take 1 tablet (150 mcg total) by mouth before breakfast. Give on an empty stomach. On Mon, Tue, Wed, Thur, Fri and Saturday    levothyroxine 75 MCG Oral Tab Take 1 tablet (75 mcg total) by mouth before breakfast. On Sundays only    aspirin 81 MG Oral Tab EC Take 1 tablet (81 mg total) by mouth in the morning.    methylPREDNISolone 4 MG Oral Tablet Therapy Pack Take as directed on dose pack instructions (Patient not taking: Reported on 2025)     No results found.           Assessment / Plan  1. Acute on Chronic Systolic Heart Failure -  Ejection fraction 30-35% with apical wall akinesia. Previous EF 35-40% . Will institute heart failure therapy with Carvedilol 3.125 mg BID and Entresto 24/26 mg BID.            I50.23 Acute on chronic systolic (congestive) heart failure           2. Acute Respiratory Failure -  Currently intubated. Chest x-ray shows right basal  opacity consistent with atelectasis versus pneumonia. Evidence of pulmonary edema pattern and possible aspiration pneumonia with mucus plugging. On abx  J96.00 Acute respiratory failure, unspecified whether with hypoxia or hypercapnia        3. Acute on Chronic Kidney Disease -     Creatinine improving.  On HD.           N17.9 Acute kidney failure, unspecified           4. Demand Ischemia -  Troponin mildly elevated at 79, down from 197 four days ago. ECG with sinus tachycardia and occasional PVCs. Findings consistent with demand ischemia.           I24.8 Other forms of acute ischemic heart disease  PLAN:  Continue with combination of carvedilol as well as Entresto.  S/p dialysis today. Ok from cardiology standpoint for discharge.

## 2025-06-06 LAB
GLUCOSE BLDC GLUCOMTR-MCNC: 102 MG/DL (ref 70–99)
GLUCOSE BLDC GLUCOMTR-MCNC: 103 MG/DL (ref 70–99)
GLUCOSE BLDC GLUCOMTR-MCNC: 112 MG/DL (ref 70–99)
GLUCOSE BLDC GLUCOMTR-MCNC: 162 MG/DL (ref 70–99)

## 2025-06-06 NOTE — CM/SW NOTE
06/06/25 1125   Discharge disposition   Expected discharge disposition subacute   Post Acute Care Provider [facility] [place]  ([facility])   Discharge transportation [facility]     DC today to [facility].  Awaiting rounding and dc orders from Dr. Majano.  Report can be called to [phone].  Ambulance ordered as on 1L O2.  Friend Leticia notified of dc time.    Magali Bravo MBA BSN RN CRRBAYRON MURRY  RN Case Manager [facility]

## 2025-06-06 NOTE — PROGRESS NOTES
[facility]  part of [facility]    Progress Note    Radha Winters Patient Status:  Inpatient    1960 MRN [id number]   Location [facility]5W Attending Fanny Majano MD   Hosp Day # 20 PCP JUAN MONTANEZ       Subjective:   Subjective:  Much better overall  Ambulatory with physical therapy on 1-2 L of oxygen  Denies any active cough or dyspnea or chest pain  Objective:   Blood pressure 107/66, pulse 82, temperature 97.8 °F (36.6 °C), temperature source Oral, resp. rate 20, height 5' 3\" (1.6 m), weight 213 lb 9.6 oz (96.9 kg), SpO2 91%.  Physical Exam  Vitals and nursing note reviewed.   Constitutional:       General: She is not in acute distress.  HENT:      Head: Atraumatic.   Cardiovascular:      Rate and Rhythm: Normal rate.      Heart sounds:      No gallop.   Pulmonary:      Effort: No respiratory distress.      Breath sounds: No stridor. No wheezing, rhonchi or rales.   Abdominal:      General: Abdomen is flat. Bowel sounds are normal.      Palpations: Abdomen is soft.   Musculoskeletal:      Right lower leg: No edema.      Left lower leg: No edema.   Skin:     General: Skin is dry.   Neurological:      General: No focal deficit present.      Mental Status: She is oriented to person, place, and time.         Results:   Lab Results   Component Value Date    WBC 7.1 2025    HGB 10.7 (L) 2025    HCT 34.4 (L) 2025    .0 2025    CREATSERUM 5.33 (H) 2025    BUN 57 (H) 2025     (L) 2025    K 4.4 2025     2025    CO2 23.0 2025    GLU 90 2025    CA 8.9 2025    ALB 3.3 2025    ALKPHO 101 2025    BILT 0.2 2025    TP 5.0 (L) 2025    AST 21 2025    ALT 20 2025    TSH 0.528 10/10/2023    LIP 36 2025    DDIMER 1.77 (H) 2025    MG 2.2 2025    PHOS 4.3 2025    TROPHS 79 (HH) 2025     (H) 2025    B12 581 10/10/2023            Assessment & Plan:       1-acute respiratory failure with hypoxia / resolved   -  pneumonia  - Pulmonary edema  - COPD with acute exacerbation ( extensive history of smoking )  - Obesity and CRUZITO  - Generalized weakness and fatigue and recurrent falls     intubated 5/27/25  Extubated 5/30/25 and tolerated well   Better overall   On 2 L NC      Completed course of antibiotics with Unasyn   Completed Taper Steroid  bronchodilator and nebulizers  O2 therapy   Pt/ot     2-acute on chronic kidney injury /anuric   Rhabdomyolysis , and now anuric   On hemodialysis    Per Renal      3-h/o HFrEF / LVEF 35%     4-recurrent falls and admitted with rhabdomyolysis  Pt/ot   Will need rehab      5-DVT prophylaxis  Heparin subcu      Okay to discharge to rehab            Jonathan Casillas MD  6/6/2025

## 2025-06-06 NOTE — PROGRESS NOTES
Patient seen in follow-up. Patient denies chest pain or shortness of breath at rest. Denies cough or congestion. Denies dizziness or lightheadedness. Denies abdominal pain or nausea.  HR and BP stable      Vitals:    06/06/25 1043   BP: 102/60   Pulse: 94   Resp: 18   Temp: 97.9 °F (36.6 °C)       Intake/Output Summary (Last 24 hours) at 6/6/2025 1122  Last data filed at 6/6/2025 0618  Gross per 24 hour   Intake 320 ml   Output --   Net 320 ml     Wt Readings from Last 1 Encounters:   06/05/25 213 lb 9.6 oz (96.9 kg)        General: No acute distress.  Neck: Jugular venous pulsations not seen.  Lungs: Clear to auscultation.  Heart: Normal rate. No murmurs.  Abdomen: Soft. Non tender  Extremities: No edema.  Neurological:   slow to respond but does answer questions  Psychiatric: Appropriate mood and affect.  Current Facility-Administered Medications   Medication Dose Route Frequency    insulin aspart (NovoLOG) 100 Units/mL FlexPen 1-5 Units  1-5 Units Subcutaneous TID CC    docusate sodium (Colace) cap 100 mg  100 mg Oral BID    sennosides (Senokot) tab 17.2 mg  17.2 mg Oral BID    ipratropium-albuterol (Duoneb) 0.5-2.5 (3) MG/3ML inhalation solution 3 mL  3 mL Nebulization 2 times daily    midodrine (ProAmatine) tab 10 mg  10 mg Per G Tube TID    busPIRone (Buspar) tab 30 mg  30 mg Per G Tube TID    chlorproMAZINE (Thorazine) tab 25 mg  25 mg Per G Tube QID    diazePAM (Valium) tab 5 mg  5 mg Per G Tube Q8H PRN    QUEtiapine (SEROquel) tab 50 mg  50 mg Per G Tube BID    levothyroxine (Synthroid) tab 150 mcg  150 mcg Per G Tube Before breakfast    famotidine (Pepcid) 20 mg/2mL injection 20 mg  20 mg Intravenous Daily    glucose (Dex4) 15 GM/59ML oral liquid 15 g  15 g Oral Q15 Min PRN    Or    glucose (Glutose) 40% oral gel 15 g  15 g Oral Q15 Min PRN    Or    glucose-vitamin C (Dex-4) chewable tab 4 tablet  4 tablet Oral Q15 Min PRN    Or    dextrose 50% injection 50 mL  50 mL Intravenous Q15 Min PRN    Or     glucose (Dex4) 15 GM/59ML oral liquid 30 g  30 g Oral Q15 Min PRN    Or    glucose (Glutose) 40% oral gel 30 g  30 g Oral Q15 Min PRN    Or    glucose-vitamin C (Dex-4) chewable tab 8 tablet  8 tablet Oral Q15 Min PRN    carvedilol (Coreg) tab 3.125 mg  3.125 mg Oral BID with meals    sacubitril-valsartan (Entresto) 24-26 MG per tab 1 tablet  1 tablet Oral BID    budesonide (Pulmicort) 0.5 MG/2ML nebulizer suspension 0.5 mg  0.5 mg Nebulization 2 times daily    arformoterol (Brovana) 15 MCG/2ML nebulizer solution 15 mcg  15 mcg Nebulization 2 times daily    dextrose 10% infusion (TPN no rate)   Intravenous Continuous PRN    sodium bicarbonate tab 650 mg  650 mg Per G Tube PRN    And    pancrelipase (Lip-Prot-Amyl) (Zenpep) DR particles cap 10,000 Units  10,000 Units Per G Tube PRN    DULoxetine (Cymbalta) DR cap 60 mg  60 mg Oral BID    HYDROcodone-acetaminophen (Norco) 5-325 MG per tab 1 tablet  1 tablet Oral Q6H PRN    ondansetron (Zofran) 4 MG/2ML injection 4 mg  4 mg Intravenous Q6H PRN    polyethylene glycol (PEG 3350) (Miralax) 17 g oral packet 17 g  17 g Oral Daily PRN    magnesium hydroxide (Milk of Magnesia) 400 MG/5ML oral suspension 30 mL  30 mL Oral Daily PRN    bisacodyl (Dulcolax) 10 MG rectal suppository 10 mg  10 mg Rectal Daily PRN    fleet enema (Fleet) rectal enema 133 mL  1 enema Rectal Once PRN    heparin (Porcine) 1000 UNIT/ML injection 2,000 Units  2,000 Units Intravenous PRN Dialysis    albuterol (Ventolin) (2.5 MG/3ML) 0.083% nebulizer solution 2.5 mg  2.5 mg Nebulization Q6H PRN    heparin (Porcine) 5000 UNIT/ML injection 5,000 Units  5,000 Units Subcutaneous 2 times per day    acetaminophen (Tylenol) tab 650 mg  650 mg Oral Q6H PRN    aspirin DR tab 81 mg  81 mg Oral Daily    QUEtiapine ER (SEROquel XR) 24 hr tab 300 mg  300 mg Oral Nightly     Medications Prior to Admission   Medication Sig    busPIRone HCl 30 MG Oral Tab Take 1 tablet (30 mg total) by mouth 3 (three) times daily.     chlorproMAZINE 25 MG Oral Tab Take 1 tablet (25 mg total) by mouth 4 (four) times daily.    diazePAM 5 MG Oral Tab Take 1 tablet (5 mg total) by mouth every 8 (eight) hours as needed for Anxiety.    DULoxetine 60 MG Oral Cap DR Particles Take 1 capsule (60 mg total) by mouth 2 (two) times daily.    metFORMIN  MG Oral Tablet 24 Hr Take 2 tablets (1,000 mg total) by mouth daily. (Patient taking differently: Take 2 tablets (1,000 mg total) by mouth 2 (two) times daily with meals.)    metoprolol succinate ER 50 MG Oral Tablet 24 Hr Take 1 tablet (50 mg total) by mouth 2 (two) times daily.    QUEtiapine  MG Oral Tablet 24 Hr Take 1 tablet (300 mg total) by mouth nightly.    QUEtiapine 50 MG Oral Tab Take 1 tablet (50 mg total) by mouth 2 (two) times daily.    rosuvastatin 40 MG Oral Tab Take 1 tablet (40 mg total) by mouth before bedtime.    lidocaine 5 % External Patch Place 1 patch onto the skin daily.    [] ibuprofen 600 MG Oral Tab Take 1 tablet (600 mg total) by mouth every 6 (six) hours as needed.    levothyroxine 150 MCG Oral Tab Take 1 tablet (150 mcg total) by mouth before breakfast. Give on an empty stomach. On Mon, Tue, Wed, Thur, Fri and Saturday    levothyroxine 75 MCG Oral Tab Take 1 tablet (75 mcg total) by mouth before breakfast. On Sundays only    aspirin 81 MG Oral Tab EC Take 1 tablet (81 mg total) by mouth in the morning.    methylPREDNISolone 4 MG Oral Tablet Therapy Pack Take as directed on dose pack instructions (Patient not taking: Reported on 2025)     No results found.             Assessment / Plan  1. Acute on Chronic Systolic Heart Failure -  Ejection fraction 30-35% with apical wall akinesia. Previous EF 35-40% . Will institute heart failure therapy with Carvedilol 3.125 mg BID and Entresto 24/26 mg BID.            I50.23 Acute on chronic systolic (congestive) heart failure           2. Acute Respiratory Failure -  Currently intubated. Chest x-ray shows right basal  opacity consistent with atelectasis versus pneumonia. Evidence of pulmonary edema pattern and possible aspiration pneumonia with mucus plugging. On abx  J96.00 Acute respiratory failure, unspecified whether with hypoxia or hypercapnia        3. Acute on Chronic Kidney Disease -     Creatinine improving.  On HD.           N17.9 Acute kidney failure, unspecified           4. Demand Ischemia -  Troponin mildly elevated at 79, down from 197 four days ago. ECG with sinus tachycardia and occasional PVCs. Findings consistent with demand ischemia.           I24.8 Other forms of acute ischemic heart disease    Plan:  Feeling well. Still on some oxygen. Nurse will attempt to wean off today. On GDMT for HF. Discharge planning in progress        D/W patient and nursing staff      Hayes Khan MD  [facility]

## 2025-06-06 NOTE — PROGRESS NOTES
[facility]  part of [facility]    Progress Note    Radha Winters Patient Status:  Inpatient    1960 MRN [id number]   Location [facility] 5SW/SE Attending Fanny Majano MD   Hosp Day # 20 PCP JUAN MONTANEZ       SUBJECTIVE:  no shortness of breath  No chest pain  Nursing staff reports that patient has been accepted to skilled nursing facility.  Nephrology wants to dialyze her tomorrow.  And they want her to stay in the hospital overnight.  Nursing home does not have dialysis over the weekend    OBJECTIVE:  Vital signs in last 24 hours:  /60 (BP Location: Left arm)   Pulse 94   Temp 97.9 °F (36.6 °C) (Oral)   Resp 18   Ht 5' 3\" (1.6 m)   Wt 213 lb 9.6 oz (96.9 kg)   SpO2 94%   BMI 37.84 kg/m²     Intake/Output:    Intake/Output Summary (Last 24 hours) at 2025 1300  Last data filed at 2025 1100  Gross per 24 hour   Intake 110 ml   Output --   Net 110 ml       Wt Readings from Last 3 Encounters:   25 213 lb 9.6 oz (96.9 kg)   10/09/23 185 lb 3 oz (84 kg)   10/04/23 185 lb (83.9 kg)       Exam  Gen: No acute distress,  HEENT: No pallor  Pulm: Lungs clear to auscultation anteriorly  CV: Heart with regular rate and rhythm, no peripheral edema  Abd: Abdomen soft, nontender, nondistended, no organomegaly, bowel sounds present  Skin: no rashes or lesions  CNS: Alert    Data Review:     Labs:            LABS  Recent Labs   Lab 25  0942 25  0456 25  0351 25  0555   RBC  --  3.86 3.77* 3.52*   HGB  --  11.5* 11.3* 10.7*   HCT  --  38.0 36.9 34.4*   MCV  --  98.4 97.9 97.7   MCH  --  29.8 30.0 30.4   MCHC  --  30.3* 30.6* 31.1   RDW  --  17.2* 17.2* 17.1*   NEPRELIM  --  7.10 8.35* 4.81   WBC  --  9.5 10.9 7.1   PLT  --  170.0 164.0 188.0   AST 39*  --   --  21   ALT 20  --   --  20   *  --   --   --    *  --  116* 90       Imaging:      Meds:   Current Hospital Medications[1]    Assessment  Problem List[2]  1. Acute  Kidney Injury on Chronic Kidney Disease:  -  - Nephrology consultation obtained  Patient with worsening renal function.  Nephrology is following the patient  On hemodialysis       2. Rhabdomyolysis:, Much improved  - Secondary to recurrent falls  - Possibly statin-induced  - Plan: Hold statin (Crestor)  Improving     3. Acute Transaminitis:  improved  - Likely secondary to rhabdomyolysis  - May also be statin-related  - Will monitor with serial labs  Patient could also have a shock liver  Liver enzymes are improving       4. Recurrent Falls:  -    Patient will need physical therapy and Occupational Therapy after she is extubated      5. Coronary Artery Disease:  - Currently stable  - Patient denies chest pain     6. Hypothyroidism:  - Continue levothyroxine  Possible UTI.  on ceftriaxone.    Acute hypoxic respiratory failure  Extubated today  Pulmonary following.      Chronic heart failure with reduced ejection fraction    Cardiomyopathy.  cardiology consulted         Plan for close monitoring and adjustment of management based on clinical course.  discussed with nursing staff  Discussed with the nephrologist      Active Orders   Nourishments    Dietary Nutrition Supplements TID     Frequency: TID     Number of Occurrences: Until Specified     Order Comments: JESÚS davey @ 10am, 2pm and HS - vanilla      Room Service Eligibility Until Discontinued     Frequency: Until Discontinued     Number of Occurrences: Until Specified    Room Service Notify RN Until Discontinued     Frequency: Until Discontinued     Number of Occurrences: Until Specified   Respiratory Care    Acapella TID     Frequency: TID     Number of Occurrences: Until Specified    BIPAP When Sleeping     Frequency: When Sleeping     Number of Occurrences: Until Specified    BIPAP When Sleeping     Frequency: When Sleeping     Number of Occurrences: Until Specified    Chest physiotherapy 4x Daily     Frequency: 4x Daily     Number of Occurrences: Until  Specified     Order Comments: Left lower lobe atelectasis.      EZ-PAP 4x Daily     Frequency: 4x Daily     Number of Occurrences: Until Specified    Incentive spriometry: RT to teach pt Once     Frequency: Once     Number of Occurrences: 1 Occurrences    Oxygen administration (adult) PRN     Frequency: PRN     Number of Occurrences: Until Specified    Respiratory care evaluation Once     Frequency: Once     Number of Occurrences: 1 Occurrences     Order Comments: If patient uses home CPAP/BIPAP, place on known home settings. If unknown, place on auto CPAP with upper limit 20 and lower limit 5 cm H2O     Dialysis    Hemodialysis inpatient     Frequency: Tomorrow     Number of Occurrences: 1 Occurrences     Order Comments: Keep MAP goal >65     Precaution    Aspiration precautions Continuous     Frequency: Continuous     Number of Occurrences: Until Specified   Diet    Regular/General diet Sodium Restriction: 3-4 GM NA; Fluid Consistency: Thin Liquids ; Texture Consistency: Regular; Is Patient on Accuchecks? Yes; Misc Restriction: No Straw     Frequency: Effective Now     Number of Occurrences: Until Specified     Order Comments: Medications crushed in pureed     Nursing    Accucheck     Frequency: PRN     Number of Occurrences: Until Specified     Order Comments: For symptoms or suspicion of hypoglycemia      Accucheck     Frequency: TID AC and HS     Number of Occurrences: Until Specified    Assess using Critical Care Pain Observation Tool (CPOT)     Frequency: Now Then Q3H     Number of Occurrences: Until Specified    Cardiac monitoring     Frequency: Until Discontinued     Number of Occurrences: Until Specified    Elevate head of bed >30 degrees     Frequency: Until Discontinued     Number of Occurrences: Until Specified     Order Comments: 30-45 degrees at all times unless contraindicated by physician order or patient condition.      Elevate head of bed greater than or equal to 30 degrees     Frequency: Until  Discontinued     Number of Occurrences: Until Specified    Flush feeding tube     Frequency: PRN     Number of Occurrences: Until Specified     Order Comments: Flush feeding tube:  1.) Before and after bolus feedings.  2.) After medication administration.      For any tube feed interruptions, continue basal insulin, and correction scales. Hold any scheduled insulins, and resume when tube feed is restarted.     Frequency: Until Discontinued     Number of Occurrences: Until Specified    For non-patent tubes:     Frequency: PRN     Number of Occurrences: Until Specified     Order Comments: If water is unsuccessful in dislodging the clog, use the pancrealipase/sodium bicarb. May repeat x1 before calling physician for further orders.      For patients without a history of diabetes, discontinue Accuchecks and insulin correction scale if blood glucose <180 mg/dL for 48 hours     Frequency: Until Discontinued     Number of Occurrences: Until Specified    Give all morning medications unless otherwise specified     Frequency: Until Discontinued     Number of Occurrences: Until Specified     Order Comments: Give all morning medications unless otherwise specified.  DO NOT use Electrolyte protocol.      If patient uses CPAP/BIPAP, encourage patient to wear when sleeping (including daytime) and after any apneic episode or adverse event.     Frequency: Until Discontinued     Number of Occurrences: Until Specified    Initiate early mobility protocol     Frequency: Once     Number of Occurrences: 1 Occurrences    Initiate Hypoglycemia Algorithm for Adults     Frequency: Until Discontinued     Number of Occurrences: Until Specified     Order Comments: For blood glucose less than 70      Initiate Medical Nutrition Therapy Protocol for Enteral Nutrition     Frequency: Until Discontinued     Number of Occurrences: Until Specified    Insert denny catheter     Frequency: Once     Number of Occurrences: 1 Occurrences    Intake and Output      Frequency: Per Unit Routine     Number of Occurrences: Until Specified     Order Comments: Record formula and water flushes separately.      May use port/central line     Frequency: Until Discontinued     Number of Occurrences: Until Specified    Measure weight     Frequency: Per Unit Routine     Number of Occurrences: Until Specified     Order Comments: Measure weight every Monday and Thursday for non critical care patients.      Monitor tube placement     Frequency: Q8H     Number of Occurrences: Until Specified    Notify attending physician for patients refusing CPAP     Frequency: Until Discontinued     Number of Occurrences: Until Specified     Order Comments: Document that patient was educated and refused CPAP, if applicable.      Notify attending physician for sustained desaturation <90% or prolonged or recurrent apnea     Frequency: Until Discontinued     Number of Occurrences: Until Specified     Order Comments: Notify attending physician for sustained desaturation <90% or prolonged or recurrent apnea      Notify physician     Frequency: Until Discontinued     Number of Occurrences: Until Specified    Notify physician of significant abdominal distension, pain, nausea, or emesis, and stop tube feeding     Frequency: Until Discontinued     Number of Occurrences: Until Specified    Notify Radiologist     Frequency: Until Discontinued     Number of Occurrences: Until Specified    Nurse Driven Indwelling Urinary Catheter Removal Protocol     Frequency: Until Discontinued     Number of Occurrences: Until Specified    Nursing communication (specify)     Frequency: Once     Number of Occurrences: 1 Occurrences     Order Comments: Hold blood thinners pre procedure      Nursing communication (specify)     Frequency: Once     Number of Occurrences: 1 Occurrences     Order Comments: RN, keep the patient off the left side.      Nursing communication (specify)     Frequency: Once     Number of Occurrences: 1  Occurrences     Order Comments: Removed OG and placed Dobhoff      Nursing communication (specify)     Frequency: Once     Number of Occurrences: 1 Occurrences     Order Comments: Dobhoff okay to use      Nursing communication - call [facility] to order dialysis     Frequency: Once     Number of Occurrences: 1 Occurrences     Order Comments: Call [facility] at [phone] to order dialysis.  Identify special circumstances and specify stat or routine treatment.      Patient may resume usual diet     Frequency: Once     Number of Occurrences: 1 Occurrences     Order Comments: Npo x one hour, then resume pre-procedure diet      Post procedure site assessment     Frequency: Per Unit Routine     Number of Occurrences: Until Specified     Order Comments: Every 15 minutes for 1 hour, then every 30 minutes for 1 hour, then every 60 minutes for 2 hours, then per unit routine      Provide patient with sleep apnea education materials     Frequency: Once     Number of Occurrences: 1 Occurrences    Pulse oximetry     Frequency: Per Unit Routine     Number of Occurrences: Until Specified    Pulse oximetry     Frequency: Continuous     Number of Occurrences: Until Specified    Tube insertion     Frequency: Once     Number of Occurrences: 1 Occurrences    Tube insertion     Frequency: Once     Number of Occurrences: 1 Occurrences    Tube insertion     Frequency: Once     Number of Occurrences: 1 Occurrences     Order Comments: Meds and tube feeding      Up to chair Once     Frequency: Once     Number of Occurrences: 1 Occurrences    Verify informed consent     Frequency: Once     Number of Occurrences: 1 Occurrences    Vital signs     Frequency: Per Unit Routine     Number of Occurrences: Until Specified     Order Comments: Every 15 minutes for 1 hour, then every 30 minutes for 1 hour, then every 60 minutes for 2 hours, then per unit routine.      Void prior to procedure     Frequency: Once     Number of Occurrences: 1 Occurrences    Consult    Consult to Interventional Radiology     Frequency: Once     Number of Occurrences: 1 Occurrences    Consult to Pulmonology     Frequency: Once     Number of Occurrences: 1 Occurrences   Medications    acetaminophen (Tylenol) tab 650 mg     Frequency: Q6H PRN     Dose: 650 mg     Route: Oral    albuterol (Ventolin) (2.5 MG/3ML) 0.083% nebulizer solution 2.5 mg     Frequency: Q6H PRN     Dose: 2.5 mg     Route: Nebulization    arformoterol (Brovana) 15 MCG/2ML nebulizer solution 15 mcg     Frequency: 2 times daily     Dose: 15 mcg     Route: Nebulization    aspirin DR tab 81 mg     Frequency: Daily     Dose: 81 mg     Route: Oral    bisacodyl (Dulcolax) 10 MG rectal suppository 10 mg     Frequency: Daily PRN     Dose: 10 mg     Route: Rectal    budesonide (Pulmicort) 0.5 MG/2ML nebulizer suspension 0.5 mg     Frequency: 2 times daily     Dose: 0.5 mg     Route: Nebulization    busPIRone (Buspar) tab 30 mg     Frequency: TID     Dose: 30 mg     Route: Per G Tube    carvedilol (Coreg) tab 3.125 mg     Frequency: BID with meals     Dose: 3.125 mg     Route: Oral    chlorproMAZINE (Thorazine) tab 25 mg     Frequency: QID     Dose: 25 mg     Route: Per G Tube    dextrose 10% infusion (TPN no rate)     Frequency: Continuous PRN     Route: Intravenous    dextrose 50% injection 50 mL     Linked Order: Or     Frequency: Q15 Min PRN     Dose: 50 mL     Route: Intravenous    diazePAM (Valium) tab 5 mg     Frequency: Q8H PRN     Dose: 5 mg     Route: Per G Tube    docusate sodium (Colace) cap 100 mg     Frequency: BID     Dose: 100 mg     Route: Oral    DULoxetine (Cymbalta) DR cap 60 mg     Frequency: BID     Dose: 60 mg     Route: Oral    famotidine (Pepcid) 20 mg/2mL injection 20 mg     Frequency: Daily     Dose: 20 mg     Route: Intravenous    fleet enema (Fleet) rectal enema 133 mL     Frequency: Once PRN     Dose: 1 enema     Route: Rectal    glucose (Dex4) 15 GM/59ML oral liquid 15 g     Linked Order: Or      Frequency: Q15 Min PRN     Dose: 15 g     Route: Oral    glucose (Dex4) 15 GM/59ML oral liquid 30 g     Linked Order: Or     Frequency: Q15 Min PRN     Dose: 30 g     Route: Oral    glucose (Glutose) 40% oral gel 15 g     Linked Order: Or     Frequency: Q15 Min PRN     Dose: 15 g     Route: Oral    glucose (Glutose) 40% oral gel 30 g     Linked Order: Or     Frequency: Q15 Min PRN     Dose: 30 g     Route: Oral    glucose-vitamin C (Dex-4) chewable tab 4 tablet     Linked Order: Or     Frequency: Q15 Min PRN     Dose: 4 tablet     Route: Oral    glucose-vitamin C (Dex-4) chewable tab 8 tablet     Linked Order: Or     Frequency: Q15 Min PRN     Dose: 8 tablet     Route: Oral    heparin (Porcine) 1000 UNIT/ML injection 2,000 Units     Frequency: PRN Dialysis     Dose: 2,000 Units     Route: Intravenous    heparin (Porcine) 5000 UNIT/ML injection 5,000 Units     Frequency: Q12H PATRICK     Dose: 5,000 Units     Route: Subcutaneous    HYDROcodone-acetaminophen (Norco) 5-325 MG per tab 1 tablet     Frequency: Q6H PRN     Dose: 1 tablet     Route: Oral    insulin aspart (NovoLOG) 100 Units/mL FlexPen 1-5 Units     Frequency: TID CC     Dose: 1-5 Units     Route: Subcutaneous     Order Comments: For insulin aspart (NovoLOG) doses > 60 Units, change product to dispense to be a insulin aspart (NovoLOG) *VIAL*    ipratropium-albuterol (Duoneb) 0.5-2.5 (3) MG/3ML inhalation solution 3 mL     Frequency: 2 times daily     Dose: 3 mL     Route: Nebulization    levothyroxine (Synthroid) tab 150 mcg     Frequency: Before breakfast     Dose: 150 mcg     Route: Per G Tube    magnesium hydroxide (Milk of Magnesia) 400 MG/5ML oral suspension 30 mL     Frequency: Daily PRN     Dose: 30 mL     Route: Oral    midodrine (ProAmatine) tab 10 mg     Frequency: TID     Dose: 10 mg     Route: Per G Tube    ondansetron (Zofran) 4 MG/2ML injection 4 mg     Frequency: Q6H PRN     Dose: 4 mg     Route: Intravenous    pancrelipase (Lip-Prot-Amyl)  (Zenpep) DR particles cap 10,000 Units     Linked Order: And     Frequency: PRN     Dose: 10,000 Units     Route: Per G Tube    polyethylene glycol (PEG 3350) (Miralax) 17 g oral packet 17 g     Frequency: Daily PRN     Dose: 17 g     Route: Oral    QUEtiapine (SEROquel) tab 50 mg     Frequency: BID     Dose: 50 mg     Route: Per G Tube    QUEtiapine ER (SEROquel XR) 24 hr tab 300 mg     Frequency: Nightly     Dose: 300 mg     Route: Oral    sacubitril-valsartan (Entresto) 24-26 MG per tab 1 tablet     Frequency: BID     Dose: 1 tablet     Route: Oral    sennosides (Senokot) tab 17.2 mg     Frequency: BID     Dose: 17.2 mg     Route: Oral    sodium bicarbonate tab 650 mg     Linked Order: And     Frequency: PRN     Dose: 650 mg     Route: Per G Tube     Reviewed personally: current medical record, vital signs info, lab results, cardiac & radiographic films and reports.  Formulated plans for continued care for this patient.  RN to call us for any significant change  Scheduled tests: see orders   Other orders per treatment team.     *The above components were done for the patient encounter: Reviewing the patient's electronic chart, reviewing results, obtaining a medical history, counseling patient and/or family member, ordering medications, tests, or procedures, documenting clinical information in the electronic health record, refer to or communicate with other health care professionals as indicated, independently interpret results and providing care coordination      Fanny Majano MD           [1]   Current Facility-Administered Medications   Medication Dose Route Frequency    insulin aspart (NovoLOG) 100 Units/mL FlexPen 1-5 Units  1-5 Units Subcutaneous TID CC    docusate sodium (Colace) cap 100 mg  100 mg Oral BID    sennosides (Senokot) tab 17.2 mg  17.2 mg Oral BID    ipratropium-albuterol (Duoneb) 0.5-2.5 (3) MG/3ML inhalation solution 3 mL  3 mL Nebulization 2 times daily    midodrine (ProAmatine) tab 10  mg  10 mg Per G Tube TID    busPIRone (Buspar) tab 30 mg  30 mg Per G Tube TID    chlorproMAZINE (Thorazine) tab 25 mg  25 mg Per G Tube QID    diazePAM (Valium) tab 5 mg  5 mg Per G Tube Q8H PRN    QUEtiapine (SEROquel) tab 50 mg  50 mg Per G Tube BID    levothyroxine (Synthroid) tab 150 mcg  150 mcg Per G Tube Before breakfast    famotidine (Pepcid) 20 mg/2mL injection 20 mg  20 mg Intravenous Daily    glucose (Dex4) 15 GM/59ML oral liquid 15 g  15 g Oral Q15 Min PRN    Or    glucose (Glutose) 40% oral gel 15 g  15 g Oral Q15 Min PRN    Or    glucose-vitamin C (Dex-4) chewable tab 4 tablet  4 tablet Oral Q15 Min PRN    Or    dextrose 50% injection 50 mL  50 mL Intravenous Q15 Min PRN    Or    glucose (Dex4) 15 GM/59ML oral liquid 30 g  30 g Oral Q15 Min PRN    Or    glucose (Glutose) 40% oral gel 30 g  30 g Oral Q15 Min PRN    Or    glucose-vitamin C (Dex-4) chewable tab 8 tablet  8 tablet Oral Q15 Min PRN    carvedilol (Coreg) tab 3.125 mg  3.125 mg Oral BID with meals    sacubitril-valsartan (Entresto) 24-26 MG per tab 1 tablet  1 tablet Oral BID    budesonide (Pulmicort) 0.5 MG/2ML nebulizer suspension 0.5 mg  0.5 mg Nebulization 2 times daily    arformoterol (Brovana) 15 MCG/2ML nebulizer solution 15 mcg  15 mcg Nebulization 2 times daily    dextrose 10% infusion (TPN no rate)   Intravenous Continuous PRN    sodium bicarbonate tab 650 mg  650 mg Per G Tube PRN    And    pancrelipase (Lip-Prot-Amyl) (Zenpep) DR particles cap 10,000 Units  10,000 Units Per G Tube PRN    DULoxetine (Cymbalta) DR cap 60 mg  60 mg Oral BID    HYDROcodone-acetaminophen (Norco) 5-325 MG per tab 1 tablet  1 tablet Oral Q6H PRN    ondansetron (Zofran) 4 MG/2ML injection 4 mg  4 mg Intravenous Q6H PRN    polyethylene glycol (PEG 3350) (Miralax) 17 g oral packet 17 g  17 g Oral Daily PRN    magnesium hydroxide (Milk of Magnesia) 400 MG/5ML oral suspension 30 mL  30 mL Oral Daily PRN    bisacodyl (Dulcolax) 10 MG rectal suppository 10 mg   10 mg Rectal Daily PRN    fleet enema (Fleet) rectal enema 133 mL  1 enema Rectal Once PRN    heparin (Porcine) 1000 UNIT/ML injection 2,000 Units  2,000 Units Intravenous PRN Dialysis    albuterol (Ventolin) (2.5 MG/3ML) 0.083% nebulizer solution 2.5 mg  2.5 mg Nebulization Q6H PRN    heparin (Porcine) 5000 UNIT/ML injection 5,000 Units  5,000 Units Subcutaneous 2 times per day    acetaminophen (Tylenol) tab 650 mg  650 mg Oral Q6H PRN    aspirin DR tab 81 mg  81 mg Oral Daily    QUEtiapine ER (SEROquel XR) 24 hr tab 300 mg  300 mg Oral Nightly   [2]   Patient Active Problem List  Diagnosis    Closed fracture of proximal end of left humerus, unspecified fracture morphology, initial encounter    Frequent falls    Seizure-like activity (HCC)    Pituitary lesion (HCC)    Major depressive disorder, recurrent episode, moderate (HCC)    Generalized anxiety disorder    Acute renal failure superimposed on chronic kidney disease, unspecified acute renal failure type, unspecified CKD stage    Non-traumatic rhabdomyolysis    Transaminitis

## 2025-06-06 NOTE — DIETARY NOTE
ADULT NUTRITION REASSESSMENT    Pt is at moderate nutrition risk.  Pt does not meet malnutrition criteria.      RECOMMENDATIONS TO MD:   RD liberalized diet and added ONS TID to promote improved/adequate PO intake.  See Nutrition Intervention for diet and oral nutritional supplement (ONS) specifics     ADMITTING DIAGNOSIS:  Transaminitis [R74.01]  Non-traumatic rhabdomyolysis [M62.82]  Acute renal failure superimposed on chronic kidney disease, unspecified acute renal failure type, unspecified CKD stage [N17.9, N18.9]  PERTINENT PAST MEDICAL HISTORY: Past Medical History[1]    PATIENT STATUS:   Initial 05/20/25: Pt assessed due to verbal RN consult for poor appetite and PO intake. Pt presented to hospital s/p recurrent falls. Has PMH as listed above including 2-year h/o recurrent falls with previous evaluation at [facility] where falls were linked to hyponatremia. Per H&P pt reported poor appetite/intake for several days PTA however screened at no risk by RN on initial MST. Found to have SUSAN and rhabdomyolysis with emergent HD initiated on 5/19. Transferred to the CCU with hypotension. Stable on NC with 3L O2. Off pressor support early this AM. Pt sitting up in bed working on lunch tray, <25% consumed. Appetite remains poor. Reports she consumed the 10am Mighty Shake however felt the chocolate was too rich for her. Will trial alternative flavors today. See diet hx below. Pt reports typical wt on home standing scale ~220 lbs.    5/26/2025 Update:       Po intake remains poor, average meal intake 23% with per recorded ONS intake of some 50-75% Mighty shake supplements. Poor appetite vs CPAP. At re-visit, pt states she is very thirsty, asked RN for help and RD requested pt to alternately drink/sip water and Mighty shake supplements--pt agreed. Pt took 80% at 11 am and 50% at 2 pm with RN.  Attempted Swallow eval but pt on CPAP. Bowel regimen in place for constipation. HD yesterday with 2200 ml fluid removal.   Unable to add  Mvi po daily as these contains K. Encouraged pt to continue to increase po as feasible but if oral and ONS intake remains suboptimal to meet daily nutrition needs, consider temporary Enteral nutrition.   5/27/25 UPDATE: Overall 8 days poor nutrition intake. Pt is lethargic, unable to tolerate off BiPaP. S/p SLP BSE and dysphagia diet recommended.  Discussed at rounds and recommend tube feeds-orders received. Worsening renal function (SUSAN on CKD)  with noted hyperkalemia- will utilize renal failure and fiber enriched formula. Continue po diet and will adjust EN pending po intake contribution. Begin goal EN to 75% needs.   5/28/25 UPDATE: Tolerating tube feeds well at goal nutrition. Noted pt started on Levothyroxine (need to hold tf/guidelines) and still no BM. Pt newly intubtated 5/27 and sedated on Propofol providing ~ 250 kcals via lipids. Will adjust tube feeding accordingly- orders adjusted. RN alerted to rx constipation using prn meds.   05/30/25 UPDATE: Brief note for EN adjustment. Pt extubated this AM. Off sedation with propofol. DHT remains in place with EN feeds at goal rate. Noted hypokalemia this AM with 20 mEq rider ordered per nephrology. Last HD on 5/29 with next scheduled HD on 5/31.    06/02/25 UPDATE: S/p HD this AM. Stable on HFNC 3L. DHT removed and EN feeds stopped on 6/1. S/p BSSE - safe for PO diet. Diet resumed however pt with poor PO intake. Skipped breakfast today d/t HD, consumed only 75% of mashed potatoes and Gingerale on lunch tray. Dinner tray at bedside - untouched. Encouraged pt to consume ONS - reports she likes them however refused at lunch today per RN.      06/06/25: RA completed per protocol. Chart reviewed. Discussion with RN, no concerns. Intakes reviewed, 0-100% x 9 meals since last visit (averaging 42% overall). Variable intake - appears to have 1-2 meals a day. Encourage increase po inake with ONS to maximize. Current weight consistent with admit weight from 5/17 at 213# 10  oz. Wt down 20# in 3 days - monitor. Continues on HD. Plan to discharge to [facility]  today. CPM    FOOD/NUTRITION RELATED HISTORY:  Appetite: Varies  Intake:  0-100% x 9 meals since last visit (averaging 42% overall)   Intake Meeting Needs: No, but oral nutrition supplements (ONS) to maximize  Percent Meals Eaten (last 6 days)       Date/Time Percent Meals Eaten (%)    06/01/25 2200 50 %    06/02/25 0800 0 %    06/02/25 1300 25 %    06/02/25 2045 10 %     Percent Meals Eaten (%): Apple sauce with meds at 06/02/25 2045    06/03/25 1430 95 %    06/04/25 1100 100 %    06/04/25 1515 100 %    06/04/25 1754 0 %     Percent Meals Eaten (%): refused dinner at 06/04/25 1754    06/05/25 0748 0 %     Percent Meals Eaten (%): refused at 06/05/25 0748    06/05/25 1300 50 %    06/05/25 2007 0 %     Percent Meals Eaten (%): pt refused at 06/05/25 2007          Food Allergies: No Known Food Allergies (NKFA)  Cultural/Ethnic/Sabianism Preferences: Not Obtained    GASTROINTESTINAL: +BM 6/6/25 - small;soft and Swallow evaluation noted  UO: 0 ml past 24 hrs; 2 L fluid removed from HD on 6/5   I/Os: net -4.7 L total since 5/23/25    MEDICATIONS: reviewed No electrolyte replacement protocol noted   insulin aspart  1-5 Units Subcutaneous TID CC    docusate sodium  100 mg Oral BID    sennosides  17.2 mg Oral BID    ipratropium-albuterol  3 mL Nebulization 2 times daily    midodrine  10 mg Per G Tube TID    busPIRone HCl  30 mg Per G Tube TID    chlorproMAZINE  25 mg Per G Tube QID    QUEtiapine  50 mg Per G Tube BID    levothyroxine  150 mcg Per G Tube Before breakfast    famotidine  20 mg Intravenous Daily    carvedilol  3.125 mg Oral BID with meals    sacubitril-valsartan  1 tablet Oral BID    budesonide  0.5 mg Nebulization 2 times daily    arformoterol  15 mcg Nebulization 2 times daily    DULoxetine  60 mg Oral BID    heparin  5,000 Units Subcutaneous 2 times per day    aspirin  81 mg Oral Daily    QUEtiapine ER  300 mg  Oral Nightly     LABS: reviewed; A1c 7.6% on 5/18/25  Hyponatremia (135) noted  Recent Labs     06/03/25  0351 06/05/25  0555   * 90   BUN 25* 57*   CREATSERUM 2.50* 5.33*   CA 8.7 8.9    135*   K 4.5 4.4    102   CO2 26.0 23.0   OSMOCALC 291 295     WEIGHT HISTORY:  Patient Weight(s) for the past 336 hrs:   Weight   06/05/25 0438 96.9 kg (213 lb 9.6 oz)   06/02/25 0500 106 kg (233 lb 11 oz)   05/29/25 0600 106.4 kg (234 lb 9.1 oz)   05/28/25 0500 106.2 kg (234 lb 2.1 oz)   05/26/25 0526 105.7 kg (233 lb 0.4 oz)   05/25/25 0500 106.6 kg (235 lb 0.2 oz)   05/24/25 1000 107 kg (235 lb 14.3 oz)   05/24/25 0627 104.8 kg (231 lb 1.6 oz)     Wt Readings from Last 10 Encounters:   06/05/25 96.9 kg (213 lb 9.6 oz)   10/09/23 84 kg (185 lb 3 oz)   10/04/23 83.9 kg (185 lb)   06/12/16 49.9 kg (110 lb)     ANTHROPOMETRICS:  HT: 160 cm (5' 3\")  Wt Readings from Last 1 Encounters:   06/05/25 96.9 kg (213 lb 9.6 oz)   Last weight: Fluid changes noted Current weight more c/w admit weight. Down 20# since 6/2 - monitor weight    Dosing Weight: 97 kg (213 lbs) - admission weight on 5/17/25, utilized for anthropometric calculations  BMI: Body mass index is 37.84 kg/m².  BMI CLASSIFICATION: 35-39.9 kg/m2 - obesity class II  IBW/lbs (Calculated) Female: 115 lbs           186% IBW  Usual Body Wt: 220 lbs       97% UBW    NUTRITION RELATED PHYSICAL FINDINGS:  - Nutrition Focused Physical Exam (NFPE): well nourished and no wasting noted  - Fluid Accumulation: non-pitting Bilateral Lower extremity, Hand (s), and Feet and generalized per flowsheet review --> See RN documentation for details  - Skin Integrity: at risk per flowsheet review --> See RN documentation for details    NUTRITION DIAGNOSIS/PROBLEM:   Inadequate oral intake related to Decreased ability to consume sufficient energy as evidenced by diet hx decreased PO x2 wks PTA and poor appetite/intake since admission x3 days.     NUTRITION DIAGNOSIS PROGRESS:  Some  Improvement (unresolved) - variable intake, ONS in place    NUTRITION INTERVENTION:   NUTRITION PRESCRIPTION:   Estimated Nutrition needs: --dosing wt of 97 kg - wt taken on 5/20/25         Calories: 5453-0044 calories/day (15-20 calories per kg Dosing wt)  Protein:  g protein/day (1.5-2 g protein/kg Ideal body wt (IBW) (52.3 kg))  Fluid Needs: 1585 ml/day (25 ml/kg adjusted BW for obesity (63.4 kg) chronological age method) - adjust for clinical status (SUSAN on HD)     - Diet:       Procedures    Regular/General diet Sodium Restriction: 3-4 GM NA; Fluid Consistency: Thin Liquids ; Texture Consistency: Regular; Is Patient on Accuchecks? Yes; Misc Restriction: No Straw       - Nutrition Care Plan: EN support. Prior had Liberalized diet, Encouraged increased PO intake, Initiated ONS (oral nutritional supplements), and Requested  with ordering meals, tray set up and/or feeding as needed  - ONS (Oral Nutrition Supplements)/Meals/Snacks: SF Shake (200 calories/ 7 g protein each) TID Vanilla or Chocolate continued  - Vitamin and mineral supplements: none  - Feeding assistance: meal set up and assistance with menu selection and encouragement at meal times  - Nutrition education: Discussed importance of adequate energy and protein intake; encouraged ONS intake; encouraged smaller, more frequent meals  - Coordination of nutrition care: collaboration with other providers  - Discharge and transfer of nutrition care to new setting or provider: monitor plans - from home alone. Plan to discharge to [facility] pending medical clearance    MONITOR AND EVALUATE/NUTRITION GOALS:  - Food and Nutrient Intake:      Monitor: adequacy of PO intake and adequacy of supplement intake  - Food and Nutrient Administration:      Monitor: N/A  - Anthropometric Measurement:    Monitor weight  - Nutrition Goals:    allow wt loss due to fluid losses, PO and supplement greater than 75% of needs, good supplement intake,  labs within acceptable limits, euglycemia, minimize lean body mass loss, and prevent skin breakdown    RD will follow per protocol.     Nisa Christine MS, MARIEL, RDN, LDN  Clinical Dietitian  P: [phone]       [1]   Past Medical History:   Essential hypertension    Heart attack (HCC)    Hyperlipidemia    Thyroid disease

## 2025-06-06 NOTE — PAYOR COMM NOTE
--------------  CONTINUED STAY REVIEW    Payor: [employer] [facility]  Subscriber #:  [id number]  Authorization Number: [id number]    Admit date: 5/17/25  Admit time:  2:12 PM    6/5/25   /50 (BP Location: Radial)   Pulse 85   Temp 98 °F (36.7 °C) (Oral)   Resp 20   Ht 5' 3\" (1.6 m)   Wt 213 lb 9.6 oz (96.9 kg)   SpO2 91%   BMI 37.84 kg/m²      HEENT: No pallor  Pulm: Lungs clear to auscultation anteriorly  CV: Heart with regular rate and rhythm, no peripheral edema  Abd: Abdomen soft, nontender, nondistended, no organomegaly, bowel sounds present  Skin: no rashes or lesions  CNS: Alert     Lab Results   Component Value Date     WBC 7.1 06/05/2025     HGB 10.7 06/05/2025     HCT 34.4 06/05/2025     .0 06/05/2025     CREATSERUM 5.33 06/05/2025     BUN 57 06/05/2025      06/05/2025     K 4.4 06/05/2025      06/05/2025     CO2 23.0 06/05/2025     GLU 90 06/05/2025     CA 8.9 06/05/2025     ALB 3.3 06/05/2025     ALKPHO 101 06/05/2025     BILT 0.2 06/05/2025     TP 5.0 06/05/2025     AST 21 06/05/2025     ALT 20 06/05/2025      Lab 06/01/25  0942 06/02/25  0456 06/03/25  0351 06/05/25  0555   RBC  --  3.86 3.77* 3.52*   HGB  --  11.5* 11.3* 10.7*   HCT  --  38.0 36.9 34.4*   MCV  --  98.4 97.9 97.7   MCH  --  29.8 30.0 30.4   MCHC  --  30.3* 30.6* 31.1   RDW  --  17.2* 17.2* 17.1*   NEPRELIM  --  7.10 8.35* 4.81   WBC  --  9.5 10.9 7.1   PLT  --  170.0 164.0 188.0   AST 39*  --   --  21   ALT 20  --   --  20   *  --   --   --    *  --  116* 90          Assessment    1. Acute Kidney Injury on Chronic Kidney Disease:  -  - Nephrology consultation obtained  Patient with worsening renal function.  Nephrology is following the patient  On hemodialysis        2. Rhabdomyolysis:, Much improved  - Secondary to recurrent falls  - Possibly statin-induced  - Plan: Hold statin (Crestor)  Improving     3. Acute Transaminitis:  improved  - Likely secondary to  rhabdomyolysis  - May also be statin-related  - Will monitor with serial labs  Patient could also have a shock liver  Liver enzymes are improving        4. Recurrent Falls:     5. Coronary Artery Disease:  - Currently stable  - Patient denies chest pain     6. Hypothyroidism:  - Continue levothyroxine  Possible UTI.  on ceftriaxone.     Acute hypoxic respiratory failure  Extubated today  Pulmonary following.      Chronic heart failure with reduced ejection fraction     Cardiomyopathy.  cardiology consulted          6/6/25    Blood pressure 107/66, pulse 82, temperature 97.8 °F (36.6 °C), temperature source Oral, resp. rate 20, height 5' 3\" (1.6 m), weight 213 lb 9.6 oz (96.9 kg), SpO2 91%.    HENT:      Head: Atraumatic.   Cardiovascular:      Rate and Rhythm: Normal rate.      Heart sounds:      No gallop.   Pulmonary:      Effort: No respiratory distress.      Breath sounds: No stridor. No wheezing, rhonchi or rales.   Abdominal:      General: Abdomen is flat. Bowel sounds are normal.      Palpations: Abdomen is soft.   Musculoskeletal:      Right lower leg: No edema.      Left lower leg: No edema.   Skin:     General: Skin is dry.   Neurological:      General: No focal deficit present.      Mental Status: She is oriented to person, place, and time.      Lab Results   Component Value Date     WBC 7.1 06/05/2025     HGB 10.7 (L) 06/05/2025     HCT 34.4 (L) 06/05/2025     .0 06/05/2025   Assessment & Plan:       1-acute respiratory failure with hypoxia / resolved   -  pneumonia  - Pulmonary edema  - COPD with acute exacerbation ( extensive history of smoking )  - Obesity and CRUZITO  - Generalized weakness and fatigue and recurrent falls     intubated 5/27/25  Extubated 5/30/25 and tolerated well   Better overall   On 2 L NC      Completed course of antibiotics with Unasyn   Completed Taper Steroid  bronchodilator and nebulizers  O2 therapy   Pt/ot     2-acute on chronic kidney injury /anuric   Rhabdomyolysis ,  and now anuric   On hemodialysis    Per Renal      3-h/o HFrEF / LVEF 35%     4-recurrent falls and admitted with rhabdomyolysis  Pt/ot      5-DVT prophylaxis  Heparin subcu        MEDICATIONS ADMINISTERED IN LAST 1 DAY:  arformoterol (Brovana) 15 MCG/2ML nebulizer solution 15 mcg       Date Action Dose Route User    6/6/2025 1008 Given 15 mcg Nebulization Darling Vallejo RCP    6/5/2025 2050 Given 15 mcg Nebulization Idalia Galloway RCP          aspirin DR tab 81 mg       Date Action Dose Route User    6/6/2025 1045 Given 81 mg Oral Tatianna Early RN          budesonide (Pulmicort) 0.5 MG/2ML nebulizer suspension 0.5 mg       Date Action Dose Route User    6/6/2025 0958 Given 0.5 mg Nebulization Darling Vallejo RCP    6/5/2025 2045 Given 0.5 mg Nebulization Idalia Galloway RCP          busPIRone (Buspar) tab 30 mg       Date Action Dose Route User    6/6/2025 1045 Given 30 mg Per G Tube Tatianna Early RN    6/5/2025 2021 Given 30 mg Per G Tube Hannah Vigil RN    6/5/2025 1709 Given 30 mg Per G Tube Cherry Gama RN          carvedilol (Coreg) tab 3.125 mg       Date Action Dose Route User    6/6/2025 1106 Given 3.125 mg Oral Tatianna Early RN          chlorproMAZINE (Thorazine) tab 25 mg       Date Action Dose Route User    6/6/2025 1301 Given 25 mg Per G Tube Tatianna Early RN    6/6/2025 1045 Given 25 mg Per G Tube Tatianna Early RN    6/5/2025 2021 Given 25 mg Per G Tube Hannah Vigil RN    6/5/2025 1709 Given 25 mg Per G Tube Cherry Gama RN          docusate sodium (Colace) cap 100 mg       Date Action Dose Route User    6/5/2025 2021 Given 100 mg Oral Hannah Vigil RN          DULoxetine (Cymbalta) DR cap 60 mg       Date Action Dose Route User    6/6/2025 1045 Given 60 mg Oral Tatianna Early RN    6/5/2025 2021 Given 60 mg Oral Hannah Vigil, ELLIS          famotidine (Pepcid) 20 mg/2mL injection 20 mg       Date Action Dose Route User    6/6/2025 1045 Given 20 mg  Intravenous Tatianna Early RN          heparin (Porcine) 5000 UNIT/ML injection 5,000 Units       Date Action Dose Route User    6/6/2025 1045 Given 5,000 Units Subcutaneous (Right Lower Abdomen) Tatianna Early RN    6/5/2025 2021 Given 5,000 Units Subcutaneous (Left Lower Abdomen) Hannah Vigil RN          ipratropium-albuterol (Duoneb) 0.5-2.5 (3) MG/3ML inhalation solution 3 mL       Date Action Dose Route User    6/6/2025 0948 Given 3 mL Nebulization Darling Vallejo RCP    6/5/2025 2037 Given 3 mL Nebulization Idalia Galloway RCP          levothyroxine (Synthroid) tab 150 mcg       Date Action Dose Route User    6/6/2025 0515 Given 150 mcg Per G Tube Hannah Vigil RN          midodrine (ProAmatine) tab 10 mg       Date Action Dose Route User    6/6/2025 1301 Given 10 mg Per G Tube Tatianna Early RN    6/6/2025 0515 Given 10 mg Per G Tube Hannah Vigil RN    6/5/2025 1709 Given 10 mg Per G Tube Cherry Gama RN          QUEtiapine (SEROquel) tab 50 mg       Date Action Dose Route User    6/6/2025 1045 Given 50 mg Per G Tube Tatianna Early RN    6/5/2025 1830 Given 50 mg Per G Tube Cherry Gama RN          QUEtiapine ER (SEROquel XR) 24 hr tab 300 mg       Date Action Dose Route User    6/5/2025 2021 Given 300 mg Oral Hannah Vigil RN          sacubitril-valsartan (Entresto) 24-26 MG per tab 1 tablet       Date Action Dose Route User    6/6/2025 1047 Given 1 tablet Oral Tatianna Early RN    6/5/2025 2021 Given 1 tablet Oral Hannah Vigil RN          sennosides (Senokot) tab 17.2 mg       Date Action Dose Route User    6/5/2025 2021 Given 17.2 mg Oral Hannah Vigil RN            Vitals (last day)       Date/Time Temp Pulse Resp BP SpO2 Weight O2 Device O2 Flow Rate (L/min) Who    06/06/25 1301 97.8 °F (36.6 °C) 82 20 107/66 91 % -- Nasal cannula 1 L/min     06/06/25 1054 -- -- -- -- 94 % -- Nasal cannula 1 L/min     06/06/25 1051 -- -- -- -- 88 % -- None (Room air)  -- BL    06/06/25 1043 97.9 °F (36.6 °C) 94 18 102/60 -- -- Nasal cannula 1 L/min BL    06/06/25 0948 -- -- -- -- 93 % -- Nasal cannula 1 L/min SB    06/06/25 0512 97.6 °F (36.4 °C) 90 18 -- 92 % -- Nasal cannula 1 L/min MP    06/05/25 2019 98 °F (36.7 °C) 85 20 112/50 91 % -- None (Room air) -- MP    06/05/25 1705 99 °F (37.2 °C) 83 16 91/53 92 % -- None (Room air) -- EH    06/05/25 0437 97.6 °F (36.4 °C) 76 18 128/57 96 % -- Nasal cannula --

## 2025-06-06 NOTE — PROGRESS NOTES
[facility]  part of [facility]    Progress Note    Radha Winters Patient Status:  Inpatient    1960 MRN [id number]   Location [facility] 5SW/SE Attending Fanny Majano MD   Hosp Day # 19 PCP JUAN MONTANEZ       SUBJECTIVE:  no shortness of breath  No chest pain  No other complaints    OBJECTIVE:  Vital signs in last 24 hours:  /50 (BP Location: Radial)   Pulse 85   Temp 98 °F (36.7 °C) (Oral)   Resp 20   Ht 5' 3\" (1.6 m)   Wt 213 lb 9.6 oz (96.9 kg)   SpO2 91%   BMI 37.84 kg/m²     Intake/Output:    Intake/Output Summary (Last 24 hours) at 2025  Last data filed at 2025 1300  Gross per 24 hour   Intake 644 ml   Output 2000 ml   Net -1356 ml       Wt Readings from Last 3 Encounters:   25 213 lb 9.6 oz (96.9 kg)   10/09/23 185 lb 3 oz (84 kg)   10/04/23 185 lb (83.9 kg)       Exam  Gen: No acute distress,  HEENT: No pallor  Pulm: Lungs clear to auscultation anteriorly  CV: Heart with regular rate and rhythm, no peripheral edema  Abd: Abdomen soft, nontender, nondistended, no organomegaly, bowel sounds present  Skin: no rashes or lesions  CNS: Alert    Data Review:     Labs:   Lab Results   Component Value Date    WBC 7.1 2025    HGB 10.7 2025    HCT 34.4 2025    .0 2025    CREATSERUM 5.33 2025    BUN 57 2025     2025    K 4.4 2025     2025    CO2 23.0 2025    GLU 90 2025    CA 8.9 2025    ALB 3.3 2025    ALKPHO 101 2025    BILT 0.2 2025    TP 5.0 2025    AST 21 2025    ALT 20 2025         LABS  Recent Labs   Lab 25  0942 25  0456 25  0351 25  0555   RBC  --  3.86 3.77* 3.52*   HGB  --  11.5* 11.3* 10.7*   HCT  --  38.0 36.9 34.4*   MCV  --  98.4 97.9 97.7   MCH  --  29.8 30.0 30.4   MCHC  --  30.3* 30.6* 31.1   RDW  --  17.2* 17.2* 17.1*   NEPRELIM  --  7.10 8.35* 4.81   WBC  --  9.5 10.9 7.1    PLT  --  170.0 164.0 188.0   AST 39*  --   --  21   ALT 20  --   --  20   *  --   --   --    *  --  116* 90       Imaging:      Meds:   Current Hospital Medications[1]    Assessment  Problem List[2]  1. Acute Kidney Injury on Chronic Kidney Disease:  -  - Nephrology consultation obtained  Patient with worsening renal function.  Nephrology is following the patient  On hemodialysis       2. Rhabdomyolysis:, Much improved  - Secondary to recurrent falls  - Possibly statin-induced  - Plan: Hold statin (Crestor)  Improving     3. Acute Transaminitis:  improved  - Likely secondary to rhabdomyolysis  - May also be statin-related  - Will monitor with serial labs  Patient could also have a shock liver  Liver enzymes are improving       4. Recurrent Falls:  -    Patient will need physical therapy and Occupational Therapy after she is extubated      5. Coronary Artery Disease:  - Currently stable  - Patient denies chest pain     6. Hypothyroidism:  - Continue levothyroxine  Possible UTI.  on ceftriaxone.    Acute hypoxic respiratory failure  Extubated today  Pulmonary following.      Chronic heart failure with reduced ejection fraction    Cardiomyopathy.  cardiology consulted         Plan for close monitoring and adjustment of management based on clinical course.  discussed with nursing staff  Discussed with the nephrologist      Active Orders   Nourishments    Dietary Nutrition Supplements TID     Frequency: TID     Number of Occurrences: Until Specified     Order Comments: JESÚS davey @ 10am, 2pm and HS - vanilla      Room Service Eligibility Until Discontinued     Frequency: Until Discontinued     Number of Occurrences: Until Specified    Room Service Notify RN Until Discontinued     Frequency: Until Discontinued     Number of Occurrences: Until Specified   Respiratory Care    Acapella TID     Frequency: TID     Number of Occurrences: Until Specified    BIPAP When Sleeping     Frequency: When Sleeping      Number of Occurrences: Until Specified    BIPAP When Sleeping     Frequency: When Sleeping     Number of Occurrences: Until Specified    Chest physiotherapy 4x Daily     Frequency: 4x Daily     Number of Occurrences: Until Specified     Order Comments: Left lower lobe atelectasis.      EZ-PAP 4x Daily     Frequency: 4x Daily     Number of Occurrences: Until Specified    Incentive spriometry: RT to teach pt Once     Frequency: Once     Number of Occurrences: 1 Occurrences    Oxygen administration (adult) PRN     Frequency: PRN     Number of Occurrences: Until Specified    Respiratory care evaluation Once     Frequency: Once     Number of Occurrences: 1 Occurrences     Order Comments: If patient uses home CPAP/BIPAP, place on known home settings. If unknown, place on auto CPAP with upper limit 20 and lower limit 5 cm H2O     Dialysis    Hemodialysis inpatient     Frequency: Once     Number of Occurrences: 1 Occurrences     Order Comments: Keep MAP goal >65     Precaution    Aspiration precautions Continuous     Frequency: Continuous     Number of Occurrences: Until Specified   Diet    Regular/General diet Sodium Restriction: 3-4 GM NA; Fluid Consistency: Thin Liquids ; Texture Consistency: Regular; Is Patient on Accuchecks? Yes; Misc Restriction: No Straw     Frequency: Effective Now     Number of Occurrences: Until Specified     Order Comments: Medications crushed in pureed     Nursing    Accucheck     Frequency: PRN     Number of Occurrences: Until Specified     Order Comments: For symptoms or suspicion of hypoglycemia      Accucheck     Frequency: TID AC and HS     Number of Occurrences: Until Specified    Assess using Critical Care Pain Observation Tool (CPOT)     Frequency: Now Then Q3H     Number of Occurrences: Until Specified    Cardiac monitoring     Frequency: Until Discontinued     Number of Occurrences: Until Specified    Elevate head of bed >30 degrees     Frequency: Until Discontinued     Number of  Occurrences: Until Specified     Order Comments: 30-45 degrees at all times unless contraindicated by physician order or patient condition.      Elevate head of bed greater than or equal to 30 degrees     Frequency: Until Discontinued     Number of Occurrences: Until Specified    Flush feeding tube     Frequency: PRN     Number of Occurrences: Until Specified     Order Comments: Flush feeding tube:  1.) Before and after bolus feedings.  2.) After medication administration.      For any tube feed interruptions, continue basal insulin, and correction scales. Hold any scheduled insulins, and resume when tube feed is restarted.     Frequency: Until Discontinued     Number of Occurrences: Until Specified    For non-patent tubes:     Frequency: PRN     Number of Occurrences: Until Specified     Order Comments: If water is unsuccessful in dislodging the clog, use the pancrealipase/sodium bicarb. May repeat x1 before calling physician for further orders.      For patients without a history of diabetes, discontinue Accuchecks and insulin correction scale if blood glucose <180 mg/dL for 48 hours     Frequency: Until Discontinued     Number of Occurrences: Until Specified    Give all morning medications unless otherwise specified     Frequency: Until Discontinued     Number of Occurrences: Until Specified     Order Comments: Give all morning medications unless otherwise specified.  DO NOT use Electrolyte protocol.      If patient uses CPAP/BIPAP, encourage patient to wear when sleeping (including daytime) and after any apneic episode or adverse event.     Frequency: Until Discontinued     Number of Occurrences: Until Specified    Initiate early mobility protocol     Frequency: Once     Number of Occurrences: 1 Occurrences    Initiate Hypoglycemia Algorithm for Adults     Frequency: Until Discontinued     Number of Occurrences: Until Specified     Order Comments: For blood glucose less than 70      Initiate Medical Nutrition  Therapy Protocol for Enteral Nutrition     Frequency: Until Discontinued     Number of Occurrences: Until Specified    Insert denny catheter     Frequency: Once     Number of Occurrences: 1 Occurrences    Intake and Output     Frequency: Per Unit Routine     Number of Occurrences: Until Specified     Order Comments: Record formula and water flushes separately.      May use port/central line     Frequency: Until Discontinued     Number of Occurrences: Until Specified    Measure weight     Frequency: Per Unit Routine     Number of Occurrences: Until Specified     Order Comments: Measure weight every Monday and Thursday for non critical care patients.      Monitor tube placement     Frequency: Q8H     Number of Occurrences: Until Specified    Notify attending physician for patients refusing CPAP     Frequency: Until Discontinued     Number of Occurrences: Until Specified     Order Comments: Document that patient was educated and refused CPAP, if applicable.      Notify attending physician for sustained desaturation <90% or prolonged or recurrent apnea     Frequency: Until Discontinued     Number of Occurrences: Until Specified     Order Comments: Notify attending physician for sustained desaturation <90% or prolonged or recurrent apnea      Notify physician     Frequency: Until Discontinued     Number of Occurrences: Until Specified    Notify physician of significant abdominal distension, pain, nausea, or emesis, and stop tube feeding     Frequency: Until Discontinued     Number of Occurrences: Until Specified    Notify Radiologist     Frequency: Until Discontinued     Number of Occurrences: Until Specified    Nurse Driven Indwelling Urinary Catheter Removal Protocol     Frequency: Until Discontinued     Number of Occurrences: Until Specified    Nursing communication (specify)     Frequency: Once     Number of Occurrences: 1 Occurrences     Order Comments: Hold blood thinners pre procedure      Nursing communication  (specify)     Frequency: Once     Number of Occurrences: 1 Occurrences     Order Comments: RN, keep the patient off the left side.      Nursing communication (specify)     Frequency: Once     Number of Occurrences: 1 Occurrences     Order Comments: Removed OG and placed Dobhoff      Nursing communication (specify)     Frequency: Once     Number of Occurrences: 1 Occurrences     Order Comments: Dobhoff okay to use      Nursing communication - call [facility] to order dialysis     Frequency: Once     Number of Occurrences: 1 Occurrences     Order Comments: Call [facility] at [phone] to order dialysis.  Identify special circumstances and specify stat or routine treatment.      Patient may resume usual diet     Frequency: Once     Number of Occurrences: 1 Occurrences     Order Comments: Npo x one hour, then resume pre-procedure diet      Post procedure site assessment     Frequency: Per Unit Routine     Number of Occurrences: Until Specified     Order Comments: Every 15 minutes for 1 hour, then every 30 minutes for 1 hour, then every 60 minutes for 2 hours, then per unit routine      Provide patient with sleep apnea education materials     Frequency: Once     Number of Occurrences: 1 Occurrences    Pulse oximetry     Frequency: Per Unit Routine     Number of Occurrences: Until Specified    Pulse oximetry     Frequency: Continuous     Number of Occurrences: Until Specified    Tube insertion     Frequency: Once     Number of Occurrences: 1 Occurrences    Tube insertion     Frequency: Once     Number of Occurrences: 1 Occurrences    Tube insertion     Frequency: Once     Number of Occurrences: 1 Occurrences     Order Comments: Meds and tube feeding      Up to chair Once     Frequency: Once     Number of Occurrences: 1 Occurrences    Verify informed consent     Frequency: Once     Number of Occurrences: 1 Occurrences    Vital signs     Frequency: Per Unit Routine     Number of Occurrences: Until Specified     Order  Comments: Every 15 minutes for 1 hour, then every 30 minutes for 1 hour, then every 60 minutes for 2 hours, then per unit routine.      Void prior to procedure     Frequency: Once     Number of Occurrences: 1 Occurrences   Consult    Consult to Interventional Radiology     Frequency: Once     Number of Occurrences: 1 Occurrences    Consult to Pulmonology     Frequency: Once     Number of Occurrences: 1 Occurrences   Medications    acetaminophen (Tylenol) tab 650 mg     Frequency: Q6H PRN     Dose: 650 mg     Route: Oral    albuterol (Ventolin) (2.5 MG/3ML) 0.083% nebulizer solution 2.5 mg     Frequency: Q6H PRN     Dose: 2.5 mg     Route: Nebulization    arformoterol (Brovana) 15 MCG/2ML nebulizer solution 15 mcg     Frequency: 2 times daily     Dose: 15 mcg     Route: Nebulization    aspirin DR tab 81 mg     Frequency: Daily     Dose: 81 mg     Route: Oral    bisacodyl (Dulcolax) 10 MG rectal suppository 10 mg     Frequency: Daily PRN     Dose: 10 mg     Route: Rectal    budesonide (Pulmicort) 0.5 MG/2ML nebulizer suspension 0.5 mg     Frequency: 2 times daily     Dose: 0.5 mg     Route: Nebulization    busPIRone (Buspar) tab 30 mg     Frequency: TID     Dose: 30 mg     Route: Per G Tube    carvedilol (Coreg) tab 3.125 mg     Frequency: BID with meals     Dose: 3.125 mg     Route: Oral    chlorproMAZINE (Thorazine) tab 25 mg     Frequency: QID     Dose: 25 mg     Route: Per G Tube    dextrose 10% infusion (TPN no rate)     Frequency: Continuous PRN     Route: Intravenous    dextrose 50% injection 50 mL     Linked Order: Or     Frequency: Q15 Min PRN     Dose: 50 mL     Route: Intravenous    diazePAM (Valium) tab 5 mg     Frequency: Q8H PRN     Dose: 5 mg     Route: Per G Tube    docusate sodium (Colace) cap 100 mg     Frequency: BID     Dose: 100 mg     Route: Oral    DULoxetine (Cymbalta) DR cap 60 mg     Frequency: BID     Dose: 60 mg     Route: Oral    famotidine (Pepcid) 20 mg/2mL injection 20 mg     Frequency:  Daily     Dose: 20 mg     Route: Intravenous    fleet enema (Fleet) rectal enema 133 mL     Frequency: Once PRN     Dose: 1 enema     Route: Rectal    glucose (Dex4) 15 GM/59ML oral liquid 15 g     Linked Order: Or     Frequency: Q15 Min PRN     Dose: 15 g     Route: Oral    glucose (Dex4) 15 GM/59ML oral liquid 30 g     Linked Order: Or     Frequency: Q15 Min PRN     Dose: 30 g     Route: Oral    glucose (Glutose) 40% oral gel 15 g     Linked Order: Or     Frequency: Q15 Min PRN     Dose: 15 g     Route: Oral    glucose (Glutose) 40% oral gel 30 g     Linked Order: Or     Frequency: Q15 Min PRN     Dose: 30 g     Route: Oral    glucose-vitamin C (Dex-4) chewable tab 4 tablet     Linked Order: Or     Frequency: Q15 Min PRN     Dose: 4 tablet     Route: Oral    glucose-vitamin C (Dex-4) chewable tab 8 tablet     Linked Order: Or     Frequency: Q15 Min PRN     Dose: 8 tablet     Route: Oral    heparin (Porcine) 1000 UNIT/ML injection 2,000 Units     Frequency: PRN Dialysis     Dose: 2,000 Units     Route: Intravenous    heparin (Porcine) 5000 UNIT/ML injection 5,000 Units     Frequency: Q12H PATRICK     Dose: 5,000 Units     Route: Subcutaneous    HYDROcodone-acetaminophen (Norco) 5-325 MG per tab 1 tablet     Frequency: Q6H PRN     Dose: 1 tablet     Route: Oral    insulin aspart (NovoLOG) 100 Units/mL FlexPen 1-5 Units     Frequency: TID CC     Dose: 1-5 Units     Route: Subcutaneous     Order Comments: For insulin aspart (NovoLOG) doses > 60 Units, change product to dispense to be a insulin aspart (NovoLOG) *VIAL*    ipratropium-albuterol (Duoneb) 0.5-2.5 (3) MG/3ML inhalation solution 3 mL     Frequency: 2 times daily     Dose: 3 mL     Route: Nebulization    levothyroxine (Synthroid) tab 150 mcg     Frequency: Before breakfast     Dose: 150 mcg     Route: Per G Tube    magnesium hydroxide (Milk of Magnesia) 400 MG/5ML oral suspension 30 mL     Frequency: Daily PRN     Dose: 30 mL     Route: Oral    midodrine  (ProAmatine) tab 10 mg     Frequency: TID     Dose: 10 mg     Route: Per G Tube    ondansetron (Zofran) 4 MG/2ML injection 4 mg     Frequency: Q6H PRN     Dose: 4 mg     Route: Intravenous    pancrelipase (Lip-Prot-Amyl) (Zenpep) DR particles cap 10,000 Units     Linked Order: And     Frequency: PRN     Dose: 10,000 Units     Route: Per G Tube    polyethylene glycol (PEG 3350) (Miralax) 17 g oral packet 17 g     Frequency: Daily PRN     Dose: 17 g     Route: Oral    QUEtiapine (SEROquel) tab 50 mg     Frequency: BID     Dose: 50 mg     Route: Per G Tube    QUEtiapine ER (SEROquel XR) 24 hr tab 300 mg     Frequency: Nightly     Dose: 300 mg     Route: Oral    sacubitril-valsartan (Entresto) 24-26 MG per tab 1 tablet     Frequency: BID     Dose: 1 tablet     Route: Oral    sennosides (Senokot) tab 17.2 mg     Frequency: BID     Dose: 17.2 mg     Route: Oral    sodium bicarbonate tab 650 mg     Linked Order: And     Frequency: PRN     Dose: 650 mg     Route: Per G Tube     Reviewed personally: current medical record, vital signs info, lab results, cardiac & radiographic films and reports.  Formulated plans for continued care for this patient.  RN to call us for any significant change  Scheduled tests: see orders   Other orders per treatment team.     *The above components were done for the patient encounter: Reviewing the patient's electronic chart, reviewing results, obtaining a medical history, counseling patient and/or family member, ordering medications, tests, or procedures, documenting clinical information in the electronic health record, refer to or communicate with other health care professionals as indicated, independently interpret results and providing care coordination      Fanny Majano MD           [1]   Current Facility-Administered Medications   Medication Dose Route Frequency    insulin aspart (NovoLOG) 100 Units/mL FlexPen 1-5 Units  1-5 Units Subcutaneous TID CC    docusate sodium (Colace) cap  100 mg  100 mg Oral BID    sennosides (Senokot) tab 17.2 mg  17.2 mg Oral BID    ipratropium-albuterol (Duoneb) 0.5-2.5 (3) MG/3ML inhalation solution 3 mL  3 mL Nebulization 2 times daily    midodrine (ProAmatine) tab 10 mg  10 mg Per G Tube TID    busPIRone (Buspar) tab 30 mg  30 mg Per G Tube TID    chlorproMAZINE (Thorazine) tab 25 mg  25 mg Per G Tube QID    diazePAM (Valium) tab 5 mg  5 mg Per G Tube Q8H PRN    QUEtiapine (SEROquel) tab 50 mg  50 mg Per G Tube BID    levothyroxine (Synthroid) tab 150 mcg  150 mcg Per G Tube Before breakfast    famotidine (Pepcid) 20 mg/2mL injection 20 mg  20 mg Intravenous Daily    glucose (Dex4) 15 GM/59ML oral liquid 15 g  15 g Oral Q15 Min PRN    Or    glucose (Glutose) 40% oral gel 15 g  15 g Oral Q15 Min PRN    Or    glucose-vitamin C (Dex-4) chewable tab 4 tablet  4 tablet Oral Q15 Min PRN    Or    dextrose 50% injection 50 mL  50 mL Intravenous Q15 Min PRN    Or    glucose (Dex4) 15 GM/59ML oral liquid 30 g  30 g Oral Q15 Min PRN    Or    glucose (Glutose) 40% oral gel 30 g  30 g Oral Q15 Min PRN    Or    glucose-vitamin C (Dex-4) chewable tab 8 tablet  8 tablet Oral Q15 Min PRN    carvedilol (Coreg) tab 3.125 mg  3.125 mg Oral BID with meals    sacubitril-valsartan (Entresto) 24-26 MG per tab 1 tablet  1 tablet Oral BID    budesonide (Pulmicort) 0.5 MG/2ML nebulizer suspension 0.5 mg  0.5 mg Nebulization 2 times daily    arformoterol (Brovana) 15 MCG/2ML nebulizer solution 15 mcg  15 mcg Nebulization 2 times daily    dextrose 10% infusion (TPN no rate)   Intravenous Continuous PRN    sodium bicarbonate tab 650 mg  650 mg Per G Tube PRN    And    pancrelipase (Lip-Prot-Amyl) (Zenpep) DR particles cap 10,000 Units  10,000 Units Per G Tube PRN    DULoxetine (Cymbalta) DR cap 60 mg  60 mg Oral BID    HYDROcodone-acetaminophen (Norco) 5-325 MG per tab 1 tablet  1 tablet Oral Q6H PRN    ondansetron (Zofran) 4 MG/2ML injection 4 mg  4 mg Intravenous Q6H PRN    polyethylene  glycol (PEG 3350) (Miralax) 17 g oral packet 17 g  17 g Oral Daily PRN    magnesium hydroxide (Milk of Magnesia) 400 MG/5ML oral suspension 30 mL  30 mL Oral Daily PRN    bisacodyl (Dulcolax) 10 MG rectal suppository 10 mg  10 mg Rectal Daily PRN    fleet enema (Fleet) rectal enema 133 mL  1 enema Rectal Once PRN    heparin (Porcine) 1000 UNIT/ML injection 2,000 Units  2,000 Units Intravenous PRN Dialysis    albuterol (Ventolin) (2.5 MG/3ML) 0.083% nebulizer solution 2.5 mg  2.5 mg Nebulization Q6H PRN    heparin (Porcine) 5000 UNIT/ML injection 5,000 Units  5,000 Units Subcutaneous 2 times per day    acetaminophen (Tylenol) tab 650 mg  650 mg Oral Q6H PRN    aspirin DR tab 81 mg  81 mg Oral Daily    QUEtiapine ER (SEROquel XR) 24 hr tab 300 mg  300 mg Oral Nightly   [2]   Patient Active Problem List  Diagnosis    Closed fracture of proximal end of left humerus, unspecified fracture morphology, initial encounter    Frequent falls    Seizure-like activity (HCC)    Pituitary lesion (HCC)    Major depressive disorder, recurrent episode, moderate (HCC)    Generalized anxiety disorder    Acute renal failure superimposed on chronic kidney disease, unspecified acute renal failure type, unspecified CKD stage    Non-traumatic rhabdomyolysis    Transaminitis

## 2025-06-06 NOTE — CM/SW NOTE
Will need HD in am prior to transfer to [facility].  Ambulance on Will call, will plan on transferring 6/7 after HD is completed.    PLAN:  Transfer to [facility] after HD is completed 6/7 am.  Ambulance on Will call.  Friend notified of dc tomorrow now.    Magali BALESN RN CRRBAYRON MURRY  RN Case Manager [facility]

## 2025-06-06 NOTE — PLAN OF CARE
Problem: GASTROINTESTINAL - ADULT  Goal: Minimal or absence of nausea and vomiting  Description: INTERVENTIONS:  - Maintain adequate hydration with IV or PO as ordered and tolerated  - Nasogastric tube to low intermittent suction as ordered  - Evaluate effectiveness of ordered antiemetic medications  - Provide nonpharmacologic comfort measures as appropriate  - Advance diet as tolerated, if ordered  - Obtain nutritional consult as needed  - Evaluate fluid balance  Outcome: Progressing     Problem: GASTROINTESTINAL - ADULT  Goal: Maintains or returns to baseline bowel function  Description: INTERVENTIONS:  - Assess bowel function  - Maintain adequate hydration with IV or PO as ordered and tolerated  - Evaluate effectiveness of GI medications  - Encourage mobilization and activity  - Obtain nutritional consult as needed  - Establish a toileting routine/schedule  - Consider collaborating with pharmacy to review patient's medication profile  Outcome: Progressing     Problem: RISK FOR INFECTION - ADULT  Goal: Absence of fever/infection during anticipated neutropenic period  Description: INTERVENTIONS  - Monitor WBC  - Administer growth factors as ordered  - Implement neutropenic guidelines  Outcome: Progressing

## 2025-06-07 ENCOUNTER — HOSPITAL ENCOUNTER (EMERGENCY)
Age: 65
Discharge: HOME | End: 2025-06-07
Attending: EMERGENCY MEDICINE

## 2025-06-07 VITALS
HEART RATE: 80 BPM | RESPIRATION RATE: 16 BRPM | SYSTOLIC BLOOD PRESSURE: 102 MMHG | BODY MASS INDEX: 17.53 KG/M2 | OXYGEN SATURATION: 95 % | DIASTOLIC BLOOD PRESSURE: 56 MMHG | WEIGHT: 98.99 LBS

## 2025-06-07 VITALS
OXYGEN SATURATION: 97 % | RESPIRATION RATE: 16 BRPM | HEIGHT: 63 IN | HEART RATE: 77 BPM | DIASTOLIC BLOOD PRESSURE: 61 MMHG | WEIGHT: 213.63 LBS | TEMPERATURE: 98 F | SYSTOLIC BLOOD PRESSURE: 98 MMHG | BODY MASS INDEX: 37.85 KG/M2

## 2025-06-07 DIAGNOSIS — N18.6 ESRD (END STAGE RENAL DISEASE)  (CMD): ICD-10-CM

## 2025-06-07 DIAGNOSIS — I95.9 HYPOTENSION, UNSPECIFIED HYPOTENSION TYPE: Primary | ICD-10-CM

## 2025-06-07 LAB
ANION GAP SERPL CALC-SCNC: 11 MMOL/L (ref 0–18)
ATRIAL RATE (BPM): 80
BASOPHILS # BLD AUTO: 0.1 X10(3) UL (ref 0–0.2)
BASOPHILS NFR BLD AUTO: 1.6 %
BUN BLD-MCNC: 33 MG/DL (ref 9–23)
BUN/CREAT SERPL: 5.7 (ref 10–20)
CALCIUM BLD-MCNC: 9.3 MG/DL (ref 8.7–10.4)
CHLORIDE SERPL-SCNC: 100 MMOL/L (ref 98–112)
CO2 SERPL-SCNC: 24 MMOL/L (ref 21–32)
CREAT BLD-MCNC: 5.76 MG/DL (ref 0.55–1.02)
DEPRECATED RDW RBC AUTO: 62.4 FL (ref 35.1–46.3)
EGFRCR SERPLBLD CKD-EPI 2021: 8 ML/MIN/1.73M2 (ref 60–?)
EOSINOPHIL # BLD AUTO: 0.13 X10(3) UL (ref 0–0.7)
EOSINOPHIL NFR BLD AUTO: 2.1 %
ERYTHROCYTE [DISTWIDTH] IN BLOOD BY AUTOMATED COUNT: 17.2 % (ref 11–15)
GLUCOSE BLD-MCNC: 98 MG/DL (ref 70–99)
GLUCOSE BLDC GLUCOMTR-MCNC: 100 MG/DL (ref 70–99)
GLUCOSE BLDC GLUCOMTR-MCNC: 98 MG/DL (ref 70–99)
HCT VFR BLD AUTO: 34.3 % (ref 35–48)
HGB BLD-MCNC: 10.7 G/DL (ref 12–16)
IMM GRANULOCYTES # BLD AUTO: 0.14 X10(3) UL (ref 0–1)
IMM GRANULOCYTES NFR BLD: 2.2 %
LYMPHOCYTES # BLD AUTO: 1.33 X10(3) UL (ref 1–4)
LYMPHOCYTES NFR BLD AUTO: 21.2 %
MCH RBC QN AUTO: 30.5 PG (ref 26–34)
MCHC RBC AUTO-ENTMCNC: 31.2 G/DL (ref 31–37)
MCV RBC AUTO: 97.7 FL (ref 80–100)
MONOCYTES # BLD AUTO: 0.43 X10(3) UL (ref 0.1–1)
MONOCYTES NFR BLD AUTO: 6.8 %
NEUTROPHILS # BLD AUTO: 4.15 X10 (3) UL (ref 1.5–7.7)
NEUTROPHILS # BLD AUTO: 4.15 X10(3) UL (ref 1.5–7.7)
NEUTROPHILS NFR BLD AUTO: 66.1 %
OSMOLALITY SERPL CALC.SUM OF ELEC: 287 MOSM/KG (ref 275–295)
P AXIS (DEGREES): 52
PLATELET # BLD AUTO: 202 10(3)UL (ref 150–450)
POTASSIUM SERPL-SCNC: 4.5 MMOL/L (ref 3.5–5.1)
PR-INTERVAL (MSEC): 206
QRS-INTERVAL (MSEC): 104
QT-INTERVAL (MSEC): 376
QTC: 434
R AXIS (DEGREES): -8
RBC # BLD AUTO: 3.51 X10(6)UL (ref 3.8–5.3)
REPORT TEXT: NORMAL
SODIUM SERPL-SCNC: 135 MMOL/L (ref 136–145)
T AXIS (DEGREES): 56
VENTRICULAR RATE EKG/MIN (BPM): 80
WBC # BLD AUTO: 6.3 X10(3) UL (ref 4–11)

## 2025-06-07 PROCEDURE — 93010 ELECTROCARDIOGRAM REPORT: CPT | Performed by: INTERNAL MEDICINE

## 2025-06-07 PROCEDURE — 93005 ELECTROCARDIOGRAM TRACING: CPT | Performed by: EMERGENCY MEDICINE

## 2025-06-07 PROCEDURE — 10002803 HB RX 637: Performed by: EMERGENCY MEDICINE

## 2025-06-07 PROCEDURE — 99284 EMERGENCY DEPT VISIT MOD MDM: CPT | Performed by: EMERGENCY MEDICINE

## 2025-06-07 RX ORDER — CARVEDILOL 3.12 MG/1
3.12 TABLET ORAL 2 TIMES DAILY WITH MEALS
Qty: 60 TABLET | Refills: 0 | Status: SHIPPED | OUTPATIENT
Start: 2025-06-07

## 2025-06-07 RX ORDER — IPRATROPIUM BROMIDE AND ALBUTEROL SULFATE 2.5; .5 MG/3ML; MG/3ML
3 SOLUTION RESPIRATORY (INHALATION) 2 TIMES DAILY
Qty: 3 ML | Refills: 0 | Status: SHIPPED | OUTPATIENT
Start: 2025-06-07

## 2025-06-07 RX ORDER — MIDODRINE HYDROCHLORIDE 10 MG/1
10 TABLET ORAL 3 TIMES DAILY
Qty: 90 TABLET | Refills: 0 | Status: SHIPPED | OUTPATIENT
Start: 2025-06-07

## 2025-06-07 RX ORDER — FLUTICASONE PROPIONATE AND SALMETEROL 250; 50 UG/1; UG/1
1 POWDER RESPIRATORY (INHALATION) 2 TIMES DAILY
Qty: 1 EACH | Refills: 0 | Status: SHIPPED | OUTPATIENT
Start: 2025-06-07

## 2025-06-07 RX ORDER — MIDODRINE HYDROCHLORIDE 10 MG/1
10 TABLET ORAL
Qty: 90 TABLET | Refills: 0 | Status: SHIPPED | OUTPATIENT
Start: 2025-06-07

## 2025-06-07 RX ORDER — DIAZEPAM 5 MG/1
5 TABLET ORAL EVERY 8 HOURS PRN
Qty: 30 TABLET | Refills: 0 | Status: SHIPPED | OUTPATIENT
Start: 2025-06-07

## 2025-06-07 RX ORDER — ACETAMINOPHEN 325 MG/1
650 TABLET ORAL EVERY 6 HOURS PRN
Qty: 30 TABLET | Refills: 0 | Status: SHIPPED | COMMUNITY
Start: 2025-06-07

## 2025-06-07 RX ORDER — CALCITRIOL 0.25 UG/1
0.25 CAPSULE, LIQUID FILLED ORAL DAILY
Qty: 30 CAPSULE | Refills: 0 | Status: SHIPPED | OUTPATIENT
Start: 2025-06-07

## 2025-06-07 RX ORDER — HYDROCODONE BITARTRATE AND ACETAMINOPHEN 5; 325 MG/1; MG/1
1 TABLET ORAL EVERY 6 HOURS PRN
Qty: 12 TABLET | Refills: 0 | Status: SHIPPED | OUTPATIENT
Start: 2025-06-07

## 2025-06-07 RX ORDER — PSEUDOEPHEDRINE HCL 30 MG
100 TABLET ORAL 2 TIMES DAILY
Qty: 60 CAPSULE | Refills: 0 | Status: SHIPPED | OUTPATIENT
Start: 2025-06-07

## 2025-06-07 RX ORDER — MIDODRINE HYDROCHLORIDE 5 MG/1
10 TABLET ORAL ONCE
Status: COMPLETED | OUTPATIENT
Start: 2025-06-07 | End: 2025-06-07

## 2025-06-07 RX ORDER — CALCITRIOL 0.25 UG/1
0.25 CAPSULE, LIQUID FILLED ORAL DAILY
Status: DISCONTINUED | OUTPATIENT
Start: 2025-06-07 | End: 2025-06-07

## 2025-06-07 RX ORDER — POLYETHYLENE GLYCOL 3350 17 G/17G
17 POWDER, FOR SOLUTION ORAL DAILY PRN
Qty: 10 EACH | Refills: 0 | Status: SHIPPED | OUTPATIENT
Start: 2025-06-07

## 2025-06-07 RX ADMIN — MIDODRINE HYDROCHLORIDE 10 MG: 5 TABLET ORAL at 15:54

## 2025-06-07 ASSESSMENT — PAIN SCALES - GENERAL
PAINLEVEL_OUTOF10: 9
PAINLEVEL_OUTOF10: 9

## 2025-06-07 ASSESSMENT — PAIN DESCRIPTION - PAIN TYPE: TYPE: CHRONIC PAIN

## 2025-06-07 NOTE — PLAN OF CARE
Problem: Patient Centered Care  Goal: Patient preferences are identified and integrated in the patient's plan of care  Description: Interventions:  - What would you like us to know as we care for you? From home alone   - Provide timely, complete, and accurate information to patient/family  - Incorporate patient and family knowledge, values, beliefs, and cultural backgrounds into the planning and delivery of care  - Encourage patient/family to participate in care and decision-making at the level they choose  - Honor patient and family perspectives and choices  Outcome: Progressing     Problem: PAIN - ADULT  Goal: Verbalizes/displays adequate comfort level or patient's stated pain goal  Description: INTERVENTIONS:  - Encourage pt to monitor pain and request assistance  - Assess pain using appropriate pain scale  - Administer analgesics based on type and severity of pain and evaluate response  - Implement non-pharmacological measures as appropriate and evaluate response  - Consider cultural and social influences on pain and pain management  - Manage/alleviate anxiety  - Utilize distraction and/or relaxation techniques  - Monitor for opioid side effects  - Notify MD/LIP if interventions unsuccessful or patient reports new pain  - Anticipate increased pain with activity and pre-medicate as appropriate  Outcome: Progressing     Problem: SAFETY ADULT - FALL  Goal: Free from fall injury  Description: INTERVENTIONS:  - Assess pt frequently for physical needs  - Identify cognitive and physical deficits and behaviors that affect risk of falls.  - [place] fall precautions as indicated by assessment.  - Educate pt/family on patient safety including physical limitations  - Instruct pt to call for assistance with activity based on assessment  - Modify environment to reduce risk of injury  - Provide assistive devices as appropriate  - Consider OT/PT consult to assist with strengthening/mobility  - Encourage toileting  schedule  Outcome: Progressing     Problem: DISCHARGE PLANNING  Goal: Discharge to home or other facility with appropriate resources  Description: INTERVENTIONS:  - Identify barriers to discharge w/pt and caregiver  - Include patient/family/discharge partner in discharge planning  - Arrange for needed discharge resources and transportation as appropriate  - Identify discharge learning needs (meds, wound care, etc)  - Arrange for interpreters to assist at discharge as needed  - Consider post-discharge preferences of patient/family/discharge partner  - Complete POLST form as appropriate  - Assess patient's ability to be responsible for managing their own health  - Refer to Case Management Department for coordinating discharge planning if the patient needs post-hospital services based on physician/LIP order or complex needs related to functional status, cognitive ability or social support system  Outcome: Progressing     Problem: SKIN/TISSUE INTEGRITY - ADULT  Goal: Skin integrity remains intact  Description: INTERVENTIONS  - Assess and document risk factors for pressure ulcer development  - Assess and document skin integrity  - Monitor for areas of redness and/or skin breakdown  - Initiate interventions, skin care algorithm/standards of care as needed  Outcome: Progressing     Problem: RESPIRATORY - ADULT  Goal: Achieves optimal ventilation and oxygenation  Description: INTERVENTIONS:  - Assess for changes in respiratory status  - Assess for changes in mentation and behavior  - Position to facilitate oxygenation and minimize respiratory effort  - Oxygen supplementation based on oxygen saturation or ABGs  - Provide Smoking Cessation handout, if applicable  - Encourage broncho-pulmonary hygiene including cough, deep breathe, Incentive Spirometry  - Assess the need for suctioning and perform as needed  - Assess and instruct to report SOB or any respiratory difficulty  - Respiratory Therapy support as indicated  -  Manage/alleviate anxiety  - Monitor for signs/symptoms of CO2 retention  Outcome: Progressing     Problem: GENITOURINARY - ADULT  Goal: Absence of urinary retention  Description: INTERVENTIONS:  - Assess patient’s ability to void and empty bladder  - Monitor intake/output and perform bladder scan as needed  - Follow urinary retention protocol/standard of care  - Consider collaborating with pharmacy to review patient's medication profile  - Implement strategies to promote bladder emptying  Outcome: Progressing     Problem: METABOLIC/FLUID AND ELECTROLYTES - ADULT  Goal: Glucose maintained within prescribed range  Description: INTERVENTIONS:  - Monitor Blood Glucose as ordered  - Assess for signs and symptoms of hyperglycemia and hypoglycemia  - Administer ordered medications to maintain glucose within target range  - Assess barriers to adequate nutritional intake and initiate nutrition consult as needed  - Instruct patient on self management of diabetes  Outcome: Progressing  Goal: Electrolytes maintained within normal limits  Description: INTERVENTIONS:  - Monitor labs and rhythm and assess patient for signs and symptoms of electrolyte imbalances  - Administer electrolyte replacement as ordered  - Monitor response to electrolyte replacements, including rhythm and repeat lab results as appropriate  - Fluid restriction as ordered  - Instruct patient on fluid and nutrition restrictions as appropriate  Outcome: Progressing  Goal: Hemodynamic stability and optimal renal function maintained  Description: INTERVENTIONS:  - Monitor labs and assess for signs and symptoms of volume excess or deficit  - Monitor intake, output and patient weight  - Monitor urine specific gravity, serum osmolarity and serum sodium as indicated or ordered  - Monitor response to interventions for patient's volume status, including labs, urine output, blood pressure (other measures as available)  - Encourage oral intake as appropriate  - Instruct  patient on fluid and nutrition restrictions as appropriate  Outcome: Progressing     Problem: HEMATOLOGIC - ADULT  Goal: Free from bleeding injury  Description: (Example usage: patient with low platelets)  INTERVENTIONS:  - Avoid intramuscular injections, enemas and rectal medication administration  - Ensure safe mobilization of patient  - Hold pressure on venipuncture sites to achieve adequate hemostasis  - Assess for signs and symptoms of internal bleeding  - Monitor lab trends  - Patient is to report abnormal signs of bleeding to staff  - Avoid use of toothpicks and dental floss  - Use electric shaver for shaving  - Use soft bristle tooth brush  - Limit straining and forceful nose blowing  Outcome: Progressing  Goal: Maintains hematologic stability  Description: INTERVENTIONS  - Assess for signs and symptoms of bleeding or hemorrhage  - Monitor labs and vital signs for trends  - Administer supportive blood products/factors, fluids and medications as ordered and appropriate  - Administer supportive blood products/factors as ordered and appropriate  Outcome: Progressing  Goal: Free from bleeding injury  Description: (Example usage: patient with low platelets)  INTERVENTIONS:  - Avoid intramuscular injections, enemas and rectal medication administration  - Ensure safe mobilization of patient  - Hold pressure on venipuncture sites to achieve adequate hemostasis  - Assess for signs and symptoms of internal bleeding  - Monitor lab trends  - Patient is to report abnormal signs of bleeding to staff  - Avoid use of toothpicks and dental floss  - Use electric shaver for shaving  - Use soft bristle tooth brush  - Limit straining and forceful nose blowing  Outcome: Progressing     Problem: Diabetes/Glucose Control  Goal: Glucose maintained within prescribed range  Description: INTERVENTIONS:  - Monitor Blood Glucose as ordered  - Assess for signs and symptoms of hyperglycemia and hypoglycemia  - Administer ordered  medications to maintain glucose within target range  - Assess barriers to adequate nutritional intake and initiate nutrition consult as needed  - Instruct patient on self management of diabetes  Outcome: Progressing     Problem: Delirium  Goal: Minimize duration of delirium  Description: Interventions:  - Encourage use of hearing aids, eye glasses  - Promote highest level of mobility daily  - Provide frequent reorientation  - Promote wakefulness i.e. lights on, blinds open  - Promote sleep, encourage patient's normal rest cycle i.e. lights off, TV off, minimize noise and interruptions  - Encourage family to assist in orientation and promotion of home routines  Outcome: Progressing     Problem: GASTROINTESTINAL - ADULT  Goal: Minimal or absence of nausea and vomiting  Description: INTERVENTIONS:  - Maintain adequate hydration with IV or PO as ordered and tolerated  - Nasogastric tube to low intermittent suction as ordered  - Evaluate effectiveness of ordered antiemetic medications  - Provide nonpharmacologic comfort measures as appropriate  - Advance diet as tolerated, if ordered  - Obtain nutritional consult as needed  - Evaluate fluid balance  Outcome: Progressing  Goal: Maintains or returns to baseline bowel function  Description: INTERVENTIONS:  - Assess bowel function  - Maintain adequate hydration with IV or PO as ordered and tolerated  - Evaluate effectiveness of GI medications  - Encourage mobilization and activity  - Obtain nutritional consult as needed  - Establish a toileting routine/schedule  - Consider collaborating with pharmacy to review patient's medication profile  Outcome: Progressing     Problem: RISK FOR INFECTION - ADULT  Goal: Absence of fever/infection during anticipated neutropenic period  Description: INTERVENTIONS  - Monitor WBC  - Administer growth factors as ordered  - Implement neutropenic guidelines  Outcome: Progressing     Problem: Patient/Family Goals  Goal: Patient/Family Long Term  Goal  Description: Patient's Long Term Goal: to discharge     Interventions:  - Monitor vital signs  - Monitor appropriate labs   - Monitor blood glucose levels   - Pain management   - Administer medications per order   - Follow MD orders   - Diagnostics per order   - Update/inform patient and family on plan of care   - Discharge planning   - See additional Care Plan goals for specific interventions  Outcome: Progressing  Goal: Patient/Family Short Term Goal  Description: Patient's Short Term Goal: to feel better     Interventions:   - Monitor vital signs  - Monitor appropriate labs   - Monitor blood glucose levels   - Pain management   - Administer medications per order   - Follow MD orders   - Diagnostics per order   - Update/inform patient and family on plan of care   - Discharge planning   - See additional Care Plan goals for specific interventions  Outcome: Progressing

## 2025-06-07 NOTE — PROGRESS NOTES
Patient seen in follow-up. Patient denies chest pain or shortness of breath at rest. Denies cough or congestion. Denies dizziness or lightheadedness. Denies abdominal pain or nausea.  HR and BP stable      Vitals:    06/07/25 1031   BP: 92/59   Pulse: 77   Resp: 16   Temp: 97.9 °F (36.6 °C)       Intake/Output Summary (Last 24 hours) at 6/7/2025 1342  Last data filed at 6/7/2025 1019  Gross per 24 hour   Intake 100 ml   Output 2000 ml   Net -1900 ml     Wt Readings from Last 1 Encounters:   06/05/25 213 lb 9.6 oz (96.9 kg)        General: No acute distress.  Neck: Jugular venous pulsations not seen.  Lungs: Crackles saúl  Heart: Normal rate. No murmurs.  Abdomen: Soft. Non tender  Extremities: Saúl edema.  Neurological:   slow to respond but does answer questions  Psychiatric: Appropriate mood and affect.  Current Facility-Administered Medications   Medication Dose Route Frequency    calcitriol (Rocaltrol) cap 0.25 mcg  0.25 mcg Oral Daily    insulin aspart (NovoLOG) 100 Units/mL FlexPen 1-5 Units  1-5 Units Subcutaneous TID CC    docusate sodium (Colace) cap 100 mg  100 mg Oral BID    sennosides (Senokot) tab 17.2 mg  17.2 mg Oral BID    ipratropium-albuterol (Duoneb) 0.5-2.5 (3) MG/3ML inhalation solution 3 mL  3 mL Nebulization 2 times daily    midodrine (ProAmatine) tab 10 mg  10 mg Per G Tube TID    busPIRone (Buspar) tab 30 mg  30 mg Per G Tube TID    chlorproMAZINE (Thorazine) tab 25 mg  25 mg Per G Tube QID    diazePAM (Valium) tab 5 mg  5 mg Per G Tube Q8H PRN    QUEtiapine (SEROquel) tab 50 mg  50 mg Per G Tube BID    levothyroxine (Synthroid) tab 150 mcg  150 mcg Per G Tube Before breakfast    famotidine (Pepcid) 20 mg/2mL injection 20 mg  20 mg Intravenous Daily    glucose (Dex4) 15 GM/59ML oral liquid 15 g  15 g Oral Q15 Min PRN    Or    glucose (Glutose) 40% oral gel 15 g  15 g Oral Q15 Min PRN    Or    glucose-vitamin C (Dex-4) chewable tab 4 tablet  4 tablet Oral Q15 Min PRN    Or    dextrose 50%  injection 50 mL  50 mL Intravenous Q15 Min PRN    Or    glucose (Dex4) 15 GM/59ML oral liquid 30 g  30 g Oral Q15 Min PRN    Or    glucose (Glutose) 40% oral gel 30 g  30 g Oral Q15 Min PRN    Or    glucose-vitamin C (Dex-4) chewable tab 8 tablet  8 tablet Oral Q15 Min PRN    carvedilol (Coreg) tab 3.125 mg  3.125 mg Oral BID with meals    sacubitril-valsartan (Entresto) 24-26 MG per tab 1 tablet  1 tablet Oral BID    budesonide (Pulmicort) 0.5 MG/2ML nebulizer suspension 0.5 mg  0.5 mg Nebulization 2 times daily    arformoterol (Brovana) 15 MCG/2ML nebulizer solution 15 mcg  15 mcg Nebulization 2 times daily    dextrose 10% infusion (TPN no rate)   Intravenous Continuous PRN    sodium bicarbonate tab 650 mg  650 mg Per G Tube PRN    And    pancrelipase (Lip-Prot-Amyl) (Zenpep) DR particles cap 10,000 Units  10,000 Units Per G Tube PRN    DULoxetine (Cymbalta) DR cap 60 mg  60 mg Oral BID    HYDROcodone-acetaminophen (Norco) 5-325 MG per tab 1 tablet  1 tablet Oral Q6H PRN    ondansetron (Zofran) 4 MG/2ML injection 4 mg  4 mg Intravenous Q6H PRN    polyethylene glycol (PEG 3350) (Miralax) 17 g oral packet 17 g  17 g Oral Daily PRN    magnesium hydroxide (Milk of Magnesia) 400 MG/5ML oral suspension 30 mL  30 mL Oral Daily PRN    bisacodyl (Dulcolax) 10 MG rectal suppository 10 mg  10 mg Rectal Daily PRN    fleet enema (Fleet) rectal enema 133 mL  1 enema Rectal Once PRN    heparin (Porcine) 1000 UNIT/ML injection 2,000 Units  2,000 Units Intravenous PRN Dialysis    albuterol (Ventolin) (2.5 MG/3ML) 0.083% nebulizer solution 2.5 mg  2.5 mg Nebulization Q6H PRN    heparin (Porcine) 5000 UNIT/ML injection 5,000 Units  5,000 Units Subcutaneous 2 times per day    acetaminophen (Tylenol) tab 650 mg  650 mg Oral Q6H PRN    aspirin DR tab 81 mg  81 mg Oral Daily    QUEtiapine ER (SEROquel XR) 24 hr tab 300 mg  300 mg Oral Nightly     Medications Prior to Admission   Medication Sig    busPIRone HCl 30 MG Oral Tab Take 1  tablet (30 mg total) by mouth 3 (three) times daily.    chlorproMAZINE 25 MG Oral Tab Take 1 tablet (25 mg total) by mouth 4 (four) times daily.    DULoxetine 60 MG Oral Cap DR Particles Take 1 capsule (60 mg total) by mouth 2 (two) times daily.    metFORMIN  MG Oral Tablet 24 Hr Take 2 tablets (1,000 mg total) by mouth daily. (Patient taking differently: Take 2 tablets (1,000 mg total) by mouth 2 (two) times daily with meals.)    metoprolol succinate ER 50 MG Oral Tablet 24 Hr Take 1 tablet (50 mg total) by mouth 2 (two) times daily.    QUEtiapine  MG Oral Tablet 24 Hr Take 1 tablet (300 mg total) by mouth nightly.    QUEtiapine 50 MG Oral Tab Take 1 tablet (50 mg total) by mouth 2 (two) times daily.    rosuvastatin 40 MG Oral Tab Take 1 tablet (40 mg total) by mouth before bedtime.    lidocaine 5 % External Patch Place 1 patch onto the skin daily.    [] ibuprofen 600 MG Oral Tab Take 1 tablet (600 mg total) by mouth every 6 (six) hours as needed.    levothyroxine 150 MCG Oral Tab Take 1 tablet (150 mcg total) by mouth before breakfast. Give on an empty stomach. On Mon, Tue, Wed, Thur, Fri and Saturday    levothyroxine 75 MCG Oral Tab Take 1 tablet (75 mcg total) by mouth before breakfast. On Sundays only    aspirin 81 MG Oral Tab EC Take 1 tablet (81 mg total) by mouth in the morning.    [DISCONTINUED] diazePAM 5 MG Oral Tab Take 1 tablet (5 mg total) by mouth every 8 (eight) hours as needed for Anxiety.    methylPREDNISolone 4 MG Oral Tablet Therapy Pack Take as directed on dose pack instructions (Patient not taking: Reported on 2025)     No results found.      Lab Results   Component Value Date    WBC 6.3 2025    HGB 10.7 2025    HCT 34.3 2025    .0 2025    CREATSERUM 5.76 2025    BUN 33 2025     2025    K 4.5 2025     2025    CO2 24.0 2025    GLU 98 2025    CA 9.3 2025         Assessment / Plan  1.  Acute on Chronic Systolic Heart Failure -  Ejection fraction 30-35% with apical wall akinesia. Previous EF 35-40% 2015. Will institute heart failure therapy with Carvedilol 3.125 mg BID and Entresto 24/26 mg BID.            I50.23 Acute on chronic systolic (congestive) heart failure           2. Acute Respiratory Failure -  Currently intubated. Chest x-ray shows right basal opacity consistent with atelectasis versus pneumonia. Evidence of pulmonary edema pattern and possible aspiration pneumonia with mucus plugging. On abx  J96.00 Acute respiratory failure, unspecified whether with hypoxia or hypercapnia        3. Acute on Chronic Kidney Disease -     Creatinine improving.  On HD.           N17.9 Acute kidney failure, unspecified           4. Demand Ischemia -  Troponin mildly elevated at 79, down from 197 four days ago. ECG with sinus tachycardia and occasional PVCs. Findings consistent with demand ischemia.           I24.8 Other forms of acute ischemic heart disease    Plan:  Feeling well. Still on some oxygen. DC plan to [facility] [place]. Continue with dialysis.

## 2025-06-07 NOTE — PROGRESS NOTES
[facility]  part of [facility]    Progress Note    Radha Winters Patient Status:  Inpatient    1960 MRN [id number]   Location [facility] 5SW/SE Attending Fanny Majano MD   Hosp Day # 21 PCP JUAN MONTANEZ       SUBJECTIVE:  no shortness of breath  No chest pain  Nursing staff reports no complaints.  Patient wants to go to the nursing home    OBJECTIVE:  Vital signs in last 24 hours:  BP 92/59 (BP Location: Right arm)   Pulse 77   Temp 97.9 °F (36.6 °C) (Oral)   Resp 16   Ht 5' 3\" (1.6 m)   Wt 213 lb 9.6 oz (96.9 kg)   SpO2 97%   BMI 37.84 kg/m²     Intake/Output:    Intake/Output Summary (Last 24 hours) at 2025 1209  Last data filed at 2025 1019  Gross per 24 hour   Intake 100 ml   Output 2000 ml   Net -1900 ml       Wt Readings from Last 3 Encounters:   25 213 lb 9.6 oz (96.9 kg)   10/09/23 185 lb 3 oz (84 kg)   10/04/23 185 lb (83.9 kg)       Exam  Gen: No acute distress,  HEENT: No pallor  Pulm: Lungs clear to auscultation anteriorly  CV: Heart with regular rate and rhythm, no peripheral edema  Abd: Abdomen soft, nontender, nondistended, no organomegaly, bowel sounds present  Skin: no rashes or lesions  CNS: Alert    Data Review:     Labs:   Lab Results   Component Value Date    WBC 6.3 2025    HGB 10.7 2025    HCT 34.3 2025    .0 2025    CREATSERUM 5.76 2025    BUN 33 2025     2025    K 4.5 2025     2025    CO2 24.0 2025    GLU 98 2025    CA 9.3 2025           LABS  Recent Labs   Lab 25  0942 25  0456 25  0351 25  0555 25  0518   RBC  --    < > 3.77* 3.52* 3.51*   HGB  --    < > 11.3* 10.7* 10.7*   HCT  --    < > 36.9 34.4* 34.3*   MCV  --    < > 97.9 97.7 97.7   MCH  --    < > 30.0 30.4 30.5   MCHC  --    < > 30.6* 31.1 31.2   RDW  --    < > 17.2* 17.1* 17.2*   NEPRELIM  --    < > 8.35* 4.81 4.15   WBC  --    < > 10.9 7.1 6.3    PLT  --    < > 164.0 188.0 202.0   AST 39*  --   --  21  --    ALT 20  --   --  20  --    *  --   --   --   --    *  --  116* 90 98    < > = values in this interval not displayed.       Imaging:      Meds:   Current Hospital Medications[1]    Assessment  Problem List[2]  1. Acute Kidney Injury on Chronic Kidney Disease:  -  - Nephrology consultation obtained  Patient with worsening renal function.  Nephrology is following the patient  On hemodialysis       2. Rhabdomyolysis:, Much improved  - Secondary to recurrent falls  - Possibly statin-induced  - Plan: Hold statin (Crestor)  Improving     3. Acute Transaminitis:  improved  - Likely secondary to rhabdomyolysis  - May also be statin-related  - Will monitor with serial labs  Patient could also have a shock liver  Liver enzymes are improving       4. Recurrent Falls:  -    Patient will need physical therapy and Occupational Therapy after she is extubated      5. Coronary Artery Disease:  - Currently stable  - Patient denies chest pain     6. Hypothyroidism:  - Continue levothyroxine  Possible UTI.  on ceftriaxone.    Acute hypoxic respiratory failure  Extubated today  Pulmonary following.      Chronic heart failure with reduced ejection fraction    Cardiomyopathy.  cardiology consulted         Plan for close monitoring and adjustment of management based on clinical course.  discussed with nursing staff  Discussed with the nephrologist    DC to nursing home today      Active Orders   Nourishments    Dietary Nutrition Supplements TID     Frequency: TID     Number of Occurrences: Until Specified     Order Comments: JESÚS davey @ 10am, 2pm and HS - vanilla      Room Service Eligibility Until Discontinued     Frequency: Until Discontinued     Number of Occurrences: Until Specified    Room Service Notify RN Until Discontinued     Frequency: Until Discontinued     Number of Occurrences: Until Specified   Respiratory Care    Acapella TID     Frequency: TID      Number of Occurrences: Until Specified    BIPAP When Sleeping     Frequency: When Sleeping     Number of Occurrences: Until Specified    BIPAP When Sleeping     Frequency: When Sleeping     Number of Occurrences: Until Specified    Chest physiotherapy 4x Daily     Frequency: 4x Daily     Number of Occurrences: Until Specified     Order Comments: Left lower lobe atelectasis.      EZ-PAP 4x Daily     Frequency: 4x Daily     Number of Occurrences: Until Specified    Incentive spriometry: RT to teach pt Once     Frequency: Once     Number of Occurrences: 1 Occurrences    Oxygen administration (adult) PRN     Frequency: PRN     Number of Occurrences: Until Specified    Respiratory care evaluation Once     Frequency: Once     Number of Occurrences: 1 Occurrences     Order Comments: If patient uses home CPAP/BIPAP, place on known home settings. If unknown, place on auto CPAP with upper limit 20 and lower limit 5 cm H2O     Dialysis    Hemodialysis inpatient     Frequency: Tomorrow     Number of Occurrences: 1 Occurrences     Order Comments: Keep MAP goal >65     Precaution    Aspiration precautions Continuous     Frequency: Continuous     Number of Occurrences: Until Specified   Diet    Regular/General diet Sodium Restriction: 3-4 GM NA; Fluid Consistency: Thin Liquids ; Texture Consistency: Regular; Is Patient on Accuchecks? Yes; Misc Restriction: No Straw     Frequency: Effective Now     Number of Occurrences: Until Specified     Order Comments: Medications crushed in pureed     Nursing    Accucheck     Frequency: PRN     Number of Occurrences: Until Specified     Order Comments: For symptoms or suspicion of hypoglycemia      Accucheck     Frequency: TID AC and HS     Number of Occurrences: Until Specified    Assess using Critical Care Pain Observation Tool (CPOT)     Frequency: Now Then Q3H     Number of Occurrences: Until Specified    Cardiac monitoring     Frequency: Until Discontinued     Number of Occurrences: Until  Specified    Elevate head of bed >30 degrees     Frequency: Until Discontinued     Number of Occurrences: Until Specified     Order Comments: 30-45 degrees at all times unless contraindicated by physician order or patient condition.      Elevate head of bed greater than or equal to 30 degrees     Frequency: Until Discontinued     Number of Occurrences: Until Specified    Flush feeding tube     Frequency: PRN     Number of Occurrences: Until Specified     Order Comments: Flush feeding tube:  1.) Before and after bolus feedings.  2.) After medication administration.      For any tube feed interruptions, continue basal insulin, and correction scales. Hold any scheduled insulins, and resume when tube feed is restarted.     Frequency: Until Discontinued     Number of Occurrences: Until Specified    For non-patent tubes:     Frequency: PRN     Number of Occurrences: Until Specified     Order Comments: If water is unsuccessful in dislodging the clog, use the pancrealipase/sodium bicarb. May repeat x1 before calling physician for further orders.      For patients without a history of diabetes, discontinue Accuchecks and insulin correction scale if blood glucose <180 mg/dL for 48 hours     Frequency: Until Discontinued     Number of Occurrences: Until Specified    Give all morning medications unless otherwise specified     Frequency: Until Discontinued     Number of Occurrences: Until Specified     Order Comments: Give all morning medications unless otherwise specified.  DO NOT use Electrolyte protocol.      If patient uses CPAP/BIPAP, encourage patient to wear when sleeping (including daytime) and after any apneic episode or adverse event.     Frequency: Until Discontinued     Number of Occurrences: Until Specified    Initiate early mobility protocol     Frequency: Once     Number of Occurrences: 1 Occurrences    Initiate Hypoglycemia Algorithm for Adults     Frequency: Until Discontinued     Number of Occurrences: Until  Specified     Order Comments: For blood glucose less than 70      Initiate Medical Nutrition Therapy Protocol for Enteral Nutrition     Frequency: Until Discontinued     Number of Occurrences: Until Specified    Insert denny catheter     Frequency: Once     Number of Occurrences: 1 Occurrences    Intake and Output     Frequency: Per Unit Routine     Number of Occurrences: Until Specified     Order Comments: Record formula and water flushes separately.      May use port/central line     Frequency: Until Discontinued     Number of Occurrences: Until Specified    Measure weight     Frequency: Per Unit Routine     Number of Occurrences: Until Specified     Order Comments: Measure weight every Monday and Thursday for non critical care patients.      Monitor tube placement     Frequency: Q8H     Number of Occurrences: Until Specified    Notify attending physician for patients refusing CPAP     Frequency: Until Discontinued     Number of Occurrences: Until Specified     Order Comments: Document that patient was educated and refused CPAP, if applicable.      Notify attending physician for sustained desaturation <90% or prolonged or recurrent apnea     Frequency: Until Discontinued     Number of Occurrences: Until Specified     Order Comments: Notify attending physician for sustained desaturation <90% or prolonged or recurrent apnea      Notify physician     Frequency: Until Discontinued     Number of Occurrences: Until Specified    Notify physician of significant abdominal distension, pain, nausea, or emesis, and stop tube feeding     Frequency: Until Discontinued     Number of Occurrences: Until Specified    Notify Radiologist     Frequency: Until Discontinued     Number of Occurrences: Until Specified    Nurse Driven Indwelling Urinary Catheter Removal Protocol     Frequency: Until Discontinued     Number of Occurrences: Until Specified    Nursing communication (specify)     Frequency: Once     Number of Occurrences: 1  Occurrences     Order Comments: Hold blood thinners pre procedure      Nursing communication (specify)     Frequency: Once     Number of Occurrences: 1 Occurrences     Order Comments: RN, keep the patient off the left side.      Nursing communication (specify)     Frequency: Once     Number of Occurrences: 1 Occurrences     Order Comments: Removed OG and placed Dobhoff      Nursing communication (specify)     Frequency: Once     Number of Occurrences: 1 Occurrences     Order Comments: Dobhoff okay to use      Nursing communication - call [facility] to order dialysis     Frequency: Once     Number of Occurrences: 1 Occurrences     Order Comments: Call [facility] at [phone] to order dialysis.  Identify special circumstances and specify stat or routine treatment.      Patient may resume usual diet     Frequency: Once     Number of Occurrences: 1 Occurrences     Order Comments: Npo x one hour, then resume pre-procedure diet      Post procedure site assessment     Frequency: Per Unit Routine     Number of Occurrences: Until Specified     Order Comments: Every 15 minutes for 1 hour, then every 30 minutes for 1 hour, then every 60 minutes for 2 hours, then per unit routine      Provide patient with sleep apnea education materials     Frequency: Once     Number of Occurrences: 1 Occurrences    Pulse oximetry     Frequency: Per Unit Routine     Number of Occurrences: Until Specified    Pulse oximetry     Frequency: Continuous     Number of Occurrences: Until Specified    Tube insertion     Frequency: Once     Number of Occurrences: 1 Occurrences    Tube insertion     Frequency: Once     Number of Occurrences: 1 Occurrences    Tube insertion     Frequency: Once     Number of Occurrences: 1 Occurrences     Order Comments: Meds and tube feeding      Up to chair Once     Frequency: Once     Number of Occurrences: 1 Occurrences    Verify informed consent     Frequency: Once     Number of Occurrences: 1 Occurrences    Vital  signs     Frequency: Per Unit Routine     Number of Occurrences: Until Specified     Order Comments: Every 15 minutes for 1 hour, then every 30 minutes for 1 hour, then every 60 minutes for 2 hours, then per unit routine.      Void prior to procedure     Frequency: Once     Number of Occurrences: 1 Occurrences   Consult    Consult to Interventional Radiology     Frequency: Once     Number of Occurrences: 1 Occurrences    Consult to Pulmonology     Frequency: Once     Number of Occurrences: 1 Occurrences   Medications    acetaminophen (Tylenol) tab 650 mg     Frequency: Q6H PRN     Dose: 650 mg     Route: Oral    albuterol (Ventolin) (2.5 MG/3ML) 0.083% nebulizer solution 2.5 mg     Frequency: Q6H PRN     Dose: 2.5 mg     Route: Nebulization    arformoterol (Brovana) 15 MCG/2ML nebulizer solution 15 mcg     Frequency: 2 times daily     Dose: 15 mcg     Route: Nebulization    aspirin DR tab 81 mg     Frequency: Daily     Dose: 81 mg     Route: Oral    bisacodyl (Dulcolax) 10 MG rectal suppository 10 mg     Frequency: Daily PRN     Dose: 10 mg     Route: Rectal    budesonide (Pulmicort) 0.5 MG/2ML nebulizer suspension 0.5 mg     Frequency: 2 times daily     Dose: 0.5 mg     Route: Nebulization    busPIRone (Buspar) tab 30 mg     Frequency: TID     Dose: 30 mg     Route: Per G Tube    calcitriol (Rocaltrol) cap 0.25 mcg     Frequency: Daily     Dose: 0.25 mcg     Route: Oral    carvedilol (Coreg) tab 3.125 mg     Frequency: BID with meals     Dose: 3.125 mg     Route: Oral    chlorproMAZINE (Thorazine) tab 25 mg     Frequency: QID     Dose: 25 mg     Route: Per G Tube    dextrose 10% infusion (TPN no rate)     Frequency: Continuous PRN     Route: Intravenous    dextrose 50% injection 50 mL     Linked Order: Or     Frequency: Q15 Min PRN     Dose: 50 mL     Route: Intravenous    diazePAM (Valium) tab 5 mg     Frequency: Q8H PRN     Dose: 5 mg     Route: Per G Tube    docusate sodium (Colace) cap 100 mg     Frequency: BID      Dose: 100 mg     Route: Oral    DULoxetine (Cymbalta) DR cap 60 mg     Frequency: BID     Dose: 60 mg     Route: Oral    famotidine (Pepcid) 20 mg/2mL injection 20 mg     Frequency: Daily     Dose: 20 mg     Route: Intravenous    fleet enema (Fleet) rectal enema 133 mL     Frequency: Once PRN     Dose: 1 enema     Route: Rectal    glucose (Dex4) 15 GM/59ML oral liquid 15 g     Linked Order: Or     Frequency: Q15 Min PRN     Dose: 15 g     Route: Oral    glucose (Dex4) 15 GM/59ML oral liquid 30 g     Linked Order: Or     Frequency: Q15 Min PRN     Dose: 30 g     Route: Oral    glucose (Glutose) 40% oral gel 15 g     Linked Order: Or     Frequency: Q15 Min PRN     Dose: 15 g     Route: Oral    glucose (Glutose) 40% oral gel 30 g     Linked Order: Or     Frequency: Q15 Min PRN     Dose: 30 g     Route: Oral    glucose-vitamin C (Dex-4) chewable tab 4 tablet     Linked Order: Or     Frequency: Q15 Min PRN     Dose: 4 tablet     Route: Oral    glucose-vitamin C (Dex-4) chewable tab 8 tablet     Linked Order: Or     Frequency: Q15 Min PRN     Dose: 8 tablet     Route: Oral    heparin (Porcine) 1000 UNIT/ML injection 2,000 Units     Frequency: PRN Dialysis     Dose: 2,000 Units     Route: Intravenous    heparin (Porcine) 5000 UNIT/ML injection 5,000 Units     Frequency: Q12H PATRICK     Dose: 5,000 Units     Route: Subcutaneous    HYDROcodone-acetaminophen (Norco) 5-325 MG per tab 1 tablet     Frequency: Q6H PRN     Dose: 1 tablet     Route: Oral    insulin aspart (NovoLOG) 100 Units/mL FlexPen 1-5 Units     Frequency: TID CC     Dose: 1-5 Units     Route: Subcutaneous     Order Comments: For insulin aspart (NovoLOG) doses > 60 Units, change product to dispense to be a insulin aspart (NovoLOG) *VIAL*    ipratropium-albuterol (Duoneb) 0.5-2.5 (3) MG/3ML inhalation solution 3 mL     Frequency: 2 times daily     Dose: 3 mL     Route: Nebulization    levothyroxine (Synthroid) tab 150 mcg     Frequency: Before breakfast      Dose: 150 mcg     Route: Per G Tube    magnesium hydroxide (Milk of Magnesia) 400 MG/5ML oral suspension 30 mL     Frequency: Daily PRN     Dose: 30 mL     Route: Oral    midodrine (ProAmatine) tab 10 mg     Frequency: TID     Dose: 10 mg     Route: Per G Tube    ondansetron (Zofran) 4 MG/2ML injection 4 mg     Frequency: Q6H PRN     Dose: 4 mg     Route: Intravenous    pancrelipase (Lip-Prot-Amyl) (Zenpep) DR particles cap 10,000 Units     Linked Order: And     Frequency: PRN     Dose: 10,000 Units     Route: Per G Tube    polyethylene glycol (PEG 3350) (Miralax) 17 g oral packet 17 g     Frequency: Daily PRN     Dose: 17 g     Route: Oral    QUEtiapine (SEROquel) tab 50 mg     Frequency: BID     Dose: 50 mg     Route: Per G Tube    QUEtiapine ER (SEROquel XR) 24 hr tab 300 mg     Frequency: Nightly     Dose: 300 mg     Route: Oral    sacubitril-valsartan (Entresto) 24-26 MG per tab 1 tablet     Frequency: BID     Dose: 1 tablet     Route: Oral    sennosides (Senokot) tab 17.2 mg     Frequency: BID     Dose: 17.2 mg     Route: Oral    sodium bicarbonate tab 650 mg     Linked Order: And     Frequency: PRN     Dose: 650 mg     Route: Per G Tube     Reviewed personally: current medical record, vital signs info, lab results, cardiac & radiographic films and reports.  Formulated plans for continued care for this patient.  RN to call us for any significant change  Scheduled tests: see orders   Other orders per treatment team.     *The above components were done for the patient encounter: Reviewing the patient's electronic chart, reviewing results, obtaining a medical history, counseling patient and/or family member, ordering medications, tests, or procedures, documenting clinical information in the electronic health record, refer to or communicate with other health care professionals as indicated, independently interpret results and providing care coordination      Fanny Majano MD           [1]   Current  Facility-Administered Medications   Medication Dose Route Frequency    calcitriol (Rocaltrol) cap 0.25 mcg  0.25 mcg Oral Daily    insulin aspart (NovoLOG) 100 Units/mL FlexPen 1-5 Units  1-5 Units Subcutaneous TID CC    docusate sodium (Colace) cap 100 mg  100 mg Oral BID    sennosides (Senokot) tab 17.2 mg  17.2 mg Oral BID    ipratropium-albuterol (Duoneb) 0.5-2.5 (3) MG/3ML inhalation solution 3 mL  3 mL Nebulization 2 times daily    midodrine (ProAmatine) tab 10 mg  10 mg Per G Tube TID    busPIRone (Buspar) tab 30 mg  30 mg Per G Tube TID    chlorproMAZINE (Thorazine) tab 25 mg  25 mg Per G Tube QID    diazePAM (Valium) tab 5 mg  5 mg Per G Tube Q8H PRN    QUEtiapine (SEROquel) tab 50 mg  50 mg Per G Tube BID    levothyroxine (Synthroid) tab 150 mcg  150 mcg Per G Tube Before breakfast    famotidine (Pepcid) 20 mg/2mL injection 20 mg  20 mg Intravenous Daily    glucose (Dex4) 15 GM/59ML oral liquid 15 g  15 g Oral Q15 Min PRN    Or    glucose (Glutose) 40% oral gel 15 g  15 g Oral Q15 Min PRN    Or    glucose-vitamin C (Dex-4) chewable tab 4 tablet  4 tablet Oral Q15 Min PRN    Or    dextrose 50% injection 50 mL  50 mL Intravenous Q15 Min PRN    Or    glucose (Dex4) 15 GM/59ML oral liquid 30 g  30 g Oral Q15 Min PRN    Or    glucose (Glutose) 40% oral gel 30 g  30 g Oral Q15 Min PRN    Or    glucose-vitamin C (Dex-4) chewable tab 8 tablet  8 tablet Oral Q15 Min PRN    carvedilol (Coreg) tab 3.125 mg  3.125 mg Oral BID with meals    sacubitril-valsartan (Entresto) 24-26 MG per tab 1 tablet  1 tablet Oral BID    budesonide (Pulmicort) 0.5 MG/2ML nebulizer suspension 0.5 mg  0.5 mg Nebulization 2 times daily    arformoterol (Brovana) 15 MCG/2ML nebulizer solution 15 mcg  15 mcg Nebulization 2 times daily    dextrose 10% infusion (TPN no rate)   Intravenous Continuous PRN    sodium bicarbonate tab 650 mg  650 mg Per G Tube PRN    And    pancrelipase (Lip-Prot-Amyl) (Zenpep) DR particles cap 10,000 Units  10,000  Units Per G Tube PRN    DULoxetine (Cymbalta) DR cap 60 mg  60 mg Oral BID    HYDROcodone-acetaminophen (Norco) 5-325 MG per tab 1 tablet  1 tablet Oral Q6H PRN    ondansetron (Zofran) 4 MG/2ML injection 4 mg  4 mg Intravenous Q6H PRN    polyethylene glycol (PEG 3350) (Miralax) 17 g oral packet 17 g  17 g Oral Daily PRN    magnesium hydroxide (Milk of Magnesia) 400 MG/5ML oral suspension 30 mL  30 mL Oral Daily PRN    bisacodyl (Dulcolax) 10 MG rectal suppository 10 mg  10 mg Rectal Daily PRN    fleet enema (Fleet) rectal enema 133 mL  1 enema Rectal Once PRN    heparin (Porcine) 1000 UNIT/ML injection 2,000 Units  2,000 Units Intravenous PRN Dialysis    albuterol (Ventolin) (2.5 MG/3ML) 0.083% nebulizer solution 2.5 mg  2.5 mg Nebulization Q6H PRN    heparin (Porcine) 5000 UNIT/ML injection 5,000 Units  5,000 Units Subcutaneous 2 times per day    acetaminophen (Tylenol) tab 650 mg  650 mg Oral Q6H PRN    aspirin DR tab 81 mg  81 mg Oral Daily    QUEtiapine ER (SEROquel XR) 24 hr tab 300 mg  300 mg Oral Nightly   [2]   Patient Active Problem List  Diagnosis    Closed fracture of proximal end of left humerus, unspecified fracture morphology, initial encounter    Frequent falls    Seizure-like activity (HCC)    Pituitary lesion (HCC)    Major depressive disorder, recurrent episode, moderate (HCC)    Generalized anxiety disorder    Acute renal failure superimposed on chronic kidney disease, unspecified acute renal failure type, unspecified CKD stage    Non-traumatic rhabdomyolysis    Transaminitis

## 2025-06-07 NOTE — DISCHARGE PLANNING
Discharge order in per MD. Discharges instructions placed in discharge packet . PIV removed. patient educated on important follow ups and medication regimen. Patient to discharge to [facility]  [facility]  Rn to Rn report given to karey

## 2025-06-07 NOTE — DISCHARGE SUMMARY
[facility]  part of [facility]    Discharge Summary    Radha Winters Patient Status:  Inpatient    1960 MRN [id number]   Location [facility]5W Attending Fanny Majano MD   Hosp Day # 21 PCP JUAN MONTANEZ                Date of Admission: 2025   Date of Discharge: 2025    Admitting Diagnosis: Transaminitis [R74.01]  Non-traumatic rhabdomyolysis [M62.82]  Acute renal failure superimposed on chronic kidney disease, unspecified acute renal failure type, unspecified CKD stage [N17.9, N18.9]    Disposition:     Discharge Diagnosis: .Principal Problem:    Acute renal failure superimposed on chronic kidney disease, unspecified acute renal failure type, unspecified CKD stage  Active Problems:    Non-traumatic rhabdomyolysis    Transaminitis      Hospital Course:   Reason for Admission: Acute kidney injury.  Rhabdomyolysis  Recurrent falls    Discharge Physical Exam:  Vital Signs:  Blood pressure 92/59, pulse 77, temperature 97.9 °F (36.6 °C), temperature source Oral, resp. rate 16, height 5' 3\" (1.6 m), weight 213 lb 9.6 oz (96.9 kg), SpO2 97%.     General: No acute distress. Alert and oriented x 3.  HEENT: Moist mucous membranes. EOM-I. PERRL  Neck: No lymphadenopathy.  No JVD. No carotid bruits.  Respiratory: Clear to auscultation bilaterally.  No wheezes. No rhonchi.  Cardiovascular: S1, S2.  Regular rate and rhythm.  No murmurs. Equal pulses   Abdomen: Soft, nontender, nondistended.  Positive bowel sounds. No rebound tenderness  Neurologic: No focal neurological deficits.  Musculoskeletal: Full range of motion of all extremities.  No swelling noted.  Integument: No lesions. No erythema.  Psychiatric: Appropriate mood and affect.    Hospital Course: Ms. Winters is a 64-year-old female with a history of hypertension, coronary artery disease, hyperlipidemia, hypothyroidism, and chronic leg weakness who presents with multiple issues. She has a two-year history of  recurrent falls and was previously evaluated at [facility], where she was told her falls were related to her sodium levels. The patient experienced a fall 2-3 days ago and presented to the emergency room but refused admission at that time. Laboratory studies then revealed elevated creatinine. Today, she experienced two more falls - initially landing on her back, followed by a second fall. She developed occipital headache and back pain following these events. She denies loss of consciousness, neck pain, or leg pain. Additionally, the patient reports poor oral intake and decreased appetite over the past several days.  Patient was admitted to the hospital patient was hydrated aggressively.  Patient was seen by renal also.  Patient had worsening of her kidney function.  Patient was started on hemodialysis.  Patient patient was also having respiratory failure.    Patient was admitted to the hospital.  Patient was seen by nephrologist patient was also having worsening respiratory failure patient was intubated.  Patient was seen by pulmonary.  Patient was able to be extubated.  Patient improved will slowly.  Patient today is doing fine.  And patient patient has been accepted to skilled nursing facility and patient was transferred to the skilled nursing facility           Complications:     Consultants         Provider   Role Specialty     Jonathan Casillas MD      Consulting Physician PULMONARY DISEASES     Jordin Joya DO      Consulting Physician Cardiovascular Diseases     Randolph Devi MD      Consulting Physician Internal Medicine     Torito Cardoza MD      Consulting Physician INTERVENTIONAL, RADIOLOGY     Tommie Reynaga MD      Consulting Physician PULMONARY DISEASES     Bernardo Lewis MD      Consulting Physician Internal Medicine                Discharge Plan:   Discharge Condition: Good    Current Discharge Medication List        New Orders    Details   acetaminophen 325 MG Oral  Tab Take 2 tablets (650 mg total) by mouth every 6 (six) hours as needed for Pain.      calcitriol 0.25 MCG Oral Cap Take 1 capsule (0.25 mcg total) by mouth daily.      carvedilol 3.125 MG Oral Tab Take 1 tablet (3.125 mg total) by mouth 2 (two) times daily with meals.      docusate sodium 100 MG Oral Cap Take 100 mg by mouth 2 (two) times daily.      ipratropium-albuterol 0.5-2.5 (3) MG/3ML Inhalation Solution Take 3 mL by nebulization 2 (two) times daily.      midodrine 10 MG Oral Tab 1 tablet (10 mg total) by Per G Tube route in the morning and 1 tablet (10 mg total) at noon and 1 tablet (10 mg total) in the evening.      polyethylene glycol, PEG 3350, 17 g Oral Powd Pack Take 17 g by mouth daily as needed.      HYDROcodone-acetaminophen 5-325 MG Oral Tab Take 1 tablet by mouth every 6 (six) hours as needed.      fluticasone-salmeterol 250-50 MCG/ACT Inhalation Aerosol Powder, Breath Activated Inhale 1 puff into the lungs 2 (two) times daily.      sacubitril-valsartan 24-26 MG Oral Tab Take 1 tablet by mouth 2 (two) times daily.           Home Meds - Modified    Details   diazePAM 5 MG Oral Tab Take 1 tablet (5 mg total) by mouth every 8 (eight) hours as needed for Anxiety.           Home Meds - Unchanged    Details   busPIRone HCl 30 MG Oral Tab Take 1 tablet (30 mg total) by mouth 3 (three) times daily.      chlorproMAZINE 25 MG Oral Tab Take 1 tablet (25 mg total) by mouth 4 (four) times daily.      DULoxetine 60 MG Oral Cap DR Particles Take 1 capsule (60 mg total) by mouth 2 (two) times daily.      QUEtiapine  MG Oral Tablet 24 Hr Take 1 tablet (300 mg total) by mouth nightly.      QUEtiapine 50 MG Oral Tab Take 1 tablet (50 mg total) by mouth 2 (two) times daily.      levothyroxine 150 MCG Oral Tab Take 1 tablet (150 mcg total) by mouth before breakfast. Give on an empty stomach. On Mon, Tue, Wed, Thur, Fri and Saturday      aspirin 81 MG Oral Tab EC Take 1 tablet (81 mg total) by mouth in the  morning.                 Discharge Diet: Cardiac diet      Discharge Activity: As tolerated    Follow up:       Follow up Labs:        Fanny Majano MD   6/7/2025    I spent 35 minutes in discharge planning for this patient, including extensive counseling regarding the patient's condition, recommendations regarding follow-up care, discussing with any applicable consultants, and arranging follow-up as indicated.

## 2025-06-07 NOTE — PROGRESS NOTES
HD done today, per HD RN 2L removed. pt educated on fluid overload and potassium levels and fluid intake-eduction reinforced by this RN at the bedside, call light left within reach.

## 2025-06-07 NOTE — PLAN OF CARE
Problem: Patient Centered Care  Goal: Patient preferences are identified and integrated in the patient's plan of care  Description: Interventions:  - What would you like us to know as we care for you? From home alone  - Provide timely, complete, and accurate information to patient/family  - Incorporate patient and family knowledge, values, beliefs, and cultural backgrounds into the planning and delivery of care  - Encourage patient/family to participate in care and decision-making at the level they choose  - Honor patient and family perspectives and choices  Outcome: Progressing     Problem: PAIN - ADULT  Goal: Verbalizes/displays adequate comfort level or patient's stated pain goal  Description: INTERVENTIONS:  - Encourage pt to monitor pain and request assistance  - Assess pain using appropriate pain scale  - Administer analgesics based on type and severity of pain and evaluate response  - Implement non-pharmacological measures as appropriate and evaluate response  - Consider cultural and social influences on pain and pain management  - Manage/alleviate anxiety  - Utilize distraction and/or relaxation techniques  - Monitor for opioid side effects  - Notify MD/LIP if interventions unsuccessful or patient reports new pain  - Anticipate increased pain with activity and pre-medicate as appropriate  Outcome: Progressing     Problem: SAFETY ADULT - FALL  Goal: Free from fall injury  Description: INTERVENTIONS:  - Assess pt frequently for physical needs  - Identify cognitive and physical deficits and behaviors that affect risk of falls.  - Dequincy fall precautions as indicated by assessment.  - Educate pt/family on patient safety including physical limitations  - Instruct pt to call for assistance with activity based on assessment  - Modify environment to reduce risk of injury  - Provide assistive devices as appropriate  - Consider OT/PT consult to assist with strengthening/mobility  - Encourage toileting  schedule  Outcome: Progressing     Problem: DISCHARGE PLANNING  Goal: Discharge to home or other facility with appropriate resources  Description: INTERVENTIONS:  - Identify barriers to discharge w/pt and caregiver  - Include patient/family/discharge partner in discharge planning  - Arrange for needed discharge resources and transportation as appropriate  - Identify discharge learning needs (meds, wound care, etc)  - Arrange for interpreters to assist at discharge as needed  - Consider post-discharge preferences of patient/family/discharge partner  - Complete POLST form as appropriate  - Assess patient's ability to be responsible for managing their own health  - Refer to Case Management Department for coordinating discharge planning if the patient needs post-hospital services based on physician/LIP order or complex needs related to functional status, cognitive ability or social support system  Outcome: Progressing     Problem: SKIN/TISSUE INTEGRITY - ADULT  Goal: Skin integrity remains intact  Description: INTERVENTIONS  - Assess and document risk factors for pressure ulcer development  - Assess and document skin integrity  - Monitor for areas of redness and/or skin breakdown  - Initiate interventions, skin care algorithm/standards of care as needed  Outcome: Progressing     Problem: RESPIRATORY - ADULT  Goal: Achieves optimal ventilation and oxygenation  Description: INTERVENTIONS:  - Assess for changes in respiratory status  - Assess for changes in mentation and behavior  - Position to facilitate oxygenation and minimize respiratory effort  - Oxygen supplementation based on oxygen saturation or ABGs  - Provide Smoking Cessation handout, if applicable  - Encourage broncho-pulmonary hygiene including cough, deep breathe, Incentive Spirometry  - Assess the need for suctioning and perform as needed  - Assess and instruct to report SOB or any respiratory difficulty  - Respiratory Therapy support as indicated  -  Manage/alleviate anxiety  - Monitor for signs/symptoms of CO2 retention  Outcome: Progressing     Problem: GENITOURINARY - ADULT  Goal: Absence of urinary retention  Description: INTERVENTIONS:  - Assess patient’s ability to void and empty bladder  - Monitor intake/output and perform bladder scan as needed  - Follow urinary retention protocol/standard of care  - Consider collaborating with pharmacy to review patient's medication profile  - Implement strategies to promote bladder emptying  Outcome: Progressing     Problem: METABOLIC/FLUID AND ELECTROLYTES - ADULT  Goal: Glucose maintained within prescribed range  Description: INTERVENTIONS:  - Monitor Blood Glucose as ordered  - Assess for signs and symptoms of hyperglycemia and hypoglycemia  - Administer ordered medications to maintain glucose within target range  - Assess barriers to adequate nutritional intake and initiate nutrition consult as needed  - Instruct patient on self management of diabetes  Outcome: Progressing  Goal: Electrolytes maintained within normal limits  Description: INTERVENTIONS:  - Monitor labs and rhythm and assess patient for signs and symptoms of electrolyte imbalances  - Administer electrolyte replacement as ordered  - Monitor response to electrolyte replacements, including rhythm and repeat lab results as appropriate  - Fluid restriction as ordered  - Instruct patient on fluid and nutrition restrictions as appropriate  Outcome: Progressing  Goal: Hemodynamic stability and optimal renal function maintained  Description: INTERVENTIONS:  - Monitor labs and assess for signs and symptoms of volume excess or deficit  - Monitor intake, output and patient weight  - Monitor urine specific gravity, serum osmolarity and serum sodium as indicated or ordered  - Monitor response to interventions for patient's volume status, including labs, urine output, blood pressure (other measures as available)  - Encourage oral intake as appropriate  - Instruct  patient on fluid and nutrition restrictions as appropriate  Outcome: Progressing     Problem: HEMATOLOGIC - ADULT  Goal: Free from bleeding injury  Description: (Example usage: patient with low platelets)  INTERVENTIONS:  - Avoid intramuscular injections, enemas and rectal medication administration  - Ensure safe mobilization of patient  - Hold pressure on venipuncture sites to achieve adequate hemostasis  - Assess for signs and symptoms of internal bleeding  - Monitor lab trends  - Patient is to report abnormal signs of bleeding to staff  - Avoid use of toothpicks and dental floss  - Use electric shaver for shaving  - Use soft bristle tooth brush  - Limit straining and forceful nose blowing  Outcome: Progressing  Goal: Maintains hematologic stability  Description: INTERVENTIONS  - Assess for signs and symptoms of bleeding or hemorrhage  - Monitor labs and vital signs for trends  - Administer supportive blood products/factors, fluids and medications as ordered and appropriate  - Administer supportive blood products/factors as ordered and appropriate  Outcome: Progressing  Goal: Free from bleeding injury  Description: (Example usage: patient with low platelets)  INTERVENTIONS:  - Avoid intramuscular injections, enemas and rectal medication administration  - Ensure safe mobilization of patient  - Hold pressure on venipuncture sites to achieve adequate hemostasis  - Assess for signs and symptoms of internal bleeding  - Monitor lab trends  - Patient is to report abnormal signs of bleeding to staff  - Avoid use of toothpicks and dental floss  - Use electric shaver for shaving  - Use soft bristle tooth brush  - Limit straining and forceful nose blowing  Outcome: Progressing     Problem: Diabetes/Glucose Control  Goal: Glucose maintained within prescribed range  Description: INTERVENTIONS:  - Monitor Blood Glucose as ordered  - Assess for signs and symptoms of hyperglycemia and hypoglycemia  - Administer ordered  medications to maintain glucose within target range  - Assess barriers to adequate nutritional intake and initiate nutrition consult as needed  - Instruct patient on self management of diabetes  Outcome: Progressing     Problem: Delirium  Goal: Minimize duration of delirium  Description: Interventions:  - Encourage use of hearing aids, eye glasses  - Promote highest level of mobility daily  - Provide frequent reorientation  - Promote wakefulness i.e. lights on, blinds open  - Promote sleep, encourage patient's normal rest cycle i.e. lights off, TV off, minimize noise and interruptions  - Encourage family to assist in orientation and promotion of home routines  Outcome: Progressing     Problem: GASTROINTESTINAL - ADULT  Goal: Minimal or absence of nausea and vomiting  Description: INTERVENTIONS:  - Maintain adequate hydration with IV or PO as ordered and tolerated  - Nasogastric tube to low intermittent suction as ordered  - Evaluate effectiveness of ordered antiemetic medications  - Provide nonpharmacologic comfort measures as appropriate  - Advance diet as tolerated, if ordered  - Obtain nutritional consult as needed  - Evaluate fluid balance  Outcome: Progressing  Goal: Maintains or returns to baseline bowel function  Description: INTERVENTIONS:  - Assess bowel function  - Maintain adequate hydration with IV or PO as ordered and tolerated  - Evaluate effectiveness of GI medications  - Encourage mobilization and activity  - Obtain nutritional consult as needed  - Establish a toileting routine/schedule  - Consider collaborating with pharmacy to review patient's medication profile  Outcome: Progressing     Problem: RISK FOR INFECTION - ADULT  Goal: Absence of fever/infection during anticipated neutropenic period  Description: INTERVENTIONS  - Monitor WBC  - Administer growth factors as ordered  - Implement neutropenic guidelines  Outcome: Progressing     Problem: Patient/Family Goals  Goal: Patient/Family Long Term  Goal  Patient's Long Term Goal: Discharge home     Interventions:  - Follow MD recommendations   - Monitor vital signs  - Monitor pertinent labs  - Early discharge planning  - Consults to see   - See additional Care Plan goals for specific interventions    Outcome: Progressing  Goal: Patient/Family Short Term Goal  Patient's Short Term Goal: Decrease Oxygen Needs    Interventions:   - PRN medication   - Non pharmacological interventions   - See additional Care Plan goals for specific interventions      Outcome: Progressing

## 2025-06-07 NOTE — PLAN OF CARE
Problem: Patient Centered Care  Goal: Patient preferences are identified and integrated in the patient's plan of care  Description: Interventions:  - What would you like us to know as we care for you?   - Provide timely, complete, and accurate information to patient/family  - Incorporate patient and family knowledge, values, beliefs, and cultural backgrounds into the planning and delivery of care  - Encourage patient/family to participate in care and decision-making at the level they choose  - Honor patient and family perspectives and choices  Outcome: Adequate for Discharge     Problem: PAIN - ADULT  Goal: Verbalizes/displays adequate comfort level or patient's stated pain goal  Description: INTERVENTIONS:  - Encourage pt to monitor pain and request assistance  - Assess pain using appropriate pain scale  - Administer analgesics based on type and severity of pain and evaluate response  - Implement non-pharmacological measures as appropriate and evaluate response  - Consider cultural and social influences on pain and pain management  - Manage/alleviate anxiety  - Utilize distraction and/or relaxation techniques  - Monitor for opioid side effects  - Notify MD/LIP if interventions unsuccessful or patient reports new pain  - Anticipate increased pain with activity and pre-medicate as appropriate  Outcome: Adequate for Discharge     Problem: SAFETY ADULT - FALL  Goal: Free from fall injury  Description: INTERVENTIONS:  - Assess pt frequently for physical needs  - Identify cognitive and physical deficits and behaviors that affect risk of falls.  - [place] fall precautions as indicated by assessment.  - Educate pt/family on patient safety including physical limitations  - Instruct pt to call for assistance with activity based on assessment  - Modify environment to reduce risk of injury  - Provide assistive devices as appropriate  - Consider OT/PT consult to assist with strengthening/mobility  - Encourage toileting  schedule  Outcome: Adequate for Discharge     Problem: DISCHARGE PLANNING  Goal: Discharge to home or other facility with appropriate resources  Description: INTERVENTIONS:  - Identify barriers to discharge w/pt and caregiver  - Include patient/family/discharge partner in discharge planning  - Arrange for needed discharge resources and transportation as appropriate  - Identify discharge learning needs (meds, wound care, etc)  - Arrange for interpreters to assist at discharge as needed  - Consider post-discharge preferences of patient/family/discharge partner  - Complete POLST form as appropriate  - Assess patient's ability to be responsible for managing their own health  - Refer to Case Management Department for coordinating discharge planning if the patient needs post-hospital services based on physician/LIP order or complex needs related to functional status, cognitive ability or social support system  Outcome: Adequate for Discharge     Problem: SKIN/TISSUE INTEGRITY - ADULT  Goal: Skin integrity remains intact  Description: INTERVENTIONS  - Assess and document risk factors for pressure ulcer development  - Assess and document skin integrity  - Monitor for areas of redness and/or skin breakdown  - Initiate interventions, skin care algorithm/standards of care as needed  Outcome: Adequate for Discharge     Problem: RESPIRATORY - ADULT  Goal: Achieves optimal ventilation and oxygenation  Description: INTERVENTIONS:  - Assess for changes in respiratory status  - Assess for changes in mentation and behavior  - Position to facilitate oxygenation and minimize respiratory effort  - Oxygen supplementation based on oxygen saturation or ABGs  - Provide Smoking Cessation handout, if applicable  - Encourage broncho-pulmonary hygiene including cough, deep breathe, Incentive Spirometry  - Assess the need for suctioning and perform as needed  - Assess and instruct to report SOB or any respiratory difficulty  - Respiratory  Therapy support as indicated  - Manage/alleviate anxiety  - Monitor for signs/symptoms of CO2 retention  Outcome: Adequate for Discharge     Problem: GENITOURINARY - ADULT  Goal: Absence of urinary retention  Description: INTERVENTIONS:  - Assess patient’s ability to void and empty bladder  - Monitor intake/output and perform bladder scan as needed  - Follow urinary retention protocol/standard of care  - Consider collaborating with pharmacy to review patient's medication profile  - Implement strategies to promote bladder emptying  Outcome: Adequate for Discharge     Problem: METABOLIC/FLUID AND ELECTROLYTES - ADULT  Goal: Glucose maintained within prescribed range  Description: INTERVENTIONS:  - Monitor Blood Glucose as ordered  - Assess for signs and symptoms of hyperglycemia and hypoglycemia  - Administer ordered medications to maintain glucose within target range  - Assess barriers to adequate nutritional intake and initiate nutrition consult as needed  - Instruct patient on self management of diabetes  Outcome: Adequate for Discharge  Goal: Electrolytes maintained within normal limits  Description: INTERVENTIONS:  - Monitor labs and rhythm and assess patient for signs and symptoms of electrolyte imbalances  - Administer electrolyte replacement as ordered  - Monitor response to electrolyte replacements, including rhythm and repeat lab results as appropriate  - Fluid restriction as ordered  - Instruct patient on fluid and nutrition restrictions as appropriate  Outcome: Adequate for Discharge  Goal: Hemodynamic stability and optimal renal function maintained  Description: INTERVENTIONS:  - Monitor labs and assess for signs and symptoms of volume excess or deficit  - Monitor intake, output and patient weight  - Monitor urine specific gravity, serum osmolarity and serum sodium as indicated or ordered  - Monitor response to interventions for patient's volume status, including labs, urine output, blood pressure (other  measures as available)  - Encourage oral intake as appropriate  - Instruct patient on fluid and nutrition restrictions as appropriate  Outcome: Adequate for Discharge     Problem: HEMATOLOGIC - ADULT  Goal: Free from bleeding injury  Description: (Example usage: patient with low platelets)  INTERVENTIONS:  - Avoid intramuscular injections, enemas and rectal medication administration  - Ensure safe mobilization of patient  - Hold pressure on venipuncture sites to achieve adequate hemostasis  - Assess for signs and symptoms of internal bleeding  - Monitor lab trends  - Patient is to report abnormal signs of bleeding to staff  - Avoid use of toothpicks and dental floss  - Use electric shaver for shaving  - Use soft bristle tooth brush  - Limit straining and forceful nose blowing  Outcome: Adequate for Discharge  Goal: Maintains hematologic stability  Description: INTERVENTIONS  - Assess for signs and symptoms of bleeding or hemorrhage  - Monitor labs and vital signs for trends  - Administer supportive blood products/factors, fluids and medications as ordered and appropriate  - Administer supportive blood products/factors as ordered and appropriate  Outcome: Adequate for Discharge  Goal: Free from bleeding injury  Description: (Example usage: patient with low platelets)  INTERVENTIONS:  - Avoid intramuscular injections, enemas and rectal medication administration  - Ensure safe mobilization of patient  - Hold pressure on venipuncture sites to achieve adequate hemostasis  - Assess for signs and symptoms of internal bleeding  - Monitor lab trends  - Patient is to report abnormal signs of bleeding to staff  - Avoid use of toothpicks and dental floss  - Use electric shaver for shaving  - Use soft bristle tooth brush  - Limit straining and forceful nose blowing  Outcome: Adequate for Discharge     Problem: Diabetes/Glucose Control  Goal: Glucose maintained within prescribed range  Description: INTERVENTIONS:  - Monitor Blood  Glucose as ordered  - Assess for signs and symptoms of hyperglycemia and hypoglycemia  - Administer ordered medications to maintain glucose within target range  - Assess barriers to adequate nutritional intake and initiate nutrition consult as needed  - Instruct patient on self management of diabetes  Outcome: Adequate for Discharge     Problem: Delirium  Goal: Minimize duration of delirium  Description: Interventions:  - Encourage use of hearing aids, eye glasses  - Promote highest level of mobility daily  - Provide frequent reorientation  - Promote wakefulness i.e. lights on, blinds open  - Promote sleep, encourage patient's normal rest cycle i.e. lights off, TV off, minimize noise and interruptions  - Encourage family to assist in orientation and promotion of home routines  Outcome: Adequate for Discharge     Problem: GASTROINTESTINAL - ADULT  Goal: Minimal or absence of nausea and vomiting  Description: INTERVENTIONS:  - Maintain adequate hydration with IV or PO as ordered and tolerated  - Nasogastric tube to low intermittent suction as ordered  - Evaluate effectiveness of ordered antiemetic medications  - Provide nonpharmacologic comfort measures as appropriate  - Advance diet as tolerated, if ordered  - Obtain nutritional consult as needed  - Evaluate fluid balance  Outcome: Adequate for Discharge  Goal: Maintains or returns to baseline bowel function  Description: INTERVENTIONS:  - Assess bowel function  - Maintain adequate hydration with IV or PO as ordered and tolerated  - Evaluate effectiveness of GI medications  - Encourage mobilization and activity  - Obtain nutritional consult as needed  - Establish a toileting routine/schedule  - Consider collaborating with pharmacy to review patient's medication profile  Outcome: Adequate for Discharge     Problem: RISK FOR INFECTION - ADULT  Goal: Absence of fever/infection during anticipated neutropenic period  Description: INTERVENTIONS  - Monitor WBC  - Administer  growth factors as ordered  - Implement neutropenic guidelines  Outcome: Adequate for Discharge     Problem: Patient/Family Goals  Goal: Patient/Family Long Term Goal    Interventions:  - See additional Care Plan goals for specific interventions  Outcome: Adequate for Discharge  Goal: Patient/Family Short Term Goal    Interventions:   - See additional Care Plan goals for specific interventions  Outcome: Adequate for Discharge

## 2025-06-07 NOTE — CM/SW NOTE
06/07/25 1100   Discharge disposition   Expected discharge disposition subacute   Post Acute Care Provider [facility]  ([facility])   Discharge transportation [facility]     Sw was informed that MD will be rounding shortly to place DC orders to be discharge to [facility]. Pt requires an ambulance according to the RN due to being a max assist. ARMEN called and ordered an Ambulance from [facility] to transport pt to [facility]  at 2:30 PM.  PCS flow sheet completed. RN to attached to AVS and print out. RN to call report to [facility] [phone].  Vicky BT [facility] liasion aware of DC for today.    SW/VALORIE to remain available for support and/or discharge planning.     Michaela Young, MSW, LSW   x [phone]

## 2025-06-07 NOTE — PROGRESS NOTES
[facility]  part of [facility]    Progress Note      Assessment and Plan:   1.  Acute kidney injury with rhabdomyolysis-the patient remains on hemodialysis.  Likely home today.    Recommendations:  1.  Dialysis  2.  As per nephrology.     2.  Transaminitis-vastly improved..     3.  DVT prophylaxis-subcutaneous heparin     4.  Hypotension-on midodrine      5.  Respiratory failure-had pulmonary edema and aspiration pneumonitis.  Now off oxygen and breathing comfortably.    Recommendations:  1.  Pulmonary toilet  2.  Ongoing hemodialysis  3.  My service can follow at [facility].     6.  Falls-rehabilitative services    7.  Rhabdomyolysis-resolved.      Subjective:   Radha Winters is a(n) 64 year old female who is wakeful and in good spirits and ready for discharge.  Objective:   Blood pressure 98/61, pulse 77, temperature 97.9 °F (36.6 °C), temperature source Oral, resp. rate 16, height 5' 3\" (1.6 m), weight 213 lb 9.6 oz (96.9 kg), SpO2 97%.    Physical Exam alert white female  HEENT examination is unremarkable with pupils equal round and reactive to light and accommodation.   Neck without adenopathy, thyromegaly, JVD nor bruit.   Lungs diminished at bases to auscultation and percussion.  Cardiac regular rate and rhythm no murmur.   Abdomen nontender, without hepatosplenomegaly and no mass appreciable.   Extremities without clubbing cyanosis nor edema.   Neurologic grossly intact with symmetric tone and strength and reflex.  Skin without gross abnormality     Results:     Lab Results   Component Value Date    WBC 6.3 06/07/2025    HGB 10.7 06/07/2025    HCT 34.3 06/07/2025    .0 06/07/2025    CREATSERUM 5.76 06/07/2025    BUN 33 06/07/2025     06/07/2025    K 4.5 06/07/2025     06/07/2025    CO2 24.0 06/07/2025    GLU 98 06/07/2025    CA 9.3 06/07/2025     Chest x-ray-pulmonary edema with modest basilar effusions    Tommie Reynaga MD  Medical Director, Critical Care,  [facility]  Medical Director, [facility]  Pager: [phone]

## 2025-06-07 NOTE — PROGRESS NOTES
[facility]  part of [facility]    Progress Note    Radha Winters Patient Status:  Inpatient    1960 MRN [id number]   Location [facility]5W Attending Fanny Majano MD   Hosp Day # 21 PCP JUAN MONTANEZ     Subjective:   Radha Winters is a(n) 64 year old female     Objective:   Blood pressure 103/56, pulse 76, temperature 98.1 °F (36.7 °C), temperature source Oral, resp. rate 18, height 5' 3\" (1.6 m), weight 213 lb 9.6 oz (96.9 kg), SpO2 96%.        Results:    Lab Results   Component Value Date    WBC 6.3 2025    HGB 10.7 (L) 2025    HCT 34.3 (L) 2025    .0 2025    CREATSERUM 5.76 (H) 2025    BUN 33 (H) 2025     (L) 2025    K 4.5 2025     2025    CO2 24.0 2025    GLU 98 2025    CA 9.3 2025    ALB 3.3 2025    ALKPHO 101 2025    BILT 0.2 2025    TP 5.0 (L) 2025    AST 21 2025    ALT 20 2025    TSH 0.528 10/10/2023    LIP 36 2025    DDIMER 1.77 (H) 2025    MG 2.2 2025    PHOS 4.3 2025    TROPHS 79 (HH) 2025     (H) 2025    B12 581 10/10/2023       No results found.        Assessment & Plan:     Acute renal failure superimposed on chronic kidney disease, unspecified acute renal failure type, unspecified CKD stage        Non-traumatic rhabdomyolysis        Transaminitis          Bernardo Lewis MD  2025

## 2025-06-09 LAB
ATRIAL RATE (BPM): 80
P AXIS (DEGREES): 52
PR-INTERVAL (MSEC): 206
QRS-INTERVAL (MSEC): 104
QT-INTERVAL (MSEC): 376
QTC: 434
R AXIS (DEGREES): -8
REPORT TEXT: NORMAL
T AXIS (DEGREES): 56
VENTRICULAR RATE EKG/MIN (BPM): 80

## 2025-06-09 NOTE — PAYOR COMM NOTE
--------------  DISCHARGE REVIEW    Payor: [facility]  Subscriber #:  [id number]  Authorization Number: [id number]    Admit date: 25  Admit time:   2:12 PM  Discharge Date: 2025  2:57 PM     Admitting Physician: Fanny Majano MD  Attending Physician:  No att. providers found  Primary Care Physician: Juan Montanez          Discharge Summary Notes        Discharge Summary signed by Fanny Majano MD at 2025 12:24 PM       Author: Fanny Majano MD Specialty: Internal Medicine Author Type: Physician    Filed: 2025 12:24 PM Date of Service: 2025 12:14 PM Status: Signed    : Fanny Majano MD (Physician)         [facility]  part of [facility]    Discharge Summary    Radha Winters Patient Status:  Inpatient    1960 MRN [id number]   Location [facility]5W Attending Fanny Majano MD   Hosp Day # 21 PCP JUAN MONTANEZ                Date of Admission: 2025   Date of Discharge: 2025    Admitting Diagnosis: Transaminitis [R74.01]  Non-traumatic rhabdomyolysis [M62.82]  Acute renal failure superimposed on chronic kidney disease, unspecified acute renal failure type, unspecified CKD stage [N17.9, N18.9]    Disposition: SNF    Discharge Diagnosis: .Principal Problem:    Acute renal failure superimposed on chronic kidney disease, unspecified acute renal failure type, unspecified CKD stage  Active Problems:    Non-traumatic rhabdomyolysis    Transaminitis      Hospital Course:   Reason for Admission: Acute kidney injury.  Rhabdomyolysis  Recurrent falls    Discharge Physical Exam:  Vital Signs:  Blood pressure 92/59, pulse 77, temperature 97.9 °F (36.6 °C), temperature source Oral, resp. rate 16, height 5' 3\" (1.6 m), weight 213 lb 9.6 oz (96.9 kg), SpO2 97%.     General: No acute distress. Alert and oriented x 3.  HEENT: Moist mucous membranes. EOM-I. PERRL  Neck: No lymphadenopathy.  No JVD. No carotid  bruits.  Respiratory: Clear to auscultation bilaterally.  No wheezes. No rhonchi.  Cardiovascular: S1, S2.  Regular rate and rhythm.  No murmurs. Equal pulses   Abdomen: Soft, nontender, nondistended.  Positive bowel sounds. No rebound tenderness  Neurologic: No focal neurological deficits.  Musculoskeletal: Full range of motion of all extremities.  No swelling noted.  Integument: No lesions. No erythema.  Psychiatric: Appropriate mood and affect.    Hospital Course: Ms. Winters is a 64-year-old female with a history of hypertension, coronary artery disease, hyperlipidemia, hypothyroidism, and chronic leg weakness who presents with multiple issues. She has a two-year history of recurrent falls and was previously evaluated at [facility], where she was told her falls were related to her sodium levels. The patient experienced a fall 2-3 days ago and presented to the emergency room but refused admission at that time. Laboratory studies then revealed elevated creatinine. Today, she experienced two more falls - initially landing on her back, followed by a second fall. She developed occipital headache and back pain following these events. She denies loss of consciousness, neck pain, or leg pain. Additionally, the patient reports poor oral intake and decreased appetite over the past several days.  Patient was admitted to the hospital patient was hydrated aggressively.  Patient was seen by renal also.  Patient had worsening of her kidney function.  Patient was started on hemodialysis.  Patient patient was also having respiratory failure.    Patient was admitted to the hospital.  Patient was seen by nephrologist patient was also having worsening respiratory failure patient was intubated.  Patient was seen by pulmonary.  Patient was able to be extubated.  Patient improved will slowly.  Patient today is doing fine.  And patient patient has been accepted to skilled nursing facility and patient was  transferred to the skilled nursing facility           Complications:     Consultants         Provider   Role Specialty     Jonathan Casillas MD      Consulting Physician PULMONARY DISEASES     Jordin Joya DO      Consulting Physician Cardiovascular Diseases     Randolph Devi MD      Consulting Physician Internal Medicine     Torito Cardoza MD      Consulting Physician INTERVENTIONAL, RADIOLOGY     Tommie Reynaga MD      Consulting Physician PULMONARY DISEASES     Bernardo Lewis MD      Consulting Physician Internal Medicine                Discharge Plan:   Discharge Condition: Good    Current Discharge Medication List        New Orders    Details   acetaminophen 325 MG Oral Tab Take 2 tablets (650 mg total) by mouth every 6 (six) hours as needed for Pain.      calcitriol 0.25 MCG Oral Cap Take 1 capsule (0.25 mcg total) by mouth daily.      carvedilol 3.125 MG Oral Tab Take 1 tablet (3.125 mg total) by mouth 2 (two) times daily with meals.      docusate sodium 100 MG Oral Cap Take 100 mg by mouth 2 (two) times daily.      ipratropium-albuterol 0.5-2.5 (3) MG/3ML Inhalation Solution Take 3 mL by nebulization 2 (two) times daily.      midodrine 10 MG Oral Tab 1 tablet (10 mg total) by Per G Tube route in the morning and 1 tablet (10 mg total) at noon and 1 tablet (10 mg total) in the evening.      polyethylene glycol, PEG 3350, 17 g Oral Powd Pack Take 17 g by mouth daily as needed.      HYDROcodone-acetaminophen 5-325 MG Oral Tab Take 1 tablet by mouth every 6 (six) hours as needed.      fluticasone-salmeterol 250-50 MCG/ACT Inhalation Aerosol Powder, Breath Activated Inhale 1 puff into the lungs 2 (two) times daily.      sacubitril-valsartan 24-26 MG Oral Tab Take 1 tablet by mouth 2 (two) times daily.           Home Meds - Modified    Details   diazePAM 5 MG Oral Tab Take 1 tablet (5 mg total) by mouth every 8 (eight) hours as needed for Anxiety.           Home Meds - Unchanged    Details   busPIRone  HCl 30 MG Oral Tab Take 1 tablet (30 mg total) by mouth 3 (three) times daily.      chlorproMAZINE 25 MG Oral Tab Take 1 tablet (25 mg total) by mouth 4 (four) times daily.      DULoxetine 60 MG Oral Cap DR Particles Take 1 capsule (60 mg total) by mouth 2 (two) times daily.      QUEtiapine  MG Oral Tablet 24 Hr Take 1 tablet (300 mg total) by mouth nightly.      QUEtiapine 50 MG Oral Tab Take 1 tablet (50 mg total) by mouth 2 (two) times daily.      levothyroxine 150 MCG Oral Tab Take 1 tablet (150 mcg total) by mouth before breakfast. Give on an empty stomach. On Mon, Tue, Wed, Thur, Fri and Saturday      aspirin 81 MG Oral Tab EC Take 1 tablet (81 mg total) by mouth in the morning.                 Discharge Diet: Cardiac diet      Discharge Activity: As tolerated    Follow up:       Follow up Labs:        Fanny Majano MD   6/7/2025    I spent 35 minutes in discharge planning for this patient, including extensive counseling regarding the patient's condition, recommendations regarding follow-up care, discussing with any applicable consultants, and arranging follow-up as indicated.      Electronically signed by Fanny Majano MD on 6/7/2025 12:24 PM         REVIEWER COMMENTS

## 2025-06-10 ENCOUNTER — INITIAL APN SNF VISIT (OUTPATIENT)
Facility: SKILLED NURSING FACILITY | Age: 65
End: 2025-06-10

## 2025-06-10 VITALS
OXYGEN SATURATION: 96 % | TEMPERATURE: 98 F | WEIGHT: 211.63 LBS | DIASTOLIC BLOOD PRESSURE: 50 MMHG | SYSTOLIC BLOOD PRESSURE: 102 MMHG | HEART RATE: 91 BPM | RESPIRATION RATE: 17 BRPM | BODY MASS INDEX: 37 KG/M2

## 2025-06-10 DIAGNOSIS — I10 ESSENTIAL HYPERTENSION: ICD-10-CM

## 2025-06-10 DIAGNOSIS — N18.6 ESRD (END STAGE RENAL DISEASE) ON DIALYSIS (HCC): ICD-10-CM

## 2025-06-10 DIAGNOSIS — R29.6 FREQUENT FALLS: Primary | ICD-10-CM

## 2025-06-10 DIAGNOSIS — I50.20 SYSTOLIC HEART FAILURE, UNSPECIFIED HF CHRONICITY (HCC): ICD-10-CM

## 2025-06-10 DIAGNOSIS — F41.1 GENERALIZED ANXIETY DISORDER: ICD-10-CM

## 2025-06-10 DIAGNOSIS — Z99.2 ESRD (END STAGE RENAL DISEASE) ON DIALYSIS (HCC): ICD-10-CM

## 2025-06-10 DIAGNOSIS — F33.1 MAJOR DEPRESSIVE DISORDER, RECURRENT EPISODE, MODERATE (HCC): ICD-10-CM

## 2025-06-10 DIAGNOSIS — R53.1 WEAKNESS GENERALIZED: ICD-10-CM

## 2025-06-10 DIAGNOSIS — R26.81 UNSTEADY GAIT: ICD-10-CM

## 2025-06-10 DIAGNOSIS — R26.81 GAIT INSTABILITY: ICD-10-CM

## 2025-06-10 PROCEDURE — 99310 SBSQ NF CARE HIGH MDM 45: CPT | Performed by: NURSE PRACTITIONER

## 2025-06-10 NOTE — PROGRESS NOTES
6/10/25.  APN Initial Encounter.  11 am  Radha Winters.    1960    Patient seen for initial visit at [facility] [place].  Patient is sitting at the nurses station in her wheelchair stating she likes to be at the nurses station.    Patient requested something for back pain and anxiety.  Patient reports she has been taking Valium for anxiety and Norco for pain.    Discussed with staff medications on MAR from hospital discharge paperwork.    Patient here for dialysis and rehab; receives dialysis every Monday, Wednesday, Friday via right perm catheter.    Radha Winters  : 1960  Age 64 year old  female patient is admitted to [place] for dialysis, pain control,  rehabilitation and strengthening.      [facility] Admission:  25 to 25  Admitted to [place] 25    Hospital Discharge Diagnoses:  Acute Renal Failure superimposed on CKD, unspecified acute renal failure type.  CKD  Non-traumatic rhabdomyolysis  Transaminitis    Chief complaint:  Today patient c/o low back  pain s/p recurrent falls, need for dialysis, anxiety    HPI  (hospital note)  64-year-old female with a history of hypertension, coronary artery disease, hyperlipidemia, hypothyroidism, and chronic leg weakness who presents with multiple issues. She has a two-year history of recurrent falls and was previously evaluated at [facility], where she was told her falls were related to her sodium levels. The patient experienced a fall 2-3 days ago and presented to the emergency room but refused admission at that time. Laboratory studies then revealed elevated creatinine. Today, she experienced two more falls - initially landing on her back, followed by a second fall. She developed occipital headache and back pain following these events. She denies loss of consciousness, neck pain, or leg pain. Additionally, the patient reports poor oral intake and decreased appetite over the  past several days.  Patient was admitted to the hospital; patient was hydrated aggressively.  Patient was seen by renal also.  Patient had worsening of her kidney function.  Patient was started on hemodialysis.  Patient was also having respiratory failure.    Patient was admitted to the hospital.  Patient was seen by nephrologist patient was also having worsening respiratory failure patient was intubated.  Patient was seen by pulmonary.  Patient was able to be extubated.  Patient improved will slowly.  Patient today is doing fine.  And patient has been accepted to skilled nursing facility and patient was transferred to the skilled nursing facility     Past Medical History[1]  Past Surgical History[2]  Family History[3]  Short Social Hx on File[4]    Immunization History   Administered Date(s) Administered    Covid-19 Vaccine Pfizer 30 mcg/0.3 ml 02/23/2021, 04/06/2021, 10/26/2021      ALLERGIES: Allergies[5]    CODE STATUS:  Full Code    ADVANCED CARE PLANNING TEAM: Will need family care plan updates.  Patient states she has a daughter who lives in [place].    CURRENT MEDICATIONS - reviewed and updated on SNF EMAR  Tylenol 650 q 6 hrs prn  Aspirin 81 mg daily  Buspirone 30 mg 3 x daily  Calcitriol 0.25 mcg daily  Coreg 3.125 mg 2 x daily\  Chlorpromazine 25 mg 4 x daily  Diazepam 5 mg q 8 hrs prn  Docusate soc 100 mg 2 x daily  Duloxetine 60 mg 2 x daily  Fluticasone salmeterol 250/50 -1 inhalation 2 x daily  Norco 5/325 one q 6 hrs prn  Ipratropium  0.5/2.52 x daily  Levothyroxine 150 mcg every morning before breakfast  Midodrine 10 mg tid  Miralax 17 daily prn  Seroquel 50 mg 2 x daily  Seroquel  mg nightly  Sacubitril Valsartan 24/26 one tab 2 x daily    SUBJECTIVE: patient s/o headache, chest pain, SOB.  C/O feeling anxious and c/o mid to low back pain.  Denies chills, fevers.  C/O poor appetite.    PHYSICAL EXAM:  102/50. P 91. RR 17. POx 96%.  T 97.6. wgt 211.6#.   GENERAL HEALTH:well developed, well  nourished, in no apparent distress  except low back pain and anxiety.  LINES, TUBES, DRAINS:   R perm catheter  SKIN: warm, dry  WOUND: see  skin assessment per staff.  EYES: PERRLA, EOMI, sclera anicteric, conjunctiva normal; there is no nystagmus, no drainage from eyes  HENT: normocephalic; normal nose, no nasal drainage, mucous membranes pink, moist, pharynx no exudate, no visible cerumen.   NECK: supple; FROM  BREAST: deferred exam   RESPIRATORY:diminished at the bases    CARDIOVASCULAR:  RRR, rate 91  ABDOMEN: active BS+, soft, nondistended; nontender, no guarding, no rebound tenderness.  :Deferred   hemodialysis  LYMPHATIC:no lymphedema  MUSCULOSKELETAL:   frequent falls; low back pain, weakness, deconditioning  EXTREMITIES/VASCULAR: trace - 1+  edema  NEUROLOGIC:follows commands  PSYCHIATRIC: alert and oriented x 3; affect appropriate , anxious , and mood disorder     MEDICAL DECISION MAKING: Patient is currently make her own decisions.  Patient has a friend Samara Cedeno  [phone]; emergency contact.    Lab Results:  6/9/25  WBC 6.0. Hgb 9.9.  Plts 213.  Na 138. K 4.4. Cl 98. CO2 26. BUN 34. Creat 5.84 (dialysis)    See Plan Below:      Acute Renal Failure/CKD  Right dialysis catheter  Hemodialysis every M/W/F  Trend labs  R catheter dressing changes in dialysis.l  Transition to outpatient dialysis at discharge  F/U with Nephrologist    Frequent Falls   Rhabdomyolysis  Closely monitor for falls  [place] 5/325 q 6 hrs prn  Tylenol 650 q 6 hrs prn  High fall risk  PT/OT    HTN/CHF - EF 30-35%  Coreg 3.125 mg bid  Sacubitril Valsartin 24/26 twice daily    Acute Respiratory Failure  Intubated and extubated in hospital  Advair Diskus 250/50 one inh q 12 hrs  Duonebs 2 x daily / prn    Transaminitis resolving  Likely secondary to Rhabdomyolysis  Trend labs    Hypothyroidism  Levothyroxine 150 mcg Monday thru Saturday - not Sunday    HX: Psychotic Disorder with Anxiety  Psychiatry to follow  Continue home  medications:  Buspirone 30 mg 3 x daily  Duloxetine 60 mg 2 x daily  Seroquel 300 mg nightly  Seroquel 50 mg 2 x daily  Valium 5 mg q 8 hrs prn  Thorazine 25 mg 4 x daily     Weakness / Deconditioning  PT/OT    Disposition:  SW to work with patient to arrange a safe discharge.l  Patient lives alone in an apartment in [place].    *Follow-Up with PCP within 1-2 weeks following [facility] discharge.   *Follow up with Pulmonary, Cardiology,and nephrology.    *Greater than 45 minutes spent w/ patient and staff, including but not limited to/ reviewing present status, needs, abilities with disciplines, reviewing medical records, vital signs, labs, completing med rec and entering orders to establish plan of care in [facility].  Note to patient: The  Cures Act makes medical notes like these available to patients in the interest of transparency. However, this is a medical document intended as peer to peer communication. It is written in medical language and may contain abbreviations or verbiage that are unfamiliar. It may appear blunt or direct. Medical documents are intended to carry relevant information, facts as evident, and the clinical opinion of the practitioner who signs the document.    Gena Flores (Daya), AXEL  06/10/25   11am  [facility]             [1]   Past Medical History:   Essential hypertension    Heart attack (HCC)    Hyperlipidemia    Thyroid disease   [2]   Past Surgical History:  Procedure Laterality Date    Appendectomy            Removal gallbladder     [3] No family history on file.  [4]   Social History  Socioeconomic History    Marital status:    Tobacco Use    Smoking status: Every Day     Types: Cigarettes    Smokeless tobacco: Never     Social Drivers of Health     Food Insecurity: No Food Insecurity (2025)    NCSS - Food Insecurity     Worried About Running Out of Food in the Last Year: No     Ran Out of Food in the Last Year: No   Transportation Needs: No  Transportation Needs (5/17/2025)    NCSS - Transportation     Lack of Transportation: No   Housing Stability: Not At Risk (5/17/2025)    NCSS - Housing/Utilities     Has Housing: Yes     Worried About Losing Housing: No     Unable to Get Utilities: No   [5] No Known Allergies

## 2025-06-12 ENCOUNTER — SNF VISIT (OUTPATIENT)
Facility: SKILLED NURSING FACILITY | Age: 65
End: 2025-06-12

## 2025-06-12 VITALS
WEIGHT: 214.69 LBS | SYSTOLIC BLOOD PRESSURE: 108 MMHG | DIASTOLIC BLOOD PRESSURE: 62 MMHG | OXYGEN SATURATION: 96 % | RESPIRATION RATE: 18 BRPM | TEMPERATURE: 98 F | BODY MASS INDEX: 38 KG/M2 | HEART RATE: 88 BPM

## 2025-06-12 DIAGNOSIS — F41.1 GENERALIZED ANXIETY DISORDER: ICD-10-CM

## 2025-06-12 DIAGNOSIS — N18.6 ESRD (END STAGE RENAL DISEASE) ON DIALYSIS (HCC): ICD-10-CM

## 2025-06-12 DIAGNOSIS — R53.1 WEAKNESS GENERALIZED: ICD-10-CM

## 2025-06-12 DIAGNOSIS — Z99.2 ESRD (END STAGE RENAL DISEASE) ON DIALYSIS (HCC): ICD-10-CM

## 2025-06-12 DIAGNOSIS — R29.6 FREQUENT FALLS: Primary | ICD-10-CM

## 2025-06-12 DIAGNOSIS — R26.81 GAIT INSTABILITY: ICD-10-CM

## 2025-06-12 DIAGNOSIS — R26.81 UNSTEADY GAIT: ICD-10-CM

## 2025-06-12 DIAGNOSIS — F33.1 MAJOR DEPRESSIVE DISORDER, RECURRENT EPISODE, MODERATE (HCC): ICD-10-CM

## 2025-06-12 PROCEDURE — 99309 SBSQ NF CARE MODERATE MDM 30: CPT | Performed by: NURSE PRACTITIONER

## 2025-06-12 NOTE — PROGRESS NOTES
25 APN Encounter 12:30 pm  Radha WintersJonathan    1960    Patient seen in follow up for ESRD new on dialysis, new right buttock opened wound, depression with anxiety.    Patient seen at bedside and at the nurses station.  Patient reports that all her medications have been delivered and she is on schedule with meds.  She is feeling calmer is getting used to the schedule at [facility]  Psyche has been placed on consult.    Patient reports she went to dialysis yesterday and all went well.  She will have dialysis again tomorrow.  Right chest perm catheter dressing is clean, dry and intact.     Discussed with wound team that patient has an opened area on her right buttocks.  Orders entered to cleanse area with N/S; apply medihone, calcium alginate and secure with dressing.  Wound APN suggested patient see dermatologist as an outpatient.  Wound Team will work with  to arrange an outpatient appointment with dermatology.     Patient is admitted to [place] for dialysis, pain control,  rehabilitation and strengthening.      [facility] Admission:  25 to 25  Admitted to [place] 25    Hospital Discharge Diagnoses:  Acute Renal Failure superimposed on CKD, unspecified acute renal failure type.  CKD  Non-traumatic rhabdomyolysis  Transaminitis    Chief complaint:  no new complaints this encounter.     HPI  (hospital note)  64-year-old female with a history of hypertension, coronary artery disease, hyperlipidemia, hypothyroidism, and chronic leg weakness who presents with multiple issues. She has a two-year history of recurrent falls and was previously evaluated at [facility], where she was told her falls were related to her sodium levels. The patient experienced a fall 2-3 days ago and presented to the emergency room but refused admission at that time. Laboratory studies then revealed elevated creatinine. Today, she experienced two more falls -  initially landing on her back, followed by a second fall. She developed occipital headache and back pain following these events. She denies loss of consciousness, neck pain, or leg pain. Additionally, the patient reports poor oral intake and decreased appetite over the past several days.  Patient was admitted to the hospital; patient was hydrated aggressively.  Patient was seen by renal also.  Patient had worsening of her kidney function.  Patient was started on hemodialysis.  Patient was also having respiratory failure.    Patient was admitted to the hospital.  Patient was seen by nephrologist; patient was also having worsening respiratory ;  patient was intubated.  Patient was seen by pulmonary.  Patient was extubated.  Patient improved slowly.  Patient today is doing fine.  patient has been accepted to skilled nursing facility and patient was transferred to the skilled nursing facility     Past Medical History[1]  Past Surgical History[2]  Family History[3]  Short Social Hx on File[4]    Immunization History   Administered Date(s) Administered    Covid-19 Vaccine Pfizer 30 mcg/0.3 ml 02/23/2021, 04/06/2021, 10/26/2021      ALLERGIES: Allergies[5]    CODE STATUS:  Full Code    ADVANCED CARE PLANNING TEAM: Will need family care plan updates.  Patient states she has a daughter who lives in [place].    CURRENT MEDICATIONS - reviewed and updated on SNF EMAR  Tylenol 650 q 6 hrs prn  Aspirin 81 mg daily  Buspirone 30 mg 3 x daily  Calcitriol 0.25 mcg daily  Coreg 3.125 mg 2 x daily\  Chlorpromazine 25 mg 4 x daily  Diazepam 5 mg q 8 hrs prn  Docusate soc 100 mg 2 x daily  Duloxetine 60 mg 2 x daily  Fluticasone salmeterol 250/50 -1 inhalation 2 x daily  Norco 5/325 one q 6 hrs prn  Ipratropium  0.5/2.52 x daily  Levothyroxine 150 mcg every morning before breakfast  Midodrine 10 mg tid  Miralax 17 daily prn  Seroquel 50 mg 2 x daily  Seroquel  mg nightly  Sacubitril Valsartan 24/26 one tab 2 x daily    SUBJECTIVE:  patient denies headache, chest pain, SOB.  States anxiety and depression is better today; Denies chills, fevers.    PHYSICAL EXAM:  108/62. P 88. RR 18. POx 96%Ms. 97.6.  wgt 214.7#.   GENERAL HEALTH:well developed, well nourished, in no apparent distress    LINES, TUBES, DRAINS:   R chest perm catheter  SKIN: warm, dry  WOUND: new Right buttock opened area.  EYES: PERRLA, EOMI, sclera anicteric, conjunctiva normal; no nystagmus, no drainage from eyes  HENT:no nasal drainage, mucous membranes pink, moist, no visible cerumen.   NECK: supple; FROM  BREAST: deferred exam   RESPIRATORY: lungs clear  CARDIOVASCULAR:  RRR, rate 88  ABDOMEN: active BS+, soft, nondistended; nontender, no guarding, no rebound tenderness.  :Deferred   +hemodialysis  LYMPHATIC:no lymphedema  MUSCULOSKELETAL:   frequent falls; low back pain, weakness, deconditioning  EXTREMITIES/VASCULAR: trace edema  NEUROLOGIC:follows commands  PSYCHIATRIC: alert and oriented x 3; affect appropriate , anxious , and mood disorder (not new)    MEDICAL DECISION MAKING: Patient is currently making her own decisions.  Patient has a friend Samara Cedeno  [phone]; emergency contact.    Lab Results:  6/9/25  WBC 6.0. Hgb 9.9.  Plts 213.  Na 138. K 4.4. Cl 98. CO2 26. BUN 34. Creat 5.84 (dialysis)    See Plan Below:      Acute Renal Failure/CKD  Right chest dialysis catheter  Hemodialysis every M/W/F  Trend labs  R catheter dressing changes in dialysis.  Transition to outpatient dialysis at discharge  F/U with Nephrologist    Frequent Falls   Rhabdomyolysis  Closely monitor for falls  [place] 5/325 q 6 hrs prn  Tylenol 650 q 6 hrs prn  High fall risk  PT/OT    New Right Buttock open wound  Wound Team following  Cleanse w/ NS, apply medi-honey, Ca+Alginate, secure w/drsg  Wound APN recommending Derm Consult    HTN/CHF - EF 30-35%  Coreg 3.125 mg bid  Sacubitril Valsartin 24/26 twice daily    Acute Respiratory Failure  Intubated and extubated in  hospital  Advair Diskus 250/50 one inh q 12 hrs  Duonebs 2 x daily / prn    Transaminitis resolving  -   SGOT 18. SGPT 14.  Likely secondary to Rhabdomyolysis  Trend labs    Hypothyroidism  Levothyroxine 150 mcg Monday thru Saturday - not     HX: Psychotic Disorder with Anxiety  Psychiatry to follow  Continue home medications:  Buspirone 30 mg 3 x daily  Duloxetine 60 mg 2 x daily  Seroquel 300 mg nightly  Seroquel 50 mg 2 x daily  Valium 5 mg q 8 hrs prn  Thorazine 25 mg 4 x daily     Weakness / Deconditioning  PT/OT    Disposition:  SW to work with patient to arrange a safe discharge.  Patient lives alone in an apartment in [place].    *Follow-Up with PCP within 1-2 weeks following [facility] discharge.   *Follow up with Pulmonary, Cardiology,and nephrology.    *Greater than 35 minutes spent w/ patient and staff, including but not limited to/ reviewing present status, needs, abilities with disciplines, reviewing medical records, vital signs, labs, completing med rec and entering orders to establish plan of care in [facility].  Note to patient: The  [place] Cures Act makes medical notes like these available to patients in the interest of transparency. However, this is a medical document intended as peer to peer communication. It is written in medical language and may contain abbreviations or verbiage that are unfamiliar. It may appear blunt or direct. Medical documents are intended to carry relevant information, facts as evident, and the clinical opinion of the practitioner who signs the document.    AXEL Parker (Daya)  25 12:30 pm   [facility]             [1]   Past Medical History:   Essential hypertension    Heart attack (HCC)    Hyperlipidemia    Thyroid disease   [2]   Past Surgical History:  Procedure Laterality Date    Appendectomy            Removal gallbladder     [3] No family history on file.  [4]   Social History  Socioeconomic History    Marital status:    Tobacco Use     Smoking status: Every Day     Types: Cigarettes    Smokeless tobacco: Never     Social Drivers of Health     Food Insecurity: No Food Insecurity (5/17/2025)    NCSS - Food Insecurity     Worried About Running Out of Food in the Last Year: No     Ran Out of Food in the Last Year: No   Transportation Needs: No Transportation Needs (5/17/2025)    NCSS - Transportation     Lack of Transportation: No   Housing Stability: Not At Risk (5/17/2025)    NCSS - Housing/Utilities     Has Housing: Yes     Worried About Losing Housing: No     Unable to Get Utilities: No   [5] No Known Allergies

## 2025-06-17 ENCOUNTER — SNF VISIT (OUTPATIENT)
Facility: SKILLED NURSING FACILITY | Age: 65
End: 2025-06-17

## 2025-06-17 DIAGNOSIS — N18.6 ESRD (END STAGE RENAL DISEASE) ON DIALYSIS (HCC): ICD-10-CM

## 2025-06-17 DIAGNOSIS — R26.81 GAIT INSTABILITY: ICD-10-CM

## 2025-06-17 DIAGNOSIS — L29.9 PRURITUS: ICD-10-CM

## 2025-06-17 DIAGNOSIS — F33.1 MAJOR DEPRESSIVE DISORDER, RECURRENT EPISODE, MODERATE (HCC): ICD-10-CM

## 2025-06-17 DIAGNOSIS — R53.1 WEAKNESS GENERALIZED: ICD-10-CM

## 2025-06-17 DIAGNOSIS — R29.6 FREQUENT FALLS: Primary | ICD-10-CM

## 2025-06-17 DIAGNOSIS — F41.1 GENERALIZED ANXIETY DISORDER: ICD-10-CM

## 2025-06-17 DIAGNOSIS — R26.81 UNSTEADY GAIT: ICD-10-CM

## 2025-06-17 DIAGNOSIS — Z99.2 ESRD (END STAGE RENAL DISEASE) ON DIALYSIS (HCC): ICD-10-CM

## 2025-06-17 PROCEDURE — 99309 SBSQ NF CARE MODERATE MDM 30: CPT | Performed by: NURSE PRACTITIONER

## 2025-06-18 VITALS
WEIGHT: 228.38 LBS | SYSTOLIC BLOOD PRESSURE: 134 MMHG | OXYGEN SATURATION: 97 % | BODY MASS INDEX: 40 KG/M2 | DIASTOLIC BLOOD PRESSURE: 56 MMHG | TEMPERATURE: 98 F | RESPIRATION RATE: 18 BRPM | HEART RATE: 87 BPM

## 2025-06-18 RX ORDER — MINERAL OIL/HYDROPHIL PETROLAT
1 OINTMENT (GRAM) TOPICAL 2 TIMES DAILY
COMMUNITY

## 2025-06-18 RX ORDER — HYDROCORTISONE BUTYRATE 1 MG/G
1 CREAM TOPICAL 3 TIMES DAILY
COMMUNITY
End: 2025-06-19 | Stop reason: SINTOL

## 2025-06-19 ENCOUNTER — SNF VISIT (OUTPATIENT)
Facility: SKILLED NURSING FACILITY | Age: 65
End: 2025-06-19

## 2025-06-19 VITALS
TEMPERATURE: 98 F | HEART RATE: 83 BPM | OXYGEN SATURATION: 97 % | BODY MASS INDEX: 40 KG/M2 | DIASTOLIC BLOOD PRESSURE: 71 MMHG | RESPIRATION RATE: 16 BRPM | SYSTOLIC BLOOD PRESSURE: 142 MMHG | WEIGHT: 228.38 LBS

## 2025-06-19 DIAGNOSIS — G89.29 ACUTE ON CHRONIC BACK PAIN: ICD-10-CM

## 2025-06-19 DIAGNOSIS — F33.1 MAJOR DEPRESSIVE DISORDER, RECURRENT EPISODE, MODERATE (HCC): ICD-10-CM

## 2025-06-19 DIAGNOSIS — R26.81 GAIT INSTABILITY: ICD-10-CM

## 2025-06-19 DIAGNOSIS — Z99.2 ESRD (END STAGE RENAL DISEASE) ON DIALYSIS (HCC): ICD-10-CM

## 2025-06-19 DIAGNOSIS — L29.9 PRURITUS: Primary | ICD-10-CM

## 2025-06-19 DIAGNOSIS — N18.6 ESRD (END STAGE RENAL DISEASE) ON DIALYSIS (HCC): ICD-10-CM

## 2025-06-19 DIAGNOSIS — F41.1 GENERALIZED ANXIETY DISORDER: ICD-10-CM

## 2025-06-19 DIAGNOSIS — R26.81 UNSTEADY GAIT: ICD-10-CM

## 2025-06-19 DIAGNOSIS — M54.9 ACUTE ON CHRONIC BACK PAIN: ICD-10-CM

## 2025-06-19 DIAGNOSIS — R29.6 FREQUENT FALLS: ICD-10-CM

## 2025-06-19 PROCEDURE — 99309 SBSQ NF CARE MODERATE MDM 30: CPT | Performed by: NURSE PRACTITIONER

## 2025-06-19 NOTE — PROGRESS NOTES
25 APN Encounter 11:30 am  Radha WintersJonathan   1960    Patient seen at bedside for recent fall at home with rhabdomyolysis and moderate to severe back pain, ESRD on dialysis, HTN and bipolar disorder.    Patient seen at bedside and at the nurses station  Patient reports her back is with severe pain asking if she can have additional pain medication.   Patient taking Norco 5/325 every 6 hours prn.  Scheduled Norco every 6 hours for 3 days then resume prn every 6 hours.    Currently waiting for physiatrist to evaluate pain and adjust medications if appropriate.    Patient tolerating dialysis at [facility].  Patient will continue every M/W/.  Right chest perm catheter dressing is clean, dry and intact. Patient is inquiring if dialysis will be permanent or temporary.  Patient is to f/u with renal.    Bipolar Disorder/Depression/Anxiety:  Patient taking:  Buspirone, Duloxetine, Seroquel, Thorazine and Valium prn.  Patient seen by Psyche NP and medications not changed.  Will continue to monitor mood and behavior.     Right buttock open area; wound team following.  Orders are to  cleanse area with N/S; apply medi-honey, calcium alginate and secure with dressing.  Wound APN suggested patient see dermatologist as an outpatient.  Wound Team will work with  to arrange an outpatient appointment with dermatology.  Awaiting for appointment.    Itching of forehead:  Hydrocortisone 0.1% 3 x daily ordered.  Aquaphor 2 x daily to arms and legs.    Patient is admitted to [place] for dialysis, pain control,  rehabilitation and strengthening.      [facility] Admission:  25 to 25  Admitted to [facility] 25    Hospital Discharge Diagnoses:  Acute Renal Failure superimposed on CKD, unspecified acute renal failure type.  CKD  Non-traumatic rhabdomyolysis  Transaminitis    Chief complaint today: increased back pain, itching on forehead, dry skin arms and legs.     hospitals  ([facility]  note)  64-year-old female with a history of hypertension, coronary artery disease, hyperlipidemia, hypothyroidism, and chronic leg weakness who presents with multiple issues. She has a two-year history of recurrent falls and was previously evaluated at [facility], where she was told her falls were related to her sodium levels. The patient experienced a fall 2-3 days ago and presented to the emergency room but refused admission at that time. Laboratory studies then revealed elevated creatinine. Today, she experienced two more falls - initially landing on her back, followed by a second fall. She developed occipital headache and back pain following these events. She denies loss of consciousness, neck pain, or leg pain. Additionally, the patient reports poor oral intake and decreased appetite over the past several days.  Patient was admitted to the hospital; patient was hydrated aggressively.  Patient was seen by renal also.  Patient had worsening of her kidney function.  Patient was started on hemodialysis.  Patient was also having respiratory failure.    Patient was admitted to the hospital.  Patient was seen by nephrologist; patient was also having worsening respiratory ;  patient - was intubated.  Patient was seen by pulmonary.  Patient was extubated.  Patient improved slowly.  Patient today is doing fine.  patient has been accepted to skilled nursing facility and patient was transferred to the skilled nursing facility     Past Medical History[1]  Past Surgical History[2]  Family History[3]  Short Social Hx on File[4]    Immunization History   Administered Date(s) Administered    Covid-19 Vaccine Pfizer 30 mcg/0.3 ml 02/23/2021, 04/06/2021, 10/26/2021      ALLERGIES: Allergies[5]    CODE STATUS:  Full Code    ADVANCED CARE PLANNING TEAM: Will need family care plan updates.  Patient states she has a daughter who lives in [place].    CURRENT MEDICATIONS - reviewed and updated on SNF EMAR  Tylenol 650  q 6 hrs prn  Aspirin 81 mg daily  Buspirone 30 mg 3 x daily  Calcitriol 0.25 mcg daily  Coreg 3.125 mg 2 x daily\  Chlorpromazine 25 mg 4 x daily  Diazepam 5 mg q 8 hrs prn  Docusate soc 100 mg 2 x daily  Duloxetine 60 mg 2 x daily  Fluticasone salmeterol 250/50 -1 inhalation 2 x daily  Norco 5/325 one q 6 hrs prn  Ipratropium  0.5/2.52 x daily  Levothyroxine 150 mcg every morning before breakfast  Midodrine 10 mg tid  Miralax 17 daily prn  Seroquel 50 mg 2 x daily  Seroquel  mg nightly  Sacubitril Valsartan 24/26 one tab 2 x daily  Hydrocortisone 0.1% to forehead 3 x daily x 7 days  Aquaphor ointment to arms and legs an and pm     SUBJECTIVE: patient denies headache, chest pain, SOB.  C/O worsening back pain.  Reports anxiety and depression is better. Denies chills, fevers.    PHYSICAL EXAM:  134/56. P 87. RR 18. POx 97%.  T. 97.6.  wgt 228.4#    GENERAL HEALTH:well developed, well nourished, in no apparent distress    LINES, TUBES, DRAINS:   R chest perm catheter  SKIN: warm, dry  WOUND: Right buttock opened area. Daily Medihoney &  Ca+Alginate drsg.  EYES: PERRLA, EOMI, sclera anicteric, conjunctiva normal; no nystagmus, no drainage from eyes  HENT:no nasal drainage, mucous membranes pink, moist, no visible cerumen.   NECK: supple; FROM  BREAST: deferred exam   RESPIRATORY: lungs clear  CARDIOVASCULAR:  RRR, rate 87  ABDOMEN: active BS+, soft, nondistended; nontender, no guarding, no rebound tenderness.  :Deferred   +hemodialysis  LYMPHATIC:no lymphedema  MUSCULOSKELETAL:   frequent falls; increased back pain, weakness, deconditioning.  EXTREMITIES/VASCULAR: trace edema  NEUROLOGIC:follows commands  PSYCHIATRIC: alert and oriented x 3; affect appropriate , anxious , and mood disorder (not new)    MEDICAL DECISION MAKING: Patient making her own decisions.  Patient has a friend Samara Cedeno  [phone]; emergency contact.    Lab Results Reviewed:  6/9/25  WBC 6.0. Hgb 9.9.  Plts 213.  Na 138. K  4.4. Cl 98. CO2 26. BUN 34. Creat 5.84 (dialysis)  Labs Reviewed: 6/17/25  WBC 5.0. HGB 10.2. PLTS 210.  . K 3.9. CL 97. CO2 28. BUN 18 CREAT 4.3. GFR 10 - HD    See Plan Below:      Acute Renal Failure/CKD  Right chest dialysis catheter  Hemodialysis every M/W/F  Trend labs  R catheter dressing changes during dialysis.  Transition to outpatient dialysis at discharge  F/U with Nephrologist    Frequent Falls   Rhabdomyolysis  Closely monitor for falls  Norco 5/325 scheduled q 6 hrs x 3 days then resume prn  Tylenol 650 q 6 hrs prn  High fall risk  PT/OT    Right Buttock open wound  Wound Team following  Cleanse w/ NS, apply medi-honey, Ca+Alginate, secure w/drsg  Wound APN recommending Derm Consult  Awaiting appointment to be made    HTN/CHF - EF 30-35%  Coreg 3.125 mg bid  Sacubitril Valsartin 24/26 twice daily    Acute Respiratory Failure  Intubated and extubated in hospital  Advair Diskus 250/50 one inh q 12 hrs  Duonebs 2 x daily / prn    Transaminitis resolving  -   SGOT 18. SGPT 14.  Likely secondary to Rhabdomyolysis  Trend labs    Hypothyroidism  Levothyroxine 150 mcg Monday thru Saturday - not Sunday    HX: Psychotic Disorder with Anxiety and Depression  Psychiatry to follow - continue medication; no changes per psyche APN  Continue home medications:  Buspirone 30 mg 3 x daily  Duloxetine 60 mg 2 x daily  Seroquel 300 mg nightly  Seroquel 50 mg 2 x daily  Valium 5 mg q 8 hrs prn  Thorazine 25 mg 4 x daily     Weakness / Deconditioning  Continue PT/OT    Disposition:  SW to work with patient to arrange a safe discharge.  Patient lives alone in an apartment in [place].    *Follow-Up with PCP within 1-2 weeks following [facility] discharge.   *Follow up with Pulmonary, Cardiology,and nephrology.    *Greater than 35 minutes spent w/ patient and staff, including but not limited to/ reviewing present status, needs, abilities with disciplines, reviewing medical records, vital signs, labs, completing med rec and  entering orders to establish plan of care in [facility].  Note to patient: The  Cures Act makes medical notes like these available to patients in the interest of transparency. However, this is a medical document intended as peer to peer communication. It is written in medical language and may contain abbreviations or verbiage that are unfamiliar. It may appear blunt or direct. Medical documents are intended to carry relevant information, facts as evident, and the clinical opinion of the practitioner who signs the document.    AXEL Parker (Daya)  25 1130am   [facility]                       [1]   Past Medical History:   Essential hypertension    Heart attack (HCC)    Hyperlipidemia    Thyroid disease   [2]   Past Surgical History:  Procedure Laterality Date    Appendectomy            Removal gallbladder     [3] No family history on file.  [4]   Social History  Socioeconomic History    Marital status:    Tobacco Use    Smoking status: Every Day     Types: Cigarettes    Smokeless tobacco: Never     Social Drivers of Health     Food Insecurity: No Food Insecurity (2025)    NCSS - Food Insecurity     Worried About Running Out of Food in the Last Year: No     Ran Out of Food in the Last Year: No   Transportation Needs: No Transportation Needs (2025)    NCSS - Transportation     Lack of Transportation: No   Housing Stability: Not At Risk (2025)    NCSS - Housing/Utilities     Has Housing: Yes     Worried About Losing Housing: No     Unable to Get Utilities: No   [5] No Known Allergies

## 2025-06-20 NOTE — PROGRESS NOTES
25 APN Encounter 12 pm  Radha Winters    1960    Patient seen for follow up for chronic low back pain, irritated forehead, new on hemodialysis, and nonhealing R buttock wound.    Patient seen at bedside and multiple times sitting at the nurses station waiting for medications.  Patient states she feels she has to come to the nurse's cart and wait for her Norco which is 5/325 every 6 hrs prn.    Order entered to schedule Norco 5/325 every 6 hours for 3 days then resume prn.  Patient agreed.  Physiatrist on consult for pain medication to evaluate pain meds.    Patient's previous c/o of itchy forehead seemed to worsen with trial of hydrocortisone.  This was discontinued and ordered to apply prn to forehead if still feels irritated.  Will continue to follow.    Patient still not clear if she will need to continue dialysis long term.  Patient will f/u with nephrologist to determine when ready for discharge  Patient has dialysis M/W/F while at [facility].  R perm catheter dressing clean and dry. LE 1+ edema. Will discuss with dietician possible fluid restriction.     Spoke with Wound APN regarding patient's nonhealing R buttock wound.  Wound APN recommended Derm Consult.  This APN contacted  to see if initial appoint has been scheduled with a Dermatologist; will follow up.    Bipolar Disorder/Depression/Anxiety:  Patient taking:  Buspirone, Duloxetine, Seroquel, Thorazine and Valium prn.  Patient seen by Psyche NP; all medications will remain the same.    Will continue to monitor mood and behavior.    Patient is admitted to [place] for dialysis, pain control,  rehabilitation and strengthening.      [facility] Admission:  25 to 25  Admitted to [place] 25    Hospital Discharge Diagnoses:  Acute Renal Failure superimposed on CKD, unspecified acute renal failure type.  CKD  Non-traumatic rhabdomyolysis  Transaminitis    Chief complaint today: increased back  pain, irritated forehead.     HPI  (hospital note)  64-year-old female with a history of hypertension, coronary artery disease, hyperlipidemia, hypothyroidism, and chronic leg weakness who presents with multiple issues. She has a two-year history of recurrent falls and was previously evaluated at [facility], where she was told her falls were related to her sodium levels. The patient experienced a fall 2-3 days ago and presented to the emergency room but refused admission at that time. Laboratory studies then revealed elevated creatinine. Today, she experienced two more falls - initially landing on her back, followed by a second fall. She developed occipital headache and back pain following these events. She denies loss of consciousness, neck pain, or leg pain. Additionally, the patient reports poor oral intake and decreased appetite over the past several days.  Patient was admitted to the hospital; patient was hydrated aggressively.  Patient was seen by renal also.  Patient had worsening of her kidney function.  Patient was started on hemodialysis.  Patient was also having respiratory failure.    Past Medical History[1]  Past Surgical History[2]  Family History[3]  Short Social Hx on File[4]    Immunization History   Administered Date(s) Administered    Covid-19 Vaccine Pfizer 30 mcg/0.3 ml 02/23/2021, 04/06/2021, 10/26/2021      Allergies[5]  CODE STATUS:  Full Code    ADVANCED CARE PLANNING TEAM: Will need family care plan updates.  Patient states she has a daughter who lives in [place].    CURRENT MEDICATIONS - reviewed and updated on SNF EMAR  Tylenol 650 q 6 hrs prn  Aspirin 81 mg daily  Buspirone 30 mg 3 x daily  Calcitriol 0.25 mcg daily  Coreg 3.125 mg 2 x daily\  Chlorpromazine 25 mg 4 x daily  Diazepam 5 mg q 8 hrs prn  Docusate soc 100 mg 2 x daily  Duloxetine 60 mg 2 x daily  Fluticasone salmeterol 250/50 -1 inhalation 2 x daily  Norco 5/325 scheduled one q 6 hrs  x 3 days, then resume  prn.  Ipratropium  0.5/2.52 x daily  Levothyroxine 150 mcg every morning before breakfast  Midodrine 10 mg tid  Miralax 17 daily prn  Seroquel 50 mg 2 x daily  Seroquel  mg nightly  Sacubitril Valsartan 24/26 one tab 2 x daily  Aquaphor ointment to arms,legs and forehead     SUBJECTIVE: patient denies headache, chest pain, SOB.  C/O worsening back pain.  Reports anxiety and depression improved. Denies chills, fevers.    PHYSICAL EXAM:  142/77. P 83. RR 16. POx 97%. T 97.8. wgt 228.4#.    GENERAL HEALTH:well developed, well nourished, in no apparent distress  except for chronic back pain.  LINES, TUBES, DRAINS:   R chest perm catheter  SKIN: warm, dry irritated forehead  WOUND: Right buttock wound. Daily Medihoney &  Ca+Alginate drsg.  EYES: PERRLA, sclera anicteric, conjunctiva normal; no nystagmus, no drainage   HENT:no nasal drainage, mucous membranes pink, moist, forehead red.   NECK: supple; FROM  BREAST: deferred exam   RESPIRATORY: lungs clear  CARDIOVASCULAR:  RRR, rate 83  ABDOMEN: active BS+, soft, nondistended; nontender, no guarding, no rebound tenderness.  :Deferred   hemodialysis 3 x week.  LYMPHATIC:no lymphedema  MUSCULOSKELETAL:   frequent falls; increased back pain, weakness, deconditioning.  EXTREMITIES/VASCULAR: trace to 1+edema  NEUROLOGIC:follows commands  PSYCHIATRIC: alert and oriented x 3; affect appropriate , anxious , and mood disorder (not new)    MEDICAL DECISION MAKING: Patient making her own decisions.  Patient has a friend Samara Cedeno  [phone]; emergency contact.    Lab Results Reviewed:  6/9/25  WBC 6.0. Hgb 9.9.  Plts 213.  Na 138. K 4.4. Cl 98. CO2 26. BUN 34. Creat 5.84 (dialysis)  Labs Reviewed: 6/17/25  WBC 5.0. HGB 10.2. PLTS 210.  . K 3.9. CL 97. CO2 28. BUN 18 CREAT 4.3. GFR 10 - HD. SGOT 15. SGPT 11.    See Plan Below:      Acute Renal Failure/CKD  Right chest dialysis catheter  Hemodialysis every M/W/F  Trend labs  R catheter dressing changes  during dialysis.  Transition to outpatient dialysis at discharge  F/U with Nephrologist    Frequent Falls   Rhabdomyolysis  Closely monitor for falls  Norco 5/325 scheduled q 6 hrs x 3 days then resume prn  Tylenol 650 q 6 hrs prn  High fall risk  PT/OT    Right Buttock open wound  Wound Team following  Cleanse w/ NS, apply medi-honey, Ca+Alginate, secure w/drsg  Wound APN recommending Derm Consult  Awaiting appointment to be made with Derm    HTN/CHF - EF 30-35%  Coreg 3.125 mg bid  Sacubitril Valsartin 24/26 twice daily    Acute Respiratory Failure  Intubated and extubated in hospital  Advair Diskus 250/50 one inh q 12 hrs  Duonebs 2 x daily / prn    Transaminitis resolving  -   SGOT 15. SGPT 11.  Likely secondary to Rhabdomyolysis  Trend labs    Hypothyroidism  Levothyroxine 150 mcg Monday thru Saturday - not Sunday    HX: Psychotic Disorder with Anxiety and Depression  Psychiatry following; continue medication; no changes per psyche APN  Continue home medications:  Buspirone 30 mg 3 x daily  Duloxetine 60 mg 2 x daily  Seroquel 300 mg nightly  Seroquel 50 mg 2 x daily  Valium 5 mg q 8 hrs prn  Thorazine 25 mg 4 x daily     Weakness / Deconditioning  Continue PT/OT    Disposition:  SW to work with patient to arrange a safe discharge.  Patient lives alone in an apartment in [place].    *Follow-Up with PCP within 1-2 weeks following [facility] discharge.   *Follow up with Pulmonary, Cardiology,and nephrology.    *Greater than 35 minutes spent w/ patient and staff, including but not limited to/ reviewing present status, needs, abilities with disciplines, reviewing medical records, vital signs, labs, completing med rec and entering orders to establish plan of care in [facility].  Note to patient: The 21st Century Cures Act makes medical notes like these available to patients in the interest of transparency. However, this is a medical document intended as peer to peer communication. It is written in medical language and may contain  abbreviations or verbiage that are unfamiliar. It may appear blunt or direct. Medical documents are intended to carry relevant information, facts as evident, and the clinical opinion of the practitioner who signs the document.    Gena PérezaAXEL Andrew  25  12 pm   [facility]                       [1]   Past Medical History:   Essential hypertension    Heart attack (HCC)    Hyperlipidemia    Thyroid disease   [2]   Past Surgical History:  Procedure Laterality Date    Appendectomy            Removal gallbladder     [3] No family history on file.  [4]   Social History  Socioeconomic History    Marital status:    Tobacco Use    Smoking status: Every Day     Types: Cigarettes    Smokeless tobacco: Never     Social Drivers of Health     Food Insecurity: No Food Insecurity (2025)    NCSS - Food Insecurity     Worried About Running Out of Food in the Last Year: No     Ran Out of Food in the Last Year: No   Transportation Needs: No Transportation Needs (2025)    NCSS - Transportation     Lack of Transportation: No   Housing Stability: Not At Risk (2025)    NCSS - Housing/Utilities     Has Housing: Yes     Worried About Losing Housing: No     Unable to Get Utilities: No   [5] No Known Allergies

## 2025-06-24 ENCOUNTER — SNF VISIT (OUTPATIENT)
Facility: SKILLED NURSING FACILITY | Age: 65
End: 2025-06-24

## 2025-06-24 DIAGNOSIS — Z99.2 ESRD (END STAGE RENAL DISEASE) ON DIALYSIS (HCC): ICD-10-CM

## 2025-06-24 DIAGNOSIS — F33.1 MAJOR DEPRESSIVE DISORDER, RECURRENT EPISODE, MODERATE (HCC): ICD-10-CM

## 2025-06-24 DIAGNOSIS — M54.9 ACUTE ON CHRONIC BACK PAIN: Primary | ICD-10-CM

## 2025-06-24 DIAGNOSIS — R26.81 UNSTEADY GAIT: ICD-10-CM

## 2025-06-24 DIAGNOSIS — S31.809S WOUND OF BUTTOCK, UNSPECIFIED LATERALITY, SEQUELA: ICD-10-CM

## 2025-06-24 DIAGNOSIS — N18.6 ESRD (END STAGE RENAL DISEASE) ON DIALYSIS (HCC): ICD-10-CM

## 2025-06-24 DIAGNOSIS — G89.29 ACUTE ON CHRONIC BACK PAIN: Primary | ICD-10-CM

## 2025-06-24 DIAGNOSIS — I50.20 SYSTOLIC HEART FAILURE, UNSPECIFIED HF CHRONICITY (HCC): ICD-10-CM

## 2025-06-24 DIAGNOSIS — R29.6 FREQUENT FALLS: ICD-10-CM

## 2025-06-24 DIAGNOSIS — F41.1 GENERALIZED ANXIETY DISORDER: ICD-10-CM

## 2025-06-24 PROCEDURE — 99309 SBSQ NF CARE MODERATE MDM 30: CPT | Performed by: NURSE PRACTITIONER

## 2025-06-25 VITALS
TEMPERATURE: 98 F | BODY MASS INDEX: 40 KG/M2 | HEART RATE: 84 BPM | OXYGEN SATURATION: 95 % | DIASTOLIC BLOOD PRESSURE: 76 MMHG | WEIGHT: 228.63 LBS | RESPIRATION RATE: 16 BRPM | SYSTOLIC BLOOD PRESSURE: 158 MMHG

## 2025-06-25 NOTE — PROGRESS NOTES
25 APRN Encounter  10 am  Radha WintersJonathan    1960.    Follow up visit for chronic back pain, new on dialysis, and depression.    Patient seen sitting in her wheelchair c/o increased lower back pain states pain is 10/10.  Discussed pain with staff.  Awaiting for physiatry to evaluate patient for chronic back pain and to adjust medications if needed.    Will increase Norco to two 5/325 tablets prn q 8 hrs until seen by physiatry.  Lidocaine patch being applied daily to lower back. Patient also on Cymbalta 60 mg 2 x daily.    Patient continues with dialysis 3 x week at [facility].  Patient inquires if this will be long term.  Patient is to f/u with Nephrology.  Perm catheter dressing clean and dry.    Depression:  patient appears stable on current medication regimen.  Patient being followed by psyche at [facility]; no recent medication changes from psyche.    Spoke with Wound APN regarding patient's nonhealing L buttock wound; atypical lesion per  Wound APN who recommended Derm Consult.  This APN spoke with ; she is working on appointment with a Dermatologist.   having difficulty d/t patient's insurance.      Bipolar Disorder/Depression/Anxiety:  Patient taking:  Buspirone, Duloxetine, Seroquel, Thorazine and Valium prn.  Patient seen by Psyche NP; all medications kept the same.    Will continue to monitor mood and behavior.    Patient is admitted to [facility] for dialysis, pain control,  wound care, rehabilitation and strengthening.      [facility] Admission:  25 to 25  Admitted to [facility] 25    Hospital Discharge Diagnoses:  Acute Renal Failure superimposed on CKD, unspecified acute renal failure type.  CKD  Non-traumatic rhabdomyolysis  Transaminitis    Chief complaint today: increased back pain, anxiety    HPI  (hospital note)  64-year-old female with a history of hypertension, coronary artery disease, hyperlipidemia, hypothyroidism, and chronic  leg weakness who presents with multiple issues. She has a two-year history of recurrent falls and was previously evaluated at [facility], where she was told her falls were related to her sodium levels. The patient experienced a fall 2-3 days ago and presented to the emergency room but refused admission at that time. Laboratory studies then revealed elevated creatinine. Today, she experienced two more falls - initially landing on her back, followed by a second fall. She developed occipital headache and back pain following these events. She denies loss of consciousness, neck pain, or leg pain. Additionally, the patient reports poor oral intake and decreased appetite over the past several days.  Patient was admitted to the hospital; patient was hydrated aggressively.  Patient was seen by renal also.  Patient had worsening of her kidney function.  Patient was started on hemodialysis.  Patient was also having respiratory failure.    Past Medical History[1]  Past Surgical History[2]  Family History[3]  Short Social Hx on File[4]    Immunization History   Administered Date(s) Administered    Covid-19 Vaccine Pfizer 30 mcg/0.3 ml 02/23/2021, 04/06/2021, 10/26/2021      Allergies[5]  CODE STATUS:  Full Code    ADVANCED CARE PLANNING TEAM: Will need family care plan updates.  Patient states she has a daughter who lives in [place].    CURRENT MEDICATIONS - reviewed and updated on SNF EMAR  Tylenol 650 q 6 hrs prn  Aspirin 81 mg daily  Buspirone 30 mg 3 x daily  Calcitriol 0.25 mcg daily  Coreg 3.125 mg 2 x daily\  Chlorpromazine 25 mg 4 x daily  Diazepam 5 mg q 8 hrs prn  Docusate soc 100 mg 2 x daily  Duloxetine 60 mg 2 x daily  Fluticasone salmeterol 250/50 -1 inhalation 2 x daily  Norco 5/325 - increased to two tablets every 8 hrs.   Ipratropium  0.5/2.52 x daily  Levothyroxine 150 mcg every morning before breakfast  Midodrine 10 mg tid  Miralax 17 daily prn  Seroquel 50 mg 2 x daily  Seroquel  mg  nightly  Sacubitril Valsartan 24/26 one tab 2 x daily  Aquaphor ointment to arms,legs and forehead     SUBJECTIVE: patient denies headache, chest pain, SOB.  C/O worsening back pain 10/10.  Reports anxiety d/t pain.  denies chills, fevers.    PHYSICAL EXAM: 158/76. P 84. RR16. POx 95%. T 97.8. wgt 228.6#    GENERAL HEALTH:well developed, well nourished, in no apparent distress  except for chronic back pain 10/10.  LINES, TUBES, DRAINS:  perm catheter for dialysis  SKIN: warm, dry   WOUND: Buttock wound. Daily Medihoney &  Ca+Alginate drsg. F/U with derm.  EYES: PERRLA, sclera anicteric, conjunctiva normal; no eye drainage   HENT:no nasal drainage, mucous membranes pink, moist  NECK: supple; FROM  BREAST: deferred exam   RESPIRATORY: lungs clear  CARDIOVASCULAR:  RRR, rate 84  ABDOMEN: active BS+, soft, nondistended; nontender, no guarding, no rebound tenderness.  :Deferred   hemodialysis 3 x week.  LYMPHATIC:no lymphedema  MUSCULOSKELETAL:   frequent falls; increased back pain, weakness, deconditioning.  EXTREMITIES/VASCULAR: trace edema.  NEUROLOGIC:follows commands  PSYCHIATRIC: alert and oriented x 3; affect appropriate , anxious , and mood disorder (not new)    MEDICAL DECISION MAKING: Patient making her own decisions.  Patient has a friend Samara Cedeno  [phone]; emergency contact.    Lab Results Reviewed:  6/9/25  WBC 6.0. Hgb 9.9.  Plts 213.  Na 138. K 4.4. Cl 98. CO2 26. BUN 34. Creat 5.84 (dialysis)  Labs Reviewed: 6/17/25  WBC 5.0. HGB 10.2. PLTS 210.  . K 3.9. CL 97. CO2 28. BUN 18 CREAT 4.3. GFR 10 - HD. SGOT 15. SGPT 11.  Labs Reviewed:  6/24/25  WBC 4.8. HGB 9.7. PLTS 233.  . K 4.5. CL 95. CO2 30. BUN 19. CREAT 4.65. GFR 10    See Plan Below:      Acute Renal Failure/CKD  Right chest dialysis catheter  Hemodialysis every M/W/F  Trend weekly labs  R catheter dressing changes in dialysis.  Transition to outpatient dialysis at discharge  F/U with Nephrologist    Frequent Falls    Rhabdomyolysis resolved  Closely monitor for falls  Norco 5/325 increased to 2 tablets q 8 hrs prn   Tylenol 650 q 6 hrs prn  High fall risk  Continue therapy     Right Buttock open wound  Wound Team following  Cleanse w/ NS, apply medi-honey, Ca+Alginate, secure w/drsg  Wound APN recommending Derm Consult  Awaiting appointment to be made with Derm    HTN/CHF - EF 30-35%  Coreg 3.125 mg bid  Sacubitril Valsartin 24/26 twice daily    Acute Respiratory Failure  Intubated and extubated in hospital  Advair Diskus 250/50 one inh q 12 hrs  Duonebs 2 x daily / prn    Transaminitis resolving  -   SGOT 14. SGPT 8.  Likely secondary to Rhabdomyolysis  Trend weekly labs    Hypothyroidism  Levothyroxine 150 mcg Monday thru Saturday - not Sunday    HX: Psychotic Disorder with Anxiety and Depression  Psychiatry following; continue medications; no changes per psyche APN  Continue home medications:  Buspirone 30 mg 3 x daily  Duloxetine 60 mg 2 x daily  Seroquel 300 mg nightly  Seroquel 50 mg 2 x daily  Valium 5 mg q 8 hrs prn  Thorazine 25 mg 4 x daily     Weakness / Deconditioning  Continue therapy    Disposition:  SW to work with patient to arrange a safe discharge.  Patient lives alone in an apartment in [place].    *Follow-Up with PCP within 1-2 weeks following [facility]ENE discharge.   *Follow up with Pulmonary, Cardiology,and nephrology.  Awaiting appointment with Dermatologist for chronic atypical lesion of buttocks    *Greater than 35 minutes spent w/ patient and staff, including but not limited to/ reviewing present status, needs, abilities with disciplines, reviewing medical records, vital signs, labs, completing med rec and entering orders to establish plan of care in [facility].  Note to patient: The 21st Century Cures Act makes medical notes like these available to patients in the interest of transparency. However, this is a medical document intended as peer to peer communication. It is written in medical language and may contain  abbreviations or verbiage that are unfamiliar. It may appear blunt or direct. Medical documents are intended to carry relevant information, facts as evident, and the clinical opinion of the practitioner who signs the document.    AXEL Parker (Daya)  25  10 am   [facility]                       [1]   Past Medical History:   Essential hypertension    Heart attack (HCC)    Hyperlipidemia    Thyroid disease   [2]   Past Surgical History:  Procedure Laterality Date    Appendectomy            Removal gallbladder     [3] No family history on file.  [4]   Social History  Socioeconomic History    Marital status:    Tobacco Use    Smoking status: Every Day     Types: Cigarettes    Smokeless tobacco: Never     Social Drivers of Health     Food Insecurity: No Food Insecurity (2025)    NCSS - Food Insecurity     Worried About Running Out of Food in the Last Year: No     Ran Out of Food in the Last Year: No   Transportation Needs: No Transportation Needs (2025)    NCSS - Transportation     Lack of Transportation: No   Housing Stability: Not At Risk (2025)    NCSS - Housing/Utilities     Has Housing: Yes     Worried About Losing Housing: No     Unable to Get Utilities: No   [5] No Known Allergies

## 2025-06-26 ENCOUNTER — SNF VISIT (OUTPATIENT)
Facility: SKILLED NURSING FACILITY | Age: 65
End: 2025-06-26

## 2025-06-26 DIAGNOSIS — S31.809S WOUND OF BUTTOCK, UNSPECIFIED LATERALITY, SEQUELA: ICD-10-CM

## 2025-06-26 DIAGNOSIS — R26.81 UNSTEADY GAIT: ICD-10-CM

## 2025-06-26 DIAGNOSIS — F33.1 MAJOR DEPRESSIVE DISORDER, RECURRENT EPISODE, MODERATE (HCC): ICD-10-CM

## 2025-06-26 DIAGNOSIS — J43.9 PULMONARY EMPHYSEMA, UNSPECIFIED EMPHYSEMA TYPE (HCC): ICD-10-CM

## 2025-06-26 DIAGNOSIS — R53.1 WEAKNESS GENERALIZED: ICD-10-CM

## 2025-06-26 DIAGNOSIS — N18.6 ESRD (END STAGE RENAL DISEASE) ON DIALYSIS (HCC): ICD-10-CM

## 2025-06-26 DIAGNOSIS — M54.9 ACUTE ON CHRONIC BACK PAIN: Primary | ICD-10-CM

## 2025-06-26 DIAGNOSIS — Z99.2 ESRD (END STAGE RENAL DISEASE) ON DIALYSIS (HCC): ICD-10-CM

## 2025-06-26 DIAGNOSIS — F41.1 GENERALIZED ANXIETY DISORDER: ICD-10-CM

## 2025-06-26 DIAGNOSIS — M62.82 NON-TRAUMATIC RHABDOMYOLYSIS: ICD-10-CM

## 2025-06-26 DIAGNOSIS — G89.29 ACUTE ON CHRONIC BACK PAIN: Primary | ICD-10-CM

## 2025-06-26 PROCEDURE — 99309 SBSQ NF CARE MODERATE MDM 30: CPT | Performed by: NURSE PRACTITIONER

## 2025-06-30 VITALS
RESPIRATION RATE: 16 BRPM | BODY MASS INDEX: 40 KG/M2 | WEIGHT: 226.88 LBS | TEMPERATURE: 98 F | SYSTOLIC BLOOD PRESSURE: 137 MMHG | HEART RATE: 84 BPM | OXYGEN SATURATION: 98 % | DIASTOLIC BLOOD PRESSURE: 76 MMHG

## 2025-06-30 NOTE — PROGRESS NOTES
25  APRN Encounter 25  Radha WintersJonathan    1960    Follow up visit 25 for:  chronic back pain, s/p fall at home, left buttock lesion, ESRD on dialysis.    Met with patient sitting in her wheelchair regarding chronic back pain.  Also spoke with physiatrist who suggested 2 tabs for few days then , then resume one tab every 6 hrs prn.  Will follow physiatrist's orders.  Continue Cymbalta 60 mg 2 x daily. Continue Lidocaine patch to lower back.  Physiatrist also explained to patient.  Patient is to f/u  pain clinic at [facility], Dr. March.    Patient being followed by wound team for an atypical lesion left buttock; measurement 1.8 cm x 30 cm x 0.1 cm.  Wound care is cleanse w/ normal saline, apply medihoney,  Ca+ Alginate, secure with border gauze.   apply miconazole cream to periwound.  Patient is to f/u with dermatologist on  25.    Patient continues with dialysis at [facility] every M// for ESRD.  SW will arrange outpatient dialysis prior to discharge.  Perm cath dressing clean and dry.  Patient will need to f/u with nephrologist as an outpatient at discharge.     Bipolar Disorder/Depression/Anxiety:  Patient taking:  Buspirone, Duloxetine, Seroquel, Thorazine and Valium prn.  Patient seen by Psyche NP; all medications kept the same.    Will continue to monitor mood and behavior.    Patient is admitted to [facility] for dialysis, pain control,  wound care, rehabilitation and strengthening.      [facility]  [facility]  Admitted to [facility] 25    Hospital Discharge Diagnoses:  Acute Renal Failure superimposed on CKD, unspecified acute renal failure type.  CKD  Non-traumatic rhabdomyolysis  Transaminitis    Chief complaint today: increased back pain, anxiety    HPI  (hospital note)  64-year-old female with a history of hypertension, coronary artery disease, hyperlipidemia, hypothyroidism, and chronic leg weakness who presents with  multiple issues. She has a two-year history of recurrent falls and was previously evaluated at [facility], where she was told her falls were related to her sodium levels. The patient experienced a fall 2-3 days ago and presented to the emergency room but refused admission at that time. Laboratory studies then revealed elevated creatinine. Today, she experienced two more falls - initially landing on her back, followed by a second fall. She developed occipital headache and back pain following these events. She denies loss of consciousness, neck pain, or leg pain. Additionally, the patient reports poor oral intake and decreased appetite over the past several days.  Patient was admitted to the hospital; patient was hydrated aggressively.  Patient was seen by renal also.  Patient had worsening of her kidney function.  Patient was started on hemodialysis.  Patient was also having respiratory failure.    Past Medical History[1]  Past Surgical History[2]  Family History[3]  Short Social Hx on File[4]    Allergies[5]  CODE STATUS:  Full Code    ADVANCED CARE PLANNING TEAM: Will need family care plan updates.  Patient states she has a daughter who lives in [place].    CURRENT MEDICATIONS - reviewed and updated on SNF EMAR  Tylenol 650 q 6 hrs prn  Aspirin 81 mg daily  Buspirone 30 mg 3 x daily  Calcitriol 0.25 mcg daily  Coreg 3.125 mg 2 x daily\  Chlorpromazine 25 mg 4 x daily  Diazepam 5 mg q 8 hrs prn  Docusate soc 100 mg 2 x daily  Duloxetine 60 mg 2 x daily  Fluticasone salmeterol 250/50 -1 inhalation 2 x daily  Norco 5/325 -  2 tabs every 8 hrs x 4 days, then 1 tab prn q 8.   Ipratropium  0.5/2.52 x daily  Levothyroxine 150 mcg every morning before breakfast  Midodrine 10 mg tid  Miralax 17 daily prn  Seroquel 50 mg 2 x daily  Seroquel  mg nightly  Sacubitril Valsartan 24/26 one tab 2 x daily  Aquaphor ointment to arms,legs and forehead     SUBJECTIVE: patient denies headache, chest pain, SOB.   C/O chronic back pain 8/10.  Reports anxiety d/t pain.  denies chills, fevers.    PHYSICAL EXAM:137/76. P 84. RR16. POx 98%. T 98.4 wgt 226.9#, 228.6#    GENERAL HEALTH:well developed, well nourished, in no apparent distress  except for chronic back pain  LINES, TUBES, DRAINS:  perm catheter for dialysis  SKIN: warm, dry   WOUND: Buttock wound. Daily Medihoney &  Ca+Alginate drsg. F/U with derm.  EYES: PERRLA, sclera anicteric, conjunctiva normal; no eye drainage   HENT:no nasal drainage, mucous membranes pink, moist  NECK: supple; FROM  BREAST: deferred exam   RESPIRATORY: lungs clear  CARDIOVASCULAR:  RRR, rate 84  ABDOMEN: active BS+, soft, nondistended; nontender, no guarding, no rebound tenderness. Good appetite.  :Deferred   hemodialysis 3 x week.  LYMPHATIC:no lymphedema  MUSCULOSKELETAL:   frequent falls; increased back pain, weakness, deconditioning.  EXTREMITIES/VASCULAR: trace edema.  NEUROLOGIC:follows commands  PSYCHIATRIC: alert and oriented x 3; affect appropriate , anxious , and mood disorder (not new)    MEDICAL DECISION MAKING: Patient making her own decisions.  Patient has a friend Samara Cedeno  [phone]; emergency contact.    Lab Results Reviewed:  6/9/25  WBC 6.0. Hgb 9.9.  Plts 213.  Na 138. K 4.4. Cl 98. CO2 26. BUN 34. Creat 5.84 (dialysis)  Labs Reviewed: 6/17/25  WBC 5.0. HGB 10.2. PLTS 210.  . K 3.9. CL 97. CO2 28. BUN 18 CREAT 4.3. GFR 10 - HD. SGOT 15. SGPT 11.  Labs Reviewed:  6/24/25  WBC 4.8. HGB 9.7. PLTS 233.  . K 4.5. CL 95. CO2 30. BUN 19. CREAT 4.65. GFR 10    See Plan Below:      Acute Renal Failure/CKD  Right chest dialysis catheter  Hemodialysis every M/W/F  Trend weekly labs  R catheter dressing changes in dialysis.  Transition to outpatient dialysis at discharge  F/U with Nephrologist    Frequent Falls   Rhabdomyolysis resolved  Closely monitor for falls  Norco 5/325  2 tablets q 8 hrs prn , then 1 tab prn   Tylenol 650 q 6 hrs prn  High fall  risk  Continue therapy     Right Buttock open wound  Wound Team following  Cleanse w/ NS, apply medi-honey, Ca+Alginate, secure w/drsg  Wound APN recommending Derm Consult  Derm appointment 7/23/25    HTN/CHF - EF 30-35%  Coreg 3.125 mg bid  Sacubitril Valsartin 24/26 twice daily    Acute Respiratory Failure  Intubated and extubated in hospital  Advair Diskus 250/50 one inh q 12 hrs  Duonebs 2 x daily / prn    Transaminitis resolving  -   SGOT 14. SGPT 8.  Likely secondary to Rhabdomyolysis  Trend weekly labs    Hypothyroidism  Levothyroxine 150 mcg Monday thru Saturday - not Sunday    HX: Psychotic Disorder with Anxiety and Depression  Psychiatry following; continue medications; no changes per psyche APN  Buspirone 30 mg 3 x daily  Duloxetine 60 mg 2 x daily  Seroquel 300 mg nightly  Seroquel 50 mg 2 x daily  Valium 5 mg q 8 hrs prn  Thorazine 25 mg 4 x daily     Weakness / Deconditioning  Continue therapy    Disposition:  SW to work with patient to arrange a safe discharge.  Patient lives alone in an apartment in [place].    *Follow-Up with PCP within 1-2 weeks following GENE discharge.   *Follow up with Pulmonary, Cardiology,and nephrology.  Follow up with Dermatologist  7/23/25 for chronic atypical lesion of buttocks    *Greater than 35 minutes spent w/ patient and staff, including but not limited to/ reviewing present status, needs, abilities with disciplines, reviewing medical records, vital signs, labs, completing med rec and entering orders to establish plan of care in [facility].  Note to patient: The 21st Century Cures Act makes medical notes like these available to patients in the interest of transparency. However, this is a medical document intended as peer to peer communication. It is written in medical language and may contain abbreviations or verbiage that are unfamiliar. It may appear blunt or direct. Medical documents are intended to carry relevant information, facts as evident, and the clinical opinion of  the practitioner who signs the document.    Gena ElisaaLisa BrownjoshuaAXEL beltran  25  10:30 am   [facility]                            [1]   Past Medical History:   Essential hypertension    Heart attack (HCC)    Hyperlipidemia    Thyroid disease   [2]   Past Surgical History:  Procedure Laterality Date    Appendectomy            Removal gallbladder     [3] No family history on file.  [4]   Social History  Socioeconomic History    Marital status:    Tobacco Use    Smoking status: Every Day     Types: Cigarettes    Smokeless tobacco: Never     Social Drivers of Health     Food Insecurity: No Food Insecurity (2025)    NCSS - Food Insecurity     Worried About Running Out of Food in the Last Year: No     Ran Out of Food in the Last Year: No   Transportation Needs: No Transportation Needs (2025)    NCSS - Transportation     Lack of Transportation: No   Housing Stability: Not At Risk (2025)    NCSS - Housing/Utilities     Has Housing: Yes     Worried About Losing Housing: No     Unable to Get Utilities: No   [5] No Known Allergies

## 2025-07-01 ENCOUNTER — SNF VISIT (OUTPATIENT)
Facility: SKILLED NURSING FACILITY | Age: 65
End: 2025-07-01

## 2025-07-01 DIAGNOSIS — M54.9 ACUTE ON CHRONIC BACK PAIN: Primary | ICD-10-CM

## 2025-07-01 DIAGNOSIS — F41.1 GENERALIZED ANXIETY DISORDER: ICD-10-CM

## 2025-07-01 DIAGNOSIS — R29.6 FREQUENT FALLS: ICD-10-CM

## 2025-07-01 DIAGNOSIS — N18.6 ESRD (END STAGE RENAL DISEASE) ON DIALYSIS (HCC): ICD-10-CM

## 2025-07-01 DIAGNOSIS — G89.29 ACUTE ON CHRONIC BACK PAIN: Primary | ICD-10-CM

## 2025-07-01 DIAGNOSIS — Z99.2 ESRD (END STAGE RENAL DISEASE) ON DIALYSIS (HCC): ICD-10-CM

## 2025-07-01 DIAGNOSIS — R26.81 GAIT INSTABILITY: ICD-10-CM

## 2025-07-01 DIAGNOSIS — R53.1 WEAKNESS GENERALIZED: ICD-10-CM

## 2025-07-01 DIAGNOSIS — S31.809S WOUND OF BUTTOCK, UNSPECIFIED LATERALITY, SEQUELA: ICD-10-CM

## 2025-07-01 PROCEDURE — 99309 SBSQ NF CARE MODERATE MDM 30: CPT | Performed by: NURSE PRACTITIONER

## 2025-07-03 VITALS
SYSTOLIC BLOOD PRESSURE: 106 MMHG | HEART RATE: 73 BPM | WEIGHT: 226.19 LBS | OXYGEN SATURATION: 97 % | RESPIRATION RATE: 14 BRPM | BODY MASS INDEX: 40 KG/M2 | TEMPERATURE: 99 F | DIASTOLIC BLOOD PRESSURE: 62 MMHG

## 2025-07-03 NOTE — PROGRESS NOTES
25 APN Encounter  10:30 am  Radha Winters  1960    Follow up Visit for:  recent fall at home.  Chronic back pain.   HemoDialysis.  L buttock wounds.    Met with patient sitting in her wheelchair.  Patient sits at the nursing station when she wants her pain medication for back pain or if she would like to be taken out to smoke.  Patient has been counseled  on the effects of smoking with wound healing.    Patient spends most of the day in her wheelchair except for therapy.  Therapy Update: Patient is being followed by [facility] and is able to ambulate with a walker with CGA and verbal cues.  Patient reports she will need to ambulate prior to discharging home.    Patient c/o chronic back pain, however, current medication regime is relieving her pain most of the time.  [place] 5/325 prn q 8 hrs, and Tylenol prn.    Patient continues with dialysis M/W/F at [facility] for ESRD.  Will need to f/u with Nephrologist at discharge to evaluate ESRD.    Left buttock wound.  Patient was advised to follow up with Dermatology per our wound APN. Patient is scheduled to see Dermatology on 25 at 9 am.     25  APRN Encounter 25  Radha Winters  1960    Follow up visit 25 for:  chronic back pain, s/p fall at home, left buttock lesion, ESRD on dialysis.    Met with patient sitting in her wheelchair regarding chronic back pain.  Also spoke with physiatrist who suggested 2 tabs for few days then , then resume one tab every 6 hrs prn.  Will follow physiatrist's orders.  Continue Cymbalta 60 mg 2 x daily. Continue Lidocaine patch to lower back.  Physiatrist also explained to patient.  Patient is to f/u  pain clinic at [facility], Dr. March.    Patient being followed by wound team for an atypical lesion left buttock; measurement 1.8 cm x 30 cm x 0.1 cm.  Wound care is cleanse w/ normal saline, apply medihoney,  Ca+ Alginate, secure with border gauze.   apply miconazole cream to  periwound.  Patient is to f/u with dermatologist on  7/23/25.    Patient continues with dialysis at [facility] every M/W/F for ESRD.  SW will arrange outpatient dialysis prior to discharge.  Perm cath dressing clean and dry.  Patient will need to f/u with nephrologist as an outpatient at discharge.     Bipolar Disorder/Depression/Anxiety:  Patient taking:  Buspirone, Duloxetine, Seroquel, Thorazine and Valium prn.  Patient seen by Psyche NP; all medications kept the same.    Will continue to monitor mood and behavior.    Patient is admitted to [facility] for dialysis, pain control,  wound care, rehabilitation and strengthening.      [facility] Admission:  5/17/25 to 6/7/25  Admitted to [facility] 6/7/25    Hospital Discharge Diagnoses:  Acute Renal Failure superimposed on CKD, unspecified acute renal failure type.  CKD  Non-traumatic rhabdomyolysis  Transaminitis    Chief complaint today: buttock pain, back pain, anxiety    HPI  (hospital note)  64-year-old female with a history of hypertension, coronary artery disease, hyperlipidemia, hypothyroidism, and chronic leg weakness who presents with multiple issues. She has a two-year history of recurrent falls and was previously evaluated at [facility], where she was told her falls were related to her sodium levels. The patient experienced a fall 2-3 days ago and presented to the emergency room but refused admission at that time. Laboratory studies then revealed elevated creatinine. Today, she experienced two more falls - initially landing on her back, followed by a second fall. She developed occipital headache and back pain following these events. She denies loss of consciousness, neck pain, or leg pain. Additionally, the patient reports poor oral intake and decreased appetite over the past several days.  Patient was admitted to the hospital; patient was hydrated aggressively.  Patient was seen by renal also.  Patient had  worsening of her kidney function.  Patient was started on hemodialysis.  Patient was also having respiratory failure.    Past Medical History[1]  Past Surgical History[2]  Family History[3]  Short Social Hx on File[4]    Allergies[5]  CODE STATUS:  Full Code    ADVANCED CARE PLANNING TEAM: Will need family care plan updates.  Patient states she has a daughter who lives in [place].    CURRENT MEDICATIONS - reviewed and updated on SNF EMAR  Tylenol 650 q 6 hrs prn  Aspirin 81 mg daily  Buspirone 30 mg 3 x daily  Calcitriol 0.25 mcg daily  Coreg 3.125 mg 2 x daily\  Chlorpromazine 25 mg 4 x daily  Diazepam 5 mg q 8 hrs prn  Docusate soc 100 mg 2 x daily  Duloxetine 60 mg 2 x daily  Fluticasone salmeterol 250/50 -1 inhalation 2 x daily  Norco 5/325 -  2 tabs every 8 hrs x 4 days, then 1 tab prn q 8.   Ipratropium  0.5/2.52 x daily  Levothyroxine 150 mcg every morning before breakfast  Midodrine 10 mg tid  Miralax 17 daily prn  Seroquel 50 mg 2 x daily  Seroquel  mg nightly  Sacubitril Valsartan 24/26 one tab 2 x daily  Aquaphor ointment to arms,legs and forehead     SUBJECTIVE:  denies headache, chest pain, SOB.  C/O chronic back pain.   C/O buttock pain.        PHYSICAL EXAM: 106/62.  P 73. RR14. POx 97%. T 98.5. wgt 226.2#, 228.6#    GENERAL HEALTH:well developed, well nourished, in no apparent distress  except for chronic back pain.  LINES, TUBES, DRAINS:  perm catheter for dialysis  SKIN: warm, dry   WOUND: Buttock wound. Daily Medihoney &  Ca+Alginate drsg. F/U with derm 7/16/25  EYES: PERRLA, sclera anicteric, conjunctiva normal; no eye drainage   HENT:no nasal drainage, mucous membranes pink, moist  NECK: supple; FROM  BREAST: deferred exam   RESPIRATORY: lungs clear  CARDIOVASCULAR:  RRR, rate 73  ABDOMEN: active BS+, soft, nondistended; nontender, no guarding, no rebound tenderness. Good appetite.  :Deferred   hemodialysis 3 x week.  LYMPHATIC:no lymphedema  MUSCULOSKELETAL:   frequent falls; increased  back pain, weakness, deconditioning.  EXTREMITIES/VASCULAR: trace edema.  NEUROLOGIC:follows commands  PSYCHIATRIC: alert and oriented x 3; affect appropriate , anxious , and mood disorder (not new)    MEDICAL DECISION MAKING: Patient making her own decisions.  Patient has a friend Samara Cedeno  [phone]; emergency contact.    Lab Results Reviewed:  6/9/25  WBC 6.0. Hgb 9.9.  Plts 213.  Na 138. K 4.4. Cl 98. CO2 26. BUN 34. Creat 5.84 (dialysis)  Labs Reviewed: 6/17/25  WBC 5.0. HGB 10.2. PLTS 210.  . K 3.9. CL 97. CO2 28. BUN 18 CREAT 4.3. GFR 10 - HD. SGOT 15. SGPT 11.  Labs Reviewed:  6/24/25  WBC 4.8. HGB 9.7. PLTS 233.  . K 4.5. CL 95. CO2 30. BUN 19. CREAT 4.65. GFR 10    See Plan Below:      Acute Renal Failure/CKD  Right chest dialysis catheter  Hemodialysis every M/W/F  Trend weekly labs  R catheter dressing changes in dialysis.  Transition to outpatient dialysis at discharge  F/U with Nephrologist post dialysis    Frequent Falls   Rhabdomyolysis resolved  Closely monitor for falls  [place] 5/325  1 tab prn q 8 hrs   Tylenol 650 q 6 hrs prn  High fall risk  Continue therapy     Right Buttock open wound/ atypical lesion  Wound Team following  Cleanse w/ NS, apply medi-honey, Ca+Alginate, secure w/drsg  Derm appointment 7/16/25    HTN/CHF - EF 30-35%  Coreg 3.125 mg bid  Sacubitril Valsartin 24/26 twice daily    Acute Respiratory Failure  Intubated and extubated in hospital  Advair Diskus 250/50 one inh q 12 hrs  Duonebs 2 x daily / prn    Transaminitis resolving  -   SGOT 14. SGPT 8.  Likely secondary to Rhabdomyolysis  Trend weekly labs    Hypothyroidism  Levothyroxine 150 mcg Monday thru Saturday - not Sunday    HX: Psychotic Disorder with Anxiety and Depression  Psychiatry following; continue medications; no changes per psyche APN  Buspirone 30 mg 3 x daily  Duloxetine 60 mg 2 x daily  Seroquel 300 mg nightly  Seroquel 50 mg 2 x daily  Valium 5 mg q 8 hrs prn  Thorazine 25 mg 4 x  daily     Weakness / Deconditioning  Continue therapy    Disposition:  SW to work with patient to arrange a safe discharge.  Patient lives alone in an apartment in [place].    *Follow-Up with PCP within 1-2 weeks following [facility] discharge.   *Follow up with Pulmonary, Cardiology,and nephrology.  Follow up with Dermatologist  25 for chronic atypical lesion of buttocks    *Greater than 35 minutes spent w/ patient and staff, including but not limited to/ reviewing present status, needs, abilities with disciplines, reviewing medical records, vital signs, labs, completing med rec and entering orders to establish plan of care in [facility].  Note to patient: The  Century Cures Act makes medical notes like these available to patients in the interest of transparency. However, this is a medical document intended as peer to peer communication. It is written in medical language and may contain abbreviations or verbiage that are unfamiliar. It may appear blunt or direct. Medical documents are intended to carry relevant information, facts as evident, and the clinical opinion of the practitioner who signs the document.    AXEL Parker (Daya)  25  10:30  [facility] [employer]                                    [1]   Past Medical History:   Essential hypertension    Heart attack (HCC)    Hyperlipidemia    Thyroid disease   [2]   Past Surgical History:  Procedure Laterality Date    Appendectomy            Removal gallbladder     [3] No family history on file.  [4]   Social History  Socioeconomic History    Marital status:    Tobacco Use    Smoking status: Every Day     Types: Cigarettes    Smokeless tobacco: Never     Social Drivers of Health     Food Insecurity: No Food Insecurity (2025)    NCSS - Food Insecurity     Worried About Running Out of Food in the Last Year: No     Ran Out of Food in the Last Year: No   Transportation Needs: No Transportation Needs (2025)    NCSS - Transportation     Lack  of Transportation: No   Housing Stability: Not At Risk (5/17/2025)    NCSS - Housing/Utilities     Has Housing: Yes     Worried About Losing Housing: No     Unable to Get Utilities: No   [5] No Known Allergies

## 2025-07-08 ENCOUNTER — SNF VISIT (OUTPATIENT)
Facility: SKILLED NURSING FACILITY | Age: 65
End: 2025-07-08

## 2025-07-08 DIAGNOSIS — Z99.2 ESRD (END STAGE RENAL DISEASE) ON DIALYSIS (HCC): ICD-10-CM

## 2025-07-08 DIAGNOSIS — M62.82 NON-TRAUMATIC RHABDOMYOLYSIS: ICD-10-CM

## 2025-07-08 DIAGNOSIS — M54.9 ACUTE ON CHRONIC BACK PAIN: ICD-10-CM

## 2025-07-08 DIAGNOSIS — F33.1 MAJOR DEPRESSIVE DISORDER, RECURRENT EPISODE, MODERATE (HCC): ICD-10-CM

## 2025-07-08 DIAGNOSIS — R29.6 FREQUENT FALLS: Primary | ICD-10-CM

## 2025-07-08 DIAGNOSIS — N18.6 ESRD (END STAGE RENAL DISEASE) ON DIALYSIS (HCC): ICD-10-CM

## 2025-07-08 DIAGNOSIS — S31.809S WOUND OF BUTTOCK, UNSPECIFIED LATERALITY, SEQUELA: ICD-10-CM

## 2025-07-08 DIAGNOSIS — F41.1 GENERALIZED ANXIETY DISORDER: ICD-10-CM

## 2025-07-08 DIAGNOSIS — G89.29 ACUTE ON CHRONIC BACK PAIN: ICD-10-CM

## 2025-07-08 DIAGNOSIS — R26.81 GAIT INSTABILITY: ICD-10-CM

## 2025-07-08 PROCEDURE — 99309 SBSQ NF CARE MODERATE MDM 30: CPT | Performed by: NURSE PRACTITIONER

## 2025-07-09 VITALS
TEMPERATURE: 97 F | RESPIRATION RATE: 17 BRPM | SYSTOLIC BLOOD PRESSURE: 118 MMHG | WEIGHT: 218.5 LBS | BODY MASS INDEX: 39 KG/M2 | OXYGEN SATURATION: 97 % | HEART RATE: 80 BPM | DIASTOLIC BLOOD PRESSURE: 70 MMHG

## 2025-07-09 NOTE — PROGRESS NOTES
25 APN Encounter 1015 am  Radha Winters    1960.    Follow up Visit for:  chronic back pain, unsteady gait, ESRD on dialysis, trace LE edema, Left Buttock wound.    Met with patient sitting in her wheelchair.  Patient reports she is exercising and ambulating with [facility].  She is hopeful to ambulate independently so she can return home.  Patient lives alone with no assistance.    Dialysis days have changed; patient will now have dialysis on Tuesday/ and .  Patient states her chair time is 4:30 am. Patient has a right chest perm catheter; dressing clean, dry and intact.      Right lower extremity edema; patient declines wearing tubi- or keeping her legs elevated while in her wheelchair.  Will continue to monitor;  edema much improved.    Patient c/o chronic back pain, however, current medication regimen is relieving her pain most of the time.  [place] 5/325 prn q 8 hrs, and Tylenol prn.    Left buttock wound.  Patient was advised to follow up with Dermatology per our wound APN. Patient is scheduled to see Dermatology on 25 at 9 am.      [facility] Admission:  25 to 25  Admitted to [facility] 25    Hospital Discharge Diagnoses:  Acute Renal Failure superimposed on CKD, unspecified acute renal failure type.  CKD  Non-traumatic rhabdomyolysis  Transaminitis    Chief complaint today: low back pain, buttock pain    HPI  (hospital note)  64-year-old female with a history of hypertension, coronary artery disease, hyperlipidemia, hypothyroidism, and chronic leg weakness who presents with multiple issues. She has a two-year history of recurrent falls and was previously evaluated at [facility], where she was told her falls were related to her sodium levels. The patient experienced a fall 2-3 days ago and presented to the emergency room but refused admission at that time. Laboratory studies then revealed elevated  creatinine. Today, she experienced two more falls - initially landing on her back, followed by a second fall. She developed occipital headache and back pain following these events. She denies loss of consciousness, neck pain, or leg pain. Additionally, the patient reports poor oral intake and decreased appetite over the past several days.  Patient was admitted to the hospital; patient was hydrated aggressively.  Patient was seen by renal also.  Patient had worsening of her kidney function.  Patient was started on hemodialysis.  Patient was also having respiratory failure.    Past Medical History[1]  Past Surgical History[2]  Family History[3]  Short Social Hx on File[4]    Allergies[5]  CODE STATUS:  Full Code    ADVANCED CARE PLANNING TEAM: Will need family care plan updates.  Patient states she has a daughter who lives in [place].    CURRENT MEDICATIONS - reviewed and updated on SNF EMAR  Tylenol 650 q 6 hrs prn  Aspirin 81 mg daily  Buspirone 30 mg 3 x daily  Calcitriol 0.25 mcg daily  Coreg 3.125 mg 2 x daily\  Chlorpromazine 25 mg 4 x daily  Diazepam 5 mg q 8 hrs prn  Docusate soc 100 mg 2 x daily  Duloxetine 60 mg 2 x daily  Fluticasone salmeterol 250/50 -1 inhalation 2 x daily  Norco 5/325 -   1 tab prn q 8 hrs.   Ipratropium  0.5/2.52 x daily  Levothyroxine 150 mcg every morning before breakfast  Midodrine 10 mg tid  Miralax 17 daily prn  Seroquel 50 mg 2 x daily  Seroquel  mg nightly  Sacubitril Valsartan 24/26 one tab 2 x daily  Aquaphor ointment to arms,legs and forehead     SUBJECTIVE:  denies headache, chest pain, SOB.  C/O chronic back pain and buttock pain.        PHYSICAL EXAM: 118/70. P 80. RR 17. POx 97%.  T 97.2. 218.5#;  226.2#, 228.6#    GENERAL HEALTH:well developed, well nourished, in no apparent distress  except for chronic back pain.  LINES, TUBES, DRAINS:  perm catheter for dialysis; dressing clean and dry.  SKIN: warm, dry   WOUND: Buttock wound. Daily Medihoney &  Ca+ Alginate drsg.  F/U with derm 7/16/25  EYES: PERRLA, sclera anicteric, conjunctiva normal; no eye drainage   HENT:no nasal drainage, mucous membranes moist  NECK: supple; FROM  BREAST: deferred exam   RESPIRATORY: lungs clear  CARDIOVASCULAR:  RRR, rate 80  ABDOMEN: active BS+, soft, nondistended; nontender, no guarding, no rebound tenderness. Good appetite.  :Deferred   hemodialysis 3 x week; changed to Tues/Thurs/Saturday.  LYMPHATIC:no lymphedema  MUSCULOSKELETAL:   frequent falls; increased back pain, weakness, deconditioning.  EXTREMITIES/VASCULAR: trace edema.  NEUROLOGIC:follows commands  PSYCHIATRIC: alert and oriented x 3; affect appropriate , anxious , and mood disorder (not new)    MEDICAL DECISION MAKING: Patient making her own decisions.  Patient has a friend Samara Pereira  [phone]; emergency contact.    Lab Results Reviewed:  6/9/25  WBC 6.0. Hgb 9.9.  Plts 213.  Na 138. K 4.4. Cl 98. CO2 26. BUN 34. Creat 5.84 (dialysis)  Labs Reviewed: 6/17/25  WBC 5.0. HGB 10.2. PLTS 210.  . K 3.9. CL 97. CO2 28. BUN 18 CREAT 4.3. GFR 10 - HD. SGOT 15. SGPT 11.  Labs Reviewed:  6/24/25  WBC 4.8. HGB 9.7. PLTS 233.  . K 4.5. CL 95. CO2 30. BUN 19. CREAT 4.65. GFR 10  Labs Reviewed 7/8/25  WBC 4.8 HGB 9.6. CRIT 32.3. PLTS 224.  NA 19. K 47 CL 98. CO2 30 BUN 34. CREAT 7.34 (HD). GFR 6    See Plan Below:      Acute Renal Failure/CKD  Right chest dialysis catheter  Hemodialysis every T/TH/SAT  Trend weekly labs  R catheter dressing change in dialysis.  Transition to outpatient dialysis at discharge  F/U with Nephrologist post dialysis    Frequent Falls   Rhabdomyolysis resolved  Closely monitor for falls  [place] 5/325  1 tab prn q 8 hrs   Tylenol 650 q 6 hrs prn  High fall risk  Continue Restorative therapy     Right Buttock open wound/ atypical lesion  Wound Team following  Cleanse w/ NS, apply medi-honey, Ca+Alginate, secure w/drsg  Derm appointment 7/16/25    HTN/CHF - EF 30-35%  Coreg 3.125 mg bid  Sacubitril  Valsartin 24/26 twice daily    Acute Respiratory Failure  Intubated and extubated in hospital  Advair Diskus 250/50 one inh q 12 hrs  Duonebs 2 x daily / prn    Transaminitis resolving  -   SGOT 14. SGPT 8.  Likely secondary to Rhabdomyolysis  Trend weekly labs    Hypothyroidism  Levothyroxine 150 mcg Monday thru Saturday - not Sunday    HX: Psychotic Disorder with Anxiety and Depression  Psychiatry following; continue medications; no changes per psyche APN  Buspirone 30 mg 3 x daily  Duloxetine 60 mg 2 x daily  Seroquel 300 mg nightly  Seroquel 50 mg 2 x daily  Valium 5 mg q 8 hrs prn  Thorazine 25 mg 4 x daily     Weakness / Deconditioning  Continue therapy    Disposition:  SW to work with patient to arrange a safe discharge.  Patient lives alone in an apartment in [place].    *Follow-Up with PCP within 1-2 weeks following [facility] discharge.   *Follow up with Pulmonary, Cardiology,and nephrology.  Follow up with Dermatologist  7/16/25 for chronic atypical lesion of buttocks    *Greater than 35 minutes spent w/ patient and staff, including but not limited to/ reviewing present status, needs, abilities with disciplines, reviewing medical records, vital signs, labs, completing med rec and entering orders to establish plan of care in [facility].  Note to patient: The 21st Century Cures Act makes medical notes like these available to patients in the interest of transparency. However, this is a medical document intended as peer to peer communication. It is written in medical language and may contain abbreviations or verbiage that are unfamiliar. It may appear blunt or direct. Medical documents are intended to carry relevant information, facts as evident, and the clinical opinion of the practitioner who signs the document.    AXEL Parker (Daya)  7/8/25  10:15 am  [facility]                                      [1]   Past Medical History:   Essential hypertension    Heart attack (HCC)    Hyperlipidemia    Thyroid  disease   [2]   Past Surgical History:  Procedure Laterality Date    Appendectomy            Removal gallbladder     [3] No family history on file.  [4]   Social History  Socioeconomic History    Marital status:    Tobacco Use    Smoking status: Every Day     Types: Cigarettes    Smokeless tobacco: Never     Social Drivers of Health     Food Insecurity: No Food Insecurity (2025)    NCSS - Food Insecurity     Worried About Running Out of Food in the Last Year: No     Ran Out of Food in the Last Year: No   Transportation Needs: No Transportation Needs (2025)    NCSS - Transportation     Lack of Transportation: No   Housing Stability: Not At Risk (2025)    NCSS - Housing/Utilities     Has Housing: Yes     Worried About Losing Housing: No     Unable to Get Utilities: No   [5] No Known Allergies

## 2025-07-11 ENCOUNTER — SNF VISIT (OUTPATIENT)
Facility: SKILLED NURSING FACILITY | Age: 65
End: 2025-07-11

## 2025-07-11 DIAGNOSIS — G89.29 ACUTE ON CHRONIC BACK PAIN: ICD-10-CM

## 2025-07-11 DIAGNOSIS — F33.1 MAJOR DEPRESSIVE DISORDER, RECURRENT EPISODE, MODERATE (HCC): ICD-10-CM

## 2025-07-11 DIAGNOSIS — S31.809S WOUND OF BUTTOCK, UNSPECIFIED LATERALITY, SEQUELA: ICD-10-CM

## 2025-07-11 DIAGNOSIS — Z99.2 ESRD (END STAGE RENAL DISEASE) ON DIALYSIS (HCC): ICD-10-CM

## 2025-07-11 DIAGNOSIS — N18.6 ESRD (END STAGE RENAL DISEASE) ON DIALYSIS (HCC): ICD-10-CM

## 2025-07-11 DIAGNOSIS — R29.6 FREQUENT FALLS: Primary | ICD-10-CM

## 2025-07-11 DIAGNOSIS — F41.1 GENERALIZED ANXIETY DISORDER: ICD-10-CM

## 2025-07-11 DIAGNOSIS — R53.1 WEAKNESS GENERALIZED: ICD-10-CM

## 2025-07-11 DIAGNOSIS — M54.9 ACUTE ON CHRONIC BACK PAIN: ICD-10-CM

## 2025-07-11 DIAGNOSIS — R26.81 GAIT INSTABILITY: ICD-10-CM

## 2025-07-14 VITALS
BODY MASS INDEX: 39 KG/M2 | RESPIRATION RATE: 18 BRPM | HEART RATE: 87 BPM | WEIGHT: 218.5 LBS | SYSTOLIC BLOOD PRESSURE: 130 MMHG | TEMPERATURE: 98 F | DIASTOLIC BLOOD PRESSURE: 71 MMHG | OXYGEN SATURATION: 97 %

## 2025-07-15 ENCOUNTER — SNF VISIT (OUTPATIENT)
Facility: SKILLED NURSING FACILITY | Age: 65
End: 2025-07-15

## 2025-07-15 VITALS
TEMPERATURE: 99 F | HEART RATE: 87 BPM | RESPIRATION RATE: 16 BRPM | OXYGEN SATURATION: 90 % | DIASTOLIC BLOOD PRESSURE: 74 MMHG | BODY MASS INDEX: 39 KG/M2 | SYSTOLIC BLOOD PRESSURE: 128 MMHG | WEIGHT: 218.5 LBS

## 2025-07-15 DIAGNOSIS — N18.6 ESRD (END STAGE RENAL DISEASE) ON DIALYSIS (HCC): ICD-10-CM

## 2025-07-15 DIAGNOSIS — M54.9 ACUTE ON CHRONIC BACK PAIN: Primary | ICD-10-CM

## 2025-07-15 DIAGNOSIS — G89.29 ACUTE ON CHRONIC BACK PAIN: Primary | ICD-10-CM

## 2025-07-15 DIAGNOSIS — S31.809S WOUND OF BUTTOCK, UNSPECIFIED LATERALITY, SEQUELA: ICD-10-CM

## 2025-07-15 DIAGNOSIS — R29.6 FREQUENT FALLS: ICD-10-CM

## 2025-07-15 DIAGNOSIS — F33.1 MAJOR DEPRESSIVE DISORDER, RECURRENT EPISODE, MODERATE (HCC): ICD-10-CM

## 2025-07-15 DIAGNOSIS — F41.1 GENERALIZED ANXIETY DISORDER: ICD-10-CM

## 2025-07-15 DIAGNOSIS — R26.81 GAIT INSTABILITY: ICD-10-CM

## 2025-07-15 DIAGNOSIS — K08.539 FRACTURE OF DENTURE: ICD-10-CM

## 2025-07-15 DIAGNOSIS — Z99.2 ESRD (END STAGE RENAL DISEASE) ON DIALYSIS (HCC): ICD-10-CM

## 2025-07-15 PROCEDURE — 99309 SBSQ NF CARE MODERATE MDM 30: CPT | Performed by: NURSE PRACTITIONER

## 2025-07-15 NOTE — PROGRESS NOTES
25 APRN Encounter 10:45 am  Radha Winters   ISIAH 1960    Follow up Visit:  Recurrent falls, chronic back pain, ESRD on dialysis, lesion buttocks    Met with patient sitting up in her wheelchair.  Patient states she prefers being around the nurses station so she can people watch and go outside to smoke throughout the day and evening.   Patient reports she is claustrophobic and is unable to sit in her room all day.  She does not care for playing games or joining in facility activities.  She goes in her room to eat her meals and sleep.      Patient c/o chronic back pain working with restorative therapy to ambulate up and down the halls.  Patient taking [place] 5/325 prn for pain.      Due to end stage renal disease, patient is on dialysis every Tues, Thurs, Saturday.  Will continue HD as an outpatient once discharged.    Right lower extremity edema; patient declines wearing tubi- or keeping her legs elevated while in her wheelchair.  Will continue to monitor;  edema has improved.    Left buttock wound.  Patient was advised to follow up with Dermatology per our wound APN. Patient is scheduled to see Derm on 25 at 9 am.     [facility] Hospital Admission:  25 to 25  Admitted to [place] 25    Hospital Discharge Diagnoses:  Acute Renal Failure superimposed on CKD, unspecified acute renal failure type.  CKD  Non-traumatic rhabdomyolysis  Transaminitis    Chief complaint today: low back pain, buttock pain    HPI  (hospital note)  64-year-old female with a history of hypertension, coronary artery disease, hyperlipidemia, hypothyroidism, and chronic leg weakness who presents with multiple issues. She has a two-year history of recurrent falls and was previously evaluated at [facility] [place], [place], where she was told her falls were related to her sodium levels. The patient experienced a fall 2-3 days ago and presented to the emergency room but refused admission at that  time. Laboratory studies then revealed elevated creatinine. Today, she experienced two more falls - initially landing on her back, followed by a second fall. She developed occipital headache and back pain following these events. She denies loss of consciousness, neck pain, or leg pain. Additionally, the patient reports poor oral intake and decreased appetite over the past several days.  Patient was admitted to the hospital; patient was hydrated aggressively.  Patient was seen by renal also.  Patient had worsening of her kidney function.  Patient was started on hemodialysis.  Patient was also having respiratory failure.    Past Medical History[1]  Past Surgical History[2]  Family History[3]  Short Social Hx on File[4]    Allergies[5]  CODE STATUS:  Full Code    ADVANCED CARE PLANNING TEAM: Will need family care plan updates.  Patient states she has a daughter who lives in [place].    CURRENT MEDICATIONS - reviewed and updated on SNF EMAR  Tylenol 650 q 6 hrs prn  Aspirin 81 mg daily  Buspirone 30 mg 3 x daily  Calcitriol 0.25 mcg daily  Coreg 3.125 mg 2 x daily\  Chlorpromazine 25 mg 4 x daily  Diazepam 5 mg q 8 hrs prn  Docusate soc 100 mg 2 x daily  Duloxetine 60 mg 2 x daily  Fluticasone salmeterol 250/50 -1 inhalation 2 x daily  Norco 5/325 -   1 tab prn q 6 hrs.   Ipratropium  0.5/2.52 x daily  Levothyroxine 150 mcg every morning before breakfast  Midodrine 10 mg tid  Miralax 17 daily prn  Seroquel 50 mg 2 x daily  Seroquel  mg nightly  Sacubitril Valsartan 24/26 one tab 2 x daily  Aquaphor ointment to arms,legs and forehead     SUBJECTIVE:  denies headache, chest pain, SOB.  C/O chronic back pain and buttock pain.  Denies n/v/d/c.        PHYSICAL EXAM: 130/71. P 87. RR 18. POx 97%. T 97.8. wgt 218.5#;  226.2#, 228.6#    GENERAL HEALTH:well developed, well nourished, in no apparent distress  except for chronic back pain.  LINES, TUBES, DRAINS:  perm catheter for dialysis; dressing clean and dry.  SKIN: warm,  dry   WOUND: Buttock wound. Daily Medihoney &  Ca+ Alginate drsg. F/U with derm 7/16/25  EYES: PERRLA, sclera anicteric, conjunctiva normal; no eye drainage   HENT:no nasal drainage, mucous membranes moist  NECK: supple  BREAST: deferred exam   RESPIRATORY: lungs clear  CARDIOVASCULAR:  RRR, rate 87  ABDOMEN: BS+, soft, nondistended; nontender, no guarding, no rebound tenderness. Good appetite.  :Deferred   hemodialysis 3 x week; changed to Tues/Thurs/Saturday.  LYMPHATIC:no lymphedema  MUSCULOSKELETAL:   frequent falls;  back pain, weakness, deconditioning.  EXTREMITIES/VASCULAR: trace edema. Declines Tubigrips and w/c leg rests.  NEUROLOGIC:follows commands  PSYCHIATRIC: alert and oriented x 3; affect appropriate , anxious , and mood disorder (not new)    MEDICAL DECISION MAKING: Patient making her own decisions.  Patient has a friend Samara Pereira  [phone]; emergency contact.    Lab Results Reviewed:  6/9/25  WBC 6.0. Hgb 9.9.  Plts 213.  Na 138. K 4.4. Cl 98. CO2 26. BUN 34. Creat 5.84 (dialysis)  Labs Reviewed: 6/17/25  WBC 5.0. HGB 10.2. PLTS 210.  . K 3.9. CL 97. CO2 28. BUN 18 CREAT 4.3. GFR 10 - HD. SGOT 15. SGPT 11.  Labs Reviewed:  6/24/25  WBC 4.8. HGB 9.7. PLTS 233.  . K 4.5. CL 95. CO2 30. BUN 19. CREAT 4.65. GFR 10  Labs Reviewed 7/7/25  WBC 4.8 HGB 9.6. CRIT 32.3. PLTS 224.  NA 19. K 47 CL 98. CO2 30 BUN 34. CREAT 7.34 (HD). GFR 6    See Plan Below:      Acute Renal Failure/ ESRD  Right chest dialysis catheter  Hemodialysis every T/TH/SAT  Trend weekly labs  R catheter dressing change in dialysis.  Transition to outpatient dialysis at discharge  F/U with Nephrologist post dialysis    Frequent Falls   Rhabdomyolysis resolved  Closely monitor for falls  [place] 5/325  1 tab prn q 6 hrs   Tylenol 650 q 6 hrs prn  High fall risk  Continue Restorative therapy     Right Buttock open wound/ atypical lesion  Wound Team following  Cleanse w/ NS, apply medi-honey, Ca+Alginate, secure  w/drsg  Derm appointment 7/16/25    HTN/CHF - EF 30-35%  Coreg 3.125 mg bid  Sacubitril Valsartin 24/26 twice daily    Acute Respiratory Failure  Intubated and extubated in hospital  Advair Diskus 250/50 one inh q 12 hrs  Duonebs 2 x daily / prn    Transaminitis resolved  -   SGOT 14. SGPT 8.  Likely secondary to Rhabdomyolysis  Trend weekly labs    Hypothyroidism  Levothyroxine 150 mcg Monday thru Saturday - not Sunday    HX: Psychotic Disorder with Anxiety and Depression  Psychiatry following; continue medications; no changes per psyche APN  Buspirone 30 mg 3 x daily  Duloxetine 60 mg 2 x daily  Seroquel 300 mg nightly  Seroquel 50 mg 2 x daily  Valium 5 mg q 8 hrs prn  Thorazine 25 mg 4 x daily     Weakness / Deconditioning  Continue restorative therapy    Disposition:  SW to work with patient to arrange a safe discharge.  Patient lives alone in an apartment in [place].    *Follow-Up with PCP within 1-2 weeks following [facility] discharge.   *Follow up with Pulmonary, Cardiology,and nephrology.  Follow up with Dermatologist  7/16/25 for chronic atypical lesion of buttocks    *Greater than 35 minutes spent w/ patient and staff, including but not limited to/ reviewing present status, needs, abilities with disciplines, reviewing medical records, vital signs, labs, completing med rec and entering orders to establish plan of care in [facility].  Note to patient: The 21st Century Cures Act makes medical notes like these available to patients in the interest of transparency. However, this is a medical document intended as peer to peer communication. It is written in medical language and may contain abbreviations or verbiage that are unfamiliar. It may appear blunt or direct. Medical documents are intended to carry relevant information, facts as evident, and the clinical opinion of the practitioner who signs the document.    AXEL Parker (Daya)  7/11/25  10:45 am  [facility]                                      [1]    Past Medical History:   Essential hypertension    Heart attack (HCC)    Hyperlipidemia    Thyroid disease   [2]   Past Surgical History:  Procedure Laterality Date    Appendectomy            Removal gallbladder     [3] No family history on file.  [4]   Social History  Socioeconomic History    Marital status:    Tobacco Use    Smoking status: Every Day     Types: Cigarettes    Smokeless tobacco: Never     Social Drivers of Health     Food Insecurity: No Food Insecurity (2025)    NCSS - Food Insecurity     Worried About Running Out of Food in the Last Year: No     Ran Out of Food in the Last Year: No   Transportation Needs: No Transportation Needs (2025)    NCSS - Transportation     Lack of Transportation: No   Housing Stability: Not At Risk (2025)    NCSS - Housing/Utilities     Has Housing: Yes     Worried About Losing Housing: No     Unable to Get Utilities: No   [5] No Known Allergies

## 2025-07-16 NOTE — PROGRESS NOTES
7/15/25 APRN Encounter  3pm  Radha Winters  1960.    Follow up visit:  chronic back pain,  ESRD on dialysis, Atypical lesion buttocks, lower dentures cracked today.    Met with patient sitting in her wheelchair.  Patient reports she went to a dermatologist or an urgent care with [facility] .  States her insurance doesn't cover many of the dermatology physicians.  Patient reports a physician ordered a topical ointment and an oral antibiotic.  Contacted  to follow up with orders.  No orders received during this encounter.    ESRD on Dialysis.  Patient has a perm catheter received dialysis at [facility] on / and .  Will f/u with dialysis regarding fluid balance.  No significant edema noted both lower extremities.  Patient declines wearing Tubi- and/or elevating LE throughout the day.    Back pain:  patient c/o chronic back pain relieved by Tylenol, and [place] 5/325 prn.     [facility] Hospital Admission:  25 to 25  Admitted to [place] 25    Hospital Discharge Diagnoses:  Acute Renal Failure superimposed on CKD, unspecified acute renal failure type.  CKD  Non-traumatic rhabdomyolysis  Transaminitis    Chief complaint today: low back pain, buttock pain    HPI  (hospital note)  64-year-old female with a history of hypertension, coronary artery disease, hyperlipidemia, hypothyroidism, and chronic leg weakness who presents with multiple issues. She has a two-year history of recurrent falls and was previously evaluated at [facility], where she was told her falls were related to her sodium levels. The patient experienced a fall 2-3 days ago and presented to the emergency room but refused admission at that time. Laboratory studies then revealed elevated creatinine. Today, she experienced two more falls - initially landing on her back, followed by a second fall. She developed occipital headache and back pain following these events.  She denies loss of consciousness, neck pain, or leg pain. Additionally, the patient reports poor oral intake and decreased appetite over the past several days.  Patient was admitted to the hospital; patient was hydrated aggressively.  Patient was seen by renal also.  Patient had worsening of her kidney function.  Patient was started on hemodialysis.  Patient was also having respiratory failure.    Past Medical History[1]  Past Surgical History[2]  Family History[3]  Short Social Hx on File[4]    Allergies[5]  CODE STATUS:  Full Code    ADVANCED CARE PLANNING TEAM: Will need family care plan updates.  Patient states she has a daughter who lives in [place].    CURRENT MEDICATIONS - reviewed and updated on SNF EMAR  Tylenol 650 q 6 hrs prn  Aspirin 81 mg daily  Buspirone 30 mg 3 x daily  Calcitriol 0.25 mcg daily  Coreg 3.125 mg 2 x daily\  Chlorpromazine 25 mg 4 x daily  Diazepam 5 mg q 8 hrs prn  Docusate soc 100 mg 2 x daily  Duloxetine 60 mg 2 x daily  Fluticasone salmeterol 250/50 -1 inhalation 2 x daily  Norco 5/325 -   1 tab prn q 6 hrs.   Ipratropium  0.5/2.52 x daily  Levothyroxine 150 mcg every morning before breakfast  Midodrine 10 mg tid  Miralax 17 daily prn  Seroquel 50 mg 2 x daily  Seroquel  mg nightly  Sacubitril Valsartan 24/26 one tab 2 x daily  Aquaphor ointment to arms,legs and forehead     SUBJECTIVE:  denies headache, chest pain, SOB.  C/O chronic back pain and buttock pain.  Denies n/v/d/c.  C/O feeling anxious and frustrated over needing dialysis.    PHYSICAL EXAM:  128/74. P 87. RR 16. POx 90%. T 98.6. wgt 218.5#;  226.2#, 228.6#    GENERAL HEALTH:well developed, well nourished, in no apparent distress  except for chronic  pain.  LINES, TUBES, DRAINS:  perm catheter for dialysis; dressing clean and dry.  SKIN: warm, dry   WOUND: Buttock wound. Daily Medihoney &  Ca+ Alginate drsg. F/U with derm 7/15/25.  Awaiting new orders from visit today.  EYES: PERRLA, sclera anicteric, conjunctiva  normal; no eye drainage   HENT: patient report lower denture cracked today.  no nasal drainage, mucous membranes moist  NECK: supple  BREAST: deferred exam   RESPIRATORY: lungs clear  CARDIOVASCULAR:  RRR, rate 87  ABDOMEN: BS+, soft, nondistended; nontender, no guarding, no rebound tenderness. Good appetite. Weight stable.  :Deferred   hemodialysis 3 x week; Tues/Thurs/Saturday.  LYMPHATIC:no lymphedema  MUSCULOSKELETAL:   frequent falls;  back pain, weakness, deconditioning.  EXTREMITIES/VASCULAR: trace edema. Declines Tubigrips and w/c leg rests.  NEUROLOGIC:follows commands  PSYCHIATRIC: alert and oriented x 3; affect appropriate , anxious , and mood disorder (not new)    MEDICAL DECISION MAKING: Patient makes her own decisions.  Patient has a friend Smaara Pereira  [phone]; emergency contact.    Lab Results Reviewed:  6/9/25  WBC 6.0. Hgb 9.9.  Plts 213.  Na 138. K 4.4. Cl 98. CO2 26. BUN 34. Creat 5.84 (dialysis)  Labs Reviewed: 6/17/25  WBC 5.0. HGB 10.2. PLTS 210.  . K 3.9. CL 97. CO2 28. BUN 18 CREAT 4.3. GFR 10 - HD. SGOT 15. SGPT 11.  Labs Reviewed:  6/24/25  WBC 4.8. HGB 9.7. PLTS 233.  . K 4.5. CL 95. CO2 30. BUN 19. CREAT 4.65. GFR 10  Labs Reviewed 7/7/25  WBC 4.8 HGB 9.6. CRIT 32.3. PLTS 224.  NA 19. K 47 CL 98. CO2 30 BUN 34. CREAT 7.34 (HD). GFR 6    See Plan Below:     New - lower denture cracked in half today 7/15/25  F/U with patient's dentist in [place]  Patient will adjust menu selections     Acute Renal Failure/ ESRD  Right chest dialysis catheter  Hemodialysis every T/TH/SAT  Trend weekly labs  R catheter dressing change in dialysis.  Transition to outpatient dialysis at discharge  F/U with Nephrologist post dialysis    Frequent Falls   Rhabdomyolysis resolved  Closely monitor for falls  [place] 5/325  1 tab prn q 6 hrs   Tylenol 650 q 6 hrs prn  High fall risk  Continue Restorative therapy     Right Buttock open wound/ atypical lesion  Wound Team  following  Continue to cleanse w/ NS, apply medi-honey, Ca+Alginate, secure w/drsg  Derm appointment 7/15/25  Awaiting new orders from physician patient saw today    HTN/CHF - EF 30-35%  Coreg 3.125 mg bid  Sacubitril Valsartin 24/26 twice daily    Acute Respiratory Failure  Intubated and extubated in hospital  Advair Diskus 250/50 one inh q 12 hrs  Duonebs 2 x daily / prn    Transaminitis resolved  -   SGOT 14. SGPT 8.  Likely secondary to Rhabdomyolysis  Trend weekly labs    Hypothyroidism  Levothyroxine 150 mcg Monday thru Saturday - not Sunday    HX: Psychotic Disorder with Anxiety and Depression  Psychiatry following; continue medications; no changes per psyche APN  Buspirone 30 mg 3 x daily  Duloxetine 60 mg 2 x daily  Seroquel 300 mg nightly  Seroquel 50 mg 2 x daily  Valium 5 mg q 8 hrs prn  Thorazine 25 mg 4 x daily     Weakness / Deconditioning  Continue restorative therapy    Disposition:  SW to work with patient to arrange a safe discharge.  Patient lives alone in an apartment in [place].    *Follow-Up with PCP within 1-2 weeks following [facility] discharge.   *Follow up with Pulmonary, Cardiology,and nephrology.   Follow up with Dermatologist  7/16/25 for chronic atypical lesion of buttocks    *Greater than 35 minutes spent w/ patient and staff, including but not limited to/ reviewing present status, needs, abilities with disciplines, reviewing medical records, vital signs, labs, completing med rec and entering orders to establish plan of care in [facility].  Note to patient: The 21st Century Cures Act makes medical notes like these available to patients in the interest of transparency. However, this is a medical document intended as peer to peer communication. It is written in medical language and may contain abbreviations or verbiage that are unfamiliar. It may appear blunt or direct. Medical documents are intended to carry relevant information, facts as evident, and the clinical opinion of the practitioner who  signs the document.    Gena Flores (Daya), APRN  7/15/25  3pm                                      [1]   Past Medical History:   Essential hypertension    Heart attack (HCC)    Hyperlipidemia    Thyroid disease   [2]   Past Surgical History:  Procedure Laterality Date    Appendectomy            Removal gallbladder     [3] No family history on file.  [4]   Social History  Socioeconomic History    Marital status:    Tobacco Use    Smoking status: Every Day     Types: Cigarettes    Smokeless tobacco: Never     Social Drivers of Health     Food Insecurity: No Food Insecurity (2025)    NCSS - Food Insecurity     Worried About Running Out of Food in the Last Year: No     Ran Out of Food in the Last Year: No   Transportation Needs: No Transportation Needs (2025)    NCSS - Transportation     Lack of Transportation: No   Housing Stability: Not At Risk (2025)    NCSS - Housing/Utilities     Has Housing: Yes     Worried About Losing Housing: No     Unable to Get Utilities: No   [5] No Known Allergies

## 2025-07-22 ENCOUNTER — SNF VISIT (OUTPATIENT)
Facility: SKILLED NURSING FACILITY | Age: 65
End: 2025-07-22

## 2025-07-22 VITALS
BODY MASS INDEX: 37 KG/M2 | RESPIRATION RATE: 18 BRPM | DIASTOLIC BLOOD PRESSURE: 76 MMHG | TEMPERATURE: 97 F | SYSTOLIC BLOOD PRESSURE: 132 MMHG | OXYGEN SATURATION: 92 % | WEIGHT: 210 LBS | HEART RATE: 83 BPM

## 2025-07-22 DIAGNOSIS — S31.809S WOUND OF BUTTOCK, UNSPECIFIED LATERALITY, SEQUELA: ICD-10-CM

## 2025-07-22 DIAGNOSIS — F33.1 MAJOR DEPRESSIVE DISORDER, RECURRENT EPISODE, MODERATE (HCC): ICD-10-CM

## 2025-07-22 DIAGNOSIS — N18.6 ESRD (END STAGE RENAL DISEASE) ON DIALYSIS (HCC): ICD-10-CM

## 2025-07-22 DIAGNOSIS — R26.81 GAIT INSTABILITY: ICD-10-CM

## 2025-07-22 DIAGNOSIS — Z99.2 ESRD (END STAGE RENAL DISEASE) ON DIALYSIS (HCC): ICD-10-CM

## 2025-07-22 DIAGNOSIS — R29.6 FREQUENT FALLS: ICD-10-CM

## 2025-07-22 DIAGNOSIS — F41.1 GENERALIZED ANXIETY DISORDER: ICD-10-CM

## 2025-07-22 DIAGNOSIS — M54.9 ACUTE ON CHRONIC BACK PAIN: Primary | ICD-10-CM

## 2025-07-22 DIAGNOSIS — G89.29 ACUTE ON CHRONIC BACK PAIN: Primary | ICD-10-CM

## 2025-07-22 DIAGNOSIS — K08.539 FRACTURE OF DENTURE: ICD-10-CM

## 2025-07-22 PROCEDURE — 99309 SBSQ NF CARE MODERATE MDM 30: CPT | Performed by: NURSE PRACTITIONER

## 2025-07-22 RX ORDER — MUPIROCIN 2 %
OINTMENT (GRAM) TOPICAL 2 TIMES DAILY
COMMUNITY

## 2025-07-23 NOTE — PROGRESS NOTES
25 APRN Encounter 1pm  Radha Winters  ISIAH  1960    Follow up visit:  L buttock lesion. Back pain s/p fall. ESRD on dialysis.    Left buttock lesion:  Patient seen outside physician for left buttock lesion.  Orders entered:    Keflex 250mg 2 x daily thru 25.  Mupirocin 2% - apply to left buttock lesion twice daily.  Patient continues to be seen by wound team for daily wound care.    Patient continues to c/o back pain tolerating Norco 5/325 prn and Tylenol prn pain with good relief.    ESRD:  patient continues to receive hemodialysis Tues/Th/Sat via R perm catheter in facility.   working on arranging outpatient dialysis once patient is discharged from [facility].  Patient lives in [place] and will arrange dialysis close to home.    [facility] Admission:  25 to 25  Admitted to [place] 25    Hospital Discharge Diagnoses:  Acute Renal Failure superimposed on CKD, unspecified acute renal failure type.  CKD  Non-traumatic rhabdomyolysis  Transaminitis    HPI  (hospital note)  64-year-old female with a history of hypertension, coronary artery disease, hyperlipidemia, hypothyroidism, and chronic leg weakness who presents with multiple issues. She has a two-year history of recurrent falls and was previously evaluated at [facility], where she was told her falls were related to her sodium levels. The patient experienced a fall 2-3 days ago and presented to the emergency room but refused admission at that time. Laboratory studies then revealed elevated creatinine. Today, she experienced two more falls - initially landing on her back, followed by a second fall. She developed occipital headache and back pain following these events. She denies loss of consciousness, neck pain, or leg pain. Additionally, the patient reports poor oral intake and decreased appetite over the past several days.  Patient was admitted to the hospital; patient was  hydrated aggressively.  Patient was seen by renal also.  Patient had worsening of her kidney function.  Patient was started on hemodialysis.  Patient was also having respiratory failure.    Past Medical History[1]  Past Surgical History[2]  Family History[3]  Short Social Hx on File[4]    Allergies[5]  CODE STATUS:  Full Code    ADVANCED CARE PLANNING TEAM: Will need family care plan updates.  Patient states she has a daughter who lives in [place]. Discharge planning in place with .    CURRENT MEDICATIONS - reviewed and updated on SNF EMAR  Tylenol 650 q 6 hrs prn  Aspirin 81 mg daily  Buspirone 30 mg 3 x daily  Calcitriol 0.25 mcg daily  Coreg 3.125 mg 2 x daily\  Chlorpromazine 25 mg 4 x daily  Diazepam 5 mg q 8 hrs prn  Docusate soc 100 mg 2 x daily  Duloxetine 60 mg 2 x daily  Fluticasone salmeterol 250/50 -1 inhalation 2 x daily  Norco 5/325 -   1 tab prn q 6 hrs.   Ipratropium  0.5/2.52 x daily  Levothyroxine 150 mcg every morning before breakfast  Midodrine 10 mg tid  Miralax 17 daily prn  Seroquel 50 mg 2 x daily  Seroquel  mg nightly  Sacubitril Valsartan 24/26 one tab 2 x daily  Aquaphor ointment to arms,legs and forehead   Keflex 250 mg 2 x daily thru 7/24/25  Mupirocin topical 2 x daily    SUBJECTIVE:  denies headache, chest pain, SOB.  C/O chronic back pain and buttock pain.  Denies n/v/d/c.  C/O feeling anxious and frustrated over needing dialysis long term.  Anxious to return home..    PHYSICAL EXAM:  132/76. P 83.  RR 18.  POx 92%. T 97.2.  wgt 210#; 218.5#;  226.2#, 228.6#    GENERAL HEALTH:well developed, well nourished, in no apparent distress  except for chronic back and left buttock pain.  LINES, TUBES, DRAINS:  perm catheter for dialysis; dressing clean and dry.  SKIN: warm, dry   WOUND: Buttock wound. Daily Mupirocin 2 x daily and Keflex 250 2 x daily x 7 days.    EYES: PERRLA, sclera anicteric, conjunctiva normal; no eye drainage   HENT: no nasal drainage, mucous membranes  moist  NECK: supple  BREAST: deferred exam   RESPIRATORY: lungs clear  CARDIOVASCULAR:  RRR, rate 83  ABDOMEN: BS+, soft, nondistended; nontender, no guarding, no rebound tenderness. Good appetite.  :Deferred   hemodialysis 3 x week; Tues/Thurs/Saturday.  LYMPHATIC:no lymphedema  MUSCULOSKELETAL:   frequent falls;  back pain, weakness, deconditioning.  EXTREMITIES/VASCULAR: trace edema. Declines Tubigrips and w/c leg rests.  NEUROLOGIC:follows commands  PSYCHIATRIC: alert and oriented x 3; affect appropriate , anxious , and mood disorder (not new)    MEDICAL DECISION MAKING: Patient makes her own decisions.  Patient has a friend Samara Pereira  [phone]; emergency contact.  Currently arranging discharge and outpatient dialysis by .    Lab Results Reviewed:  6/9/25  WBC 6.0. Hgb 9.9.  Plts 213.  Na 138. K 4.4. Cl 98. CO2 26. BUN 34. Creat 5.84 (dialysis)  Labs Reviewed: 6/17/25  WBC 5.0. HGB 10.2. PLTS 210.  . K 3.9. CL 97. CO2 28. BUN 18 CREAT 4.3. GFR 10 - HD. SGOT 15. SGPT 11.  Labs Reviewed:  6/24/25  WBC 4.8. HGB 9.7. PLTS 233.  . K 4.5. CL 95. CO2 30. BUN 19. CREAT 4.65. GFR 10  Labs Reviewed 7/7/25  WBC 4.8 HGB 9.6. CRIT 32.3. PLTS 224.  NA 19. K 47 CL 98. CO2 30 BUN 34. CREAT 7.34 (HD). GFR 6  Labs reviewed 7/17/25  WBC 5.1. HGB 10.4. PLTS 203  . K 4.4. . CO2 30. BUN 32. CREAT 6.22. GFR 7 -     See Plan Below:     lower denture cracked in half 7/15/25  Patient to f/u with dentist in [place]  Patient will adjust menu selections     Acute Renal Failure/ ESRD  Right chest dialysis catheter  Hemodialysis every T/TH/SAT  Trend weekly labs  R catheter dressing changed in dialysis.  Transition to outpatient dialysis at discharge   F/U with Nephrologist post dialysis    Frequent Falls   Rhabdomyolysis resolved  Closely monitor for falls  [place] 5/325  1 tab prn q 6 hrs   Tylenol 650 q 6 hrs prn  High fall risk  Continue Restorative therapy     Right Buttock open  wound/ atypical lesion  Wound Team following  Continue to cleanse w/ NS, apply Mupirocin ointment 2 x daily.  Keflex 250 mg 2 x daily thru 7/24/25  F/U with Derm    HTN/CHF - EF 30-35%  Coreg 3.125 mg bid  Sacubitril Valsartin 24/26 twice daily    Acute Respiratory Failure resolved  Intubated and extubated in hospital  Advair Diskus 250/50 one inh q 12 hrs  Duonebs 2 x daily / prn    Transaminitis resolved  -   SGOT 14. SGPT 8.  Likely secondary to Rhabdomyolysis  Trend weekly labs    Hypothyroidism  Levothyroxine 150 mcg Monday thru Saturday - not Sunday    HX: Psychotic Disorder with Anxiety and Depression  Psychiatry following; continue medications; no changes per psyche APN  Buspirone 30 mg 3 x daily  Duloxetine 60 mg 2 x daily  Seroquel 300 mg nightly  Seroquel 50 mg 2 x daily  Valium 5 mg q 8 hrs prn  Thorazine 25 mg 4 x daily     Weakness / Deconditioning  Continue restorative therapy    Disposition:  SW working with patient to arrange a safe discharge.  Patient lives alone in an apartment in [place]; arranging dialysis close to her home.    *Follow-Up with PCP within 1-2 weeks following [facility] discharge.   *Follow up with Pulmonary, Cardiology,and nephrology.   Follow up with Dermatologist      *Greater than 35 minutes spent w/ patient and staff, including but not limited to/ reviewing present status, needs, abilities with disciplines, reviewing medical records, vital signs, labs, completing med rec and entering orders to establish plan of care in [facility].  Note to patient: The 21st Century Cures Act makes medical notes like these available to patients in the interest of transparency. However, this is a medical document intended as peer to peer communication. It is written in medical language and may contain abbreviations or verbiage that are unfamiliar. It may appear blunt or direct. Medical documents are intended to carry relevant information, facts as evident, and the clinical opinion of the practitioner who  signs the document.    Gena Flores (Daya), APRN  25  1pm  [email or web address]                                      [1]   Past Medical History:   Essential hypertension    Heart attack (HCC)    Hyperlipidemia    Thyroid disease   [2]   Past Surgical History:  Procedure Laterality Date    Appendectomy            Removal gallbladder     [3] No family history on file.  [4]   Social History  Socioeconomic History    Marital status:    Tobacco Use    Smoking status: Every Day     Types: Cigarettes    Smokeless tobacco: Never     Social Drivers of Health     Food Insecurity: No Food Insecurity (2025)    NCSS - Food Insecurity     Worried About Running Out of Food in the Last Year: No     Ran Out of Food in the Last Year: No   Transportation Needs: No Transportation Needs (2025)    NCSS - Transportation     Lack of Transportation: No   Housing Stability: Not At Risk (2025)    NCSS - Housing/Utilities     Has Housing: Yes     Worried About Losing Housing: No     Unable to Get Utilities: No   [5] No Known Allergies

## 2025-07-24 ENCOUNTER — SNF DISCHARGE (OUTPATIENT)
Facility: SKILLED NURSING FACILITY | Age: 65
End: 2025-07-24

## 2025-07-24 VITALS
WEIGHT: 210 LBS | DIASTOLIC BLOOD PRESSURE: 67 MMHG | OXYGEN SATURATION: 96 % | TEMPERATURE: 98 F | HEART RATE: 67 BPM | SYSTOLIC BLOOD PRESSURE: 104 MMHG | BODY MASS INDEX: 37 KG/M2 | RESPIRATION RATE: 18 BRPM

## 2025-07-24 DIAGNOSIS — S31.809S WOUND OF BUTTOCK, UNSPECIFIED LATERALITY, SEQUELA: ICD-10-CM

## 2025-07-24 DIAGNOSIS — R29.6 FREQUENT FALLS: ICD-10-CM

## 2025-07-24 DIAGNOSIS — F33.1 MAJOR DEPRESSIVE DISORDER, RECURRENT EPISODE, MODERATE (HCC): ICD-10-CM

## 2025-07-24 DIAGNOSIS — M54.9 ACUTE ON CHRONIC BACK PAIN: Primary | ICD-10-CM

## 2025-07-24 DIAGNOSIS — G89.29 ACUTE ON CHRONIC BACK PAIN: Primary | ICD-10-CM

## 2025-07-24 DIAGNOSIS — R53.1 WEAKNESS GENERALIZED: ICD-10-CM

## 2025-07-24 DIAGNOSIS — K08.539 FRACTURE OF DENTURE: ICD-10-CM

## 2025-07-24 DIAGNOSIS — R26.81 GAIT INSTABILITY: ICD-10-CM

## 2025-07-24 DIAGNOSIS — N18.6 ESRD (END STAGE RENAL DISEASE) ON DIALYSIS (HCC): ICD-10-CM

## 2025-07-24 DIAGNOSIS — Z99.2 ESRD (END STAGE RENAL DISEASE) ON DIALYSIS (HCC): ICD-10-CM

## 2025-07-24 DIAGNOSIS — F41.1 GENERALIZED ANXIETY DISORDER: ICD-10-CM

## 2025-07-24 PROCEDURE — 99316 NF DSCHRG MGMT 30 MIN+: CPT | Performed by: NURSE PRACTITIONER

## 2025-07-24 NOTE — PROGRESS NOTES
7/24/25 Discharge Encounter  12:30 pm    Scheduled Discharge:  Saturday, 7/26/25    Radha Winters, 9/20/1960, 64 year old, female is being discharged from [place] 7/26/25.     DISCHARGE SUMMARY    Date of Admission to [place]:      6/7/25    Date of Discharge from [place]:  7/26/25                                 Admitting Diagnoses:  frequent  falls, back injury with chronic pain.  ESRD on dialysis, Left buttock wound/lesion.      Reason for Admission to [facility]: Dialysis, wound care, Rehabilitation and strengthening.    Hospital Discharge Diagnoses:  Acute Renal Failure superimposed on CKD, unspecified acute renal failure type.  CKD  Non-traumatic rhabdomyolysis  Transaminitis    HPI  (hospital note)  64-year-old female with a history of hypertension, coronary artery disease, hyperlipidemia, hypothyroidism, and chronic leg weakness who presents with multiple issues. She has a two-year history of recurrent falls and was previously evaluated at [facility], where she was told her falls were related to her sodium levels. The patient experienced a fall 2-3 days ago and presented to the emergency room but refused admission at that time. Laboratory studies then revealed elevated creatinine. Today, she experienced two more falls - initially landing on her back, followed by a second fall. She developed occipital headache and back pain following these events. She denies loss of consciousness, neck pain, or leg pain. Additionally, the patient reports poor oral intake and decreased appetite over the past several days.  Patient was admitted to the hospital; patient was hydrated aggressively.  Patient was seen by renal also.  Patient had worsening of her kidney function.  Patient was started on hemodialysis.  Patient was also having respiratory failure    [Past Medical History]   Essential hypertension    Heart attack (HCC)    Hyperlipidemia    Thyroid disease     Procedure  Laterality Date    Appendectomy            Removal gallbladder       [Family History].  No family history on file.    Social History  Socioeconomic History    Marital status:    Tobacco Use    Smoking status: Every Day     Types: Cigarettes    Smokeless tobacco: Never     Social Drivers of Health     Food Insecurity: No Food Insecurity (2025)    NCSS - Food Insecurity     Worried About Running Out of Food in the Last Year: No     Ran Out of Food in the Last Year: No   Transportation Needs: No Transportation Needs (2025)    NCSS - Transportation     Lack of Transportation: No   Housing Stability: Not At Risk (2025)    NCSS - Housing/Utilities     Has Housing: Yes     Worried About Losing Housing: No     Unable to Get Utilities: No      PHYSICAL EXAM:  104/67. P 67. RR 18. POx  9 6%. T 97.8. wgt 210#.  GENERAL HEALTH:well developed, well nourished, in no apparent distress   LINES, TUBES, DRAINS:   Right HD catheter  SKIN: warm, dry  WOUND: see wound assessment of left buttock lesion,   EYES: PERRLA, EOMI, sclera anicteric, conjunctiva normal; no nystagmus, no drainage.  HENT: lower dentures cracked; unable to wear bottoms.  mucous membranes pink, moist,  no visible cerumen.   NECK: supple  BREAST: deferred exam   RESPIRATORY: lungs clear  CARDIOVASCULAR:  RRR, rate 67  ABDOMEN: active BS+, soft, nondistended;  no bruits; nontender, no guarding, no rebound tenderness.  : deferred  LYMPHATIC:no lymphedema  MUSCULOSKELETAL:  weakness upper and lower extremities; high fall risk.  EXTREMITIES/VASCULAR: trace LE edema.  NEUROLOGIC:follows commands  PSYCHIATRIC: alert and oriented x 3; affect appropriate  and anxious     CURRENT MANAGEMENT:    lower denture cracked in half 7/15/25  Unable to wear bottom denture.  Patient to f/u with dentist in [place]  Patient will adjust menu selections     Acute Renal Failure/ ESRD  Right chest dialysis catheter  Hemodialysis every  T/TH/SAT  Trend weekly labs  R catheter dressing changed in dialysis.  Transition to outpatient dialysis with [facility] in [place]   F/U with Nephrologist     Frequent Falls   Rhabdomyolysis resolved  Closely monitor for falls  [place] 5/325  1 tab prn q 6 hrs   Tylenol 650 q 6 hrs prn  High fall risk  Home with Home Health services: RN, PT , OT, [facility]     Right Buttock open wound/ atypical lesion  Wound Team following  Wound MD ordered:    Cleanse w/ NS, apply Mupirocin ointment 2 x daily.  Keflex 250 mg 2 x daily thru 7/24/25  F/U with Derm    HTN/CHF - EF 30-35%  Coreg 3.125 mg bid  Sacubitril Valsartin 24/26 twice daily  F/U with cardiology    Acute Respiratory Failure resolved  Intubated and extubated in hospital  Advair Diskus 250/50 one inh q 12 hrs  Duonebs 2 x daily / prn    Transaminitis resolved  -   SGOT 14. SGPT 8.  Likely secondary to Rhabdomyolysis    Hypothyroidism  Levothyroxine 150 mcg Monday thru Saturday - not Sunday    HX: Psychotic Disorder with Anxiety and Depression  Psychiatry following; continue medications; no changes per psyche APN  Buspirone 30 mg 3 x daily  Duloxetine 60 mg 2 x daily  Seroquel 300 mg nightly  Seroquel 50 mg 2 x daily  Valium 5 mg q 8 hrs prn  Thorazine 25 mg 4 x daily  F/U with outpatient psyche     Weakness / Deconditioning  Home with Home Health Services:  RN/PT/OT/HCA    Disposition:   arranged outpatient dialysis with [facility] in [place]; patient lives in [place].  Patient will return home Saturday 7/26/25 with home health services:  RN, PT, OT, HCA.     Medication Reconciliation Completed:  Yes    FOLLOW UP APPOINTMENTS  F/U with PCP within 2 weeks of discharge.  F/U with personal dentist in [facility].  F/U with Nephrologist - outpatient dialysis with [facility] in [place].  F/U with Dermatologist for left buttock lesion.         Greater than 30 minutes spent today with patient and staff, including but not limited to/ coordination of care in preparation for  discharge; reviewing present status, progress with therapy, discharge needs--DME and home health, reviewing medical records, labs, completing medication reconciliation, providing prescriptions and guiding referrals to PCP/specialists for continued medical care and oversight of ongoing needs.       Note to patient: The 21st Century Cures Act makes medical notes like these available to patients in the interest of transparency. However, this is a medical document intended as peer to peer communication. It is written in medical language and may contain abbreviations or verbiage that are unfamiliar. It may appear blunt or direct. Medical documents are intended to carry relevant information, facts as evident and the clinical opinion of the practitioner who signs the document.    AXEL Parker (Daya)  7/24/2025 1230 pm  [facility]

## (undated) NOTE — LETTER
[facility]  [address], [place], [place]    Authorization for Surgical Operation and Procedure                               I hereby authorize /Kenisha my physician and his/her assistants (if applicable), which may include medical students, residents, and/or fellows, to perform the following surgical operation/ procedure and administer such anesthesia as may be determined necessary by my physician: Operation/Procedure name (s) tunneled dialysis catheter placement on Radha Winters   2.   I recognize that during the surgical operation/procedure, unforeseen conditions may necessitate additional or different procedures than those listed above.  I, therefore, further authorize and request that the above-named surgeon, assistants, or designees perform such procedures as are, in their judgment, necessary and desirable.    3.   My surgeon/physician has discussed prior to my surgery the potential benefits, risks and side effects of this procedure; the likelihood of achieving goals; and potential problems that might occur during recuperation.  They also discussed reasonable alternatives to the procedure, including risks, benefits, and side effects related to the alternatives and risks related to not receiving this procedure.  I have had all my questions answered and I acknowledge that no guarantee has been made as to the result that may be obtained.    4.   Should the need arise during my operation/procedure, which includes change of level of care prior to discharge, I also consent to the administration of blood and/or blood products.  Further, I understand that despite careful testing and screening of blood or blood products by collecting agencies, I may still be subject to ill effects as a result of receiving a blood transfusion and/or blood products.  The following are some, but not all, of the potential risks that can occur: fever and allergic reactions, hemolytic reactions, transmission of  diseases such as Hepatitis, AIDS and Cytomegalovirus (CMV) and fluid overload.  In the event that I wish to have an autologous transfusion of my own blood, or a directed donor transfusion, I will discuss this with my physician.  Check only if Refusing Blood or Blood Products  I understand refusal of blood or blood products as deemed necessary by my physician may have serious consequences to my condition to include possible death. I hereby assume responsibility for my refusal and release the hospital, its personnel, and my physicians from any responsibility for the consequences of my refusal.    o  Refuse   5.   I authorize the use of any specimen, organs, tissues, body parts or foreign objects that may be removed from my body during the operation/procedure for diagnosis, research or teaching purposes and their subsequent disposal by hospital authorities.  I also authorize the release of specimen test results and/or written reports to my treating physician on the hospital medical staff or other referring or consulting physicians involved in my care, at the discretion of the Pathologist or my treating physician.    6.   I consent to the photographing or videotaping of the operations or procedures to be performed, including appropriate portions of my body for medical, scientific, or educational purposes, provided my identity is not revealed by the pictures or by descriptive texts accompanying them.  If the procedure has been photographed/videotaped, the surgeon will obtain the original picture, image, videotape or CD.  The hospital will not be responsible for storage, release or maintenance of the picture, image, tape or CD.    7.   I consent to the presence of a  or observers in the operating room as deemed necessary by my physician or their designees.    8.   I recognize that in the event my procedure results in extended X-Ray/fluoroscopy time, I may develop a skin reaction.    9. If I have a Do Not  Attempt Resuscitation (DNAR) order in place, that status will be suspended while in the operating room, procedural suite, and during the recovery period unless otherwise explicitly stated by me (or a person authorized to consent on my behalf). The surgeon or my attending physician will determine when the applicable recovery period ends for purposes of reinstating the DNAR order.  10. Patients having a sterilization procedure: I understand that if the procedure is successful the results will be permanent and it will therefore be impossible for me to inseminate, conceive, or bear children.  I also understand that the procedure is intended to result in sterility, although the result has not been guaranteed.   11. I acknowledge that my physician has explained sedation/analgesia administration to me including the risk and benefits I consent to the administration of sedation/analgesia as may be necessary or desirable in the judgment of my physician.    I CERTIFY THAT I HAVE READ AND FULLY UNDERSTAND THE ABOVE CONSENT TO OPERATION and/or OTHER PROCEDURE.     ____________________________________  _________________________________        ______________________________  Signature of Patient    Signature of Responsible Person                Printed Name of Responsible Person                                      ____________________________________  _____________________________                ________________________________  Signature of Witness        Date  Time         Relationship to Patient    STATEMENT OF PHYSICIAN My signature below affirms that prior to the time of the procedure; I have explained to the patient and/or his/her legal representative, the risks and benefits involved in the proposed treatment and any reasonable alternative to the proposed treatment. I have also explained the risks and benefits involved in refusal of the proposed treatment and alternatives to the proposed treatment and have answered the  patient's questions. If I have a significant financial interest in a co-management agreement or a significant financial interest in any product or implant, or other significant relationship used in this procedure/surgery, I have disclosed this and had a discussion with my patient.     _____________________________________________________              _____________________________  (Signature of Physician)                                                                                         (Date)                                   (Time)  Patient Name: Radha Winters      : 1960      Printed: 2025     Medical Record #: [id number]                                      Page 1 of 1